# Patient Record
Sex: FEMALE | Race: OTHER | Employment: FULL TIME | URBAN - METROPOLITAN AREA
[De-identification: names, ages, dates, MRNs, and addresses within clinical notes are randomized per-mention and may not be internally consistent; named-entity substitution may affect disease eponyms.]

---

## 2017-09-15 PROBLEM — C54.1 ENDOMETRIAL CA (HCC): Status: ACTIVE | Noted: 2017-09-15

## 2018-01-01 ENCOUNTER — HOSPITAL ENCOUNTER (OUTPATIENT)
Dept: INTERVENTIONAL RADIOLOGY/VASCULAR | Age: 68
Discharge: HOME | End: 2018-12-07
Attending: NURSE PRACTITIONER

## 2018-01-01 ENCOUNTER — APPOINTMENT (OUTPATIENT)
Dept: CT IMAGING | Age: 68
DRG: 674 | End: 2018-01-01
Attending: EMERGENCY MEDICINE
Payer: COMMERCIAL

## 2018-01-01 ENCOUNTER — OFFICE VISIT (OUTPATIENT)
Dept: ONCOLOGY | Age: 68
End: 2018-01-01

## 2018-01-01 ENCOUNTER — HOSPITAL ENCOUNTER (OUTPATIENT)
Dept: PET IMAGING | Age: 68
Discharge: HOME | End: 2018-11-23
Attending: NURSE PRACTITIONER
Payer: COMMERCIAL

## 2018-01-01 ENCOUNTER — HOSPITAL ENCOUNTER (OUTPATIENT)
Dept: LAB | Age: 68
Discharge: HOME | End: 2018-12-04

## 2018-01-01 ENCOUNTER — HOSPITAL ENCOUNTER (INPATIENT)
Age: 68
LOS: 10 days | Discharge: HOME HEALTH | DRG: 674 | End: 2018-12-15
Attending: EMERGENCY MEDICINE | Admitting: HOSPITALIST
Payer: COMMERCIAL

## 2018-01-01 ENCOUNTER — HOSPITAL ENCOUNTER (OUTPATIENT)
Dept: INFUSION THERAPY | Age: 68
Discharge: HOME | End: 2018-11-14
Payer: COMMERCIAL

## 2018-01-01 ENCOUNTER — APPOINTMENT (OUTPATIENT)
Dept: INTERVENTIONAL RADIOLOGY/VASCULAR | Age: 68
DRG: 674 | End: 2018-01-01
Attending: FAMILY MEDICINE
Payer: COMMERCIAL

## 2018-01-01 ENCOUNTER — HOSPITAL ENCOUNTER (OUTPATIENT)
Dept: INFUSION THERAPY | Age: 68
End: 2018-01-01
Payer: COMMERCIAL

## 2018-01-01 ENCOUNTER — APPOINTMENT (OUTPATIENT)
Dept: INTERVENTIONAL RADIOLOGY/VASCULAR | Age: 68
DRG: 674 | End: 2018-01-01
Attending: RADIOLOGY
Payer: COMMERCIAL

## 2018-01-01 ENCOUNTER — TELEPHONE (OUTPATIENT)
Dept: ONCOLOGY | Age: 68
End: 2018-01-01

## 2018-01-01 ENCOUNTER — HOSPITAL ENCOUNTER (OUTPATIENT)
Dept: INFUSION THERAPY | Age: 68
Discharge: HOME | End: 2018-11-26
Payer: COMMERCIAL

## 2018-01-01 ENCOUNTER — APPOINTMENT (OUTPATIENT)
Dept: INFUSION THERAPY | Age: 68
End: 2018-01-01
Payer: COMMERCIAL

## 2018-01-01 ENCOUNTER — DOCUMENTATION ONLY (OUTPATIENT)
Dept: ONCOLOGY | Age: 68
End: 2018-01-01

## 2018-01-01 VITALS
TEMPERATURE: 96 F | BODY MASS INDEX: 33.87 KG/M2 | WEIGHT: 198.4 LBS | DIASTOLIC BLOOD PRESSURE: 98 MMHG | OXYGEN SATURATION: 96 % | RESPIRATION RATE: 16 BRPM | HEIGHT: 64 IN | SYSTOLIC BLOOD PRESSURE: 189 MMHG | HEART RATE: 88 BPM

## 2018-01-01 VITALS
OXYGEN SATURATION: 99 % | DIASTOLIC BLOOD PRESSURE: 95 MMHG | HEART RATE: 80 BPM | RESPIRATION RATE: 18 BRPM | TEMPERATURE: 97.5 F | SYSTOLIC BLOOD PRESSURE: 176 MMHG

## 2018-01-01 VITALS
TEMPERATURE: 98.4 F | OXYGEN SATURATION: 96 % | RESPIRATION RATE: 18 BRPM | DIASTOLIC BLOOD PRESSURE: 90 MMHG | HEART RATE: 84 BPM | SYSTOLIC BLOOD PRESSURE: 174 MMHG

## 2018-01-01 VITALS
HEIGHT: 65 IN | WEIGHT: 192.5 LBS | HEART RATE: 110 BPM | OXYGEN SATURATION: 97 % | SYSTOLIC BLOOD PRESSURE: 120 MMHG | TEMPERATURE: 98.9 F | BODY MASS INDEX: 32.07 KG/M2 | DIASTOLIC BLOOD PRESSURE: 71 MMHG | RESPIRATION RATE: 17 BRPM

## 2018-01-01 DIAGNOSIS — C54.1 ENDOMETRIAL CANCER (HCC): ICD-10-CM

## 2018-01-01 DIAGNOSIS — C54.1 ENDOMETRIAL CA (HCC): Primary | ICD-10-CM

## 2018-01-01 DIAGNOSIS — N13.30 HYDRONEPHROSIS: ICD-10-CM

## 2018-01-01 DIAGNOSIS — C54.1 ENDOMETRIAL CANCER (HCC): Primary | ICD-10-CM

## 2018-01-01 DIAGNOSIS — R94.8 ABNORMAL POSITRON EMISSION TOMOGRAPHY (PET) SCAN: ICD-10-CM

## 2018-01-01 DIAGNOSIS — N17.9 ACUTE RENAL FAILURE, UNSPECIFIED ACUTE RENAL FAILURE TYPE (HCC): ICD-10-CM

## 2018-01-01 DIAGNOSIS — C54.1: ICD-10-CM

## 2018-01-01 LAB
A-G RATIO,AGRAT: 1.6 RATIO (ref 1.1–2.6)
ABSOLUTE LYMPHOCYTE COUNT, 10803: 1.9 K/UL (ref 1–4.8)
ALBUMIN SERPL-MCNC: 3.1 G/DL (ref 3.4–5)
ALBUMIN SERPL-MCNC: 3.8 G/DL (ref 3.4–5)
ALBUMIN SERPL-MCNC: 4.3 G/DL (ref 3.5–5)
ALBUMIN/GLOB SERPL: 0.9 {RATIO} (ref 0.8–1.7)
ALBUMIN/GLOB SERPL: 1 {RATIO} (ref 0.8–1.7)
ALP SERPL-CCNC: 398 U/L (ref 40–120)
ALP SERPL-CCNC: 434 U/L (ref 45–117)
ALP SERPL-CCNC: 506 U/L (ref 45–117)
ALT SERPL-CCNC: 12 U/L (ref 13–56)
ALT SERPL-CCNC: 13 U/L (ref 13–56)
ALT SERPL-CCNC: 7 U/L (ref 5–40)
AMYLASE SERPL-CCNC: 86 U/L (ref 20–120)
ANION GAP SERPL CALC-SCNC: 10 MMOL/L (ref 3–18)
ANION GAP SERPL CALC-SCNC: 10 MMOL/L (ref 3–18)
ANION GAP SERPL CALC-SCNC: 11 MMOL/L (ref 3–18)
ANION GAP SERPL CALC-SCNC: 12 MMOL/L (ref 3–18)
ANION GAP SERPL CALC-SCNC: 22 MMOL/L
ANION GAP SERPL CALC-SCNC: 7 MMOL/L (ref 3–18)
ANION GAP SERPL CALC-SCNC: 7 MMOL/L (ref 3–18)
ANION GAP SERPL CALC-SCNC: 8 MMOL/L (ref 3–18)
ANION GAP SERPL CALC-SCNC: 9 MMOL/L (ref 3–18)
APPEARANCE UR: ABNORMAL
APTT PPP: 31.8 SEC (ref 23–36.4)
APTT PPP: 34.4 SEC (ref 23–36.4)
AST SERPL W P-5'-P-CCNC: 8 U/L (ref 10–37)
AST SERPL-CCNC: 16 U/L (ref 15–37)
AST SERPL-CCNC: 22 U/L (ref 15–37)
ATRIAL RATE: 90 BPM
BACTERIA URNS QL MICRO: ABNORMAL /HPF
BASOPHILS # BLD: 0 K/UL (ref 0–0.06)
BASOPHILS # BLD: 0 K/UL (ref 0–0.1)
BASOPHILS # BLD: 0 K/UL (ref 0–0.2)
BASOPHILS NFR BLD: 0 % (ref 0–2)
BASOPHILS NFR BLD: 0 % (ref 0–2)
BASOPHILS NFR BLD: 0 % (ref 0–3)
BILIRUB SERPL-MCNC: 0.4 MG/DL (ref 0.2–1)
BILIRUB SERPL-MCNC: 0.4 MG/DL (ref 0.2–1)
BILIRUB SERPL-MCNC: 0.4 MG/DL (ref 0.2–1.2)
BILIRUB UR QL: NEGATIVE
BUN SERPL-MCNC: 18 MG/DL (ref 7–18)
BUN SERPL-MCNC: 20 MG/DL (ref 7–18)
BUN SERPL-MCNC: 23 MG/DL (ref 7–18)
BUN SERPL-MCNC: 24 MG/DL (ref 7–18)
BUN SERPL-MCNC: 26 MG/DL (ref 7–18)
BUN SERPL-MCNC: 26 MG/DL (ref 7–18)
BUN SERPL-MCNC: 30 MG/DL (ref 7–18)
BUN SERPL-MCNC: 32 MG/DL (ref 7–18)
BUN SERPL-MCNC: 33 MG/DL (ref 7–18)
BUN SERPL-MCNC: 36 MG/DL (ref 7–18)
BUN SERPL-MCNC: 36 MG/DL (ref 7–18)
BUN SERPL-MCNC: 43 MG/DL (ref 7–18)
BUN SERPL-MCNC: 43 MG/DL (ref 7–18)
BUN SERPL-MCNC: 48 MG/DL (ref 7–18)
BUN SERPL-MCNC: 55 MG/DL (ref 7–18)
BUN SERPL-MCNC: 57 MG/DL (ref 6–22)
BUN SERPL-MCNC: 59 MG/DL (ref 7–18)
BUN SERPL-MCNC: 63 MG/DL (ref 7–18)
BUN SERPL-MCNC: 63 MG/DL (ref 7–18)
BUN SERPL-MCNC: 64 MG/DL (ref 7–18)
BUN/CREAT SERPL: 10 (ref 12–20)
BUN/CREAT SERPL: 11 (ref 12–20)
BUN/CREAT SERPL: 11 (ref 12–20)
BUN/CREAT SERPL: 17 (ref 12–20)
BUN/CREAT SERPL: 6 (ref 12–20)
BUN/CREAT SERPL: 7 (ref 12–20)
BUN/CREAT SERPL: 8 (ref 12–20)
BUN/CREAT SERPL: 9 (ref 12–20)
BUN/CREAT SERPL: 9 (ref 12–20)
CALCIUM SERPL-MCNC: 8 MG/DL (ref 8.5–10.1)
CALCIUM SERPL-MCNC: 8 MG/DL (ref 8.5–10.1)
CALCIUM SERPL-MCNC: 8.1 MG/DL (ref 8.5–10.1)
CALCIUM SERPL-MCNC: 8.2 MG/DL (ref 8.5–10.1)
CALCIUM SERPL-MCNC: 8.3 MG/DL (ref 8.5–10.1)
CALCIUM SERPL-MCNC: 8.4 MG/DL (ref 8.5–10.1)
CALCIUM SERPL-MCNC: 8.5 MG/DL (ref 8.5–10.1)
CALCIUM SERPL-MCNC: 8.6 MG/DL (ref 8.5–10.1)
CALCIUM SERPL-MCNC: 8.6 MG/DL (ref 8.5–10.1)
CALCIUM SERPL-MCNC: 9.1 MG/DL (ref 8.4–10.5)
CALCIUM SERPL-MCNC: 9.1 MG/DL (ref 8.5–10.1)
CALCIUM SERPL-MCNC: 9.4 MG/DL (ref 8.5–10.1)
CALCULATED P AXIS, ECG09: 20 DEGREES
CALCULATED R AXIS, ECG10: -23 DEGREES
CALCULATED T AXIS, ECG11: 15 DEGREES
CANCER AG125 SERPL-ACNC: 328 U/ML (ref 0–38.1)
CHLORIDE SERPL-SCNC: 101 MMOL/L (ref 98–110)
CHLORIDE SERPL-SCNC: 104 MMOL/L (ref 100–108)
CHLORIDE SERPL-SCNC: 104 MMOL/L (ref 100–108)
CHLORIDE SERPL-SCNC: 105 MMOL/L (ref 100–108)
CHLORIDE SERPL-SCNC: 106 MMOL/L (ref 100–108)
CHLORIDE SERPL-SCNC: 107 MMOL/L (ref 100–108)
CHLORIDE SERPL-SCNC: 108 MMOL/L (ref 100–108)
CHLORIDE SERPL-SCNC: 108 MMOL/L (ref 100–108)
CHLORIDE SERPL-SCNC: 109 MMOL/L (ref 100–108)
CHLORIDE SERPL-SCNC: 109 MMOL/L (ref 100–108)
CHLORIDE SERPL-SCNC: 98 MMOL/L (ref 100–108)
CHLORIDE SERPL-SCNC: 99 MMOL/L (ref 100–108)
CHOLEST SERPL-MCNC: 156 MG/DL
CO2 SERPL-SCNC: 20 MMOL/L (ref 20–32)
CO2 SERPL-SCNC: 21 MMOL/L (ref 21–32)
CO2 SERPL-SCNC: 22 MMOL/L (ref 21–32)
CO2 SERPL-SCNC: 23 MMOL/L (ref 21–32)
CO2 SERPL-SCNC: 24 MMOL/L (ref 21–32)
CO2 SERPL-SCNC: 25 MMOL/L (ref 21–32)
CO2 SERPL-SCNC: 25 MMOL/L (ref 21–32)
CO2 SERPL-SCNC: 26 MMOL/L (ref 21–32)
CO2 SERPL-SCNC: 27 MMOL/L (ref 21–32)
COLOR UR: YELLOW
CREAT SERPL-MCNC: 1.32 MG/DL (ref 0.6–1.3)
CREAT SERPL-MCNC: 1.61 MG/DL (ref 0.6–1.3)
CREAT SERPL-MCNC: 1.78 MG/DL (ref 0.6–1.3)
CREAT SERPL-MCNC: 10.1 MG/DL (ref 0.6–1.3)
CREAT SERPL-MCNC: 2.45 MG/DL (ref 0.6–1.3)
CREAT SERPL-MCNC: 2.53 MG/DL (ref 0.6–1.3)
CREAT SERPL-MCNC: 2.69 MG/DL (ref 0.6–1.3)
CREAT SERPL-MCNC: 2.96 MG/DL (ref 0.6–1.3)
CREAT SERPL-MCNC: 3.4 MG/DL (ref 0.6–1.3)
CREAT SERPL-MCNC: 3.71 MG/DL (ref 0.6–1.3)
CREAT SERPL-MCNC: 4.27 MG/DL (ref 0.6–1.3)
CREAT SERPL-MCNC: 4.45 MG/DL (ref 0.6–1.3)
CREAT SERPL-MCNC: 5.24 MG/DL (ref 0.6–1.3)
CREAT SERPL-MCNC: 5.74 MG/DL (ref 0.6–1.3)
CREAT SERPL-MCNC: 6.24 MG/DL (ref 0.6–1.3)
CREAT SERPL-MCNC: 8.05 MG/DL (ref 0.6–1.3)
CREAT SERPL-MCNC: 8.4 MG/DL (ref 0.8–1.4)
CREAT SERPL-MCNC: 9.29 MG/DL (ref 0.6–1.3)
CREAT SERPL-MCNC: 9.67 MG/DL (ref 0.6–1.3)
CREAT SERPL-MCNC: 9.97 MG/DL (ref 0.6–1.3)
DIAGNOSIS, 93000: NORMAL
DIFFERENTIAL METHOD BLD: ABNORMAL
DIFFERENTIAL METHOD BLD: ABNORMAL
EOSINOPHIL # BLD: 0 K/UL (ref 0–0.4)
EOSINOPHIL # BLD: 0.4 K/UL (ref 0–0.5)
EOSINOPHIL # BLD: 0.5 K/UL (ref 0–0.4)
EOSINOPHIL NFR BLD: 0 % (ref 0–5)
EOSINOPHIL NFR BLD: 3 % (ref 0–6)
EOSINOPHIL NFR BLD: 4 % (ref 0–5)
EPITH CASTS URNS QL MICRO: ABNORMAL /LPF (ref 0–5)
ERYTHROCYTE [DISTWIDTH] IN BLOOD BY AUTOMATED COUNT: 13.9 % (ref 11.6–14.5)
ERYTHROCYTE [DISTWIDTH] IN BLOOD BY AUTOMATED COUNT: 14.1 % (ref 11.6–14.5)
ERYTHROCYTE [DISTWIDTH] IN BLOOD BY AUTOMATED COUNT: 14.2 % (ref 11.6–14.5)
ERYTHROCYTE [DISTWIDTH] IN BLOOD BY AUTOMATED COUNT: 14.2 % (ref 11.6–14.5)
ERYTHROCYTE [DISTWIDTH] IN BLOOD BY AUTOMATED COUNT: 14.4 % (ref 10–15.5)
GFRAA, 66117: 5.9
GFRNA, 66118: 4.8
GLOBULIN SER CALC-MCNC: 3.5 G/DL (ref 2–4)
GLOBULIN SER CALC-MCNC: 4 G/DL (ref 2–4)
GLOBULIN,GLOB: 2.7 G/DL (ref 2–4)
GLUCOSE BLD STRIP.AUTO-MCNC: 106 MG/DL (ref 70–110)
GLUCOSE BLD STRIP.AUTO-MCNC: 108 MG/DL (ref 70–110)
GLUCOSE BLD STRIP.AUTO-MCNC: 110 MG/DL (ref 70–110)
GLUCOSE BLD STRIP.AUTO-MCNC: 128 MG/DL (ref 70–110)
GLUCOSE SERPL-MCNC: 101 MG/DL (ref 74–99)
GLUCOSE SERPL-MCNC: 103 MG/DL (ref 74–99)
GLUCOSE SERPL-MCNC: 103 MG/DL (ref 74–99)
GLUCOSE SERPL-MCNC: 104 MG/DL (ref 74–99)
GLUCOSE SERPL-MCNC: 104 MG/DL (ref 74–99)
GLUCOSE SERPL-MCNC: 107 MG/DL (ref 74–99)
GLUCOSE SERPL-MCNC: 109 MG/DL (ref 74–99)
GLUCOSE SERPL-MCNC: 109 MG/DL (ref 74–99)
GLUCOSE SERPL-MCNC: 112 MG/DL (ref 74–99)
GLUCOSE SERPL-MCNC: 112 MG/DL (ref 74–99)
GLUCOSE SERPL-MCNC: 113 MG/DL (ref 74–99)
GLUCOSE SERPL-MCNC: 120 MG/DL (ref 74–99)
GLUCOSE SERPL-MCNC: 127 MG/DL (ref 70–99)
GLUCOSE SERPL-MCNC: 129 MG/DL (ref 74–99)
GLUCOSE SERPL-MCNC: 138 MG/DL (ref 74–99)
GLUCOSE SERPL-MCNC: 82 MG/DL (ref 74–99)
GLUCOSE SERPL-MCNC: 91 MG/DL (ref 74–99)
GLUCOSE SERPL-MCNC: 92 MG/DL (ref 74–99)
GLUCOSE SERPL-MCNC: 92 MG/DL (ref 74–99)
GLUCOSE SERPL-MCNC: 96 MG/DL (ref 74–99)
GLUCOSE UR STRIP.AUTO-MCNC: NEGATIVE MG/DL
GRANULOCYTES,GRANS: 76 % (ref 40–75)
HBV SURFACE AB SER QL IA: NEGATIVE
HBV SURFACE AB SERPL IA-ACNC: <3.1 MIU/ML
HBV SURFACE AG SER QL: 0.13 INDEX
HBV SURFACE AG SER QL: NEGATIVE
HCT VFR BLD AUTO: 28.3 % (ref 35–45)
HCT VFR BLD AUTO: 31 % (ref 35–45)
HCT VFR BLD AUTO: 32.5 % (ref 35–45)
HCT VFR BLD AUTO: 33.6 % (ref 35–45)
HCT VFR BLD AUTO: 34.2 % (ref 35.1–48.3)
HDLC SERPL-MCNC: 34 MG/DL (ref 40–60)
HDLC SERPL: 4.6 {RATIO} (ref 0–5)
HEP BS AB COMMENT,HBSAC: ABNORMAL
HGB BLD-MCNC: 10.5 G/DL (ref 12–16)
HGB BLD-MCNC: 10.9 G/DL (ref 12–16)
HGB BLD-MCNC: 11 G/DL (ref 11.7–16.1)
HGB BLD-MCNC: 9.3 G/DL (ref 12–16)
HGB BLD-MCNC: 9.7 G/DL (ref 12–16)
HGB UR QL STRIP: ABNORMAL
INR BLD: 0.9 (ref 0.9–1.2)
INR PPP: 1.1 (ref 0.8–1.2)
KETONES UR QL STRIP.AUTO: NEGATIVE MG/DL
LDLC SERPL CALC-MCNC: 99.2 MG/DL (ref 0–100)
LEUKOCYTE ESTERASE UR QL STRIP.AUTO: ABNORMAL
LIPASE SERPL-CCNC: 40 U/L (ref 7–60)
LIPID PROFILE,FLP: ABNORMAL
LYMPHOCYTES # BLD: 1.9 K/UL (ref 0.9–3.6)
LYMPHOCYTES # BLD: 2.5 K/UL (ref 0.8–3.5)
LYMPHOCYTES NFR BLD: 13 % (ref 20–51)
LYMPHOCYTES NFR BLD: 14 % (ref 21–52)
LYMPHOCYTES, LYMLT: 13 % (ref 20–45)
MAGNESIUM SERPL-MCNC: 1.8 MG/DL (ref 1.6–2.6)
MAGNESIUM SERPL-MCNC: 2.7 MG/DL (ref 1.6–2.5)
MCH RBC QN AUTO: 28 PG (ref 24–34)
MCH RBC QN AUTO: 28.6 PG (ref 24–34)
MCH RBC QN AUTO: 28.7 PG (ref 24–34)
MCH RBC QN AUTO: 29 PG (ref 24–34)
MCH RBC QN AUTO: 29 PG (ref 26–34)
MCHC RBC AUTO-ENTMCNC: 31.3 G/DL (ref 31–37)
MCHC RBC AUTO-ENTMCNC: 32 G/DL (ref 31–36)
MCHC RBC AUTO-ENTMCNC: 32.3 G/DL (ref 31–37)
MCHC RBC AUTO-ENTMCNC: 32.4 G/DL (ref 31–37)
MCHC RBC AUTO-ENTMCNC: 32.9 G/DL (ref 31–37)
MCV RBC AUTO: 88.2 FL (ref 74–97)
MCV RBC AUTO: 88.4 FL (ref 74–97)
MCV RBC AUTO: 88.6 FL (ref 74–97)
MCV RBC AUTO: 89.6 FL (ref 74–97)
MCV RBC AUTO: 91 FL (ref 80–95)
MONOCYTES # BLD: 1 K/UL (ref 0.1–1)
MONOCYTES # BLD: 1.4 K/UL (ref 0.05–1.2)
MONOCYTES # BLD: 2.3 K/UL (ref 0–1)
MONOCYTES NFR BLD: 10 % (ref 3–10)
MONOCYTES NFR BLD: 12 % (ref 2–9)
MONOCYTES NFR BLD: 7 % (ref 3–12)
NEUTROPHILS # BLD AUTO: 10.5 K/UL (ref 1.8–7.7)
NEUTS SEG # BLD: 14.5 K/UL (ref 1.8–8)
NEUTS SEG # BLD: 9.7 K/UL (ref 1.8–8)
NEUTS SEG NFR BLD: 72 % (ref 40–73)
NEUTS SEG NFR BLD: 75 % (ref 42–75)
NITRITE UR QL STRIP.AUTO: NEGATIVE
P-R INTERVAL, ECG05: 158 MS
PH UR STRIP: 5 [PH] (ref 5–8)
PHOSPHATE SERPL-MCNC: 3.6 MG/DL (ref 2.5–4.9)
PLATELET # BLD AUTO: 153 K/UL (ref 135–420)
PLATELET # BLD AUTO: 175 K/UL (ref 135–420)
PLATELET # BLD AUTO: 176 K/UL (ref 135–420)
PLATELET # BLD AUTO: 193 K/UL (ref 140–440)
PLATELET # BLD AUTO: 231 K/UL (ref 135–420)
PLATELET COMMENTS,PCOM: ABNORMAL
PMV BLD AUTO: 8.6 FL (ref 9.2–11.8)
PMV BLD AUTO: 8.8 FL (ref 9.2–11.8)
PMV BLD AUTO: 8.9 FL (ref 9.2–11.8)
PMV BLD AUTO: 8.9 FL (ref 9.2–11.8)
PMV BLD AUTO: 9.6 FL (ref 9–13)
POTASSIUM SERPL-SCNC: 3.8 MMOL/L (ref 3.5–5.5)
POTASSIUM SERPL-SCNC: 4 MMOL/L (ref 3.5–5.5)
POTASSIUM SERPL-SCNC: 4.1 MMOL/L (ref 3.5–5.5)
POTASSIUM SERPL-SCNC: 4.2 MMOL/L (ref 3.5–5.5)
POTASSIUM SERPL-SCNC: 4.2 MMOL/L (ref 3.5–5.5)
POTASSIUM SERPL-SCNC: 4.3 MMOL/L (ref 3.5–5.5)
POTASSIUM SERPL-SCNC: 4.4 MMOL/L (ref 3.5–5.5)
POTASSIUM SERPL-SCNC: 4.5 MMOL/L (ref 3.5–5.5)
POTASSIUM SERPL-SCNC: 4.6 MMOL/L (ref 3.5–5.5)
POTASSIUM SERPL-SCNC: 5.2 MMOL/L (ref 3.5–5.5)
POTASSIUM SERPL-SCNC: 5.4 MMOL/L (ref 3.5–5.5)
POTASSIUM SERPL-SCNC: 5.4 MMOL/L (ref 3.5–5.5)
POTASSIUM SERPL-SCNC: 5.6 MMOL/L (ref 3.5–5.5)
POTASSIUM SERPL-SCNC: 5.8 MMOL/L (ref 3.5–5.5)
POTASSIUM SERPL-SCNC: 5.9 MMOL/L (ref 3.5–5.5)
POTASSIUM SERPL-SCNC: 6.2 MMOL/L (ref 3.5–5.5)
POTASSIUM SERPL-SCNC: 6.3 MMOL/L (ref 3.5–5.5)
PROT SERPL-MCNC: 6.6 G/DL (ref 6.4–8.2)
PROT SERPL-MCNC: 7 G/DL (ref 6.2–8.1)
PROT SERPL-MCNC: 7.8 G/DL (ref 6.4–8.2)
PROT UR STRIP-MCNC: 30 MG/DL
PROTHROMBIN TIME: 13.4 SEC (ref 11.5–15.2)
Q-T INTERVAL, ECG07: 352 MS
QRS DURATION, ECG06: 70 MS
QTC CALCULATION (BEZET), ECG08: 430 MS
RBC # BLD AUTO: 3.21 M/UL (ref 4.2–5.3)
RBC # BLD AUTO: 3.46 M/UL (ref 4.2–5.3)
RBC # BLD AUTO: 3.67 M/UL (ref 4.2–5.3)
RBC # BLD AUTO: 3.76 M/UL (ref 3.8–5.2)
RBC # BLD AUTO: 3.8 M/UL (ref 4.2–5.3)
RBC #/AREA URNS HPF: ABNORMAL /HPF (ref 0–5)
RBC MORPH BLD: ABNORMAL
SENTARA SPECIMEN COL,SENBCF: NORMAL
SODIUM SERPL-SCNC: 133 MMOL/L (ref 136–145)
SODIUM SERPL-SCNC: 133 MMOL/L (ref 136–145)
SODIUM SERPL-SCNC: 136 MMOL/L (ref 136–145)
SODIUM SERPL-SCNC: 136 MMOL/L (ref 136–145)
SODIUM SERPL-SCNC: 138 MMOL/L (ref 136–145)
SODIUM SERPL-SCNC: 139 MMOL/L (ref 136–145)
SODIUM SERPL-SCNC: 140 MMOL/L (ref 136–145)
SODIUM SERPL-SCNC: 140 MMOL/L (ref 136–145)
SODIUM SERPL-SCNC: 141 MMOL/L (ref 136–145)
SODIUM SERPL-SCNC: 143 MMOL/L (ref 133–145)
SODIUM SERPL-SCNC: 143 MMOL/L (ref 136–145)
SP GR UR REFRACTOMETRY: 1.01 (ref 1–1.03)
TRIGL SERPL-MCNC: 114 MG/DL (ref ?–150)
UROBILINOGEN UR QL STRIP.AUTO: 0.2 EU/DL (ref 0.2–1)
VENTRICULAR RATE, ECG03: 90 BPM
VLDLC SERPL CALC-MCNC: 22.8 MG/DL
WBC # BLD AUTO: 11.4 K/UL (ref 4.6–13.2)
WBC # BLD AUTO: 13.6 K/UL (ref 4.6–13.2)
WBC # BLD AUTO: 13.7 K/UL (ref 4.6–13.2)
WBC # BLD AUTO: 13.9 K/UL (ref 4–11)
WBC # BLD AUTO: 19.3 K/UL (ref 4.6–13.2)
WBC URNS QL MICRO: ABNORMAL /HPF (ref 0–4)

## 2018-01-01 PROCEDURE — 74011250637 HC RX REV CODE- 250/637: Performed by: EMERGENCY MEDICINE

## 2018-01-01 PROCEDURE — 74011250637 HC RX REV CODE- 250/637: Performed by: FAMILY MEDICINE

## 2018-01-01 PROCEDURE — 36415 COLL VENOUS BLD VENIPUNCTURE: CPT

## 2018-01-01 PROCEDURE — 65660000000 HC RM CCU STEPDOWN

## 2018-01-01 PROCEDURE — 74011000250 HC RX REV CODE- 250: Performed by: HOSPITALIST

## 2018-01-01 PROCEDURE — 74011250636 HC RX REV CODE- 250/636: Performed by: HOSPITALIST

## 2018-01-01 PROCEDURE — 74011000258 HC RX REV CODE- 258: Performed by: EMERGENCY MEDICINE

## 2018-01-01 PROCEDURE — C1894 INTRO/SHEATH, NON-LASER: HCPCS

## 2018-01-01 PROCEDURE — 85730 THROMBOPLASTIN TIME PARTIAL: CPT

## 2018-01-01 PROCEDURE — 82962 GLUCOSE BLOOD TEST: CPT

## 2018-01-01 PROCEDURE — 96523 IRRIG DRUG DELIVERY DEVICE: CPT

## 2018-01-01 PROCEDURE — 74011250636 HC RX REV CODE- 250/636: Performed by: FAMILY MEDICINE

## 2018-01-01 PROCEDURE — 74011000258 HC RX REV CODE- 258: Performed by: INTERNAL MEDICINE

## 2018-01-01 PROCEDURE — 76937 US GUIDE VASCULAR ACCESS: CPT

## 2018-01-01 PROCEDURE — C1769 GUIDE WIRE: HCPCS

## 2018-01-01 PROCEDURE — 74011000258 HC RX REV CODE- 258: Performed by: HOSPITALIST

## 2018-01-01 PROCEDURE — 77002 NEEDLE LOCALIZATION BY XRAY: CPT

## 2018-01-01 PROCEDURE — 74011636320 HC RX REV CODE- 636/320: Performed by: RADIOLOGY

## 2018-01-01 PROCEDURE — 05PY03Z REMOVAL OF INFUSION DEVICE FROM UPPER VEIN, OPEN APPROACH: ICD-10-PCS | Performed by: RADIOLOGY

## 2018-01-01 PROCEDURE — 85025 COMPLETE CBC W/AUTO DIFF WBC: CPT

## 2018-01-01 PROCEDURE — 80048 BASIC METABOLIC PNL TOTAL CA: CPT

## 2018-01-01 PROCEDURE — 74011250637 HC RX REV CODE- 250/637: Performed by: INTERNAL MEDICINE

## 2018-01-01 PROCEDURE — 74011250636 HC RX REV CODE- 250/636: Performed by: SURGERY

## 2018-01-01 PROCEDURE — 85027 COMPLETE CBC AUTOMATED: CPT

## 2018-01-01 PROCEDURE — 74011250636 HC RX REV CODE- 250/636

## 2018-01-01 PROCEDURE — 74011636637 HC RX REV CODE- 636/637: Performed by: EMERGENCY MEDICINE

## 2018-01-01 PROCEDURE — 84100 ASSAY OF PHOSPHORUS: CPT

## 2018-01-01 PROCEDURE — 86304 IMMUNOASSAY TUMOR CA 125: CPT

## 2018-01-01 PROCEDURE — 99284 EMERGENCY DEPT VISIT MOD MDM: CPT

## 2018-01-01 PROCEDURE — 74011250636 HC RX REV CODE- 250/636: Performed by: INTERNAL MEDICINE

## 2018-01-01 PROCEDURE — 74011250637 HC RX REV CODE- 250/637: Performed by: HOSPITALIST

## 2018-01-01 PROCEDURE — 77030022017 HC DRSG HEMO QCLOT ZMED -A

## 2018-01-01 PROCEDURE — A9552 F18 FDG: HCPCS

## 2018-01-01 PROCEDURE — 80061 LIPID PANEL: CPT

## 2018-01-01 PROCEDURE — 96374 THER/PROPH/DIAG INJ IV PUSH: CPT

## 2018-01-01 PROCEDURE — 77030012965 HC NDL HUBR BBMI -A

## 2018-01-01 PROCEDURE — 93005 ELECTROCARDIOGRAM TRACING: CPT

## 2018-01-01 PROCEDURE — 74011000250 HC RX REV CODE- 250: Performed by: INTERNAL MEDICINE

## 2018-01-01 PROCEDURE — 36593 DECLOT VASCULAR DEVICE: CPT

## 2018-01-01 PROCEDURE — 77030002986 HC SUT PROL J&J -A

## 2018-01-01 PROCEDURE — 74011636637 HC RX REV CODE- 636/637: Performed by: HOSPITALIST

## 2018-01-01 PROCEDURE — 85610 PROTHROMBIN TIME: CPT

## 2018-01-01 PROCEDURE — 99211 OFF/OP EST MAY X REQ PHY/QHP: CPT

## 2018-01-01 PROCEDURE — C1894 INTRO/SHEATH, NON-LASER: HCPCS | Performed by: SURGERY

## 2018-01-01 PROCEDURE — 76937 US GUIDE VASCULAR ACCESS: CPT | Performed by: SURGERY

## 2018-01-01 PROCEDURE — 77030002916 HC SUT ETHLN J&J -A: Performed by: SURGERY

## 2018-01-01 PROCEDURE — 80053 COMPREHEN METABOLIC PANEL: CPT

## 2018-01-01 PROCEDURE — BT131ZZ FLUOROSCOPY OF BILATERAL KIDNEYS USING LOW OSMOLAR CONTRAST: ICD-10-PCS | Performed by: RADIOLOGY

## 2018-01-01 PROCEDURE — 77030025702 HC BG URIN DRN MRTM -A

## 2018-01-01 PROCEDURE — 0JH63WZ INSERTION OF TOTALLY IMPLANTABLE VASCULAR ACCESS DEVICE INTO CHEST SUBCUTANEOUS TISSUE AND FASCIA, PERCUTANEOUS APPROACH: ICD-10-PCS | Performed by: RADIOLOGY

## 2018-01-01 PROCEDURE — 0T9030Z DRAINAGE OF RIGHT KIDNEY WITH DRAINAGE DEVICE, PERCUTANEOUS APPROACH: ICD-10-PCS | Performed by: RADIOLOGY

## 2018-01-01 PROCEDURE — 74011250636 HC RX REV CODE- 250/636: Performed by: RADIOLOGY

## 2018-01-01 PROCEDURE — 05HM33Z INSERTION OF INFUSION DEVICE INTO RIGHT INTERNAL JUGULAR VEIN, PERCUTANEOUS APPROACH: ICD-10-PCS | Performed by: RADIOLOGY

## 2018-01-01 PROCEDURE — 77010033678 HC OXYGEN DAILY

## 2018-01-01 PROCEDURE — 77030031139 HC SUT VCRL2 J&J -A

## 2018-01-01 PROCEDURE — 74011000250 HC RX REV CODE- 250: Performed by: RADIOLOGY

## 2018-01-01 PROCEDURE — 0JPT0WZ REMOVAL OF TOTALLY IMPLANTABLE VASCULAR ACCESS DEVICE FROM TRUNK SUBCUTANEOUS TISSUE AND FASCIA, OPEN APPROACH: ICD-10-PCS | Performed by: RADIOLOGY

## 2018-01-01 PROCEDURE — 36556 INSERT NON-TUNNEL CV CATH: CPT | Performed by: SURGERY

## 2018-01-01 PROCEDURE — 83735 ASSAY OF MAGNESIUM: CPT

## 2018-01-01 PROCEDURE — 84132 ASSAY OF SERUM POTASSIUM: CPT

## 2018-01-01 PROCEDURE — 99001 SPECIMEN HANDLING PT-LAB: CPT

## 2018-01-01 PROCEDURE — 96375 TX/PRO/DX INJ NEW DRUG ADDON: CPT

## 2018-01-01 PROCEDURE — B5131ZA FLUOROSCOPY OF RIGHT JUGULAR VEINS USING LOW OSMOLAR CONTRAST, GUIDANCE: ICD-10-PCS | Performed by: RADIOLOGY

## 2018-01-01 PROCEDURE — 05HM33Z INSERTION OF INFUSION DEVICE INTO RIGHT INTERNAL JUGULAR VEIN, PERCUTANEOUS APPROACH: ICD-10-PCS | Performed by: SURGERY

## 2018-01-01 PROCEDURE — 81001 URINALYSIS AUTO W/SCOPE: CPT

## 2018-01-01 PROCEDURE — 87340 HEPATITIS B SURFACE AG IA: CPT

## 2018-01-01 PROCEDURE — 74011250636 HC RX REV CODE- 250/636: Performed by: EMERGENCY MEDICINE

## 2018-01-01 PROCEDURE — C1752 CATH,HEMODIALYSIS,SHORT-TERM: HCPCS | Performed by: SURGERY

## 2018-01-01 PROCEDURE — 86706 HEP B SURFACE ANTIBODY: CPT

## 2018-01-01 PROCEDURE — 0T9130Z DRAINAGE OF LEFT KIDNEY WITH DRAINAGE DEVICE, PERCUTANEOUS APPROACH: ICD-10-PCS | Performed by: RADIOLOGY

## 2018-01-01 PROCEDURE — 51702 INSERT TEMP BLADDER CATH: CPT

## 2018-01-01 PROCEDURE — C1788 PORT, INDWELLING, IMP: HCPCS

## 2018-01-01 PROCEDURE — C1729 CATH, DRAINAGE: HCPCS

## 2018-01-01 PROCEDURE — 74011000250 HC RX REV CODE- 250: Performed by: EMERGENCY MEDICINE

## 2018-01-01 PROCEDURE — 94640 AIRWAY INHALATION TREATMENT: CPT

## 2018-01-01 PROCEDURE — 74176 CT ABD & PELVIS W/O CONTRAST: CPT

## 2018-01-01 PROCEDURE — 77030018719 HC DRSG PTCH ANTIMIC J&J -A: Performed by: SURGERY

## 2018-01-01 PROCEDURE — C1893 INTRO/SHEATH, FIXED,NON-PEEL: HCPCS

## 2018-01-01 RX ORDER — ONDANSETRON 4 MG/1
4 TABLET, FILM COATED ORAL
Qty: 30 TAB | Refills: 3 | Status: SHIPPED | OUTPATIENT
Start: 2018-01-01 | End: 2019-01-01 | Stop reason: ALTCHOICE

## 2018-01-01 RX ORDER — OXYCODONE AND ACETAMINOPHEN 5; 325 MG/1; MG/1
1-2 TABLET ORAL
Qty: 20 TAB | Refills: 0 | Status: SHIPPED | OUTPATIENT
Start: 2018-01-01 | End: 2019-01-01

## 2018-01-01 RX ORDER — HYDRALAZINE HYDROCHLORIDE 20 MG/ML
10 INJECTION INTRAMUSCULAR; INTRAVENOUS
Status: DISCONTINUED | OUTPATIENT
Start: 2018-01-01 | End: 2018-01-01 | Stop reason: HOSPADM

## 2018-01-01 RX ORDER — SODIUM CHLORIDE 0.9 % (FLUSH) 0.9 %
5-10 SYRINGE (ML) INJECTION EVERY 8 HOURS
Status: DISCONTINUED | OUTPATIENT
Start: 2018-01-01 | End: 2018-01-01 | Stop reason: SDUPTHER

## 2018-01-01 RX ORDER — IODIXANOL 320 MG/ML
50 INJECTION, SOLUTION INTRAVASCULAR
Status: COMPLETED | OUTPATIENT
Start: 2018-01-01 | End: 2018-01-01

## 2018-01-01 RX ORDER — SODIUM CHLORIDE 0.9 % (FLUSH) 0.9 %
5-10 SYRINGE (ML) INJECTION AS NEEDED
Status: DISCONTINUED | OUTPATIENT
Start: 2018-01-01 | End: 2018-01-01 | Stop reason: HOSPADM

## 2018-01-01 RX ORDER — MIDAZOLAM HYDROCHLORIDE 1 MG/ML
INJECTION, SOLUTION INTRAMUSCULAR; INTRAVENOUS AS NEEDED
Status: DISCONTINUED | OUTPATIENT
Start: 2018-01-01 | End: 2018-01-01 | Stop reason: HOSPADM

## 2018-01-01 RX ORDER — ACETAMINOPHEN 325 MG/1
650 TABLET ORAL
Status: DISCONTINUED | OUTPATIENT
Start: 2018-01-01 | End: 2018-01-01 | Stop reason: HOSPADM

## 2018-01-01 RX ORDER — NALOXONE HYDROCHLORIDE 0.4 MG/ML
0.1 INJECTION, SOLUTION INTRAMUSCULAR; INTRAVENOUS; SUBCUTANEOUS
Status: DISCONTINUED | OUTPATIENT
Start: 2018-01-01 | End: 2018-01-01 | Stop reason: HOSPADM

## 2018-01-01 RX ORDER — FENTANYL CITRATE 50 UG/ML
50 INJECTION, SOLUTION INTRAMUSCULAR; INTRAVENOUS
Status: DISCONTINUED | OUTPATIENT
Start: 2018-01-01 | End: 2018-01-01

## 2018-01-01 RX ORDER — FLUMAZENIL 0.1 MG/ML
0.2 INJECTION INTRAVENOUS
Status: DISCONTINUED | OUTPATIENT
Start: 2018-01-01 | End: 2018-01-01 | Stop reason: SDUPTHER

## 2018-01-01 RX ORDER — FACIAL-BODY WIPES
10 EACH TOPICAL DAILY PRN
Status: DISCONTINUED | OUTPATIENT
Start: 2018-01-01 | End: 2018-01-01 | Stop reason: HOSPADM

## 2018-01-01 RX ORDER — MORPHINE SULFATE 2 MG/ML
2 INJECTION, SOLUTION INTRAMUSCULAR; INTRAVENOUS
Status: DISCONTINUED | OUTPATIENT
Start: 2018-01-01 | End: 2018-01-01 | Stop reason: SDUPTHER

## 2018-01-01 RX ORDER — OXYCODONE AND ACETAMINOPHEN 5; 325 MG/1; MG/1
1-2 TABLET ORAL
Status: DISCONTINUED | OUTPATIENT
Start: 2018-01-01 | End: 2018-01-01 | Stop reason: HOSPADM

## 2018-01-01 RX ORDER — DOCUSATE SODIUM 100 MG/1
100 CAPSULE, LIQUID FILLED ORAL 2 TIMES DAILY
Qty: 60 CAP | Refills: 0 | Status: SHIPPED | OUTPATIENT
Start: 2018-01-01 | End: 2019-01-01

## 2018-01-01 RX ORDER — DIPHENHYDRAMINE HYDROCHLORIDE 50 MG/ML
25-50 INJECTION, SOLUTION INTRAMUSCULAR; INTRAVENOUS ONCE
Status: COMPLETED | OUTPATIENT
Start: 2018-01-01 | End: 2018-01-01

## 2018-01-01 RX ORDER — SODIUM CHLORIDE 9 MG/ML
75 INJECTION, SOLUTION INTRAVENOUS CONTINUOUS
Status: DISCONTINUED | OUTPATIENT
Start: 2018-01-01 | End: 2018-01-01

## 2018-01-01 RX ORDER — METOPROLOL TARTRATE 25 MG/1
12.5 TABLET, FILM COATED ORAL 2 TIMES DAILY
Status: DISCONTINUED | OUTPATIENT
Start: 2018-01-01 | End: 2018-01-01

## 2018-01-01 RX ORDER — ADHESIVE BANDAGE
30 BANDAGE TOPICAL DAILY PRN
Status: DISCONTINUED | OUTPATIENT
Start: 2018-01-01 | End: 2018-01-01 | Stop reason: HOSPADM

## 2018-01-01 RX ORDER — ALBUTEROL SULFATE 2.5 MG/.5ML
5 SOLUTION RESPIRATORY (INHALATION)
Status: COMPLETED | OUTPATIENT
Start: 2018-01-01 | End: 2018-01-01

## 2018-01-01 RX ORDER — HEPARIN 100 UNIT/ML
500 SYRINGE INTRAVENOUS ONCE
Status: ACTIVE | OUTPATIENT
Start: 2018-01-01 | End: 2018-01-01

## 2018-01-01 RX ORDER — HEPARIN 100 UNIT/ML
500 SYRINGE INTRAVENOUS AS NEEDED
Status: DISCONTINUED | OUTPATIENT
Start: 2018-01-01 | End: 2018-01-01 | Stop reason: HOSPADM

## 2018-01-01 RX ORDER — SODIUM CHLORIDE 0.9 % (FLUSH) 0.9 %
5-10 SYRINGE (ML) INJECTION EVERY 8 HOURS
Status: DISCONTINUED | OUTPATIENT
Start: 2018-01-01 | End: 2018-01-01 | Stop reason: HOSPADM

## 2018-01-01 RX ORDER — CYCLOBENZAPRINE HCL 10 MG
5 TABLET ORAL
Status: DISCONTINUED | OUTPATIENT
Start: 2018-01-01 | End: 2018-01-01 | Stop reason: HOSPADM

## 2018-01-01 RX ORDER — MORPHINE SULFATE 4 MG/ML
2 INJECTION INTRAVENOUS
Status: DISCONTINUED | OUTPATIENT
Start: 2018-01-01 | End: 2018-01-01 | Stop reason: HOSPADM

## 2018-01-01 RX ORDER — DEXTROSE 50 % IN WATER (D50W) INTRAVENOUS SYRINGE
25 ONCE
Status: COMPLETED | OUTPATIENT
Start: 2018-01-01 | End: 2018-01-01

## 2018-01-01 RX ORDER — LIDOCAINE HYDROCHLORIDE 10 MG/ML
INJECTION, SOLUTION EPIDURAL; INFILTRATION; INTRACAUDAL; PERINEURAL AS NEEDED
Status: DISCONTINUED | OUTPATIENT
Start: 2018-01-01 | End: 2018-01-01 | Stop reason: HOSPADM

## 2018-01-01 RX ORDER — DEXTROSE 50 % IN WATER (D50W) INTRAVENOUS SYRINGE
25
Status: COMPLETED | OUTPATIENT
Start: 2018-01-01 | End: 2018-01-01

## 2018-01-01 RX ORDER — SODIUM POLYSTYRENE SULFONATE 15 G/60ML
15 SUSPENSION ORAL; RECTAL
Status: COMPLETED | OUTPATIENT
Start: 2018-01-01 | End: 2018-01-01

## 2018-01-01 RX ORDER — FUROSEMIDE 10 MG/ML
40 INJECTION INTRAMUSCULAR; INTRAVENOUS ONCE
Status: ACTIVE | OUTPATIENT
Start: 2018-01-01 | End: 2018-01-01

## 2018-01-01 RX ORDER — HEPARIN SODIUM (PORCINE) LOCK FLUSH IV SOLN 100 UNIT/ML 100 UNIT/ML
500 SOLUTION INTRAVENOUS AS NEEDED
Status: DISCONTINUED | OUTPATIENT
Start: 2018-01-01 | End: 2018-01-01 | Stop reason: HOSPADM

## 2018-01-01 RX ORDER — CALCIUM GLUCONATE 94 MG/ML
1 INJECTION, SOLUTION INTRAVENOUS ONCE
Status: DISCONTINUED | OUTPATIENT
Start: 2018-01-01 | End: 2018-01-01 | Stop reason: CLARIF

## 2018-01-01 RX ORDER — MAGNESIUM CITRATE
296 SOLUTION, ORAL ORAL
Status: COMPLETED | OUTPATIENT
Start: 2018-01-01 | End: 2018-01-01

## 2018-01-01 RX ORDER — SODIUM BICARBONATE 1 MEQ/ML
50 SYRINGE (ML) INTRAVENOUS ONCE
Status: ACTIVE | OUTPATIENT
Start: 2018-01-01 | End: 2018-01-01

## 2018-01-01 RX ORDER — METOPROLOL TARTRATE 25 MG/1
25 TABLET, FILM COATED ORAL 2 TIMES DAILY
Status: DISCONTINUED | OUTPATIENT
Start: 2018-01-01 | End: 2018-01-01

## 2018-01-01 RX ORDER — HYDRALAZINE HYDROCHLORIDE 25 MG/1
25 TABLET, FILM COATED ORAL EVERY 8 HOURS
Status: DISCONTINUED | OUTPATIENT
Start: 2018-01-01 | End: 2018-01-01

## 2018-01-01 RX ORDER — MIDAZOLAM HYDROCHLORIDE 1 MG/ML
1 INJECTION, SOLUTION INTRAMUSCULAR; INTRAVENOUS
Status: DISCONTINUED | OUTPATIENT
Start: 2018-01-01 | End: 2018-01-01

## 2018-01-01 RX ORDER — FACIAL-BODY WIPES
10 EACH TOPICAL
Qty: 10 SUPPOSITORY | Refills: 0 | Status: SHIPPED | OUTPATIENT
Start: 2018-01-01 | End: 2019-01-01

## 2018-01-01 RX ORDER — HEPARIN SODIUM 5000 [USP'U]/ML
5000 INJECTION, SOLUTION INTRAVENOUS; SUBCUTANEOUS EVERY 8 HOURS
Status: DISCONTINUED | OUTPATIENT
Start: 2018-01-01 | End: 2018-01-01

## 2018-01-01 RX ORDER — HYDRALAZINE HYDROCHLORIDE 25 MG/1
25 TABLET, FILM COATED ORAL 3 TIMES DAILY
Status: DISCONTINUED | OUTPATIENT
Start: 2018-01-01 | End: 2018-01-01

## 2018-01-01 RX ORDER — TAMOXIFEN CITRATE 10 MG/1
20 TABLET, FILM COATED ORAL 2 TIMES DAILY
Status: DISCONTINUED | OUTPATIENT
Start: 2018-01-01 | End: 2018-01-01

## 2018-01-01 RX ORDER — ONDANSETRON HYDROCHLORIDE 8 MG/1
8 TABLET, FILM COATED ORAL
Qty: 30 TAB | Refills: 0 | Status: SHIPPED | OUTPATIENT
Start: 2018-01-01 | End: 2019-01-01

## 2018-01-01 RX ORDER — ONDANSETRON 2 MG/ML
4 INJECTION INTRAMUSCULAR; INTRAVENOUS
Status: DISCONTINUED | OUTPATIENT
Start: 2018-01-01 | End: 2018-01-01 | Stop reason: HOSPADM

## 2018-01-01 RX ORDER — DOCUSATE SODIUM 100 MG/1
100 CAPSULE, LIQUID FILLED ORAL 2 TIMES DAILY
Status: DISCONTINUED | OUTPATIENT
Start: 2018-01-01 | End: 2018-01-01 | Stop reason: HOSPADM

## 2018-01-01 RX ORDER — MEGESTROL ACETATE 40 MG/1
80 TABLET ORAL 2 TIMES DAILY
Qty: 120 TAB | Refills: 3 | Status: SHIPPED | OUTPATIENT
Start: 2018-01-01 | End: 2019-01-01 | Stop reason: ALTCHOICE

## 2018-01-01 RX ORDER — SODIUM CHLORIDE 0.9 % (FLUSH) 0.9 %
5-10 SYRINGE (ML) INJECTION AS NEEDED
Status: DISCONTINUED | OUTPATIENT
Start: 2018-01-01 | End: 2018-01-01 | Stop reason: SDUPTHER

## 2018-01-01 RX ORDER — SODIUM CHLORIDE 0.9 % (FLUSH) 0.9 %
10-40 SYRINGE (ML) INJECTION AS NEEDED
Status: DISCONTINUED | OUTPATIENT
Start: 2018-01-01 | End: 2018-01-01 | Stop reason: HOSPADM

## 2018-01-01 RX ORDER — HEPARIN 100 UNIT/ML
SYRINGE INTRAVENOUS
Status: DISPENSED
Start: 2018-01-01 | End: 2018-01-01

## 2018-01-01 RX ORDER — LIDOCAINE HYDROCHLORIDE 10 MG/ML
INJECTION, SOLUTION EPIDURAL; INFILTRATION; INTRACAUDAL; PERINEURAL
Status: COMPLETED
Start: 2018-01-01 | End: 2018-01-01

## 2018-01-01 RX ORDER — CYCLOBENZAPRINE HCL 10 MG
5 TABLET ORAL
Qty: 30 TAB | Refills: 0 | Status: SHIPPED | OUTPATIENT
Start: 2018-01-01 | End: 2019-01-01 | Stop reason: ALTCHOICE

## 2018-01-01 RX ORDER — CIPROFLOXACIN 2 MG/ML
400 INJECTION, SOLUTION INTRAVENOUS ONCE
Status: COMPLETED | OUTPATIENT
Start: 2018-01-01 | End: 2018-01-01

## 2018-01-01 RX ORDER — MIDAZOLAM HYDROCHLORIDE 1 MG/ML
1 INJECTION, SOLUTION INTRAMUSCULAR; INTRAVENOUS
Status: DISCONTINUED | OUTPATIENT
Start: 2018-01-01 | End: 2018-01-01 | Stop reason: HOSPADM

## 2018-01-01 RX ORDER — BISACODYL 5 MG
10 TABLET, DELAYED RELEASE (ENTERIC COATED) ORAL DAILY PRN
Status: DISCONTINUED | OUTPATIENT
Start: 2018-01-01 | End: 2018-01-01

## 2018-01-01 RX ORDER — LIDOCAINE HYDROCHLORIDE 10 MG/ML
30 INJECTION, SOLUTION EPIDURAL; INFILTRATION; INTRACAUDAL; PERINEURAL ONCE
Status: COMPLETED | OUTPATIENT
Start: 2018-01-01 | End: 2018-01-01

## 2018-01-01 RX ORDER — TAMOXIFEN CITRATE 20 MG/1
20 TABLET ORAL 2 TIMES DAILY
Qty: 120 TAB | Refills: 0 | Status: SHIPPED | OUTPATIENT
Start: 2018-01-01 | End: 2019-01-01

## 2018-01-01 RX ORDER — BISACODYL 5 MG
10 TABLET, DELAYED RELEASE (ENTERIC COATED) ORAL
Qty: 30 TAB | Refills: 1 | Status: SHIPPED | OUTPATIENT
Start: 2018-01-01 | End: 2019-01-01

## 2018-01-01 RX ORDER — NALOXONE HYDROCHLORIDE 0.4 MG/ML
0.1 INJECTION, SOLUTION INTRAMUSCULAR; INTRAVENOUS; SUBCUTANEOUS
Status: DISCONTINUED | OUTPATIENT
Start: 2018-01-01 | End: 2018-01-01 | Stop reason: SDUPTHER

## 2018-01-01 RX ORDER — CEFAZOLIN SODIUM 2 G/50ML
2 SOLUTION INTRAVENOUS ONCE
Status: COMPLETED | OUTPATIENT
Start: 2018-01-01 | End: 2018-01-01

## 2018-01-01 RX ORDER — MORPHINE SULFATE 10 MG/ML
2 INJECTION, SOLUTION INTRAMUSCULAR; INTRAVENOUS
Status: DISCONTINUED | OUTPATIENT
Start: 2018-01-01 | End: 2018-01-01

## 2018-01-01 RX ORDER — FLUMAZENIL 0.1 MG/ML
0.2 INJECTION INTRAVENOUS
Status: DISCONTINUED | OUTPATIENT
Start: 2018-01-01 | End: 2018-01-01 | Stop reason: HOSPADM

## 2018-01-01 RX ORDER — FENTANYL CITRATE 50 UG/ML
50 INJECTION, SOLUTION INTRAMUSCULAR; INTRAVENOUS
Status: DISCONTINUED | OUTPATIENT
Start: 2018-01-01 | End: 2018-01-01 | Stop reason: HOSPADM

## 2018-01-01 RX ORDER — FENTANYL CITRATE 50 UG/ML
INJECTION, SOLUTION INTRAMUSCULAR; INTRAVENOUS AS NEEDED
Status: DISCONTINUED | OUTPATIENT
Start: 2018-01-01 | End: 2018-01-01 | Stop reason: HOSPADM

## 2018-01-01 RX ORDER — SODIUM CHLORIDE 450 MG/100ML
100 INJECTION, SOLUTION INTRAVENOUS CONTINUOUS
Status: DISCONTINUED | OUTPATIENT
Start: 2018-01-01 | End: 2018-01-01

## 2018-01-01 RX ADMIN — CALCIUM GLUCONATE 1 G: 98 INJECTION, SOLUTION INTRAVENOUS at 08:29

## 2018-01-01 RX ADMIN — Medication 10 ML: at 13:33

## 2018-01-01 RX ADMIN — Medication 10 ML: at 05:13

## 2018-01-01 RX ADMIN — Medication 10 ML: at 05:47

## 2018-01-01 RX ADMIN — HEPARIN SODIUM 5000 UNITS: 5000 INJECTION, SOLUTION INTRAVENOUS; SUBCUTANEOUS at 08:00

## 2018-01-01 RX ADMIN — HEPARIN SODIUM 5000 UNITS: 5000 INJECTION, SOLUTION INTRAVENOUS; SUBCUTANEOUS at 08:29

## 2018-01-01 RX ADMIN — ONDANSETRON 4 MG: 2 INJECTION INTRAMUSCULAR; INTRAVENOUS at 15:58

## 2018-01-01 RX ADMIN — HYDRALAZINE HYDROCHLORIDE 25 MG: 25 TABLET, FILM COATED ORAL at 08:58

## 2018-01-01 RX ADMIN — SODIUM CHLORIDE 75 ML/HR: 900 INJECTION, SOLUTION INTRAVENOUS at 18:49

## 2018-01-01 RX ADMIN — CYCLOBENZAPRINE HYDROCHLORIDE 5 MG: 10 TABLET, FILM COATED ORAL at 13:26

## 2018-01-01 RX ADMIN — Medication 10 ML: at 14:28

## 2018-01-01 RX ADMIN — ONDANSETRON 4 MG: 2 INJECTION INTRAMUSCULAR; INTRAVENOUS at 21:59

## 2018-01-01 RX ADMIN — OXYCODONE AND ACETAMINOPHEN 2 TABLET: 5; 325 TABLET ORAL at 09:36

## 2018-01-01 RX ADMIN — MORPHINE SULFATE 2 MG: 4 INJECTION INTRAVENOUS at 10:16

## 2018-01-01 RX ADMIN — ONDANSETRON 4 MG: 2 INJECTION INTRAMUSCULAR; INTRAVENOUS at 17:00

## 2018-01-01 RX ADMIN — HEPARIN SODIUM 5000 UNITS: 5000 INJECTION, SOLUTION INTRAVENOUS; SUBCUTANEOUS at 00:07

## 2018-01-01 RX ADMIN — TAMOXIFEN CITRATE 20 MG: 10 TABLET, FILM COATED ORAL at 09:46

## 2018-01-01 RX ADMIN — MORPHINE SULFATE 2 MG: 4 INJECTION INTRAVENOUS at 21:59

## 2018-01-01 RX ADMIN — Medication 10 ML: at 15:03

## 2018-01-01 RX ADMIN — ONDANSETRON 4 MG: 2 INJECTION INTRAMUSCULAR; INTRAVENOUS at 20:50

## 2018-01-01 RX ADMIN — Medication 10 ML: at 06:08

## 2018-01-01 RX ADMIN — FENTANYL CITRATE 50 MCG: 50 INJECTION INTRAMUSCULAR; INTRAVENOUS at 16:05

## 2018-01-01 RX ADMIN — ONDANSETRON 4 MG: 2 INJECTION INTRAMUSCULAR; INTRAVENOUS at 15:06

## 2018-01-01 RX ADMIN — BISACODYL 10 MG: 5 TABLET, COATED ORAL at 13:25

## 2018-01-01 RX ADMIN — Medication 10 ML: at 06:33

## 2018-01-01 RX ADMIN — HEPARIN SODIUM 5000 UNITS: 5000 INJECTION, SOLUTION INTRAVENOUS; SUBCUTANEOUS at 01:04

## 2018-01-01 RX ADMIN — HEPARIN SODIUM 5000 UNITS: 5000 INJECTION, SOLUTION INTRAVENOUS; SUBCUTANEOUS at 16:11

## 2018-01-01 RX ADMIN — SODIUM POLYSTYRENE SULFONATE 15 G: 15 SUSPENSION ORAL; RECTAL at 22:25

## 2018-01-01 RX ADMIN — Medication 20 ML: at 16:27

## 2018-01-01 RX ADMIN — OXYCODONE AND ACETAMINOPHEN 1 TABLET: 5; 325 TABLET ORAL at 14:05

## 2018-01-01 RX ADMIN — Medication 10 ML: at 17:00

## 2018-01-01 RX ADMIN — HEPARIN SODIUM 5000 UNITS: 5000 INJECTION, SOLUTION INTRAVENOUS; SUBCUTANEOUS at 11:54

## 2018-01-01 RX ADMIN — MORPHINE SULFATE 2 MG: 10 INJECTION INTRAMUSCULAR; INTRAVENOUS; SUBCUTANEOUS at 19:49

## 2018-01-01 RX ADMIN — Medication 10 ML: at 06:16

## 2018-01-01 RX ADMIN — DEXTROSE MONOHYDRATE 25 G: 25 INJECTION, SOLUTION INTRAVENOUS at 22:19

## 2018-01-01 RX ADMIN — MIDAZOLAM HYDROCHLORIDE 1 MG: 2 INJECTION, SOLUTION INTRAMUSCULAR; INTRAVENOUS at 15:20

## 2018-01-01 RX ADMIN — Medication 10 ML: at 21:47

## 2018-01-01 RX ADMIN — ONDANSETRON 4 MG: 2 INJECTION INTRAMUSCULAR; INTRAVENOUS at 03:57

## 2018-01-01 RX ADMIN — LIDOCAINE HYDROCHLORIDE 30 ML: 10 INJECTION, SOLUTION EPIDURAL; INFILTRATION; INTRACAUDAL; PERINEURAL at 13:07

## 2018-01-01 RX ADMIN — DEXTROSE MONOHYDRATE 25 G: 25 INJECTION, SOLUTION INTRAVENOUS at 06:30

## 2018-01-01 RX ADMIN — Medication 10 ML: at 06:09

## 2018-01-01 RX ADMIN — MORPHINE SULFATE 2 MG: 2 INJECTION, SOLUTION INTRAMUSCULAR; INTRAVENOUS at 06:58

## 2018-01-01 RX ADMIN — OXYCODONE AND ACETAMINOPHEN 2 TABLET: 5; 325 TABLET ORAL at 09:27

## 2018-01-01 RX ADMIN — OXYCODONE AND ACETAMINOPHEN 1 TABLET: 5; 325 TABLET ORAL at 02:36

## 2018-01-01 RX ADMIN — HEPARIN SODIUM 5000 UNITS: 5000 INJECTION, SOLUTION INTRAVENOUS; SUBCUTANEOUS at 08:44

## 2018-01-01 RX ADMIN — CALCIUM GLUCONATE 1 G: 98 INJECTION, SOLUTION INTRAVENOUS at 23:17

## 2018-01-01 RX ADMIN — INSULIN HUMAN 10 UNITS: 100 INJECTION, SOLUTION PARENTERAL at 22:22

## 2018-01-01 RX ADMIN — SODIUM CHLORIDE 100 ML/HR: 450 INJECTION, SOLUTION INTRAVENOUS at 05:29

## 2018-01-01 RX ADMIN — Medication 10 ML: at 14:00

## 2018-01-01 RX ADMIN — SODIUM CHLORIDE 100 ML/HR: 450 INJECTION, SOLUTION INTRAVENOUS at 22:11

## 2018-01-01 RX ADMIN — ONDANSETRON 4 MG: 2 INJECTION INTRAMUSCULAR; INTRAVENOUS at 07:32

## 2018-01-01 RX ADMIN — OXYCODONE AND ACETAMINOPHEN 2 TABLET: 5; 325 TABLET ORAL at 14:32

## 2018-01-01 RX ADMIN — MORPHINE SULFATE 2 MG: 10 INJECTION INTRAMUSCULAR; INTRAVENOUS; SUBCUTANEOUS at 00:32

## 2018-01-01 RX ADMIN — OXYCODONE AND ACETAMINOPHEN 2 TABLET: 5; 325 TABLET ORAL at 16:01

## 2018-01-01 RX ADMIN — HEPARIN SODIUM 5000 UNITS: 5000 INJECTION, SOLUTION INTRAVENOUS; SUBCUTANEOUS at 17:30

## 2018-01-01 RX ADMIN — OXYCODONE AND ACETAMINOPHEN 1 TABLET: 5; 325 TABLET ORAL at 14:15

## 2018-01-01 RX ADMIN — OXYCODONE AND ACETAMINOPHEN 1 TABLET: 5; 325 TABLET ORAL at 05:18

## 2018-01-01 RX ADMIN — SODIUM CHLORIDE 100 ML/HR: 450 INJECTION, SOLUTION INTRAVENOUS at 16:39

## 2018-01-01 RX ADMIN — LIDOCAINE HYDROCHLORIDE 300 MG: 10; .005 INJECTION, SOLUTION EPIDURAL; INFILTRATION; INTRACAUDAL; PERINEURAL at 15:30

## 2018-01-01 RX ADMIN — Medication 296 ML: at 17:48

## 2018-01-01 RX ADMIN — ALBUTEROL SULFATE 5 MG: 2.5 SOLUTION RESPIRATORY (INHALATION) at 22:16

## 2018-01-01 RX ADMIN — HEPARIN SODIUM 5000 UNITS: 5000 INJECTION, SOLUTION INTRAVENOUS; SUBCUTANEOUS at 09:07

## 2018-01-01 RX ADMIN — FENTANYL CITRATE 50 MCG: 50 INJECTION INTRAMUSCULAR; INTRAVENOUS at 15:55

## 2018-01-01 RX ADMIN — SODIUM CHLORIDE 100 ML/HR: 450 INJECTION, SOLUTION INTRAVENOUS at 04:12

## 2018-01-01 RX ADMIN — CEFTRIAXONE SODIUM 1 G: 1 INJECTION, POWDER, FOR SOLUTION INTRAMUSCULAR; INTRAVENOUS at 03:56

## 2018-01-01 RX ADMIN — Medication 10 ML: at 18:47

## 2018-01-01 RX ADMIN — DOCUSATE SODIUM 100 MG: 100 CAPSULE, LIQUID FILLED ORAL at 08:05

## 2018-01-01 RX ADMIN — Medication 10 ML: at 22:46

## 2018-01-01 RX ADMIN — FENTANYL CITRATE 50 MCG: 50 INJECTION INTRAMUSCULAR; INTRAVENOUS at 13:10

## 2018-01-01 RX ADMIN — Medication 10 ML: at 01:08

## 2018-01-01 RX ADMIN — SODIUM CHLORIDE 100 ML/HR: 450 INJECTION, SOLUTION INTRAVENOUS at 07:03

## 2018-01-01 RX ADMIN — Medication 10 ML: at 22:00

## 2018-01-01 RX ADMIN — MIDAZOLAM HYDROCHLORIDE 1 MG: 2 INJECTION, SOLUTION INTRAMUSCULAR; INTRAVENOUS at 13:25

## 2018-01-01 RX ADMIN — FENTANYL CITRATE 50 MCG: 50 INJECTION INTRAMUSCULAR; INTRAVENOUS at 15:25

## 2018-01-01 RX ADMIN — DOCUSATE SODIUM 100 MG: 100 CAPSULE, LIQUID FILLED ORAL at 13:25

## 2018-01-01 RX ADMIN — Medication 10 ML: at 06:00

## 2018-01-01 RX ADMIN — INSULIN HUMAN 10 UNITS: 100 INJECTION, SOLUTION PARENTERAL at 06:31

## 2018-01-01 RX ADMIN — HEPARIN SODIUM 5000 UNITS: 5000 INJECTION, SOLUTION INTRAVENOUS; SUBCUTANEOUS at 04:19

## 2018-01-01 RX ADMIN — Medication 10 ML: at 05:03

## 2018-01-01 RX ADMIN — FENTANYL CITRATE 50 MCG: 50 INJECTION INTRAMUSCULAR; INTRAVENOUS at 15:20

## 2018-01-01 RX ADMIN — ACETAMINOPHEN 650 MG: 325 TABLET ORAL at 00:56

## 2018-01-01 RX ADMIN — OXYCODONE AND ACETAMINOPHEN 1 TABLET: 5; 325 TABLET ORAL at 21:03

## 2018-01-01 RX ADMIN — OXYCODONE AND ACETAMINOPHEN 1 TABLET: 5; 325 TABLET ORAL at 09:46

## 2018-01-01 RX ADMIN — OXYCODONE AND ACETAMINOPHEN 2 TABLET: 5; 325 TABLET ORAL at 02:33

## 2018-01-01 RX ADMIN — OXYCODONE AND ACETAMINOPHEN 2 TABLET: 5; 325 TABLET ORAL at 22:12

## 2018-01-01 RX ADMIN — SODIUM CHLORIDE 100 ML/HR: 450 INJECTION, SOLUTION INTRAVENOUS at 20:57

## 2018-01-01 RX ADMIN — HEPARIN SODIUM 5000 UNITS: 5000 INJECTION, SOLUTION INTRAVENOUS; SUBCUTANEOUS at 16:26

## 2018-01-01 RX ADMIN — DOCUSATE SODIUM 100 MG: 100 CAPSULE, LIQUID FILLED ORAL at 08:27

## 2018-01-01 RX ADMIN — WATER 2 MG: 1 INJECTION INTRAMUSCULAR; INTRAVENOUS; SUBCUTANEOUS at 16:09

## 2018-01-01 RX ADMIN — Medication 10 ML: at 05:19

## 2018-01-01 RX ADMIN — FENTANYL CITRATE 50 MCG: 50 INJECTION INTRAMUSCULAR; INTRAVENOUS at 13:05

## 2018-01-01 RX ADMIN — Medication 10 ML: at 21:28

## 2018-01-01 RX ADMIN — IODIXANOL 50 ML: 320 INJECTION, SOLUTION INTRAVASCULAR at 12:00

## 2018-01-01 RX ADMIN — ONDANSETRON 4 MG: 2 INJECTION INTRAMUSCULAR; INTRAVENOUS at 09:06

## 2018-01-01 RX ADMIN — HEPARIN SODIUM 5000 UNITS: 5000 INJECTION, SOLUTION INTRAVENOUS; SUBCUTANEOUS at 16:01

## 2018-01-01 RX ADMIN — Medication 10 ML: at 16:45

## 2018-01-01 RX ADMIN — ONDANSETRON 4 MG: 2 INJECTION INTRAMUSCULAR; INTRAVENOUS at 04:12

## 2018-01-01 RX ADMIN — HEPARIN SODIUM 5000 UNITS: 5000 INJECTION, SOLUTION INTRAVENOUS; SUBCUTANEOUS at 00:42

## 2018-01-01 RX ADMIN — MORPHINE SULFATE 2 MG: 2 INJECTION, SOLUTION INTRAMUSCULAR; INTRAVENOUS at 20:10

## 2018-01-01 RX ADMIN — DIPHENHYDRAMINE HYDROCHLORIDE 25 MG: 50 INJECTION INTRAMUSCULAR; INTRAVENOUS at 15:29

## 2018-01-01 RX ADMIN — ACETAMINOPHEN 650 MG: 325 TABLET ORAL at 06:38

## 2018-01-01 RX ADMIN — FENTANYL CITRATE 50 MCG: 50 INJECTION INTRAMUSCULAR; INTRAVENOUS at 15:35

## 2018-01-01 RX ADMIN — DOCUSATE SODIUM 100 MG: 100 CAPSULE, LIQUID FILLED ORAL at 11:55

## 2018-01-01 RX ADMIN — OXYCODONE AND ACETAMINOPHEN 1 TABLET: 5; 325 TABLET ORAL at 23:51

## 2018-01-01 RX ADMIN — MIDAZOLAM HYDROCHLORIDE 1 MG: 2 INJECTION, SOLUTION INTRAMUSCULAR; INTRAVENOUS at 13:05

## 2018-01-01 RX ADMIN — SODIUM CHLORIDE 100 ML/HR: 450 INJECTION, SOLUTION INTRAVENOUS at 18:24

## 2018-01-01 RX ADMIN — MIDAZOLAM HYDROCHLORIDE 1 MG: 2 INJECTION, SOLUTION INTRAMUSCULAR; INTRAVENOUS at 13:10

## 2018-01-01 RX ADMIN — MIDAZOLAM HYDROCHLORIDE 1 MG: 2 INJECTION, SOLUTION INTRAMUSCULAR; INTRAVENOUS at 13:20

## 2018-01-01 RX ADMIN — HEPARIN SODIUM 5000 UNITS: 5000 INJECTION, SOLUTION INTRAVENOUS; SUBCUTANEOUS at 02:22

## 2018-01-01 RX ADMIN — HEPARIN SODIUM 5000 UNITS: 5000 INJECTION, SOLUTION INTRAVENOUS; SUBCUTANEOUS at 09:48

## 2018-01-01 RX ADMIN — Medication 10 ML: at 21:05

## 2018-01-01 RX ADMIN — FENTANYL CITRATE 50 MCG: 50 INJECTION INTRAMUSCULAR; INTRAVENOUS at 15:45

## 2018-01-01 RX ADMIN — OXYCODONE AND ACETAMINOPHEN 2 TABLET: 5; 325 TABLET ORAL at 12:12

## 2018-01-01 RX ADMIN — HEPARIN SODIUM 5000 UNITS: 5000 INJECTION, SOLUTION INTRAVENOUS; SUBCUTANEOUS at 16:42

## 2018-01-01 RX ADMIN — CEFAZOLIN SODIUM 2 G: 2 SOLUTION INTRAVENOUS at 15:10

## 2018-01-01 RX ADMIN — OXYCODONE AND ACETAMINOPHEN 1 TABLET: 5; 325 TABLET ORAL at 00:06

## 2018-01-01 RX ADMIN — Medication 10 ML: at 21:41

## 2018-01-01 RX ADMIN — MIDAZOLAM HYDROCHLORIDE 1 MG: 2 INJECTION, SOLUTION INTRAMUSCULAR; INTRAVENOUS at 15:35

## 2018-01-01 RX ADMIN — MIDAZOLAM HYDROCHLORIDE 1 MG: 2 INJECTION, SOLUTION INTRAMUSCULAR; INTRAVENOUS at 15:50

## 2018-01-01 RX ADMIN — SODIUM CHLORIDE 100 ML/HR: 450 INJECTION, SOLUTION INTRAVENOUS at 16:50

## 2018-01-01 RX ADMIN — Medication 10 ML: at 21:59

## 2018-01-01 RX ADMIN — HYDRALAZINE HYDROCHLORIDE 25 MG: 25 TABLET, FILM COATED ORAL at 14:26

## 2018-01-01 RX ADMIN — MIDAZOLAM HYDROCHLORIDE 1 MG: 2 INJECTION, SOLUTION INTRAMUSCULAR; INTRAVENOUS at 15:25

## 2018-01-01 RX ADMIN — Medication 10 ML: at 16:41

## 2018-01-01 RX ADMIN — IOHEXOL 50 ML: 300 INJECTION, SOLUTION INTRAVENOUS at 13:19

## 2018-01-01 RX ADMIN — OXYCODONE AND ACETAMINOPHEN 1 TABLET: 5; 325 TABLET ORAL at 20:05

## 2018-01-01 RX ADMIN — SODIUM CHLORIDE, PRESERVATIVE FREE 500 UNITS: 5 INJECTION INTRAVENOUS at 16:28

## 2018-01-01 RX ADMIN — FENTANYL CITRATE 50 MCG: 50 INJECTION INTRAMUSCULAR; INTRAVENOUS at 13:25

## 2018-01-01 RX ADMIN — CIPROFLOXACIN 400 MG: 2 INJECTION, SOLUTION INTRAVENOUS at 12:55

## 2018-01-01 RX ADMIN — BISACODYL 10 MG: 5 TABLET, COATED ORAL at 11:55

## 2018-01-01 RX ADMIN — CEFTRIAXONE SODIUM 1 G: 1 INJECTION, POWDER, FOR SOLUTION INTRAMUSCULAR; INTRAVENOUS at 02:22

## 2018-01-01 RX ADMIN — FENTANYL CITRATE 50 MCG: 50 INJECTION INTRAMUSCULAR; INTRAVENOUS at 13:20

## 2018-01-01 RX ADMIN — HEPARIN SODIUM 5000 UNITS: 5000 INJECTION, SOLUTION INTRAVENOUS; SUBCUTANEOUS at 20:48

## 2018-01-01 RX ADMIN — MORPHINE SULFATE 2 MG: 10 INJECTION INTRAMUSCULAR; INTRAVENOUS; SUBCUTANEOUS at 13:26

## 2018-01-01 RX ADMIN — DOCUSATE SODIUM 100 MG: 100 CAPSULE, LIQUID FILLED ORAL at 08:36

## 2018-01-01 RX ADMIN — MAGNESIUM HYDROXIDE 30 ML: 400 SUSPENSION ORAL at 14:30

## 2018-01-01 RX ADMIN — Medication 10 ML: at 22:47

## 2018-01-01 RX ADMIN — OXYCODONE AND ACETAMINOPHEN 1 TABLET: 5; 325 TABLET ORAL at 23:09

## 2018-01-01 RX ADMIN — HEPARIN SODIUM 5000 UNITS: 5000 INJECTION, SOLUTION INTRAVENOUS; SUBCUTANEOUS at 09:36

## 2018-01-01 RX ADMIN — SODIUM CHLORIDE 100 ML/HR: 900 INJECTION, SOLUTION INTRAVENOUS at 00:10

## 2018-01-01 RX ADMIN — Medication 10 ML: at 01:00

## 2018-01-01 RX ADMIN — OXYCODONE AND ACETAMINOPHEN 2 TABLET: 5; 325 TABLET ORAL at 15:02

## 2018-01-01 RX ADMIN — CEFTRIAXONE SODIUM 1 G: 1 INJECTION, POWDER, FOR SOLUTION INTRAMUSCULAR; INTRAVENOUS at 02:46

## 2018-01-01 RX ADMIN — MORPHINE SULFATE 2 MG: 2 INJECTION, SOLUTION INTRAMUSCULAR; INTRAVENOUS at 12:25

## 2018-01-01 RX ADMIN — OXYCODONE AND ACETAMINOPHEN 2 TABLET: 5; 325 TABLET ORAL at 17:06

## 2018-01-01 RX ADMIN — HEPARIN SODIUM (PORCINE) LOCK FLUSH IV SOLN 100 UNIT/ML 500 UNITS: 100 SOLUTION at 10:00

## 2018-01-01 RX ADMIN — SODIUM CHLORIDE 100 ML/HR: 450 INJECTION, SOLUTION INTRAVENOUS at 08:51

## 2018-01-01 RX ADMIN — HEPARIN SODIUM (PORCINE) LOCK FLUSH IV SOLN 100 UNIT/ML 500 UNITS: 100 SOLUTION at 15:57

## 2018-01-01 RX ADMIN — MORPHINE SULFATE 2 MG: 4 INJECTION INTRAVENOUS at 14:26

## 2018-01-01 RX ADMIN — Medication 10 ML: at 14:27

## 2018-01-01 RX ADMIN — MORPHINE SULFATE 2 MG: 10 INJECTION INTRAMUSCULAR; INTRAVENOUS; SUBCUTANEOUS at 06:06

## 2018-01-01 RX ADMIN — OXYCODONE AND ACETAMINOPHEN 2 TABLET: 5; 325 TABLET ORAL at 08:06

## 2018-01-01 RX ADMIN — HEPARIN SODIUM 5000 UNITS: 5000 INJECTION, SOLUTION INTRAVENOUS; SUBCUTANEOUS at 23:51

## 2018-01-01 RX ADMIN — OXYCODONE AND ACETAMINOPHEN 1 TABLET: 5; 325 TABLET ORAL at 04:11

## 2018-09-17 ENCOUNTER — TELEPHONE (OUTPATIENT)
Dept: ONCOLOGY | Age: 68
End: 2018-09-17

## 2018-09-17 NOTE — TELEPHONE ENCOUNTER
Patient contacted the office to reschedule her appointment for today. During call patient stated that she has an IV Mediport and has not had it flushed in 3 months. She would like to know if there is any way to have it flushed prior to her appointment on 10/10/18. Please call her at [phone]. 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

## 2018-09-19 NOTE — TELEPHONE ENCOUNTER
Spoke with patient and relayed that we would not be able to order an IV flush until after she is seen in office. Patient's appointment was moved to a sooner date in the [place] office.

## 2018-09-24 ENCOUNTER — TELEPHONE (OUTPATIENT)
Dept: ONCOLOGY | Age: 68
End: 2018-09-24

## 2018-09-24 NOTE — TELEPHONE ENCOUNTER
Per NP Jodine Angelucci, patient to ask if she can come in at 1400 for a 1500 appointment so we can review her records, she was agreeable.

## 2018-09-25 ENCOUNTER — OFFICE VISIT (OUTPATIENT)
Dept: ONCOLOGY | Age: 68
End: 2018-09-25

## 2018-09-25 VITALS
RESPIRATION RATE: 22 BRPM | HEART RATE: 101 BPM | BODY MASS INDEX: 35.03 KG/M2 | OXYGEN SATURATION: 96 % | HEIGHT: 64 IN | WEIGHT: 205.2 LBS | TEMPERATURE: 97.1 F | SYSTOLIC BLOOD PRESSURE: 148 MMHG | DIASTOLIC BLOOD PRESSURE: 92 MMHG

## 2018-09-25 DIAGNOSIS — C54.1: Primary | ICD-10-CM

## 2018-09-25 DIAGNOSIS — R94.8 ABNORMAL POSITRON EMISSION TOMOGRAPHY (PET) SCAN: ICD-10-CM

## 2018-09-25 NOTE — PROGRESS NOTES
Alberto Valerio, a 76 y.o. female,  is here for Chief Complaint Patient presents with  New Patient  
  previously seen Dr. Roxana Pederson; here for a second opinion Visit Vitals  BP (!) 148/92 (BP 1 Location: Right arm, BP Patient Position: Sitting)  Pulse (!) 101  Temp 97.1 °F (36.2 °C) (Oral)  Resp 22  
 Ht 5' 4\" (1.626 m)  Wt 93.1 kg (205 lb 3.2 oz)  SpO2 96%  BMI 35.22 kg/m2 Patient reports that she has urinary incontinence, she has prior to the operation, reports that recently it has improved. Patient denies any persistent or worsening abdominal or pelvic pain. Denies any unusual vaginal bleeding, discharge, irritation, or odor. Denies experiencing any constipation or diarrhea. No burning, discomfort, or irritation with urination. Reports that since she started chemotherapy she has had blurry vision intermittently. \" I notice I get headaches when my blood pressure goes up,\" did report having a current headache. Denies any dizziness.

## 2018-09-25 NOTE — PROGRESS NOTES
[facility] [address] [place], [address] [place] [address] 
[address] [place] [address], [address] [place], [facility] [place] 
 [phone] Freddy Elizabeth LANGFORD Patient ID: 
Name:  Treva Vila MRN:  [id number] :  1950/68 y.o. Date:  2018 HISTORY OF PRESENT ILLNESS: 
Treva Vila is a 76 y.o.   postmenopausal female self-referred for Stage IVB Grade 2 endometrioid adenocarcinoma, favor cervical primary. She initially presented with complaint of PMB x8-10 months to PCP. She was referred to gyn Dr. Ben Araujo, who referred to Dr. Diaz Vivar for large cervical mass. Last pap 17 years prior. Biopsy of cervical mass 17 showed adenocarcinoma, most c/w endometrioid adenocarcinoma, with initial staining suggesting endometrial origin. Patient had MRI of pelvis 17, which showed BL parametrial involvement, L>R, cancer extending from the large cervical mass into fundus and anterior 1/3 of vagina, no obvious adenopathy. PETCT 17 suspicious for metastatic disease to left ovary, omentum, liver report scanned in media. Patient is s/p EUA, cysto, sigmoidoscopy 17, medial left parametrial involvement, no tumor in bladder or rectosigmoid colon. She is s/p exploratory laparotomy with radical hysterectomy, BSO, resection of omental mass 9/15/17, pathology consistent with metastatic endometrioid adenocarcinoma, favor cervical primary, pathology report scanned in media. Patient is s/p 6 cycles Carbo/Taxol, completed 2018. Most recent PETCT 18 demonstrated new uptake in small nodular densities adjacent to cecum concerning for metastatic disease, increasing uptake in right thyroid gland concerning for neoplasm, as well as interval response to prior uptake. She was referred to Dr. Brady Velasco for further workup, suggestive of goiters, she has follow up in a few months. Treatment options offered by Dr. Matthew Handley included Topotecan vs Avastin, which was deferred during thyroid workup. She declined treatment due to side effects of last treatment and work concerns, along with the fact that she is feeling well overall. She reports residual neuropathy, taking B vitamins occasionally. Denies abdominopelvic pain, bloating, bowel or bladder changes. She presents today for further management. Pathology: 
9/15/17 Metastatic endometrioid adenocarcinoma, favor cervical primary Labs: 
5/16/18 Ca-125: 27 
 
9/11/17 Ca-125 108 Imaging 5/16/18 Thyroid Scanned in media 3/6/18 US head/neck 3/12/18 CT Chest 
Scanned in media  
(r/o PE) delayed thryoid uptake 8 weeks after 2/27/18 PETCT Scanned in media 8/23/17 MRI pelvis Scanned in media 8/24/17 MRI abdomen Scanned in media ROS:  
As above Patient Active Problem List  
 Diagnosis Date Noted  Endometrial ca ([facility] [address] 09/15/2017 Past Medical History:  
Diagnosis Date  BMI 34.0-34.9,adult 34.8  Caffeine adverse reaction   
 elevated BP  Cancer ([address] uterine, cervical  
 Cervical cancer (HCC)  Chronic sinusitis  Dizziness  Endometriosis  Hiatal hernia  High cholesterol  Palpitation  PMB (postmenopausal bleeding)  Rash   
 eczemz  Urinary incontinence Past Surgical History:  
Procedure Laterality Date  HX ABDOMINAL LAPAROSCOPY    
 HX BREAST LUMPECTOMY Right 1973  
 benign  HX HYSTERECTOMY  HX OTHER SURGICAL  09/01/2017 Exam under anesthesia: Cystoscopy, sigmoidoscopy OB History No data available Social History Substance Use Topics  Smoking status: Never Smoker  Smokeless tobacco: Never Used  Alcohol use No  
  
Family History Problem Relation Age of Onset  Cancer Mother   
  left ovary Current Outpatient Prescriptions Medication Sig  
  calcium carbonate (OS-MAYELA) 500 mg calcium (1,250 mg) tablet Take  by mouth daily.  levoFLOXacin (LEVAQUIN) 500 mg tablet Take 500 mg by mouth daily. Postop prevention  DOCOSAHEXANOIC ACID/EPA (FISH OIL PO) Take 1,000 mg by mouth daily.  LACTOBACILLUS ACIDOPHILUS (PROBIOTIC PO) Take  by mouth daily. No current facility-administered medications for this visit. Allergies Allergen Reactions  Other Food Nausea and Vomiting Artificial sweeteners  Aspirin Other (comments) Gi upset  Milk Itching and Atopic Dermatitis Eczema (dairy cow)  Wheat Atopic Dermatitis  
  eczema  Other Plant, Animal, Environmental Other (comments) Pt states she has \"springtime grass allergies. \" Reports reaction: Sinus infection OBJECTIVE: 
 
Physical Exam 
VITAL SIGNS: There were no vitals taken for this visit. GENERAL CRISTINO: in no apparent distress and well developed and well nourished MUSCULOSKEL: no joint tenderness, deformity or swelling INTEGUMENT:  warm and dry, no rashes or lesions ABDOMEN . soft, NT, ND, No masses appreciated EXTREMITIES: extremities normal, atraumatic, no cyanosis or edema PELVIC: Exam deferred. RECTAL: deferred LOKI SURVEY: Cervical, supraclavicular, axillary and inguinal nodes normal.  
NEURO: Grossly normal  
 
 
 
IMPRESSION/PLAN: 
1. H/o Stage IVB endometrioid cervical cancer vs. Endometrial cancer 
 -given abnormal PETCT in February and no imaging since will repeat PETCT and  
 -will see patient back to discuss 
 -discussed options of chemotherapy, immunotherapy and hormonal therapy pending results 
 -f/u after PET to discuss 
 -all of ms. pruett's questioins/concerns addressed The total time spent was 60 minutes regarding this patients diagnosis of cervical cancer and >50% of this time was spent counseling and coordinating care [facility] DO Gynecologic Oncology 9/25/20181:28PM

## 2018-09-26 ENCOUNTER — TELEPHONE (OUTPATIENT)
Dept: ONCOLOGY | Age: 68
End: 2018-09-26

## 2018-09-26 NOTE — TELEPHONE ENCOUNTER
Attempted to informed patient of her appointment for poft flush and labs at 1pm 10/03/2018 at [facility] location. No answer left voicemail requesting return call.          Ashley Whelan NP

## 2018-09-26 NOTE — TELEPHONE ENCOUNTER
Informed patient of 10/3/2018 1 pm appointment time. Patient asked to rescheduled time. Gave patient number to Isabel [phone] to reschedule.          Chantal Hamlin NP

## 2018-09-27 PROBLEM — E66.01 SEVERE OBESITY (BMI 35.0-39.9): Status: ACTIVE | Noted: 2018-09-27

## 2018-10-03 ENCOUNTER — HOSPITAL ENCOUNTER (OUTPATIENT)
Dept: INFUSION THERAPY | Age: 68
Discharge: HOME | End: 2018-10-03
Payer: COMMERCIAL

## 2018-10-03 VITALS
RESPIRATION RATE: 18 BRPM | SYSTOLIC BLOOD PRESSURE: 156 MMHG | HEART RATE: 93 BPM | OXYGEN SATURATION: 95 % | TEMPERATURE: 97.4 F | DIASTOLIC BLOOD PRESSURE: 82 MMHG

## 2018-10-03 PROCEDURE — 86304 IMMUNOASSAY TUMOR CA 125: CPT | Performed by: OBSTETRICS & GYNECOLOGY

## 2018-10-03 PROCEDURE — 36591 DRAW BLOOD OFF VENOUS DEVICE: CPT

## 2018-10-03 PROCEDURE — 77030012965 HC NDL HUBR BBMI -A

## 2018-10-03 PROCEDURE — 74011250636 HC RX REV CODE- 250/636: Performed by: INTERNAL MEDICINE

## 2018-10-03 RX ORDER — SODIUM CHLORIDE 0.9 % (FLUSH) 0.9 %
10-40 SYRINGE (ML) INJECTION AS NEEDED
Status: DISCONTINUED | OUTPATIENT
Start: 2018-10-03 | End: 2018-10-07 | Stop reason: HOSPADM

## 2018-10-03 RX ORDER — HEPARIN 100 UNIT/ML
500 SYRINGE INTRAVENOUS AS NEEDED
Status: DISCONTINUED | OUTPATIENT
Start: 2018-10-03 | End: 2018-10-07 | Stop reason: HOSPADM

## 2018-10-03 RX ADMIN — HEPARIN 500 UNITS: 100 SYRINGE at 15:46

## 2018-10-03 RX ADMIN — Medication 30 ML: at 15:45

## 2018-10-03 NOTE — PROGRESS NOTES
[facility] Progress Note Date: October 3, 2018 Name: Steffen Carroll MRN: [id number] : 1950 Monthly Port flush/lab draw Arrived ambulatory at 1520 Ms. Hewitt was assessed and education was provided. No complaints or concerns voiced Ms. Hewitt's vitals were reviewed. Visit Vitals  /82 (BP 1 Location: Left arm, BP Patient Position: Sitting)  Pulse 93  Temp 97.4 °F (36.3 °C)  Resp 18  SpO2 95% Mediport was accessed with # 20 gauge needle after chloroprep. Brisk flush noted Right chest, single Blood return: YES 
 
 blood sample collected for ca-125 per order Flushed with 20 ml of NS followed by Heparin 500u Arizmendi needle removed. Site s irritation, swelling or tenderness Band-Aid applied. Ms. Leticia Evangelista tolerated the procedure, and had no complaints. Patient ARMBAND removed and shredded Ms. Hewitt was discharged from [place] [address] in stable condition at 1550. She is to return on 18 at 1530 pm for her next appointment. Chance Whalen, RN October 3, 2018 
4:04 PM

## 2018-10-05 LAB — CANCER AG125 SERPL-ACNC: 202 U/ML (ref 0–38.1)

## 2018-11-14 NOTE — PROGRESS NOTES
[facility] Progress Note Date: 2018 Name: Anival Mccurdy MRN: [id number] : 1950 Monthly Port flush/lab draw Arrived ambulatory at 1500 Ms. Hewitt was assessed and education was provided. No complaints or concerns voiced Ms. Hewitt's vitals were reviewed. Visit Vitals BP (!) 176/95 (BP 1 Location: Left arm) Pulse 80 Temp 97.5 °F (36.4 °C) Resp 18 SpO2 99% Mediport was accessed with # 20 gauge needle after chloroprep. No blood return noted. Cath mariola injected. No blood return noted. Informed Jessica Flores RN from Dr Branda Gilford office. Obtained orders for peripheral blood draw, heparinize port, and pt is to return in one week to re access port. Orders to be scanned in by Jessica Flores RN 
 
 blood sample collected for ca-125 per order Flushed with 20 ml of NS followed by Heparin 500u Arizmendi needle removed. Site s irritation, swelling or tenderness Band-Aid applied. Ms. Baudilio Rick tolerated the procedure, and had no complaints. Patient ARMBAND removed and shredded Ms. Hewitt was discharged from [place] [address] in stable condition at 1550. She is to return on 18 at 1655 pm for her next appointment. Tina Foy RN 2018 
4:04 PM

## 2018-11-15 NOTE — TELEPHONE ENCOUNTER
I called patient and spoke with her regarding her appointment for next week to have her port re-accessed due to it did not give blood return yesterday when she went for a port flush yesterday. Orders written yesterday to flush port and return in a week for a port flush with possible use of Activase. 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 She agreed to take the 4 pm on Tuesday 11-20-18. She said she would try to get there a little early. I will call scheduling and let them know.

## 2018-11-26 NOTE — PROGRESS NOTES
[facility] Progress Note Date: 2018 Name: Dennis Oneil MRN: [id number] : 1950 Monthly Port flush Arrived ambulatory at 25 [id number] Ms. Hewitt was assessed and education was provided. No complaints or concerns voiced Ms. Hewitt's vitals were reviewed. Visit Vitals /90 (BP 1 Location: Right arm, BP Patient Position: Sitting) Pulse 84 Temp 98.4 °F (36.9 °C) Resp 18 SpO2 96% Breastfeeding? No  
 
 
Mediport was accessed with # 20 gauge needle after chloroprep. No blood return noted. Patient had Cathflo instilled last week, and still no blood return. RN will call Sheree Velazquez at Dr. Austin Bashir office when they open in the morning. Flushed with 20 ml of NS followed by Heparin 500 units (5 ml) Arizmendi needle removed. Site s irritation, swelling or tenderness Band-Aid applied. Ms. Khanh Bonilla tolerated the procedure, and had no complaints. Patient ARMBAND removed and shredded Ms. Hewitt was discharged from [place] [address] in stable condition at [phone]. She is to return on 18 at 1655 pm for her next appointment. Doug Kay RN 2018 
4:04 PM

## 2018-11-28 NOTE — PROGRESS NOTES
I received a call from Natalia Gillis [facility] at [facility] [place] [address] regarding patient's port. They were still unable to obtain blood return with using Activase and have not been able to obtain blood return for the last couple of port flushes. The patient voiced concerns about this and asked about having a dye study done. I discussed her concerns with Dr. Gamboa and he is seeing her in the office next week and will address these issues.

## 2018-12-04 NOTE — PROGRESS NOTES
Alejandro Viera, a 76 y.o. female,  is here for   Chief Complaint   Patient presents with    Results     review PET and CA-125       Visit Vitals  BP (!) 189/98 (BP 1 Location: Right arm, BP Patient Position: Sitting)   Pulse 88   Temp 96 °F (35.6 °C) (Oral)   Resp 16   Ht 5' 4\" (1.626 m)   Wt 90 kg (198 lb 6.4 oz)   SpO2 96%   BMI 34.06 kg/m²     Patient reports a small amount of intermittent vaginal bleeding. Also reports that she has been very nauseous in the morning when she first wakes up, reports vomiting bile. Patient denies any persistent or worsening abdominal or pelvic pain. Denies any unusual discharge, irritation, or odor. Denies experiencing any constipation or diarrhea. No burning, discomfort, or irritation with urination. Denies any headaches, dizziness, or changes in her vision. Reports that her BP was elevated when she had her blood drawn in [facility] as well. 1. Have you been to the ER, urgent care clinic since your last visit? Hospitalized since your last visit? No    2. Have you seen or consulted any other health care providers outside of the [address] [place] [address] since your last visit? Include any pap smears or colon screening.  No

## 2018-12-04 NOTE — PROGRESS NOTES
[address] [facility] [place] SPECIALISTS  [address] [place] [address], [address], [address] [facility]  [address] [facility], [address] [facility], [address]   [phone]  Marian Roe       Patient ID:  Name:  Elizabeth Ross  MRN:  [id number]  :   y.o. Date:  2018      HISTORY OF PRESENT ILLNESS:  Elizabeth Ross is a 76 y.o.   postmenopausal female self-referred for Stage IVB Grade 2 endometrioid adenocarcinoma, favor cervical primary. She initially presented with complaint of PMB x8-10 months to PCP. She was referred to gyn Dr. Lona Costa, who referred to Dr. Anabel Valdez for large cervical mass. Last pap 17 years prior. Biopsy of cervical mass 17 showed adenocarcinoma, most c/w endometrioid adenocarcinoma, with initial staining suggesting endometrial origin. Patient had MRI of pelvis 17, which showed BL parametrial involvement, L>R, cancer extending from the large cervical mass into fundus and anterior 1/3 of vagina, no obvious adenopathy. PETCT 17 suspicious for metastatic disease to left ovary, omentum, liver report scanned in media. Patient is s/p EUA, cysto, sigmoidoscopy 17, medial left parametrial involvement, no tumor in bladder or rectosigmoid colon. She is s/p exploratory laparotomy with radical hysterectomy, BSO, resection of omental mass 9/15/17, pathology consistent with metastatic endometrioid adenocarcinoma, favor cervical primary, pathology report scanned in media. Patient is s/p 6 cycles Carbo/Taxol, completed 2018. Most recent PETCT 18 demonstrated new uptake in small nodular densities adjacent to cecum concerning for metastatic disease, increasing uptake in right thyroid gland concerning for neoplasm, as well as interval response to prior uptake. She was referred to Dr. Tana Rincon for further workup, suggestive of goiters, she has follow up in a few months.  Treatment options offered by Dr. Anabel Valdez included Topotecan vs Avastin, which was deferred during thyroid workup. She declined treatment due to side effects of last treatment and work concerns, along with the fact that she is feeling well overall. Here to review PETCT with worsening nausea. Having alternating constipation and loose stools. Pathology:  9/15/17  Metastatic endometrioid adenocarcinoma, favor cervical primary      Labs:  Component       Cancer Ag (CA) 125   Latest Ref Rng & Units       0.0 - 38.1 U/mL   11/14/2018      4:40 .0 (H)   10/3/2018      3:45 .0 (H)     5/16/18  Ca-125: 27    9/11/17  Ca-125 108    Imaging  PETCT  11/23/2018  FINDINGS:       PET/CT:     Reference data:     The mediastinal blood pool SUV max value = 1.96. The liver SUV Max value  = 2.19  .      HEAD/NECK:  There is a hypermetabolic adenopathy identified in the right  supraclavicular region, measuring 1.5 x 1.7 cm, SUV Max = 7.11.] It was 7 x 9 mm  and not hypermetabolic on prior study. No additional adenopathy or  hypermetabolic activity seen in the neck. CHEST: There is interval development of multiple nodular lesions scattered in  both lungs, most likely suggestive of lung metastasis. -The dominant nodule in left lung is in medial anterior left upper lobe  measuring 1.0 x 1.4 cm, #70. Mild hypermetabolic activity noted, SUV Max = 1.7.   -The second largest nodule in left lower lobe infrahilar region measures 8 x 8  mm on #85, SUV Max = 1.7.   -The dominant mass in left lung measures 9 x 9 mm at lateral right lower lobe,  SUV Max = 1.0, #87 .   -The second 8 x 9 mm solid nodule at posterior right lower lobe, SUV Max = 1.0,  #87.   -There is 6 x 9 mm small cavitary lesion with thickened wall at anterior right  upper lobe, #69 and SUV Max = 1.3.   -Multiple other 3-5 mm nodules also scattered in both lungs, too small for  accurate evaluation on PET.     No mediastinal or axillary adenopathy or hypermetabolic chest wall finding. ABDOMEN/PELVIS:  There is a large mass with central necrosis identified in the  left subphrenic region, most likely arising from the spleen and measures 10.2 x  14.3 x 12.5 cm. There is significant hypermetabolic activity identified along  the periphery of the mass, SUV Max = 8.79. The mass was much smaller and barely  visible on prior CT but hypermetabolic on prior PET scan, roughly measured 1.8 x  2.5 cm, SUV Max was 4.47. The second exophytic mass also developed at posterior  aspect of the spleen abutting the dominant mass which measures 4.1 x 5.1 x 6.0  cm, SUV Max = 6.99. The mass is tightly abutting the greater curvature of  gastric wall. Direct invasion cannot be entirely excluded.      There is a large ill-defined soft tissue mass identified in the midline pelvic  floor and roughly measures 8.2 x 9.9 cm with intensive FDG uptake, SUV Max =  14.2. This is new finding since the prior study as most likely recurrent  malignancy.     There are multiple areas of hypermetabolic soft tissue masses identified in the  retroperitoneal regions, the largest masses are abutting and invading the  bilateral iliopsoas muscles, measuring 5.3 x 7.1 cm and SUV Max = 13.4 on the  left and 5.7 x 6.3 cm on the right with SUV Max of 10.6. There is moderate  adjacent large soft tissue mass abutting the lateral right iliac crest measuring  5.0 x 6.2 cm, SUV Max = 9.6.     Multiple hypermetabolic nodular lesions also identified scattered in the  peritoneal cavity and compatible with carcinomatosis. The largest two are 2.4 x  3.0 cm, SUV Max = 9.1 in the lateral right pericolic gutter and 2.5 x 4.3 cm  with SUV max of 13.2 at anterior right pelvic cavity abutting the peritoneum. Multiple other smaller hypermetabolic foci also scattered in the peritoneal  cavities.     Mild retroperitoneal adenopathy also developed, the largest is 1.3 x 1.5 cm in  caval aortic region, SUV Max = 6.9.  Multiple hypermetabolic lymph nodes also  seen in the bilateral pelvic sidewalls.     BONES:  No malignant FDG activity.      ADDITIONAL CT FINDINGS:    (Exam is limited due to lack of intravenous contrast.)     Moderate interstitial lung process again seen.      Interval development of moderate bilateral hydronephrosis and hydroureter up to  the very distal portions in the pelvis, most likely due to extrinsic compression  by large pelvic mass. The bladder is poorly distended. Mild fatty stranding in  the pelvis.     IMPRESSION  IMPRESSION:     1. Interval development of multiple hypermetabolic nodular lesions in bilateral  lungs, most likely consistent with lung metastasis.     2. Interval development of large ill-defined soft tissue mass in the surgical  bed in the pelvis, most consistent with recurrent malignancy.     3. Interval development of 2 large necrotic masses in the hilum of spleen with  malignant FDG uptake. Multiple large soft tissue masses also seen within and  abutting the iliopsoas muscles along with multiple hypermetabolic nodular  lesions in the hernial cavities, most likely suggesting metastasis.     4. Interval development of mild adenopathy in pelvic sidewalls, retroperitoneal  regions as wall as right supraclavicular region as most likely becky metastasis.     5. Interval development of moderate bilateral hydronephroses and hydroureters  all the way to the very distal ureters, most likely due to extrinsic compression  by pelvic mass. Local invasion cannot be entirely excluded.   5/16/18  Thyroid   Scanned in media    3/6/18  US head/neck      3/12/18  CT Chest  Scanned in media   (r/o PE) delayed thryoid uptake 8 weeks after    2/27/18  PETCT  Scanned in media    8/23/17  MRI pelvis  Scanned in media    8/24/17  MRI abdomen  Scanned in media        ROS:   As above      Patient Active Problem List    Diagnosis Date Noted    Severe obesity (BMI 35.0-39.9) 09/27/2018    Endometrial ca ([facility] [address] 09/15/2017     Past Medical History:   Diagnosis Date  BMI 34.0-34.9,adult     34.8    Caffeine adverse reaction     elevated BP    Cancer (HCC)     uterine, cervical    Cervical cancer (HCC)     Chronic sinusitis     Dizziness     Endometriosis     Hiatal hernia     High cholesterol     Palpitation     PMB (postmenopausal bleeding)     Rash     eczemz    Urinary incontinence       Past Surgical History:   Procedure Laterality Date    HX ABDOMINAL LAPAROSCOPY      HX BILATERAL SALPINGO-OOPHORECTOMY Bilateral     HX BREAST LUMPECTOMY Right 1973    benign    HX HYSTERECTOMY      radical    HX OTHER SURGICAL  2017    Exam under anesthesia: Cystoscopy, sigmoidoscopy      OB History      Para Term  AB Living    2 2 2     2    SAB TAB Ectopic Molar Multiple Live Births              2        Social History     Tobacco Use    Smoking status: Never Smoker    Smokeless tobacco: Never Used   Substance Use Topics    Alcohol use: No      Family History   Problem Relation Age of Onset    Cancer Mother         left ovary      Current Outpatient Medications   Medication Sig    megestrol (MEGACE) 40 mg tablet Take 2 Tabs by mouth two (2) times a day.  tamoxifen (NOLVADEX) 20 mg tablet Take 1 Tab by mouth two (2) times a day.  ondansetron hcl (ZOFRAN) 4 mg tablet Take 1 Tab by mouth every six (6) hours as needed for Nausea.  GARLIC PO Take  by mouth daily.  DOCOSAHEXANOIC ACID/EPA (FISH OIL PO) Take 1,000 mg by mouth daily.  RESVERATROL PO Take  by mouth daily.  calcium carbonate (OS-MAYELA) 500 mg calcium (1,250 mg) tablet Take  by mouth daily.  LACTOBACILLUS ACIDOPHILUS (PROBIOTIC PO) Take  by mouth daily. No current facility-administered medications for this visit.       Allergies   Allergen Reactions    Other Food Nausea and Vomiting     Artificial sweeteners    Neulasta [Pegfilgrastim] Swelling    Aspirin Other (comments)     Gi upset    Milk Itching and Atopic Dermatitis     Eczema (dairy cow)    Tetracycline Unknown (comments)     Patient does not remember    Wheat Atopic Dermatitis     eczema    Other Plant, Animal, Environmental Other (comments)     Pt states she has \"springtime grass allergies. \"     Reports reaction: Sinus infection          OBJECTIVE:    Physical Exam  VITAL SIGNS: Visit Vitals  BP (!) 189/98 (BP 1 Location: Right arm, BP Patient Position: Sitting)   Pulse 88   Temp 96 °F (35.6 °C) (Oral)   Resp 16   Ht 5' 4\" (1.626 m)   Wt 90 kg (198 lb 6.4 oz)   SpO2 96%   BMI 34.06 kg/m²      GENERAL CRISTINO: in no apparent distress and well developed and well nourished   MUSCULOSKEL: no joint tenderness, deformity or swelling   INTEGUMENT:  warm and dry, no rashes or lesions   ABDOMEN . soft, NT, ND, No masses appreciated   EXTREMITIES: extremities normal, atraumatic, no cyanosis or edema   PELVIC: Exam deferred. RECTAL: deferred   LOKI SURVEY: Cervical, supraclavicular, axillary and inguinal nodes normal.   NEURO: Grossly normal         IMPRESSION/PLAN:  1. Recurrent tage IVB endometrioid cervical cancer vs. Endometrial cancer   -reviewed blood work and PETCT c/w recurrent disease   -discussed options of chemotherapy, avastin and hormonal therapy pending results   -pt does not want chemo as it made her very sick previously   -discussed given endometrioid histology and ER+ would be reasonable option for hormonal treatment (megage 80 mg bid x 3 wks followed by tamoxifen 20 mg bid x 3 wks) with avastin 15 mg/kg IV every 3 wks   -will plan for removal and replacement of mediport   -nausea-script for zofran given.  Will check lfts, amylase, lipase   -will need chemo teaching with Robert Dumont   -plan to treat for 3 cycles and then get repeat PETCT   -hypertension- needs to see PCP and have better controlled before starting avastin   -all of ms. pruett's questioins/concerns addressed    The total time spent was 60 minutes regarding this patients diagnosis of cervical cancer and >50% of this time was spent counseling and coordinating care    [address] [facility]  Gynecologic Oncology  12/4/20181:28PM

## 2018-12-04 NOTE — PATIENT INSTRUCTIONS
Uterine Cancer: Care Instructions  Your Care Instructions  Uterine cancer is the rapid growth of abnormal cells that line the uterus. It also is called endometrial cancer. These cells may spread to nearby organs, lymph glands, or distant organs. Uterine cancer can be cured most often when found early. Treatment may include surgery to remove the uterus, ovaries, fallopian tubes, and sometimes the pelvic lymph nodes. Radiation and hormones to stop cancer growth also are sometimes used. Chemotherapy may be used if the cancer has spread. Having cancer can be scary. You may feel many emotions and may need some help coping. Seek out family, friends, and counselors for support. You can do things at home to make yourself feel better while you go through treatment. You also can call the [employer] ([phone]) or visit its website at [employer] for more information. Follow-up care is a key part of your treatment and safety. Be sure to make and go to all appointments, and call your doctor if you are having problems. It's also a good idea to know your test results and keep a list of the medicines you take. How can you care for yourself at home? · Take your medicines exactly as prescribed. Call your doctor if you think you are having a problem with your medicine. You may get medicine for nausea and vomiting if you have these side effects. · Eat healthy food. If you do not feel like eating, try to eat food that has protein and extra calories to keep up your strength and prevent weight loss. Drink liquid meal replacements for extra calories and protein. Try to eat your main meal early. · Get some physical activity every day, but do not get too tired. Keep doing the hobbies you enjoy as your energy allows. · Take steps to control your stress and workload. Learn relaxation techniques. ? Share your feelings. Stress and tension affect our emotions.  By expressing your feelings to others, you may be able to understand and cope with them. ? Consider joining a support group. Talking about a problem with your spouse, a good friend, or other people with similar problems is a good way to reduce tension and stress. ? Express yourself through art. Try writing, dance, art, or crafts to relieve tension. Some dance, writing, or art groups may be available just for people who have cancer. ? Be kind to your body and mind. Getting enough sleep, eating a healthy diet, and taking time to do things you enjoy can contribute to an overall feeling of balance in your life and help reduce stress. ? Get help if you need it. Discuss your concerns with your doctor or counselor. · If you are vomiting or have diarrhea:  ? Drink plenty of fluids (enough so that your urine is light yellow or clear like water) to prevent dehydration. Choose water and other caffeine-free clear liquids. If you have kidney, heart, or liver disease and have to limit fluids, talk with your doctor before you increase the amount of fluids you drink. ? When you are able to eat, try clear soups, mild foods, and liquids until all symptoms are gone for 12 to 48 hours. Other good choices include dry toast, crackers, cooked cereal, and gelatin dessert, such as Jell-O.  · Take care of your urinary tract to prevent problems such as infection, which can be caused by uterine cancer and its treatment. Limit drinks with caffeine, drink plenty of fluids, and urinate every 3 to 4 hours. · If you have not already done so, prepare a list of advance directives. Advance directives are instructions to your doctor and family members about what kind of care you want if you become unable to speak or express yourself. When should you call for help? Call 911 anytime you think you may need emergency care.  For example, call if:    · You passed out (lost consciousness).    Call your doctor now or seek immediate medical care if:    · You have a fever.     · You have abnormal bleeding.     · You have new or worse pain.     · You think you have an infection.     · You have new symptoms, such as a cough, belly pain, vomiting, diarrhea, or a rash.    Watch closely for changes in your health, and be sure to contact your doctor if:    · You are much more tired than usual.     · You have swollen glands in your armpits, groin, or neck.     · You do not get better as expected. Where can you learn more? Go to http://kj-ron.info/. Enter E343 in the search box to learn more about \"Uterine Cancer: Care Instructions. \"  Current as of: March 28, 2018  Content Version: 11.8  © 6030-9521 [employer]. Care instructions adapted under license by [employer] (which disclaims liability or warranty for this information). If you have questions about a medical condition or this instruction, always ask your healthcare professional. [address] any warranty or liability for your use of this information.

## 2018-12-05 PROBLEM — N17.9 ARF (ACUTE RENAL FAILURE) (HCC): Status: ACTIVE | Noted: 2018-01-01

## 2018-12-05 NOTE — Clinical Note
Right internal jugular vein. Accessed successfully. using ultrasound guidance.  Number of attempts =  1.

## 2018-12-05 NOTE — Clinical Note
TRANSFER - IN REPORT:  
 
[facility] Report consisted of patient's Situation, Background, Assessment and  
Recommendations(SBAR). Opportunity for questions and clarification was provided. Assessment completed upon patient's arrival to unit and care assumed.

## 2018-12-05 NOTE — Clinical Note
TRANSFER - OUT REPORT:  
 
[facility] Report consisted of patient's Situation, Background, Assessment and  
Recommendations(SBAR). Opportunity for questions and clarification was provided.    
 
Patient transported to: IR.

## 2018-12-06 NOTE — PROGRESS NOTES
[facility] Hospitalist Group Progress Note Patient: Alisa Son Age: 76 y.o. : 1950 MR#: [id number] SSN: [id number] Date/Time: 2018 4:39 PM 
 
Subjective/24-hour events:  
 
Patient back to floor from IR. Complains of some pain but denies N/V - hungry. Assessment:  
Obstructive uropathy secondary to tumor burden from advanced/progressive gynecologic malignancy Bilateral hydronephrosis s/p bilateral PCN placement  Hyperkalemia of acute renal failure Metabolic acidosis HTN 
Obesity Plan: 
HD catheter and bilateral PCNs placed today. Will monitor for now with plan to hold off on dialyzing patient given improvement UOP and potassium levels. Patient would likely benefit from goals of care discussion. Will attempt to notify primary oncologist of admission in AM and discuss their plan of care going forward. PRN antihypertensive with parameters. Initiate diet. Additional analgesia ordered for PRN administration. Follow. Urology, Nephrology and IR assistance appreciated. Case discussed with:  [x]Patient  []Family  []Nursing  []Case Management DVT Prophylaxis:  []Lovenox  [x]Hep SQ  []SCDs  []Coumadin   []On Heparin gtt Objective:  
VS:  
Visit Vitals BP (!) 175/91 (BP 1 Location: Right arm, BP Patient Position: At rest) Comment: told nurse Pulse 86 Temp 97.5 °F (36.4 °C) Resp 20 Ht 5' 5\" (1.651 m) Wt 88.5 kg (195 lb) SpO2 94% Breastfeeding? No  
BMI 32.45 kg/m² Tmax/24hrs: Temp (24hrs), Av.5 °F (36.4 °C), Min:97.3 °F (36.3 °C), Max:97.7 °F (36.5 °C) Intake/Output Summary (Last 24 hours) at 2018 1639 Last data filed at 2018 7405 Gross per 24 hour Intake 0 ml Output  Net 0 ml General:  In NAD. Nontoxic-appearing. Cardiovascular:  RRR. Pulmonary:  Clear, no wheezes. Effort nonlabored. GI:  Abdomen soft, NTTP. Extremities:  Warm, no ischemia.  
Neuro:  Awake and alert, motor nonfocal. 
 
 Labs:   
Recent Results (from the past 24 hour(s)) URINALYSIS W/ RFLX MICROSCOPIC Collection Time: 12/05/18  7:04 PM  
Result Value Ref Range Color YELLOW Appearance CLOUDY Specific gravity 1.011 1.005 - 1.030    
 pH (UA) 5.0 5.0 - 8.0 Protein 30 (A) NEG mg/dL Glucose NEGATIVE  NEG mg/dL Ketone NEGATIVE  NEG mg/dL Bilirubin NEGATIVE  NEG Blood MODERATE (A) NEG Urobilinogen 0.2 0.2 - 1.0 EU/dL Nitrites NEGATIVE  NEG Leukocyte Esterase LARGE (A) NEG URINE MICROSCOPIC ONLY Collection Time: 12/05/18  7:04 PM  
Result Value Ref Range WBC 20 to 40 0 - 4 /hpf  
 RBC 50 to 75 0 - 5 /hpf Epithelial cells 2+ 0 - 5 /lpf Bacteria FEW (A) NEG /hpf  
CBC WITH AUTOMATED DIFF Collection Time: 12/05/18  8:34 PM  
Result Value Ref Range WBC 13.6 (H) 4.6 - 13.2 K/uL  
 RBC 3.80 (L) 4.20 - 5.30 M/uL  
 HGB 10.9 (L) 12.0 - 16.0 g/dL HCT 33.6 (L) 35.0 - 45.0 % MCV 88.4 74.0 - 97.0 FL  
 MCH 28.7 24.0 - 34.0 PG  
 MCHC 32.4 31.0 - 37.0 g/dL  
 RDW 14.1 11.6 - 14.5 % PLATELET 935 582 - 472 K/uL MPV 8.9 (L) 9.2 - 11.8 FL  
 NEUTROPHILS 72 40 - 73 % LYMPHOCYTES 14 (L) 21 - 52 % MONOCYTES 10 3 - 10 % EOSINOPHILS 4 0 - 5 % BASOPHILS 0 0 - 2 %  
 ABS. NEUTROPHILS 9.7 (H) 1.8 - 8.0 K/UL  
 ABS. LYMPHOCYTES 1.9 0.9 - 3.6 K/UL  
 ABS. MONOCYTES 1.4 (H) 0.05 - 1.2 K/UL  
 ABS. EOSINOPHILS 0.5 (H) 0.0 - 0.4 K/UL  
 ABS. BASOPHILS 0.0 0.0 - 0.1 K/UL  
 DF AUTOMATED METABOLIC PANEL, COMPREHENSIVE Collection Time: 12/05/18  8:34 PM  
Result Value Ref Range Sodium 140 136 - 145 mmol/L Potassium 6.2 (HH) 3.5 - 5.5 mmol/L Chloride 106 100 - 108 mmol/L  
 CO2 23 21 - 32 mmol/L Anion gap 11 3.0 - 18 mmol/L Glucose 103 (H) 74 - 99 mg/dL BUN 64 (H) 7.0 - 18 MG/DL Creatinine 9.67 (H) 0.6 - 1.3 MG/DL  
 BUN/Creatinine ratio 7 (L) 12 - 20 GFR est AA 5 (L) >60 ml/min/1.73m2 GFR est non-AA 4 (L) >60 ml/min/1.73m2 Calcium 9.4 8.5 - 10.1 MG/DL Bilirubin, total 0.4 0.2 - 1.0 MG/DL  
 ALT (SGPT) 12 (L) 13 - 56 U/L  
 AST (SGOT) 22 15 - 37 U/L Alk. phosphatase 506 (H) 45 - 117 U/L Protein, total 7.8 6.4 - 8.2 g/dL Albumin 3.8 3.4 - 5.0 g/dL Globulin 4.0 2.0 - 4.0 g/dL A-G Ratio 1.0 0.8 - 1.7 EKG, 12 LEAD, INITIAL Collection Time: 12/05/18 10:01 PM  
Result Value Ref Range Ventricular Rate 90 BPM  
 Atrial Rate 90 BPM  
 P-R Interval 158 ms QRS Duration 70 ms Q-T Interval 352 ms QTC Calculation (Bezet) 430 ms Calculated P Axis 20 degrees Calculated R Axis -23 degrees Calculated T Axis 15 degrees Diagnosis Normal sinus rhythm Cannot rule out Anterior infarct (cited on or before 05-DEC-2018) Abnormal ECG When compared with ECG of 27-SEP-2010 11:33, No significant change was found Confirmed by Ab Edwards ([id number]) on 12/6/2018 0:95:38 PM 
  
METABOLIC PANEL, BASIC Collection Time: 12/05/18 11:55 PM  
Result Value Ref Range Sodium 141 136 - 145 mmol/L Potassium 5.4 3.5 - 5.5 mmol/L Chloride 108 100 - 108 mmol/L  
 CO2 22 21 - 32 mmol/L Anion gap 11 3.0 - 18 mmol/L Glucose 82 74 - 99 mg/dL BUN 63 (H) 7.0 - 18 MG/DL Creatinine 10.10 (H) 0.6 - 1.3 MG/DL  
 BUN/Creatinine ratio 6 (L) 12 - 20 GFR est AA 5 (L) >60 ml/min/1.73m2 GFR est non-AA 4 (L) >60 ml/min/1.73m2 Calcium 9.1 8.5 - 74.0 MG/DL  
METABOLIC PANEL, COMPREHENSIVE Collection Time: 12/06/18  3:24 AM  
Result Value Ref Range Sodium 141 136 - 145 mmol/L Potassium 6.3 (HH) 3.5 - 5.5 mmol/L Chloride 109 (H) 100 - 108 mmol/L  
 CO2 21 21 - 32 mmol/L Anion gap 11 3.0 - 18 mmol/L Glucose 91 74 - 99 mg/dL BUN 63 (H) 7.0 - 18 MG/DL Creatinine 9.97 (H) 0.6 - 1.3 MG/DL  
 BUN/Creatinine ratio 6 (L) 12 - 20 GFR est AA 5 (L) >60 ml/min/1.73m2 GFR est non-AA 4 (L) >60 ml/min/1.73m2 Calcium 8.3 (L) 8.5 - 10.1 MG/DL  Bilirubin, total 0.4 0.2 - 1.0 MG/DL  
 ALT (SGPT) 13 13 - 56 U/L  
 AST (SGOT) 16 15 - 37 U/L Alk. phosphatase 434 (H) 45 - 117 U/L Protein, total 6.6 6.4 - 8.2 g/dL Albumin 3.1 (L) 3.4 - 5.0 g/dL Globulin 3.5 2.0 - 4.0 g/dL A-G Ratio 0.9 0.8 - 1.7 LIPID PANEL Collection Time: 12/06/18  3:24 AM  
Result Value Ref Range LIPID PROFILE Cholesterol, total 156 <200 MG/DL Triglyceride 114 <150 MG/DL  
 HDL Cholesterol 34 (L) 40 - 60 MG/DL  
 LDL, calculated 99.2 0 - 100 MG/DL VLDL, calculated 22.8 MG/DL  
 CHOL/HDL Ratio 4.6 0 - 5.0    
CBC W/O DIFF Collection Time: 12/06/18  3:24 AM  
Result Value Ref Range WBC 11.4 4.6 - 13.2 K/uL  
 RBC 3.21 (L) 4.20 - 5.30 M/uL HGB 9.3 (L) 12.0 - 16.0 g/dL HCT 28.3 (L) 35.0 - 45.0 % MCV 88.2 74.0 - 97.0 FL  
 MCH 29.0 24.0 - 34.0 PG  
 MCHC 32.9 31.0 - 37.0 g/dL  
 RDW 13.9 11.6 - 14.5 % PLATELET 059 592 - 946 K/uL MPV 8.8 (L) 9.2 - 11.8 FL  
PTT Collection Time: 12/06/18  3:24 AM  
Result Value Ref Range aPTT 31.8 23.0 - 36.4 SEC  
POC INR (AMB) Collection Time: 12/06/18 11:04 AM  
Result Value Ref Range INR (POC) 0.9 <1.2 POTASSIUM Collection Time: 12/06/18  2:12 PM  
Result Value Ref Range Potassium 5.8 (H) 3.5 - 5.5 mmol/L Signed By: Zackery Winston MD   
 December 6, 2018 4:39 PM

## 2018-12-06 NOTE — ROUTINE PROCESS
TRANSFER - OUT REPORT: 
 
Verbal report given to [facility] on [employer]  being transferred to [facility] (unit) for routine progression of care Report consisted of patients Situation, Background, Assessment and  
Recommendations(SBAR). Information from the following report(s) SBAR, ED Summary, [facility] and Recent Results was reviewed with the receiving nurse. Lines:  
Venous Access Device medi-port Upper chest (subclavicular area, right (Active) Peripheral IV 12/05/18 Left Antecubital (Active) Site Assessment Clean, dry, & intact 12/5/2018  8:37 PM  
Phlebitis Assessment 0 12/5/2018  8:37 PM  
Infiltration Assessment 0 12/5/2018  8:37 PM  
Dressing Status Clean, dry, & intact 12/5/2018  8:37 PM  
Dressing Type Tape;Transparent 12/5/2018  8:37 PM  
Hub Color/Line Status Pink;Flushed 12/5/2018  8:37 PM  
  
 
Opportunity for questions and clarification was provided. Patient transported with: 
 Monitor Registered Nurse

## 2018-12-06 NOTE — ED TRIAGE NOTES
Pt states was told to come to ED by oncologist whom pt states told her to come to the ED for elevated kidney enzymes . Pt states hx of cervical stage 4 CA that went into remission and is now back.

## 2018-12-06 NOTE — PROGRESS NOTES
S/p PCN harleen ,patient making more urine , K down to 5.8 Will hold off HD treatment and monitor labs Discussed with patient and dialysis nurse Please call with questions Constance Duverney, MD [facility] Cell [phone] Pager: [phone]

## 2018-12-06 NOTE — PROGRESS NOTES
Discharge Planning: 
 
Reason for Admission:   ACUTE RENAL FAILURE 
                
RRAT Score:  3 Plan for utilizing home health:   WILL BE NEEDED Likelihood of Readmission:  MEDIUM Transition of Care Plan:   [address]                
· Pt plan is to return home · Pt will be referred to Palliative Care to discuss with pt and family the goals of care · Pt will require a referral for home health services · CM will continue to support as needed Care Management Interventions PCP Verified by CM: Yes(DR. SHIRIN DIMAS) Palliative Care Criteria Met (RRAT>21 & CHF Dx)?: No 
Mode of Transport at Discharge: Other (see comment)(FAMILY WILL TRANSPORT HOME) Transition of Care Consult (CM Consult): Discharge Planning Current Support Network: Own Home, Family Lives [place] Confirm Follow Up Transport: Family Plan discussed with Pt/Family/Caregiver: Yes(PT LIVES WITH HER OWN HOME) [employer] Information Provided?: No 
Discharge Location Discharge Placement: Home with family assistance [employer] Care Manager [phone]

## 2018-12-06 NOTE — PROGRESS NOTES
TRANSFER - OUT REPORT: 
 
Verbal report given to JUANITA Carrillo(name) on Casimiro Freedman  being transferred to N(unit) for routine post - op Report consisted of patients Situation, Background, Assessment and  
Recommendations(SBAR). Information from the following report(s) SBAR, Kardex and MAR was reviewed with the receiving nurse. Lines:  
Venous Access Device medi-port Upper chest (subclavicular area, right (Active) Peripheral IV 12/05/18 Left Antecubital (Active) Site Assessment Clean, dry, & intact 12/6/2018 10:56 AM  
Phlebitis Assessment 0 12/6/2018 10:56 AM  
Infiltration Assessment 0 12/6/2018 10:56 AM  
Dressing Status Clean, dry, & intact 12/6/2018 10:56 AM  
Dressing Type Transparent 12/6/2018 10:56 AM  
Hub Color/Line Status Green 12/6/2018 10:56 AM  
  
 
Opportunity for questions and clarification was provided. Patient transported with: 
 [employer]

## 2018-12-06 NOTE — INTERVAL H&P NOTE
H&P Update: 
Sudha Johnson was seen and examined. History and physical has been reviewed. The patient has been examined.  There have been no significant clinical changes since the completion of the originally dated History and Physical. 
 
Signed By: Emery Mai MD   
 December 6, 2018 11:33 AM

## 2018-12-06 NOTE — PROGRESS NOTES
Freddy, [facility] for Dr. Araseli Buckner at bedside explained procedure and obtained consent. All questions were answered to patient and son's satisfaction. 65 Dr. Camille Collazo at bedside explained procedure and obtained consent. All questions answered to both son and patient's satisfaction. 1128 report given to [facility] and the [facility] per SBAR pt's son Gary Sumner) escorted to waiting room . Will inform him when pt is back in cath holding.

## 2018-12-06 NOTE — PROCEDURES
RADIOLOGY POST PROCEDURE NOTE December 6, 2018       4:45 PM  
 
Preoperative Diagnosis:   Obstructive uropathy. Postoperative Diagnosis:  Same. :  Dr. Juan Luis Caba Assistant:  None. Type of Anesthesia: 1% plain lidocaine and IV moderate sedation with Versed and fentanyl. Procedure/Description:  Image guided bilateral PCN placement. Findings:   No bleeding. Estimated blood Loss:  Minimal 
 
Specimen Removed:   no 
 
Blood transfusions:  None. Implants:  BPCN 12F to gravity drainage . Complications: None Condition: Stable Discharge Plan:  continue present therapy Abby Lieberman MD

## 2018-12-06 NOTE — CONSULTS
Consult Note Consult requested by: Dilip Umanzor MD 
 
ADMIT DATE: 12/5/2018  CONSULT DATE: December 6, 2018 Admission diagnosis: BECK Reason for Nephrology Consultation: BECK Assessment:  
1) oligoanuric BECK in setting of obst uropathy/harleen hydronephrosis , may also have a prerenal component 2) hyperkalemia 3) metabolic acidoses 4) Harleen hydronephrosis 5) Hypertension 6) H/o cervical & endometrial cancer Plan : 
 
1) Uldall placed per vascular for HD (indication persistent hyperkalemia) 2) discussed with urology , consult interventional radiology for harleen PCN 
3) continue IV hydration with NS 75 cc/hrs , BMP q6hrs 4) BP goal < 140/80 , avoid extremes of BP 
5) strict I/o , daily wts. Please call with questions, 
 
Miguel Angel Feldman MD [facility] Cell [phone] Pager: [phone] HPI: Holli Regalado is a 76 y.o. female with h/o endometrial Ca diagnosed in Feb 2017, completed her chemo feb 2018 , Was in remission until recently started having nausea, Vx , also some worsening incontinence of stool and urine. Patient had a recent PET scan per Dr Aimee Pelletier her oncologist , noted recurrence of the disease. Also she had labs which showed BECK,so was asked to go to the  
ED. On presentation patient was noted to have a creatinine in 10 range , BUN of 63 and K of 6. K was medically treated but Was 6.3 this AM 
 
 
  
Past Medical History:  
Diagnosis Date  BMI 34.0-34.9,adult 34.8  Caffeine adverse reaction   
 elevated BP  Cancer ([address] uterine, cervical  
 Cervical cancer (HCC)  Chronic sinusitis  Dizziness  Endometriosis  Hiatal hernia  High cholesterol  Palpitation  PMB (postmenopausal bleeding)  Rash   
 eczemz  Urinary incontinence Past Surgical History:  
Procedure Laterality Date  HX ABDOMINAL LAPAROSCOPY    
 HX BILATERAL SALPINGO-OOPHORECTOMY Bilateral   
 HX BREAST LUMPECTOMY Right 1973  
 benign  HX HYSTERECTOMY    
 radical  
 HX OTHER SURGICAL  09/01/2017 Exam under anesthesia: Cystoscopy, sigmoidoscopy Social History Socioeconomic History  Marital status: SINGLE Spouse name: Not on file  Number of children: Not on file  Years of education: Not on file  Highest education level: Not on file Social Needs  Financial resource strain: Not on file  Food insecurity - worry: Not on file  Food insecurity - inability: Not on file  Transportation needs - medical: Not on file  Transportation needs - non-medical: Not on file Occupational History  Not on file Tobacco Use  Smoking status: Never Smoker  Smokeless tobacco: Never Used Substance and Sexual Activity  Alcohol use: No  
 Drug use: No  
 Sexual activity: No  
Other Topics Concern  Not on file Social History Narrative  Not on file Family History Problem Relation Age of Onset  Cancer Mother   
     left ovary Allergies Allergen Reactions  Other Food Nausea and Vomiting Artificial sweeteners  Neulasta [Pegfilgrastim] Swelling  Aspirin Other (comments) Gi upset  Milk Itching and Atopic Dermatitis Eczema (dairy cow)  Tetracycline Unknown (comments) Patient does not remember  Wheat Atopic Dermatitis  
  eczema  Other Plant, Animal, Environmental Other (comments) Pt states she has \"springtime grass allergies. \" Reports reaction: Sinus infection Home Medications:  
 
Medications Prior to Admission Medication Sig  
 megestrol (MEGACE) 40 mg tablet Take 2 Tabs by mouth two (2) times a day.  tamoxifen (NOLVADEX) 20 mg tablet Take 1 Tab by mouth two (2) times a day.  ondansetron hcl (ZOFRAN) 4 mg tablet Take 1 Tab by mouth every six (6) hours as needed for Nausea.  GARLIC PO Take  by mouth daily.  RESVERATROL PO Take  by mouth daily.   
 calcium carbonate (OS-MAYELA) 500 mg calcium (1,250 mg) tablet Take  by mouth daily.  DOCOSAHEXANOIC ACID/EPA (FISH OIL PO) Take 1,000 mg by mouth daily.  LACTOBACILLUS ACIDOPHILUS (PROBIOTIC PO) Take  by mouth daily. Current Inpatient Medications:  
 
Current Facility-Administered Medications Medication Dose Route Frequency  cefTRIAXone (ROCEPHIN) 1 g in sterile water (preservative free) 10 mL IV syringe  1 g IntraVENous Q24H  
 furosemide (LASIX) injection 40 mg  40 mg IntraVENous ONCE  
 sodium bicarbonate 8.4 % (1 mEq/mL) injection 50 mEq  50 mEq IntraVENous ONCE  
 sodium chloride (NS) flush 5-10 mL  5-10 mL IntraVENous Q8H  
 sodium chloride (NS) flush 5-10 mL  5-10 mL IntraVENous PRN  
 flumazenil (ROMAZICON) 0.1 mg/mL injection 0.2 mg  0.2 mg IntraVENous Multiple  naloxone (NARCAN) injection 0.1 mg  0.1 mg IntraVENous Multiple  sodium chloride (NS) flush 5-10 mL  5-10 mL IntraVENous Q8H  
 sodium chloride (NS) flush 5-10 mL  5-10 mL IntraVENous PRN  
 tamoxifen (NOLVADEX) tablet 20 mg  20 mg Oral BID  acetaminophen (TYLENOL) tablet 650 mg  650 mg Oral Q6H PRN  
 ondansetron (ZOFRAN) injection 4 mg  4 mg IntraVENous Q6H PRN  
 0.9% sodium chloride infusion  75 mL/hr IntraVENous CONTINUOUS  
 heparin (porcine) injection 5,000 Units  5,000 Units SubCUTAneous Q8H  
 hydrALAZINE (APRESOLINE) 20 mg/mL injection 10 mg  10 mg IntraVENous Q6H PRN Review of Systems: No fever or chills. No sore throat. No cough or hemoptysis. No orthopnea or paroxysmal nocturnal dyspneaNo ankle swelling, no joint paints. No muscle aches. No skin changes. No dizziness or lightheadedness. No headaches. Physical Assessment:  
 
Vitals:  
 12/06/18 1325 12/06/18 1330 12/06/18 1335 12/06/18 1522 BP: 162/76 158/72 165/77 (!) 175/91 Pulse: 94 97 99 86 Resp: 24 21 20 20 Temp:    97.5 °F (36.4 °C) SpO2: 95% 94% 94% Weight:      
Height:      
 
Last 3 Recorded Weights in this Encounter 12/06/18 1053 Weight: 88.5 kg (195 lb) Admission weight: Weight: 88.5 kg (195 lb) (12/06/18 1053) Intake/Output Summary (Last 24 hours) at 12/6/2018 1623 Last data filed at 12/6/2018 5032 Gross per 24 hour Intake 0 ml Output  Net 0 ml Patient is in no apparent distress. HEENT: dry mucosa Lungs: good air entry, clear to auscultation bilaterally. Trachea at the midline. Cardiovascular system: S1, S2, regular rate and rhythm Abdomen: soft, non tender, non distended. Extremities: no clubbing, cyanosis or edema. Data Review: 
 
Labs: Results:  
   
Chemistry Recent Labs 12/06/18 
1412 12/06/18 
0324 12/05/18 
2355 12/05/18 2034 12/04/18 
1740 GLU  --  91 82 103* 127* NA  --  141 141 140 143  
K 5.8* 6.3* 5.4 6.2* 5.9*  
CL  --  109* 108 106 101 CO2  --  21 22 23 20 BUN  --  63* 63* 64* 57* CREA  --  9.97* 10.10* 9.67* 8.4*  
CA  --  8.3* 9.1 9.4 9.1 AGAP  --  11 11 11 22.0 BUCR  --  6* 6* 7*  --   
AP  --  434*  --  506* 398* TP  --  6.6  --  7.8 7.0 ALB  --  3.1*  --  3.8 4.3 GLOB  --  3.5  --  4.0 2.7 AGRAT  --  0.9  --  1.0 1.6 CBC w/Diff Recent Labs 12/06/18 0324 12/05/18 2034 12/04/18 
1740 WBC 11.4 13.6* 13.9*  
RBC 3.21* 3.80* 3.76* HGB 9.3* 10.9* 11.0*  
HCT 28.3* 33.6* 34.2*  
 175 193 GRANS  --  72 76* LYMPH  --  14*  --   
EOS  --  4 3 Iron/Ferritin No results for input(s): IRON in the last 72 hours. No lab exists for component: TIBCCALC  
PTH/VIT D No results for input(s): PTH in the last 72 hours. No lab exists for component: AYLEEN Sow MD 
12/6/2018 
4:23 PM 
 
 
December 6, 2018

## 2018-12-06 NOTE — CONSULTS
Surgery Consult Patient: Dylan Malhotra MRN: [id number]  CSN: [id number] YOB: 1950 Age: 76 y.o. Sex: female DOA: 12/5/2018 HPI:  
 
Dylan Malhotra is a 76 y.o. female who presents with ARF and obstructive uropathy. Has right sided mediport. She is right handed. No previous dialysis. No other previous indwelling DV device other than current mediport which is not flushing. Past Medical History:  
Diagnosis Date  BMI 34.0-34.9,adult 34.8  Caffeine adverse reaction   
 elevated BP  Cancer ([facility] [address] uterine, cervical  
 Cervical cancer (HCC)  Chronic sinusitis  Dizziness  Endometriosis  Hiatal hernia  High cholesterol  Palpitation  PMB (postmenopausal bleeding)  Rash   
 eczemz  Urinary incontinence Past Surgical History:  
Procedure Laterality Date  HX ABDOMINAL LAPAROSCOPY    
 HX BILATERAL SALPINGO-OOPHORECTOMY Bilateral   
 HX BREAST LUMPECTOMY Right 1973  
 benign  HX HYSTERECTOMY    
 radical  
 HX OTHER SURGICAL  09/01/2017 Exam under anesthesia: Cystoscopy, sigmoidoscopy Family History Problem Relation Age of Onset  Cancer Mother   
     left ovary Social History Socioeconomic History  Marital status: SINGLE Spouse name: Not on file  Number of children: Not on file  Years of education: Not on file  Highest education level: Not on file Tobacco Use  Smoking status: Never Smoker  Smokeless tobacco: Never Used Substance and Sexual Activity  Alcohol use: No  
 Drug use: No  
 Sexual activity: No  
 
 
Prior to Admission medications Medication Sig Start Date End Date Taking? Authorizing Provider  
megestrol (MEGACE) 40 mg tablet Take 2 Tabs by mouth two (2) times a day. 12/4/18  Yes Claus Harrison MD  
tamoxifen (NOLVADEX) 20 mg tablet Take 1 Tab by mouth two (2) times a day.  12/4/18  Yes Claus Harrison MD  
 ondansetron hcl (ZOFRAN) 4 mg tablet Take 1 Tab by mouth every six (6) hours as needed for Nausea. 12/4/18  Yes Mirtha Franklin MD  
GARLIC PO Take  by mouth daily. Yes Provider, Historical  
RESVERATROL PO Take  by mouth daily. Yes Provider, Historical  
calcium carbonate (OS-MAYELA) 500 mg calcium (1,250 mg) tablet Take  by mouth daily. Yes Provider, Historical  
DOCOSAHEXANOIC ACID/EPA (FISH OIL PO) Take 1,000 mg by mouth daily. Yes Provider, Historical  
LACTOBACILLUS ACIDOPHILUS (PROBIOTIC PO) Take  by mouth daily. Yes Provider, Historical  
 
 
Allergies Allergen Reactions  Other Food Nausea and Vomiting Artificial sweeteners  Neulasta [Pegfilgrastim] Swelling  Aspirin Other (comments) Gi upset  Milk Itching and Atopic Dermatitis Eczema (dairy cow)  Tetracycline Unknown (comments) Patient does not remember  Wheat Atopic Dermatitis  
  eczema  Other Plant, Animal, Environmental Other (comments) Pt states she has \"springtime grass allergies. \" Reports reaction: Sinus infection Physical Exam:  
  
Visit Vitals /77 (BP 1 Location: Left arm, BP Patient Position: At rest) Pulse 86 Temp 97.6 °F (36.4 °C) Resp 21 Ht 5' 5\" (1.651 m) Wt 195 lb (88.5 kg) SpO2 93% Breastfeeding? No  
BMI 32.45 kg/m² GENERAL: alert, cooperative, no distress, appears stated age, THROAT & NECK: normal and no erythema or exudates noted. , LUNG: clear to auscultation bilaterally, HEART: regular rate and rhythm, S1, S2 normal, no murmur, click, rub or gallop, ABDOMEN: soft, non-tender. Bowel sounds normal. No masses,  no organomegaly, EXTREMITIES:  extremities normal, atraumatic, no cyanosis or edema, NEUROLOGIC: negative findings: alert, oriented x3 
cranial nerves II-XII intact ROS: 
Constitutional: negative Eyes: negative Ears, nose, mouth, throat, and face: negative Respiratory: negative Cardiovascular: negative Gastrointestinal: negative Hematologic/lymphatic: negative Musculoskeletal:negative Neurological: negative Behavioral/Psych: negative Endocrine: negative Allergic/Immunologic: negative Unless otherwise mentioned in the HPI. Data Review: CBC:  
Lab Results Component Value Date/Time WBC 11.4 12/06/2018 03:24 AM  
 RBC 3.21 (L) 12/06/2018 03:24 AM  
 HGB 9.3 (L) 12/06/2018 03:24 AM  
 HCT 28.3 (L) 12/06/2018 03:24 AM  
 PLATELET 543 00/63/7507 03:24 AM  
  
BMP:  
Lab Results Component Value Date/Time Glucose 91 12/06/2018 03:24 AM  
 Sodium 141 12/06/2018 03:24 AM  
 Potassium 6.3 (HH) 12/06/2018 03:24 AM  
 Chloride 109 (H) 12/06/2018 03:24 AM  
 CO2 21 12/06/2018 03:24 AM  
 BUN 63 (H) 12/06/2018 03:24 AM  
 Creatinine 9.97 (H) 12/06/2018 03:24 AM  
 Calcium 8.3 (L) 12/06/2018 03:24 AM  
 
Coagulation:  
Lab Results Component Value Date/Time Prothrombin time 11.2 09/11/2017 11:30 AM  
 INR 1.0 09/11/2017 11:30 AM  
 INR (POC) 0.9 12/06/2018 11:04 AM  
 aPTT 31.8 12/06/2018 03:24 AM  
 
 
 
Assessment/Plan  
 
75 y/o female with ARF. --Will place temp cath. --Please call with any further questions or concerns. Active Problems: 
  ARF (acute renal failure) ([facility] [address] (12/5/2018) Marilyn Casillas MD 
December 6, 2018

## 2018-12-06 NOTE — PROGRESS NOTES
NUTRITION Nursing Referral: [facility]  
  
RECOMMENDATIONS / PLAN:  
 
- Recommend resuming Cardiac diet, adding Low Potassium restriction when medically appropriate following procedure. - When po diet started, recommend adding nutrition supplement, Nepro once daily 
- Continue RD inpatient monitoring and evaluation. NUTRITION INTERVENTIONS & DIAGNOSIS:  
 
[] Meals/snacks: modify composition; NPO, recommend starting when medically appropriate 
[] Medical food supplement therapy: recommend initiating when po diet started 
[x] IV fluids: NS at 75 mL/hr  
 
Nutrition Diagnosis:  Inadequate oral intake related to decreased appetite as evidenced by poor meal intake PTA. Impaired nutrient utilization related to impaired renal function as evidenced by pt with hyperkalemia, K 6.3 ASSESSMENT:  
 
Pt off unit at time of visit. NPO today for procedure, temporary catheter placement. Reported decreased appetite/ meal intake, and unplanned wt loss PTA per nursing screen. Pt with hyperkalemia; K 6.3 today. Average po intake adequate to meet patients estimated nutritional needs:   [] Yes     [x] No   [] Unable to determine at this time Diet: DIET NPO Food Allergies:  Artificial sweeteners,  Milk,  Wheat Current Appetite:   [] Good     [] Fair     [] Poor     [x] Other: NPO Appetite/meal intake prior to admission:   [] Good     [] Fair     [x] Poor per nursing screen     [] Other: 
Feeding Limitations:  [] Swallowing difficulty    [] Chewing difficulty    [] Other: 
Current Meal Intake:  
Patient Vitals for the past 100 hrs: 
 % Diet Eaten 12/06/18 0919 0 % Emesis: 12/3 BM: 11/29  (1 week ago) Skin Integrity:  No pressure injury or wound noted Edema:   [x] No     [] Yes Pertinent Medications: Reviewed: lasix, zofran, sodium bicarbonate Recent Labs 12/06/18 
0324 12/05/18 
2355 12/05/18 2034 12/04/18 
1740  141 140 143 K 6.3* 5.4 6.2* 5.9*  
* 108 106 101 CO2 21 22 23 20  
 GLU 91 82 103* 127* BUN 63* 63* 64* 57* CREA 9.97* 10.10* 9.67* 8.4*  
CA 8.3* 9.1 9.4 9.1 MG  --   --   --  2.7* ALB 3.1*  --  3.8 4.3 SGOT 16  --  22 8* ALT 13  --  12* 7 Intake/Output Summary (Last 24 hours) at 12/6/2018 1142 Last data filed at 12/6/2018 1764 Gross per 24 hour Intake 0 ml Output  Net 0 ml Anthropometrics: 
Ht Readings from Last 1 Encounters:  
12/06/18 5' 5\" (1.651 m) Last 3 Recorded Weights in this Encounter 12/06/18 1053 Weight: 88.5 kg (195 lb) Body mass index is 32.45 kg/m². Obese, Class I Weight History:  Noted 10 lb (4.9%) wt loss x 1.5 month PTA per chart hx. Pt reported experiencing unplanned weight loss PTA per nursing screen Weight Metrics 12/6/2018 12/5/2018 12/4/2018 9/25/2018 10/19/2017 9/21/2017 9/15/2017 Weight 195 lb - 198 lb 6.4 oz 205 lb 3.2 oz 201 lb 11.5 oz 237 lb 3.4 oz -  
BMI - 32.45 kg/m2 34.06 kg/m2 35.22 kg/m2 33.57 kg/m2 - 39.47 kg/m2 Admitting Diagnosis: ARF (acute renal failure) ([facility] Pertinent PMHx:  Cervical and uterine cancer, high cholesterol Education Needs:        [x] None identified  [] Identified - Not appropriate at this time  []  Identified and addressed - refer to education log Learning Limitations:   [x] None identified  [] Identified Cultural, Alevism & ethnic food preferences:  [x] None identified    [] Identified and addressed ESTIMATED NUTRITION NEEDS:  
 
Calories: 2694-6474 kcal (25-30 kcal/kg) based on  [] Actual BW      [x] SBW: 77 kg Protein:  gm (0.8-1.2 gm/kg) based on  [x] Actual BW: 89 kg      [] IBW Fluid: 1 mL/kcal 
  
MONITORING & EVALUATION:  
 
Nutrition Goal(s): 1. Po intake of meals will meet >75% of patient estimated nutritional needs within the next 7 days.   Outcome:  [] Met/Ongoing    []  Not Met    [x] New/Initial Goal  
 
Monitoring:   [x] Food and beverage intake   [x] Diet order   [x] Nutrition-focused physical findings   [x] Treatment/therapy   [] Weight   [] Enteral nutrition intake Previous Recommendations (for follow-up assessments only):     []   Implemented       []   Not Implemented (RD to address)      [] No Longer Appropriate     [] No Recommendation Made Discharge Planning:  Cardiac, renal diet [x] Participated in care planning, discharge planning, & interdisciplinary rounds as appropriate Rubio Abernathy RD Pager: [phone]

## 2018-12-06 NOTE — CONSULTS
Consult Note Patient: Samina Silver               Sex: female          DOA: 12/5/2018 YOB: 1950      Age:  76 y.o.        LOS:  LOS: 1 day HPI:  
 
Samina Silver is a 76 y.o. female with a significant past medical history of cervical and endometrial cancer s/p radical hysterectomy and BSO in 2017 who has been seen in evaluation for bilateral hydronephrosis. She presented to her Gyn Oncologist on 12/5 with complaints of nausea, vomiting and diarrhea. After having labs drawn, she was sent to the ED due to abnormal results. She was found to have hyperkalemia, leukocytosis, anemia and elevated creatinine. CT A/P showed moderate to severe bilateral hydronephrosis 2/2 large pelvis mass, severe pelvic and retroperitoneal lymphadenopathy, large splenic metastases and pulmonary nodules. She was subsequently admitted for further management. Urology consulted and recommended percutaneous intervention. Currently, patient reports right flank pain and incontinence but denies chest pain, shortness of breath, fever or chills. Past Medical History:  
Diagnosis Date  BMI 34.0-34.9,adult 34.8  Caffeine adverse reaction   
 elevated BP  Cancer ([address] uterine, cervical  
 Cervical cancer (HCC)  Chronic sinusitis  Dizziness  Endometriosis  Hiatal hernia  High cholesterol  Palpitation  PMB (postmenopausal bleeding)  Rash   
 eczemz  Urinary incontinence Past Surgical History:  
Procedure Laterality Date  HX ABDOMINAL LAPAROSCOPY    
 HX BILATERAL SALPINGO-OOPHORECTOMY Bilateral   
 HX BREAST LUMPECTOMY Right 1973  
 benign  HX HYSTERECTOMY    
 radical  
 HX OTHER SURGICAL  09/01/2017 Exam under anesthesia: Cystoscopy, sigmoidoscopy Family History Problem Relation Age of Onset  Cancer Mother   
     left ovary Social History Socioeconomic History  Marital status: SINGLE  
 Spouse name: Not on file  Number of children: Not on file  Years of education: Not on file  Highest education level: Not on file Tobacco Use  Smoking status: Never Smoker  Smokeless tobacco: Never Used Substance and Sexual Activity  Alcohol use: No  
 Drug use: No  
 Sexual activity: No  
 
 
Prior to Admission medications Medication Sig Start Date End Date Taking? Authorizing Provider  
megestrol (MEGACE) 40 mg tablet Take 2 Tabs by mouth two (2) times a day. 12/4/18  Yes Cal Schwab, MD  
tamoxifen (NOLVADEX) 20 mg tablet Take 1 Tab by mouth two (2) times a day. 12/4/18  Yes Cal Schwab, MD  
ondansetron hcl [facility]) 4 mg tablet Take 1 Tab by mouth every six (6) hours as needed for Nausea. 12/4/18  Yes Cal Schwab, MD  
GARLIC PO Take  by mouth daily. Yes Provider, Historical  
RESVERATROL PO Take  by mouth daily. Yes Provider, Historical  
calcium carbonate (OS-EBENEZER) 500 mg calcium (1,250 mg) tablet Take  by mouth daily. Yes Provider, Historical  
DOCOSAHEXANOIC ACID/EPA (FISH OIL PO) Take 1,000 mg by mouth daily. Yes Provider, Historical  
LACTOBACILLUS ACIDOPHILUS (PROBIOTIC PO) Take  by mouth daily. Yes Provider, Historical  
 
 
Allergies Allergen Reactions  Other Food Nausea and Vomiting Artificial sweeteners  Neulasta [Pegfilgrastim] Swelling  Aspirin Other (comments) Gi upset  Milk Itching and Atopic Dermatitis Eczema (dairy cow)  Tetracycline Unknown (comments) Patient does not remember  Wheat Atopic Dermatitis  
  eczema  Other Plant, Animal, Environmental Other (comments) Pt states she has \"springtime grass allergies. \" Reports reaction: Sinus infection Review of Systems Pertinent items are noted in the History of Present Illness. Physical Exam:  
  
Visit Vitals /77 (BP 1 Location: Left arm, BP Patient Position: At rest) Pulse 86 Temp 97.6 °F (36.4 °C) Resp 21  
 Ht 5' 5\" (1.651 m) Wt 88.5 kg (195 lb) SpO2 93% Breastfeeding? No  
BMI 32.45 kg/m² Physical Exam: 
Constitutional: NAD. A&Ox4. Respiratory: Normal respiratory effort. Symetrical rise and fall of chest.  
Cardiovascular: Regular rate. Gastrointestinal: Soft, NT, ND. Labs Reviewed: 
CMP:  
Lab Results Component Value Date/Time  12/06/2018 03:24 AM  
 K 6.3 (HH) 12/06/2018 03:24 AM  
  (H) 12/06/2018 03:24 AM  
 CO2 21 12/06/2018 03:24 AM  
 AGAP 11 12/06/2018 03:24 AM  
 GLU 91 12/06/2018 03:24 AM  
 BUN 63 (H) 12/06/2018 03:24 AM  
 CREA 9.97 (H) 12/06/2018 03:24 AM  
 GFRAA 5 (L) 12/06/2018 03:24 AM  
 GFRNA 4 (L) 12/06/2018 03:24 AM  
 CA 8.3 (L) 12/06/2018 03:24 AM  
 ALB 3.1 (L) 12/06/2018 03:24 AM  
 TP 6.6 12/06/2018 03:24 AM  
 GLOB 3.5 12/06/2018 03:24 AM  
 AGRAT 0.9 12/06/2018 03:24 AM  
 SGOT 16 12/06/2018 03:24 AM  
 ALT 13 12/06/2018 03:24 AM  
 
CBC:  
Lab Results Component Value Date/Time WBC 11.4 12/06/2018 03:24 AM  
 HGB 9.3 (L) 12/06/2018 03:24 AM  
 HCT 28.3 (L) 12/06/2018 03:24 AM  
  12/06/2018 03:24 AM  
 
COAGS:  
Lab Results Component Value Date/Time APTT 31.8 12/06/2018 03:24 AM  
 INR 0.9 12/06/2018 11:04 AM  
 
Imaging: 
CT Abdomen And Pelvis without Intravenous Contrast 
  
INDICATION: Renal failure. Stage IV cervical cancer. 
  
TECHNIQUE: 5 mm collimation axial images obtained from the diaphragm to the 
level of the pubic symphysis without administration of low osmolar, nonionic 
intravenous contrast. 
  
Dose reduction techniques used: Automated exposure control, adjustment of the 
mAs and/or kVp according to patient size, standardized low-dose protocol, and/or 
iterative reconstruction technique. 
  
COMPARISON: November 23, 2018. ABDOMEN FINDINGS: 
  
Lung Bases: Bibasilar atelectasis. Central bronchial wall thickening. 9 mm 
nodule in the right lower lobe. 8 mm nodule in the right lower lobe.  Additional 
 5 mm nodule in the right lower lobe. Left lower lobe nodule measuring 9 mm. Small nodular density in the left breast is incidentally noted. 
  
Liver: Normal attenuation. No evidence for mass. 
  
Gallbladder: Present and appears normal. No biliary ductal dilatation. 
  
Pancreas: Normal attenuation without mass or ductal dilatation. 
  
Spleen: Enlarged with large mass throughout the hilum measuring up to 11.2 cm, 
similar or slightly increased in size. Smaller splenic mass measures up to 3.3 
cm, previously 3.1 cm. 
  
Adrenal Glands: No evidence for mass. 
  
Kidneys: Moderate to severe bilateral hydronephrosis. This is slightly worsened. Probable small bilateral renal cysts. 
  
Lymph Nodes: Large soft tissue mass in the pelvis consistent with recurrent 
neoplasm. Pelvic sidewall adenopathy is seen measuring up to 2.0 cm on the left, 
previously 1.9 cm. Right pelvic sidewall lymphadenopathy measures up to 2.2 cm, 
previously 2.1 cm. Significant masses posterior to the left psoas muscle and 
right psoas muscle are again seen and consistent with malignancy. These measure 
up to 7.2 cm on the left, and 5.8 cm on the right. Large right paracolic gutter 
soft tissue mass measuring 7.0 x 5.6 cm. Aortocaval lymph node measures 15 mm. Additional aortocaval lymph nodes measure 13 mm. 
  
Aorta: Atherosclerosis 
  
PELVIS FINDINGS:  
  
Bowel: Small Bowel: Normal in caliber with normal wall thickness. Small hiatal 
hernia Large Bowel: Normal in caliber with normal wall thickness. Diverticulosis Appendix: Likely prior appendectomy. 
  
Bladder: Decompressed and possibly thick-walled. 
  
Large recurrent soft tissue mass in the pelvis measuring approximately 8.2 x 7.0 
cm. 
  
No significant free fluid. 
  
Bones: Degenerative changes of the spine. 
  
IMPRESSION Impression: 
  
Moderate to severe bilateral hydronephrosis secondary to tumor masses in the 
pelvis.  This is perhaps slightly worsened from prior. 
  
 Large recurrent pelvic soft tissue mass. Severe pelvic and retroperitoneal 
lymphadenopathy with large metastatic implants near the psoas muscles 
bilaterally. This is similar or slightly worsened from prior. 
  
Large splenic metastases are slightly increased from prior. 
  
Bladder wall thickening, nonspecific. 
  
Retroperitoneal adenopathy, similar to prior. 
  
Pulmonary nodules in the lung bases consistent with metastatic disease. 
  
Additional findings as above. Assessment/Plan Active Problems: 
  ARF (acute renal failure) ([facility] [address] (12/5/2018) Case and images reviewed by Dr. Bobbi Marin. Will plan for image-guided bilateral nephrostomy tube placement with moderate sedation as IR schedule allows. Patient to remain NPO with blood thinning medications held. Consent to be obtained prior to procedure.

## 2018-12-06 NOTE — ED PROVIDER NOTES
EMERGENCY DEPARTMENT HISTORY AND PHYSICAL EXAM 
 
9:33 PM 
 
 
Date: 12/5/2018 Patient Name: Aminah Florentino History of Presenting Illness Chief Complaint Patient presents with  Abnormal Lab Results History Provided By: Patient Chief Complaint: Abnormal Lab results Duration:  Hours Timing:  Acute Location: N/A Quality: N/A Severity: N/A Modifying Factors:  
Associated Symptoms: denies any other associated signs or symptoms Additional History (Context): Aminah Florentino is a 76 y.o. female with High cholesterol, Cervical CA, Endometriosis who presents with abnormal lab results after getting blood work done today. Pt was called by oncologist and was told to go to ED due to elevated kidney enzymes. Pt has history of cervical cancer that went into remission and is now back. Pt denies HA, Fever, Chills, SOB, CP, Leg swelling, and all other symptoms. PCP: Charisse Rothman MD 
 
Current Outpatient Medications Medication Sig Dispense Refill  megestrol (MEGACE) 40 mg tablet Take 2 Tabs by mouth two (2) times a day. 120 Tab 3  
 tamoxifen (NOLVADEX) 20 mg tablet Take 1 Tab by mouth two (2) times a day. 120 Tab 0  
 ondansetron hcl (ZOFRAN) 4 mg tablet Take 1 Tab by mouth every six (6) hours as needed for Nausea. 30 Tab 3  
 GARLIC PO Take  by mouth daily.  RESVERATROL PO Take  by mouth daily.  calcium carbonate (OS-MAYELA) 500 mg calcium (1,250 mg) tablet Take  by mouth daily.  DOCOSAHEXANOIC ACID/EPA (FISH OIL PO) Take 1,000 mg by mouth daily.  LACTOBACILLUS ACIDOPHILUS (PROBIOTIC PO) Take  by mouth daily. Past History Past Medical History: 
Past Medical History:  
Diagnosis Date  BMI 34.0-34.9,adult 34.8  Caffeine adverse reaction   
 elevated BP  Cancer ([facility] [address] uterine, cervical  
 Cervical cancer (HCC)  Chronic sinusitis  Dizziness  Endometriosis  Hiatal hernia  High cholesterol  Palpitation  PMB (postmenopausal bleeding)  Rash   
 eczemz  Urinary incontinence Past Surgical History: 
Past Surgical History:  
Procedure Laterality Date  HX ABDOMINAL LAPAROSCOPY    
 HX BILATERAL SALPINGO-OOPHORECTOMY Bilateral   
 HX BREAST LUMPECTOMY Right 1973  
 benign  HX HYSTERECTOMY    
 radical  
 HX OTHER SURGICAL  09/01/2017 Exam under anesthesia: Cystoscopy, sigmoidoscopy Family History: 
Family History Problem Relation Age of Onset  Cancer Mother   
     left ovary Social History: 
Social History Tobacco Use  Smoking status: Never Smoker  Smokeless tobacco: Never Used Substance Use Topics  Alcohol use: No  
 Drug use: No  
 
 
Allergies: Allergies Allergen Reactions  Other Food Nausea and Vomiting Artificial sweeteners  Neulasta [Pegfilgrastim] Swelling  Aspirin Other (comments) Gi upset  Milk Itching and Atopic Dermatitis Eczema (dairy cow)  Tetracycline Unknown (comments) Patient does not remember  Wheat Atopic Dermatitis  
  eczema  Other Plant, Animal, Environmental Other (comments) Pt states she has \"springtime grass allergies. \" Reports reaction: Sinus infection Review of Systems Review of Systems Constitutional: Negative for chills and fever. Cardiovascular: Negative for chest pain and leg swelling. Neurological: Negative for headaches. All other systems reviewed and are negative. Physical Exam  
 
Visit Vitals BP (!) 181/104 (BP 1 Location: Right arm, BP Patient Position: Sitting) Pulse 98 Temp 97.7 °F (36.5 °C) Resp 20 SpO2 97% Physical Exam 
 
Patient Vitals for the past 12 hrs: 
 Temp Pulse Resp BP SpO2  
12/05/18 1904 97.7 °F (36.5 °C) 98 20 (!) 181/104 97 % Gen: Well developed, well nourished 76 y.o. female HEENT: Normocephalic, atraumatic, no scleral icterus Respiratory: No accessory muscle use No wheeze, No rales, No rhonchi. Normal chest wall excursion. No subcutaneous air, no rib crepitus Cardiovascular: Regular rhythm and rate, Normal pulses, Normal perfusion. No edema Gastrointestinal: Non distended, Non tender, No masses. No ascites. No organomegaly. No evidence of trauma Musculoskeletal: Full range of motion at all other tested joints. No joint effusions. No spinal tenderness. Neuro: Normal strength, Normal sensation. Normal speech. No ataxia. Cranial Nerves II-XII normal as tested. Skin: No rash, No lesions, petechia or purpura. Warm and dry. Midline Abdominal Scar Psyche: No suicidal ideation, No homicidal ideation. No hallucinations. Organized thoughts. Heme: Normal 
: Deferred Diagnostic Study Results Labs - Recent Results (from the past 12 hour(s)) URINALYSIS W/ RFLX MICROSCOPIC Collection Time: 12/05/18  7:04 PM  
Result Value Ref Range Color YELLOW Appearance CLOUDY Specific gravity 1.011 1.005 - 1.030    
 pH (UA) 5.0 5.0 - 8.0 Protein 30 (A) NEG mg/dL Glucose NEGATIVE  NEG mg/dL Ketone NEGATIVE  NEG mg/dL Bilirubin NEGATIVE  NEG Blood MODERATE (A) NEG Urobilinogen 0.2 0.2 - 1.0 EU/dL Nitrites NEGATIVE  NEG Leukocyte Esterase LARGE (A) NEG URINE MICROSCOPIC ONLY Collection Time: 12/05/18  7:04 PM  
Result Value Ref Range WBC 20 to 40 0 - 4 /hpf  
 RBC 50 to 75 0 - 5 /hpf Epithelial cells 2+ 0 - 5 /lpf Bacteria FEW (A) NEG /hpf  
CBC WITH AUTOMATED DIFF Collection Time: 12/05/18  8:34 PM  
Result Value Ref Range WBC 13.6 (H) 4.6 - 13.2 K/uL  
 RBC 3.80 (L) 4.20 - 5.30 M/uL  
 HGB 10.9 (L) 12.0 - 16.0 g/dL HCT 33.6 (L) 35.0 - 45.0 % MCV 88.4 74.0 - 97.0 FL  
 MCH 28.7 24.0 - 34.0 PG  
 MCHC 32.4 31.0 - 37.0 g/dL  
 RDW 14.1 11.6 - 14.5 % PLATELET 225 418 - 477 K/uL MPV 8.9 (L) 9.2 - 11.8 FL  
 NEUTROPHILS 72 40 - 73 % LYMPHOCYTES 14 (L) 21 - 52 % MONOCYTES 10 3 - 10 % EOSINOPHILS 4 0 - 5 % BASOPHILS 0 0 - 2 % ABS. NEUTROPHILS 9.7 (H) 1.8 - 8.0 K/UL  
 ABS. LYMPHOCYTES 1.9 0.9 - 3.6 K/UL  
 ABS. MONOCYTES 1.4 (H) 0.05 - 1.2 K/UL  
 ABS. EOSINOPHILS 0.5 (H) 0.0 - 0.4 K/UL  
 ABS. BASOPHILS 0.0 0.0 - 0.1 K/UL  
 DF AUTOMATED METABOLIC PANEL, COMPREHENSIVE Collection Time: 12/05/18  8:34 PM  
Result Value Ref Range Sodium 140 136 - 145 mmol/L Potassium 6.2 (HH) 3.5 - 5.5 mmol/L Chloride 106 100 - 108 mmol/L  
 CO2 23 21 - 32 mmol/L Anion gap 11 3.0 - 18 mmol/L Glucose 103 (H) 74 - 99 mg/dL BUN 64 (H) 7.0 - 18 MG/DL Creatinine 9.67 (H) 0.6 - 1.3 MG/DL  
 BUN/Creatinine ratio 7 (L) 12 - 20 GFR est AA 5 (L) >60 ml/min/1.73m2 GFR est non-AA 4 (L) >60 ml/min/1.73m2 Calcium 9.4 8.5 - 10.1 MG/DL Bilirubin, total 0.4 0.2 - 1.0 MG/DL  
 ALT (SGPT) 12 (L) 13 - 56 U/L  
 AST (SGOT) 22 15 - 37 U/L Alk. phosphatase 506 (H) 45 - 117 U/L Protein, total 7.8 6.4 - 8.2 g/dL Albumin 3.8 3.4 - 5.0 g/dL Globulin 4.0 2.0 - 4.0 g/dL A-G Ratio 1.0 0.8 - 1.7 Radiologic Studies - No orders to display Medical Decision Making I am the first provider for this patient. I reviewed the vital signs, available nursing notes, past medical history, past surgical history, family history and social history. Vital Signs-Reviewed the patient's vital signs. Records Reviewed: Nursing Notes and Old Medical Records (Time of Review: 9:33 PM) ED Course: Progress Notes, Reevaluation, and Consults: 
Consult:  Discussed care with Dr. George Arevalo (Nephrology). Standard discussion; including history of patients chief complaint, available diagnostic results, and treatment course. Recommends CT Abd and admission to hospitalist 
 
Consult:  Discussed care with Ramsey HOLGUIN [facility]). Standard discussion; including history of patients chief complaint, available diagnostic results, and treatment course. Accepted for admission Diagnosis Diagnosis: Hyperkalemia Patient Active Problem List  
Diagnosis Code  [facility] C54.1  Severe obesity (BMI 35.0-39. 9) E66.01  
 ARF (acute renal failure) ([facility]) N17.9 Disposition:ADMIT No discharge date for patient encounter. Discharge Medications:  
Current Discharge Medication List  
  
 
 
 
Follow-up Information None Medication List  
  
ASK your doctor about these medications   
calcium carbonate 500 mg calcium (1,250 mg) tablet Commonly known as:  OS-MAYELA FISH OIL PO 
  
GARLIC PO 
  
megestrol 40 mg tablet Commonly known as:  MEGACE Take 2 Tabs by mouth two (2) times a day. ondansetron hcl 4 mg tablet Commonly known as:  Angel Pallas Take 1 Tab by mouth every six (6) hours as needed for Nausea. PROBIOTIC PO 
  
RESVERATROL PO 
  
tamoxifen 20 mg tablet Commonly known as:  NOLVADEX Take 1 Tab by mouth two (2) times a day. 
  
  
 
_______________________________ Scribe Attestation Holland Rehman acting as a scribe for and in the presence of Arnaldo Vasquez MD     
December 05, 2018 at 9:33 PM 
    
Provider Attestation:     
I personally performed the services described in the documentation, reviewed the documentation, as recorded by the scribe in my presence, and it accurately and completely records my words and actions. December 05, 2018 at 9:33 PM - Arnaldo Vasquez MD   
 
 
_______________________________

## 2018-12-06 NOTE — CONSULTS
Consult Note Patient: Marianne Morillo               Sex: female          DOA: 12/5/2018 YOB: 1950      Age:  76 y.o.        LOS:  LOS: 1 day Referring Provider: Vickie Bhardwaj ASSESSMENT:  
#1 Bilateral hydroureteronephrosis #2 BECK to Cre 10  
#3 Metastatic endometrioid carcinoma favoring cervical origin Given bulk of disease in pelvis, stents unlikely to be successfull therefore recommended nephrostomy tubes. These have been placed this afternoon and she is diuresing now. Would be best managed with neph tubes long term as well, cervical CA notorious for obstructing stents and causing renal failure. PLAN:   
Recommend 1. Continue nephrostomy tubes 2. [place] can be removed for patient comfort Amanda Morales MD 
[phone] Office 
[phone]  Pager Will follow Chief Complaint Patient presents with  Abnormal Lab Results HISTORY OF PRESENT ILLNESS:  Marianne Morillo is a 76 y.o. female who is seen in consultation as referred by   for acute kidney injury, hyperkalemia, bilateral hydronephrosis and pelvic mass. She has a history of metastatic endometrioid carcinoma of cervical origin s/p EVA/BSO 2017 s/p chemotherapy. She was found to have cre of 10 and sent to ED. CT shows bilateral massive hydronephrosis. She denies flank pain. Denies hematuria, infections or stones. Does have ANGELICA. Neph tubes places this afternoon with good diuresis. No flowsheet data found. Past Medical History:  
Diagnosis Date  BMI 34.0-34.9,adult 34.8  Caffeine adverse reaction   
 elevated BP  Cancer ([address] uterine, cervical  
 Cervical cancer (HCC)  Chronic sinusitis  Dizziness  Endometriosis  Hiatal hernia  High cholesterol  Palpitation  PMB (postmenopausal bleeding)  Rash   
 eczemz  Urinary incontinence Past Surgical History:  
Procedure Laterality Date  HX ABDOMINAL LAPAROSCOPY    
 HX BILATERAL SALPINGO-OOPHORECTOMY Bilateral   
 HX BREAST LUMPECTOMY Right 1973  
 benign  HX HYSTERECTOMY    
 radical  
 HX OTHER SURGICAL  09/01/2017 Exam under anesthesia: Cystoscopy, sigmoidoscopy Social History Tobacco Use  Smoking status: Never Smoker  Smokeless tobacco: Never Used Substance Use Topics  Alcohol use: No  
 Drug use: No  
 
 
Allergies Allergen Reactions  Other Food Nausea and Vomiting Artificial sweeteners  Neulasta [Pegfilgrastim] Swelling  Aspirin Other (comments) Gi upset  Milk Itching and Atopic Dermatitis Eczema (dairy cow)  Tetracycline Unknown (comments) Patient does not remember  Wheat Atopic Dermatitis  
  eczema  Other Plant, Animal, Environmental Other (comments) Pt states she has \"springtime grass allergies. \" Reports reaction: Sinus infection Family History Problem Relation Age of Onset  Cancer Mother   
     left ovary Current Facility-Administered Medications Medication Dose Route Frequency Provider Last Rate Last Dose  cefTRIAXone (ROCEPHIN) 1 g in sterile water (preservative free) 10 mL IV syringe  1 g IntraVENous Q24H Maria Dolores Banegas MD   1 g at 12/06/18 6952  furosemide (LASIX) injection 40 mg  40 mg IntraVENous ONCE Zane Amador MD      
 sodium bicarbonate 8.4 % (1 mEq/mL) injection 50 mEq  50 mEq IntraVENous ONCE Zane Amador MD      
 sodium chloride (NS) flush 5-10 mL  5-10 mL IntraVENous Q8H Jairo Thompson MD      
 sodium chloride (NS) flush 5-10 mL  5-10 mL IntraVENous PRN Jairo Thompson MD      
 flumazenil (ROMAZICON) 0.1 mg/mL injection 0.2 mg  0.2 mg IntraVENous Multiple Jairo Thompson MD      
 naloxone (NARCAN) injection 0.1 mg  0.1 mg IntraVENous Multiple Jairo Thompson MD      
 sodium chloride (NS) flush 5-10 mL  5-10 mL IntraVENous Q8H Dates, Dwan Schaumann, MD   10 mL at 12/06/18 7756  sodium chloride (NS) flush 5-10 mL  5-10 mL IntraVENous PRN Vianney Vasquez MD      
 tamoxifen (NOLVADEX) tablet 20 mg  20 mg Oral BID Mavis Gilman MD      
 acetaminophen (TYLENOL) tablet 650 mg  650 mg Oral Q6H PRN Mavis Gilman MD   650 mg at 12/06/18 1650  ondansetron (ZOFRAN) injection 4 mg  4 mg IntraVENous Q6H PRN Mavis Gilman MD   4 mg at 12/06/18 0357  
 0.9% sodium chloride infusion  75 mL/hr IntraVENous CONTINUOUS Bicradha, Ashley MARTINEZ  mL/hr at 12/06/18 0010 100 mL/hr at 12/06/18 0010  
 heparin (porcine) injection 5,000 Units  5,000 Units SubCUTAneous Q8H Mavis Gilman MD   5,000 Units at 12/06/18 1883  hydrALAZINE (APRESOLINE) 20 mg/mL injection 10 mg  10 mg IntraVENous Q6H PRN Mavis Gilman MD      
 
 
Review of Systems Constitutional: No fever, chills, or weight loss Respiratory: No dyspnea Cardiovascular: No chest pain Gastrointestinal: No vomiting or abdominal pain. Genitourinary: Denies frequency, urgency, dysuria, hematuria. Neurological: No focal motor changes. PHYSICAL EXAMINATION:  
Visit Vitals BP (!) 175/91 (BP 1 Location: Right arm, BP Patient Position: At rest) Comment: told nurse Pulse 86 Temp 97.5 °F (36.4 °C) Resp 20 Ht 5' 5\" (1.651 m) Wt 195 lb (88.5 kg) SpO2 94% Breastfeeding? No  
BMI 32.45 kg/m² Constitutional: Well developed, well nourished female. No acute distress. HEENT: Normocephalic, Atraumatic, EOM's intact CV:  Normal radial pulse. Respiratory: No respiratory distress or difficulties breathing Abdomen:  Nontender, nondistended.  Female:  No CVA tenderness. Bilateral neph tubes in place with light pink urine. Skin: No evidence of jaundice. Normal color Neuro/Psych:  Alert and oriented. Affect appropriate. Lymphatic:   No enlarged inguinal lymph nodes. REVIEW OF LABS AND IMAGING:   
 
Labs: Results:  
Chemistry Recent Labs 12/06/18 
1412 12/06/18 
0324 12/05/18 
2215 12/05/18 2034 12/04/18 
1740 GLU  --  91 82 103* 127* NA  --  141 141 140 143  
K 5.8* 6.3* 5.4 6.2* 5.9*  
CL  --  109* 108 106 101 CO2  --  21 22 23 20 BUN  --  63* 63* 64* 57* CREA  --  9.97* 10.10* 9.67* 8.4*  
CA  --  8.3* 9.1 9.4 9.1 AGAP  --  11 11 11 22.0 BUCR  --  6* 6* 7*  --   
AP  --  434*  --  506* 398* TP  --  6.6  --  7.8 7.0 ALB  --  3.1*  --  3.8 4.3 GLOB  --  3.5  --  4.0 2.7 AGRAT  --  0.9  --  1.0 1.6 CBC w/Diff Recent Labs 12/06/18 0324 12/05/18 2034 12/04/18 
1740 WBC 11.4 13.6* 13.9*  
RBC 3.21* 3.80* 3.76* HGB 9.3* 10.9* 11.0*  
HCT 28.3* 33.6* 34.2*  
 175 193 GRANS  --  72 76* LYMPH  --  14*  --   
EOS  --  4 3 Cultures No results for input(s): CULT in the last 72 hours. All Micro Results None Urinalysis Color Date Value Ref Range Status 12/05/2018 YELLOW   Final  
 
Appearance Date Value Ref Range Status 12/05/2018 CLOUDY   Final  
 
Specific gravity Date Value Ref Range Status 12/05/2018 1.011 1.005 - 1.030   Final  
 
pH (UA) Date Value Ref Range Status 12/05/2018 5.0 5.0 - 8.0   Final  
 
Protein Date Value Ref Range Status 12/05/2018 30 (A) NEG mg/dL Final  
 
Ketone Date Value Ref Range Status 12/05/2018 NEGATIVE  NEG mg/dL Final  
 
Bilirubin Date Value Ref Range Status 12/05/2018 NEGATIVE  NEG   Final  
 
Blood Date Value Ref Range Status 12/05/2018 MODERATE (A) NEG   Final  
 
Urobilinogen Date Value Ref Range Status 12/05/2018 0.2 0.2 - 1.0 EU/dL Final  
 
Nitrites Date Value Ref Range Status 12/05/2018 NEGATIVE  NEG   Final  
 
Leukocyte Esterase Date Value Ref Range Status 12/05/2018 LARGE (A) NEG   Final  
 
Potassium Date Value Ref Range Status 12/06/2018 5.8 (H) 3.5 - 5.5 mmol/L Final  
 
Creatinine Date Value Ref Range Status 12/06/2018 9.97 (H) 0.6 - 1.3 MG/DL Final  
 
BUN Date Value Ref Range Status 12/06/2018 63 (H) 7.0 - 18 MG/DL Final  
  
 PSA No results for input(s): PSA in the last 72 hours. Coagulation Lab Results Component Value Date/Time Prothrombin time 11.2 09/11/2017 11:30 AM  
 Prothrombin time 11.0 08/28/2017 11:27 AM  
 INR 1.0 09/11/2017 11:30 AM  
 INR 1.0 08/28/2017 11:27 AM  
 INR (POC) 0.9 12/06/2018 11:04 AM  
 aPTT 31.8 12/06/2018 03:24 AM  
 aPTT 29.8 09/11/2017 11:30 AM  
   
 
 
US Results (most recent): 
Results from Hospital Encounter encounter on 03/06/18 US HEAD NECK SOFT TISSUE Narrative Indication: Increased uptake right thyroid on PET scan. On chemotherapy. Impression IMPRESSION: Solid right thyroid nodules, largest 3.2 cm. Subcentimeter left 
thyroid nodules. Comment: Sonography of the thyroid was performed. No prior studies for 
comparison. The right lobe measures 4.9 x 2.3 x 2.5 cm. It harbors a 3.2 x 1.8 x 2.3 cm 
solid nodule in the midpole and a 1.9 x 1.7 x 2.1 cm lower pole nodule. The left lobe measures 4.6 x 1.0 x 1.3 cm and harbors 2 subcentimeter nodules. The largest measures 8 mm in greatest dimension. The isthmus is normal. 
  
       chest 
 
CT Results (most recent):  
Results from Hospital Encounter encounter on 12/05/18 CT ABD PELV WO CONT Narrative CT Abdomen And Pelvis without Intravenous Contrast 
 
INDICATION: Renal failure. Stage IV cervical cancer. TECHNIQUE: 5 mm collimation axial images obtained from the diaphragm to the 
level of the pubic symphysis without administration of low osmolar, nonionic 
intravenous contrast. 
 
Dose reduction techniques used: Automated exposure control, adjustment of the 
mAs and/or kVp according to patient size, standardized low-dose protocol, and/or 
iterative reconstruction technique. COMPARISON: November 23, 2018. ABDOMEN FINDINGS: 
 
Lung Bases: Bibasilar atelectasis. Central bronchial wall thickening. 9 mm 
nodule in the right lower lobe. 8 mm nodule in the right lower lobe.  Additional 
 5 mm nodule in the right lower lobe. Left lower lobe nodule measuring 9 mm. Small nodular density in the left breast is incidentally noted. Liver: Normal attenuation. No evidence for mass. Gallbladder: Present and appears normal. No biliary ductal dilatation. Pancreas: Normal attenuation without mass or ductal dilatation. Spleen: Enlarged with large mass throughout the hilum measuring up to 11.2 cm, 
similar or slightly increased in size. Smaller splenic mass measures up to 3.3 
cm, previously 3.1 cm. Adrenal Glands: No evidence for mass. Kidneys: Moderate to severe bilateral hydronephrosis. This is slightly worsened. Probable small bilateral renal cysts. Lymph Nodes: Large soft tissue mass in the pelvis consistent with recurrent 
neoplasm. Pelvic sidewall adenopathy is seen measuring up to 2.0 cm on the left, 
previously 1.9 cm. Right pelvic sidewall lymphadenopathy measures up to 2.2 cm, 
previously 2.1 cm. Significant masses posterior to the left psoas muscle and 
right psoas muscle are again seen and consistent with malignancy. These measure 
up to 7.2 cm on the left, and 5.8 cm on the right. Large right paracolic gutter 
soft tissue mass measuring 7.0 x 5.6 cm. Aortocaval lymph node measures 15 mm. Additional aortocaval lymph nodes measure 13 mm. Aorta: Atherosclerosis PELVIS FINDINGS:  
 
Bowel: Small Bowel: Normal in caliber with normal wall thickness. Small hiatal 
hernia Large Bowel: Normal in caliber with normal wall thickness. Diverticulosis Appendix: Likely prior appendectomy. Bladder: Decompressed and possibly thick-walled. Large recurrent soft tissue mass in the pelvis measuring approximately 8.2 x 7.0 
cm. No significant free fluid. Bones: Degenerative changes of the spine. Impression Impression: Moderate to severe bilateral hydronephrosis secondary to tumor masses in the 
pelvis. This is perhaps slightly worsened from prior. Large recurrent pelvic soft tissue mass. Severe pelvic and retroperitoneal 
lymphadenopathy with large metastatic implants near the psoas muscles 
bilaterally. This is similar or slightly worsened from prior. Large splenic metastases are slightly increased from prior. Bladder wall thickening, nonspecific. Retroperitoneal adenopathy, similar to prior. Pulmonary nodules in the lung bases consistent with metastatic disease. Additional findings as above. ABD MRI Results (most recent): No procedure found. 1. Acute renal failure, unspecified acute renal failure type ([address] 2. Hydronephrosis

## 2018-12-06 NOTE — H&P
HISTORY & PHYSICAL Patient: Alejandro Viera MRN: [id number]  CSN: [id number] YOB: 1950  Age: 76 y.o. Sex: female DOA: 12/5/2018 LOS:  LOS: 0 days DOA: 12/5/2018 Assessment/Plan Active Problems: 
  ARF (acute renal failure) ([address] (12/5/2018) Plan: 1. Hyperkalemia - rx in the ER with IV calcium gluconate, Insulin D50 - repeat BMP at midnight - Nephrology consulted by ER 2. ARF - likely 2y to dehydration - has been having N&V & diarrhea for past 1-2 weeks - IVF - monitor I&O's - await CT Abd & pelvis results -- CT Abd & Pelvis results reviewed - has moderate to severe Hydronephrosis - will consult Urology in AM for further eval - will keep pt NPO in AM  
3. H/o cervical & endometrial cancer with recurrence - is seeing Penny Collins - says she underwent radical hysterectomy last yr - competed Rx but found out this Thanksgiving that the cancer had returned - Has a PET scan done - was supposed to start new Rx but held 2y to elevated BP 4. HTN - new dx - monitor - will add hydralazine prn DVT - Heparin FC Severity of Signs & Symptoms -- Moderate Risk of adverse events -- Moderate Current Medical Rx Plan - As Above Patient history & comorbidities - Per HPI Discharge Plan -- Home HPI:  
 
Alejandro Viera is a 76 y.o. female who is being admitted for ARF , hyperkalemia Pt has a h/o cervical & endometrial cancer - underwent radical hysterectomy & chemo last year -- was in remission till she learnt recently that it has returned She mentions that she was supposed to start a new medication this week - likely tamoxifen but has been having NVD for the past 1-2 weeks -- she says she saw her gyn Onc - was given imodium but it didn't help much - she also mentions that she hasnt been drinking enough water / fluids She had labs drawn yesterday - showed ARF - advised to go to the ER  
 ER eval - also found to be hyperkalemic - Rx - Nephrology consulted, will admit for further eval  
 
 
Past Medical History:  
Diagnosis Date  BMI 34.0-34.9,adult 34.8  Caffeine adverse reaction   
 elevated BP  Cancer ([facility] [address] uterine, cervical  
 Cervical cancer (HCC)  Chronic sinusitis  Dizziness  Endometriosis  Hiatal hernia  High cholesterol  Palpitation  PMB (postmenopausal bleeding)  Rash   
 eczemz  Urinary incontinence Past Surgical History:  
Procedure Laterality Date  HX ABDOMINAL LAPAROSCOPY    
 HX BILATERAL SALPINGO-OOPHORECTOMY Bilateral   
 HX BREAST LUMPECTOMY Right 1973  
 benign  HX HYSTERECTOMY    
 radical  
 HX OTHER SURGICAL  09/01/2017 Exam under anesthesia: Cystoscopy, sigmoidoscopy Family History Problem Relation Age of Onset  Cancer Mother   
     left ovary Social History Socioeconomic History  Marital status: SINGLE Spouse name: Not on file  Number of children: Not on file  Years of education: Not on file  Highest education level: Not on file Tobacco Use  Smoking status: Never Smoker  Smokeless tobacco: Never Used Substance and Sexual Activity  Alcohol use: No  
 Drug use: No  
 Sexual activity: No  
 
 
Prior to Admission medications Medication Sig Start Date End Date Taking? Authorizing Provider  
megestrol (MEGACE) 40 mg tablet Take 2 Tabs by mouth two (2) times a day. 12/4/18  Yes Deborah Schultz MD  
tamoxifen (NOLVADEX) 20 mg tablet Take 1 Tab by mouth two (2) times a day. 12/4/18  Yes Deborah Schultz MD  
ondansetron hcl [facility]) 4 mg tablet Take 1 Tab by mouth every six (6) hours as needed for Nausea. 12/4/18  Yes Deborah Schultz MD  
GARLIC PO Take  by mouth daily. Yes Provider, Historical  
RESVERATROL PO Take  by mouth daily.    Yes Provider, Historical  
calcium carbonate (OS-MAYELA) 500 mg calcium (1,250 mg) tablet Take  by mouth daily. Yes Provider, Historical  
DOCOSAHEXANOIC ACID/EPA (FISH OIL PO) Take 1,000 mg by mouth daily. Yes Provider, Historical  
LACTOBACILLUS ACIDOPHILUS (PROBIOTIC PO) Take  by mouth daily. Yes Provider, Historical  
 
 
Allergies Allergen Reactions  Other Food Nausea and Vomiting Artificial sweeteners  Neulasta [Pegfilgrastim] Swelling  Aspirin Other (comments) Gi upset  Milk Itching and Atopic Dermatitis Eczema (dairy cow)  Tetracycline Unknown (comments) Patient does not remember  Wheat Atopic Dermatitis  
  eczema  Other Plant, Animal, Environmental Other (comments) Pt states she has \"springtime grass allergies. \" Reports reaction: Sinus infection Review of Systems A comprehensive review of systems was negative except for that written in the History of Present Illness. Physical Exam:  
  
Visit Vitals /67 Pulse (!) 103 Temp 97.7 °F (36.5 °C) Resp 23 SpO2 99% Physical Exam: 
 
Gen: In general, this is a well nourished female in no acute distress HEENT: Sclerae nonicteric. Oral mucous membranes moist. Dentition normal 
Neck: Supple with midline trachea. CV: RRR without murmur or rub appreciated. Resp:Respirations are unlabored without use of accessory muscles. Lung fields B/L without wheezes or rhonchi. Abd: Soft, nontender, nondistended. Extrem: Extremities are warm, without cyanosis or clubbing. No pitting pretibial edema. Skin: Warm, no visible rashes. Neuro: Patient is alert, oriented, and cooperative. No obvious focal defects. Moves all 4 extremities. Labs Reviewed: 
 
Recent Results (from the past 24 hour(s)) URINALYSIS W/ RFLX MICROSCOPIC Collection Time: 12/05/18  7:04 PM  
Result Value Ref Range Color YELLOW Appearance CLOUDY Specific gravity 1.011 1.005 - 1.030    
 pH (UA) 5.0 5.0 - 8.0 Protein 30 (A) NEG mg/dL Glucose NEGATIVE  NEG mg/dL Ketone NEGATIVE  NEG mg/dL Bilirubin NEGATIVE  NEG Blood MODERATE (A) NEG Urobilinogen 0.2 0.2 - 1.0 EU/dL Nitrites NEGATIVE  NEG Leukocyte Esterase LARGE (A) NEG URINE MICROSCOPIC ONLY Collection Time: 12/05/18  7:04 PM  
Result Value Ref Range WBC 20 to 40 0 - 4 /hpf  
 RBC 50 to 75 0 - 5 /hpf Epithelial cells 2+ 0 - 5 /lpf Bacteria FEW (A) NEG /hpf  
CBC WITH AUTOMATED DIFF Collection Time: 12/05/18  8:34 PM  
Result Value Ref Range WBC 13.6 (H) 4.6 - 13.2 K/uL  
 RBC 3.80 (L) 4.20 - 5.30 M/uL  
 HGB 10.9 (L) 12.0 - 16.0 g/dL HCT 33.6 (L) 35.0 - 45.0 % MCV 88.4 74.0 - 97.0 FL  
 MCH 28.7 24.0 - 34.0 PG  
 MCHC 32.4 31.0 - 37.0 g/dL  
 RDW 14.1 11.6 - 14.5 % PLATELET 067 425 - 673 K/uL MPV 8.9 (L) 9.2 - 11.8 FL  
 NEUTROPHILS 72 40 - 73 % LYMPHOCYTES 14 (L) 21 - 52 % MONOCYTES 10 3 - 10 % EOSINOPHILS 4 0 - 5 % BASOPHILS 0 0 - 2 %  
 ABS. NEUTROPHILS 9.7 (H) 1.8 - 8.0 K/UL  
 ABS. LYMPHOCYTES 1.9 0.9 - 3.6 K/UL  
 ABS. MONOCYTES 1.4 (H) 0.05 - 1.2 K/UL  
 ABS. EOSINOPHILS 0.5 (H) 0.0 - 0.4 K/UL  
 ABS. BASOPHILS 0.0 0.0 - 0.1 K/UL  
 DF AUTOMATED METABOLIC PANEL, COMPREHENSIVE Collection Time: 12/05/18  8:34 PM  
Result Value Ref Range Sodium 140 136 - 145 mmol/L Potassium 6.2 (HH) 3.5 - 5.5 mmol/L Chloride 106 100 - 108 mmol/L  
 CO2 23 21 - 32 mmol/L Anion gap 11 3.0 - 18 mmol/L Glucose 103 (H) 74 - 99 mg/dL BUN 64 (H) 7.0 - 18 MG/DL Creatinine 9.67 (H) 0.6 - 1.3 MG/DL  
 BUN/Creatinine ratio 7 (L) 12 - 20 GFR est AA 5 (L) >60 ml/min/1.73m2 GFR est non-AA 4 (L) >60 ml/min/1.73m2 Calcium 9.4 8.5 - 10.1 MG/DL Bilirubin, total 0.4 0.2 - 1.0 MG/DL  
 ALT (SGPT) 12 (L) 13 - 56 U/L  
 AST (SGOT) 22 15 - 37 U/L Alk. phosphatase 506 (H) 45 - 117 U/L Protein, total 7.8 6.4 - 8.2 g/dL Albumin 3.8 3.4 - 5.0 g/dL Globulin 4.0 2.0 - 4.0 g/dL A-G Ratio 1.0 0.8 - 1.7 EKG, 12 LEAD, INITIAL  Collection Time: 12/05/18 10:01 PM  
 Result Value Ref Range Ventricular Rate 90 BPM  
 Atrial Rate 90 BPM  
 P-R Interval 158 ms QRS Duration 70 ms Q-T Interval 352 ms QTC Calculation (Bezet) 430 ms Calculated P Axis 20 degrees Calculated R Axis -23 degrees Calculated T Axis 15 degrees Diagnosis Normal sinus rhythm Cannot rule out Anterior infarct , age undetermined Abnormal ECG Imaging Reviewed: 
 
CT Abd & pelvis IMPRESSION Impression: 
  
Moderate to severe bilateral hydronephrosis secondary to tumor masses in the 
pelvis. This is perhaps slightly worsened from prior. 
  
Large recurrent pelvic soft tissue mass. Severe pelvic and retroperitoneal 
lymphadenopathy with large metastatic implants near the psoas muscles 
bilaterally. This is similar or slightly worsened from prior. 
  
Large splenic metastases are slightly increased from prior. 
  
Bladder wall thickening, nonspecific. 
  
Retroperitoneal adenopathy, similar to prior. 
  
Pulmonary nodules in the lung bases consistent with metastatic disease. 
  
Additional findings as above.  
 
 
 
Anali Koch MD 
12/5/2018, 11:15 PM

## 2018-12-06 NOTE — H&P (VIEW-ONLY)
Surgery Consult Patient: Micah Clark MRN: [id number]  CSN: [id number] YOB: 1950 Age: 76 y.o. Sex: female DOA: 12/5/2018 HPI:  
 
Micah Clark is a 76 y.o. female who presents with ARF and obstructive uropathy. Has right sided mediport. She is right handed. No previous dialysis. No other previous indwelling DV device other than current mediport which is not flushing. Past Medical History:  
Diagnosis Date  BMI 34.0-34.9,adult 34.8  Caffeine adverse reaction   
 elevated BP  Cancer ([facility] [address] uterine, cervical  
 Cervical cancer (HCC)  Chronic sinusitis  Dizziness  Endometriosis  Hiatal hernia  High cholesterol  Palpitation  PMB (postmenopausal bleeding)  Rash   
 eczemz  Urinary incontinence Past Surgical History:  
Procedure Laterality Date  HX ABDOMINAL LAPAROSCOPY    
 HX BILATERAL SALPINGO-OOPHORECTOMY Bilateral   
 HX BREAST LUMPECTOMY Right 1973  
 benign  HX HYSTERECTOMY    
 radical  
 HX OTHER SURGICAL  09/01/2017 Exam under anesthesia: Cystoscopy, sigmoidoscopy Family History Problem Relation Age of Onset  Cancer Mother   
     left ovary Social History Socioeconomic History  Marital status: SINGLE Spouse name: Not on file  Number of children: Not on file  Years of education: Not on file  Highest education level: Not on file Tobacco Use  Smoking status: Never Smoker  Smokeless tobacco: Never Used Substance and Sexual Activity  Alcohol use: No  
 Drug use: No  
 Sexual activity: No  
 
 
Prior to Admission medications Medication Sig Start Date End Date Taking? Authorizing Provider  
megestrol (MEGACE) 40 mg tablet Take 2 Tabs by mouth two (2) times a day. 12/4/18  Yes Krissy Crow MD  
tamoxifen (NOLVADEX) 20 mg tablet Take 1 Tab by mouth two (2) times a day.  12/4/18  Yes Krissy Crow MD  
 ondansetron hcl (ZOFRAN) 4 mg tablet Take 1 Tab by mouth every six (6) hours as needed for Nausea. 12/4/18  Yes Donnie Cali MD  
GARLIC PO Take  by mouth daily. Yes Provider, Historical  
RESVERATROL PO Take  by mouth daily. Yes Provider, Historical  
calcium carbonate (OS-MAYELA) 500 mg calcium (1,250 mg) tablet Take  by mouth daily. Yes Provider, Historical  
DOCOSAHEXANOIC ACID/EPA (FISH OIL PO) Take 1,000 mg by mouth daily. Yes Provider, Historical  
LACTOBACILLUS ACIDOPHILUS (PROBIOTIC PO) Take  by mouth daily. Yes Provider, Historical  
 
 
Allergies Allergen Reactions  Other Food Nausea and Vomiting Artificial sweeteners  Neulasta [Pegfilgrastim] Swelling  Aspirin Other (comments) Gi upset  Milk Itching and Atopic Dermatitis Eczema (dairy cow)  Tetracycline Unknown (comments) Patient does not remember  Wheat Atopic Dermatitis  
  eczema  Other Plant, Animal, Environmental Other (comments) Pt states she has \"springtime grass allergies. \" Reports reaction: Sinus infection Physical Exam:  
  
Visit Vitals /77 (BP 1 Location: Left arm, BP Patient Position: At rest) Pulse 86 Temp 97.6 °F (36.4 °C) Resp 21 Ht 5' 5\" (1.651 m) Wt 195 lb (88.5 kg) SpO2 93% Breastfeeding? No  
BMI 32.45 kg/m² GENERAL: alert, cooperative, no distress, appears stated age, THROAT & NECK: normal and no erythema or exudates noted. , LUNG: clear to auscultation bilaterally, HEART: regular rate and rhythm, S1, S2 normal, no murmur, click, rub or gallop, ABDOMEN: soft, non-tender. Bowel sounds normal. No masses,  no organomegaly, EXTREMITIES:  extremities normal, atraumatic, no cyanosis or edema, NEUROLOGIC: negative findings: alert, oriented x3 
cranial nerves II-XII intact ROS: 
Constitutional: negative Eyes: negative Ears, nose, mouth, throat, and face: negative Respiratory: negative Cardiovascular: negative Gastrointestinal: negative Hematologic/lymphatic: negative Musculoskeletal:negative Neurological: negative Behavioral/Psych: negative Endocrine: negative Allergic/Immunologic: negative Unless otherwise mentioned in the HPI. Data Review: CBC:  
Lab Results Component Value Date/Time WBC 11.4 12/06/2018 03:24 AM  
 RBC 3.21 (L) 12/06/2018 03:24 AM  
 HGB 9.3 (L) 12/06/2018 03:24 AM  
 HCT 28.3 (L) 12/06/2018 03:24 AM  
 PLATELET 952 61/46/9208 03:24 AM  
  
BMP:  
Lab Results Component Value Date/Time Glucose 91 12/06/2018 03:24 AM  
 Sodium 141 12/06/2018 03:24 AM  
 Potassium 6.3 (HH) 12/06/2018 03:24 AM  
 Chloride 109 (H) 12/06/2018 03:24 AM  
 CO2 21 12/06/2018 03:24 AM  
 BUN 63 (H) 12/06/2018 03:24 AM  
 Creatinine 9.97 (H) 12/06/2018 03:24 AM  
 Calcium 8.3 (L) 12/06/2018 03:24 AM  
 
Coagulation:  
Lab Results Component Value Date/Time Prothrombin time 11.2 09/11/2017 11:30 AM  
 INR 1.0 09/11/2017 11:30 AM  
 INR (POC) 0.9 12/06/2018 11:04 AM  
 aPTT 31.8 12/06/2018 03:24 AM  
 
 
 
Assessment/Plan  
 
75 y/o female with ARF. --Will place temp cath. --Please call with any further questions or concerns. Active Problems: 
  ARF (acute renal failure) ([facility] [address] (12/5/2018) German Dunlap MD 
December 6, 2018

## 2018-12-06 NOTE — ROUTINE PROCESS
Report received from Maikel Rodríguez on [facility]. Pt transported to Cath holding placed on monitors, prepped for procedure, PIV assessed no sign of infiltration or phlebitis, assessment completed, no c/o voiced, no distress noted denies nausea or vomiting or pain at this time. ISTAT PT/INR completed at bedside. Family at bedside. Will cont to monitor until procedure.

## 2018-12-07 NOTE — PROGRESS NOTES
NUTRITION Nursing Referral: [facility]  
  
RECOMMENDATIONS / PLAN:  
 
- Add nutrition supplement: Nepro once daily - Provided education on low potassium diet. - Continue RD inpatient monitoring and evaluation. NUTRITION INTERVENTIONS & DIAGNOSIS:  
 
[x] Meals/snacks: modified composition 
[x] Medical food supplement therapy: initiate [x] Nutrition Education: low potassium diet, 12/7 Nutrition Diagnosis:   
Inadequate oral intake related to decreased appetite as evidenced by poor meal intake PTA. Impaired nutrient utilization related to impaired renal function as evidenced by pt with hyperkalemia. ASSESSMENT:  
 
12/7: Tolerating diet, some nausea. Consumed about 50% of breakfast per pt report, eating lunch at time of visit. Pt requested education on potassium containing foods, provided education. 12/6: Pt off unit at time of visit. NPO today for procedure, temporary catheter placement. Reported decreased appetite/ meal intake, and unplanned wt loss PTA per nursing screen. Pt with hyperkalemia; K 6.3 today. Average po intake adequate to meet patients estimated nutritional needs:   [] Yes     [x] No   [] Unable to determine at this time Diet: DIET CARDIAC Regular; Low Potassium Food Allergies:  Artificial sweeteners,  Milk,  Wheat Current Appetite:   [] Good     [x] Fair     [] Poor     [] Other:  
Appetite/meal intake prior to admission:   [] Good     [] Fair     [x] Poor per nursing screen     [] Other: 
Feeding Limitations:  [] Swallowing difficulty    [] Chewing difficulty    [] Other: 
Current Meal Intake: Patient Vitals for the past 100 hrs: 
 % Diet Eaten 12/06/18 0919 0 % BM: 12/5 Skin Integrity:  Surgical incision - nephrostomy tubes Edema:   [x] No     [] Yes Pertinent Medications: Reviewed: zofran, NS at 100 mL/hr Recent Labs 12/07/18 
1315 12/07/18 
0335 12/06/18 
2020  12/06/18 
0324  12/05/18 2034 12/04/18 
1740  141 143  --  141   < > 140 143 K 5.2 5.4 5.6*   < > 6.3*   < > 6.2* 5.9*  
 108 109*  --  109*   < > 106 101 CO2 23 23 22  --  21   < > 23 20 * 103* 92  --  91   < > 103* 127* BUN 48* 55* 59*  --  63*   < > 64* 57* CREA 6.24* 8.05* 9.29*  --  9.97*   < > 9.67* 8.4*  
CA 8.3* 8.6 8.6  --  8.3*   < > 9.4 9.1 MG  --   --   --   --   --   --   --  2.7* ALB  --   --   --   --  3.1*  --  3.8 4.3 SGOT  --   --   --   --  16  --  22 8* ALT  --   --   --   --  13  --  12* 7  
 < > = values in this interval not displayed. Intake/Output Summary (Last 24 hours) at 12/7/2018 1423 Last data filed at 12/7/2018 1149 Gross per 24 hour Intake 600 ml Output 7300 ml Net -6700 ml Anthropometrics: 
Ht Readings from Last 1 Encounters:  
12/06/18 5' 5\" (1.651 m) Last 3 Recorded Weights in this Encounter 12/06/18 1053 Weight: 88.5 kg (195 lb) Body mass index is 32.45 kg/m². Obese, Class I Weight History:  Noted 10 lb (4.9%) wt loss x 1.5 month PTA per chart hx. Pt reported experiencing unplanned weight loss PTA per nursing screen Weight Metrics 12/6/2018 12/5/2018 12/4/2018 9/25/2018 10/19/2017 9/21/2017 9/15/2017 Weight 195 lb - 198 lb 6.4 oz 205 lb 3.2 oz 201 lb 11.5 oz 237 lb 3.4 oz -  
BMI - 32.45 kg/m2 34.06 kg/m2 35.22 kg/m2 33.57 kg/m2 - 39.47 kg/m2 Admitting Diagnosis: ARF (acute renal failure) ([facility] [address] ARF (acute renal failure) ([facility] [address] Pertinent PMHx:  Cervical and uterine cancer, high cholesterol Education Needs:        [] None identified  [] Identified - Not appropriate at this time  [x]  Identified and addressed - refer to education log Learning Limitations:   [x] None identified  [] Identified Cultural, Mu-ism & ethnic food preferences:  [x] None identified    [] Identified and addressed ESTIMATED NUTRITION NEEDS:  
 
Calories: 5611-5552 kcal (25-30 kcal/kg) based on  [] Actual BW      [x] SBW: 77 kg Protein:  gm (0.8-1.2 gm/kg) based on  [x] Actual BW: 89 kg      [] IBW Fluid: 1 mL/kcal 
  
MONITORING & EVALUATION:  
 
Nutrition Goal(s): 1. Po intake of meals will meet >75% of patient estimated nutritional needs within the next 7 days. Outcome:  [] Met/Ongoing    [x]  Not Met    [] New/Initial Goal  
 
Monitoring:   [x] Food and beverage intake   [x] Diet order   [x] Nutrition-focused physical findings   [x] Treatment/therapy   [] Weight   [] Enteral nutrition intake Previous Recommendations (for follow-up assessments only):     []   Implemented       [x]   Not Implemented (RD to address)      [] No Longer Appropriate     [] No Recommendation Made Discharge Planning:  Cardiac, renal diet [x] Participated in care planning, discharge planning, & interdisciplinary rounds as appropriate Samir Vaughn RD, [facility] Pager: [phone]

## 2018-12-07 NOTE — PROGRESS NOTES
Progress Note Patient: Harvinder Montelongo MRN: [id number]  SSN: [id number] YOB: 1950  Age: 76 y.o. Sex: female Admit Date: 12/5/2018 LOS: 2 days Assessment:  
#1 Bilateral hydroureteronephrosis - sp bilateral PCN placement 12/6/18 #2 BECK to Cre 10 - improving. #3 Metastatic endometrioid carcinoma favoring cervical origin  
  
 
 
 
Plan:  
 
SCr improving, today 8. Excellent UOP, 6 L yesterday. OSIEL samaniego Subjective: No issues. Feels improved. Objective:  
 
Vitals:  
 12/06/18 2126 12/07/18 0024 12/07/18 1210 12/07/18 7111 BP: 162/88 150/86 100/56 (!) 176/98 Pulse: 94 93 68 89 Resp: 17 18 18 17 Temp: 97.1 °F (36.2 °C) 97.9 °F (36.6 °C) 97.9 °F (36.6 °C) 98.2 °F (36.8 °C) SpO2: 95% 94% 99% 99% Weight:      
Height:      
  
 
Intake and Output: 
Current Shift: No intake/output data recorded. Last three shifts: 12/05 1901 - 12/07 0700 In: 600 [P.O.:600] Out: Cassandra Saldaña [[id number]] Current Facility-Administered Medications Medication Dose Route Frequency  cefTRIAXone (ROCEPHIN) 1 g in sterile water (preservative free) 10 mL IV syringe  1 g IntraVENous Q24H  
 sodium chloride (NS) flush 5-10 mL  5-10 mL IntraVENous Q8H  
 sodium chloride (NS) flush 5-10 mL  5-10 mL IntraVENous PRN  
 flumazenil (ROMAZICON) 0.1 mg/mL injection 0.2 mg  0.2 mg IntraVENous Multiple  naloxone (NARCAN) injection 0.1 mg  0.1 mg IntraVENous Multiple  morphine 10 mg/ml injection 2 mg  2 mg IntraVENous Q4H PRN  
 oxyCODONE-acetaminophen (PERCOCET) 5-325 mg per tablet 1-2 Tab  1-2 Tab Oral Q6H PRN  
 0.45% sodium chloride infusion  100 mL/hr IntraVENous CONTINUOUS  
 sodium chloride (NS) flush 5-10 mL  5-10 mL IntraVENous Q8H  
 sodium chloride (NS) flush 5-10 mL  5-10 mL IntraVENous PRN  
 tamoxifen (NOLVADEX) tablet 20 mg  20 mg Oral BID  acetaminophen (TYLENOL) tablet 650 mg  650 mg Oral Q6H PRN  
  ondansetron (ZOFRAN) injection 4 mg  4 mg IntraVENous Q6H PRN  
 heparin (porcine) injection 5,000 Units  5,000 Units SubCUTAneous Q8H  
 hydrALAZINE (APRESOLINE) 20 mg/mL injection 10 mg  10 mg IntraVENous Q6H PRN Physical Exam:  
GENERAL: alert, cooperative, no distress, appears stated age LUNG: unlabored breathing ABDOMEN: soft, non-tender. EXTREMITIES:  extremities normal, atraumatic, no cyanosis or edema SKIN: no jaundice :b/l pcns draining clear, light pink urine Lab/Data Review: 
BMP:  
Lab Results Component Value Date/Time  12/07/2018 03:35 AM  
 K 5.4 12/07/2018 03:35 AM  
  12/07/2018 03:35 AM  
 CO2 23 12/07/2018 03:35 AM  
 AGAP 10 12/07/2018 03:35 AM  
  (H) 12/07/2018 03:35 AM  
 BUN 55 (H) 12/07/2018 03:35 AM  
 CREA 8.05 (H) 12/07/2018 03:35 AM  
 GFRAA 6 (L) 12/07/2018 03:35 AM  
 GFRNA 5 (L) 12/07/2018 03:35 AM  
 
CBC: No results found for: WBC, HGB, HGBEXT, HCT, HCTEXT, PLT, PLTEXT, HGBEXT, HCTEXT, PLTEXT 
COAGS:  
Lab Results Component Value Date/Time INR 0.9 12/06/2018 11:04 AM  
  
 
 
CT Results: 
Results from Hospital Encounter encounter on 12/05/18 CT ABD PELV WO CONT Narrative CT Abdomen And Pelvis without Intravenous Contrast 
 
INDICATION: Renal failure. Stage IV cervical cancer. TECHNIQUE: 5 mm collimation axial images obtained from the diaphragm to the 
level of the pubic symphysis without administration of low osmolar, nonionic 
intravenous contrast. 
 
Dose reduction techniques used: Automated exposure control, adjustment of the 
mAs and/or kVp according to patient size, standardized low-dose protocol, and/or 
iterative reconstruction technique. COMPARISON: November 23, 2018. ABDOMEN FINDINGS: 
 
Lung Bases: Bibasilar atelectasis. Central bronchial wall thickening. 9 mm 
nodule in the right lower lobe. 8 mm nodule in the right lower lobe.  Additional 
 5 mm nodule in the right lower lobe. Left lower lobe nodule measuring 9 mm. Small nodular density in the left breast is incidentally noted. Liver: Normal attenuation. No evidence for mass. Gallbladder: Present and appears normal. No biliary ductal dilatation. Pancreas: Normal attenuation without mass or ductal dilatation. Spleen: Enlarged with large mass throughout the hilum measuring up to 11.2 cm, 
similar or slightly increased in size. Smaller splenic mass measures up to 3.3 
cm, previously 3.1 cm. Adrenal Glands: No evidence for mass. Kidneys: Moderate to severe bilateral hydronephrosis. This is slightly worsened. Probable small bilateral renal cysts. Lymph Nodes: Large soft tissue mass in the pelvis consistent with recurrent 
neoplasm. Pelvic sidewall adenopathy is seen measuring up to 2.0 cm on the left, 
previously 1.9 cm. Right pelvic sidewall lymphadenopathy measures up to 2.2 cm, 
previously 2.1 cm. Significant masses posterior to the left psoas muscle and 
right psoas muscle are again seen and consistent with malignancy. These measure 
up to 7.2 cm on the left, and 5.8 cm on the right. Large right paracolic gutter 
soft tissue mass measuring 7.0 x 5.6 cm. Aortocaval lymph node measures 15 mm. Additional aortocaval lymph nodes measure 13 mm. Aorta: Atherosclerosis PELVIS FINDINGS:  
 
Bowel: Small Bowel: Normal in caliber with normal wall thickness. Small hiatal 
hernia Large Bowel: Normal in caliber with normal wall thickness. Diverticulosis Appendix: Likely prior appendectomy. Bladder: Decompressed and possibly thick-walled. Large recurrent soft tissue mass in the pelvis measuring approximately 8.2 x 7.0 
cm. No significant free fluid. Bones: Degenerative changes of the spine. Impression Impression: Moderate to severe bilateral hydronephrosis secondary to tumor masses in the 
pelvis. This is perhaps slightly worsened from prior. Large recurrent pelvic soft tissue mass. Severe pelvic and retroperitoneal 
lymphadenopathy with large metastatic implants near the psoas muscles 
bilaterally. This is similar or slightly worsened from prior. Large splenic metastases are slightly increased from prior. Bladder wall thickening, nonspecific. Retroperitoneal adenopathy, similar to prior. Pulmonary nodules in the lung bases consistent with metastatic disease. Additional findings as above. US Results: 
Results from [place] encounter on 03/06/18 US HEAD NECK SOFT TISSUE Narrative Indication: Increased uptake right thyroid on PET scan. On chemotherapy. Impression IMPRESSION: Solid right thyroid nodules, largest 3.2 cm. Subcentimeter left 
thyroid nodules. Comment: Sonography of the thyroid was performed. No prior studies for 
comparison. The right lobe measures 4.9 x 2.3 x 2.5 cm. It harbors a 3.2 x 1.8 x 2.3 cm 
solid nodule in the midpole and a 1.9 x 1.7 x 2.1 cm lower pole nodule. The left lobe measures 4.6 x 1.0 x 1.3 cm and harbors 2 subcentimeter nodules. The largest measures 8 mm in greatest dimension. The isthmus is normal. 
  
 
 
 
Active Problems: 
  ARF (acute renal failure) ([facility] [address] (12/5/2018) Signed By: Justus Leo MD , FACS December 7, 2018 PAGER:  [phone] OFFICE:  [phone] [id number] [facility]

## 2018-12-07 NOTE — PROGRESS NOTES
In Patient Progress note Admit Date: 12/5/2018 Impression:  
 
1) oligoanuric BECK in setting of obst uropathy/harleen hydronephrosis , Improving s/p PCN placement 2) hyperkalemia, improving 3) metabolic acidoses,better 4) Harleen hydronephrosis , s/p harleen PCN 
5) Hypertension , better 6) H/o cervical & endometrial cancer 7) anemia  
  
Plan : 
1) continue 1/2 NS @ 100cc/hrs  
2) add hydralazine 25 mg q8hrs 3) keep Uldall in place for another days , then D/C  
4) BP goal < 140/80 , avoid extremes of BP 
5) strict I/o , daily wts. 6) renal diet 7) check iron studies 
  
Please call with questions, 
  
Derick Nix MD FASN Cell [phone] Pager: [phone] 
  
Subjective:  
 
Denies SOB Current Facility-Administered Medications:  
  hydrALAZINE (APRESOLINE) tablet 25 mg, 25 mg, Oral, Q8H, Jose Amador MD, 25 mg at 12/07/18 8675   cefTRIAXone (ROCEPHIN) 1 g in sterile water (preservative free) 10 mL IV syringe, 1 g, IntraVENous, Q24H, Joaquin Owen MD, 1 g at 12/07/18 0222   sodium chloride (NS) flush 5-10 mL, 5-10 mL, IntraVENous, Q8H, Jairo Thompson MD, 10 mL at 12/07/18 0519 
  sodium chloride (NS) flush 5-10 mL, 5-10 mL, IntraVENous, PRN, Jairo Thompson MD 
  flumazenil (ROMAZICON) 0.1 mg/mL injection 0.2 mg, 0.2 mg, IntraVENous, Jay Steel Dmitri, MD 
  naloxone (NARCAN) injection 0.1 mg, 0.1 mg, IntraVENous, MultipleJay Dmitri, MD 
  morphine 10 mg/ml injection 2 mg, 2 mg, IntraVENous, Q4H PRN, Karis Randolph MD 
  oxyCODONE-acetaminophen (PERCOCET) 5-325 mg per tablet 1-2 Tab, 1-2 Tab, Oral, Q6H PRN, Karis Randolph MD, 1 Tab at 12/07/18 0518 
  0.45% sodium chloride infusion, 100 mL/hr, IntraVENous, CONTINUOUS, Bichu, Jose MARTINEZ MD, Last Rate: 100 mL/hr at 12/07/18 0851, 100 mL/hr at 12/07/18 3183   sodium chloride (NS) flush 5-10 mL, 5-10 mL, IntraVENous, Q8H, Dates, Niurka Acosta MD, 10 mL at 12/07/18 1165   sodium chloride (NS) flush 5-10 mL, 5-10 mL, IntraVENous, PRN, Dates, Radha Benjamin MD 
  tamoxifen (NOLVADEX) tablet 20 mg, 20 mg, Oral, BID, Jason Carbajal MD 
  acetaminophen (TYLENOL) tablet 650 mg, 650 mg, Oral, Q6H PRN, Jason Carbajal MD, 650 mg at 12/06/18 9039   ondansetron (ZOFRAN) injection 4 mg, 4 mg, IntraVENous, Q6H PRN, Jason Carbajal MD, 4 mg at 12/07/18 3097   heparin (porcine) injection 5,000 Units, 5,000 Units, SubCUTAneous, Q8H, Jason Carbajal MD, 5,000 Units at 12/07/18 4942   hydrALAZINE (APRESOLINE) 20 mg/mL injection 10 mg, 10 mg, IntraVENous, Q6H PRN, Jason Carbajal MD 
 
 
 
Objective:  
 
Visit Vitals BP (!) 176/98 (BP 1 Location: Right arm, BP Patient Position: At rest) Pulse 89 Temp 98.2 °F (36.8 °C) Resp 17 Ht 5' 5\" (1.651 m) Wt 88.5 kg (195 lb) SpO2 99% Breastfeeding? No  
BMI 32.45 kg/m² Intake/Output Summary (Last 24 hours) at 12/7/2018 1044 Last data filed at 12/7/2018 6034 Gross per 24 hour Intake 600 ml Output 6075 ml Net -5475 ml Physical Exam:  
 
GEN NAD HENT mmm RS AEBE clear CVS s1 s2 wnl no JVD 
GI soft BS + Ext no edema Data Review: 
 
Recent Labs 12/06/18 
7530 WBC 11.4 RBC 3.21* HCT 28.3*  
MCV 88.2 MCH 29.0 MCHC 32.9 RDW 13.9 Recent Labs 12/07/18 
0335 12/06/18 
2020 12/06/18 
1412 12/06/18 
0324 12/05/18 
2355 12/05/18 2034 12/04/18 
1740 BUN 55* 59*  --  63* 63* 64* 57* CREA 8.05* 9.29*  --  9.97* 10.10* 9.67* 8.4*  
CA 8.6 8.6  --  8.3* 9.1 9.4 9.1 ALB  --   --   --  3.1*  --  3.8 4.3  
K 5.4 5.6* 5.8* 6.3* 5.4 6.2* 5.9*  143  --  141 141 140 143  109*  --  109* 108 106 101 CO2 23 22  --  21 22 23 20 * 92  --  91 82 103* 127* Carly Yeager MD

## 2018-12-07 NOTE — PROGRESS NOTES
[facility] Hospitalist Group Progress Note Patient: Samina Silver Age: 76 y.o. : 1950 MR#: [id number] SSN: [id number] Date/Time: 2018 2:50 PM 
 
Subjective/24-hour events:  
 
Complains of some nausea today, but no N/V. Assessment:  
Obstructive uropathy secondary to tumor burden from advanced/progressive gynecologic malignancy Bilateral hydronephrosis s/p bilateral PCN placement  Hyperkalemia of acute renal failure Metabolic acidosis HTN 
Obesity Plan: 
Continue IVF, monitor UOP, lytes and renal indices. No need for HD acutely. HD catheter to remain in place for now, however. Adjust antihypertensives as necessary - BP goal noted. Continue supportive care otherwise. Case discussed with:  [x]Patient  []Family  []Nursing  []Case Management DVT Prophylaxis:  []Lovenox  [x]Hep SQ  []SCDs  []Coumadin   []On Heparin gtt Objective:  
VS:  
Visit Vitals BP (!) 167/100 (BP 1 Location: Right arm, BP Patient Position: At rest) Pulse 100 Temp 98.1 °F (36.7 °C) Resp 18 Ht 5' 5\" (1.651 m) Wt 88.4 kg (194 lb 14.4 oz) SpO2 95% Breastfeeding? No  
BMI 32.43 kg/m² Tmax/24hrs: Temp (24hrs), Av.8 °F (36.6 °C), Min:97.1 °F (36.2 °C), Max:98.2 °F (36.8 °C) Intake/Output Summary (Last 24 hours) at 2018 1451 Last data filed at 2018 1149 Gross per 24 hour Intake 600 ml Output 7300 ml Net -6700 ml General:  In NAD. Nontoxic-appearing. Cardiovascular:  RRR. Pulmonary:  Clear, no wheezes. Effort nonlabored. GI:  Abdomen soft, NTTP. Extremities:  Warm, no ischemia. Neuro:  Awake and alert, motor nonfocal. 
 
Labs:   
Recent Results (from the past 24 hour(s)) METABOLIC PANEL, BASIC Collection Time: 18  8:20 PM  
Result Value Ref Range Sodium 143 136 - 145 mmol/L Potassium 5.6 (H) 3.5 - 5.5 mmol/L Chloride 109 (H) 100 - 108 mmol/L  
 CO2 22 21 - 32 mmol/L  Anion gap 12 3.0 - 18 mmol/L  
 Glucose 92 74 - 99 mg/dL BUN 59 (H) 7.0 - 18 MG/DL Creatinine 9.29 (H) 0.6 - 1.3 MG/DL  
 BUN/Creatinine ratio 6 (L) 12 - 20 GFR est AA 5 (L) >60 ml/min/1.73m2 GFR est non-AA 4 (L) >60 ml/min/1.73m2 Calcium 8.6 8.5 - 83.2 MG/DL  
METABOLIC PANEL, BASIC Collection Time: 12/07/18  3:35 AM  
Result Value Ref Range Sodium 141 136 - 145 mmol/L Potassium 5.4 3.5 - 5.5 mmol/L Chloride 108 100 - 108 mmol/L  
 CO2 23 21 - 32 mmol/L Anion gap 10 3.0 - 18 mmol/L Glucose 103 (H) 74 - 99 mg/dL BUN 55 (H) 7.0 - 18 MG/DL Creatinine 8.05 (H) 0.6 - 1.3 MG/DL  
 BUN/Creatinine ratio 7 (L) 12 - 20 GFR est AA 6 (L) >60 ml/min/1.73m2 GFR est non-AA 5 (L) >60 ml/min/1.73m2 Calcium 8.6 8.5 - 10.1 MG/DL  
GLUCOSE, POC Collection Time: 12/07/18  7:42 AM  
Result Value Ref Range Glucose (POC) 106 70 - 110 mg/dL GLUCOSE, POC Collection Time: 12/07/18 11:07 AM  
Result Value Ref Range Glucose (POC) 128 (H) 70 - 110 mg/dL METABOLIC PANEL, BASIC Collection Time: 12/07/18  1:15 PM  
Result Value Ref Range Sodium 140 136 - 145 mmol/L Potassium 5.2 3.5 - 5.5 mmol/L Chloride 106 100 - 108 mmol/L  
 CO2 23 21 - 32 mmol/L Anion gap 11 3.0 - 18 mmol/L Glucose 138 (H) 74 - 99 mg/dL BUN 48 (H) 7.0 - 18 MG/DL Creatinine 6.24 (H) 0.6 - 1.3 MG/DL  
 BUN/Creatinine ratio 8 (L) 12 - 20 GFR est AA 8 (L) >60 ml/min/1.73m2 GFR est non-AA 7 (L) >60 ml/min/1.73m2 Calcium 8.3 (L) 8.5 - 10.1 MG/DL Signed By: Zackery Winston MD   
 December 7, 2018 2:50 PM

## 2018-12-07 NOTE — PROGRESS NOTES
conducted a Follow up consultation and Spiritual Assessment for Hany Douglas, who is a 76 y.o.,female. The  provided the following Interventions: 
Continued the relationship of care and support. Listened empathically. Offered prayer and assurance of continued prayer on patients behalf. Chart reviewed. The following outcomes were achieved: 
Patient expressed gratitude for 's visit. Assessment: 
There are no further spiritual or Anabaptism issues which require Spiritual Care Services interventions at this time. Plan: 
Chaplains will continue to follow and will provide pastoral care on an as needed/requested basis.  recommends bedside caregivers page  on duty if patient shows signs of acute spiritual or emotional distress. [address] [facility]  
[phone]

## 2018-12-07 NOTE — ROUTINE PROCESS
Bedside shift change report given to Rolan May RN (oncoming nurse) by Makenzie Bustillo RN (offgoing nurse). Report included the following information SBAR, Kardex, Intake/Output, MAR, Recent Results and Quality Measures.

## 2018-12-08 NOTE — PROGRESS NOTES
Renal parameters improving , continue IVF 1/2 NS @ 100cc/hrs Nausea + treat agressively OK to take the Uldall out please Unlikely to need  RRT Please call with questions Dominique Arredondo MD FASN Cell [phone] Pager: [phone]

## 2018-12-08 NOTE — PROGRESS NOTES
Spoke to Dr. Gurwinder Romeo concerning patient's increased MEWS of 3 D/T increased HR and increased BP. Explained that patient has scheduled and PRN BP meds but refuses to take them stating that they make her vomit. New orders provided.

## 2018-12-08 NOTE — PROGRESS NOTES
[facility] Hospitalist Group Progress Note Patient: Harvinder Montelongo Age: 76 y.o. : 1950 MR#: [id number] SSN: [id number] Date/Time: 2018 8:43 AM 
 
Subjective/24-hour events:  
 
Feeling better today - denies N/V. Assessment:  
Obstructive uropathy secondary to tumor burden from advanced/progressive gynecologic malignancy Bilateral hydronephrosis s/p bilateral PCN placement  Hyperkalemia of acute renal failure Metabolic acidosis HTN 
Obesity Plan: 
Continue IVF, monitor renal indices per nephrology - creatinine continues to improve and UOP remains excellent. D/C rocephin and monitor off. Cultures remain negative. BP control - adjust med regimen as necessary. Disposition when renal issues stablize/OK with nephrology. Follow. Case discussed with:  [x]Patient  []Family  []Nursing  []Case Management DVT Prophylaxis:  []Lovenox  [x]Hep SQ  []SCDs  []Coumadin   []On Heparin gtt Objective:  
VS:  
Visit Vitals /84 (BP 1 Location: Right arm, BP Patient Position: At rest) Pulse (!) 110 Temp 98.5 °F (36.9 °C) Resp 18 Ht 5' 5\" (1.651 m) Wt 88.4 kg (194 lb 14.4 oz) SpO2 93% Breastfeeding? No  
BMI 32.43 kg/m² Tmax/24hrs: Temp (24hrs), Av °F (36.7 °C), Min:97 °F (36.1 °C), Max:98.6 °F (37 °C) Intake/Output Summary (Last 24 hours) at 2018 5038 Last data filed at 2018 5908 Gross per 24 hour Intake 360 ml Output 4625 ml Net -4265 ml General:  In NAD. Nontoxic-appearing. Cardiovascular:  RRR. Pulmonary:  Clear, no wheezes. Effort nonlabored. GI:  Abdomen soft, NTTP. Extremities:  Warm, no ischemia. Neuro:  Awake and alert, motor nonfocal. 
 
Labs:   
Recent Results (from the past 24 hour(s)) GLUCOSE, POC Collection Time: 18 11:07 AM  
Result Value Ref Range Glucose (POC) 128 (H) 70 - 110 mg/dL METABOLIC PANEL, BASIC Collection Time: 18  1:15 PM  
Result Value Ref Range Sodium 140 136 - 145 mmol/L Potassium 5.2 3.5 - 5.5 mmol/L Chloride 106 100 - 108 mmol/L  
 CO2 23 21 - 32 mmol/L Anion gap 11 3.0 - 18 mmol/L Glucose 138 (H) 74 - 99 mg/dL BUN 48 (H) 7.0 - 18 MG/DL Creatinine 6.24 (H) 0.6 - 1.3 MG/DL  
 BUN/Creatinine ratio 8 (L) 12 - 20 GFR est AA 8 (L) >60 ml/min/1.73m2 GFR est non-AA 7 (L) >60 ml/min/1.73m2 Calcium 8.3 (L) 8.5 - 58.6 MG/DL  
METABOLIC PANEL, BASIC Collection Time: 12/07/18  6:11 PM  
Result Value Ref Range Sodium 138 136 - 145 mmol/L Potassium 4.4 3.5 - 5.5 mmol/L Chloride 107 100 - 108 mmol/L  
 CO2 23 21 - 32 mmol/L Anion gap 8 3.0 - 18 mmol/L Glucose 120 (H) 74 - 99 mg/dL BUN 43 (H) 7.0 - 18 MG/DL Creatinine 5.74 (H) 0.6 - 1.3 MG/DL  
 BUN/Creatinine ratio 7 (L) 12 - 20 GFR est AA 9 (L) >60 ml/min/1.73m2 GFR est non-AA 7 (L) >60 ml/min/1.73m2 Calcium 8.2 (L) 8.5 - 72.4 MG/DL  
METABOLIC PANEL, BASIC Collection Time: 12/08/18  1:34 AM  
Result Value Ref Range Sodium 139 136 - 145 mmol/L Potassium 4.6 3.5 - 5.5 mmol/L Chloride 106 100 - 108 mmol/L  
 CO2 22 21 - 32 mmol/L Anion gap 11 3.0 - 18 mmol/L Glucose 113 (H) 74 - 99 mg/dL BUN 43 (H) 7.0 - 18 MG/DL Creatinine 5.24 (H) 0.6 - 1.3 MG/DL  
 BUN/Creatinine ratio 8 (L) 12 - 20 GFR est AA 10 (L) >60 ml/min/1.73m2 GFR est non-AA 8 (L) >60 ml/min/1.73m2 Calcium 8.4 (L) 8.5 - 10.1 MG/DL Signed By: Sravani Bautista MD   
 December 8, 2018 8:43 AM

## 2018-12-08 NOTE — PROGRESS NOTES
Urology Hospital Progress Note HD#:   4 POD#:  N/A Antibiotics:  None ASSESSMENT:  
 
1.  Bilateral hydroureteronephrosis - sp bilateral PCN placement 12/6/18 
 -Metastatic endometrioid carcinoma favoring cervical origin 2. BECK  
          -Urine output 4500 ml over last 24 hours Current creatinine:   4.45 (eGFR: 10) Peak creatinine: 10.1 (12/5/18) Baseline creatinine: 0.8 (6/18) PLAN:  
 
-excellent urine output 
-noted plans for continued gentle hydration per nephrology 
-while PCN in place, will need to arrange for [facility] for MWF flush with 10 ml NS 
-improving-- will see again on Monday SUBJECTIVE:  
 
CC: BECK and anuria Referring MD: Lloyd Grider MD 
 
HPI: Overall, she is feeling better. We discussed management of her PCN and that they would be a likely permanent situation until response is demonstrated based on second line therapies for her malignancy. ECOG PS: 1 OBJECTIVE:  
 
Visit Vitals /82 (BP 1 Location: Right arm, BP Patient Position: At rest) Pulse (!) 105 Temp 98.1 °F (36.7 °C) Resp 20 Ht 5' 5\" (1.651 m) Wt 194 lb 14.4 oz (88.4 kg) SpO2 96% Breastfeeding? No  
BMI 32.43 kg/m² Gen: NAD Pulm: Equal respiratory effort bilaterally CV: regular Abd: soft, nontender, non-distended; no SP tenderness; no CVA TTP; bilateral PCN with clear and yellow urine present Ext: No significant edema Neuro: Grossly intact Skin: warm and dry Recent Results (from the past 24 hour(s)) METABOLIC PANEL, BASIC Collection Time: 12/07/18  6:11 PM  
Result Value Ref Range Sodium 138 136 - 145 mmol/L Potassium 4.4 3.5 - 5.5 mmol/L Chloride 107 100 - 108 mmol/L  
 CO2 23 21 - 32 mmol/L Anion gap 8 3.0 - 18 mmol/L Glucose 120 (H) 74 - 99 mg/dL BUN 43 (H) 7.0 - 18 MG/DL Creatinine 5.74 (H) 0.6 - 1.3 MG/DL  
 BUN/Creatinine ratio 7 (L) 12 - 20 GFR est AA 9 (L) >60 ml/min/1.73m2 GFR est non-AA 7 (L) >60 ml/min/1.73m2 Calcium 8.2 (L) 8.5 - 76.5 MG/DL  
METABOLIC PANEL, BASIC Collection Time: 12/08/18  1:34 AM  
Result Value Ref Range Sodium 139 136 - 145 mmol/L Potassium 4.6 3.5 - 5.5 mmol/L Chloride 106 100 - 108 mmol/L  
 CO2 22 21 - 32 mmol/L Anion gap 11 3.0 - 18 mmol/L Glucose 113 (H) 74 - 99 mg/dL BUN 43 (H) 7.0 - 18 MG/DL Creatinine 5.24 (H) 0.6 - 1.3 MG/DL  
 BUN/Creatinine ratio 8 (L) 12 - 20 GFR est AA 10 (L) >60 ml/min/1.73m2 GFR est non-AA 8 (L) >60 ml/min/1.73m2 Calcium 8.4 (L) 8.5 - 00.1 MG/DL  
METABOLIC PANEL, BASIC Collection Time: 12/08/18 10:30 AM  
Result Value Ref Range Sodium 141 136 - 145 mmol/L Potassium 4.5 3.5 - 5.5 mmol/L Chloride 107 100 - 108 mmol/L  
 CO2 24 21 - 32 mmol/L Anion gap 10 3.0 - 18 mmol/L Glucose 109 (H) 74 - 99 mg/dL BUN 36 (H) 7.0 - 18 MG/DL Creatinine 4.45 (H) 0.6 - 1.3 MG/DL  
 BUN/Creatinine ratio 8 (L) 12 - 20 GFR est AA 12 (L) >60 ml/min/1.73m2 GFR est non-AA 10 (L) >60 ml/min/1.73m2 Calcium 8.0 (L) 8.5 - 10.1 MG/DL Jenny Erwin. Rogelio Sue MD, Lu[address] K[address] [facility] [place]  [facility] Department of Urology Cade Fong. [place], [place] Pager:  [phone] Office:  [phone]

## 2018-12-09 NOTE — PROGRESS NOTES
Problem: Falls - Risk of 
Goal: *Absence of Falls Document Olya Torres Fall Risk and appropriate interventions in the flowsheet. Outcome: Progressing Towards Goal 
Fall Risk Interventions: 
  
 
  
 
Medication Interventions: Assess postural VS orthostatic hypotension, Evaluate medications/consider consulting pharmacy, Patient to call before getting OOB, Teach patient to arise slowly

## 2018-12-09 NOTE — PROGRESS NOTES
12/8/18:  
 
1915: Bedside shift change report given to Zaheer Batista RN (oncoming nurse) by Guero Quezada RN (offgoing nurse). Report included the following information SBAR, Procedure Summary, Intake/Output and MAR.  
 
2026: pt resting in bed watching tv son at bedside Respirations even and unlabored, no signs of distress or requests at this time 2134: pt watching tv Respirations even and unlabored, no signs of distress or requests at this time 2251: pt watching tv Respirations even and unlabored, no signs of distress or requests at this time 2351: emptied nephrostomy tubes Respirations even and unlabored, no signs of distress or requests at this time 12/9/18 
 
0023: vital signs taken pt resting in bed even rise and fall of chest no signs of distress 0106: pt resting in bed even rise and fall of chest no signs of distress 0209: pt resting in bed even rise and fall of chest no signs of distress  
 
0302: pt alert watching tv states she has no pain Respirations even and unlabored, no signs of distress or requests at this time 0715: Bedside shift change report given to Ken Kussmaul, LPN (oncoming nurse) by Myles Flanagan RN (offgoing nurse). Report included the following information SBAR and Procedure Summary.

## 2018-12-09 NOTE — PROGRESS NOTES
[facility] Hospitalist Group Progress Note Patient: Alejandro Viera Age: 76 y.o. : 1950 MR#: [id number] SSN: [id number] Date/Time: 2018 9:59 AM 
 
Subjective/24-hour events:  
 
Nothing new/acute overnight. Continues to have some intermittent nausea but denies vomiting. UOP continues to be good. Assessment:  
Obstructive uropathy secondary to tumor burden from advanced/progressive gynecologic malignancy Bilateral hydronephrosis s/p bilateral PCN placement  Hyperkalemia of acute renal failure Metabolic acidosis HTN 
Obesity Plan: 
IVF, supportive care. Creatinine continues to improve. Lytes stable, continue to monitor. Routine PCN care. Disposition when OK with nephrology. Will need close f/u with oncology for plan for resumption/continuation of chemotherapy when deemed appropriate. Case discussed with:  [x]Patient  []Family  [x]Nursing  []Case Management DVT Prophylaxis:  []Lovenox  [x]Hep SQ  []SCDs  []Coumadin   []On Heparin gtt Objective:  
VS:  
Visit Vitals /85 (BP 1 Location: Right arm, BP Patient Position: At rest) Pulse (!) 105 Temp 98.1 °F (36.7 °C) Resp 16 Ht 5' 5\" (1.651 m) Wt 88.4 kg (194 lb 14.2 oz) SpO2 96% Breastfeeding? No  
BMI 32.43 kg/m² Tmax/24hrs: Temp (24hrs), Av.8 °F (36.6 °C), Min:97.2 °F (36.2 °C), Max:98.1 °F (36.7 °C) Intake/Output Summary (Last 24 hours) at 2018 7597 Last data filed at 2018 2976 Gross per 24 hour Intake 2120 ml Output 3390 ml Net -1270 ml General:  In NAD. Nontoxic-appearing. Cardiovascular:  RRR. Pulmonary:  Clear, no wheezes. Effort nonlabored. GI:  Abdomen soft, NTTP. Extremities:  Warm, no ischemia. Neuro:  Awake and alert, motor nonfocal. 
 
Labs:   
Recent Results (from the past 24 hour(s)) METABOLIC PANEL, BASIC Collection Time: 18 10:30 AM  
Result Value Ref Range  Sodium 141 136 - 145 mmol/L  
 Potassium 4.5 3.5 - 5.5 mmol/L Chloride 107 100 - 108 mmol/L  
 CO2 24 21 - 32 mmol/L Anion gap 10 3.0 - 18 mmol/L Glucose 109 (H) 74 - 99 mg/dL BUN 36 (H) 7.0 - 18 MG/DL Creatinine 4.45 (H) 0.6 - 1.3 MG/DL  
 BUN/Creatinine ratio 8 (L) 12 - 20 GFR est AA 12 (L) >60 ml/min/1.73m2 GFR est non-AA 10 (L) >60 ml/min/1.73m2 Calcium 8.0 (L) 8.5 - 77.9 MG/DL  
METABOLIC PANEL, BASIC Collection Time: 12/08/18  2:00 PM  
Result Value Ref Range Sodium 138 136 - 145 mmol/L Potassium 4.1 3.5 - 5.5 mmol/L Chloride 105 100 - 108 mmol/L  
 CO2 26 21 - 32 mmol/L Anion gap 7 3.0 - 18 mmol/L Glucose 129 (H) 74 - 99 mg/dL BUN 36 (H) 7.0 - 18 MG/DL Creatinine 4.27 (H) 0.6 - 1.3 MG/DL  
 BUN/Creatinine ratio 8 (L) 12 - 20 GFR est AA 13 (L) >60 ml/min/1.73m2 GFR est non-AA 10 (L) >60 ml/min/1.73m2 Calcium 8.5 8.5 - 95.1 MG/DL  
METABOLIC PANEL, BASIC Collection Time: 12/08/18  8:40 PM  
Result Value Ref Range Sodium 139 136 - 145 mmol/L Potassium 4.2 3.5 - 5.5 mmol/L Chloride 107 100 - 108 mmol/L  
 CO2 23 21 - 32 mmol/L Anion gap 9 3.0 - 18 mmol/L Glucose 107 (H) 74 - 99 mg/dL BUN 33 (H) 7.0 - 18 MG/DL Creatinine 3.71 (H) 0.6 - 1.3 MG/DL  
 BUN/Creatinine ratio 9 (L) 12 - 20 GFR est AA 15 (L) >60 ml/min/1.73m2 GFR est non-AA 12 (L) >60 ml/min/1.73m2 Calcium 8.2 (L) 8.5 - 07.9 MG/DL  
METABOLIC PANEL, BASIC Collection Time: 12/09/18  3:04 AM  
Result Value Ref Range Sodium 139 136 - 145 mmol/L Potassium 4.6 3.5 - 5.5 mmol/L Chloride 106 100 - 108 mmol/L  
 CO2 24 21 - 32 mmol/L Anion gap 9 3.0 - 18 mmol/L Glucose 112 (H) 74 - 99 mg/dL BUN 32 (H) 7.0 - 18 MG/DL Creatinine 3.40 (H) 0.6 - 1.3 MG/DL  
 BUN/Creatinine ratio 9 (L) 12 - 20 GFR est AA 16 (L) >60 ml/min/1.73m2 GFR est non-AA 13 (L) >60 ml/min/1.73m2 Calcium 8.2 (L) 8.5 - 10.1 MG/DL Signed By: Unique Flores MD   
 December 9, 2018 9:59 AM

## 2018-12-09 NOTE — PROGRESS NOTES
Urology Hospital Progress Note HD#:   5 POD#:  N/A Antibiotics:  None ASSESSMENT:  
 
1.  Bilateral hydroureteronephrosis - sp bilateral PCN placement 12/6/18 
            -Metastatic endometrioid carcinoma favoring cervical origin  
  
2. BECK  
             -Urine output 3625 ml over last 24 hours 
              
Current creatinine:      2.96 (eGFR: 16) Peak creatinine:          10.1 (12/5/18) Baseline creatinine:    0.8 (6/18) PLAN:  
 
-consider voiding trial in the AM 
-continued good urine output. Renal function is slowly returning to baseline 
-will need to arrange [facility] for PCN flush with 10 ml NS q MWF upon discharge SUBJECTIVE:  
 
CC: Bilateral hydronephrosis with BECK Referring MD:    Lalit Layne MD 
 
HPI: No major issues overnight. Comfortable. Resting in chair. No complaints of pain this AM. 
 
ECOG PS: 1 OBJECTIVE:  
 
Visit Vitals /80 (BP 1 Location: Right arm, BP Patient Position: Sitting) Pulse (!) 109 Temp 97.1 °F (36.2 °C) Resp 16 Ht 5' 5\" (1.651 m) Wt 194 lb 14.2 oz (88.4 kg) SpO2 94% Breastfeeding? No  
BMI 32.43 kg/m² Gen: Patient is in NAD Abd: soft, nontender, non-distended : bilateral PCN draining quite well ; samaniego with ~595 out over the past 24 hours; all clear and yellow urine Ext: No significant edema Neuro: Grossly intact Skin: warm and dry Recent Results (from the past 24 hour(s)) METABOLIC PANEL, BASIC Collection Time: 12/08/18  2:00 PM  
Result Value Ref Range Sodium 138 136 - 145 mmol/L Potassium 4.1 3.5 - 5.5 mmol/L Chloride 105 100 - 108 mmol/L  
 CO2 26 21 - 32 mmol/L Anion gap 7 3.0 - 18 mmol/L Glucose 129 (H) 74 - 99 mg/dL BUN 36 (H) 7.0 - 18 MG/DL Creatinine 4.27 (H) 0.6 - 1.3 MG/DL  
 BUN/Creatinine ratio 8 (L) 12 - 20 GFR est AA 13 (L) >60 ml/min/1.73m2 GFR est non-AA 10 (L) >60 ml/min/1.73m2  Calcium 8.5 8.5 - 12.4 MG/DL  
METABOLIC PANEL, BASIC  
 Collection Time: 12/08/18  8:40 PM  
Result Value Ref Range Sodium 139 136 - 145 mmol/L Potassium 4.2 3.5 - 5.5 mmol/L Chloride 107 100 - 108 mmol/L  
 CO2 23 21 - 32 mmol/L Anion gap 9 3.0 - 18 mmol/L Glucose 107 (H) 74 - 99 mg/dL BUN 33 (H) 7.0 - 18 MG/DL Creatinine 3.71 (H) 0.6 - 1.3 MG/DL  
 BUN/Creatinine ratio 9 (L) 12 - 20 GFR est AA 15 (L) >60 ml/min/1.73m2 GFR est non-AA 12 (L) >60 ml/min/1.73m2 Calcium 8.2 (L) 8.5 - 57.6 MG/DL  
METABOLIC PANEL, BASIC Collection Time: 12/09/18  3:04 AM  
Result Value Ref Range Sodium 139 136 - 145 mmol/L Potassium 4.6 3.5 - 5.5 mmol/L Chloride 106 100 - 108 mmol/L  
 CO2 24 21 - 32 mmol/L Anion gap 9 3.0 - 18 mmol/L Glucose 112 (H) 74 - 99 mg/dL BUN 32 (H) 7.0 - 18 MG/DL Creatinine 3.40 (H) 0.6 - 1.3 MG/DL  
 BUN/Creatinine ratio 9 (L) 12 - 20 GFR est AA 16 (L) >60 ml/min/1.73m2 GFR est non-AA 13 (L) >60 ml/min/1.73m2 Calcium 8.2 (L) 8.5 - 91.8 MG/DL  
METABOLIC PANEL, BASIC Collection Time: 12/09/18  9:00 AM  
Result Value Ref Range Sodium 138 136 - 145 mmol/L Potassium 4.3 3.5 - 5.5 mmol/L Chloride 105 100 - 108 mmol/L  
 CO2 24 21 - 32 mmol/L Anion gap 9 3.0 - 18 mmol/L Glucose 104 (H) 74 - 99 mg/dL BUN 30 (H) 7.0 - 18 MG/DL Creatinine 2.96 (H) 0.6 - 1.3 MG/DL  
 BUN/Creatinine ratio 10 (L) 12 - 20 GFR est AA 19 (L) >60 ml/min/1.73m2 GFR est non-AA 16 (L) >60 ml/min/1.73m2 Calcium 8.2 (L) 8.5 - 10.1 MG/DL René Oh. Gary Lynn MD, [address] K[address] [facility] [place]  [facility] Department of Urology Dusty Jackson. Sylvia AVILA [facility] [place], [place] Pager:  [phone] Office:  [phone]

## 2018-12-09 NOTE — PROGRESS NOTES
In Patient Progress note Admit Date: 12/5/2018 Impression:  
 
1) oligoanuric BECK in setting of obst uropathy/harleen hydronephrosis , Improving s/p PCN placement 2) hyperkalemia, resolved 3) metabolic acidoses,resolved 4) Harleen hydronephrosis , s/p harleen PCN 
5) Hypertension , better 6) H/o cervical & endometrial cancer 7) anemia  
  
Plan : 
1) continue 1/2 NS @ 100cc/hrs  
2) continue metoprolol 3) may d/c U[place]all 4) BP goal < 140/80 , avoid extremes of BP 
5) strict I/o , daily wts. 6) renal diet 7) pending iron studies 
  
Please call with questions, 
  
Lilo Dang MD FASN Cell [phone] Pager: [phone] 
  
Subjective:  
 
Denies SOB Current Facility-Administered Medications:  
  metoprolol tartrate (LOPRESSOR) tablet 25 mg, 25 mg, Oral, BID, Grace Carrero MD 
  sodium chloride (NS) flush 5-10 mL, 5-10 mL, IntraVENous, Q8H, Jairo Thompson MD, 10 mL at 12/09/18 1503   sodium chloride (NS) flush 5-10 mL, 5-10 mL, IntraVENous, PRN, Jairo Thompson MD 
  flumazenil (ROMAZICON) 0.1 mg/mL injection 0.2 mg, 0.2 mg, IntraVENous, MultipleJay Dmitri, MD 
  naloxone (NARCAN) injection 0.1 mg, 0.1 mg, IntraVENous, MultipleJay Dmitri, MD 
  morphine 10 mg/ml injection 2 mg, 2 mg, IntraVENous, Q4H PRN, Cristel Jones MD 
  oxyCODONE-acetaminophen (PERCOCET) 5-325 mg per tablet 1-2 Tab, 1-2 Tab, Oral, Q6H PRN, Nathanael Gonzalez MD, 2 Tab at 12/09/18 1502 
  0.45% sodium chloride infusion, 100 mL/hr, IntraVENous, CONTINUOUS, Jose Amador MD, Last Rate: 100 mL/hr at 12/09/18 1824, 100 mL/hr at 12/09/18 1824 
  sodium chloride (NS) flush 5-10 mL, 5-10 mL, IntraVENous, Q8H, Lisa Vasquez MD, 10 mL at 12/09/18 1503   sodium chloride (NS) flush 5-10 mL, 5-10 mL, IntraVENous, PRN, Lisa Vasquez MD 
  acetaminophen (TYLENOL) tablet 650 mg, 650 mg, Oral, Q6H PRN, Grace Carrero MD, 650 mg at 12/06/18 6025   ondansetron (ZOFRAN) injection 4 mg, 4 mg, IntraVENous, Q6H PRN, Maria Dolores Banegas MD, 4 mg at 12/09/18 1506   heparin (porcine) injection 5,000 Units, 5,000 Units, SubCUTAneous, Q8H, Maria Dolores Banegas MD, 5,000 Units at 12/09/18 1730   hydrALAZINE (APRESOLINE) 20 mg/mL injection 10 mg, 10 mg, IntraVENous, Q6H PRN, Maria Dolores Banegas MD 
 
 
 
Objective:  
 
Visit Vitals /79 (BP 1 Location: Right arm, BP Patient Position: At rest) Pulse 98 Temp 98.8 °F (37.1 °C) Resp 18 Ht 5' 5\" (1.651 m) Wt 88.4 kg (194 lb 14.2 oz) SpO2 94% Breastfeeding? No  
BMI 32.43 kg/m² Intake/Output Summary (Last 24 hours) at 12/9/2018 1857 Last data filed at 12/9/2018 1759 Gross per 24 hour Intake 900 ml Output 2730 ml Net -1830 ml Physical Exam:  
 
GEN NAD HENT mmm RS AEBE clear CVS s1 s2 wnl no JVD 
GI soft BS + Ext no edema Data Review: No results for input(s): WBC, RBC, HCT, MCV, MCH, MCHC, RDW, HCTEXT, HCTEXT in the last 72 hours. No lab exists for component: HEMOGLOBIN, PLATELET, MPV Recent Labs 12/09/18 [facility] 12/09/18 
0900 12/09/18 
0304 12/08/18 
2040 12/08/18 
1400 12/08/18 
1030 12/08/18 
0134 12/07/18 
1811 12/07/18 
1315 12/07/18 
0335 12/06/18 
2020 BUN 26* 30* 32* 33* 36* 36* 43* 43* 48* 55* 59* CREA 2.69* 2.96* 3.40* 3.71* 4.27* 4.45* 5.24* 5.74* 6.24* 8.05* 9.29* CA 8.4* 8.2* 8.2* 8.2* 8.5 8.0* 8.4* 8.2* 8.3* 8.6 8.6 K 4.4 4.3 4.6 4.2 4.1 4.5 4.6 4.4 5.2 5.4 5.6*  138 139 139 138 141 139 138 140 141 143  105 106 107 105 107 106 107 106 108 109* CO2 25 24 24 23 26 24 22 23 23 23 22 * 104* 112* 107* 129* 109* 113* 120* 138* 103* 92  
 
 
Norma Flanagan MD

## 2018-12-10 NOTE — PROGRESS NOTES
[facility] Hospitalist Group Progress Note Patient: Hany Douglas Age: 76 y.o. : 1950 MR#: [id number] SSN: [id number] Date/Time: 12/10/2018 10:57 AM 
 
Subjective/24-hour events:  
 
Feeling better today - no nausea acutely. Remains afebrile. Assessment:  
Obstructive uropathy secondary to tumor burden from advanced/progressive gynecologic malignancy Bilateral hydronephrosis s/p bilateral PCN placement  Hyperkalemia of acute renal failure Metabolic acidosis HTN 
Obesity Plan: 
Continue IVF per nephrology and monitor lytes, renal indices. Creatinine continuing to improve. D/C [place] and HD catheter. FMLA paperwork completed today. Anticipate discharge in next day or so pending nephrology clearance. Time spent:  40 mins. Case discussed with:  [x]Patient  []Family  [x]Nursing  []Case Management DVT Prophylaxis:  []Lovenox  [x]Hep SQ  []SCDs  []Coumadin   []On Heparin gtt Objective:  
VS:  
Visit Vitals /80 (BP 1 Location: Right arm, BP Patient Position: At rest) Pulse (!) 101 Temp 98.1 °F (36.7 °C) Resp 18 Ht 5' 5\" (1.651 m) Wt 90 kg (198 lb 8 oz) SpO2 95% Breastfeeding? No  
BMI 33.03 kg/m² Tmax/24hrs: Temp (24hrs), Av.3 °F (36.8 °C), Min:97.1 °F (36.2 °C), Max:98.8 °F (37.1 °C) Intake/Output Summary (Last 24 hours) at 12/10/2018 1057 Last data filed at 12/10/2018 9094 Gross per 24 hour Intake 880 ml Output 1950 ml Net -1070 ml General:  In NAD. Nontoxic-appearing. Cardiovascular:  RRR. Pulmonary:  Clear, no wheezes. Effort nonlabored. GI:  Abdomen soft, NTTP. Extremities:  Warm, no ischemia. Neuro:  Awake and alert, motor nonfocal. 
 
Labs:   
Recent Results (from the past 24 hour(s)) METABOLIC PANEL, BASIC Collection Time: 18  3:35 PM  
Result Value Ref Range Sodium 138 136 - 145 mmol/L Potassium 4.4 3.5 - 5.5 mmol/L  Chloride 105 100 - 108 mmol/L  
 CO2 25 21 - 32 mmol/L  
 Anion gap 8 3.0 - 18 mmol/L Glucose 101 (H) 74 - 99 mg/dL BUN 26 (H) 7.0 - 18 MG/DL Creatinine 2.69 (H) 0.6 - 1.3 MG/DL  
 BUN/Creatinine ratio 10 (L) 12 - 20 GFR est AA 21 (L) >60 ml/min/1.73m2 GFR est non-AA 18 (L) >60 ml/min/1.73m2 Calcium 8.4 (L) 8.5 - 00.5 MG/DL  
METABOLIC PANEL, BASIC Collection Time: 12/09/18 10:15 PM  
Result Value Ref Range Sodium 136 136 - 145 mmol/L Potassium 4.0 3.5 - 5.5 mmol/L Chloride 104 100 - 108 mmol/L  
 CO2 25 21 - 32 mmol/L Anion gap 7 3.0 - 18 mmol/L Glucose 96 74 - 99 mg/dL BUN 26 (H) 7.0 - 18 MG/DL Creatinine 2.53 (H) 0.6 - 1.3 MG/DL  
 BUN/Creatinine ratio 10 (L) 12 - 20 GFR est AA 23 (L) >60 ml/min/1.73m2 GFR est non-AA 19 (L) >60 ml/min/1.73m2 Calcium 8.0 (L) 8.5 - 10.1 MG/DL  
CBC W/O DIFF Collection Time: 12/10/18  1:34 AM  
Result Value Ref Range WBC 13.7 (H) 4.6 - 13.2 K/uL  
 RBC 3.46 (L) 4.20 - 5.30 M/uL HGB 9.7 (L) 12.0 - 16.0 g/dL HCT 31.0 (L) 35.0 - 45.0 % MCV 89.6 74.0 - 97.0 FL  
 MCH 28.0 24.0 - 34.0 PG  
 MCHC 31.3 31.0 - 37.0 g/dL  
 RDW 14.2 11.6 - 14.5 % PLATELET 323 889 - 710 K/uL MPV 8.9 (L) 9.2 - 14.8 FL  
METABOLIC PANEL, BASIC Collection Time: 12/10/18  1:34 AM  
Result Value Ref Range Sodium 136 136 - 145 mmol/L Potassium 3.8 3.5 - 5.5 mmol/L Chloride 105 100 - 108 mmol/L  
 CO2 23 21 - 32 mmol/L Anion gap 8 3.0 - 18 mmol/L Glucose 109 (H) 74 - 99 mg/dL BUN 24 (H) 7.0 - 18 MG/DL Creatinine 2.45 (H) 0.6 - 1.3 MG/DL  
 BUN/Creatinine ratio 10 (L) 12 - 20 GFR est AA 24 (L) >60 ml/min/1.73m2 GFR est non-AA 20 (L) >60 ml/min/1.73m2 Calcium 8.1 (L) 8.5 - 10.1 MG/DL  
GLUCOSE, POC Collection Time: 12/10/18  8:28 AM  
Result Value Ref Range Glucose (POC) 110 70 - 110 mg/dL Signed By: Kem Jimenes MD   
 December 10, 2018 10:57 AM

## 2018-12-10 NOTE — PROGRESS NOTES
Progress Note Patient: Glenn Núñez MRN: [id number]  SSN: [id number] YOB: 1950  Age: 76 y.o. Sex: female Admit Date: 12/5/2018 LOS: 5 days Assessment:  
#1 Bilateral hydroureteronephrosis - sp bilateral PCN placement 12/6/18 #2 BECK to Cre 10, now ~2 range Doing well with PCN tubes in place. Please start educating for self care and arrange for home health initially for MWF flushes with 5cc normal saline. Will see back in ~2 months to reassess and decide about next steps or continue with neph tubes with exchanges with IR. Plan: 1. Home health MWF for flushes 5cc NS 2. Start teaching Neph tube cares now Follow up with me in office in 2 months. Signed By: Addi Lopez MD   
 December 10, 2018 PAGER:  [phone] OFFICE:  [phone] [place] [address] Subjective:  
 
NO events overnight. Objective:  
 
Vitals:  
 12/09/18 2135 12/09/18 2357 12/10/18 0419 12/10/18 4821 BP:  142/84 138/80 127/80 Pulse:  86 82 (!) 101 Resp:  20 20 18 Temp:  98.3 °F (36.8 °C) 98.5 °F (36.9 °C) 98.1 °F (36.7 °C) SpO2:  95% 100% 95% Weight: 198 lb 8 oz (90 kg) Height:      
  
 
Intake and Output: 
Current Shift: No intake/output data recorded. Last three shifts: 12/08 1901 - 12/10 0700 In: 900 [P.O.:900] Out: 3930 [[facility]:[id number]] Current Facility-Administered Medications Medication Dose Route Frequency  metoprolol tartrate (LOPRESSOR) tablet 25 mg  25 mg Oral BID  sodium chloride (NS) flush 5-10 mL  5-10 mL IntraVENous Q8H  
 sodium chloride (NS) flush 5-10 mL  5-10 mL IntraVENous PRN  
 flumazenil (ROMAZICON) 0.1 mg/mL injection 0.2 mg  0.2 mg IntraVENous Multiple  naloxone (NARCAN) injection 0.1 mg  0.1 mg IntraVENous Multiple  morphine 10 mg/ml injection 2 mg  2 mg IntraVENous Q4H PRN  
 oxyCODONE-acetaminophen (PERCOCET) 5-325 mg per tablet 1-2 Tab  1-2 Tab Oral Q6H PRN  
  0.45% sodium chloride infusion  100 mL/hr IntraVENous CONTINUOUS  
 sodium chloride (NS) flush 5-10 mL  5-10 mL IntraVENous Q8H  
 sodium chloride (NS) flush 5-10 mL  5-10 mL IntraVENous PRN  
 acetaminophen (TYLENOL) tablet 650 mg  650 mg Oral Q6H PRN  
 ondansetron (ZOFRAN) injection 4 mg  4 mg IntraVENous Q6H PRN  
 heparin (porcine) injection 5,000 Units  5,000 Units SubCUTAneous Q8H  
 hydrALAZINE (APRESOLINE) 20 mg/mL injection 10 mg  10 mg IntraVENous Q6H PRN Physical Exam:  
GENERAL: alert, cooperative, no distress, appears stated age LUNG: unlabored breathing ABDOMEN: soft, non-tender. No masses,  no organomegaly EXTREMITIES:  extremities normal, atraumatic, no cyanosis or edema SKIN: no jaundice : no CVA tenderness. Neph tubes draining well. Lab/Data Review: 
BMP:  
Lab Results Component Value Date/Time  12/10/2018 01:34 AM  
 K 3.8 12/10/2018 01:34 AM  
  12/10/2018 01:34 AM  
 CO2 23 12/10/2018 01:34 AM  
 AGAP 8 12/10/2018 01:34 AM  
  (H) 12/10/2018 01:34 AM  
 BUN 24 (H) 12/10/2018 01:34 AM  
 CREA 2.45 (H) 12/10/2018 01:34 AM  
 GFRAA 24 (L) 12/10/2018 01:34 AM  
 GFRNA 20 (L) 12/10/2018 01:34 AM  
 
CBC:  
Lab Results Component Value Date/Time WBC 13.7 (H) 12/10/2018 01:34 AM  
 HGB 9.7 (L) 12/10/2018 01:34 AM  
 HCT 31.0 (L) 12/10/2018 01:34 AM  
  12/10/2018 01:34 AM  
 
COAGS: No results found for: APTT, PTP, INR  
 
 
CT Results: 
Results from Hospital Encounter encounter on 12/05/18 CT ABD PELV WO CONT Narrative CT Abdomen And Pelvis without Intravenous Contrast 
 
INDICATION: Renal failure. Stage IV cervical cancer. TECHNIQUE: 5 mm collimation axial images obtained from the diaphragm to the 
level of the pubic symphysis without administration of low osmolar, nonionic 
intravenous contrast. 
 
Dose reduction techniques used:  Automated exposure control, adjustment of the 
 mAs and/or kVp according to patient size, standardized low-dose protocol, and/or 
iterative reconstruction technique. COMPARISON: November 23, 2018. ABDOMEN FINDINGS: 
 
Lung Bases: Bibasilar atelectasis. Central bronchial wall thickening. 9 mm 
nodule in the right lower lobe. 8 mm nodule in the right lower lobe. Additional 
5 mm nodule in the right lower lobe. Left lower lobe nodule measuring 9 mm. Small nodular density in the left breast is incidentally noted. Liver: Normal attenuation. No evidence for mass. Gallbladder: Present and appears normal. No biliary ductal dilatation. Pancreas: Normal attenuation without mass or ductal dilatation. Spleen: Enlarged with large mass throughout the hilum measuring up to 11.2 cm, 
similar or slightly increased in size. Smaller splenic mass measures up to 3.3 
cm, previously 3.1 cm. Adrenal Glands: No evidence for mass. Kidneys: Moderate to severe bilateral hydronephrosis. This is slightly worsened. Probable small bilateral renal cysts. Lymph Nodes: Large soft tissue mass in the pelvis consistent with recurrent 
neoplasm. Pelvic sidewall adenopathy is seen measuring up to 2.0 cm on the left, 
previously 1.9 cm. Right pelvic sidewall lymphadenopathy measures up to 2.2 cm, 
previously 2.1 cm. Significant masses posterior to the left psoas muscle and 
right psoas muscle are again seen and consistent with malignancy. These measure 
up to 7.2 cm on the left, and 5.8 cm on the right. Large right paracolic gutter 
soft tissue mass measuring 7.0 x 5.6 cm. Aortocaval lymph node measures 15 mm. Additional aortocaval lymph nodes measure 13 mm. Aorta: Atherosclerosis PELVIS FINDINGS:  
 
Bowel: Small Bowel: Normal in caliber with normal wall thickness. Small hiatal 
hernia Large Bowel: Normal in caliber with normal wall thickness. Diverticulosis Appendix: Likely prior appendectomy. Bladder: Decompressed and possibly thick-walled. Large recurrent soft tissue mass in the pelvis measuring approximately 8.2 x 7.0 
cm. No significant free fluid. Bones: Degenerative changes of the spine. Impression Impression: Moderate to severe bilateral hydronephrosis secondary to tumor masses in the 
pelvis. This is perhaps slightly worsened from prior. Large recurrent pelvic soft tissue mass. Severe pelvic and retroperitoneal 
lymphadenopathy with large metastatic implants near the psoas muscles 
bilaterally. This is similar or slightly worsened from prior. Large splenic metastases are slightly increased from prior. Bladder wall thickening, nonspecific. Retroperitoneal adenopathy, similar to prior. Pulmonary nodules in the lung bases consistent with metastatic disease. Additional findings as above. US Results: 
Results from [place] encounter on 03/06/18 US HEAD NECK SOFT TISSUE Narrative Indication: Increased uptake right thyroid on PET scan. On chemotherapy. Impression IMPRESSION: Solid right thyroid nodules, largest 3.2 cm. Subcentimeter left 
thyroid nodules. Comment: Sonography of the thyroid was performed. No prior studies for 
comparison. The right lobe measures 4.9 x 2.3 x 2.5 cm. It harbors a 3.2 x 1.8 x 2.3 cm 
solid nodule in the midpole and a 1.9 x 1.7 x 2.1 cm lower pole nodule. The left lobe measures 4.6 x 1.0 x 1.3 cm and harbors 2 subcentimeter nodules. The largest measures 8 mm in greatest dimension. The isthmus is normal. 
  
 
 
 
 
 
 
 
 
 
 
 
 
 
 
 
Active Problems: 
  ARF (acute renal failure) ([facility] [address] (12/5/2018)

## 2018-12-10 NOTE — PROGRESS NOTES
Problem: Falls - Risk of 
Goal: *Absence of Falls Document Geyser Kappa Fall Risk and appropriate interventions in the flowsheet. Outcome: Progressing Towards Goal 
Fall Risk Interventions: 
  
 
  
 
Medication Interventions: Bed/chair exit alarm, Patient to call before getting OOB, Teach patient to arise slowly

## 2018-12-10 NOTE — PROGRESS NOTES
Problem: Falls - Risk of 
Goal: *Absence of Falls Document Lamar Galen Fall Risk and appropriate interventions in the flowsheet. Outcome: Progressing Towards Goal 
Fall Risk Interventions: 
  
 
  
 
Medication Interventions: Patient to call before getting OOB, Teach patient to arise slowly Problem: Pressure Injury - Risk of 
Goal: *Prevention of pressure injury Document Nuno Scale and appropriate interventions in the flowsheet. Outcome: Progressing Towards Goal 
Pressure Injury Interventions: 
  
 
Moisture Interventions: Internal/External urinary devices, Minimize layers Activity Interventions: Increase time out of bed, Pressure redistribution bed/mattress(bed type), PT/OT evaluation Mobility Interventions: HOB 30 degrees or less, Pressure redistribution bed/mattress (bed type), PT/OT evaluation Nutrition Interventions: Document food/fluid/supplement intake

## 2018-12-10 NOTE — PROGRESS NOTES
Bedside shift change report given to Helga Bloch (oncoming nurse) by Freddie Whelan LPN (offgoing nurse). Report included the following information SBAR and Procedure Summary.

## 2018-12-11 NOTE — CDMP QUERY
The medical record reflects the following: 
 
Risk:  77 yo with Dx \"advanced/progressive gynecologic malignancy\" Clinical Indicators: 
CT abd:  Moderate to severe bilateral hydronephrosis secondary to tumor masses in the 
pelvis. This is perhaps slightly worsened from prior. Large recurrent pelvic soft tissue mass. Severe pelvic and retroperitoneal 
lymphadenopathy with large metastatic implants near the psoas muscles 
bilaterally. This is similar or slightly worsened from prior. Large splenic metastases are slightly increased from prior. Bladder wall thickening, nonspecific. Retroperitoneal adenopathy, similar to prior. Pulmonary nodules in the lung bases consistent with metastatic disease 11/23 PET:  1. Interval development of multiple hypermetabolic nodular lesions in bilateral 
lungs, most likely consistent with lung metastasis. 2. Interval development of large ill-defined soft tissue mass in the surgical 
bed in the pelvis, most consistent with recurrent malignancy. 3. Interval development of 2 large necrotic masses in the hilum of spleen with 
malignant FDG uptake. Multiple large soft tissue masses also seen within and 
abutting the iliopsoas muscles along with multiple hypermetabolic nodular 
lesions in the hernial cavities, most likely suggesting metastasis. 4. Interval development of mild adenopathy in pelvic sidewalls, retroperitoneal 
regions as wall as right supraclavicular region as most likely becky metastasis. 5. Interval development of moderate bilateral hydronephroses and hydroureters 
all the way to the very distal ureters, most likely due to extrinsic  
--12/9 PN \"Will need close f/u with oncology for plan for resumption/continuation of chemotherapy when deemed appropriate\" Please clarify any/all sites of mets. =>Other Explanation of clinical findings =>Unable to Determine (no explanation of clinical findings) Please clarify and document your clinical opinion in the progress notes and discharge summary including the definitive and/or presumptive diagnosis, (suspected or probable), related to the above clinical findings. Please include clinical findings supporting your diagnosis. If you DECLINE this query or would like to communicate with [facility], please utilize the \"[facility] message box\" at the TOP of the Progress Note on the right. Thank you, 
Julio Cesar Rush RN BSN CCDS [phone]

## 2018-12-11 NOTE — ROUTINE PROCESS
Bedside and Verbal shift change report given to Liana Chisholm RN (oncoming nurse) by Yony Juárez RN (offgoing nurse). Report included the following information SBAR, Kardex and MAR. Patient had no acute events overnight. Currently resting in NAD. No express needs reported at this time.

## 2018-12-11 NOTE — PROGRESS NOTES
In Patient Progress note Admit Date: 12/5/2018 Impression:  
 
1) oligoanuric BECK in setting of obst uropathy/harleen hydronephrosis d/t tumour burden Improving s/p PCN placement 2) hyperkalemia, resolved 3) metabolic acidoses,resolved 4) Harleen hydronephrosis , s/p harleen PCN 
5) Hypertension , better 6) H/o cervical & endometrial cancer 7) anemia  
  
Plan : 
1) d/c IVF and encourage po intake 2) may d/c [place] 4) BP goal < 140/80 , avoid extremes of BP 
5) strict I/o , daily wts. 6) renal diet 7) pending iron studies 
  
Please call with questions, 
  
Miguel Angel Feldman MD FASN Cell [phone] Pager: [phone] 
  
Subjective:  
 
Denies SOB Current Facility-Administered Medications:  
  metoprolol tartrate (LOPRESSOR) tablet 12.5 mg, 12.5 mg, Oral, BID, Jose Amador MD 
  sodium chloride (NS) flush 5-10 mL, 5-10 mL, IntraVENous, Q8H, Jairo Thompson MD, 10 mL at 12/10/18 2141   sodium chloride (NS) flush 5-10 mL, 5-10 mL, IntraVENous, PRN, Jairo Thompson MD 
  flumazenil (ROMAZICON) 0.1 mg/mL injection 0.2 mg, 0.2 mg, IntraVENous, Jay Steel Dmitri, MD 
  naloxone (NARCAN) injection 0.1 mg, 0.1 mg, IntraVENous, MultipleJay Dmitri, MD 
  morphine 10 mg/ml injection 2 mg, 2 mg, IntraVENous, Q4H PRN, Loraine Rivas MD 
  oxyCODONE-acetaminophen (PERCOCET) 5-325 mg per tablet 1-2 Tab, 1-2 Tab, Oral, Q6H PRN, Dilip Umanzor MD, 1 Tab at 12/10/18 1405 
  0.45% sodium chloride infusion, 100 mL/hr, IntraVENous, CONTINUOUS, Jose Amador MD, Last Rate: 100 mL/hr at 12/10/18 1639, 100 mL/hr at 12/10/18 1639 
  sodium chloride (NS) flush 5-10 mL, 5-10 mL, IntraVENous, Q8H, Jyotsna Vasquez MD, 10 mL at 12/10/18 2141   sodium chloride (NS) flush 5-10 mL, 5-10 mL, IntraVENous, PRN, Jyotsna Vasquez MD 
  acetaminophen (TYLENOL) tablet 650 mg, 650 mg, Oral, Q6H PRN, Adore Francois MD, 650 mg at 12/06/18 4441   ondansetron (ZOFRAN) injection 4 mg, 4 mg, IntraVENous, Q6H PRN, Adore Francois MD, 4 mg at 12/09/18 1506   heparin (porcine) injection 5,000 Units, 5,000 Units, SubCUTAneous, Q8H, Adore Francois MD, 5,000 Units at 12/10/18 1642   hydrALAZINE (APRESOLINE) 20 mg/mL injection 10 mg, 10 mg, IntraVENous, Q6H PRN, Adore Francois MD 
 
 
 
Objective:  
 
Visit Vitals /82 (BP 1 Location: Right arm, BP Patient Position: At rest) Pulse (!) 103 Temp 98.5 °F (36.9 °C) Resp 18 Ht 5' 5\" (1.651 m) Wt 90 kg (198 lb 8 oz) SpO2 96% Breastfeeding? No  
BMI 33.03 kg/m² Intake/Output Summary (Last 24 hours) at 12/10/2018 2211 Last data filed at 12/10/2018 2143 Gross per 24 hour Intake 8889.7 ml Output 2900 ml Net 5989.7 ml Physical Exam:  
 
GEN NAD HENT mmm RS AEBE clear CVS s1 s2 wnl no JVD 
GI soft BS + Ext no edema Data Review: 
 
Recent Labs 12/10/18 
0134 WBC 13.7*  
RBC 3.46* HCT 31.0*  
MCV 89.6 MCH 28.0 MCHC 31.3  
RDW 14.2 Recent Labs 12/10/18 
0134 12/09/18 431 3995 12/09/18 32 61 16 12/09/18 
0900 12/09/18 
0304 12/08/18 
2040 12/08/18 
1400 12/08/18 
1030 12/08/18 
0134 BUN 24* 26* 26* 30* 32* 33* 36* 36* 43* CREA 2.45* 2.53* 2.69* 2.96* 3.40* 3.71* 4.27* 4.45* 5.24* CA 8.1* 8.0* 8.4* 8.2* 8.2* 8.2* 8.5 8.0* 8.4*  
K 3.8 4.0 4.4 4.3 4.6 4.2 4.1 4.5 4.6  136 138 138 139 139 138 141 139  104 105 105 106 107 105 107 106 CO2 23 25 25 24 24 23 26 24 22 * 96 101* 104* 112* 107* 129* 109* 113* Charity Nicholas MD

## 2018-12-11 NOTE — PROGRESS NOTES
Patient still has not voided after samaniego removal, patient does not feel the urge to void. Patient was assisted to bedside commode, no urine output noted. Patient had very little urine output from samaniego since placement of nephrostomy tubes. Adequate urine output coming from bilateral nephrostomy tubes.  On coming nurse will bladder scan patient and notify MD.

## 2018-12-11 NOTE — PROGRESS NOTES
[facility] Hospitalist Group Progress Note Patient: Janine Hanson Age: 76 y.o. : 1950 MR#: [id number] SSN: [id number] Date/Time: 2018 10:23 AM 
 
Subjective/24-hour events:  
 
Complains of some R leg pain this AM that she describes as muscular. Nothing new/acute otherwise. Assessment:  
Obstructive uropathy secondary to tumor burden from advanced/progressive gynecologic malignancy Bilateral hydronephrosis s/p bilateral PCN placement  Hyperkalemia of acute renal failure Metabolic acidosis HTN 
R leg pain suspect muscular Obesity Plan: 
Renal issues continue to improve. Continue current management. Received call from office of patient's GYN/Oncologist.  Have requested that mediport issue be dealt with while hospitalized. Have discussed with IR - will plan for port study to be done today and proceed accordingly. Trial of analgesic and flexeril PRN. Disposition pending above as patient is medically stable from renal perspective. Case discussed with:  [x]Patient  []Family  [x]Nursing  []Case Management DVT Prophylaxis:  []Lovenox  [x]Hep SQ  []SCDs  []Coumadin   []On Heparin gtt Objective:  
VS:  
Visit Vitals BP (!) 143/96 (BP 1 Location: Right arm, BP Patient Position: At rest) Pulse 94 Temp 97.6 °F (36.4 °C) Resp 20 Ht 5' 5\" (1.651 m) Wt 87 kg (191 lb 12.8 oz) SpO2 93% Breastfeeding? No  
BMI 31.92 kg/m² Tmax/24hrs: Temp (24hrs), Av.2 °F (36.8 °C), Min:97.3 °F (36.3 °C), Max:99.3 °F (37.4 °C) Intake/Output Summary (Last 24 hours) at 2018 1023 Last data filed at 2018 0330 Gross per 24 hour Intake 8329.7 ml Output 2000 ml Net 6329.7 ml  
 
 
General:  In NAD. Nontoxic-appearing. Cardiovascular:  RRR. Pulmonary:  Clear, no wheezes. Effort nonlabored. GI:  Abdomen soft, NTTP. Extremities:  Warm, no ischemia. Neuro:  Awake and alert, motor nonfocal. 
 
Labs: Recent Results (from the past 24 hour(s)) GLUCOSE, POC Collection Time: 12/10/18 11:57 AM  
Result Value Ref Range Glucose (POC) 108 70 - 110 mg/dL Signed By: Stella Dickerson MD   
 December 11, 2018 10:23 AM

## 2018-12-11 NOTE — PROGRESS NOTES
NUTRITION    Nursing Referral: [facility]      RECOMMENDATIONS / PLAN:     - Modify nutrition supplement: change Nepro to Ensure Pudding TID  - Update food preferences  - Bowel regimen started  - Continue RD inpatient monitoring and evaluation. NUTRITION INTERVENTIONS & DIAGNOSIS:     [x] Meals/snacks: modified composition  [x] Medical food supplement therapy: Nepro once daily  [x] Nutrition Education: low potassium diet, 12/7  [x] Collaboration and referral of nutrition care:  Discussed with Dr Zulema Zee patient's complaint of constipation; noted bowel regimen started today    Nutrition Diagnosis:    Inadequate oral intake related to decreased appetite as evidenced by poor meal intake PTA, fair meal intake since admission. Impaired nutrient utilization related to impaired renal function as evidenced by pt with hyperkalemia. Food and nutrition related knowledge deficit related to low potassium diet as evidenced by pt report. ASSESSMENT:     12/11: Pt reported fair appetite and meal intake of in-house meals; has not been eating any outside food; preferences discussed. Dislikes Nepro; causes nausea/vomiting. Other nutrition supplement options discussed. Pt also c/o constipation; last BM was on 12/5 or 12/6 (pt can not recall exact date); discussed concern with MD. K level improved; WNL, 4.2 today    12/7: Tolerating diet, some nausea. Consumed about 50% of breakfast per pt report, eating lunch at time of visit. Pt requested education on potassium containing foods, provided education. 12/6: Pt off unit at time of visit. NPO today for procedure, temporary catheter placement. Reported decreased appetite/ meal intake, and unplanned wt loss PTA per nursing screen. Pt with hyperkalemia; K 6.3 today.     Average po intake adequate to meet patients estimated nutritional needs:   [] Yes     [x] No   [] Unable to determine at this time    Diet: DIET CARDIAC Regular; Low Potassium  DIET NUTRITIONAL SUPPLEMENTS Dinner; SWEEPiO Food Allergies:  Artificial sweeteners,  Milk,  Wheat   Current Appetite:   [] Good     [x] Fair     [] Poor     [] Other:   Appetite/meal intake prior to admission:   [] Good     [] Fair     [x] Poor per nursing screen     [] Other:  Feeding Limitations:  [] Swallowing difficulty    [] Chewing difficulty    [] Other:  Current Meal Intake:   Patient Vitals for the past 100 hrs:   % Diet Eaten   12/10/18 0916 10 %   12/10/18 0747 25 %   12/09/18 1759 40 %   12/09/18 1336 25 %   12/09/18 0943 45 %   12/08/18 1812 70 %   12/08/18 1404 50 %   12/08/18 1006 20 %       BM: 12/5 or 12/6 per pt report;  C/o constipation  Skin Integrity:  Surgical incision - nephrostomy tubes  Edema:   [] No     [x] Yes   Pertinent Medications: Reviewed: zofran, bowel regimen (dulcolax, colace: started today)    Recent Labs     12/10/18  0134 12/09/18  2215 12/09/18  1535    136 138   K 3.8 4.0 4.4    104 105   CO2 23 25 25   * 96 101*   BUN 24* 26* 26*   CREA 2.45* 2.53* 2.69*   CA 8.1* 8.0* 8.4*       Intake/Output Summary (Last 24 hours) at 12/11/2018 1149  Last data filed at 12/11/2018 0330  Gross per 24 hour   Intake 8329.7 ml   Output 1325 ml   Net 7004.7 ml       Anthropometrics:  Ht Readings from Last 1 Encounters:   12/06/18 5' 5\" (1.651 m)     Last 3 Recorded Weights in this Encounter    12/08/18 1638 12/09/18 2135 12/11/18 0044   Weight: 88.4 kg (194 lb 14.2 oz) 90 kg (198 lb 8 oz) 87 kg (191 lb 12.8 oz)     Body mass index is 31.92 kg/m². Obese, Class I    Weight History:  Noted 10 lb (4.9%) wt loss x 1.5 month PTA per chart hx.  Pt reported experiencing unplanned weight loss PTA per nursing screen    Weight Metrics 12/11/2018 12/5/2018 12/4/2018 9/25/2018 10/19/2017 9/21/2017 9/15/2017   Weight 191 lb 12.8 oz - 198 lb 6.4 oz 205 lb 3.2 oz 201 lb 11.5 oz 237 lb 3.4 oz -   BMI - 31.92 kg/m2 34.06 kg/m2 35.22 kg/m2 33.57 kg/m2 - 39.47 kg/m2        Admitting Diagnosis: ARF (acute renal failure) (HCC)  ARF (acute renal failure) (HCC)  Pertinent PMHx:  Cervical and uterine cancer, high cholesterol    Education Needs:        [] None identified  [] Identified - Not appropriate at this time  [x]  Identified and addressed - refer to education log  Learning Limitations:   [x] None identified  [] Identified    Cultural, [place]ian & ethnic food preferences:  [x] None identified    [] Identified and addressed     ESTIMATED NUTRITION NEEDS:     Calories: 3360-2348 kcal (25-30 kcal/kg) based on  [] Actual BW      [x] SBW: 77 kg   Protein:  gm (0.8-1.2 gm/kg) based on  [x] Actual BW: 89 kg      [] IBW   Fluid: 1 mL/kcal     MONITORING & EVALUATION:     Nutrition Goal(s):  Goals modified  1. Po intake of meals will meet >75% of patient estimated nutritional needs within the next 7 days. Outcome:  [] Met/Ongoing    [x]  Not Met/ Progressing    [] New/Initial Goal   2. Patient will increase knowledge of appropriate food choices on a low potassium diet within 7 days.   Outcome:  [x] Met    []  Not Met    [] New/Initial Goal       Monitoring:   [x] Food and beverage intake   [x] Diet order   [x] Nutrition-focused physical findings   [x] Treatment/therapy   [] Weight   [] Enteral nutrition intake        Previous Recommendations (for follow-up assessments only):     []   Implemented       []   Not Implemented (RD to address)      [] No Longer Appropriate     [] No Recommendation Made     Discharge Planning:  Cardiac, renal diet   [x] Participated in care planning, discharge planning, & interdisciplinary rounds as appropriate      Waleska Garcia, [address] [place] [address]  Pager: [phone]

## 2018-12-11 NOTE — CDMP QUERY
The medical record reflects the following: 
 
Risk:  77 yo with gynecologic malignancy Clinical Indicators: 
--12/5 H&P \"She mentions that she was supposed to start a new medication this week - likely tamoxifen but has been having NVD for the past 1-2 weeks\" --12/7 RD:  \"Tolerating diet, some nausea. Consumed about 50% of breakfast per pt report Inadequate oral intake related to decreased appetite as evidenced by poor meal intake PTA. Weight History:  Noted 10 lb (4.9%) wt loss x 1.5 month PTA per chart hx Add nutrition supplement: Nepro once daily, Continue RD inpatient monitoring and evaluation\" Please clarify if this patient is being treated/managed for: 
 
=>Mild Malnutrition and/or Weight Loss =>Other Explanation of clinical findings =>Unable to Determine (no explanation of clinical findings) Please clarify and document your clinical opinion in the progress notes and discharge summary including the definitive and/or presumptive diagnosis, (suspected or probable), related to the above clinical findings. Please include clinical findings supporting your diagnosis. If you DECLINE this query or would like to communicate with [facility], please utilize the \"[facility] message box\" at the TOP of the Progress Note on the right. Thank you, 
Audrey Tirado RN BSN CCDS [phone]

## 2018-12-11 NOTE — PROGRESS NOTES
conducted a Follow up consultation and Spiritual Assessment for Alisa Son, who is a 76 y.o.,female. The  provided the following Interventions: 
Continued the relationship of care and support. Listened empathically. Offered prayer and assurance of continued prayer on patients behalf. Chart reviewed. The following outcomes were achieved: 
Patient expressed gratitude for 's visit. Assessment: 
There are no further spiritual or Zoroastrian issues which require Spiritual Care Services interventions at this time. Plan: 
Chaplains will continue to follow and will provide pastoral care on an as needed/requested basis.  recommends bedside caregivers page  on duty if patient shows signs of acute spiritual or emotional distress. [address] [place] [address] Care  
[phone]

## 2018-12-12 NOTE — PROGRESS NOTES
Problem: Pressure Injury - Risk of  Goal: *Prevention of pressure injury  Document Nuno Scale and appropriate interventions in the flowsheet.   Outcome: Progressing Towards Goal  Pressure Injury Interventions:       Moisture Interventions: Absorbent underpads    Activity Interventions: Pressure redistribution bed/mattress(bed type)    Mobility Interventions: HOB 30 degrees or less    Nutrition Interventions: Document food/fluid/supplement intake

## 2018-12-12 NOTE — PROGRESS NOTES
[facility]ist [facility]  Progress Note    Patient: Dylan Malhotra Age: 76 y.o. : 1950 MR#: [id number] SSN: [id number]  Date/Time: 2018 11:57 AM    Subjective/24-hour events:     No new complaints this AM.    Assessment:   Obstructive uropathy secondary to tumor burden from advanced/progressive gynecologic malignancy  Bilateral hydronephrosis s/p bilateral PCN placement   Hyperkalemia of acute renal failure  Metabolic acidosis  HTN  R leg pain suspect muscular  Obesity    Plan:  Port study noted. Will call oncology to notify of results and obtain direction on what they need with regard to port function. If ability to draw blood is required, patient will need additional procedure. F/U labs. Disposition as soon as port issues resolved. Case discussed with:  [x]Patient  []Family  [x]Nursing  []Case Management  DVT Prophylaxis:  []Lovenox  [x]Hep SQ  []SCDs  []Coumadin   []On Heparin gtt    Objective:   VS:   Visit Vitals  /76 (BP 1 Location: Right arm, BP Patient Position: At rest)   Pulse 99   Temp 98.2 °F (36.8 °C)   Resp 18   Ht 5' 5\" (1.651 m)   Wt 87.3 kg (192 lb 8 oz)   SpO2 93%   Breastfeeding? No   BMI 32.03 kg/m²      Tmax/24hrs: Temp (24hrs), Av.2 °F (36.8 °C), Min:98 °F (36.7 °C), Max:98.5 °F (36.9 °C)      Intake/Output Summary (Last 24 hours) at 2018 1158  Last data filed at 2018 0931  Gross per 24 hour   Intake 220 ml   Output 850 ml   Net -630 ml       General:  In NAD. Nontoxic-appearing. Cardiovascular:  RRR. Pulmonary:  Clear, no wheezes. Effort nonlabored. GI:  Abdomen soft, NTTP. Extremities:  Warm, no ischemia. Neuro:  Awake and alert, motor nonfocal.    Labs:    No results found for this or any previous visit (from the past 24 hour(s)).     Signed By: Sabrina Denton MD     2018 11:57 AM

## 2018-12-13 NOTE — PROGRESS NOTES
Bedside shift change report given to [employer], RN (oncoming nurse) by Jarocho King RN (offgoing nurse). Report included the following information SBAR and Procedure Summary.

## 2018-12-13 NOTE — PROGRESS NOTES
In Patient Progress note    Admit Date: 12/5/2018    Impression:     1) oligoanuric BECK in setting of obst uropathy/harleen hydronephrosis d/t tumour burden  Improving s/p PCN placement   2) hyperkalemia, resolved   3) metabolic acidoses,resolved   4) Harleen hydronephrosis , s/p harleen PCN  5) Hypertension , better  6) H/o cervical & endometrial cancer   7) anemia ok  8) mild hyponatremia      Plan :  1) encourage po intake   2) BP goal < 140/80 , avoid extremes of BP  3) strict I/o , daily wts.       Please call with questions,     Britton Blair MD FASN  Cell [phone]  Pager: [phone]     Subjective:     Denies SOB  Denies nausea     Current Facility-Administered Medications:     morphine injection 2 mg, 2 mg, IntraVENous, Q4H PRN, Nahomy Wyatt MD, 2 mg at 12/12/18 2010    heparin (porcine) 100 unit/mL injection 500 Units, 500 Units, InterCATHeter, PRN, Raul Fernandez MD, 500 Units at 12/11/18 1000    bisacodyl (DULCOLAX) tablet 10 mg, 10 mg, Oral, DAILY PRN, Nahomy Wyatt MD, 10 mg at 12/12/18 1155    docusate sodium (COLACE) capsule 100 mg, 100 mg, Oral, BID, Nahomy Wyatt MD, 100 mg at 12/12/18 1155    cyclobenzaprine (FLEXERIL) tablet 5 mg, 5 mg, Oral, TID PRN, Nahomy Wyatt MD, 5 mg at 12/11/18 1326    metoprolol tartrate (LOPRESSOR) tablet 12.5 mg, 12.5 mg, Oral, BID, Myra Amador MD    sodium chloride (NS) flush 5-10 mL, 5-10 mL, IntraVENous, Q8H, Jairo Thompson MD, 10 mL at 12/12/18 1700    sodium chloride (NS) flush 5-10 mL, 5-10 mL, IntraVENous, PRN, Jairo Thompson MD    flumazenil (ROMAZICON) 0.1 mg/mL injection 0.2 mg, 0.2 mg, IntraVENous, Multiple, Jairo Thompson MD    naloxone (NARCAN) injection 0.1 mg, 0.1 mg, IntraVENous, Multiple, Jairo Thompson MD    oxyCODONE-acetaminophen (PERCOCET) 5-325 mg per tablet 1-2 Tab, 1-2 Tab, Oral, Q6H PRN, Nahomy Wyatt MD, 2 Tab at 12/12/18 0927    sodium chloride (NS) flush 5-10 mL, 5-10 mL, IntraVENous, Q8H, Juliette Vasquez MD, 10 mL at 12/12/18 1700    sodium chloride (NS) flush 5-10 mL, 5-10 mL, IntraVENous, PRN, Juliette Vasquez MD    acetaminophen (TYLENOL) tablet 650 mg, 650 mg, Oral, Q6H PRN, Lucia Monroe MD, 650 mg at 12/06/18 1457    [facility]ron [facility]) injection 4 mg, 4 mg, IntraVENous, Q6H PRN, Lucia Monroe MD, 4 mg at 12/12/18 1700    heparin (porcine) injection 5,000 Units, 5,000 Units, SubCUTAneous, Q8H, Lucia Monroe MD, 5,000 Units at 12/12/18 2048    hydrALAZINE (APRESOLINE) 20 mg/mL injection 10 mg, 10 mg, IntraVENous, Q6H PRN, Lucia Monroe MD        Objective:     Visit Vitals  /83 (BP 1 Location: Right arm, BP Patient Position: At rest)   Pulse 98   Temp 98.1 °F (36.7 °C)   Resp 20   Ht 5' 5\" (1.651 m)   Wt 87.3 kg (192 lb 8 oz)   SpO2 96%   Breastfeeding?  No   BMI 32.03 kg/m²         Intake/Output Summary (Last 24 hours) at 12/12/2018 2242  Last data filed at 12/12/2018 1922  Gross per 24 hour   Intake 580 ml   Output 1050 ml   Net -470 ml       Physical Exam:     GEN NAD  HENT mmm  RS AEBE clear   CVS s1 s2 wnl no JVD  GI soft BS +  Ext no edema    Data Review:    Recent Labs     12/12/18  1208   WBC 19.3*   RBC 3.67*   HCT 32.5*   MCV 88.6   MCH 28.6   MCHC 32.3   RDW 14.2     Recent Labs     12/12/18  1208 12/11/18  1103 12/10/18  0134   BUN 18 20* 24*   CREA 1.61* 1.78* 2.45*   CA 8.3* 8.3* 8.1*   K 4.6 4.2 3.8   * 138 136   CL 99* 104 105   CO2 23 23 23   PHOS  --  3.6  --    * 104* 109*       Holli Selby MD

## 2018-12-13 NOTE — PROGRESS NOTES
Interventional Radiology      Patient seen in follow up for chest port revision    Port revision can be done outpatient if patient desires to go home. Will make pt NPO for tomorrow chest port revision tentatively    Please let us know if anything changes.     Thank you

## 2018-12-13 NOTE — DISCHARGE SUMMARY
Discharge Summary    Patient: Holli Regalado MRN: [id number]  CSN: [id number]    YOB: 1950  Age: 76 y.o. Sex: female    DOA: 12/5/2018 LOS:  LOS: 8 days   Discharge Date: 12/13/2018     Admission Diagnoses: ARF (acute renal failure) ([facility]  ARF (acute renal failure) ([facility]    Discharge Diagnoses:    Obstructive uropathy secondary to tumor burden from advanced/progressive gynecologic malignancy  Bilateral hydronephrosis s/p bilateral PCN placement 12/6  Hyperkalemia of acute renal failure, resolved  Metabolic acidosis  Obesity    Discharge Condition: Stable    PHYSICAL EXAM  Visit Vitals  /69 (BP 1 Location: Left arm, BP Patient Position: At rest)   Pulse 90   Temp 98 °F (36.7 °C)   Resp 18   Ht 5' 5\" (1.651 m)   Wt 87.3 kg (192 lb 8 oz)   SpO2 95%   Breastfeeding? No   BMI 32.03 kg/m²       General: In NAD. Nontoxic-appearing. HEENT: NCAT. Sclerae anicteric  Lungs:  Clear, no wheezes. Effort nonlabored. Heart:  RRR. Abdomen: Soft, NTTP. Extremities: Warm, no ischemia. Psych:   Mood normal.  Neurologic:  Awake and alert, motor nonfocal.      Hospital Course:   See admission H&P for full details of HPI. Patient admitted to medical bed with nephrology and urology in consultation. Urology recommendation was for placement of bilateral PCNs for management of obstructive uropathy/hydronephrosis due to recurrent cancer/tumor burden. She underwent this procedure by interventional radiology on 12/6/18 with a U[place]all hemodialysis catheter also being placed on the same day. Clinical response to nephrostomy placement was excellent - UOP improved, renal indices and hyperkalemia improved. Patient ultimately did not require hemodialysis and the dialysis catheter was removed on 12/10/18. GYN/Oncology has requested addressing patient's mediport, which has apparently been dysfunctional.  Case discussed with IR and port study obtained - results of study are documented below.   Discussion was had with her primary oncologist's office on 12/13/2018. Their recommendation was for port to be replaced, as frequent blood draws are anticipated while additional planned chemotherapy treatments are in progress. I had a lengthy conversation with the patient following my conversation with GYN/Oncology. She had voiced to me earlier in this hospitalization that she preferred not to have her port replaced if at all possible. Patient continues to stand by this and is adamant against having the procedure done now, despite this being recommended by Oncology. She states that she is just ready to go home and will discuss this procedure being done as an outpatient with her oncologist once she is discharged. Being that patient has refused mediport replacement and acute renal failure has resolved, maximum benefit of hospitalization has been met and she is medically stable for discharge home with [facility] services as ordered and outpatient follow up as recommended. Consults:   Urology  Nephrology  Interventional Radiology      Significant Diagnostic Studies:   IR mediport study:  IMPRESSION  Impression: Difficulty obtaining blood return likely related to fibrin sheath at  the distal end of the prot tubing. Appropriate positioning of tip in the right  atrium. No kinks or breaks in tubing. If blood return is required by clinicians  to perform lab testing or confirm placement of access needle in port reservoir,  patient may elect to have port replaced or catheter stripping to remove fibrin  sheath by select IR attendings.          Bilateral PCN placement:  Type of Anesthesia: 1% plain lidocaine and IV moderate sedation with Versed and fentanyl.     Procedure/Description:  Image guided bilateral PCN placement.     Findings:   No bleeding.     Estimated blood Loss:  Minimal     Specimen Removed:   no     Blood transfusions:  None.     Implants:  BPCN 12F to gravity drainage .     Complications: None     Condition: Stable        CT abdomen/pelvis:  IMPRESSION  Impression:     Moderate to severe bilateral hydronephrosis secondary to tumor masses in the  pelvis. This is perhaps slightly worsened from prior.     Large recurrent pelvic soft tissue mass. Severe pelvic and retroperitoneal  lymphadenopathy with large metastatic implants near the psoas muscles  bilaterally. This is similar or slightly worsened from prior.     Large splenic metastases are slightly increased from prior.     Bladder wall thickening, nonspecific.     Retroperitoneal adenopathy, similar to prior.     Pulmonary nodules in the lung bases consistent with metastatic disease. Discharge Medications:     Current Discharge Medication List      START taking these medications    Details   bisacodyl (DULCOLAX) 5 mg EC tablet Take 2 Tabs by mouth daily as needed for Constipation. Qty: 30 Tab, Refills: 1      cyclobenzaprine (FLEXERIL) 10 mg tablet Take 0.5 Tabs by mouth three (3) times daily as needed for Muscle Spasm(s). Qty: 30 Tab, Refills: 0      docusate sodium (COLACE) 100 mg capsule Take 1 Cap by mouth two (2) times a day for 90 days. Qty: 60 Cap, Refills: 0      !! ondansetron hcl (ZOFRAN) 8 mg tablet Take 1 Tab by mouth every eight (8) hours as needed for Nausea. Qty: 30 Tab, Refills: 0      oxyCODONE-acetaminophen (PERCOCET) 5-325 mg per tablet Take 1-2 Tabs by mouth every six (6) hours as needed. Max Daily Amount: 8 Tabs. Qty: 20 Tab, Refills: 0    Associated Diagnoses: Endometrial ca [facility])       ! ! - Potential duplicate medications found. Please discuss with provider. CONTINUE these medications which have NOT CHANGED    Details   megestrol (MEGACE) 40 mg tablet Take 2 Tabs by mouth two (2) times a day. Qty: 120 Tab, Refills: 3      tamoxifen (NOLVADEX) 20 mg tablet Take 1 Tab by mouth two (2) times a day. Qty: 120 Tab, Refills: 0      !! ondansetron hcl (ZOFRAN) 4 mg tablet Take 1 Tab by mouth every six (6) hours as needed for Nausea.   Qty: 30 Tab, Refills: 3      GARLIC PO Take  by mouth daily. RESVERATROL PO Take  by mouth daily. calcium carbonate (OS-MAYELA) 500 mg calcium (1,250 mg) tablet Take  by mouth daily. DOCOSAHEXANOIC ACID/EPA (FISH OIL PO) Take 1,000 mg by mouth daily. LACTOBACILLUS ACIDOPHILUS (PROBIOTIC PO) Take  by mouth daily. !! - Potential duplicate medications found. Please discuss with provider. Activity: As tolerated    Diet: Regular. Disposition:  Home with [place] [id number] for PT/OT and nephrostomy care. Follow-up: with PCP, Gudelia Garza MD  1 week, urology and GYN/Oncology as directed. Minutes spent on discharge: >30 minutes spent coordinating this discharge. Sera Montano.  Archie Quintero MD  [facility]

## 2018-12-13 NOTE — PROGRESS NOTES
Bedside shift change report given to Quang Luna (oncoming nurse) by Jared Lester (offgoing nurse).  Report included the following information SBAR, Kardex, Intake/Output, MAR and Cardiac Rhythm NSR-ST.

## 2018-12-13 NOTE — PROGRESS NOTES
Discharge Planning:  · Pt will return to home with home health services  · Pt will return home with family assistance  · Pt provided [address] for home health services  · Pt chose [facility]  · Pt provided [facility]side Rx for discharge medications  · Pt's son will  medications when he arrives to transport this pt home         [facility]  [phone]

## 2018-12-13 NOTE — PROGRESS NOTES
Problem: Falls - Risk of  Goal: *Absence of Falls  Document Antionette Fall Risk and appropriate interventions in the flowsheet. Outcome: Progressing Towards Goal  Fall Risk Interventions:            Medication Interventions: Assess postural VS orthostatic hypotension, Patient to call before getting OOB, Teach patient to arise slowly         History of Falls Interventions: Bed/chair exit alarm, Utilize gait belt for transfer/ambulation        Problem: Pressure Injury - Risk of  Goal: *Prevention of pressure injury  Document Nuno Scale and appropriate interventions in the flowsheet.   Outcome: Progressing Towards Goal  Pressure Injury Interventions:       Moisture Interventions: Absorbent underpads, Internal/External urinary devices, Minimize layers    Activity Interventions: Increase time out of bed, Pressure redistribution bed/mattress(bed type)    Mobility Interventions: HOB 30 degrees or less, Pressure redistribution bed/mattress (bed type)    Nutrition Interventions: Document food/fluid/supplement intake

## 2018-12-13 NOTE — PROGRESS NOTES
In Patient Progress note    Admit Date: 12/5/2018    Impression:     1) oligoanuric BECK in setting of obst uropathy/harleen hydronephrosis d/t tumour burden, Improving s/p PCN placement   2) metabolic acidoses,resolved   3) Harleen hydronephrosis , s/p harleen PCN  4) Hypertension , resolved   5) H/o cervical & endometrial cancer   6) anemia ok  7) mild hyponatremia , recheck      Plan :    1) encourage po intake   2) BP goal < 140/80 ,d/c metoprolol  3) strict I/o , daily wts.       Please call with questions,     Conny Nissen, MD FASN  Cell [phone]  Pager: [phone]     Subjective:     Denies SOB  Denies nausea       Current Facility-Administered Medications:     morphine injection 2 mg, 2 mg, IntraVENous, Q4H PRN, Dougie Mejia MD, 2 mg at 12/13/18 0658    heparin (porcine) 100 unit/mL injection 500 Units, 500 Units, InterCATHeter, PRN, Domenico Rosas MD, 500 Units at 12/11/18 1000    bisacodyl (DULCOLAX) tablet 10 mg, 10 mg, Oral, DAILY PRN, Dougie Mejia MD, 10 mg at 12/12/18 1155    docusate sodium (COLACE) capsule 100 mg, 100 mg, Oral, BID, Dougie Mejia MD, 100 mg at 12/13/18 0805    cyclobenzaprine (FLEXERIL) tablet 5 mg, 5 mg, Oral, TID PRN, Dougie Mejia MD, 5 mg at 12/11/18 1326    metoprolol tartrate (LOPRESSOR) tablet 12.5 mg, 12.5 mg, Oral, BID, Shine Amador MD    sodium chloride (NS) flush 5-10 mL, 5-10 mL, IntraVENous, Q8H, Jairo Thompson MD, 10 mL at 12/13/18 0608    sodium chloride (NS) flush 5-10 mL, 5-10 mL, IntraVENous, PRN, Jairo Thompson MD    flumazenil (ROMAZICON) 0.1 mg/mL injection 0.2 mg, 0.2 mg, IntraVENous, Multiple, Jairo Thompson MD    naloxone (NARCAN) injection 0.1 mg, 0.1 mg, IntraVENous, Multiple, Jairo Thompson MD    oxyCODONE-acetaminophen (PERCOCET) 5-325 mg per tablet 1-2 Tab, 1-2 Tab, Oral, Q6H PRN, Dougie Mejia MD, 2 Tab at 12/13/18 0806    sodium chloride (NS) flush 5-10 mL, 5-10 mL, IntraVENous, Q8H, Dates, Jonah Moya MD, 10 mL at 12/13/18 0609    sodium chloride (NS) flush 5-10 mL, 5-10 mL, IntraVENous, PRN, Christina, Arnulfo Meade MD    acetaminophen (TYLENOL) tablet 650 mg, 650 mg, Oral, Q6H PRN, Khanh Luciano MD, 650 mg at 12/06/18 6322    ondansetron [facility]) injection 4 mg, 4 mg, IntraVENous, Q6H PRN, Khanh Luciano MD, 4 mg at 12/12/18 1700    heparin (porcine) injection 5,000 Units, 5,000 Units, SubCUTAneous, Q8H, Khanh Luciano MD, 5,000 Units at 12/13/18 0419    hydrALAZINE (APRESOLINE) 20 mg/mL injection 10 mg, 10 mg, IntraVENous, Q6H PRN, Khanh Luciano MD        Objective:     Visit Vitals  /69 (BP 1 Location: Left arm, BP Patient Position: At rest)   Pulse 90   Temp 98 °F (36.7 °C)   Resp 18   Ht 5' 5\" (1.651 m)   Wt 87.3 kg (192 lb 8 oz)   SpO2 95%   Breastfeeding?  No   BMI 32.03 kg/m²         Intake/Output Summary (Last 24 hours) at 12/13/2018 1117  Last data filed at 12/13/2018 1004  Gross per 24 hour   Intake 860 ml   Output 980 ml   Net -120 ml       Physical Exam:     GEN NAD  HENT mmm  RS AEBE clear   CVS s1 s2 wnl no JVD  GI soft BS +, harleen PCN in place   Ext no edema    Data Review:    Recent Labs     12/12/18  1208   WBC 19.3*   RBC 3.67*   HCT 32.5*   MCV 88.6   MCH 28.6   MCHC 32.3   RDW 14.2     Recent Labs     12/12/18  1208 12/11/18  1103   BUN 18 20*   CREA 1.61* 1.78*   CA 8.3* 8.3*   K 4.6 4.2   * 138   CL 99* 104   CO2 23 23   PHOS  --  3.6   * 104*       Deloris Hodges MD

## 2018-12-14 NOTE — PROGRESS NOTES
Bedside shift change report given to [employer], RN (oncoming nurse) by Corrina Alicia RN (offgoing nurse). Report included the following information SBAR, Procedure Summary and Intake/Output.

## 2018-12-14 NOTE — PROGRESS NOTES
Discharge Planning:  · Pt plan is to be discharged to home on 12/15/18  · This writer spoke with this pt regarding discharge to a SNF to assist with her ambulating problems  · Pt states no ambulating problem except with one of her leg that sometimes locks  · Pt states she \"Absolutely\" do not want to be placed in a local SNF  · Pt has signed an [address] for [address] [place] [address] who have been notified of this pt's pending discharge and update  · Pt will be placed on \"Will Call\" for discharge transport when ready, PCS faxed to [phone] for this \"Will Call\" services  · Pt request medical transport due to her inability to safely ambulate up her twenty steps  · Will Call packet on this pt's chart    [facility]  [phone]

## 2018-12-14 NOTE — PROGRESS NOTES
NUTRITION    Nursing Referral: [facility]      RECOMMENDATIONS / PLAN:     - Recommend resuming po diet following procedure when medically appropriate  - Add Prune juice once daily to diet when diet order resumed for promotion of bowel movement  - Continue bowel regimen; noted suppository ordered for today  - Clarify in diet order that patient can tolerate and eats cheese, yogurt, ice cream, and pudding, just no milk; discussed with MD  - Discontinue Ensure Pudding supplement per pt request  - Continue RD inpatient monitoring and evaluation. NUTRITION INTERVENTIONS & DIAGNOSIS:     [] Meals/snacks: modified composition; NPO  [] Medical food supplement therapy: Ensure Pudding TID  [x] Nutrition Education: low potassium diet, 12/7  [x] Collaboration and referral of nutrition care:  Pt discussed with Dr Antonina Dean; po diet to resume following procedure today per MD.  Discussed with MD about patient's report of \"milk allergy\" but able to tolerate/eat, with no symptoms, other dairy products, like cheese, yogurt, ice cream, pudding; okay to verify in diet order when diet resumed per MD    Nutrition Diagnosis:    Inadequate oral intake related to decreased appetite as evidenced by poor meal intake PTA, fair meal intake since admission. Impaired nutrient utilization related to impaired renal function as evidenced by pt with hyperkalemia. Food and nutrition related knowledge deficit related to low potassium diet as evidenced by pt report. ASSESSMENT:     12/14: Pt reported fair appetite and meal intake when on po diet; consuming 50% of meals per her report. Dislikes Ensure Pudding; stated that she did not tolerate them. NPO today for procedure; diet to resume afterwards per Dr Antonina Dean. Pt c/o constipation; still no BM since 12/5. Agreeable to receiving prune juice daily to encouraged BM. Noted dulcolax suppository ordered.  Pt reported having food allergy to milk, but has always tolerated/eaten other dairy products, which she specified. K level remain WNL    12/11: Pt reported fair appetite and meal intake of in-house meals; has not been eating any outside food; preferences discussed. Dislikes Nepro; causes nausea/vomiting. Other nutrition supplement options discussed. Pt also c/o constipation; last BM was on 12/5 or 12/6 (pt can not recall exact date); discussed concern with MD. K level improved; WNL, 4.2 today  12/7: Tolerating diet, some nausea. Consumed about 50% of breakfast per pt report, eating lunch at time of visit. Pt requested education on potassium containing foods, provided education. 12/6: Pt off unit at time of visit. NPO today for procedure, temporary catheter placement. Reported decreased appetite/ meal intake, and unplanned wt loss PTA per nursing screen. Pt with hyperkalemia; K 6.3 today.     Average po intake adequate to meet patients estimated nutritional needs:   [] Yes     [x] No   [] Unable to determine at this time    Diet: DIET NUTRITIONAL SUPPLEMENTS Breakfast, Lunch, Dinner; ENSURE PUDDING  DIET NPO Except Meds      Food Allergies:  Artificial sweeteners,  Milk,  Wheat   Current Appetite:   [] Good     [x] Fair     [] Poor     [] Other:   Appetite/meal intake prior to admission:   [] Good     [] Fair     [x] Poor per nursing screen     [] Other:  Feeding Limitations:  [] Swallowing difficulty    [] Chewing difficulty    [] Other:  Current Meal Intake:   Patient Vitals for the past 100 hrs:   % Diet Eaten   12/13/18 1741 10 %   12/13/18 1326 25 %   12/13/18 0908 2 %   12/12/18 1753 25 %   12/12/18 1342 10 %   12/12/18 0931 2 %       BM: 12/5 or 12/6 per pt report;  C/o constipation  Skin Integrity:  Surgical incision - nephrostomy tubes  Edema:   [] No     [x] Yes   Pertinent Medications: Reviewed: zofran, bowel regimen     Recent Labs     12/14/18  0940 12/12/18  1208   * 133*   K 4.4 4.6   CL 98* 99*   CO2 27 23   GLU 92 112*   BUN 23* 18   CREA 1.32* 1.61*   CA 8.4* 8.3*       Intake/Output Summary (Last 24 hours) at 12/14/2018 1455  Last data filed at 12/14/2018 0626  Gross per 24 hour   Intake 240 ml   Output 855 ml   Net -615 ml       Anthropometrics:  Ht Readings from Last 1 Encounters:   12/06/18 5' 5\" (1.651 m)     Last 3 Recorded Weights in this Encounter    12/09/18 2135 12/11/18 0044 12/12/18 0928   Weight: 90 kg (198 lb 8 oz) 87 kg (191 lb 12.8 oz) 87.3 kg (192 lb 8 oz)     Body mass index is 32.03 kg/m². Obese, Class I    Weight History:  Noted 10 lb (4.9%) wt loss x 1.5 month PTA per chart hx. Pt reported experiencing unplanned weight loss PTA per nursing screen    Weight Metrics 12/12/2018 12/5/2018 12/4/2018 9/25/2018 10/19/2017 9/21/2017 9/15/2017   Weight 192 lb 8 oz - 198 lb 6.4 oz 205 lb 3.2 oz 201 lb 11.5 oz 237 lb 3.4 oz -   BMI - 32.03 kg/m2 34.06 kg/m2 35.22 kg/m2 33.57 kg/m2 - 39.47 kg/m2        Admitting Diagnosis: ARF (acute renal failure) (HCC)  ARF (acute renal failure) (HCC)  Pertinent PMHx:  Cervical and uterine cancer, high cholesterol    Education Needs:        [] None identified  [] Identified - Not appropriate at this time  [x]  Identified and addressed - refer to education log  Learning Limitations:   [x] None identified  [] Identified    Cultural, Mormonism & ethnic food preferences:  [x] None identified    [] Identified and addressed     ESTIMATED NUTRITION NEEDS:     Calories: 2237-9936 kcal (25-30 kcal/kg) based on  [] Actual BW      [x] SBW: 77 kg   Protein:  gm (0.8-1.2 gm/kg) based on  [x] Actual BW: 89 kg      [] IBW   Fluid: 1 mL/kcal     MONITORING & EVALUATION:     Nutrition Goal(s):    1. Po intake of meals will meet >75% of patient estimated nutritional needs within the next 7 days. Outcome:  [] Met/Ongoing    [x]  Not Met/ Progressing    [] New/Initial Goal   2. Patient will increase knowledge of appropriate food choices on a low potassium diet within 7 days.   Outcome:  [x] Met    []  Not Met    [] New/Initial Goal       Monitoring:   [x] Food and beverage intake   [x] Diet order   [x] Nutrition-focused physical findings   [x] Treatment/therapy   [] Weight   [] Enteral nutrition intake        Previous Recommendations (for follow-up assessments only):     [x]   Implemented       []   Not Implemented (RD to address)      [] No Longer Appropriate     [] No Recommendation Made     Discharge Planning:  Cardiac, renal diet   [x] Participated in care planning, discharge planning, & interdisciplinary rounds as appropriate      Star Leventhal, [address] [place] [address]  Pager: [phone]

## 2018-12-14 NOTE — CONSULTS
Consult Note    Patient: Lili Martínez               Sex: female          DOA: 12/5/2018         YOB: 1950      Age:  76 y.o.        LOS:  LOS: 9 days              HPI:     Lili Martínez is a 76 y.o. female who has been seen for right chest port revision. Prior chest port placed by an outside service. Port is malpositioned. Past Medical History:   Diagnosis Date    BMI 34.0-34.9,adult     34.8    Caffeine adverse reaction     elevated BP    Cancer (HCC)     uterine, cervical    Cervical cancer (HCC)     Chronic sinusitis     Dizziness     Endometriosis     Hiatal hernia     High cholesterol     Palpitation     PMB (postmenopausal bleeding)     Rash     eczemz    Urinary incontinence        Past Surgical History:   Procedure Laterality Date    HX ABDOMINAL LAPAROSCOPY      HX BILATERAL SALPINGO-OOPHORECTOMY Bilateral     HX BREAST LUMPECTOMY Right 1973    benign    HX HYSTERECTOMY      radical    HX OTHER SURGICAL  09/01/2017    Exam under anesthesia: Cystoscopy, sigmoidoscopy       Family History   Problem Relation Age of Onset    Cancer Mother         left ovary       Social History     Socioeconomic History    Marital status: SINGLE     Spouse name: Not on file    Number of children: Not on file    Years of education: Not on file    Highest education level: Not on file   Tobacco Use    Smoking status: Never Smoker    Smokeless tobacco: Never Used   Substance and Sexual Activity    Alcohol use: No    Drug use: No    Sexual activity: No       Prior to Admission medications    Medication Sig Start Date End Date Taking? Authorizing Provider   bisacodyl (DULCOLAX) 5 mg EC tablet Take 2 Tabs by mouth daily as needed for Constipation. 12/13/18  Yes Valeria Melton MD   cyclobenzaprine (FLEXERIL) 10 mg tablet Take 0.5 Tabs by mouth three (3) times daily as needed for Muscle Spasm(s).  12/13/18  Yes Valeria Melton MD   docusate sodium (COLACE) 100 mg capsule Take 1 Cap by mouth two (2) times a day for 90 days. 12/13/18 3/13/19 Yes Andrea Zambrano MD   ondansetron hcl (ZOFRAN) 8 mg tablet Take 1 Tab by mouth every eight (8) hours as needed for Nausea. 12/13/18  Yes Andrea Zambrano MD   oxyCODONE-acetaminophen (PERCOCET) 5-325 mg per tablet Take 1-2 Tabs by mouth every six (6) hours as needed. Max Daily Amount: 8 Tabs. 12/11/18  Yes Andrea Zambrano MD   megestrol (MEGACE) 40 mg tablet Take 2 Tabs by mouth two (2) times a day. 12/4/18  Yes Adela Crocker MD   tamoxifen (NOLVADEX) 20 mg tablet Take 1 Tab by mouth two (2) times a day. 12/4/18  Yes Adela Crocker MD   ondansetron hcl [facility]) 4 mg tablet Take 1 Tab by mouth every six (6) hours as needed for Nausea. 12/4/18  Yes Adela Crocker MD   GARLIC PO Take  by mouth daily. Yes Provider, Historical   RESVERATROL PO Take  by mouth daily. Yes Provider, Historical   calcium carbonate (OS-MAYELA) 500 mg calcium (1,250 mg) tablet Take  by mouth daily. Yes Provider, Historical   DOCOSAHEXANOIC ACID/EPA (FISH OIL PO) Take 1,000 mg by mouth daily. Yes Provider, Historical   LACTOBACILLUS ACIDOPHILUS (PROBIOTIC PO) Take  by mouth daily. Yes Provider, Historical       Allergies   Allergen Reactions    Other Food Nausea and Vomiting     Artificial sweeteners    Neulasta [Pegfilgrastim] Swelling    Aspirin Other (comments)     Gi upset    Milk Itching and Atopic Dermatitis     Eczema (dairy cow)    Tetracycline Unknown (comments)     Patient does not remember    Wheat Atopic Dermatitis     eczema    Other Plant, Animal, Environmental Other (comments)     Pt states she has \"springtime grass allergies. \"     Reports reaction: Sinus infection       Review of Systems  Pertinent items are noted in the History of Present Illness.       Physical Exam:      Visit Vitals  /73 (BP 1 Location: Right arm, BP Patient Position: At rest)   Pulse 98   Temp 98 °F (36.7 °C)   Resp 20   Ht 5' 5\" (1.651 m)   Wt 87.3 kg (192 lb 8 oz)   SpO2 98%   Breastfeeding? No   BMI 32.03 kg/m²       Physical Exam:  Physical exam not obtained due to patient factors. Labs Reviewed:  Lab results reviewed. For significant abnormal values and values requiring intervention, see assessment and plan. Assessment/Plan     Active Problems:    ARF (acute renal failure) ([facility] [address] (12/5/2018)      Old right chest port removal and new chest port placement was done today. Thank you.

## 2018-12-14 NOTE — PROGRESS NOTES
Problem: Falls - Risk of  Goal: *Absence of Falls  Document Antionette Fall Risk and appropriate interventions in the flowsheet. Outcome: Progressing Towards Goal  Fall Risk Interventions:  Mobility Interventions: Patient to call before getting OOB         Medication Interventions: Bed/chair exit alarm, Patient to call before getting OOB, Teach patient to arise slowly    Elimination Interventions: Bed/chair exit alarm, Call light in reach, Patient to call for help with toileting needs    History of Falls Interventions: Bed/chair exit alarm, Door open when patient unattended        Problem: Pressure Injury - Risk of  Goal: *Prevention of pressure injury  Document Nuno Scale and appropriate interventions in the flowsheet.   Outcome: Progressing Towards Goal  Pressure Injury Interventions:  Sensory Interventions: Assess changes in LOC, Discuss PT/OT consult with provider, Minimize linen layers, Maintain/enhance activity level    Moisture Interventions: Absorbent underpads, Limit adult briefs, Minimize layers    Activity Interventions: Increase time out of bed, Pressure redistribution bed/mattress(bed type)    Mobility Interventions: Assess need for specialty bed, Pressure redistribution bed/mattress (bed type)    Nutrition Interventions: Document food/fluid/supplement intake    Friction and Shear Interventions: Minimize layers

## 2018-12-14 NOTE — PROGRESS NOTES
After one attempt of unsuccessful PIV insertion. , patient does want no more tries. She stated she will in the morning until the new mediport is placed.

## 2018-12-14 NOTE — PROGRESS NOTES
conducted a Follow up consultation and Spiritual Assessment for Chilo Forrester, who is a 76 y.o.,female. The  provided the following Interventions:  Continued the relationship of care and support. Listened empathically. Offered assurance of continued prayer on patients behalf. Chart reviewed. The following outcomes were achieved:  Patient expressed gratitude for 's visit. Assessment:  There are no further spiritual or [employer]ec[employer]al issues which require Spiritual Care Services interventions at this time. Plan:  Chaplains will continue to follow and will provide pastoral care on an as needed/requested basis.  recommends bedside caregivers page  on duty if patient shows signs of acute spiritual or emotional distress.        Chaplain Resident [facility]   [phone]

## 2018-12-14 NOTE — PROGRESS NOTES
Bedside and Verbal shift change report given to Nirmala Barlow (oncoming nurse) by [employer] (offgoing nurse). Report included the following information SBAR, Kardex, MAR and Recent Results. Héctor Parkinson

## 2018-12-14 NOTE — PROCEDURES
RADIOLOGY POST PROCEDURE NOTE     December 14, 2018       4:51 PM     Preoperative Diagnosis:   Malfunctioning/malpositioned right chest port. Postoperative Diagnosis:  Same. :  Dr. Maday Whiteside    Assistant:  None. Type of Anesthesia: 1% plain lidocaine and IV moderate sedation with Versed and Fentanyl. Procedure/Description:  Image guided old right chest port removal and new port placement. Findings:   No bleeding. Estimated blood Loss:  Minimal    Specimen Removed:   no    Blood transfusions:  None. Implants:  See report.     Complications: None    Condition: Stable    Discharge Plan:  continue present therapy    Poppy Bose MD

## 2018-12-14 NOTE — PROGRESS NOTES
[facility] Hospitalist Group  Progress Note    Patient: Micah Clark Age: 76 y.o. : 1950 MR#: [id number] SSN: [id number]  Date/Time: 2018 11:15 AM    Subjective/24-hour events:     Discharge planned for yesterday cancelled. Patient has decided to have mediport revision while hospitalized. No new issues overnight but still unable to have bowel movement. 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  PT/OT evals. [address]ala 78 orders for PT/OT eval/treat already ordered. Discussed possibility that therapy may recommend SNF depending on their evaluation. She states that she does not think that she will need this and that she will likely decline if recommended. Discussed with [facility]. Suppository for constipation. Plan for discharge home in AM with medical transport pending no new clinical issues. Continue supportive care in interim. Case discussed with:  [x]Patient  []Family  [x]Nursing  [x]Case Management  DVT Prophylaxis:  []Lovenox  [x]Hep SQ  []SCDs  []Coumadin   []On Heparin gtt    Objective:   VS:   Visit Vitals  /78 (BP 1 Location: Right arm, BP Patient Position: At rest)   Pulse 96   Temp 97.8 °F (36.6 °C)   Resp 18   Ht 5' 5\" (1.651 m)   Wt 87.3 kg (192 lb 8 oz)   SpO2 96%   Breastfeeding? No   BMI 32.03 kg/m²      Tmax/24hrs: Temp (24hrs), Av.2 °F (36.8 °C), Min:97.8 °F (36.6 °C), Max:98.7 °F (37.1 °C)      Intake/Output Summary (Last 24 hours) at 2018 1115  Last data filed at 2018 0626  Gross per 24 hour   Intake 560 ml   Output 855 ml   Net -295 ml       General:  In NAD. Nontoxic-appearing. Cardiovascular:  RRR. Pulmonary:  Clear, no wheezes.   Effort nonlabored. GI:  Abdomen soft, NTTP. Extremities:  Warm, no ischemia.   Neuro:  Awake and alert, motor nonfocal.    Labs:    Recent Results (from the past 24 hour(s))   PTT    Collection Time: 12/14/18  3:44 AM   Result Value Ref Range    aPTT 34.4 23.0 - 36.4 SEC   PROTHROMBIN TIME + INR    Collection Time: 12/14/18  3:44 AM   Result Value Ref Range    Prothrombin time 13.4 11.5 - 15.2 sec    INR 1.1 0.8 - 1.2     METABOLIC PANEL, BASIC    Collection Time: 12/14/18  9:40 AM   Result Value Ref Range    Sodium 133 (L) 136 - 145 mmol/L    Potassium 4.4 3.5 - 5.5 mmol/L    Chloride 98 (L) 100 - 108 mmol/L    CO2 27 21 - 32 mmol/L    Anion gap 8 3.0 - 18 mmol/L    Glucose 92 74 - 99 mg/dL    BUN 23 (H) 7.0 - 18 MG/DL    Creatinine 1.32 (H) 0.6 - 1.3 MG/DL    BUN/Creatinine ratio 17 12 - 20      GFR est AA 49 (L) >60 ml/min/1.73m2    GFR est non-AA 40 (L) >60 ml/min/1.73m2    Calcium 8.4 (L) 8.5 - 10.1 MG/DL       Signed By: Matt Blank MD     December 14, 2018 11:15 AM

## 2018-12-14 NOTE — PROGRESS NOTES
Bedside shift change report given to Meghan Pereyra RN (oncoming nurse) by [employer] [facility], RN (offgoing nurse). Report included the following information SBAR and Procedure Summary.

## 2018-12-15 NOTE — PROGRESS NOTES
Patient discharge to home per MD order. Patient giving discharge instructions, follow-up appointments, and prescription. Patient stated understanding of discharge instruction, follow-up appointment and medication administration. Patient transported home via medical transport. No acute distress noted at this time.

## 2018-12-15 NOTE — PROGRESS NOTES
12/15/18 0838   Vital Signs   Temp 97.9 °F (36.6 °C)   Temp Source Oral   Pulse (Heart Rate) (!) 112   Heart Rate Source Monitor   Resp Rate 17   O2 Sat (%) 96 %   Level of Consciousness Alert   /68   MAP (Calculated) 83   BP 1 Method Automatic   BP 1 Location Left arm   MEWS Score 3   Patient Observation   Repositioned Head of bed elevated (degrees)   Patient Turned Turns self   Observations rounds   Ambulate No (Comment)   Activity In bed   Patient has a MEWS Score of 3 due to heart rate of 112. MD notified. No New orders received at this time. Patient is okay to be discharge to home per MD orders.

## 2018-12-15 NOTE — PROGRESS NOTES
Bedside shift change report given to this RN (oncoming nurse) by Pamela Fajardo RN (offgoing nurse). Report included the following information SBAR and Kardex.

## 2018-12-15 NOTE — PROGRESS NOTES
Pt stated she wants to go home and not to nursing facility. Pt's nurse Sohail Marie will call  when pt is ready for discharge. 1400:  As per pt's nurse, pt is ready for discharge. A [facility] transport was already set up yesterday. [employer] and they stated they will be here within an hour. Notified nursing and pt.

## 2018-12-15 NOTE — DISCHARGE INSTRUCTIONS
Learning About Hydronephrosis  What is hydronephrosis? Hydronephrosis is swelling of the kidneys. It is caused by a buildup of urine. This condition can happen if a tube that drains urine from your kidneys is blocked. The blockage can come from within the urinary tract or from pressure outside of the tract. Pregnancy is an example of an outside (external) cause. This condition is often caused by a blockage such as a kidney stone, tumor, or blood clot. It also can be caused by a problem in your urinary system that you were born with (congenital problem). What are the symptoms? Some of the common symptoms are:  · Pain in one or both sides. · Stomach pain. · Blood in your urine. Some people have no symptoms. How is it diagnosed? Your doctor will do an ultrasound to look for a blockage in your urinary system. An ultrasound allows your doctor to see a picture of the organs and other structures in your belly (abdomen). You also may need blood and urine tests. How is it treated? Your treatment depends on the cause of the swelling. If it is caused by a blockage, your treatment will depend on the type of blockage you have. If the blockage is caused by a kidney stone, you may wait for the stone to pass. If hydronephrosis happens during pregnancy, it usually clears up on its own. You may need to have urine drained from your bladder or kidneys. A urinary catheter is a small, flexible tube that can be inserted through the urethra and into the bladder, allowing urine to drain. A nephrostomy catheter is a thin tube placed into your kidney to drain urine. Sometimes surgery is needed to clear the blockage. If you have a blockage, you should begin to feel better after the blockage is gone. Many people recover and have no long-term problems. But some may have kidney damage. If hydronephrosis was left untreated for a long time, the damage can be severe. Severe damage will require further treatment.   Follow-up care is a key part of your treatment and safety. Be sure to make and go to all appointments, and call your doctor if you are having problems. It's also a good idea to know your test results and keep a list of the medicines you take. Where can you learn more? Go to http://kj-ron.info/. Enter S386 in the search box to learn more about \"Learning About Hydronephrosis. \"  Current as of: March 15, 2018  Content Version: 11.8  © 1105-1146 [employer]. Care instructions adapted under license by [employer] (which disclaims liability or warranty for this information). If you have questions about a medical condition or this instruction, always ask your healthcare professional. [address] any warranty or liability for your use of this information. Uterine Cancer: Care Instructions  Your Care Instructions  Uterine cancer is the rapid growth of abnormal cells that line the uterus. It also is called endometrial cancer. These cells may spread to nearby organs, lymph glands, or distant organs. Uterine cancer can be cured most often when found early. Treatment may include surgery to remove the uterus, ovaries, fallopian tubes, and sometimes the pelvic lymph nodes. Radiation and hormones to stop cancer growth also are sometimes used. Chemotherapy may be used if the cancer has spread. Having cancer can be scary. You may feel many emotions and may need some help coping. Seek out family, friends, and counselors for support. You can do things at home to make yourself feel better while you go through treatment. You also can call the [employer] (9-138.282.4151) or visit its website at [address] [employer] for more information. Follow-up care is a key part of your treatment and safety. Be sure to make and go to all appointments, and call your doctor if you are having problems.  It's also a good idea to know your test results and keep a list of the medicines you take. How can you care for yourself at home? · Take your medicines exactly as prescribed. Call your doctor if you think you are having a problem with your medicine. You may get medicine for nausea and vomiting if you have these side effects. · Eat healthy food. If you do not feel like eating, try to eat food that has protein and extra calories to keep up your strength and prevent weight loss. Drink liquid meal replacements for extra calories and protein. Try to eat your main meal early. · Get some physical activity every day, but do not get too tired. Keep doing the hobbies you enjoy as your energy allows. · Take steps to control your stress and workload. Learn relaxation techniques. ? Share your feelings. Stress and tension affect our emotions. By expressing your feelings to others, you may be able to understand and cope with them. ? Consider joining a support group. Talking about a problem with your spouse, a good friend, or other people with similar problems is a good way to reduce tension and stress. ? Express yourself through art. Try writing, dance, art, or crafts to relieve tension. Some dance, writing, or art groups may be available just for people who have cancer. ? Be kind to your body and mind. Getting enough sleep, eating a healthy diet, and taking time to do things you enjoy can contribute to an overall feeling of balance in your life and help reduce stress. ? Get help if you need it. Discuss your concerns with your doctor or counselor. · If you are vomiting or have diarrhea:  ? Drink plenty of fluids (enough so that your urine is light yellow or clear like water) to prevent dehydration. Choose water and other caffeine-free clear liquids. If you have kidney, heart, or liver disease and have to limit fluids, talk with your doctor before you increase the amount of fluids you drink. ?  When you are able to eat, try clear soups, mild foods, and liquids until all symptoms are gone for 12 to 48 hours. Other good choices include dry toast, crackers, cooked cereal, and gelatin dessert, such as Jell-O.  · Take care of your urinary tract to prevent problems such as infection, which can be caused by uterine cancer and its treatment. Limit drinks with caffeine, drink plenty of fluids, and urinate every 3 to 4 hours. · If you have not already done so, prepare a list of advance directives. Advance directives are instructions to your doctor and family members about what kind of care you want if you become unable to speak or express yourself. When should you call for help? Call 911 anytime you think you may need emergency care. For example, call if:    · You passed out (lost consciousness).    Call your doctor now or seek immediate medical care if:    · You have a fever.     · You have abnormal bleeding.     · You have new or worse pain.     · You think you have an infection.     · You have new symptoms, such as a cough, belly pain, vomiting, diarrhea, or a rash.    Watch closely for changes in your health, and be sure to contact your doctor if:    · You are much more tired than usual.     · You have swollen glands in your armpits, groin, or neck.     · You do not get better as expected. Where can you learn more? Go to http://kj-ron.info/. Enter E343 in the search box to learn more about \"Uterine Cancer: Care Instructions. \"  Current as of: March 28, 2018  Content Version: 11.8  © 2780-6988 [employer]. Care instructions adapted under license by [employer] (which disclaims liability or warranty for this information). If you have questions about a medical condition or this instruction, always ask your healthcare professional. [place] [address] any warranty or liability for your use of this information.            Acute Kidney Injury: Care Instructions  Your Care Instructions    Acute kidney injury (BECK) is a sudden decrease in kidney function. This can happen over a period of hours, days or, in some cases, weeks. BECK used to be called acute renal failure, but kidney failure doesn't always happen with BECK. Common causes of BECK are dehydration, blood loss, and medicines. When BECK happens, the kidneys have trouble removing waste and excess fluids from the body. The waste and fluids build up and become harmful. Kidney function may return to normal if the cause of BECK is treated quickly. Your chance of a full recovery depends on what caused the problem, how quickly the cause was treated, and what other medical problems you have. A machine may be used to help your kidneys remove waste and fluids for a short period of time. This is called dialysis. Follow-up care is a key part of your treatment and safety. Be sure to make and go to all appointments, and call your doctor if you are having problems. It's also a good idea to know your test results and keep a list of the medicines you take. How can you care for yourself at home? · Talk to your doctor about how much fluid you should drink. · Eat a balanced diet. Talk to your doctor or a dietitian about what type of diet may be best for you. You may need to limit sodium, potassium, and phosphorus. · If you need dialysis, follow the instructions and schedule for dialysis that your doctor gives you. · Do not smoke. Smoking can make your condition worse. If you need help quitting, talk to your doctor about stop-smoking programs and medicines. These can increase your chances of quitting for good. · Do not drink alcohol. · Review all of your medicines with your doctor. Do not take any medicines, including nonsteroidal anti-inflammatory drugs (NSAIDs) such as ibuprofen (Advil, Motrin) or naproxen (Aleve), unless your doctor says it is safe for you to do so. · Make sure that anyone treating you for any health problem knows that you have had BECK. When should you call for help?   Call 911 anytime you think you may need emergency care. For example, call if:    · You passed out (lost consciousness).    Call your doctor now or seek immediate medical care if:    · You have new or worse nausea and vomiting.     · You have much less urine than normal, or you have no urine.     · You are feeling confused or cannot think clearly.     · You have new or more blood in your urine.     · You have new swelling.     · You are dizzy or lightheaded, or feel like you may faint.    Watch closely for changes in your health, and be sure to contact your doctor if:    · You do not get better as expected. Where can you learn more? Go to http://kj-ron.info/. Enter C587 in the search box to learn more about \"Acute Kidney Injury: Care Instructions. \"  Current as of: March 15, 2018  Content Version: 11.8  © 2122-0838 [employer]. Care instructions adapted under license by [employer] (which disclaims liability or warranty for this information). If you have questions about a medical condition or this instruction, always ask your healthcare professional. [address] any warranty or liability for your use of this information. Featurespace Activation    Thank you for requesting access to Features[employer]. Please follow the instructions below to securely access and download your online medical record. Featurespace allows you to send messages to your doctor, view your test results, renew your prescriptions, schedule appointments, and more. How Do I Sign Up? 1. In your internet browser, go to [email or web address]  2. Click on the First Time User? Click Here link in the Sign In box. You will be redirect to the New Member Sign Up page. 3. Enter your Featurespace Access Code exactly as it appears below. You will not need to use this code after youve completed the sign-up process. If you do not sign up before the expiration date, you must request a new code. Featurespace Access Code:  Activation code not generated  Current ProDeaf Status: Active (This is the date your ProDeaf access code will )    4. Enter the last four digits of your Social Security Number (xxxx) and Date of Birth (mm/dd/yyyy) as indicated and click Submit. You will be taken to the next sign-up page. 5. Create a Smart Picture [employer] ID. This will be your ProDeaf login ID and cannot be changed, so think of one that is secure and easy to remember. 6. Create a ProDeaf password. You can change your password at any time. 7. Enter your Password Reset Question and Answer. This can be used at a later time if you forget your password. 8. Enter your e-mail address. You will receive e-mail notification when new information is available in [address] 9. Click Sign Up. You can now view and download portions of your medical record. 10. Click the Download Summary menu link to download a portable copy of your medical information. Additional Information    If you have questions, please visit the Frequently Asked Questions section of the ProDeaf website at [email or web address] Remember, ProDeaf is NOT to be used for urgent needs. For medical emergencies, dial 911. Patient armband removed and shredded  DISCHARGE SUMMARY from Nurse    PATIENT INSTRUCTIONS:    After general anesthesia or intravenous sedation, for 24 hours or while taking prescription Narcotics:  · Limit your activities  · Do not drive and operate hazardous machinery  · Do not make important personal or business decisions  · Do  not drink alcoholic beverages  · If you have not urinated within 8 hours after discharge, please contact your surgeon on call.     Report the following to your surgeon:  · Excessive pain, swelling, redness or odor of or around the surgical area  · Temperature over 100.5  · Nausea and vomiting lasting longer than 4 hours or if unable to take medications  · Any signs of decreased circulation or nerve impairment to extremity: change in color, persistent  numbness, tingling, coldness or increase pain  · Any questions    What to do at Home:  Recommended activity: PT/OT per [address], Nephrostomy care    If you experience any of the following symptoms Pain not controlled by pain meds, fever greater than 100.5, please follow up with primary care doctor or Oncologist..    *  Please give a list of your current medications to your Primary Care Provider. *  Please update this list whenever your medications are discontinued, doses are      changed, or new medications (including over-the-counter products) are added. *  Please carry medication information at all times in case of emergency situations. These are general instructions for a healthy lifestyle:    No smoking/ No tobacco products/ Avoid exposure to second hand smoke  Surgeon General's Warning:  Quitting smoking now greatly reduces serious risk to your health. Obesity, smoking, and sedentary lifestyle greatly increases your risk for illness    A healthy diet, regular physical exercise & weight monitoring are important for maintaining a healthy lifestyle    You may be retaining fluid if you have a history of heart failure or if you experience any of the following symptoms:  Weight gain of 3 pounds or more overnight or 5 pounds in a week, increased swelling in our hands or feet or shortness of breath while lying flat in bed. Please call your doctor as soon as you notice any of these symptoms; do not wait until your next office visit. Recognize signs and symptoms of STROKE:    F-face looks uneven    A-arms unable to move or move unevenly    S-speech slurred or non-existent    T-time-call 911 as soon as signs and symptoms begin-DO NOT go       Back to bed or wait to see if you get better-TIME IS BRAIN. Warning Signs of HEART ATTACK     Call 911 if you have these symptoms:   Chest discomfort.  Most heart attacks involve discomfort in the center of the chest that lasts more than a few minutes, or that goes away and comes back. It can feel like uncomfortable pressure, squeezing, fullness, or pain.  Discomfort in other areas of the upper body. Symptoms can include pain or discomfort in one or both arms, the back, neck, jaw, or stomach.  Shortness of breath with or without chest discomfort.  Other signs may include breaking out in a cold sweat, nausea, or lightheadedness. Don't wait more than five minutes to call 911 - MINUTES MATTER! Fast action can save your life. Calling 911 is almost always the fastest way to get lifesaving treatment. Emergency Medical Services staff can begin treatment when they arrive -- up to an hour sooner than if someone gets to the hospital by car. The discharge information has been reviewed with the patient. The patient verbalized understanding. Discharge medications reviewed with the patient and appropriate educational materials and side effects teaching were provided.   ___________________________________________________________________________________________________________________________________

## 2018-12-21 NOTE — PROGRESS NOTES
Additional diagnoses/clarification for documentation purposes:  1. Mild protein calorie malnutrition  2. Metastatic GYN malignancy to multiple sites:   1. Lung   2. Spleen (hilum)   3. Iliopsoas muscles and hernial cavities   4.   Pelvic sidewalls, retroperitoneal regions and supraclavicular region

## 2019-01-01 ENCOUNTER — APPOINTMENT (OUTPATIENT)
Dept: INFUSION THERAPY | Age: 69
End: 2019-01-01
Payer: COMMERCIAL

## 2019-01-01 ENCOUNTER — APPOINTMENT (OUTPATIENT)
Dept: GENERAL RADIOLOGY | Age: 69
End: 2019-01-01
Attending: UROLOGY
Payer: COMMERCIAL

## 2019-01-01 ENCOUNTER — HOSPITAL ENCOUNTER (OUTPATIENT)
Dept: INFUSION THERAPY | Age: 69
Discharge: HOME | End: 2019-01-30
Payer: COMMERCIAL

## 2019-01-01 ENCOUNTER — OFFICE VISIT (OUTPATIENT)
Dept: ONCOLOGY | Age: 69
End: 2019-01-01

## 2019-01-01 ENCOUNTER — HOSPITAL ENCOUNTER (OUTPATIENT)
Dept: INFUSION THERAPY | Age: 69
Discharge: HOME | End: 2019-01-31
Payer: COMMERCIAL

## 2019-01-01 ENCOUNTER — HOME CARE VISIT (OUTPATIENT)
Dept: SCHEDULING | Facility: HOME HEALTH | Age: 69
End: 2019-01-01
Payer: COMMERCIAL

## 2019-01-01 ENCOUNTER — HOSPITAL ENCOUNTER (OUTPATIENT)
Dept: INFUSION THERAPY | Age: 69
Discharge: HOME | End: 2019-04-22
Payer: COMMERCIAL

## 2019-01-01 ENCOUNTER — HOSPITAL ENCOUNTER (OUTPATIENT)
Dept: INFUSION THERAPY | Age: 69
End: 2019-01-01
Payer: COMMERCIAL

## 2019-01-01 ENCOUNTER — HOSPITAL ENCOUNTER (OUTPATIENT)
Dept: CT IMAGING | Age: 69
Discharge: HOME | End: 2019-06-19
Attending: COLON & RECTAL SURGERY
Payer: COMMERCIAL

## 2019-01-01 ENCOUNTER — HOSPITAL ENCOUNTER (OUTPATIENT)
Dept: INFUSION THERAPY | Age: 69
Discharge: HOME | End: 2019-03-23
Payer: COMMERCIAL

## 2019-01-01 ENCOUNTER — APPOINTMENT (OUTPATIENT)
Dept: VASCULAR SURGERY | Age: 69
DRG: 808 | End: 2019-01-01
Attending: PHYSICIAN ASSISTANT
Payer: COMMERCIAL

## 2019-01-01 ENCOUNTER — HOSPITAL ENCOUNTER (OUTPATIENT)
Dept: INFUSION THERAPY | Age: 69
Discharge: HOME | End: 2019-04-27
Payer: COMMERCIAL

## 2019-01-01 ENCOUNTER — HOSPITAL ENCOUNTER (OUTPATIENT)
Dept: INFUSION THERAPY | Age: 69
Discharge: HOME | End: 2019-04-12
Payer: COMMERCIAL

## 2019-01-01 ENCOUNTER — HOSPITAL ENCOUNTER (OUTPATIENT)
Dept: INFUSION THERAPY | Age: 69
Discharge: HOME | End: 2019-02-19
Payer: COMMERCIAL

## 2019-01-01 ENCOUNTER — HOSPITAL ENCOUNTER (INPATIENT)
Age: 69
LOS: 3 days | Discharge: HOME | DRG: 660 | End: 2019-09-20
Attending: EMERGENCY MEDICINE | Admitting: HOSPITALIST
Payer: COMMERCIAL

## 2019-01-01 ENCOUNTER — HOSPITAL ENCOUNTER (OUTPATIENT)
Dept: INFUSION THERAPY | Age: 69
Discharge: HOME | End: 2019-10-01
Payer: COMMERCIAL

## 2019-01-01 ENCOUNTER — HOSPITAL ENCOUNTER (OUTPATIENT)
Dept: INTERVENTIONAL RADIOLOGY/VASCULAR | Age: 69
Discharge: HOME | End: 2019-07-11
Attending: COLON & RECTAL SURGERY | Admitting: RADIOLOGY
Payer: COMMERCIAL

## 2019-01-01 ENCOUNTER — DOCUMENTATION ONLY (OUTPATIENT)
Dept: ONCOLOGY | Age: 69
End: 2019-01-01

## 2019-01-01 ENCOUNTER — HOSPITAL ENCOUNTER (OUTPATIENT)
Dept: INFUSION THERAPY | Age: 69
Discharge: HOME | End: 2019-08-19
Payer: COMMERCIAL

## 2019-01-01 ENCOUNTER — HOSPITAL ENCOUNTER (OUTPATIENT)
Dept: INFUSION THERAPY | Age: 69
Discharge: HOME | End: 2019-04-04
Payer: COMMERCIAL

## 2019-01-01 ENCOUNTER — HOSPITAL ENCOUNTER (OUTPATIENT)
Dept: INFUSION THERAPY | Age: 69
Discharge: HOME | End: 2019-03-25
Payer: COMMERCIAL

## 2019-01-01 ENCOUNTER — TELEPHONE (OUTPATIENT)
Dept: ONCOLOGY | Age: 69
End: 2019-01-01

## 2019-01-01 ENCOUNTER — HOSPITAL ENCOUNTER (OUTPATIENT)
Dept: INFUSION THERAPY | Age: 69
Discharge: HOME | End: 2019-04-03
Payer: COMMERCIAL

## 2019-01-01 ENCOUNTER — HOSPITAL ENCOUNTER (OUTPATIENT)
Dept: INFUSION THERAPY | Age: 69
Discharge: HOME | End: 2019-02-20
Payer: COMMERCIAL

## 2019-01-01 ENCOUNTER — ANESTHESIA EVENT (OUTPATIENT)
Dept: SURGERY | Age: 69
DRG: 660 | End: 2019-01-01
Payer: COMMERCIAL

## 2019-01-01 ENCOUNTER — HOME HEALTH ADMISSION (OUTPATIENT)
Dept: HOME HEALTH SERVICES | Facility: HOME HEALTH | Age: 69
End: 2019-01-01
Payer: COMMERCIAL

## 2019-01-01 ENCOUNTER — HOSPITAL ENCOUNTER (OUTPATIENT)
Dept: INFUSION THERAPY | Age: 69
Discharge: HOME | End: 2019-04-13
Payer: COMMERCIAL

## 2019-01-01 ENCOUNTER — HOSPITAL ENCOUNTER (OUTPATIENT)
Dept: INFUSION THERAPY | Age: 69
Discharge: HOME | End: 2019-04-01
Payer: COMMERCIAL

## 2019-01-01 ENCOUNTER — HOSPITAL ENCOUNTER (OUTPATIENT)
Dept: INFUSION THERAPY | Age: 69
Discharge: HOME | End: 2019-02-22
Payer: COMMERCIAL

## 2019-01-01 ENCOUNTER — HOSPITAL ENCOUNTER (OUTPATIENT)
Dept: INFUSION THERAPY | Age: 69
Discharge: HOME | End: 2019-04-15
Payer: COMMERCIAL

## 2019-01-01 ENCOUNTER — HOSPITAL ENCOUNTER (OUTPATIENT)
Dept: INFUSION THERAPY | Age: 69
End: 2019-01-01

## 2019-01-01 ENCOUNTER — HOME CARE VISIT (OUTPATIENT)
Dept: HOME HEALTH SERVICES | Facility: HOME HEALTH | Age: 69
End: 2019-01-01
Payer: COMMERCIAL

## 2019-01-01 ENCOUNTER — HOSPITAL ENCOUNTER (OUTPATIENT)
Dept: INFUSION THERAPY | Age: 69
Discharge: HOME | End: 2019-04-16
Payer: COMMERCIAL

## 2019-01-01 ENCOUNTER — HOSPITAL ENCOUNTER (OUTPATIENT)
Dept: INFUSION THERAPY | Age: 69
Discharge: HOME | End: 2019-04-06
Payer: COMMERCIAL

## 2019-01-01 ENCOUNTER — APPOINTMENT (OUTPATIENT)
Dept: CT IMAGING | Age: 69
DRG: 871 | End: 2019-01-01
Attending: PHYSICIAN ASSISTANT
Payer: COMMERCIAL

## 2019-01-01 ENCOUNTER — HOSPITAL ENCOUNTER (OUTPATIENT)
Dept: INFUSION THERAPY | Age: 69
Discharge: HOME | End: 2019-03-13
Payer: COMMERCIAL

## 2019-01-01 ENCOUNTER — ANESTHESIA (OUTPATIENT)
Dept: SURGERY | Age: 69
DRG: 660 | End: 2019-01-01
Payer: COMMERCIAL

## 2019-01-01 ENCOUNTER — HOSPITAL ENCOUNTER (OUTPATIENT)
Dept: INFUSION THERAPY | Age: 69
Discharge: HOME | End: 2019-08-02
Payer: COMMERCIAL

## 2019-01-01 ENCOUNTER — HOSPITAL ENCOUNTER (OUTPATIENT)
Dept: INFUSION THERAPY | Age: 69
Discharge: HOME | End: 2019-06-13
Payer: COMMERCIAL

## 2019-01-01 ENCOUNTER — HOSPITAL ENCOUNTER (OUTPATIENT)
Dept: INFUSION THERAPY | Age: 69
Discharge: HOME | End: 2019-04-08
Payer: COMMERCIAL

## 2019-01-01 ENCOUNTER — OFFICE VISIT (OUTPATIENT)
Dept: SURGERY | Age: 69
End: 2019-01-01

## 2019-01-01 ENCOUNTER — HOSPITAL ENCOUNTER (OUTPATIENT)
Dept: INFUSION THERAPY | Age: 69
Discharge: HOME | End: 2019-02-14
Payer: COMMERCIAL

## 2019-01-01 ENCOUNTER — HOSPITAL ENCOUNTER (OUTPATIENT)
Dept: INFUSION THERAPY | Age: 69
Discharge: HOME | End: 2019-02-18
Payer: COMMERCIAL

## 2019-01-01 ENCOUNTER — HOSPITAL ENCOUNTER (OUTPATIENT)
Dept: INFUSION THERAPY | Age: 69
Discharge: HOME | End: 2019-10-11
Payer: COMMERCIAL

## 2019-01-01 ENCOUNTER — APPOINTMENT (OUTPATIENT)
Dept: VASCULAR SURGERY | Age: 69
DRG: 660 | End: 2019-01-01
Attending: EMERGENCY MEDICINE
Payer: COMMERCIAL

## 2019-01-01 ENCOUNTER — HOSPITAL ENCOUNTER (OUTPATIENT)
Dept: INFUSION THERAPY | Age: 69
Discharge: HOME | End: 2019-09-09
Payer: COMMERCIAL

## 2019-01-01 ENCOUNTER — HOSPITAL ENCOUNTER (OUTPATIENT)
Dept: INFUSION THERAPY | Age: 69
Discharge: HOME | End: 2019-02-04
Payer: COMMERCIAL

## 2019-01-01 ENCOUNTER — HOSPITAL ENCOUNTER (OUTPATIENT)
Dept: INFUSION THERAPY | Age: 69
Discharge: HOME | End: 2019-07-29
Payer: COMMERCIAL

## 2019-01-01 ENCOUNTER — HOSPITAL ENCOUNTER (OUTPATIENT)
Dept: INFUSION THERAPY | Age: 69
Discharge: HOME | End: 2019-04-19
Payer: COMMERCIAL

## 2019-01-01 ENCOUNTER — HOSPITAL ENCOUNTER (OUTPATIENT)
Dept: INFUSION THERAPY | Age: 69
Discharge: HOME | End: 2019-04-02
Payer: COMMERCIAL

## 2019-01-01 ENCOUNTER — ANESTHESIA (OUTPATIENT)
Dept: SURGERY | Age: 69
End: 2019-01-01
Payer: COMMERCIAL

## 2019-01-01 ENCOUNTER — HOSPITAL ENCOUNTER (OUTPATIENT)
Dept: CT IMAGING | Age: 69
Discharge: HOME | End: 2019-03-05
Attending: UROLOGY
Payer: COMMERCIAL

## 2019-01-01 ENCOUNTER — APPOINTMENT (OUTPATIENT)
Dept: INFUSION THERAPY | Age: 69
End: 2019-01-01

## 2019-01-01 ENCOUNTER — HOSPITAL ENCOUNTER (OUTPATIENT)
Dept: INFUSION THERAPY | Age: 69
Discharge: HOME | End: 2019-02-25
Payer: COMMERCIAL

## 2019-01-01 ENCOUNTER — HOSPITAL ENCOUNTER (OUTPATIENT)
Dept: INFUSION THERAPY | Age: 69
Discharge: HOME | End: 2019-03-19
Payer: COMMERCIAL

## 2019-01-01 ENCOUNTER — HOSPITAL ENCOUNTER (OUTPATIENT)
Dept: INFUSION THERAPY | Age: 69
Discharge: HOME | End: 2019-04-21
Payer: COMMERCIAL

## 2019-01-01 ENCOUNTER — DOCUMENTATION ONLY (OUTPATIENT)
Dept: SURGERY | Age: 69
End: 2019-01-01

## 2019-01-01 ENCOUNTER — HOSPITAL ENCOUNTER (OUTPATIENT)
Dept: INFUSION THERAPY | Age: 69
Discharge: HOME | End: 2019-01-29
Payer: COMMERCIAL

## 2019-01-01 ENCOUNTER — ANESTHESIA EVENT (OUTPATIENT)
Dept: CARDIOTHORACIC SURGERY | Age: 69
End: 2019-01-01

## 2019-01-01 ENCOUNTER — HOSPITAL ENCOUNTER (OUTPATIENT)
Dept: INFUSION THERAPY | Age: 69
Discharge: HOME | End: 2019-03-31
Payer: COMMERCIAL

## 2019-01-01 ENCOUNTER — HOSPITAL ENCOUNTER (OUTPATIENT)
Dept: INFUSION THERAPY | Age: 69
Discharge: HOME | End: 2019-03-29
Payer: COMMERCIAL

## 2019-01-01 ENCOUNTER — HOSPITAL ENCOUNTER (OUTPATIENT)
Dept: INFUSION THERAPY | Age: 69
Discharge: HOME | End: 2019-07-31
Payer: COMMERCIAL

## 2019-01-01 ENCOUNTER — HOSPITAL ENCOUNTER (OUTPATIENT)
Dept: INFUSION THERAPY | Age: 69
Discharge: HOME | End: 2019-09-10
Payer: COMMERCIAL

## 2019-01-01 ENCOUNTER — HOSPITAL ENCOUNTER (OUTPATIENT)
Dept: INTERVENTIONAL RADIOLOGY/VASCULAR | Age: 69
Discharge: HOME | End: 2019-03-29
Attending: COLON & RECTAL SURGERY | Admitting: RADIOLOGY
Payer: COMMERCIAL

## 2019-01-01 ENCOUNTER — HOSPITAL ENCOUNTER (OUTPATIENT)
Dept: INFUSION THERAPY | Age: 69
Discharge: HOME | End: 2019-10-07
Payer: COMMERCIAL

## 2019-01-01 ENCOUNTER — HOSPITAL ENCOUNTER (OUTPATIENT)
Dept: INFUSION THERAPY | Age: 69
Discharge: HOME | End: 2019-07-12
Payer: COMMERCIAL

## 2019-01-01 ENCOUNTER — ANESTHESIA EVENT (OUTPATIENT)
Dept: SURGERY | Age: 69
End: 2019-01-01
Payer: COMMERCIAL

## 2019-01-01 ENCOUNTER — HOSPITAL ENCOUNTER (OUTPATIENT)
Dept: INFUSION THERAPY | Age: 69
Discharge: HOME | End: 2019-04-11
Payer: COMMERCIAL

## 2019-01-01 ENCOUNTER — HOSPITAL ENCOUNTER (OUTPATIENT)
Dept: INFUSION THERAPY | Age: 69
Discharge: HOME | End: 2019-07-30
Payer: COMMERCIAL

## 2019-01-01 ENCOUNTER — HOSPITAL ENCOUNTER (OUTPATIENT)
Dept: INFUSION THERAPY | Age: 69
Discharge: HOME | End: 2019-02-21
Payer: COMMERCIAL

## 2019-01-01 ENCOUNTER — HOSPITAL ENCOUNTER (OUTPATIENT)
Dept: INFUSION THERAPY | Age: 69
Discharge: HOME | End: 2019-04-29
Payer: COMMERCIAL

## 2019-01-01 ENCOUNTER — HOSPITAL ENCOUNTER (OUTPATIENT)
Dept: CT IMAGING | Age: 69
Discharge: HOME | End: 2019-04-05
Attending: COLON & RECTAL SURGERY
Payer: COMMERCIAL

## 2019-01-01 ENCOUNTER — APPOINTMENT (OUTPATIENT)
Dept: CT IMAGING | Age: 69
DRG: 871 | End: 2019-01-01
Attending: INTERNAL MEDICINE
Payer: COMMERCIAL

## 2019-01-01 ENCOUNTER — HOSPITAL ENCOUNTER (OUTPATIENT)
Dept: INFUSION THERAPY | Age: 69
Discharge: HOME | End: 2019-08-01
Payer: COMMERCIAL

## 2019-01-01 ENCOUNTER — ANESTHESIA (OUTPATIENT)
Dept: CARDIOTHORACIC SURGERY | Age: 69
End: 2019-01-01

## 2019-01-01 ENCOUNTER — HOSPITAL ENCOUNTER (OUTPATIENT)
Age: 69
Discharge: HOME | End: 2019-04-16
Attending: UROLOGY | Admitting: UROLOGY
Payer: COMMERCIAL

## 2019-01-01 ENCOUNTER — HOSPITAL ENCOUNTER (OUTPATIENT)
Dept: INFUSION THERAPY | Age: 69
Discharge: HOME | End: 2019-08-20
Payer: COMMERCIAL

## 2019-01-01 ENCOUNTER — HOSPITAL ENCOUNTER (OUTPATIENT)
Dept: INFUSION THERAPY | Age: 69
Discharge: HOME | End: 2019-03-18
Payer: COMMERCIAL

## 2019-01-01 ENCOUNTER — HOSPITAL ENCOUNTER (OUTPATIENT)
Dept: INFUSION THERAPY | Age: 69
Discharge: HOME | End: 2019-02-01
Payer: COMMERCIAL

## 2019-01-01 ENCOUNTER — HOSPITAL ENCOUNTER (OUTPATIENT)
Dept: INFUSION THERAPY | Age: 69
Discharge: HOME | End: 2019-03-28
Payer: COMMERCIAL

## 2019-01-01 ENCOUNTER — HOSPITAL ENCOUNTER (OUTPATIENT)
Dept: INFUSION THERAPY | Age: 69
Discharge: HOME | End: 2019-10-10
Payer: COMMERCIAL

## 2019-01-01 ENCOUNTER — HOSPITAL ENCOUNTER (OUTPATIENT)
Dept: INFUSION THERAPY | Age: 69
Discharge: HOME | End: 2019-03-21
Payer: COMMERCIAL

## 2019-01-01 ENCOUNTER — HOSPITAL ENCOUNTER (INPATIENT)
Dept: INTERVENTIONAL RADIOLOGY/VASCULAR | Age: 69
LOS: 9 days | Discharge: HOME HEALTH | DRG: 871 | End: 2019-03-16
Attending: UROLOGY | Admitting: INTERNAL MEDICINE
Payer: COMMERCIAL

## 2019-01-01 ENCOUNTER — HOSPITAL ENCOUNTER (OUTPATIENT)
Dept: CT IMAGING | Age: 69
Discharge: HOME | End: 2019-09-13
Attending: UROLOGY
Payer: COMMERCIAL

## 2019-01-01 ENCOUNTER — HOSPITAL ENCOUNTER (OUTPATIENT)
Dept: INFUSION THERAPY | Age: 69
Discharge: HOME | End: 2019-04-09
Payer: COMMERCIAL

## 2019-01-01 ENCOUNTER — HOSPITAL ENCOUNTER (OUTPATIENT)
Dept: INFUSION THERAPY | Age: 69
Discharge: HOME | End: 2019-04-26
Payer: COMMERCIAL

## 2019-01-01 ENCOUNTER — HOSPITAL ENCOUNTER (OUTPATIENT)
Dept: INFUSION THERAPY | Age: 69
Discharge: HOME | End: 2019-03-30
Payer: COMMERCIAL

## 2019-01-01 ENCOUNTER — HOSPITAL ENCOUNTER (OUTPATIENT)
Dept: INFUSION THERAPY | Age: 69
Discharge: HOME | End: 2019-04-14
Payer: COMMERCIAL

## 2019-01-01 ENCOUNTER — APPOINTMENT (OUTPATIENT)
Dept: CT IMAGING | Age: 69
DRG: 372 | End: 2019-01-01
Attending: PHYSICIAN ASSISTANT
Payer: COMMERCIAL

## 2019-01-01 ENCOUNTER — HOSPITAL ENCOUNTER (OUTPATIENT)
Dept: INFUSION THERAPY | Age: 69
Discharge: HOME | End: 2019-01-28
Payer: COMMERCIAL

## 2019-01-01 ENCOUNTER — HOSPITAL ENCOUNTER (INPATIENT)
Age: 69
LOS: 4 days | Discharge: HOME HEALTH | DRG: 372 | End: 2019-06-24
Attending: COLON & RECTAL SURGERY | Admitting: COLON & RECTAL SURGERY
Payer: COMMERCIAL

## 2019-01-01 ENCOUNTER — HOSPITAL ENCOUNTER (OUTPATIENT)
Dept: INFUSION THERAPY | Age: 69
Discharge: HOME | End: 2019-03-22
Payer: COMMERCIAL

## 2019-01-01 ENCOUNTER — HOSPITAL ENCOUNTER (OUTPATIENT)
Dept: INFUSION THERAPY | Age: 69
Discharge: HOME | End: 2019-04-23
Payer: COMMERCIAL

## 2019-01-01 ENCOUNTER — APPOINTMENT (OUTPATIENT)
Dept: CT IMAGING | Age: 69
DRG: 808 | End: 2019-01-01
Attending: PHYSICIAN ASSISTANT
Payer: COMMERCIAL

## 2019-01-01 ENCOUNTER — HOSPICE ADMISSION (OUTPATIENT)
Dept: HOSPICE | Facility: HOSPICE | Age: 69
End: 2019-01-01

## 2019-01-01 ENCOUNTER — HOSPITAL ENCOUNTER (OUTPATIENT)
Dept: INFUSION THERAPY | Age: 69
Discharge: HOME | End: 2019-04-20
Payer: COMMERCIAL

## 2019-01-01 ENCOUNTER — HOSPITAL ENCOUNTER (OUTPATIENT)
Dept: INFUSION THERAPY | Age: 69
Discharge: HOME | End: 2019-04-30
Payer: COMMERCIAL

## 2019-01-01 ENCOUNTER — HOSPITAL ENCOUNTER (OUTPATIENT)
Dept: CT IMAGING | Age: 69
Discharge: HOME | End: 2019-05-07
Attending: COLON & RECTAL SURGERY
Payer: COMMERCIAL

## 2019-01-01 ENCOUNTER — HOSPITAL ENCOUNTER (OUTPATIENT)
Dept: INFUSION THERAPY | Age: 69
Discharge: HOME | End: 2019-04-24
Payer: COMMERCIAL

## 2019-01-01 ENCOUNTER — HOSPITAL ENCOUNTER (OUTPATIENT)
Dept: INFUSION THERAPY | Age: 69
Discharge: HOME | End: 2019-04-18
Payer: COMMERCIAL

## 2019-01-01 ENCOUNTER — HOSPITAL ENCOUNTER (OUTPATIENT)
Dept: INFUSION THERAPY | Age: 69
Discharge: HOME | End: 2019-08-22
Payer: COMMERCIAL

## 2019-01-01 ENCOUNTER — HOSPITAL ENCOUNTER (OUTPATIENT)
Dept: INFUSION THERAPY | Age: 69
Discharge: HOME | End: 2019-03-27
Payer: COMMERCIAL

## 2019-01-01 ENCOUNTER — HOSPITAL ENCOUNTER (OUTPATIENT)
Dept: INFUSION THERAPY | Age: 69
Discharge: HOME | End: 2019-04-25
Payer: COMMERCIAL

## 2019-01-01 ENCOUNTER — HOSPITAL ENCOUNTER (OUTPATIENT)
Dept: INFUSION THERAPY | Age: 69
Discharge: HOME | End: 2019-09-16
Payer: COMMERCIAL

## 2019-01-01 ENCOUNTER — HOSPITAL ENCOUNTER (OUTPATIENT)
Age: 69
Discharge: HOME | End: 2019-08-06
Attending: UROLOGY | Admitting: UROLOGY
Payer: COMMERCIAL

## 2019-01-01 ENCOUNTER — APPOINTMENT (OUTPATIENT)
Dept: GENERAL RADIOLOGY | Age: 69
DRG: 808 | End: 2019-01-01
Attending: EMERGENCY MEDICINE
Payer: COMMERCIAL

## 2019-01-01 ENCOUNTER — HOSPITAL ENCOUNTER (OUTPATIENT)
Dept: INFUSION THERAPY | Age: 69
Discharge: HOME | End: 2019-03-04
Payer: COMMERCIAL

## 2019-01-01 ENCOUNTER — HOSPITAL ENCOUNTER (OUTPATIENT)
Dept: INFUSION THERAPY | Age: 69
Discharge: HOME | End: 2019-04-10
Payer: COMMERCIAL

## 2019-01-01 ENCOUNTER — HOSPITAL ENCOUNTER (OUTPATIENT)
Dept: INFUSION THERAPY | Age: 69
Discharge: HOME | End: 2019-03-26
Payer: COMMERCIAL

## 2019-01-01 ENCOUNTER — HOSPITAL ENCOUNTER (OUTPATIENT)
Dept: INFUSION THERAPY | Age: 69
Discharge: HOME | End: 2019-08-21
Payer: COMMERCIAL

## 2019-01-01 ENCOUNTER — HOSPITAL ENCOUNTER (OUTPATIENT)
Dept: INFUSION THERAPY | Age: 69
Discharge: HOME | End: 2019-10-08
Payer: COMMERCIAL

## 2019-01-01 ENCOUNTER — HOSPITAL ENCOUNTER (INPATIENT)
Age: 69
LOS: 5 days | DRG: 808 | End: 2019-10-19
Attending: EMERGENCY MEDICINE | Admitting: INTERNAL MEDICINE
Payer: COMMERCIAL

## 2019-01-01 ENCOUNTER — CLINICAL SUPPORT (OUTPATIENT)
Dept: ONCOLOGY | Age: 69
End: 2019-01-01

## 2019-01-01 ENCOUNTER — HOSPITAL ENCOUNTER (OUTPATIENT)
Dept: INTERVENTIONAL RADIOLOGY/VASCULAR | Age: 69
Discharge: HOME | End: 2019-05-20
Attending: COLON & RECTAL SURGERY | Admitting: RADIOLOGY
Payer: COMMERCIAL

## 2019-01-01 ENCOUNTER — HOSPITAL ENCOUNTER (OUTPATIENT)
Dept: INFUSION THERAPY | Age: 69
Discharge: HOME | End: 2019-10-09
Payer: COMMERCIAL

## 2019-01-01 ENCOUNTER — APPOINTMENT (OUTPATIENT)
Dept: GENERAL RADIOLOGY | Age: 69
DRG: 871 | End: 2019-01-01
Attending: FAMILY MEDICINE
Payer: COMMERCIAL

## 2019-01-01 ENCOUNTER — HOSPITAL ENCOUNTER (OUTPATIENT)
Dept: INFUSION THERAPY | Age: 69
Discharge: HOME | End: 2019-03-20
Payer: COMMERCIAL

## 2019-01-01 ENCOUNTER — HOSPITAL ENCOUNTER (OUTPATIENT)
Dept: INFUSION THERAPY | Age: 69
Discharge: HOME | End: 2019-04-05
Payer: COMMERCIAL

## 2019-01-01 ENCOUNTER — APPOINTMENT (OUTPATIENT)
Dept: GENERAL RADIOLOGY | Age: 69
DRG: 660 | End: 2019-01-01
Attending: UROLOGY
Payer: COMMERCIAL

## 2019-01-01 ENCOUNTER — HOSPITAL ENCOUNTER (OUTPATIENT)
Dept: INFUSION THERAPY | Age: 69
Discharge: HOME | End: 2019-04-17
Payer: COMMERCIAL

## 2019-01-01 ENCOUNTER — HOSPITAL ENCOUNTER (OUTPATIENT)
Dept: INFUSION THERAPY | Age: 69
Discharge: HOME | End: 2019-03-14
Payer: COMMERCIAL

## 2019-01-01 ENCOUNTER — HOSPITAL ENCOUNTER (OUTPATIENT)
Dept: INFUSION THERAPY | Age: 69
Discharge: HOME | End: 2019-08-23
Payer: COMMERCIAL

## 2019-01-01 ENCOUNTER — HOSPITAL ENCOUNTER (OUTPATIENT)
Dept: INFUSION THERAPY | Age: 69
Discharge: HOME | End: 2019-03-24
Payer: COMMERCIAL

## 2019-01-01 VITALS
TEMPERATURE: 98.1 F | RESPIRATION RATE: 17 BRPM | OXYGEN SATURATION: 97 % | HEART RATE: 102 BPM | SYSTOLIC BLOOD PRESSURE: 119 MMHG | DIASTOLIC BLOOD PRESSURE: 78 MMHG

## 2019-01-01 VITALS
HEART RATE: 101 BPM | OXYGEN SATURATION: 99 % | DIASTOLIC BLOOD PRESSURE: 99 MMHG | TEMPERATURE: 98 F | SYSTOLIC BLOOD PRESSURE: 137 MMHG

## 2019-01-01 VITALS
HEART RATE: 100 BPM | DIASTOLIC BLOOD PRESSURE: 83 MMHG | BODY MASS INDEX: 22.81 KG/M2 | RESPIRATION RATE: 18 BRPM | RESPIRATION RATE: 18 BRPM | TEMPERATURE: 97.9 F | SYSTOLIC BLOOD PRESSURE: 133 MMHG | SYSTOLIC BLOOD PRESSURE: 138 MMHG | DIASTOLIC BLOOD PRESSURE: 83 MMHG | HEART RATE: 106 BPM | OXYGEN SATURATION: 98 % | TEMPERATURE: 98.3 F | OXYGEN SATURATION: 98 % | HEIGHT: 65 IN | WEIGHT: 136.9 LBS

## 2019-01-01 VITALS
RESPIRATION RATE: 16 BRPM | BODY MASS INDEX: 28.12 KG/M2 | HEART RATE: 100 BPM | DIASTOLIC BLOOD PRESSURE: 86 MMHG | WEIGHT: 169 LBS | SYSTOLIC BLOOD PRESSURE: 148 MMHG | OXYGEN SATURATION: 98 % | TEMPERATURE: 99 F

## 2019-01-01 VITALS
SYSTOLIC BLOOD PRESSURE: 108 MMHG | HEART RATE: 103 BPM | DIASTOLIC BLOOD PRESSURE: 80 MMHG | OXYGEN SATURATION: 98 % | TEMPERATURE: 97.5 F

## 2019-01-01 VITALS
DIASTOLIC BLOOD PRESSURE: 87 MMHG | HEART RATE: 97 BPM | SYSTOLIC BLOOD PRESSURE: 143 MMHG | TEMPERATURE: 100 F | OXYGEN SATURATION: 96 % | WEIGHT: 167 LBS | BODY MASS INDEX: 27.79 KG/M2 | RESPIRATION RATE: 17 BRPM

## 2019-01-01 VITALS
TEMPERATURE: 98.2 F | BODY MASS INDEX: 28.16 KG/M2 | WEIGHT: 169 LBS | HEART RATE: 126 BPM | OXYGEN SATURATION: 98 % | HEIGHT: 65 IN | RESPIRATION RATE: 18 BRPM

## 2019-01-01 VITALS
OXYGEN SATURATION: 100 % | SYSTOLIC BLOOD PRESSURE: 135 MMHG | RESPIRATION RATE: 18 BRPM | RESPIRATION RATE: 18 BRPM | SYSTOLIC BLOOD PRESSURE: 153 MMHG | TEMPERATURE: 97.6 F | TEMPERATURE: 97.7 F | HEART RATE: 76 BPM | OXYGEN SATURATION: 98 % | DIASTOLIC BLOOD PRESSURE: 86 MMHG | HEART RATE: 114 BPM | DIASTOLIC BLOOD PRESSURE: 91 MMHG

## 2019-01-01 VITALS
OXYGEN SATURATION: 100 % | SYSTOLIC BLOOD PRESSURE: 137 MMHG | HEART RATE: 115 BPM | BODY MASS INDEX: 27.88 KG/M2 | TEMPERATURE: 98.8 F | WEIGHT: 167.31 LBS | RESPIRATION RATE: 20 BRPM | HEIGHT: 65 IN | DIASTOLIC BLOOD PRESSURE: 88 MMHG

## 2019-01-01 VITALS
TEMPERATURE: 98.1 F | OXYGEN SATURATION: 100 % | DIASTOLIC BLOOD PRESSURE: 86 MMHG | RESPIRATION RATE: 18 BRPM | HEART RATE: 93 BPM | SYSTOLIC BLOOD PRESSURE: 137 MMHG

## 2019-01-01 VITALS
OXYGEN SATURATION: 97 % | HEIGHT: 65 IN | DIASTOLIC BLOOD PRESSURE: 46 MMHG | HEART RATE: 136 BPM | SYSTOLIC BLOOD PRESSURE: 79 MMHG | BODY MASS INDEX: 28.76 KG/M2 | RESPIRATION RATE: 11 BRPM | TEMPERATURE: 98.9 F | WEIGHT: 172.62 LBS

## 2019-01-01 VITALS
DIASTOLIC BLOOD PRESSURE: 72 MMHG | SYSTOLIC BLOOD PRESSURE: 127 MMHG | OXYGEN SATURATION: 99 % | HEART RATE: 72 BPM | RESPIRATION RATE: 18 BRPM | TEMPERATURE: 97.9 F

## 2019-01-01 VITALS
TEMPERATURE: 98.2 F | DIASTOLIC BLOOD PRESSURE: 81 MMHG | RESPIRATION RATE: 18 BRPM | HEART RATE: 82 BPM | OXYGEN SATURATION: 100 % | SYSTOLIC BLOOD PRESSURE: 138 MMHG

## 2019-01-01 VITALS
SYSTOLIC BLOOD PRESSURE: 123 MMHG | RESPIRATION RATE: 16 BRPM | TEMPERATURE: 97.6 F | DIASTOLIC BLOOD PRESSURE: 88 MMHG | HEART RATE: 104 BPM | OXYGEN SATURATION: 98 %

## 2019-01-01 VITALS
WEIGHT: 168.2 LBS | OXYGEN SATURATION: 96 % | SYSTOLIC BLOOD PRESSURE: 153 MMHG | RESPIRATION RATE: 16 BRPM | TEMPERATURE: 98.7 F | HEIGHT: 65 IN | HEART RATE: 103 BPM | DIASTOLIC BLOOD PRESSURE: 97 MMHG | BODY MASS INDEX: 28.02 KG/M2

## 2019-01-01 VITALS
RESPIRATION RATE: 18 BRPM | DIASTOLIC BLOOD PRESSURE: 60 MMHG | SYSTOLIC BLOOD PRESSURE: 129 MMHG | TEMPERATURE: 98.3 F | HEART RATE: 82 BPM

## 2019-01-01 VITALS
SYSTOLIC BLOOD PRESSURE: 122 MMHG | OXYGEN SATURATION: 98 % | HEIGHT: 65 IN | BODY MASS INDEX: 29.99 KG/M2 | DIASTOLIC BLOOD PRESSURE: 82 MMHG | TEMPERATURE: 98 F | HEART RATE: 110 BPM | RESPIRATION RATE: 18 BRPM | WEIGHT: 180 LBS

## 2019-01-01 VITALS
SYSTOLIC BLOOD PRESSURE: 128 MMHG | OXYGEN SATURATION: 98 % | HEART RATE: 108 BPM | DIASTOLIC BLOOD PRESSURE: 82 MMHG | RESPIRATION RATE: 18 BRPM | TEMPERATURE: 97.8 F

## 2019-01-01 VITALS
HEART RATE: 86 BPM | DIASTOLIC BLOOD PRESSURE: 76 MMHG | SYSTOLIC BLOOD PRESSURE: 116 MMHG | OXYGEN SATURATION: 97 % | RESPIRATION RATE: 18 BRPM | TEMPERATURE: 98.1 F

## 2019-01-01 VITALS
TEMPERATURE: 97.9 F | SYSTOLIC BLOOD PRESSURE: 150 MMHG | HEART RATE: 96 BPM | RESPIRATION RATE: 18 BRPM | OXYGEN SATURATION: 100 % | DIASTOLIC BLOOD PRESSURE: 84 MMHG

## 2019-01-01 VITALS
DIASTOLIC BLOOD PRESSURE: 86 MMHG | HEIGHT: 65 IN | WEIGHT: 183.8 LBS | RESPIRATION RATE: 18 BRPM | OXYGEN SATURATION: 99 % | DIASTOLIC BLOOD PRESSURE: 73 MMHG | TEMPERATURE: 98.2 F | HEART RATE: 97 BPM | SYSTOLIC BLOOD PRESSURE: 137 MMHG | OXYGEN SATURATION: 99 % | SYSTOLIC BLOOD PRESSURE: 107 MMHG | BODY MASS INDEX: 30.62 KG/M2 | TEMPERATURE: 98.3 F | RESPIRATION RATE: 18 BRPM | HEART RATE: 117 BPM

## 2019-01-01 VITALS
TEMPERATURE: 97.6 F | SYSTOLIC BLOOD PRESSURE: 118 MMHG | DIASTOLIC BLOOD PRESSURE: 70 MMHG | HEART RATE: 110 BPM | OXYGEN SATURATION: 97 % | RESPIRATION RATE: 18 BRPM

## 2019-01-01 VITALS
OXYGEN SATURATION: 97 % | RESPIRATION RATE: 16 BRPM | TEMPERATURE: 98.3 F | SYSTOLIC BLOOD PRESSURE: 128 MMHG | DIASTOLIC BLOOD PRESSURE: 84 MMHG | HEART RATE: 103 BPM

## 2019-01-01 VITALS
SYSTOLIC BLOOD PRESSURE: 114 MMHG | TEMPERATURE: 98.6 F | DIASTOLIC BLOOD PRESSURE: 76 MMHG | OXYGEN SATURATION: 98 % | HEART RATE: 80 BPM

## 2019-01-01 VITALS
DIASTOLIC BLOOD PRESSURE: 83 MMHG | HEART RATE: 100 BPM | SYSTOLIC BLOOD PRESSURE: 127 MMHG | OXYGEN SATURATION: 96 % | OXYGEN SATURATION: 97 % | TEMPERATURE: 97.7 F | RESPIRATION RATE: 18 BRPM | RESPIRATION RATE: 19 BRPM | DIASTOLIC BLOOD PRESSURE: 85 MMHG | SYSTOLIC BLOOD PRESSURE: 151 MMHG | HEART RATE: 104 BPM | TEMPERATURE: 98.3 F

## 2019-01-01 VITALS
RESPIRATION RATE: 16 BRPM | TEMPERATURE: 97.6 F | DIASTOLIC BLOOD PRESSURE: 89 MMHG | HEART RATE: 98 BPM | OXYGEN SATURATION: 100 % | SYSTOLIC BLOOD PRESSURE: 127 MMHG

## 2019-01-01 VITALS
TEMPERATURE: 98.6 F | RESPIRATION RATE: 16 BRPM | DIASTOLIC BLOOD PRESSURE: 72 MMHG | WEIGHT: 163.5 LBS | OXYGEN SATURATION: 100 % | HEIGHT: 65 IN | HEART RATE: 106 BPM | BODY MASS INDEX: 27.24 KG/M2 | SYSTOLIC BLOOD PRESSURE: 120 MMHG

## 2019-01-01 VITALS
WEIGHT: 184.5 LBS | HEIGHT: 65 IN | DIASTOLIC BLOOD PRESSURE: 80 MMHG | RESPIRATION RATE: 18 BRPM | SYSTOLIC BLOOD PRESSURE: 127 MMHG | BODY MASS INDEX: 30.74 KG/M2 | TEMPERATURE: 97.5 F | HEART RATE: 109 BPM | OXYGEN SATURATION: 99 %

## 2019-01-01 VITALS
OXYGEN SATURATION: 99 % | HEART RATE: 95 BPM | TEMPERATURE: 97.4 F | RESPIRATION RATE: 18 BRPM | SYSTOLIC BLOOD PRESSURE: 142 MMHG | DIASTOLIC BLOOD PRESSURE: 87 MMHG

## 2019-01-01 VITALS
SYSTOLIC BLOOD PRESSURE: 128 MMHG | RESPIRATION RATE: 18 BRPM | TEMPERATURE: 98 F | DIASTOLIC BLOOD PRESSURE: 83 MMHG | HEART RATE: 109 BPM | OXYGEN SATURATION: 96 %

## 2019-01-01 VITALS
HEART RATE: 115 BPM | DIASTOLIC BLOOD PRESSURE: 81 MMHG | OXYGEN SATURATION: 97 % | RESPIRATION RATE: 18 BRPM | TEMPERATURE: 98 F | SYSTOLIC BLOOD PRESSURE: 133 MMHG

## 2019-01-01 VITALS
DIASTOLIC BLOOD PRESSURE: 89 MMHG | HEART RATE: 107 BPM | RESPIRATION RATE: 18 BRPM | TEMPERATURE: 98.6 F | TEMPERATURE: 98.2 F | OXYGEN SATURATION: 96 % | SYSTOLIC BLOOD PRESSURE: 139 MMHG | OXYGEN SATURATION: 97 % | DIASTOLIC BLOOD PRESSURE: 87 MMHG | HEART RATE: 108 BPM | SYSTOLIC BLOOD PRESSURE: 122 MMHG | RESPIRATION RATE: 18 BRPM

## 2019-01-01 VITALS
RESPIRATION RATE: 18 BRPM | HEART RATE: 105 BPM | DIASTOLIC BLOOD PRESSURE: 86 MMHG | OXYGEN SATURATION: 98 % | SYSTOLIC BLOOD PRESSURE: 138 MMHG | TEMPERATURE: 98.4 F

## 2019-01-01 VITALS
OXYGEN SATURATION: 97 % | TEMPERATURE: 98.2 F | RESPIRATION RATE: 18 BRPM | DIASTOLIC BLOOD PRESSURE: 85 MMHG | SYSTOLIC BLOOD PRESSURE: 122 MMHG | HEART RATE: 110 BPM

## 2019-01-01 VITALS
TEMPERATURE: 98 F | DIASTOLIC BLOOD PRESSURE: 66 MMHG | HEART RATE: 69 BPM | SYSTOLIC BLOOD PRESSURE: 118 MMHG | RESPIRATION RATE: 17 BRPM | OXYGEN SATURATION: 97 %

## 2019-01-01 VITALS
OXYGEN SATURATION: 98 % | RESPIRATION RATE: 16 BRPM | HEART RATE: 85 BPM | DIASTOLIC BLOOD PRESSURE: 79 MMHG | TEMPERATURE: 98 F | SYSTOLIC BLOOD PRESSURE: 138 MMHG

## 2019-01-01 VITALS
TEMPERATURE: 97.4 F | RESPIRATION RATE: 18 BRPM | OXYGEN SATURATION: 98 % | HEART RATE: 112 BPM | DIASTOLIC BLOOD PRESSURE: 69 MMHG | SYSTOLIC BLOOD PRESSURE: 105 MMHG

## 2019-01-01 VITALS
SYSTOLIC BLOOD PRESSURE: 145 MMHG | OXYGEN SATURATION: 98 % | TEMPERATURE: 96.5 F | BODY MASS INDEX: 30.42 KG/M2 | RESPIRATION RATE: 16 BRPM | DIASTOLIC BLOOD PRESSURE: 76 MMHG | HEIGHT: 65 IN | HEART RATE: 65 BPM | WEIGHT: 182.6 LBS

## 2019-01-01 VITALS
HEIGHT: 65 IN | RESPIRATION RATE: 18 BRPM | WEIGHT: 167 LBS | DIASTOLIC BLOOD PRESSURE: 78 MMHG | TEMPERATURE: 98.5 F | SYSTOLIC BLOOD PRESSURE: 132 MMHG | OXYGEN SATURATION: 100 % | BODY MASS INDEX: 27.82 KG/M2 | HEART RATE: 98 BPM

## 2019-01-01 VITALS — HEART RATE: 117 BPM | RESPIRATION RATE: 16 BRPM | OXYGEN SATURATION: 98 % | TEMPERATURE: 98.3 F

## 2019-01-01 VITALS
HEIGHT: 65 IN | BODY MASS INDEX: 26.74 KG/M2 | TEMPERATURE: 98.6 F | SYSTOLIC BLOOD PRESSURE: 127 MMHG | RESPIRATION RATE: 20 BRPM | WEIGHT: 160.5 LBS | OXYGEN SATURATION: 100 % | DIASTOLIC BLOOD PRESSURE: 81 MMHG | HEART RATE: 111 BPM

## 2019-01-01 VITALS
SYSTOLIC BLOOD PRESSURE: 126 MMHG | TEMPERATURE: 97.6 F | WEIGHT: 167 LBS | HEART RATE: 102 BPM | RESPIRATION RATE: 16 BRPM | HEIGHT: 65 IN | DIASTOLIC BLOOD PRESSURE: 83 MMHG | OXYGEN SATURATION: 98 % | BODY MASS INDEX: 27.82 KG/M2

## 2019-01-01 VITALS
DIASTOLIC BLOOD PRESSURE: 82 MMHG | TEMPERATURE: 97.5 F | RESPIRATION RATE: 18 BRPM | OXYGEN SATURATION: 94 % | SYSTOLIC BLOOD PRESSURE: 127 MMHG | HEART RATE: 101 BPM

## 2019-01-01 VITALS
RESPIRATION RATE: 18 BRPM | BODY MASS INDEX: 31.11 KG/M2 | HEIGHT: 65 IN | SYSTOLIC BLOOD PRESSURE: 132 MMHG | WEIGHT: 186.7 LBS | DIASTOLIC BLOOD PRESSURE: 81 MMHG | OXYGEN SATURATION: 97 % | TEMPERATURE: 97.9 F | HEART RATE: 103 BPM

## 2019-01-01 VITALS
SYSTOLIC BLOOD PRESSURE: 112 MMHG | OXYGEN SATURATION: 98 % | RESPIRATION RATE: 18 BRPM | DIASTOLIC BLOOD PRESSURE: 74 MMHG | TEMPERATURE: 97.2 F | HEART RATE: 112 BPM

## 2019-01-01 VITALS
HEART RATE: 80 BPM | SYSTOLIC BLOOD PRESSURE: 142 MMHG | TEMPERATURE: 98 F | DIASTOLIC BLOOD PRESSURE: 84 MMHG | RESPIRATION RATE: 20 BRPM | OXYGEN SATURATION: 100 %

## 2019-01-01 VITALS
HEART RATE: 108 BPM | SYSTOLIC BLOOD PRESSURE: 124 MMHG | OXYGEN SATURATION: 96 % | RESPIRATION RATE: 18 BRPM | TEMPERATURE: 97.5 F | DIASTOLIC BLOOD PRESSURE: 82 MMHG

## 2019-01-01 VITALS
HEART RATE: 82 BPM | TEMPERATURE: 98.1 F | OXYGEN SATURATION: 97 % | RESPIRATION RATE: 17 BRPM | DIASTOLIC BLOOD PRESSURE: 78 MMHG | SYSTOLIC BLOOD PRESSURE: 130 MMHG

## 2019-01-01 VITALS
RESPIRATION RATE: 16 BRPM | BODY MASS INDEX: 27.46 KG/M2 | OXYGEN SATURATION: 100 % | DIASTOLIC BLOOD PRESSURE: 56 MMHG | SYSTOLIC BLOOD PRESSURE: 114 MMHG | WEIGHT: 164.8 LBS | TEMPERATURE: 98.6 F | HEART RATE: 93 BPM | HEIGHT: 65 IN

## 2019-01-01 VITALS
HEART RATE: 108 BPM | TEMPERATURE: 97.4 F | RESPIRATION RATE: 18 BRPM | OXYGEN SATURATION: 99 % | DIASTOLIC BLOOD PRESSURE: 86 MMHG | SYSTOLIC BLOOD PRESSURE: 133 MMHG

## 2019-01-01 VITALS
HEART RATE: 112 BPM | TEMPERATURE: 98.2 F | RESPIRATION RATE: 18 BRPM | SYSTOLIC BLOOD PRESSURE: 136 MMHG | DIASTOLIC BLOOD PRESSURE: 58 MMHG | OXYGEN SATURATION: 98 %

## 2019-01-01 VITALS
SYSTOLIC BLOOD PRESSURE: 128 MMHG | DIASTOLIC BLOOD PRESSURE: 80 MMHG | RESPIRATION RATE: 18 BRPM | OXYGEN SATURATION: 98 % | TEMPERATURE: 98 F | HEART RATE: 82 BPM

## 2019-01-01 VITALS
TEMPERATURE: 98.2 F | SYSTOLIC BLOOD PRESSURE: 122 MMHG | OXYGEN SATURATION: 98 % | DIASTOLIC BLOOD PRESSURE: 81 MMHG | HEART RATE: 110 BPM | RESPIRATION RATE: 18 BRPM

## 2019-01-01 VITALS
SYSTOLIC BLOOD PRESSURE: 114 MMHG | TEMPERATURE: 98.5 F | DIASTOLIC BLOOD PRESSURE: 69 MMHG | RESPIRATION RATE: 18 BRPM | HEART RATE: 94 BPM | OXYGEN SATURATION: 98 %

## 2019-01-01 VITALS — TEMPERATURE: 97.7 F | DIASTOLIC BLOOD PRESSURE: 70 MMHG | SYSTOLIC BLOOD PRESSURE: 118 MMHG | HEART RATE: 101 BPM

## 2019-01-01 VITALS
TEMPERATURE: 97.9 F | RESPIRATION RATE: 18 BRPM | OXYGEN SATURATION: 96 % | DIASTOLIC BLOOD PRESSURE: 80 MMHG | SYSTOLIC BLOOD PRESSURE: 126 MMHG | HEART RATE: 111 BPM

## 2019-01-01 VITALS
SYSTOLIC BLOOD PRESSURE: 128 MMHG | HEART RATE: 111 BPM | OXYGEN SATURATION: 98 % | DIASTOLIC BLOOD PRESSURE: 82 MMHG | RESPIRATION RATE: 18 BRPM | TEMPERATURE: 97.9 F

## 2019-01-01 VITALS
HEART RATE: 99 BPM | OXYGEN SATURATION: 99 % | RESPIRATION RATE: 18 BRPM | BODY MASS INDEX: 31.48 KG/M2 | DIASTOLIC BLOOD PRESSURE: 60 MMHG | DIASTOLIC BLOOD PRESSURE: 83 MMHG | WEIGHT: 189.2 LBS | SYSTOLIC BLOOD PRESSURE: 115 MMHG | SYSTOLIC BLOOD PRESSURE: 139 MMHG | TEMPERATURE: 97.8 F | RESPIRATION RATE: 18 BRPM | TEMPERATURE: 97.6 F | HEART RATE: 108 BPM

## 2019-01-01 VITALS
HEART RATE: 102 BPM | SYSTOLIC BLOOD PRESSURE: 133 MMHG | TEMPERATURE: 97.4 F | OXYGEN SATURATION: 98 % | DIASTOLIC BLOOD PRESSURE: 84 MMHG

## 2019-01-01 VITALS
RESPIRATION RATE: 18 BRPM | TEMPERATURE: 97.8 F | HEART RATE: 113 BPM | OXYGEN SATURATION: 97 % | DIASTOLIC BLOOD PRESSURE: 84 MMHG | SYSTOLIC BLOOD PRESSURE: 131 MMHG

## 2019-01-01 VITALS
DIASTOLIC BLOOD PRESSURE: 73 MMHG | SYSTOLIC BLOOD PRESSURE: 129 MMHG | TEMPERATURE: 97.9 F | RESPIRATION RATE: 18 BRPM | OXYGEN SATURATION: 98 % | HEART RATE: 115 BPM

## 2019-01-01 VITALS
SYSTOLIC BLOOD PRESSURE: 102 MMHG | OXYGEN SATURATION: 98 % | RESPIRATION RATE: 16 BRPM | BODY MASS INDEX: 30.71 KG/M2 | WEIGHT: 184.3 LBS | TEMPERATURE: 97.7 F | DIASTOLIC BLOOD PRESSURE: 62 MMHG | HEIGHT: 65 IN | HEART RATE: 95 BPM

## 2019-01-01 VITALS
TEMPERATURE: 97.5 F | OXYGEN SATURATION: 98 % | SYSTOLIC BLOOD PRESSURE: 133 MMHG | SYSTOLIC BLOOD PRESSURE: 142 MMHG | TEMPERATURE: 97.8 F | RESPIRATION RATE: 18 BRPM | DIASTOLIC BLOOD PRESSURE: 90 MMHG | OXYGEN SATURATION: 97 % | RESPIRATION RATE: 18 BRPM | HEART RATE: 87 BPM | HEART RATE: 100 BPM | DIASTOLIC BLOOD PRESSURE: 83 MMHG

## 2019-01-01 VITALS
OXYGEN SATURATION: 97 % | RESPIRATION RATE: 20 BRPM | DIASTOLIC BLOOD PRESSURE: 80 MMHG | SYSTOLIC BLOOD PRESSURE: 139 MMHG | TEMPERATURE: 98.3 F | HEART RATE: 102 BPM

## 2019-01-01 VITALS
HEART RATE: 110 BPM | OXYGEN SATURATION: 98 % | RESPIRATION RATE: 18 BRPM | SYSTOLIC BLOOD PRESSURE: 125 MMHG | TEMPERATURE: 98.3 F | DIASTOLIC BLOOD PRESSURE: 86 MMHG

## 2019-01-01 VITALS
RESPIRATION RATE: 18 BRPM | DIASTOLIC BLOOD PRESSURE: 85 MMHG | HEIGHT: 65 IN | TEMPERATURE: 97.2 F | BODY MASS INDEX: 31.32 KG/M2 | HEART RATE: 116 BPM | WEIGHT: 188 LBS | SYSTOLIC BLOOD PRESSURE: 140 MMHG | OXYGEN SATURATION: 100 %

## 2019-01-01 VITALS
SYSTOLIC BLOOD PRESSURE: 137 MMHG | DIASTOLIC BLOOD PRESSURE: 87 MMHG | HEART RATE: 101 BPM | RESPIRATION RATE: 16 BRPM | TEMPERATURE: 97.5 F | OXYGEN SATURATION: 98 %

## 2019-01-01 VITALS
RESPIRATION RATE: 16 BRPM | HEART RATE: 108 BPM | TEMPERATURE: 97.6 F | DIASTOLIC BLOOD PRESSURE: 83 MMHG | SYSTOLIC BLOOD PRESSURE: 124 MMHG

## 2019-01-01 VITALS — RESPIRATION RATE: 16 BRPM | HEART RATE: 99 BPM | TEMPERATURE: 98.3 F | OXYGEN SATURATION: 98 %

## 2019-01-01 VITALS
RESPIRATION RATE: 18 BRPM | HEIGHT: 65 IN | TEMPERATURE: 98.2 F | HEART RATE: 106 BPM | WEIGHT: 168 LBS | DIASTOLIC BLOOD PRESSURE: 76 MMHG | OXYGEN SATURATION: 97 % | SYSTOLIC BLOOD PRESSURE: 123 MMHG | BODY MASS INDEX: 27.99 KG/M2

## 2019-01-01 VITALS
HEART RATE: 107 BPM | SYSTOLIC BLOOD PRESSURE: 141 MMHG | TEMPERATURE: 98.6 F | OXYGEN SATURATION: 98 % | DIASTOLIC BLOOD PRESSURE: 88 MMHG

## 2019-01-01 VITALS
HEART RATE: 108 BPM | OXYGEN SATURATION: 96 % | TEMPERATURE: 98.9 F | SYSTOLIC BLOOD PRESSURE: 120 MMHG | DIASTOLIC BLOOD PRESSURE: 80 MMHG

## 2019-01-01 VITALS
OXYGEN SATURATION: 98 % | RESPIRATION RATE: 14 BRPM | TEMPERATURE: 96.8 F | HEART RATE: 108 BPM | SYSTOLIC BLOOD PRESSURE: 125 MMHG | DIASTOLIC BLOOD PRESSURE: 83 MMHG

## 2019-01-01 VITALS
SYSTOLIC BLOOD PRESSURE: 143 MMHG | HEART RATE: 111 BPM | RESPIRATION RATE: 18 BRPM | SYSTOLIC BLOOD PRESSURE: 133 MMHG | TEMPERATURE: 97.9 F | DIASTOLIC BLOOD PRESSURE: 83 MMHG | RESPIRATION RATE: 18 BRPM | OXYGEN SATURATION: 97 % | DIASTOLIC BLOOD PRESSURE: 85 MMHG | HEART RATE: 88 BPM | OXYGEN SATURATION: 98 % | TEMPERATURE: 97.5 F

## 2019-01-01 VITALS
HEART RATE: 92 BPM | DIASTOLIC BLOOD PRESSURE: 77 MMHG | SYSTOLIC BLOOD PRESSURE: 143 MMHG | TEMPERATURE: 97.8 F | RESPIRATION RATE: 18 BRPM | OXYGEN SATURATION: 99 %

## 2019-01-01 VITALS
SYSTOLIC BLOOD PRESSURE: 132 MMHG | BODY MASS INDEX: 26.02 KG/M2 | RESPIRATION RATE: 18 BRPM | TEMPERATURE: 98.3 F | HEART RATE: 117 BPM | DIASTOLIC BLOOD PRESSURE: 91 MMHG | WEIGHT: 156.2 LBS | DIASTOLIC BLOOD PRESSURE: 81 MMHG | OXYGEN SATURATION: 97 % | HEART RATE: 103 BPM | RESPIRATION RATE: 18 BRPM | HEIGHT: 65 IN | TEMPERATURE: 98.2 F | OXYGEN SATURATION: 98 % | SYSTOLIC BLOOD PRESSURE: 143 MMHG

## 2019-01-01 VITALS
RESPIRATION RATE: 18 BRPM | TEMPERATURE: 97.6 F | WEIGHT: 178 LBS | DIASTOLIC BLOOD PRESSURE: 78 MMHG | HEART RATE: 118 BPM | HEIGHT: 65 IN | BODY MASS INDEX: 29.66 KG/M2 | SYSTOLIC BLOOD PRESSURE: 125 MMHG

## 2019-01-01 VITALS
TEMPERATURE: 97.7 F | HEART RATE: 101 BPM | OXYGEN SATURATION: 98 % | SYSTOLIC BLOOD PRESSURE: 118 MMHG | DIASTOLIC BLOOD PRESSURE: 70 MMHG

## 2019-01-01 VITALS
OXYGEN SATURATION: 98 % | RESPIRATION RATE: 18 BRPM | SYSTOLIC BLOOD PRESSURE: 133 MMHG | DIASTOLIC BLOOD PRESSURE: 81 MMHG | TEMPERATURE: 98 F | HEART RATE: 97 BPM

## 2019-01-01 VITALS
TEMPERATURE: 98.1 F | DIASTOLIC BLOOD PRESSURE: 78 MMHG | OXYGEN SATURATION: 97 % | RESPIRATION RATE: 17 BRPM | SYSTOLIC BLOOD PRESSURE: 119 MMHG | HEART RATE: 102 BPM

## 2019-01-01 VITALS
HEART RATE: 87 BPM | TEMPERATURE: 98.3 F | RESPIRATION RATE: 18 BRPM | OXYGEN SATURATION: 97 % | DIASTOLIC BLOOD PRESSURE: 87 MMHG | SYSTOLIC BLOOD PRESSURE: 143 MMHG

## 2019-01-01 VITALS
OXYGEN SATURATION: 97 % | SYSTOLIC BLOOD PRESSURE: 119 MMHG | RESPIRATION RATE: 18 BRPM | DIASTOLIC BLOOD PRESSURE: 66 MMHG | HEART RATE: 110 BPM | TEMPERATURE: 97.3 F

## 2019-01-01 VITALS
OXYGEN SATURATION: 100 % | RESPIRATION RATE: 16 BRPM | HEART RATE: 74 BPM | SYSTOLIC BLOOD PRESSURE: 151 MMHG | DIASTOLIC BLOOD PRESSURE: 76 MMHG | TEMPERATURE: 97.8 F

## 2019-01-01 VITALS
HEART RATE: 106 BPM | HEIGHT: 65 IN | RESPIRATION RATE: 16 BRPM | OXYGEN SATURATION: 97 % | BODY MASS INDEX: 27.76 KG/M2 | TEMPERATURE: 98.1 F | WEIGHT: 166.6 LBS | DIASTOLIC BLOOD PRESSURE: 84 MMHG | SYSTOLIC BLOOD PRESSURE: 131 MMHG

## 2019-01-01 VITALS
TEMPERATURE: 97.8 F | RESPIRATION RATE: 18 BRPM | OXYGEN SATURATION: 100 % | DIASTOLIC BLOOD PRESSURE: 83 MMHG | HEART RATE: 75 BPM | SYSTOLIC BLOOD PRESSURE: 129 MMHG

## 2019-01-01 VITALS
SYSTOLIC BLOOD PRESSURE: 122 MMHG | OXYGEN SATURATION: 97 % | RESPIRATION RATE: 18 BRPM | TEMPERATURE: 98 F | DIASTOLIC BLOOD PRESSURE: 82 MMHG | HEART RATE: 105 BPM

## 2019-01-01 VITALS
RESPIRATION RATE: 16 BRPM | OXYGEN SATURATION: 98 % | TEMPERATURE: 98.6 F | DIASTOLIC BLOOD PRESSURE: 79 MMHG | HEART RATE: 98 BPM | SYSTOLIC BLOOD PRESSURE: 125 MMHG

## 2019-01-01 VITALS
SYSTOLIC BLOOD PRESSURE: 110 MMHG | BODY MASS INDEX: 31.33 KG/M2 | WEIGHT: 188.3 LBS | TEMPERATURE: 97.8 F | RESPIRATION RATE: 18 BRPM | HEART RATE: 91 BPM | DIASTOLIC BLOOD PRESSURE: 75 MMHG | OXYGEN SATURATION: 98 %

## 2019-01-01 VITALS
TEMPERATURE: 97.8 F | DIASTOLIC BLOOD PRESSURE: 81 MMHG | OXYGEN SATURATION: 97 % | RESPIRATION RATE: 20 BRPM | SYSTOLIC BLOOD PRESSURE: 117 MMHG | HEART RATE: 114 BPM

## 2019-01-01 VITALS
TEMPERATURE: 97 F | WEIGHT: 160.9 LBS | BODY MASS INDEX: 26.78 KG/M2 | SYSTOLIC BLOOD PRESSURE: 140 MMHG | DIASTOLIC BLOOD PRESSURE: 86 MMHG | HEART RATE: 120 BPM

## 2019-01-01 VITALS
DIASTOLIC BLOOD PRESSURE: 85 MMHG | TEMPERATURE: 97.9 F | HEART RATE: 111 BPM | SYSTOLIC BLOOD PRESSURE: 143 MMHG | RESPIRATION RATE: 18 BRPM | OXYGEN SATURATION: 97 %

## 2019-01-01 VITALS
WEIGHT: 156 LBS | HEART RATE: 83 BPM | TEMPERATURE: 97.2 F | RESPIRATION RATE: 15 BRPM | BODY MASS INDEX: 25.99 KG/M2 | SYSTOLIC BLOOD PRESSURE: 117 MMHG | HEIGHT: 65 IN | OXYGEN SATURATION: 100 % | DIASTOLIC BLOOD PRESSURE: 68 MMHG

## 2019-01-01 VITALS
RESPIRATION RATE: 18 BRPM | HEART RATE: 89 BPM | OXYGEN SATURATION: 98 % | DIASTOLIC BLOOD PRESSURE: 72 MMHG | TEMPERATURE: 97.8 F | SYSTOLIC BLOOD PRESSURE: 126 MMHG

## 2019-01-01 VITALS
SYSTOLIC BLOOD PRESSURE: 149 MMHG | WEIGHT: 163 LBS | RESPIRATION RATE: 16 BRPM | HEIGHT: 65 IN | HEART RATE: 113 BPM | DIASTOLIC BLOOD PRESSURE: 88 MMHG | OXYGEN SATURATION: 98 % | BODY MASS INDEX: 27.16 KG/M2 | TEMPERATURE: 98.2 F

## 2019-01-01 VITALS
RESPIRATION RATE: 18 BRPM | TEMPERATURE: 98 F | SYSTOLIC BLOOD PRESSURE: 132 MMHG | OXYGEN SATURATION: 98 % | DIASTOLIC BLOOD PRESSURE: 82 MMHG | HEART RATE: 99 BPM

## 2019-01-01 VITALS
TEMPERATURE: 97.1 F | RESPIRATION RATE: 16 BRPM | WEIGHT: 164 LBS | DIASTOLIC BLOOD PRESSURE: 77 MMHG | OXYGEN SATURATION: 100 % | BODY MASS INDEX: 27.32 KG/M2 | SYSTOLIC BLOOD PRESSURE: 137 MMHG | HEIGHT: 65 IN | HEART RATE: 85 BPM

## 2019-01-01 VITALS
TEMPERATURE: 97.9 F | SYSTOLIC BLOOD PRESSURE: 139 MMHG | OXYGEN SATURATION: 99 % | HEART RATE: 112 BPM | DIASTOLIC BLOOD PRESSURE: 81 MMHG | RESPIRATION RATE: 20 BRPM

## 2019-01-01 VITALS
HEART RATE: 88 BPM | TEMPERATURE: 97.4 F | RESPIRATION RATE: 18 BRPM | SYSTOLIC BLOOD PRESSURE: 120 MMHG | BODY MASS INDEX: 30.66 KG/M2 | DIASTOLIC BLOOD PRESSURE: 82 MMHG | OXYGEN SATURATION: 98 % | HEIGHT: 65 IN | WEIGHT: 184 LBS

## 2019-01-01 VITALS
DIASTOLIC BLOOD PRESSURE: 84 MMHG | HEART RATE: 119 BPM | RESPIRATION RATE: 18 BRPM | SYSTOLIC BLOOD PRESSURE: 137 MMHG | TEMPERATURE: 98.3 F | OXYGEN SATURATION: 97 %

## 2019-01-01 VITALS
TEMPERATURE: 97.4 F | DIASTOLIC BLOOD PRESSURE: 80 MMHG | SYSTOLIC BLOOD PRESSURE: 118 MMHG | OXYGEN SATURATION: 98 % | HEART RATE: 106 BPM

## 2019-01-01 VITALS
HEART RATE: 115 BPM | TEMPERATURE: 97.1 F | DIASTOLIC BLOOD PRESSURE: 68 MMHG | SYSTOLIC BLOOD PRESSURE: 116 MMHG | OXYGEN SATURATION: 98 % | RESPIRATION RATE: 17 BRPM

## 2019-01-01 VITALS
RESPIRATION RATE: 18 BRPM | OXYGEN SATURATION: 99 % | DIASTOLIC BLOOD PRESSURE: 81 MMHG | HEART RATE: 110 BPM | TEMPERATURE: 98 F | SYSTOLIC BLOOD PRESSURE: 126 MMHG

## 2019-01-01 VITALS
HEART RATE: 69 BPM | RESPIRATION RATE: 18 BRPM | DIASTOLIC BLOOD PRESSURE: 83 MMHG | TEMPERATURE: 97.6 F | OXYGEN SATURATION: 99 % | SYSTOLIC BLOOD PRESSURE: 129 MMHG

## 2019-01-01 VITALS
SYSTOLIC BLOOD PRESSURE: 125 MMHG | RESPIRATION RATE: 18 BRPM | OXYGEN SATURATION: 98 % | HEART RATE: 87 BPM | TEMPERATURE: 97.7 F | DIASTOLIC BLOOD PRESSURE: 77 MMHG

## 2019-01-01 VITALS
SYSTOLIC BLOOD PRESSURE: 124 MMHG | HEART RATE: 65 BPM | RESPIRATION RATE: 17 BRPM | DIASTOLIC BLOOD PRESSURE: 82 MMHG | OXYGEN SATURATION: 97 % | TEMPERATURE: 97.4 F

## 2019-01-01 VITALS
HEART RATE: 105 BPM | OXYGEN SATURATION: 96 % | SYSTOLIC BLOOD PRESSURE: 133 MMHG | DIASTOLIC BLOOD PRESSURE: 88 MMHG | TEMPERATURE: 98.7 F | RESPIRATION RATE: 18 BRPM

## 2019-01-01 VITALS
HEART RATE: 85 BPM | SYSTOLIC BLOOD PRESSURE: 135 MMHG | RESPIRATION RATE: 18 BRPM | DIASTOLIC BLOOD PRESSURE: 79 MMHG | TEMPERATURE: 98.1 F | OXYGEN SATURATION: 100 %

## 2019-01-01 VITALS
SYSTOLIC BLOOD PRESSURE: 132 MMHG | TEMPERATURE: 98.3 F | RESPIRATION RATE: 18 BRPM | DIASTOLIC BLOOD PRESSURE: 85 MMHG | OXYGEN SATURATION: 97 % | HEART RATE: 115 BPM

## 2019-01-01 VITALS
OXYGEN SATURATION: 100 % | DIASTOLIC BLOOD PRESSURE: 78 MMHG | RESPIRATION RATE: 18 BRPM | HEART RATE: 80 BPM | TEMPERATURE: 97.8 F | SYSTOLIC BLOOD PRESSURE: 125 MMHG

## 2019-01-01 VITALS
SYSTOLIC BLOOD PRESSURE: 117 MMHG | HEART RATE: 110 BPM | OXYGEN SATURATION: 96 % | TEMPERATURE: 97.4 F | RESPIRATION RATE: 18 BRPM | DIASTOLIC BLOOD PRESSURE: 80 MMHG

## 2019-01-01 VITALS
TEMPERATURE: 98.2 F | SYSTOLIC BLOOD PRESSURE: 139 MMHG | DIASTOLIC BLOOD PRESSURE: 84 MMHG | RESPIRATION RATE: 18 BRPM | OXYGEN SATURATION: 96 % | HEART RATE: 103 BPM

## 2019-01-01 VITALS
SYSTOLIC BLOOD PRESSURE: 146 MMHG | DIASTOLIC BLOOD PRESSURE: 87 MMHG | RESPIRATION RATE: 18 BRPM | TEMPERATURE: 97.9 F | HEART RATE: 91 BPM | OXYGEN SATURATION: 98 %

## 2019-01-01 DIAGNOSIS — C53.9 MALIGNANT NEOPLASM OF CERVIX, UNSPECIFIED SITE (HCC): Primary | ICD-10-CM

## 2019-01-01 DIAGNOSIS — C54.1 ENDOMETRIAL CANCER (HCC): Primary | ICD-10-CM

## 2019-01-01 DIAGNOSIS — Z51.5 HOSPICE CARE: ICD-10-CM

## 2019-01-01 DIAGNOSIS — N73.9 PELVIC ABSCESS IN FEMALE: ICD-10-CM

## 2019-01-01 DIAGNOSIS — K63.1 COLON PERFORATION (HCC): Primary | ICD-10-CM

## 2019-01-01 DIAGNOSIS — I82.409 DEEP VEIN THROMBOSIS (DVT) OF LOWER EXTREMITY, UNSPECIFIED CHRONICITY, UNSPECIFIED LATERALITY, UNSPECIFIED VEIN (HCC): ICD-10-CM

## 2019-01-01 DIAGNOSIS — B96.5 PSEUDOMONAS URINARY TRACT INFECTION: Primary | ICD-10-CM

## 2019-01-01 DIAGNOSIS — K63.1 COLON PERFORATION (HCC): ICD-10-CM

## 2019-01-01 DIAGNOSIS — N39.0 PSEUDOMONAS URINARY TRACT INFECTION: Primary | ICD-10-CM

## 2019-01-01 DIAGNOSIS — N13.30 HYDRONEPHROSIS, UNSPECIFIED HYDRONEPHROSIS TYPE: ICD-10-CM

## 2019-01-01 DIAGNOSIS — K68.19 RETROPERITONEAL ABSCESS (HCC): ICD-10-CM

## 2019-01-01 DIAGNOSIS — C53.0 MALIGNANT NEOPLASM OF ENDOCERVIX (HCC): ICD-10-CM

## 2019-01-01 DIAGNOSIS — D70.9 NEUTROPENIC FEVER (HCC): Primary | ICD-10-CM

## 2019-01-01 DIAGNOSIS — N17.9 AKI (ACUTE KIDNEY INJURY) (HCC): Primary | ICD-10-CM

## 2019-01-01 DIAGNOSIS — C54.1 ENDOMETRIAL CA (HCC): ICD-10-CM

## 2019-01-01 DIAGNOSIS — C53.1 MALIGNANT NEOPLASM OF EXOCERVIX (HCC): Primary | ICD-10-CM

## 2019-01-01 DIAGNOSIS — N13.30 BILATERAL HYDRONEPHROSIS: ICD-10-CM

## 2019-01-01 DIAGNOSIS — F41.9 ANXIETY: ICD-10-CM

## 2019-01-01 DIAGNOSIS — N17.9 ACUTE RENAL FAILURE, UNSPECIFIED ACUTE RENAL FAILURE TYPE (HCC): ICD-10-CM

## 2019-01-01 DIAGNOSIS — F19.982 DRUG-INDUCED INSOMNIA (HCC): ICD-10-CM

## 2019-01-01 DIAGNOSIS — R50.81 NEUTROPENIC FEVER (HCC): Primary | ICD-10-CM

## 2019-01-01 DIAGNOSIS — N39.0 URINARY TRACT INFECTION WITH HEMATURIA, SITE UNSPECIFIED: ICD-10-CM

## 2019-01-01 DIAGNOSIS — K68.19 RETROPERITONEAL ABSCESS (HCC): Primary | ICD-10-CM

## 2019-01-01 DIAGNOSIS — C54.1 ENDOMETRIAL CA (HCC): Primary | ICD-10-CM

## 2019-01-01 DIAGNOSIS — N13.30 HYDRONEPHROSIS: ICD-10-CM

## 2019-01-01 DIAGNOSIS — N13.39 OTHER HYDRONEPHROSIS: Primary | ICD-10-CM

## 2019-01-01 DIAGNOSIS — A41.9 SEPSIS, DUE TO UNSPECIFIED ORGANISM, UNSPECIFIED WHETHER ACUTE ORGAN DYSFUNCTION PRESENT (HCC): ICD-10-CM

## 2019-01-01 DIAGNOSIS — R31.9 URINARY TRACT INFECTION WITH HEMATURIA, SITE UNSPECIFIED: ICD-10-CM

## 2019-01-01 DIAGNOSIS — J18.9 PNEUMONIA OF BOTH LOWER LOBES DUE TO INFECTIOUS ORGANISM: ICD-10-CM

## 2019-01-01 DIAGNOSIS — E87.5 HYPERKALEMIA: ICD-10-CM

## 2019-01-01 LAB
ABO + RH BLD: NORMAL
ALBUMIN SERPL-MCNC: 1.8 G/DL (ref 3.4–5)
ALBUMIN SERPL-MCNC: 1.9 G/DL (ref 3.4–5)
ALBUMIN SERPL-MCNC: 2.4 G/DL (ref 3.4–5)
ALBUMIN SERPL-MCNC: 2.8 G/DL (ref 3.4–5)
ALBUMIN SERPL-MCNC: 2.8 G/DL (ref 3.4–5)
ALBUMIN SERPL-MCNC: 2.9 G/DL (ref 3.4–5)
ALBUMIN SERPL-MCNC: 2.9 G/DL (ref 3.4–5)
ALBUMIN SERPL-MCNC: 3 G/DL (ref 3.4–5)
ALBUMIN SERPL-MCNC: 3.1 G/DL (ref 3.4–5)
ALBUMIN SERPL-MCNC: 3.2 G/DL (ref 3.4–5)
ALBUMIN/GLOB SERPL: 0.4 {RATIO} (ref 0.8–1.7)
ALBUMIN/GLOB SERPL: 0.5 {RATIO} (ref 0.8–1.7)
ALBUMIN/GLOB SERPL: 0.6 {RATIO} (ref 0.8–1.7)
ALBUMIN/GLOB SERPL: 0.7 {RATIO} (ref 0.8–1.7)
ALBUMIN/GLOB SERPL: 0.7 {RATIO} (ref 0.8–1.7)
ALBUMIN/GLOB SERPL: 0.8 {RATIO} (ref 0.8–1.7)
ALBUMIN/GLOB SERPL: 0.9 {RATIO} (ref 0.8–1.7)
ALBUMIN/GLOB SERPL: 1 {RATIO} (ref 0.8–1.7)
ALP SERPL-CCNC: 112 U/L (ref 45–117)
ALP SERPL-CCNC: 113 U/L (ref 45–117)
ALP SERPL-CCNC: 122 U/L (ref 45–117)
ALP SERPL-CCNC: 125 U/L (ref 45–117)
ALP SERPL-CCNC: 127 U/L (ref 45–117)
ALP SERPL-CCNC: 142 U/L (ref 45–117)
ALP SERPL-CCNC: 164 U/L (ref 45–117)
ALP SERPL-CCNC: 197 U/L (ref 45–117)
ALP SERPL-CCNC: 201 U/L (ref 45–117)
ALP SERPL-CCNC: 280 U/L (ref 45–117)
ALP SERPL-CCNC: 287 U/L (ref 45–117)
ALP SERPL-CCNC: 532 U/L (ref 45–117)
ALP SERPL-CCNC: 83 U/L (ref 45–117)
ALT SERPL-CCNC: 11 U/L (ref 13–56)
ALT SERPL-CCNC: 13 U/L (ref 13–56)
ALT SERPL-CCNC: 14 U/L (ref 13–56)
ALT SERPL-CCNC: 14 U/L (ref 13–56)
ALT SERPL-CCNC: 17 U/L (ref 13–56)
ALT SERPL-CCNC: 17 U/L (ref 13–56)
ALT SERPL-CCNC: 20 U/L (ref 13–56)
ALT SERPL-CCNC: 21 U/L (ref 13–56)
ALT SERPL-CCNC: 22 U/L (ref 13–56)
ALT SERPL-CCNC: 23 U/L (ref 13–56)
ALT SERPL-CCNC: 28 U/L (ref 13–56)
ALT SERPL-CCNC: 32 U/L (ref 13–56)
ALT SERPL-CCNC: 42 U/L (ref 13–56)
AMMONIA PLAS-SCNC: 18 UMOL/L (ref 11–32)
AMORPH CRY URNS QL MICRO: ABNORMAL
ANION GAP BLD CALC-SCNC: 16 MMOL/L (ref 10–20)
ANION GAP BLD CALC-SCNC: 19 MMOL/L (ref 10–20)
ANION GAP SERPL CALC-SCNC: 10 MMOL/L (ref 3–18)
ANION GAP SERPL CALC-SCNC: 10 MMOL/L (ref 3–18)
ANION GAP SERPL CALC-SCNC: 11 MMOL/L (ref 3–18)
ANION GAP SERPL CALC-SCNC: 11 MMOL/L (ref 3–18)
ANION GAP SERPL CALC-SCNC: 13 MMOL/L (ref 3–18)
ANION GAP SERPL CALC-SCNC: 13 MMOL/L (ref 3–18)
ANION GAP SERPL CALC-SCNC: 4 MMOL/L (ref 3–18)
ANION GAP SERPL CALC-SCNC: 5 MMOL/L (ref 3–18)
ANION GAP SERPL CALC-SCNC: 6 MMOL/L (ref 3–18)
ANION GAP SERPL CALC-SCNC: 6 MMOL/L (ref 3–18)
ANION GAP SERPL CALC-SCNC: 7 MMOL/L (ref 3–18)
ANION GAP SERPL CALC-SCNC: 8 MMOL/L (ref 3–18)
ANION GAP SERPL CALC-SCNC: 9 MMOL/L (ref 3–18)
APPEARANCE UR: ABNORMAL
APPEARANCE UR: CLEAR
APTT PPP: 110.3 SEC (ref 23–36.4)
APTT PPP: 115.2 SEC (ref 23–36.4)
APTT PPP: 27.9 SEC (ref 23–36.4)
APTT PPP: 28.7 SEC (ref 23–36.4)
APTT PPP: 32.1 SEC (ref 23–36.4)
APTT PPP: 32.3 SEC (ref 23–36.4)
APTT PPP: 34.2 SEC (ref 23–36.4)
APTT PPP: 36 SEC (ref 23–36.4)
APTT PPP: 44.5 SEC (ref 23–36.4)
APTT PPP: 50.1 SEC (ref 23–36.4)
APTT PPP: 54.2 SEC (ref 23–36.4)
APTT PPP: 72.9 SEC (ref 23–36.4)
APTT PPP: >180 SEC (ref 23–36.4)
AST SERPL-CCNC: 12 U/L (ref 10–38)
AST SERPL-CCNC: 13 U/L (ref 10–38)
AST SERPL-CCNC: 15 U/L (ref 10–38)
AST SERPL-CCNC: 15 U/L (ref 15–37)
AST SERPL-CCNC: 16 U/L (ref 15–37)
AST SERPL-CCNC: 16 U/L (ref 15–37)
AST SERPL-CCNC: 18 U/L (ref 10–38)
AST SERPL-CCNC: 18 U/L (ref 15–37)
AST SERPL-CCNC: 19 U/L (ref 15–37)
AST SERPL-CCNC: 30 U/L (ref 10–38)
AST SERPL-CCNC: 31 U/L (ref 10–38)
AST SERPL-CCNC: 32 U/L (ref 10–38)
AST SERPL-CCNC: 34 U/L (ref 10–38)
ATRIAL RATE: 110 BPM
ATRIAL RATE: 127 BPM
ATRIAL RATE: 130 BPM
ATRIAL RATE: 141 BPM
ATRIAL RATE: 152 BPM
ATRIAL RATE: 157 BPM
ATRIAL RATE: 192 BPM
ATRIAL RATE: 202 BPM
ATRIAL RATE: 214 BPM
BACTERIA SPEC CULT: ABNORMAL
BACTERIA SPEC CULT: NORMAL
BACTERIA URNS QL MICRO: ABNORMAL /HPF
BACTERIA URNS QL MICRO: NEGATIVE /HPF
BASO+EOS+MONOS # BLD AUTO: 0.2 K/UL (ref 0–2.3)
BASO+EOS+MONOS # BLD AUTO: 0.6 K/UL (ref 0–2.3)
BASO+EOS+MONOS # BLD AUTO: 0.7 K/UL (ref 0–2.3)
BASO+EOS+MONOS # BLD AUTO: 0.7 K/UL (ref 0–2.3)
BASO+EOS+MONOS # BLD AUTO: 0.8 K/UL (ref 0–2.3)
BASO+EOS+MONOS # BLD AUTO: 1 K/UL (ref 0–2.3)
BASO+EOS+MONOS # BLD AUTO: 1.2 K/UL (ref 0–2.3)
BASO+EOS+MONOS # BLD AUTO: 1.2 K/UL (ref 0–2.3)
BASO+EOS+MONOS NFR BLD AUTO: 11 % (ref 0.1–17)
BASO+EOS+MONOS NFR BLD AUTO: 11 % (ref 0.1–17)
BASO+EOS+MONOS NFR BLD AUTO: 4 % (ref 0.1–17)
BASO+EOS+MONOS NFR BLD AUTO: 7 % (ref 0.1–17)
BASO+EOS+MONOS NFR BLD AUTO: 7 % (ref 0.1–17)
BASO+EOS+MONOS NFR BLD AUTO: 9 % (ref 0.1–17)
BASOPHILS # BLD: 0 K/UL (ref 0–0.06)
BASOPHILS # BLD: 0 K/UL (ref 0–0.1)
BASOPHILS # BLD: 0.1 K/UL (ref 0–0.06)
BASOPHILS # BLD: ABNORMAL K/UL (ref 0–0.1)
BASOPHILS # BLD: ABNORMAL K/UL (ref 0–0.1)
BASOPHILS NFR BLD: 0 % (ref 0–2)
BASOPHILS NFR BLD: 0 % (ref 0–3)
BASOPHILS NFR BLD: 1 % (ref 0–3)
BASOPHILS NFR BLD: ABNORMAL % (ref 0–2)
BILIRUB DIRECT SERPL-MCNC: 0.1 MG/DL (ref 0–0.2)
BILIRUB DIRECT SERPL-MCNC: 0.9 MG/DL (ref 0–0.2)
BILIRUB DIRECT SERPL-MCNC: <0.1 MG/DL (ref 0–0.2)
BILIRUB SERPL-MCNC: 0.2 MG/DL (ref 0.2–1)
BILIRUB SERPL-MCNC: 0.3 MG/DL (ref 0.2–1)
BILIRUB SERPL-MCNC: 0.4 MG/DL (ref 0.2–1)
BILIRUB SERPL-MCNC: 0.6 MG/DL (ref 0.2–1)
BILIRUB SERPL-MCNC: 1.3 MG/DL (ref 0.2–1)
BILIRUB UR QL: NEGATIVE
BLD PROD TYP BPU: NORMAL
BLOOD BANK CMNT PATIENT-IMP: NORMAL
BLOOD BANK CMNT PATIENT-IMP: NORMAL
BLOOD GROUP ANTIBODIES SERPL: NORMAL
BPU ID: NORMAL
BUN BLD-MCNC: 13 MG/DL (ref 7–18)
BUN BLD-MCNC: 15 MG/DL (ref 7–18)
BUN BLD-MCNC: 20 MG/DL (ref 7–18)
BUN BLD-MCNC: 30 MG/DL (ref 7–18)
BUN BLD-MCNC: 37 MG/DL (ref 7–18)
BUN BLD-MCNC: 9 MG/DL (ref 7–18)
BUN SERPL-MCNC: 10 MG/DL (ref 7–18)
BUN SERPL-MCNC: 10 MG/DL (ref 7–18)
BUN SERPL-MCNC: 106 MG/DL (ref 7–18)
BUN SERPL-MCNC: 109 MG/DL (ref 7–18)
BUN SERPL-MCNC: 109 MG/DL (ref 7–18)
BUN SERPL-MCNC: 112 MG/DL (ref 7–18)
BUN SERPL-MCNC: 116 MG/DL (ref 7–18)
BUN SERPL-MCNC: 12 MG/DL (ref 7–18)
BUN SERPL-MCNC: 12 MG/DL (ref 7–18)
BUN SERPL-MCNC: 13 MG/DL (ref 7–18)
BUN SERPL-MCNC: 14 MG/DL (ref 7–18)
BUN SERPL-MCNC: 14 MG/DL (ref 7–18)
BUN SERPL-MCNC: 19 MG/DL (ref 7–18)
BUN SERPL-MCNC: 20 MG/DL (ref 7–18)
BUN SERPL-MCNC: 20 MG/DL (ref 7–18)
BUN SERPL-MCNC: 28 MG/DL (ref 7–18)
BUN SERPL-MCNC: 30 MG/DL (ref 7–18)
BUN SERPL-MCNC: 30 MG/DL (ref 7–18)
BUN SERPL-MCNC: 33 MG/DL (ref 7–18)
BUN SERPL-MCNC: 35 MG/DL (ref 7–18)
BUN SERPL-MCNC: 36 MG/DL (ref 7–18)
BUN SERPL-MCNC: 7 MG/DL (ref 7–18)
BUN/CREAT SERPL: 10 (ref 12–20)
BUN/CREAT SERPL: 10 (ref 12–20)
BUN/CREAT SERPL: 11 (ref 12–20)
BUN/CREAT SERPL: 12 (ref 12–20)
BUN/CREAT SERPL: 13 (ref 12–20)
BUN/CREAT SERPL: 14 (ref 12–20)
BUN/CREAT SERPL: 15 (ref 12–20)
BUN/CREAT SERPL: 15 (ref 12–20)
BUN/CREAT SERPL: 16 (ref 12–20)
BUN/CREAT SERPL: 17 (ref 12–20)
BUN/CREAT SERPL: 18 (ref 12–20)
BUN/CREAT SERPL: 18 (ref 12–20)
BUN/CREAT SERPL: 19 (ref 12–20)
BUN/CREAT SERPL: 20 (ref 12–20)
BUN/CREAT SERPL: 20 (ref 12–20)
CA-I BLD-MCNC: 1.14 MMOL/L (ref 1.12–1.32)
CA-I BLD-MCNC: 1.16 MMOL/L (ref 1.12–1.32)
CA-I BLD-MCNC: 1.16 MMOL/L (ref 1.12–1.32)
CA-I BLD-MCNC: 1.17 MMOL/L (ref 1.12–1.32)
CA-I BLD-MCNC: 1.19 MMOL/L (ref 1.12–1.32)
CALCIUM SERPL-MCNC: 7.3 MG/DL (ref 8.5–10.1)
CALCIUM SERPL-MCNC: 7.3 MG/DL (ref 8.5–10.1)
CALCIUM SERPL-MCNC: 7.4 MG/DL (ref 8.5–10.1)
CALCIUM SERPL-MCNC: 7.7 MG/DL (ref 8.5–10.1)
CALCIUM SERPL-MCNC: 7.8 MG/DL (ref 8.5–10.1)
CALCIUM SERPL-MCNC: 8.1 MG/DL (ref 8.5–10.1)
CALCIUM SERPL-MCNC: 8.1 MG/DL (ref 8.5–10.1)
CALCIUM SERPL-MCNC: 8.2 MG/DL (ref 8.5–10.1)
CALCIUM SERPL-MCNC: 8.3 MG/DL (ref 8.5–10.1)
CALCIUM SERPL-MCNC: 8.4 MG/DL (ref 8.5–10.1)
CALCIUM SERPL-MCNC: 8.4 MG/DL (ref 8.5–10.1)
CALCIUM SERPL-MCNC: 8.7 MG/DL (ref 8.5–10.1)
CALCIUM SERPL-MCNC: 8.7 MG/DL (ref 8.5–10.1)
CALCIUM SERPL-MCNC: 8.8 MG/DL (ref 8.5–10.1)
CALCIUM SERPL-MCNC: 8.8 MG/DL (ref 8.5–10.1)
CALCIUM SERPL-MCNC: 9 MG/DL (ref 8.5–10.1)
CALCIUM SERPL-MCNC: 9 MG/DL (ref 8.5–10.1)
CALCIUM SERPL-MCNC: 9.2 MG/DL (ref 8.5–10.1)
CALCIUM SERPL-MCNC: 9.6 MG/DL (ref 8.5–10.1)
CALCULATED P AXIS, ECG09: 29 DEGREES
CALCULATED P AXIS, ECG09: 34 DEGREES
CALCULATED P AXIS, ECG09: 40 DEGREES
CALCULATED R AXIS, ECG10: -22 DEGREES
CALCULATED R AXIS, ECG10: -3 DEGREES
CALCULATED R AXIS, ECG10: -4 DEGREES
CALCULATED R AXIS, ECG10: -4 DEGREES
CALCULATED R AXIS, ECG10: -6 DEGREES
CALCULATED R AXIS, ECG10: -8 DEGREES
CALCULATED R AXIS, ECG10: 2 DEGREES
CALCULATED R AXIS, ECG10: 3 DEGREES
CALCULATED T AXIS, ECG11: 25 DEGREES
CALCULATED T AXIS, ECG11: 34 DEGREES
CALCULATED T AXIS, ECG11: 34 DEGREES
CALCULATED T AXIS, ECG11: 44 DEGREES
CALCULATED T AXIS, ECG11: 45 DEGREES
CALCULATED T AXIS, ECG11: 49 DEGREES
CALCULATED T AXIS, ECG11: 51 DEGREES
CALCULATED T AXIS, ECG11: 60 DEGREES
CALCULATED T AXIS, ECG11: 7 DEGREES
CALLED TO:,BCALL1: NORMAL
CALLED TO:,BCALL1: NORMAL
CALLED TO:,BCALL2: NORMAL
CANCER AG125 SERPL-ACNC: 100 U/ML (ref 1.5–35)
CANCER AG125 SERPL-ACNC: 113 U/ML (ref 1.5–35)
CANCER AG125 SERPL-ACNC: 149 U/ML (ref 1.5–35)
CANCER AG125 SERPL-ACNC: 175.7 U/ML (ref 0–38.1)
CANCER AG125 SERPL-ACNC: 223 U/ML (ref 1.5–35)
CANCER AG125 SERPL-ACNC: 8.7 U/ML (ref 0–38.1)
CANCER AG125 SERPL-ACNC: 99 U/ML (ref 1.5–35)
CHLORIDE BLD-SCNC: 102 MMOL/L (ref 100–108)
CHLORIDE BLD-SCNC: 104 MMOL/L (ref 100–108)
CHLORIDE BLD-SCNC: 107 MMOL/L (ref 100–108)
CHLORIDE SERPL-SCNC: 101 MMOL/L (ref 100–108)
CHLORIDE SERPL-SCNC: 101 MMOL/L (ref 100–111)
CHLORIDE SERPL-SCNC: 104 MMOL/L (ref 100–111)
CHLORIDE SERPL-SCNC: 105 MMOL/L (ref 100–108)
CHLORIDE SERPL-SCNC: 105 MMOL/L (ref 100–111)
CHLORIDE SERPL-SCNC: 106 MMOL/L (ref 100–108)
CHLORIDE SERPL-SCNC: 106 MMOL/L (ref 100–108)
CHLORIDE SERPL-SCNC: 106 MMOL/L (ref 100–111)
CHLORIDE SERPL-SCNC: 107 MMOL/L (ref 100–108)
CHLORIDE SERPL-SCNC: 107 MMOL/L (ref 100–111)
CHLORIDE SERPL-SCNC: 108 MMOL/L (ref 100–111)
CHLORIDE SERPL-SCNC: 110 MMOL/L (ref 100–108)
CHLORIDE SERPL-SCNC: 112 MMOL/L (ref 100–111)
CK MB CFR SERPL CALC: NORMAL % (ref 0–4)
CK MB CFR SERPL CALC: NORMAL % (ref 0–4)
CK MB SERPL-MCNC: <1 NG/ML (ref 5–25)
CK MB SERPL-MCNC: <1 NG/ML (ref 5–25)
CK SERPL-CCNC: 17 U/L (ref 26–192)
CK SERPL-CCNC: 22 U/L (ref 26–192)
CK SERPL-CCNC: 30 U/L (ref 26–192)
CK SERPL-CCNC: 34 U/L (ref 26–192)
CK SERPL-CCNC: 50 U/L (ref 26–192)
CO2 BLD-SCNC: 21 MMOL/L (ref 19–24)
CO2 BLD-SCNC: 23 MMOL/L (ref 19–24)
CO2 BLD-SCNC: 24 MMOL/L (ref 19–24)
CO2 BLD-SCNC: 24 MMOL/L (ref 19–24)
CO2 BLD-SCNC: 25 MMOL/L (ref 19–24)
CO2 SERPL-SCNC: 13 MMOL/L (ref 21–32)
CO2 SERPL-SCNC: 17 MMOL/L (ref 21–32)
CO2 SERPL-SCNC: 17 MMOL/L (ref 21–32)
CO2 SERPL-SCNC: 18 MMOL/L (ref 21–32)
CO2 SERPL-SCNC: 22 MMOL/L (ref 21–32)
CO2 SERPL-SCNC: 23 MMOL/L (ref 21–32)
CO2 SERPL-SCNC: 24 MMOL/L (ref 21–32)
CO2 SERPL-SCNC: 25 MMOL/L (ref 21–32)
CO2 SERPL-SCNC: 26 MMOL/L (ref 21–32)
CO2 SERPL-SCNC: 27 MMOL/L (ref 21–32)
CO2 SERPL-SCNC: 29 MMOL/L (ref 21–32)
CO2 SERPL-SCNC: 30 MMOL/L (ref 21–32)
CO2 SERPL-SCNC: 30 MMOL/L (ref 21–32)
COLOR UR: ABNORMAL
COLOR UR: YELLOW
CREAT SERPL-MCNC: 0.62 MG/DL (ref 0.6–1.3)
CREAT SERPL-MCNC: 0.64 MG/DL (ref 0.6–1.3)
CREAT SERPL-MCNC: 0.65 MG/DL (ref 0.6–1.3)
CREAT SERPL-MCNC: 0.69 MG/DL (ref 0.6–1.3)
CREAT SERPL-MCNC: 0.7 MG/DL (ref 0.6–1.3)
CREAT SERPL-MCNC: 0.76 MG/DL (ref 0.6–1.3)
CREAT SERPL-MCNC: 0.87 MG/DL (ref 0.6–1.3)
CREAT SERPL-MCNC: 1.22 MG/DL (ref 0.6–1.3)
CREAT SERPL-MCNC: 1.31 MG/DL (ref 0.6–1.3)
CREAT SERPL-MCNC: 1.46 MG/DL (ref 0.6–1.3)
CREAT SERPL-MCNC: 1.56 MG/DL (ref 0.6–1.3)
CREAT SERPL-MCNC: 1.71 MG/DL (ref 0.6–1.3)
CREAT SERPL-MCNC: 1.75 MG/DL (ref 0.6–1.3)
CREAT SERPL-MCNC: 1.84 MG/DL (ref 0.6–1.3)
CREAT SERPL-MCNC: 2.07 MG/DL (ref 0.6–1.3)
CREAT SERPL-MCNC: 2.45 MG/DL (ref 0.6–1.3)
CREAT SERPL-MCNC: 2.82 MG/DL (ref 0.6–1.3)
CREAT SERPL-MCNC: 3.22 MG/DL (ref 0.6–1.3)
CREAT SERPL-MCNC: 3.26 MG/DL (ref 0.6–1.3)
CREAT SERPL-MCNC: 5.52 MG/DL (ref 0.6–1.3)
CREAT SERPL-MCNC: 5.76 MG/DL (ref 0.6–1.3)
CREAT SERPL-MCNC: 5.99 MG/DL (ref 0.6–1.3)
CREAT SERPL-MCNC: 6.02 MG/DL (ref 0.6–1.3)
CREAT SERPL-MCNC: 6.03 MG/DL (ref 0.6–1.3)
CREAT UR-MCNC: 0.7 MG/DL (ref 0.6–1.3)
CREAT UR-MCNC: 0.8 MG/DL (ref 0.6–1.3)
CREAT UR-MCNC: 0.9 MG/DL (ref 0.6–1.3)
CREAT UR-MCNC: 1 MG/DL (ref 0.6–1.3)
CREAT UR-MCNC: 1.2 MG/DL (ref 0.6–1.3)
CREAT UR-MCNC: 1.5 MG/DL (ref 0.6–1.3)
CREAT UR-MCNC: 124 MG/DL (ref 30–125)
CREAT UR-MCNC: 3.3 MG/DL (ref 0.6–1.3)
CREAT UR-MCNC: 73 MG/DL (ref 30–125)
CROSSMATCH RESULT,%XM: NORMAL
CRP SERPL-MCNC: 20.1 MG/DL (ref 0–0.3)
DIAGNOSIS, 93000: NORMAL
DIFFERENTIAL METHOD BLD: ABNORMAL
EOSINOPHIL # BLD: 0 K/UL (ref 0–0.4)
EOSINOPHIL # BLD: 0.1 K/UL (ref 0–0.4)
EOSINOPHIL # BLD: 0.1 K/UL (ref 0–0.4)
EOSINOPHIL # BLD: 0.2 K/UL (ref 0–0.4)
EOSINOPHIL # BLD: 0.2 K/UL (ref 0–0.4)
EOSINOPHIL # BLD: 0.3 K/UL (ref 0–0.4)
EOSINOPHIL # BLD: 0.4 K/UL (ref 0–0.4)
EOSINOPHIL # BLD: ABNORMAL K/UL (ref 0–0.4)
EOSINOPHIL # BLD: ABNORMAL K/UL (ref 0–0.4)
EOSINOPHIL #/AREA URNS HPF: NORMAL /[HPF]
EOSINOPHIL NFR BLD: 0 % (ref 0–5)
EOSINOPHIL NFR BLD: 1 % (ref 0–5)
EOSINOPHIL NFR BLD: 2 % (ref 0–5)
EOSINOPHIL NFR BLD: 2 % (ref 0–5)
EOSINOPHIL NFR BLD: 3 % (ref 0–5)
EOSINOPHIL NFR BLD: ABNORMAL % (ref 0–5)
EPITH CASTS URNS QL MICRO: ABNORMAL /LPF (ref 0–5)
ERYTHROCYTE [DISTWIDTH] IN BLOOD BY AUTOMATED COUNT: 15.6 % (ref 11.5–14.5)
ERYTHROCYTE [DISTWIDTH] IN BLOOD BY AUTOMATED COUNT: 15.7 % (ref 11.5–14.5)
ERYTHROCYTE [DISTWIDTH] IN BLOOD BY AUTOMATED COUNT: 15.7 % (ref 11.6–14.5)
ERYTHROCYTE [DISTWIDTH] IN BLOOD BY AUTOMATED COUNT: 15.8 % (ref 11.6–14.5)
ERYTHROCYTE [DISTWIDTH] IN BLOOD BY AUTOMATED COUNT: 15.8 % (ref 11.6–14.5)
ERYTHROCYTE [DISTWIDTH] IN BLOOD BY AUTOMATED COUNT: 16 % (ref 11.6–14.5)
ERYTHROCYTE [DISTWIDTH] IN BLOOD BY AUTOMATED COUNT: 16.2 % (ref 11.6–14.5)
ERYTHROCYTE [DISTWIDTH] IN BLOOD BY AUTOMATED COUNT: 16.2 % (ref 11.6–14.5)
ERYTHROCYTE [DISTWIDTH] IN BLOOD BY AUTOMATED COUNT: 16.3 % (ref 11.6–14.5)
ERYTHROCYTE [DISTWIDTH] IN BLOOD BY AUTOMATED COUNT: 16.4 % (ref 11.6–14.5)
ERYTHROCYTE [DISTWIDTH] IN BLOOD BY AUTOMATED COUNT: 16.5 % (ref 11.5–14.5)
ERYTHROCYTE [DISTWIDTH] IN BLOOD BY AUTOMATED COUNT: 16.5 % (ref 11.6–14.5)
ERYTHROCYTE [DISTWIDTH] IN BLOOD BY AUTOMATED COUNT: 16.6 % (ref 11.5–14.5)
ERYTHROCYTE [DISTWIDTH] IN BLOOD BY AUTOMATED COUNT: 16.6 % (ref 11.6–14.5)
ERYTHROCYTE [DISTWIDTH] IN BLOOD BY AUTOMATED COUNT: 16.7 % (ref 11.5–14.5)
ERYTHROCYTE [DISTWIDTH] IN BLOOD BY AUTOMATED COUNT: 16.7 % (ref 11.6–14.5)
ERYTHROCYTE [DISTWIDTH] IN BLOOD BY AUTOMATED COUNT: 16.7 % (ref 11.6–14.5)
ERYTHROCYTE [DISTWIDTH] IN BLOOD BY AUTOMATED COUNT: 17.4 % (ref 11.5–14.5)
ERYTHROCYTE [DISTWIDTH] IN BLOOD BY AUTOMATED COUNT: 17.5 % (ref 11.5–14.5)
ERYTHROCYTE [DISTWIDTH] IN BLOOD BY AUTOMATED COUNT: 17.5 % (ref 11.6–14.5)
ERYTHROCYTE [DISTWIDTH] IN BLOOD BY AUTOMATED COUNT: 17.6 % (ref 11.6–14.5)
ERYTHROCYTE [DISTWIDTH] IN BLOOD BY AUTOMATED COUNT: 17.9 % (ref 11.6–14.5)
ERYTHROCYTE [DISTWIDTH] IN BLOOD BY AUTOMATED COUNT: 18.1 % (ref 11.6–14.5)
ERYTHROCYTE [DISTWIDTH] IN BLOOD BY AUTOMATED COUNT: 18.4 % (ref 11.6–14.5)
ERYTHROCYTE [DISTWIDTH] IN BLOOD BY AUTOMATED COUNT: 18.6 % (ref 11.6–14.5)
ERYTHROCYTE [DISTWIDTH] IN BLOOD BY AUTOMATED COUNT: 18.6 % (ref 11.6–14.5)
ERYTHROCYTE [DISTWIDTH] IN BLOOD BY AUTOMATED COUNT: 18.7 % (ref 11.6–14.5)
ERYTHROCYTE [DISTWIDTH] IN BLOOD BY AUTOMATED COUNT: 18.8 % (ref 11.5–14.5)
FERRITIN SERPL-MCNC: 603 NG/ML (ref 8–388)
FLUAV AG NPH QL IA: NEGATIVE
FLUBV AG NOSE QL IA: NEGATIVE
GLOBULIN SER CALC-MCNC: 3 G/DL (ref 2–4)
GLOBULIN SER CALC-MCNC: 3.3 G/DL (ref 2–4)
GLOBULIN SER CALC-MCNC: 3.4 G/DL (ref 2–4)
GLOBULIN SER CALC-MCNC: 3.5 G/DL (ref 2–4)
GLOBULIN SER CALC-MCNC: 3.5 G/DL (ref 2–4)
GLOBULIN SER CALC-MCNC: 3.7 G/DL (ref 2–4)
GLOBULIN SER CALC-MCNC: 4 G/DL (ref 2–4)
GLOBULIN SER CALC-MCNC: 4 G/DL (ref 2–4)
GLOBULIN SER CALC-MCNC: 4.2 G/DL (ref 2–4)
GLOBULIN SER CALC-MCNC: 4.3 G/DL (ref 2–4)
GLOBULIN SER CALC-MCNC: 4.5 G/DL (ref 2–4)
GLUCOSE BLD STRIP.AUTO-MCNC: 105 MG/DL (ref 74–106)
GLUCOSE BLD STRIP.AUTO-MCNC: 106 MG/DL (ref 74–106)
GLUCOSE BLD STRIP.AUTO-MCNC: 107 MG/DL (ref 74–106)
GLUCOSE BLD STRIP.AUTO-MCNC: 121 MG/DL (ref 74–106)
GLUCOSE BLD STRIP.AUTO-MCNC: 124 MG/DL (ref 74–106)
GLUCOSE BLD STRIP.AUTO-MCNC: 133 MG/DL (ref 70–110)
GLUCOSE BLD STRIP.AUTO-MCNC: 98 MG/DL (ref 74–106)
GLUCOSE SERPL-MCNC: 104 MG/DL (ref 74–99)
GLUCOSE SERPL-MCNC: 105 MG/DL (ref 74–99)
GLUCOSE SERPL-MCNC: 107 MG/DL (ref 74–99)
GLUCOSE SERPL-MCNC: 108 MG/DL (ref 74–99)
GLUCOSE SERPL-MCNC: 110 MG/DL (ref 74–99)
GLUCOSE SERPL-MCNC: 114 MG/DL (ref 74–99)
GLUCOSE SERPL-MCNC: 115 MG/DL (ref 74–99)
GLUCOSE SERPL-MCNC: 124 MG/DL (ref 74–99)
GLUCOSE SERPL-MCNC: 127 MG/DL (ref 74–99)
GLUCOSE SERPL-MCNC: 138 MG/DL (ref 74–99)
GLUCOSE SERPL-MCNC: 147 MG/DL (ref 74–99)
GLUCOSE SERPL-MCNC: 80 MG/DL (ref 74–99)
GLUCOSE SERPL-MCNC: 82 MG/DL (ref 74–99)
GLUCOSE SERPL-MCNC: 86 MG/DL (ref 74–99)
GLUCOSE SERPL-MCNC: 87 MG/DL (ref 74–99)
GLUCOSE SERPL-MCNC: 89 MG/DL (ref 74–99)
GLUCOSE SERPL-MCNC: 90 MG/DL (ref 74–99)
GLUCOSE SERPL-MCNC: 90 MG/DL (ref 74–99)
GLUCOSE SERPL-MCNC: 92 MG/DL (ref 74–99)
GLUCOSE SERPL-MCNC: 93 MG/DL (ref 74–99)
GLUCOSE SERPL-MCNC: 93 MG/DL (ref 74–99)
GLUCOSE SERPL-MCNC: 96 MG/DL (ref 74–99)
GLUCOSE SERPL-MCNC: 96 MG/DL (ref 74–99)
GLUCOSE SERPL-MCNC: 99 MG/DL (ref 74–99)
GLUCOSE UR STRIP.AUTO-MCNC: NEGATIVE MG/DL
GRAM STN SPEC: ABNORMAL
GRAM STN SPEC: NORMAL
GRAM STN SPEC: NORMAL
HCT VFR BLD AUTO: 17.7 % (ref 35–45)
HCT VFR BLD AUTO: 19.4 % (ref 35–45)
HCT VFR BLD AUTO: 19.5 % (ref 35–45)
HCT VFR BLD AUTO: 19.7 % (ref 35–45)
HCT VFR BLD AUTO: 21.2 % (ref 35–45)
HCT VFR BLD AUTO: 22 % (ref 35–45)
HCT VFR BLD AUTO: 22.6 % (ref 36–48)
HCT VFR BLD AUTO: 23 % (ref 35–45)
HCT VFR BLD AUTO: 23.1 % (ref 35–45)
HCT VFR BLD AUTO: 23.3 % (ref 35–45)
HCT VFR BLD AUTO: 25 % (ref 35–45)
HCT VFR BLD AUTO: 26.1 % (ref 36–48)
HCT VFR BLD AUTO: 27.1 % (ref 35–45)
HCT VFR BLD AUTO: 27.1 % (ref 35–45)
HCT VFR BLD AUTO: 27.3 % (ref 35–45)
HCT VFR BLD AUTO: 27.5 % (ref 35–45)
HCT VFR BLD AUTO: 27.8 % (ref 35–45)
HCT VFR BLD AUTO: 28.1 % (ref 35–45)
HCT VFR BLD AUTO: 28.7 % (ref 35–45)
HCT VFR BLD AUTO: 29.1 % (ref 35–45)
HCT VFR BLD AUTO: 29.2 % (ref 36–48)
HCT VFR BLD AUTO: 30.2 % (ref 35–45)
HCT VFR BLD AUTO: 30.2 % (ref 35–45)
HCT VFR BLD AUTO: 30.5 % (ref 35–45)
HCT VFR BLD AUTO: 30.7 % (ref 36–48)
HCT VFR BLD AUTO: 30.8 % (ref 35–45)
HCT VFR BLD AUTO: 31.6 % (ref 36–48)
HCT VFR BLD AUTO: 31.8 % (ref 36–48)
HCT VFR BLD AUTO: 32.2 % (ref 35–45)
HCT VFR BLD AUTO: 32.2 % (ref 36–48)
HCT VFR BLD AUTO: 33.2 % (ref 36–48)
HCT VFR BLD AUTO: 33.4 % (ref 35–45)
HCT VFR BLD CALC: 25 % (ref 36–49)
HCT VFR BLD CALC: 28 % (ref 36–49)
HCT VFR BLD CALC: 28 % (ref 36–49)
HCT VFR BLD CALC: 29 % (ref 36–49)
HCT VFR BLD CALC: 29 % (ref 36–49)
HCT VFR BLD CALC: 32 % (ref 36–49)
HGB BLD-MCNC: 10 G/DL (ref 12–16)
HGB BLD-MCNC: 10.1 G/DL (ref 12–16)
HGB BLD-MCNC: 10.1 G/DL (ref 12–16)
HGB BLD-MCNC: 10.2 G/DL (ref 12–16)
HGB BLD-MCNC: 10.6 G/DL (ref 12–16)
HGB BLD-MCNC: 10.8 G/DL (ref 12–16)
HGB BLD-MCNC: 10.9 G/DL (ref 12–16)
HGB BLD-MCNC: 5.8 G/DL (ref 12–16)
HGB BLD-MCNC: 6.2 G/DL (ref 12–16)
HGB BLD-MCNC: 6.5 G/DL (ref 12–16)
HGB BLD-MCNC: 6.7 G/DL (ref 12–16)
HGB BLD-MCNC: 6.8 G/DL (ref 12–16)
HGB BLD-MCNC: 6.9 G/DL (ref 12–16)
HGB BLD-MCNC: 7.2 G/DL (ref 12–16)
HGB BLD-MCNC: 7.3 G/DL (ref 12–16)
HGB BLD-MCNC: 7.4 G/DL (ref 12–16)
HGB BLD-MCNC: 7.6 G/DL (ref 12–16)
HGB BLD-MCNC: 8.2 G/DL (ref 12–16)
HGB BLD-MCNC: 8.2 G/DL (ref 12–16)
HGB BLD-MCNC: 8.5 G/DL (ref 12–16)
HGB BLD-MCNC: 8.5 G/DL (ref 12–16)
HGB BLD-MCNC: 8.6 G/DL (ref 12–16)
HGB BLD-MCNC: 8.6 G/DL (ref 12–16)
HGB BLD-MCNC: 8.8 G/DL (ref 12–16)
HGB BLD-MCNC: 9 G/DL (ref 12–16)
HGB BLD-MCNC: 9 G/DL (ref 12–16)
HGB BLD-MCNC: 9.2 G/DL (ref 12–16)
HGB BLD-MCNC: 9.3 G/DL (ref 12–16)
HGB BLD-MCNC: 9.4 G/DL (ref 12–16)
HGB BLD-MCNC: 9.5 G/DL (ref 12–16)
HGB BLD-MCNC: 9.6 G/DL (ref 12–16)
HGB BLD-MCNC: 9.7 G/DL (ref 12–16)
HGB BLD-MCNC: 9.9 G/DL (ref 12–16)
HGB UR QL STRIP: ABNORMAL
INR PPP: 1 (ref 0.8–1.2)
INR PPP: 1.2 (ref 0.8–1.2)
INR PPP: 1.2 (ref 0.8–1.2)
INR PPP: 1.4 (ref 0.8–1.2)
INR PPP: 1.4 (ref 0.8–1.2)
INR PPP: 1.5 (ref 0.8–1.2)
IRON SERPL-MCNC: 12 UG/DL (ref 50–175)
KETONES UR QL STRIP.AUTO: ABNORMAL MG/DL
KETONES UR QL STRIP.AUTO: ABNORMAL MG/DL
KETONES UR QL STRIP.AUTO: NEGATIVE MG/DL
LACTATE BLD-SCNC: 0.42 MMOL/L (ref 0.4–2)
LACTATE BLD-SCNC: 1.07 MMOL/L (ref 0.4–2)
LACTATE SERPL-SCNC: 1.1 MMOL/L (ref 0.4–2)
LACTATE SERPL-SCNC: 2.1 MMOL/L (ref 0.4–2)
LEUKOCYTE ESTERASE UR QL STRIP.AUTO: ABNORMAL
LIPASE SERPL-CCNC: 127 U/L (ref 73–393)
LYMPHOCYTES # BLD: 1.2 K/UL (ref 0.8–3.5)
LYMPHOCYTES # BLD: 1.5 K/UL (ref 1.1–5.9)
LYMPHOCYTES # BLD: 1.6 K/UL (ref 0.8–3.5)
LYMPHOCYTES # BLD: 1.6 K/UL (ref 0.9–3.6)
LYMPHOCYTES # BLD: 1.6 K/UL (ref 1.1–5.9)
LYMPHOCYTES # BLD: 1.8 K/UL (ref 1.1–5.9)
LYMPHOCYTES # BLD: 1.9 K/UL (ref 1.1–5.9)
LYMPHOCYTES # BLD: 2 K/UL (ref 0.8–3.5)
LYMPHOCYTES # BLD: 2 K/UL (ref 0.8–3.5)
LYMPHOCYTES # BLD: 2.1 K/UL (ref 0.8–3.5)
LYMPHOCYTES # BLD: 2.2 K/UL (ref 1.1–5.9)
LYMPHOCYTES # BLD: 2.3 K/UL (ref 0.8–3.5)
LYMPHOCYTES # BLD: 2.3 K/UL (ref 1.1–5.9)
LYMPHOCYTES # BLD: 2.5 K/UL (ref 1.1–5.9)
LYMPHOCYTES # BLD: 2.6 K/UL (ref 0.8–3.5)
LYMPHOCYTES # BLD: 3.1 K/UL (ref 1.1–5.9)
LYMPHOCYTES # BLD: 3.5 K/UL (ref 0.8–3.5)
LYMPHOCYTES # BLD: 4.2 K/UL (ref 0.8–3.5)
LYMPHOCYTES # BLD: ABNORMAL K/UL (ref 0.9–3.6)
LYMPHOCYTES # BLD: ABNORMAL K/UL (ref 0.9–3.6)
LYMPHOCYTES NFR BLD: 11 % (ref 14–44)
LYMPHOCYTES NFR BLD: 12 % (ref 14–44)
LYMPHOCYTES NFR BLD: 12 % (ref 20–51)
LYMPHOCYTES NFR BLD: 13 % (ref 20–51)
LYMPHOCYTES NFR BLD: 17 % (ref 20–51)
LYMPHOCYTES NFR BLD: 18 % (ref 20–51)
LYMPHOCYTES NFR BLD: 19 % (ref 14–44)
LYMPHOCYTES NFR BLD: 20 % (ref 20–51)
LYMPHOCYTES NFR BLD: 21 % (ref 14–44)
LYMPHOCYTES NFR BLD: 22 % (ref 20–51)
LYMPHOCYTES NFR BLD: 25 % (ref 20–51)
LYMPHOCYTES NFR BLD: 28 % (ref 14–44)
LYMPHOCYTES NFR BLD: 28 % (ref 20–51)
LYMPHOCYTES NFR BLD: 29 % (ref 14–44)
LYMPHOCYTES NFR BLD: 29 % (ref 14–44)
LYMPHOCYTES NFR BLD: 29 % (ref 21–52)
LYMPHOCYTES NFR BLD: 39 % (ref 20–51)
LYMPHOCYTES NFR BLD: 45 % (ref 14–44)
LYMPHOCYTES NFR BLD: ABNORMAL % (ref 21–52)
MAGNESIUM SERPL-MCNC: 1.8 MG/DL (ref 1.6–2.6)
MAGNESIUM SERPL-MCNC: 1.9 MG/DL (ref 1.6–2.6)
MAGNESIUM SERPL-MCNC: 1.9 MG/DL (ref 1.6–2.6)
MAGNESIUM SERPL-MCNC: 2 MG/DL (ref 1.6–2.6)
MAGNESIUM SERPL-MCNC: 2 MG/DL (ref 1.6–2.6)
MAGNESIUM SERPL-MCNC: 2.1 MG/DL (ref 1.6–2.6)
MAGNESIUM SERPL-MCNC: 2.1 MG/DL (ref 1.6–2.6)
MAGNESIUM SERPL-MCNC: 2.2 MG/DL (ref 1.6–2.6)
MAGNESIUM SERPL-MCNC: 2.2 MG/DL (ref 1.6–2.6)
MAGNESIUM SERPL-MCNC: 2.3 MG/DL (ref 1.6–2.6)
MAGNESIUM SERPL-MCNC: 2.3 MG/DL (ref 1.6–2.6)
MAGNESIUM SERPL-MCNC: 2.4 MG/DL (ref 1.6–2.6)
MCH RBC QN AUTO: 26.7 PG (ref 24–34)
MCH RBC QN AUTO: 26.8 PG (ref 24–34)
MCH RBC QN AUTO: 27.6 PG (ref 24–34)
MCH RBC QN AUTO: 27.9 PG (ref 24–34)
MCH RBC QN AUTO: 27.9 PG (ref 24–34)
MCH RBC QN AUTO: 28 PG (ref 24–34)
MCH RBC QN AUTO: 28.1 PG (ref 24–34)
MCH RBC QN AUTO: 28.2 PG (ref 24–34)
MCH RBC QN AUTO: 28.4 PG (ref 24–34)
MCH RBC QN AUTO: 28.5 PG (ref 24–34)
MCH RBC QN AUTO: 28.5 PG (ref 24–34)
MCH RBC QN AUTO: 28.6 PG (ref 24–34)
MCH RBC QN AUTO: 28.6 PG (ref 25–35)
MCH RBC QN AUTO: 28.6 PG (ref 25–35)
MCH RBC QN AUTO: 28.7 PG (ref 24–34)
MCH RBC QN AUTO: 28.8 PG (ref 24–34)
MCH RBC QN AUTO: 28.8 PG (ref 24–34)
MCH RBC QN AUTO: 28.9 PG (ref 24–34)
MCH RBC QN AUTO: 28.9 PG (ref 25–35)
MCH RBC QN AUTO: 29 PG (ref 24–34)
MCH RBC QN AUTO: 29.1 PG (ref 24–34)
MCH RBC QN AUTO: 29.1 PG (ref 24–34)
MCH RBC QN AUTO: 29.2 PG (ref 24–34)
MCH RBC QN AUTO: 29.2 PG (ref 24–34)
MCH RBC QN AUTO: 29.4 PG (ref 24–34)
MCH RBC QN AUTO: 30 PG (ref 25–35)
MCH RBC QN AUTO: 30.1 PG (ref 25–35)
MCH RBC QN AUTO: 30.2 PG (ref 25–35)
MCH RBC QN AUTO: 30.4 PG (ref 25–35)
MCH RBC QN AUTO: 30.4 PG (ref 25–35)
MCHC RBC AUTO-ENTMCNC: 30.5 G/DL (ref 31–37)
MCHC RBC AUTO-ENTMCNC: 30.5 G/DL (ref 31–37)
MCHC RBC AUTO-ENTMCNC: 31.1 G/DL (ref 31–37)
MCHC RBC AUTO-ENTMCNC: 31.4 G/DL (ref 31–37)
MCHC RBC AUTO-ENTMCNC: 31.5 G/DL (ref 31–37)
MCHC RBC AUTO-ENTMCNC: 31.6 G/DL (ref 31–37)
MCHC RBC AUTO-ENTMCNC: 31.7 G/DL (ref 31–37)
MCHC RBC AUTO-ENTMCNC: 31.8 G/DL (ref 31–37)
MCHC RBC AUTO-ENTMCNC: 31.8 G/DL (ref 31–37)
MCHC RBC AUTO-ENTMCNC: 31.9 G/DL (ref 31–37)
MCHC RBC AUTO-ENTMCNC: 31.9 G/DL (ref 31–37)
MCHC RBC AUTO-ENTMCNC: 32 G/DL (ref 31–37)
MCHC RBC AUTO-ENTMCNC: 32.2 G/DL (ref 31–37)
MCHC RBC AUTO-ENTMCNC: 32.3 G/DL (ref 31–37)
MCHC RBC AUTO-ENTMCNC: 32.5 G/DL (ref 31–37)
MCHC RBC AUTO-ENTMCNC: 32.6 G/DL (ref 31–37)
MCHC RBC AUTO-ENTMCNC: 32.8 G/DL (ref 31–37)
MCHC RBC AUTO-ENTMCNC: 33.1 G/DL (ref 31–37)
MCHC RBC AUTO-ENTMCNC: 33.5 G/DL (ref 31–37)
MCHC RBC AUTO-ENTMCNC: 34 G/DL (ref 31–37)
MCV RBC AUTO: 82.4 FL (ref 74–97)
MCV RBC AUTO: 84.7 FL (ref 74–97)
MCV RBC AUTO: 85.1 FL (ref 74–97)
MCV RBC AUTO: 87.2 FL (ref 74–97)
MCV RBC AUTO: 87.7 FL (ref 74–97)
MCV RBC AUTO: 87.8 FL (ref 74–97)
MCV RBC AUTO: 88 FL (ref 74–97)
MCV RBC AUTO: 88 FL (ref 74–97)
MCV RBC AUTO: 88.6 FL (ref 74–97)
MCV RBC AUTO: 88.9 FL (ref 74–97)
MCV RBC AUTO: 89 FL (ref 74–97)
MCV RBC AUTO: 89.1 FL (ref 74–97)
MCV RBC AUTO: 89.3 FL (ref 74–97)
MCV RBC AUTO: 89.7 FL (ref 78–102)
MCV RBC AUTO: 90.2 FL (ref 78–102)
MCV RBC AUTO: 90.4 FL (ref 74–97)
MCV RBC AUTO: 90.6 FL (ref 74–97)
MCV RBC AUTO: 90.9 FL (ref 74–97)
MCV RBC AUTO: 91.2 FL (ref 74–97)
MCV RBC AUTO: 91.3 FL (ref 74–97)
MCV RBC AUTO: 91.5 FL (ref 74–97)
MCV RBC AUTO: 91.6 FL (ref 74–97)
MCV RBC AUTO: 91.9 FL (ref 78–102)
MCV RBC AUTO: 92.1 FL (ref 78–102)
MCV RBC AUTO: 95.1 FL (ref 78–102)
MCV RBC AUTO: 95.2 FL (ref 78–102)
MCV RBC AUTO: 95.3 FL (ref 78–102)
MCV RBC AUTO: 96 FL (ref 78–102)
METAMYELOCYTES NFR BLD MANUAL: 1 %
METAMYELOCYTES NFR BLD MANUAL: 2 %
METAMYELOCYTES NFR BLD MANUAL: 5 %
METAMYELOCYTES NFR BLD MANUAL: 6 %
MONOCYTES # BLD: 0.5 K/UL (ref 0–1)
MONOCYTES # BLD: 0.5 K/UL (ref 0–1)
MONOCYTES # BLD: 0.6 K/UL (ref 0–1)
MONOCYTES # BLD: 0.9 K/UL (ref 0–1)
MONOCYTES # BLD: 1.3 K/UL (ref 0.05–1.2)
MONOCYTES # BLD: 1.4 K/UL (ref 0–1)
MONOCYTES # BLD: 1.4 K/UL (ref 0–1)
MONOCYTES # BLD: 1.5 K/UL (ref 0–1)
MONOCYTES # BLD: 1.5 K/UL (ref 0–1)
MONOCYTES # BLD: 1.8 K/UL (ref 0–1)
MONOCYTES # BLD: 1.8 K/UL (ref 0–1)
MONOCYTES # BLD: 2.5 K/UL (ref 0–1)
MONOCYTES # BLD: ABNORMAL K/UL (ref 0.05–1.2)
MONOCYTES # BLD: ABNORMAL K/UL (ref 0.05–1.2)
MONOCYTES NFR BLD: 10 % (ref 2–9)
MONOCYTES NFR BLD: 13 % (ref 2–9)
MONOCYTES NFR BLD: 17 % (ref 2–9)
MONOCYTES NFR BLD: 23 % (ref 3–10)
MONOCYTES NFR BLD: 4 % (ref 2–9)
MONOCYTES NFR BLD: 5 % (ref 2–9)
MONOCYTES NFR BLD: ABNORMAL % (ref 3–10)
MUCOUS THREADS URNS QL MICRO: ABNORMAL /LPF
MUCOUS THREADS URNS QL MICRO: ABNORMAL /LPF
MYELOCYTES NFR BLD MANUAL: 1 %
MYELOCYTES NFR BLD MANUAL: 2 %
MYELOCYTES NFR BLD MANUAL: 2 %
MYELOCYTES NFR BLD MANUAL: 3 %
MYELOCYTES NFR BLD MANUAL: 6 %
MYELOCYTES NFR BLD MANUAL: 6 %
NEUTS BAND NFR BLD MANUAL: 1 % (ref 0–5)
NEUTS BAND NFR BLD MANUAL: 14 % (ref 0–5)
NEUTS BAND NFR BLD MANUAL: 16 % (ref 0–5)
NEUTS BAND NFR BLD MANUAL: 2 % (ref 0–5)
NEUTS BAND NFR BLD MANUAL: 2 % (ref 0–5)
NEUTS BAND NFR BLD MANUAL: 5 % (ref 0–5)
NEUTS BAND NFR BLD MANUAL: 7 % (ref 0–5)
NEUTS SEG # BLD: 10.5 K/UL (ref 1.8–9.5)
NEUTS SEG # BLD: 14.1 K/UL (ref 1.8–9.5)
NEUTS SEG # BLD: 2.4 K/UL (ref 1.8–8)
NEUTS SEG # BLD: 2.6 K/UL (ref 1.8–8)
NEUTS SEG # BLD: 3.7 K/UL (ref 1.8–9.5)
NEUTS SEG # BLD: 4.8 K/UL (ref 1.8–9.5)
NEUTS SEG # BLD: 4.9 K/UL (ref 1.8–9.5)
NEUTS SEG # BLD: 5.4 K/UL (ref 1.8–9.5)
NEUTS SEG # BLD: 5.7 K/UL (ref 1.8–9.5)
NEUTS SEG # BLD: 6.5 K/UL (ref 1.8–8)
NEUTS SEG # BLD: 6.5 K/UL (ref 1.8–9.5)
NEUTS SEG # BLD: 6.6 K/UL (ref 1.8–8)
NEUTS SEG # BLD: 6.9 K/UL (ref 1.8–8)
NEUTS SEG # BLD: 7.5 K/UL (ref 1.8–8)
NEUTS SEG # BLD: 7.6 K/UL (ref 1.8–8)
NEUTS SEG # BLD: 8 K/UL (ref 1.8–8)
NEUTS SEG # BLD: 8 K/UL (ref 1.8–8)
NEUTS SEG # BLD: 8.1 K/UL (ref 1.8–8)
NEUTS SEG # BLD: 8.5 K/UL (ref 1.8–8)
NEUTS SEG # BLD: 9 K/UL (ref 1.8–8)
NEUTS SEG # BLD: ABNORMAL K/UL (ref 1.8–8)
NEUTS SEG # BLD: ABNORMAL K/UL (ref 1.8–8)
NEUTS SEG NFR BLD: 46 % (ref 42–75)
NEUTS SEG NFR BLD: 46 % (ref 42–75)
NEUTS SEG NFR BLD: 47 % (ref 40–73)
NEUTS SEG NFR BLD: 49 % (ref 42–75)
NEUTS SEG NFR BLD: 52 % (ref 40–70)
NEUTS SEG NFR BLD: 53 % (ref 42–75)
NEUTS SEG NFR BLD: 57 % (ref 42–75)
NEUTS SEG NFR BLD: 60 % (ref 42–75)
NEUTS SEG NFR BLD: 60 % (ref 42–75)
NEUTS SEG NFR BLD: 62 % (ref 40–70)
NEUTS SEG NFR BLD: 63 % (ref 40–70)
NEUTS SEG NFR BLD: 63 % (ref 42–75)
NEUTS SEG NFR BLD: 64 % (ref 40–70)
NEUTS SEG NFR BLD: 65 % (ref 42–75)
NEUTS SEG NFR BLD: 69 % (ref 40–70)
NEUTS SEG NFR BLD: 69 % (ref 42–75)
NEUTS SEG NFR BLD: 69 % (ref 42–75)
NEUTS SEG NFR BLD: 71 % (ref 40–70)
NEUTS SEG NFR BLD: 79 % (ref 40–70)
NEUTS SEG NFR BLD: 82 % (ref 40–70)
NEUTS SEG NFR BLD: ABNORMAL % (ref 40–73)
NITRITE UR QL STRIP.AUTO: NEGATIVE
NRBC BLD-RTO: 1 PER 100 WBC
NRBC BLD-RTO: 2 PER 100 WBC
NRBC BLD-RTO: 2 PER 100 WBC
OTHER CELLS NFR BLD MANUAL: 3 %
P-R INTERVAL, ECG05: 118 MS
P-R INTERVAL, ECG05: 118 MS
P-R INTERVAL, ECG05: 146 MS
PH UR STRIP: 5 [PH] (ref 5–8)
PH UR STRIP: 5.5 [PH] (ref 5–8)
PH UR STRIP: 6 [PH] (ref 5–8)
PH UR STRIP: 6 [PH] (ref 5–8)
PH UR STRIP: 7 [PH] (ref 5–8)
PH UR STRIP: 7.5 [PH] (ref 5–8)
PHOSPHATE SERPL-MCNC: 2 MG/DL (ref 2.5–4.9)
PHOSPHATE SERPL-MCNC: 3.3 MG/DL (ref 2.5–4.9)
PHOSPHATE SERPL-MCNC: 4.3 MG/DL (ref 2.5–4.9)
PHOSPHATE SERPL-MCNC: 4.6 MG/DL (ref 2.5–4.9)
PHOSPHATE SERPL-MCNC: 4.9 MG/DL (ref 2.5–4.9)
PHOSPHATE SERPL-MCNC: 5.6 MG/DL (ref 2.5–4.9)
PHOSPHATE SERPL-MCNC: 7.5 MG/DL (ref 2.5–4.9)
PLATELET # BLD AUTO: 16 K/UL (ref 135–420)
PLATELET # BLD AUTO: 16 K/UL (ref 135–420)
PLATELET # BLD AUTO: 18 K/UL (ref 135–420)
PLATELET # BLD AUTO: 188 K/UL (ref 135–420)
PLATELET # BLD AUTO: 189 K/UL (ref 135–420)
PLATELET # BLD AUTO: 220 K/UL (ref 135–420)
PLATELET # BLD AUTO: 235 K/UL (ref 135–420)
PLATELET # BLD AUTO: 237 K/UL (ref 140–440)
PLATELET # BLD AUTO: 243 K/UL (ref 140–440)
PLATELET # BLD AUTO: 256 K/UL (ref 140–440)
PLATELET # BLD AUTO: 264 K/UL (ref 135–420)
PLATELET # BLD AUTO: 269 K/UL (ref 140–440)
PLATELET # BLD AUTO: 278 K/UL (ref 135–420)
PLATELET # BLD AUTO: 282 K/UL (ref 135–420)
PLATELET # BLD AUTO: 282 K/UL (ref 140–440)
PLATELET # BLD AUTO: 303 K/UL (ref 135–420)
PLATELET # BLD AUTO: 310 K/UL (ref 135–420)
PLATELET # BLD AUTO: 328 K/UL (ref 135–420)
PLATELET # BLD AUTO: 332 K/UL (ref 140–440)
PLATELET # BLD AUTO: 339 K/UL (ref 135–420)
PLATELET # BLD AUTO: 347 K/UL (ref 135–420)
PLATELET # BLD AUTO: 382 K/UL (ref 135–420)
PLATELET # BLD AUTO: 394 K/UL (ref 135–420)
PLATELET # BLD AUTO: 394 K/UL (ref 140–440)
PLATELET # BLD AUTO: 408 K/UL (ref 135–420)
PLATELET # BLD AUTO: 423 K/UL (ref 135–420)
PLATELET # BLD AUTO: 43 K/UL (ref 135–420)
PLATELET # BLD AUTO: 454 K/UL (ref 135–420)
PLATELET # BLD AUTO: 456 K/UL (ref 135–420)
PLATELET # BLD AUTO: 482 K/UL (ref 140–440)
PLATELET COMMENTS,PCOM: ABNORMAL
PMV BLD AUTO: 7.9 FL (ref 9.2–11.8)
PMV BLD AUTO: 8.1 FL (ref 9.2–11.8)
PMV BLD AUTO: 8.2 FL (ref 9.2–11.8)
PMV BLD AUTO: 8.4 FL (ref 9.2–11.8)
PMV BLD AUTO: 8.5 FL (ref 9.2–11.8)
PMV BLD AUTO: 8.5 FL (ref 9.2–11.8)
PMV BLD AUTO: 8.7 FL (ref 9.2–11.8)
PMV BLD AUTO: 8.9 FL (ref 9.2–11.8)
PMV BLD AUTO: 8.9 FL (ref 9.2–11.8)
PMV BLD AUTO: 9 FL (ref 9.2–11.8)
PMV BLD AUTO: 9.3 FL (ref 9.2–11.8)
PMV BLD AUTO: 9.3 FL (ref 9.2–11.8)
PMV BLD AUTO: 9.4 FL (ref 9.2–11.8)
POTASSIUM BLD-SCNC: 3.6 MMOL/L (ref 3.5–5.5)
POTASSIUM BLD-SCNC: 4 MMOL/L (ref 3.5–5.5)
POTASSIUM BLD-SCNC: 4.2 MMOL/L (ref 3.5–5.5)
POTASSIUM BLD-SCNC: 4.3 MMOL/L (ref 3.5–5.5)
POTASSIUM BLD-SCNC: 5.2 MMOL/L (ref 3.5–5.5)
POTASSIUM BLD-SCNC: 5.5 MMOL/L (ref 3.5–5.5)
POTASSIUM SERPL-SCNC: 3.6 MMOL/L (ref 3.5–5.5)
POTASSIUM SERPL-SCNC: 3.7 MMOL/L (ref 3.5–5.5)
POTASSIUM SERPL-SCNC: 3.9 MMOL/L (ref 3.5–5.5)
POTASSIUM SERPL-SCNC: 3.9 MMOL/L (ref 3.5–5.5)
POTASSIUM SERPL-SCNC: 4.1 MMOL/L (ref 3.5–5.5)
POTASSIUM SERPL-SCNC: 4.2 MMOL/L (ref 3.5–5.5)
POTASSIUM SERPL-SCNC: 4.3 MMOL/L (ref 3.5–5.5)
POTASSIUM SERPL-SCNC: 4.4 MMOL/L (ref 3.5–5.5)
POTASSIUM SERPL-SCNC: 4.5 MMOL/L (ref 3.5–5.5)
POTASSIUM SERPL-SCNC: 4.5 MMOL/L (ref 3.5–5.5)
POTASSIUM SERPL-SCNC: 4.7 MMOL/L (ref 3.5–5.5)
POTASSIUM SERPL-SCNC: 4.8 MMOL/L (ref 3.5–5.5)
POTASSIUM SERPL-SCNC: 4.9 MMOL/L (ref 3.5–5.5)
POTASSIUM SERPL-SCNC: 5.2 MMOL/L (ref 3.5–5.5)
POTASSIUM SERPL-SCNC: 5.4 MMOL/L (ref 3.5–5.5)
POTASSIUM SERPL-SCNC: 5.7 MMOL/L (ref 3.5–5.5)
POTASSIUM SERPL-SCNC: 5.8 MMOL/L (ref 3.5–5.5)
POTASSIUM SERPL-SCNC: 5.9 MMOL/L (ref 3.5–5.5)
POTASSIUM SERPL-SCNC: 6.1 MMOL/L (ref 3.5–5.5)
POTASSIUM SERPL-SCNC: 6.4 MMOL/L (ref 3.5–5.5)
POTASSIUM SERPL-SCNC: 6.7 MMOL/L (ref 3.5–5.5)
PROT SERPL-MCNC: 5.4 G/DL (ref 6.4–8.2)
PROT SERPL-MCNC: 6 G/DL (ref 6.4–8.2)
PROT SERPL-MCNC: 6.2 G/DL (ref 6.4–8.2)
PROT SERPL-MCNC: 6.3 G/DL (ref 6.4–8.2)
PROT SERPL-MCNC: 6.3 G/DL (ref 6.4–8.2)
PROT SERPL-MCNC: 6.4 G/DL (ref 6.4–8.2)
PROT SERPL-MCNC: 6.6 G/DL (ref 6.4–8.2)
PROT SERPL-MCNC: 6.6 G/DL (ref 6.4–8.2)
PROT SERPL-MCNC: 6.7 G/DL (ref 6.4–8.2)
PROT SERPL-MCNC: 6.8 G/DL (ref 6.4–8.2)
PROT SERPL-MCNC: 6.9 G/DL (ref 6.4–8.2)
PROT SERPL-MCNC: 7 G/DL (ref 6.4–8.2)
PROT SERPL-MCNC: 7.1 G/DL (ref 6.4–8.2)
PROT UR STRIP-MCNC: 100 MG/DL
PROT UR STRIP-MCNC: 30 MG/DL
PROT UR STRIP-MCNC: 300 MG/DL
PROT UR STRIP-MCNC: ABNORMAL MG/DL
PROT UR-MCNC: 96 MG/DL
PROT/CREAT UR-RTO: 0.8
PROTHROMBIN TIME: 13 SEC (ref 11.5–15.2)
PROTHROMBIN TIME: 14.5 SEC (ref 11.5–15.2)
PROTHROMBIN TIME: 15.2 SEC (ref 11.5–15.2)
PROTHROMBIN TIME: 17 SEC (ref 11.5–15.2)
PROTHROMBIN TIME: 17.3 SEC (ref 11.5–15.2)
PROTHROMBIN TIME: 18.1 SEC (ref 11.5–15.2)
Q-T INTERVAL, ECG07: 232 MS
Q-T INTERVAL, ECG07: 240 MS
Q-T INTERVAL, ECG07: 248 MS
Q-T INTERVAL, ECG07: 266 MS
Q-T INTERVAL, ECG07: 266 MS
Q-T INTERVAL, ECG07: 270 MS
Q-T INTERVAL, ECG07: 280 MS
Q-T INTERVAL, ECG07: 284 MS
Q-T INTERVAL, ECG07: 294 MS
QRS DURATION, ECG06: 70 MS
QRS DURATION, ECG06: 74 MS
QRS DURATION, ECG06: 76 MS
QRS DURATION, ECG06: 76 MS
QRS DURATION, ECG06: 78 MS
QRS DURATION, ECG06: 78 MS
QRS DURATION, ECG06: 82 MS
QTC CALCULATION (BEZET), ECG08: 381 MS
QTC CALCULATION (BEZET), ECG08: 397 MS
QTC CALCULATION (BEZET), ECG08: 412 MS
QTC CALCULATION (BEZET), ECG08: 417 MS
QTC CALCULATION (BEZET), ECG08: 419 MS
QTC CALCULATION (BEZET), ECG08: 422 MS
QTC CALCULATION (BEZET), ECG08: 428 MS
QTC CALCULATION (BEZET), ECG08: 429 MS
QTC CALCULATION (BEZET), ECG08: 432 MS
RBC # BLD AUTO: 2.08 M/UL (ref 4.2–5.3)
RBC # BLD AUTO: 2.13 M/UL (ref 4.2–5.3)
RBC # BLD AUTO: 2.29 M/UL (ref 4.2–5.3)
RBC # BLD AUTO: 2.39 M/UL (ref 4.2–5.3)
RBC # BLD AUTO: 2.42 M/UL (ref 4.2–5.3)
RBC # BLD AUTO: 2.52 M/UL (ref 4.1–5.1)
RBC # BLD AUTO: 2.53 M/UL (ref 4.2–5.3)
RBC # BLD AUTO: 2.58 M/UL (ref 4.2–5.3)
RBC # BLD AUTO: 2.61 M/UL (ref 4.2–5.3)
RBC # BLD AUTO: 2.81 M/UL (ref 4.2–5.3)
RBC # BLD AUTO: 2.84 M/UL (ref 4.1–5.1)
RBC # BLD AUTO: 2.99 M/UL (ref 4.2–5.3)
RBC # BLD AUTO: 3.02 M/UL (ref 4.2–5.3)
RBC # BLD AUTO: 3.08 M/UL (ref 4.2–5.3)
RBC # BLD AUTO: 3.17 M/UL (ref 4.1–5.1)
RBC # BLD AUTO: 3.22 M/UL (ref 4.1–5.1)
RBC # BLD AUTO: 3.23 M/UL (ref 4.2–5.3)
RBC # BLD AUTO: 3.26 M/UL (ref 4.2–5.3)
RBC # BLD AUTO: 3.29 M/UL (ref 4.1–5.1)
RBC # BLD AUTO: 3.33 M/UL (ref 4.2–5.3)
RBC # BLD AUTO: 3.34 M/UL (ref 4.1–5.1)
RBC # BLD AUTO: 3.41 M/UL (ref 4.2–5.3)
RBC # BLD AUTO: 3.44 M/UL (ref 4.2–5.3)
RBC # BLD AUTO: 3.49 M/UL (ref 4.1–5.1)
RBC # BLD AUTO: 3.51 M/UL (ref 4.2–5.3)
RBC # BLD AUTO: 3.57 M/UL (ref 4.1–5.1)
RBC # BLD AUTO: 3.66 M/UL (ref 4.2–5.3)
RBC # BLD AUTO: 3.83 M/UL (ref 4.2–5.3)
RBC #/AREA URNS HPF: ABNORMAL /HPF (ref 0–5)
RBC MORPH BLD: ABNORMAL
SERVICE CMNT-IMP: ABNORMAL
SERVICE CMNT-IMP: NORMAL
SODIUM BLD-SCNC: 132 MMOL/L (ref 136–145)
SODIUM BLD-SCNC: 136 MMOL/L (ref 136–145)
SODIUM BLD-SCNC: 138 MMOL/L (ref 136–145)
SODIUM BLD-SCNC: 138 MMOL/L (ref 136–145)
SODIUM BLD-SCNC: 139 MMOL/L (ref 136–145)
SODIUM BLD-SCNC: 141 MMOL/L (ref 136–145)
SODIUM SERPL-SCNC: 133 MMOL/L (ref 136–145)
SODIUM SERPL-SCNC: 134 MMOL/L (ref 136–145)
SODIUM SERPL-SCNC: 135 MMOL/L (ref 136–145)
SODIUM SERPL-SCNC: 136 MMOL/L (ref 136–145)
SODIUM SERPL-SCNC: 137 MMOL/L (ref 136–145)
SODIUM SERPL-SCNC: 137 MMOL/L (ref 136–145)
SODIUM SERPL-SCNC: 138 MMOL/L (ref 136–145)
SODIUM SERPL-SCNC: 139 MMOL/L (ref 136–145)
SODIUM SERPL-SCNC: 141 MMOL/L (ref 136–145)
SODIUM SERPL-SCNC: 142 MMOL/L (ref 136–145)
SODIUM SERPL-SCNC: 142 MMOL/L (ref 136–145)
SODIUM SERPL-SCNC: 143 MMOL/L (ref 136–145)
SODIUM UR-SCNC: 94 MMOL/L (ref 20–110)
SP GR UR REFRACTOMETRY: 1.01 (ref 1–1.03)
SP GR UR REFRACTOMETRY: 1.02 (ref 1–1.03)
SP GR UR REFRACTOMETRY: >1.03 (ref 1–1.03)
SPECIMEN EXP DATE BLD: NORMAL
STATUS OF UNIT,%ST: NORMAL
TROPONIN I SERPL-MCNC: <0.02 NG/ML (ref 0–0.04)
TROPONIN I SERPL-MCNC: <0.02 NG/ML (ref 0–0.04)
UNIT DIVISION, %UDIV: 0
URATE SERPL-MCNC: 14.5 MG/DL (ref 2.6–7.2)
UROBILINOGEN UR QL STRIP.AUTO: 0.2 EU/DL (ref 0.2–1)
UROBILINOGEN UR QL STRIP.AUTO: 1 EU/DL (ref 0.2–1)
VANCOMYCIN SERPL-MCNC: 18.2 UG/ML (ref 5–40)
VANCOMYCIN SERPL-MCNC: 24 UG/ML (ref 5–40)
VENTRICULAR RATE, ECG03: 110 BPM
VENTRICULAR RATE, ECG03: 120 BPM
VENTRICULAR RATE, ECG03: 130 BPM
VENTRICULAR RATE, ECG03: 137 BPM
VENTRICULAR RATE, ECG03: 148 BPM
VENTRICULAR RATE, ECG03: 152 BPM
VENTRICULAR RATE, ECG03: 180 BPM
VENTRICULAR RATE, ECG03: 195 BPM
VENTRICULAR RATE, ECG03: 196 BPM
WBC # BLD AUTO: 0.3 K/UL (ref 4.6–13.2)
WBC # BLD AUTO: 0.4 K/UL (ref 4.6–13.2)
WBC # BLD AUTO: 10.5 K/UL (ref 4.6–13.2)
WBC # BLD AUTO: 10.6 K/UL (ref 4.6–13.2)
WBC # BLD AUTO: 11.3 K/UL (ref 4.6–13.2)
WBC # BLD AUTO: 11.6 K/UL (ref 4.6–13.2)
WBC # BLD AUTO: 13.1 K/UL (ref 4.6–13.2)
WBC # BLD AUTO: 13.3 K/UL (ref 4.5–13)
WBC # BLD AUTO: 13.7 K/UL (ref 4.6–13.2)
WBC # BLD AUTO: 14.1 K/UL (ref 4.6–13.2)
WBC # BLD AUTO: 14.5 K/UL (ref 4.6–13.2)
WBC # BLD AUTO: 15.1 K/UL (ref 4.6–13.2)
WBC # BLD AUTO: 17.1 K/UL (ref 4.5–13)
WBC # BLD AUTO: 5 K/UL (ref 4.6–13.2)
WBC # BLD AUTO: 5.5 K/UL (ref 4.6–13.2)
WBC # BLD AUTO: 7 K/UL (ref 4.5–13)
WBC # BLD AUTO: 7.4 K/UL (ref 4.6–13.2)
WBC # BLD AUTO: 7.7 K/UL (ref 4.5–13)
WBC # BLD AUTO: 7.9 K/UL (ref 4.5–13)
WBC # BLD AUTO: 7.9 K/UL (ref 4.6–13.2)
WBC # BLD AUTO: 8 K/UL (ref 4.5–13)
WBC # BLD AUTO: 8.5 K/UL (ref 4.5–13)
WBC # BLD AUTO: 8.9 K/UL (ref 4.6–13.2)
WBC # BLD AUTO: 9.4 K/UL (ref 4.5–13)
WBC # BLD AUTO: 9.5 K/UL (ref 4.6–13.2)
WBC # BLD AUTO: 9.6 K/UL (ref 4.6–13.2)
WBC URNS QL MICRO: >100 /HPF (ref 0–4)
WBC URNS QL MICRO: ABNORMAL /HPF (ref 0–4)

## 2019-01-01 PROCEDURE — 76937 US GUIDE VASCULAR ACCESS: CPT | Performed by: SURGERY

## 2019-01-01 PROCEDURE — 74011250636 HC RX REV CODE- 250/636: Performed by: NURSE ANESTHETIST, CERTIFIED REGISTERED

## 2019-01-01 PROCEDURE — 74011250636 HC RX REV CODE- 250/636

## 2019-01-01 PROCEDURE — 74011250636 HC RX REV CODE- 250/636: Performed by: RADIOLOGY

## 2019-01-01 PROCEDURE — 87070 CULTURE OTHR SPECIMN AEROBIC: CPT

## 2019-01-01 PROCEDURE — 74011250636 HC RX REV CODE- 250/636: Performed by: INTERNAL MEDICINE

## 2019-01-01 PROCEDURE — 74011250636 HC RX REV CODE- 250/636: Performed by: OBSTETRICS & GYNECOLOGY

## 2019-01-01 PROCEDURE — 77030012965 HC NDL HUBR BBMI -A

## 2019-01-01 PROCEDURE — 49406 IMAGE CATH FLUID PERI/RETRO: CPT

## 2019-01-01 PROCEDURE — 96366 THER/PROPH/DIAG IV INF ADDON: CPT

## 2019-01-01 PROCEDURE — 74011000250 HC RX REV CODE- 250: Performed by: INTERNAL MEDICINE

## 2019-01-01 PROCEDURE — 96375 TX/PRO/DX INJ NEW DRUG ADDON: CPT

## 2019-01-01 PROCEDURE — 74011250637 HC RX REV CODE- 250/637: Performed by: INTERNAL MEDICINE

## 2019-01-01 PROCEDURE — 74011000250 HC RX REV CODE- 250

## 2019-01-01 PROCEDURE — A4223 INFUSION SUPPLIES W/O PUMP: HCPCS

## 2019-01-01 PROCEDURE — 65660000000 HC RM CCU STEPDOWN

## 2019-01-01 PROCEDURE — 96376 TX/PRO/DX INJ SAME DRUG ADON: CPT

## 2019-01-01 PROCEDURE — 87077 CULTURE AEROBIC IDENTIFY: CPT

## 2019-01-01 PROCEDURE — 74011636320 HC RX REV CODE- 636/320: Performed by: UROLOGY

## 2019-01-01 PROCEDURE — 83735 ASSAY OF MAGNESIUM: CPT

## 2019-01-01 PROCEDURE — 96365 THER/PROPH/DIAG IV INF INIT: CPT

## 2019-01-01 PROCEDURE — 84100 ASSAY OF PHOSPHORUS: CPT

## 2019-01-01 PROCEDURE — 96367 TX/PROPH/DG ADDL SEQ IV INF: CPT

## 2019-01-01 PROCEDURE — C1769 GUIDE WIRE: HCPCS | Performed by: UROLOGY

## 2019-01-01 PROCEDURE — 30233K1 TRANSFUSION OF NONAUTOLOGOUS FROZEN PLASMA INTO PERIPHERAL VEIN, PERCUTANEOUS APPROACH: ICD-10-PCS | Performed by: EMERGENCY MEDICINE

## 2019-01-01 PROCEDURE — 77030037878 HC DRSG MEPILEX >48IN BORD MOLN -B

## 2019-01-01 PROCEDURE — 96413 CHEMO IV INFUSION 1 HR: CPT

## 2019-01-01 PROCEDURE — 74011000250 HC RX REV CODE- 250: Performed by: OBSTETRICS & GYNECOLOGY

## 2019-01-01 PROCEDURE — 82550 ASSAY OF CK (CPK): CPT

## 2019-01-01 PROCEDURE — 86923 COMPATIBILITY TEST ELECTRIC: CPT

## 2019-01-01 PROCEDURE — 80076 HEPATIC FUNCTION PANEL: CPT

## 2019-01-01 PROCEDURE — 97162 PT EVAL MOD COMPLEX 30 MIN: CPT

## 2019-01-01 PROCEDURE — 97530 THERAPEUTIC ACTIVITIES: CPT

## 2019-01-01 PROCEDURE — 96361 HYDRATE IV INFUSION ADD-ON: CPT

## 2019-01-01 PROCEDURE — 74011000258 HC RX REV CODE- 258: Performed by: OBSTETRICS & GYNECOLOGY

## 2019-01-01 PROCEDURE — 74011000258 HC RX REV CODE- 258: Performed by: INTERNAL MEDICINE

## 2019-01-01 PROCEDURE — 77030040361 HC SLV COMPR DVT MDII -B: Performed by: UROLOGY

## 2019-01-01 PROCEDURE — 74011250637 HC RX REV CODE- 250/637: Performed by: NURSE PRACTITIONER

## 2019-01-01 PROCEDURE — 80048 BASIC METABOLIC PNL TOTAL CA: CPT

## 2019-01-01 PROCEDURE — 77030005206: Performed by: UROLOGY

## 2019-01-01 PROCEDURE — 85025 COMPLETE CBC W/AUTO DIFF WBC: CPT

## 2019-01-01 PROCEDURE — P9016 RBC LEUKOCYTES REDUCED: HCPCS

## 2019-01-01 PROCEDURE — 87186 SC STD MICRODIL/AGAR DIL: CPT

## 2019-01-01 PROCEDURE — 74011250637 HC RX REV CODE- 250/637: Performed by: FAMILY MEDICINE

## 2019-01-01 PROCEDURE — 74011000258 HC RX REV CODE- 258: Performed by: COLON & RECTAL SURGERY

## 2019-01-01 PROCEDURE — 36415 COLL VENOUS BLD VENIPUNCTURE: CPT

## 2019-01-01 PROCEDURE — 96415 CHEMO IV INFUSION ADDL HR: CPT

## 2019-01-01 PROCEDURE — 37191 INS ENDOVAS VENA CAVA FILTR: CPT | Performed by: SURGERY

## 2019-01-01 PROCEDURE — 74011636320 HC RX REV CODE- 636/320: Performed by: SURGERY

## 2019-01-01 PROCEDURE — G0299 HHS/HOSPICE OF RN EA 15 MIN: HCPCS

## 2019-01-01 PROCEDURE — 82947 ASSAY GLUCOSE BLOOD QUANT: CPT

## 2019-01-01 PROCEDURE — 0T25X0Z CHANGE DRAINAGE DEVICE IN KIDNEY, EXTERNAL APPROACH: ICD-10-PCS | Performed by: RADIOLOGY

## 2019-01-01 PROCEDURE — 65270000029 HC RM PRIVATE

## 2019-01-01 PROCEDURE — 93005 ELECTROCARDIOGRAM TRACING: CPT

## 2019-01-01 PROCEDURE — 76060000033 HC ANESTHESIA 1 TO 1.5 HR: Performed by: UROLOGY

## 2019-01-01 PROCEDURE — 65610000006 HC RM INTENSIVE CARE

## 2019-01-01 PROCEDURE — 400013 HH SOC

## 2019-01-01 PROCEDURE — 77030013140 HC MSK NEB VYRM -A

## 2019-01-01 PROCEDURE — 76060000032 HC ANESTHESIA 0.5 TO 1 HR: Performed by: UROLOGY

## 2019-01-01 PROCEDURE — 74011250636 HC RX REV CODE- 250/636: Performed by: NURSE PRACTITIONER

## 2019-01-01 PROCEDURE — 80047 BASIC METABLC PNL IONIZED CA: CPT

## 2019-01-01 PROCEDURE — 74011250637 HC RX REV CODE- 250/637: Performed by: PHYSICIAN ASSISTANT

## 2019-01-01 PROCEDURE — 77030013744: Performed by: SURGERY

## 2019-01-01 PROCEDURE — 0T788DZ DILATION OF BILATERAL URETERS WITH INTRALUMINAL DEVICE, VIA NATURAL OR ARTIFICIAL OPENING ENDOSCOPIC: ICD-10-PCS | Performed by: UROLOGY

## 2019-01-01 PROCEDURE — 74011636320 HC RX REV CODE- 636/320: Performed by: RADIOLOGY

## 2019-01-01 PROCEDURE — 77030018836 HC SOL IRR NACL ICUM -A: Performed by: UROLOGY

## 2019-01-01 PROCEDURE — 74011250636 HC RX REV CODE- 250/636: Performed by: PHYSICIAN ASSISTANT

## 2019-01-01 PROCEDURE — 96523 IRRIG DRUG DELIVERY DEVICE: CPT

## 2019-01-01 PROCEDURE — 74011636637 HC RX REV CODE- 636/637: Performed by: INTERNAL MEDICINE

## 2019-01-01 PROCEDURE — 83540 ASSAY OF IRON: CPT

## 2019-01-01 PROCEDURE — 74011000250 HC RX REV CODE- 250: Performed by: COLON & RECTAL SURGERY

## 2019-01-01 PROCEDURE — 86304 IMMUNOASSAY TUMOR CA 125: CPT

## 2019-01-01 PROCEDURE — 82728 ASSAY OF FERRITIN: CPT

## 2019-01-01 PROCEDURE — 99285 EMERGENCY DEPT VISIT HI MDM: CPT

## 2019-01-01 PROCEDURE — 77030020782 HC GWN BAIR PAWS FLX 3M -B: Performed by: UROLOGY

## 2019-01-01 PROCEDURE — 97116 GAIT TRAINING THERAPY: CPT

## 2019-01-01 PROCEDURE — 74420 UROGRAPHY RTRGR +-KUB: CPT

## 2019-01-01 PROCEDURE — 83690 ASSAY OF LIPASE: CPT

## 2019-01-01 PROCEDURE — 99211 OFF/OP EST MAY X REQ PHY/QHP: CPT

## 2019-01-01 PROCEDURE — 74011250636 HC RX REV CODE- 250/636: Performed by: UROLOGY

## 2019-01-01 PROCEDURE — 87086 URINE CULTURE/COLONY COUNT: CPT

## 2019-01-01 PROCEDURE — 96360 HYDRATION IV INFUSION INIT: CPT

## 2019-01-01 PROCEDURE — 74011000258 HC RX REV CODE- 258: Performed by: PHYSICIAN ASSISTANT

## 2019-01-01 PROCEDURE — 74011250636 HC RX REV CODE- 250/636: Performed by: FAMILY MEDICINE

## 2019-01-01 PROCEDURE — 86900 BLOOD TYPING SEROLOGIC ABO: CPT

## 2019-01-01 PROCEDURE — G0300 HHS/HOSPICE OF LPN EA 15 MIN: HCPCS

## 2019-01-01 PROCEDURE — 36591 DRAW BLOOD OFF VENOUS DEVICE: CPT

## 2019-01-01 PROCEDURE — 74176 CT ABD & PELVIS W/O CONTRAST: CPT

## 2019-01-01 PROCEDURE — G0157 HHC PT ASSISTANT EA 15: HCPCS

## 2019-01-01 PROCEDURE — 0W9J30Z DRAINAGE OF PELVIC CAVITY WITH DRAINAGE DEVICE, PERCUTANEOUS APPROACH: ICD-10-PCS | Performed by: STUDENT IN AN ORGANIZED HEALTH CARE EDUCATION/TRAINING PROGRAM

## 2019-01-01 PROCEDURE — 74011250636 HC RX REV CODE- 250/636: Performed by: COLON & RECTAL SURGERY

## 2019-01-01 PROCEDURE — 85027 COMPLETE CBC AUTOMATED: CPT

## 2019-01-01 PROCEDURE — 77030004561 HC CATH ANGI DX COBRA ANGI -B

## 2019-01-01 PROCEDURE — 85730 THROMBOPLASTIN TIME PARTIAL: CPT

## 2019-01-01 PROCEDURE — BT131ZZ FLUOROSCOPY OF BILATERAL KIDNEYS USING LOW OSMOLAR CONTRAST: ICD-10-PCS | Performed by: RADIOLOGY

## 2019-01-01 PROCEDURE — 74011250636 HC RX REV CODE- 250/636: Performed by: EMERGENCY MEDICINE

## 2019-01-01 PROCEDURE — G0151 HHCP-SERV OF PT,EA 15 MIN: HCPCS

## 2019-01-01 PROCEDURE — 74011636637 HC RX REV CODE- 636/637: Performed by: PHYSICIAN ASSISTANT

## 2019-01-01 PROCEDURE — 76210000006 HC OR PH I REC 0.5 TO 1 HR: Performed by: UROLOGY

## 2019-01-01 PROCEDURE — C2617 STENT, NON-COR, TEM W/O DEL: HCPCS | Performed by: UROLOGY

## 2019-01-01 PROCEDURE — C1880 VENA CAVA FILTER: HCPCS | Performed by: SURGERY

## 2019-01-01 PROCEDURE — P9059 PLASMA, FRZ BETWEEN 8-24HOUR: HCPCS

## 2019-01-01 PROCEDURE — 36430 TRANSFUSION BLD/BLD COMPNT: CPT

## 2019-01-01 PROCEDURE — 74011250637 HC RX REV CODE- 250/637: Performed by: COLON & RECTAL SURGERY

## 2019-01-01 PROCEDURE — 87185 SC STD ENZYME DETCJ PER NZM: CPT

## 2019-01-01 PROCEDURE — 77030004530 HC CATH ANGI DX IMGR BSC -A: Performed by: SURGERY

## 2019-01-01 PROCEDURE — 77030027138 HC INCENT SPIROMETER -A

## 2019-01-01 PROCEDURE — 74011000258 HC RX REV CODE- 258: Performed by: NURSE PRACTITIONER

## 2019-01-01 PROCEDURE — 74177 CT ABD & PELVIS W/CONTRAST: CPT

## 2019-01-01 PROCEDURE — 77030010509 HC AIRWY LMA MSK TELE -A: Performed by: ANESTHESIOLOGY

## 2019-01-01 PROCEDURE — 77030008683 HC TU ET CUF COVD -A: Performed by: ANESTHESIOLOGY

## 2019-01-01 PROCEDURE — 77030019927 HC TBNG IRR CYSTO BAXT -A: Performed by: UROLOGY

## 2019-01-01 PROCEDURE — 81001 URINALYSIS AUTO W/SCOPE: CPT

## 2019-01-01 PROCEDURE — 49060 DRAIN OPEN RETROPERI ABSCESS: CPT

## 2019-01-01 PROCEDURE — A4333 URINARY CATH ANCHOR DEVICE: HCPCS

## 2019-01-01 PROCEDURE — 71045 X-RAY EXAM CHEST 1 VIEW: CPT

## 2019-01-01 PROCEDURE — 20501 NJX SINUS TRACT DIAGNOSTIC: CPT

## 2019-01-01 PROCEDURE — 77030018846 HC SOL IRR STRL H20 ICUM -A

## 2019-01-01 PROCEDURE — 77030034696 HC CATH URETH FOL 2W BARD -A: Performed by: UROLOGY

## 2019-01-01 PROCEDURE — A4245 ALCOHOL WIPES PER BOX: HCPCS

## 2019-01-01 PROCEDURE — 74011636320 HC RX REV CODE- 636/320: Performed by: FAMILY MEDICINE

## 2019-01-01 PROCEDURE — 77030010545: Performed by: UROLOGY

## 2019-01-01 PROCEDURE — 93971 EXTREMITY STUDY: CPT

## 2019-01-01 PROCEDURE — 85018 HEMOGLOBIN: CPT

## 2019-01-01 PROCEDURE — 76010000161 HC OR TIME 1 TO 1.5 HR INTENSV-TIER 1: Performed by: UROLOGY

## 2019-01-01 PROCEDURE — 84132 ASSAY OF SERUM POTASSIUM: CPT

## 2019-01-01 PROCEDURE — 87040 BLOOD CULTURE FOR BACTERIA: CPT

## 2019-01-01 PROCEDURE — 83605 ASSAY OF LACTIC ACID: CPT

## 2019-01-01 PROCEDURE — 76210000020 HC REC RM PH II FIRST 0.5 HR: Performed by: UROLOGY

## 2019-01-01 PROCEDURE — G0495 RN CARE TRAIN/EDU IN HH: HCPCS

## 2019-01-01 PROCEDURE — 74011636320 HC RX REV CODE- 636/320: Performed by: COLON & RECTAL SURGERY

## 2019-01-01 PROCEDURE — 74011000250 HC RX REV CODE- 250: Performed by: STUDENT IN AN ORGANIZED HEALTH CARE EDUCATION/TRAINING PROGRAM

## 2019-01-01 PROCEDURE — 71275 CT ANGIOGRAPHY CHEST: CPT

## 2019-01-01 PROCEDURE — 74011000258 HC RX REV CODE- 258: Performed by: EMERGENCY MEDICINE

## 2019-01-01 PROCEDURE — 36593 DECLOT VASCULAR DEVICE: CPT

## 2019-01-01 PROCEDURE — 87076 CULTURE ANAEROBE IDENT EACH: CPT

## 2019-01-01 PROCEDURE — A5120 SKIN BARRIER, WIPE OR SWAB: HCPCS

## 2019-01-01 PROCEDURE — 82565 ASSAY OF CREATININE: CPT

## 2019-01-01 PROCEDURE — 82962 GLUCOSE BLOOD TEST: CPT

## 2019-01-01 PROCEDURE — 74011000250 HC RX REV CODE- 250: Performed by: UROLOGY

## 2019-01-01 PROCEDURE — 76450000000

## 2019-01-01 PROCEDURE — 96417 CHEMO IV INFUS EACH ADDL SEQ: CPT

## 2019-01-01 PROCEDURE — 87804 INFLUENZA ASSAY W/OPTIC: CPT

## 2019-01-01 PROCEDURE — 97535 SELF CARE MNGMENT TRAINING: CPT

## 2019-01-01 PROCEDURE — 96374 THER/PROPH/DIAG INJ IV PUSH: CPT

## 2019-01-01 PROCEDURE — 84484 ASSAY OF TROPONIN QUANT: CPT

## 2019-01-01 PROCEDURE — 84550 ASSAY OF BLOOD/URIC ACID: CPT

## 2019-01-01 PROCEDURE — 82553 CREATINE MB FRACTION: CPT

## 2019-01-01 PROCEDURE — 77030040830 HC CATH URETH FOL MDII -A

## 2019-01-01 PROCEDURE — 74011000250 HC RX REV CODE- 250: Performed by: PHYSICIAN ASSISTANT

## 2019-01-01 PROCEDURE — 82570 ASSAY OF URINE CREATININE: CPT

## 2019-01-01 PROCEDURE — 99152 MOD SED SAME PHYS/QHP 5/>YRS: CPT

## 2019-01-01 PROCEDURE — 77030020268 HC MISC GENERAL SUPPLY: Performed by: UROLOGY

## 2019-01-01 PROCEDURE — 75984 XRAY CONTROL CATHETER CHANGE: CPT

## 2019-01-01 PROCEDURE — 74011250636 HC RX REV CODE- 250/636: Performed by: SURGERY

## 2019-01-01 PROCEDURE — 84156 ASSAY OF PROTEIN URINE: CPT

## 2019-01-01 PROCEDURE — 74011250637 HC RX REV CODE- 250/637: Performed by: NURSE ANESTHETIST, CERTIFIED REGISTERED

## 2019-01-01 PROCEDURE — 76210000026 HC REC RM PH II 1 TO 1.5 HR: Performed by: UROLOGY

## 2019-01-01 PROCEDURE — 77030038269 HC DRN EXT URIN PURWCK BARD -A

## 2019-01-01 PROCEDURE — 85610 PROTHROMBIN TIME: CPT

## 2019-01-01 PROCEDURE — BT141ZZ FLUOROSCOPY OF KIDNEYS, URETERS AND BLADDER USING LOW OSMOLAR CONTRAST: ICD-10-PCS | Performed by: UROLOGY

## 2019-01-01 PROCEDURE — 80053 COMPREHEN METABOLIC PANEL: CPT

## 2019-01-01 PROCEDURE — 30233N1 TRANSFUSION OF NONAUTOLOGOUS RED BLOOD CELLS INTO PERIPHERAL VEIN, PERCUTANEOUS APPROACH: ICD-10-PCS | Performed by: EMERGENCY MEDICINE

## 2019-01-01 PROCEDURE — 86140 C-REACTIVE PROTEIN: CPT

## 2019-01-01 PROCEDURE — 76010000160 HC OR TIME 0.5 TO 1 HR INTENSV-TIER 1: Performed by: UROLOGY

## 2019-01-01 PROCEDURE — 77030025702 HC BG URIN DRN MRTM -A

## 2019-01-01 PROCEDURE — 0W9H30Z DRAINAGE OF RETROPERITONEUM WITH DRAINAGE DEVICE, PERCUTANEOUS APPROACH: ICD-10-PCS | Performed by: RADIOLOGY

## 2019-01-01 PROCEDURE — P9035 PLATELET PHERES LEUKOREDUCED: HCPCS

## 2019-01-01 PROCEDURE — 84300 ASSAY OF URINE SODIUM: CPT

## 2019-01-01 PROCEDURE — C1769 GUIDE WIRE: HCPCS

## 2019-01-01 PROCEDURE — 80202 ASSAY OF VANCOMYCIN: CPT

## 2019-01-01 PROCEDURE — 87205 SMEAR GRAM STAIN: CPT

## 2019-01-01 PROCEDURE — A6402 STERILE GAUZE <= 16 SQ IN: HCPCS

## 2019-01-01 PROCEDURE — 77030032490 HC SLV COMPR SCD KNE COVD -B: Performed by: UROLOGY

## 2019-01-01 PROCEDURE — 0TP98DZ REMOVAL OF INTRALUMINAL DEVICE FROM URETER, VIA NATURAL OR ARTIFICIAL OPENING ENDOSCOPIC: ICD-10-PCS | Performed by: UROLOGY

## 2019-01-01 PROCEDURE — 76210000016 HC OR PH I REC 1 TO 1.5 HR: Performed by: UROLOGY

## 2019-01-01 PROCEDURE — 87075 CULTR BACTERIA EXCEPT BLOOD: CPT

## 2019-01-01 PROCEDURE — 77030013169 SET IV BLD ICUM -A

## 2019-01-01 PROCEDURE — C1769 GUIDE WIRE: HCPCS | Performed by: SURGERY

## 2019-01-01 PROCEDURE — 86901 BLOOD TYPING SEROLOGIC RH(D): CPT

## 2019-01-01 PROCEDURE — C1758 CATHETER, URETERAL: HCPCS | Performed by: UROLOGY

## 2019-01-01 PROCEDURE — 97165 OT EVAL LOW COMPLEX 30 MIN: CPT

## 2019-01-01 PROCEDURE — 30233R1 TRANSFUSION OF NONAUTOLOGOUS PLATELETS INTO PERIPHERAL VEIN, PERCUTANEOUS APPROACH: ICD-10-PCS | Performed by: EMERGENCY MEDICINE

## 2019-01-01 PROCEDURE — 76010000138 HC OR TIME 0.5 TO 1 HR: Performed by: UROLOGY

## 2019-01-01 PROCEDURE — 96368 THER/DIAG CONCURRENT INF: CPT

## 2019-01-01 PROCEDURE — 82140 ASSAY OF AMMONIA: CPT

## 2019-01-01 PROCEDURE — 74011250636 HC RX REV CODE- 250/636: Performed by: STUDENT IN AN ORGANIZED HEALTH CARE EDUCATION/TRAINING PROGRAM

## 2019-01-01 PROCEDURE — 74178 CT ABD&PLV WO CNTR FLWD CNTR: CPT

## 2019-01-01 PROCEDURE — A4452 WATERPROOF TAPE: HCPCS

## 2019-01-01 DEVICE — URETERAL STENT
Type: IMPLANTABLE DEVICE | Site: URETER | Status: FUNCTIONAL
Brand: POLARIS™ ULTRA

## 2019-01-01 DEVICE — FILTER VENA CAVA 7FR 79X65CM -- IGTCFS-65-1-FEM-CELECT-PT: Type: IMPLANTABLE DEVICE | Status: FUNCTIONAL

## 2019-01-01 DEVICE — RESONANCE, METALLIC URETERAL STENT AND INTRODUCER
Type: IMPLANTABLE DEVICE | Site: URETER | Status: FUNCTIONAL
Brand: RESONANCE

## 2019-01-01 RX ORDER — SODIUM CHLORIDE 0.9 % (FLUSH) 0.9 %
10 SYRINGE (ML) INJECTION AS NEEDED
Status: CANCELLED
Start: 2019-01-01

## 2019-01-01 RX ORDER — SODIUM CHLORIDE 0.9 % (FLUSH) 0.9 %
10-40 SYRINGE (ML) INJECTION AS NEEDED
Status: DISCONTINUED | OUTPATIENT
Start: 2019-01-01 | End: 2019-01-01 | Stop reason: HOSPADM

## 2019-01-01 RX ORDER — ALBUTEROL SULFATE 0.83 MG/ML
2.5 SOLUTION RESPIRATORY (INHALATION) AS NEEDED
Status: DISCONTINUED | OUTPATIENT
Start: 2019-01-01 | End: 2019-01-01 | Stop reason: HOSPADM

## 2019-01-01 RX ORDER — SODIUM CHLORIDE 9 MG/ML
500 INJECTION, SOLUTION INTRAVENOUS ONCE
Status: COMPLETED | OUTPATIENT
Start: 2019-01-01 | End: 2019-01-01

## 2019-01-01 RX ORDER — HEPARIN 100 UNIT/ML
SYRINGE INTRAVENOUS
Status: DISCONTINUED
Start: 2019-01-01 | End: 2019-01-01 | Stop reason: WASHOUT

## 2019-01-01 RX ORDER — MORPHINE SULFATE 2 MG/ML
2-4 INJECTION, SOLUTION INTRAMUSCULAR; INTRAVENOUS
Status: DISCONTINUED | OUTPATIENT
Start: 2019-01-01 | End: 2019-01-01

## 2019-01-01 RX ORDER — ONDANSETRON 2 MG/ML
8 INJECTION INTRAMUSCULAR; INTRAVENOUS AS NEEDED
Status: CANCELLED | OUTPATIENT
Start: 2019-01-01

## 2019-01-01 RX ORDER — HEPARIN 100 UNIT/ML
500 SYRINGE INTRAVENOUS ONCE
Status: COMPLETED | OUTPATIENT
Start: 2019-01-01 | End: 2019-01-01

## 2019-01-01 RX ORDER — ACETAMINOPHEN 325 MG/1
650 TABLET ORAL AS NEEDED
Status: CANCELLED
Start: 2019-01-01

## 2019-01-01 RX ORDER — SODIUM CHLORIDE 9 MG/ML
10 INJECTION INTRAMUSCULAR; INTRAVENOUS; SUBCUTANEOUS AS NEEDED
Status: CANCELLED | OUTPATIENT
Start: 2019-01-01

## 2019-01-01 RX ORDER — SODIUM CHLORIDE 0.9 % (FLUSH) 0.9 %
10-40 SYRINGE (ML) INJECTION AS NEEDED
Status: DISPENSED | OUTPATIENT
Start: 2019-01-01 | End: 2019-01-01

## 2019-01-01 RX ORDER — MORPHINE SULFATE 4 MG/ML
4 INJECTION INTRAVENOUS
Status: DISCONTINUED | OUTPATIENT
Start: 2019-01-01 | End: 2019-01-01

## 2019-01-01 RX ORDER — EPINEPHRINE 1 MG/ML
0.3 INJECTION, SOLUTION, CONCENTRATE INTRAVENOUS AS NEEDED
Status: CANCELLED | OUTPATIENT
Start: 2019-01-01

## 2019-01-01 RX ORDER — HEPARIN 100 UNIT/ML
SYRINGE INTRAVENOUS
Status: COMPLETED
Start: 2019-01-01 | End: 2019-01-01

## 2019-01-01 RX ORDER — SODIUM CHLORIDE 0.9 % (FLUSH) 0.9 %
10 SYRINGE (ML) INJECTION AS NEEDED
Status: DISPENSED | OUTPATIENT
Start: 2019-01-01 | End: 2019-01-01

## 2019-01-01 RX ORDER — SODIUM CHLORIDE 9 MG/ML
500 INJECTION, SOLUTION INTRAVENOUS ONCE
Status: CANCELLED | OUTPATIENT
Start: 2019-01-01 | End: 2019-01-01

## 2019-01-01 RX ORDER — ALBUTEROL SULFATE 0.83 MG/ML
2.5 SOLUTION RESPIRATORY (INHALATION) AS NEEDED
Status: CANCELLED
Start: 2019-01-01

## 2019-01-01 RX ORDER — HEPARIN 100 UNIT/ML
300-500 SYRINGE INTRAVENOUS AS NEEDED
Status: CANCELLED
Start: 2019-01-01

## 2019-01-01 RX ORDER — FENTANYL CITRATE 50 UG/ML
50 INJECTION, SOLUTION INTRAMUSCULAR; INTRAVENOUS
Status: DISCONTINUED | OUTPATIENT
Start: 2019-01-01 | End: 2019-01-01 | Stop reason: HOSPADM

## 2019-01-01 RX ORDER — DIPHENHYDRAMINE HYDROCHLORIDE 50 MG/ML
50 INJECTION, SOLUTION INTRAMUSCULAR; INTRAVENOUS AS NEEDED
Status: CANCELLED
Start: 2019-01-01

## 2019-01-01 RX ORDER — SODIUM CHLORIDE 9 MG/ML
250 INJECTION, SOLUTION INTRAVENOUS ONCE
Status: COMPLETED | OUTPATIENT
Start: 2019-01-01 | End: 2019-01-01

## 2019-01-01 RX ORDER — DEXAMETHASONE SODIUM PHOSPHATE 4 MG/ML
8 INJECTION, SOLUTION INTRA-ARTICULAR; INTRALESIONAL; INTRAMUSCULAR; INTRAVENOUS; SOFT TISSUE ONCE
Status: CANCELLED
Start: 2019-01-01 | End: 2019-01-01

## 2019-01-01 RX ORDER — LORAZEPAM 2 MG/ML
0.26 INJECTION INTRAMUSCULAR ONCE
Status: CANCELLED
Start: 2019-01-01 | End: 2019-01-01

## 2019-01-01 RX ORDER — SODIUM CHLORIDE 9 MG/ML
250 INJECTION, SOLUTION INTRAVENOUS AS NEEDED
Status: DISCONTINUED | OUTPATIENT
Start: 2019-01-01 | End: 2019-01-01

## 2019-01-01 RX ORDER — HEPARIN 100 UNIT/ML
300-500 SYRINGE INTRAVENOUS AS NEEDED
Status: DISPENSED | OUTPATIENT
Start: 2019-01-01 | End: 2019-01-01

## 2019-01-01 RX ORDER — HEPARIN 100 UNIT/ML
SYRINGE INTRAVENOUS
Status: DISCONTINUED
Start: 2019-01-01 | End: 2019-01-01 | Stop reason: HOSPADM

## 2019-01-01 RX ORDER — SCOLOPAMINE TRANSDERMAL SYSTEM 1 MG/1
1 PATCH, EXTENDED RELEASE TRANSDERMAL
Qty: 5 PATCH | Refills: 0 | Status: SHIPPED | OUTPATIENT
Start: 2019-01-01

## 2019-01-01 RX ORDER — GLUCOSAM/CHONDRO/HERB 149/HYAL 750-100 MG
1 TABLET ORAL DAILY
Status: DISCONTINUED | OUTPATIENT
Start: 2019-01-01 | End: 2019-01-01 | Stop reason: HOSPADM

## 2019-01-01 RX ORDER — LORAZEPAM 2 MG/ML
0.26 INJECTION INTRAMUSCULAR ONCE
Status: ACTIVE | OUTPATIENT
Start: 2019-01-01 | End: 2019-01-01

## 2019-01-01 RX ORDER — DEXAMETHASONE SODIUM PHOSPHATE 4 MG/ML
8 INJECTION, SOLUTION INTRA-ARTICULAR; INTRALESIONAL; INTRAMUSCULAR; INTRAVENOUS; SOFT TISSUE ONCE
Status: CANCELLED
Start: 2019-01-01

## 2019-01-01 RX ORDER — DEXAMETHASONE SODIUM PHOSPHATE 4 MG/ML
10 INJECTION, SOLUTION INTRA-ARTICULAR; INTRALESIONAL; INTRAMUSCULAR; INTRAVENOUS; SOFT TISSUE ONCE
Status: CANCELLED
Start: 2019-01-01 | End: 2019-01-01

## 2019-01-01 RX ORDER — HEPARIN 100 UNIT/ML
SYRINGE INTRAVENOUS
Status: DISPENSED
Start: 2019-01-01 | End: 2019-01-01

## 2019-01-01 RX ORDER — HYDROCORTISONE SODIUM SUCCINATE 100 MG/2ML
100 INJECTION, POWDER, FOR SOLUTION INTRAMUSCULAR; INTRAVENOUS AS NEEDED
Status: CANCELLED | OUTPATIENT
Start: 2019-01-01

## 2019-01-01 RX ORDER — MAGNESIUM SULFATE HEPTAHYDRATE 40 MG/ML
2 INJECTION, SOLUTION INTRAVENOUS
Status: CANCELLED | OUTPATIENT
Start: 2019-01-01 | End: 2019-01-01

## 2019-01-01 RX ORDER — HEPARIN 100 UNIT/ML
500 SYRINGE INTRAVENOUS ONCE
Status: DISPENSED | OUTPATIENT
Start: 2019-01-01 | End: 2019-01-01

## 2019-01-01 RX ORDER — MORPHINE SULFATE 100 MG/5ML
5 SOLUTION ORAL
Status: DISCONTINUED | OUTPATIENT
Start: 2019-01-01 | End: 2019-01-01 | Stop reason: HOSPADM

## 2019-01-01 RX ORDER — SODIUM CHLORIDE 0.9 % (FLUSH) 0.9 %
5-40 SYRINGE (ML) INJECTION EVERY 8 HOURS
Status: DISCONTINUED | OUTPATIENT
Start: 2019-01-01 | End: 2019-01-01 | Stop reason: HOSPADM

## 2019-01-01 RX ORDER — LIDOCAINE HYDROCHLORIDE 10 MG/ML
30 INJECTION, SOLUTION EPIDURAL; INFILTRATION; INTRACAUDAL; PERINEURAL ONCE
Status: COMPLETED | OUTPATIENT
Start: 2019-01-01 | End: 2019-01-01

## 2019-01-01 RX ORDER — SIMETHICONE 80 MG
80 TABLET,CHEWABLE ORAL
Status: DISCONTINUED | OUTPATIENT
Start: 2019-01-01 | End: 2019-01-01 | Stop reason: HOSPADM

## 2019-01-01 RX ORDER — SODIUM CHLORIDE 0.9 % (FLUSH) 0.9 %
10-40 SYRINGE (ML) INJECTION AS NEEDED
Status: CANCELLED
Start: 2019-01-01

## 2019-01-01 RX ORDER — HEPARIN 100 UNIT/ML
500 SYRINGE INTRAVENOUS AS NEEDED
Status: DISCONTINUED | OUTPATIENT
Start: 2019-01-01 | End: 2019-01-01 | Stop reason: HOSPADM

## 2019-01-01 RX ORDER — MEGESTROL ACETATE 40 MG/1
80 TABLET ORAL 2 TIMES DAILY
Status: DISCONTINUED | OUTPATIENT
Start: 2019-01-01 | End: 2019-01-01

## 2019-01-01 RX ORDER — LORAZEPAM 2 MG/ML
0.26 INJECTION INTRAMUSCULAR ONCE
Status: COMPLETED | OUTPATIENT
Start: 2019-01-01 | End: 2019-01-01

## 2019-01-01 RX ORDER — SODIUM CHLORIDE 9 MG/ML
INJECTION, SOLUTION INTRAVENOUS
Status: DISCONTINUED | OUTPATIENT
Start: 2019-01-01 | End: 2019-01-01 | Stop reason: HOSPADM

## 2019-01-01 RX ORDER — SODIUM CHLORIDE 0.9 % (FLUSH) 0.9 %
5-40 SYRINGE (ML) INJECTION AS NEEDED
Status: DISCONTINUED | OUTPATIENT
Start: 2019-01-01 | End: 2019-01-01 | Stop reason: HOSPADM

## 2019-01-01 RX ORDER — SODIUM CHLORIDE 9 MG/ML
INJECTION, SOLUTION INTRAVENOUS
Status: COMPLETED | OUTPATIENT
Start: 2019-01-01 | End: 2019-01-01

## 2019-01-01 RX ORDER — PROPOFOL 10 MG/ML
INJECTION, EMULSION INTRAVENOUS AS NEEDED
Status: DISCONTINUED | OUTPATIENT
Start: 2019-01-01 | End: 2019-01-01 | Stop reason: HOSPADM

## 2019-01-01 RX ORDER — POTASSIUM CHLORIDE 7.45 MG/ML
10 INJECTION INTRAVENOUS
Status: CANCELLED | OUTPATIENT
Start: 2019-01-01 | End: 2019-01-01

## 2019-01-01 RX ORDER — ACETAMINOPHEN 650 MG/1
650 SUPPOSITORY RECTAL
Status: DISCONTINUED | OUTPATIENT
Start: 2019-01-01 | End: 2019-01-01 | Stop reason: HOSPADM

## 2019-01-01 RX ORDER — PALONOSETRON 0.05 MG/ML
0.25 INJECTION, SOLUTION INTRAVENOUS ONCE
Status: CANCELLED
Start: 2019-01-01 | End: 2019-01-01

## 2019-01-01 RX ORDER — DEXAMETHASONE SODIUM PHOSPHATE 4 MG/ML
INJECTION, SOLUTION INTRA-ARTICULAR; INTRALESIONAL; INTRAMUSCULAR; INTRAVENOUS; SOFT TISSUE AS NEEDED
Status: DISCONTINUED | OUTPATIENT
Start: 2019-01-01 | End: 2019-01-01 | Stop reason: HOSPADM

## 2019-01-01 RX ORDER — MIDAZOLAM HYDROCHLORIDE 1 MG/ML
1 INJECTION, SOLUTION INTRAMUSCULAR; INTRAVENOUS
Status: DISCONTINUED | OUTPATIENT
Start: 2019-01-01 | End: 2019-01-01 | Stop reason: HOSPADM

## 2019-01-01 RX ORDER — ACETAMINOPHEN 325 MG/1
650 TABLET ORAL
Status: DISCONTINUED | OUTPATIENT
Start: 2019-01-01 | End: 2019-01-01 | Stop reason: HOSPADM

## 2019-01-01 RX ORDER — POTASSIUM CHLORIDE 7.45 MG/ML
10 INJECTION INTRAVENOUS
Status: CANCELLED | OUTPATIENT
Start: 2019-01-01

## 2019-01-01 RX ORDER — HYDROMORPHONE HYDROCHLORIDE 1 MG/ML
0.5 INJECTION, SOLUTION INTRAMUSCULAR; INTRAVENOUS; SUBCUTANEOUS
Status: DISCONTINUED | OUTPATIENT
Start: 2019-01-01 | End: 2019-01-01

## 2019-01-01 RX ORDER — SODIUM CHLORIDE 0.9 % (FLUSH) 0.9 %
10-40 SYRINGE (ML) INJECTION AS NEEDED
Status: DISCONTINUED | OUTPATIENT
Start: 2019-01-01 | End: 2019-01-01 | Stop reason: SDUPTHER

## 2019-01-01 RX ORDER — FENTANYL CITRATE 50 UG/ML
12.5-5 INJECTION, SOLUTION INTRAMUSCULAR; INTRAVENOUS
Status: DISCONTINUED | OUTPATIENT
Start: 2019-01-01 | End: 2019-01-01 | Stop reason: ALTCHOICE

## 2019-01-01 RX ORDER — VANCOMYCIN/0.9 % SOD CHLORIDE 1 G/100 ML
1000 PLASTIC BAG, INJECTION (ML) INTRAVENOUS ONCE
Status: COMPLETED | OUTPATIENT
Start: 2019-01-01 | End: 2019-01-01

## 2019-01-01 RX ORDER — HEPARIN 100 UNIT/ML
500 SYRINGE INTRAVENOUS AS NEEDED
Status: DISCONTINUED | OUTPATIENT
Start: 2019-01-01 | End: 2019-01-01 | Stop reason: SDUPTHER

## 2019-01-01 RX ORDER — LEVOFLOXACIN 500 MG/1
500 TABLET, FILM COATED ORAL EVERY 24 HOURS
Status: DISCONTINUED | OUTPATIENT
Start: 2019-01-01 | End: 2019-01-01

## 2019-01-01 RX ORDER — MORPHINE SULFATE 2 MG/ML
4 INJECTION, SOLUTION INTRAMUSCULAR; INTRAVENOUS ONCE
Status: DISCONTINUED | OUTPATIENT
Start: 2019-01-01 | End: 2019-01-01 | Stop reason: SDUPTHER

## 2019-01-01 RX ORDER — ONDANSETRON 4 MG/1
4 TABLET, ORALLY DISINTEGRATING ORAL
Status: DISCONTINUED | OUTPATIENT
Start: 2019-01-01 | End: 2019-01-01 | Stop reason: HOSPADM

## 2019-01-01 RX ORDER — LIDOCAINE HYDROCHLORIDE 10 MG/ML
0.1 INJECTION, SOLUTION EPIDURAL; INFILTRATION; INTRACAUDAL; PERINEURAL AS NEEDED
Status: DISCONTINUED | OUTPATIENT
Start: 2019-01-01 | End: 2019-01-01 | Stop reason: HOSPADM

## 2019-01-01 RX ORDER — LIDOCAINE HYDROCHLORIDE 20 MG/ML
INJECTION, SOLUTION EPIDURAL; INFILTRATION; INTRACAUDAL; PERINEURAL AS NEEDED
Status: DISCONTINUED | OUTPATIENT
Start: 2019-01-01 | End: 2019-01-01 | Stop reason: HOSPADM

## 2019-01-01 RX ORDER — DEXAMETHASONE SODIUM PHOSPHATE 4 MG/ML
8 INJECTION, SOLUTION INTRA-ARTICULAR; INTRALESIONAL; INTRAMUSCULAR; INTRAVENOUS; SOFT TISSUE ONCE
Status: COMPLETED | OUTPATIENT
Start: 2019-01-01 | End: 2019-01-01

## 2019-01-01 RX ORDER — FENTANYL CITRATE 50 UG/ML
INJECTION, SOLUTION INTRAMUSCULAR; INTRAVENOUS AS NEEDED
Status: DISCONTINUED | OUTPATIENT
Start: 2019-01-01 | End: 2019-01-01 | Stop reason: HOSPADM

## 2019-01-01 RX ORDER — DIPHENHYDRAMINE HYDROCHLORIDE 50 MG/ML
25 INJECTION, SOLUTION INTRAMUSCULAR; INTRAVENOUS ONCE
Status: COMPLETED | OUTPATIENT
Start: 2019-01-01 | End: 2019-01-01

## 2019-01-01 RX ORDER — FENTANYL CITRATE 50 UG/ML
12.5-5 INJECTION, SOLUTION INTRAMUSCULAR; INTRAVENOUS
Status: DISCONTINUED | OUTPATIENT
Start: 2019-01-01 | End: 2019-01-01

## 2019-01-01 RX ORDER — HEPARIN SODIUM 1000 [USP'U]/ML
80 INJECTION, SOLUTION INTRAVENOUS; SUBCUTANEOUS ONCE
Status: COMPLETED | OUTPATIENT
Start: 2019-01-01 | End: 2019-01-01

## 2019-01-01 RX ORDER — AMOXICILLIN AND CLAVULANATE POTASSIUM 875; 125 MG/1; MG/1
1 TABLET, FILM COATED ORAL EVERY 12 HOURS
Status: DISCONTINUED | OUTPATIENT
Start: 2019-01-01 | End: 2019-01-01

## 2019-01-01 RX ORDER — LORAZEPAM 2 MG/ML
0.26 INJECTION INTRAMUSCULAR ONCE
Status: DISCONTINUED | OUTPATIENT
Start: 2019-01-01 | End: 2019-01-01 | Stop reason: HOSPADM

## 2019-01-01 RX ORDER — SODIUM CHLORIDE 0.9 % (FLUSH) 0.9 %
5-10 SYRINGE (ML) INJECTION AS NEEDED
Status: DISCONTINUED | OUTPATIENT
Start: 2019-01-01 | End: 2019-01-01

## 2019-01-01 RX ORDER — LORAZEPAM 2 MG/ML
0.26 INJECTION INTRAMUSCULAR ONCE
Status: CANCELLED
Start: 2019-01-01

## 2019-01-01 RX ORDER — SODIUM CHLORIDE 9 MG/ML
10 INJECTION INTRAMUSCULAR; INTRAVENOUS; SUBCUTANEOUS AS NEEDED
Status: ACTIVE | OUTPATIENT
Start: 2019-01-01 | End: 2019-01-01

## 2019-01-01 RX ORDER — HEPARIN SODIUM 10000 [USP'U]/100ML
18-36 INJECTION, SOLUTION INTRAVENOUS
Status: DISCONTINUED | OUTPATIENT
Start: 2019-01-01 | End: 2019-01-01

## 2019-01-01 RX ORDER — METOPROLOL TARTRATE 5 MG/5ML
INJECTION INTRAVENOUS
Status: COMPLETED | OUTPATIENT
Start: 2019-01-01 | End: 2019-01-01

## 2019-01-01 RX ORDER — ONDANSETRON 2 MG/ML
INJECTION INTRAMUSCULAR; INTRAVENOUS AS NEEDED
Status: DISCONTINUED | OUTPATIENT
Start: 2019-01-01 | End: 2019-01-01 | Stop reason: HOSPADM

## 2019-01-01 RX ORDER — OXYCODONE AND ACETAMINOPHEN 5; 325 MG/1; MG/1
1 TABLET ORAL
Status: DISCONTINUED | OUTPATIENT
Start: 2019-01-01 | End: 2019-01-01 | Stop reason: HOSPADM

## 2019-01-01 RX ORDER — LORAZEPAM 2 MG/ML
0.5 INJECTION INTRAMUSCULAR ONCE
Status: COMPLETED | OUTPATIENT
Start: 2019-01-01 | End: 2019-01-01

## 2019-01-01 RX ORDER — SODIUM CHLORIDE 9 MG/ML
75 INJECTION, SOLUTION INTRAVENOUS CONTINUOUS
Status: DISCONTINUED | OUTPATIENT
Start: 2019-01-01 | End: 2019-01-01

## 2019-01-01 RX ORDER — MIDAZOLAM HYDROCHLORIDE 1 MG/ML
1 INJECTION, SOLUTION INTRAMUSCULAR; INTRAVENOUS
Status: DISCONTINUED | OUTPATIENT
Start: 2019-01-01 | End: 2019-01-01 | Stop reason: ALTCHOICE

## 2019-01-01 RX ORDER — MORPHINE SULFATE 10 MG/ML
5 INJECTION, SOLUTION INTRAMUSCULAR; INTRAVENOUS ONCE
Status: DISCONTINUED | OUTPATIENT
Start: 2019-01-01 | End: 2019-01-01 | Stop reason: SDUPTHER

## 2019-01-01 RX ORDER — ALBUTEROL SULFATE 1.25 MG/3ML
1.25 SOLUTION RESPIRATORY (INHALATION)
Status: DISCONTINUED | OUTPATIENT
Start: 2019-01-01 | End: 2019-01-01 | Stop reason: HOSPADM

## 2019-01-01 RX ORDER — HEPARIN 100 UNIT/ML
300-500 SYRINGE INTRAVENOUS AS NEEDED
Status: DISCONTINUED | OUTPATIENT
Start: 2019-01-01 | End: 2019-01-01 | Stop reason: HOSPADM

## 2019-01-01 RX ORDER — MORPHINE SULFATE 4 MG/ML
4 INJECTION, SOLUTION INTRAMUSCULAR; INTRAVENOUS
Status: DISCONTINUED | OUTPATIENT
Start: 2019-01-01 | End: 2019-01-01

## 2019-01-01 RX ORDER — METOPROLOL TARTRATE 5 MG/5ML
2.5 INJECTION INTRAVENOUS ONCE
Status: COMPLETED | OUTPATIENT
Start: 2019-01-01 | End: 2019-01-01

## 2019-01-01 RX ORDER — SODIUM CHLORIDE 9 MG/ML
1000 INJECTION, SOLUTION INTRAVENOUS CONTINUOUS
Status: DISPENSED | OUTPATIENT
Start: 2019-01-01 | End: 2019-01-01

## 2019-01-01 RX ORDER — SODIUM CHLORIDE, SODIUM LACTATE, POTASSIUM CHLORIDE, CALCIUM CHLORIDE 600; 310; 30; 20 MG/100ML; MG/100ML; MG/100ML; MG/100ML
50 INJECTION, SOLUTION INTRAVENOUS CONTINUOUS
Status: DISCONTINUED | OUTPATIENT
Start: 2019-01-01 | End: 2019-01-01 | Stop reason: HOSPADM

## 2019-01-01 RX ORDER — HEPARIN SODIUM 1000 [USP'U]/ML
INJECTION, SOLUTION INTRAVENOUS; SUBCUTANEOUS
Status: DISPENSED
Start: 2019-01-01 | End: 2019-01-01

## 2019-01-01 RX ORDER — SODIUM CHLORIDE 9 MG/ML
250 INJECTION, SOLUTION INTRAVENOUS AS NEEDED
Status: DISCONTINUED | OUTPATIENT
Start: 2019-01-01 | End: 2019-01-01 | Stop reason: HOSPADM

## 2019-01-01 RX ORDER — LEVOFLOXACIN 5 MG/ML
500 INJECTION, SOLUTION INTRAVENOUS EVERY 24 HOURS
Status: DISCONTINUED | OUTPATIENT
Start: 2019-01-01 | End: 2019-01-01

## 2019-01-01 RX ORDER — SODIUM CHLORIDE 9 MG/ML
500 INJECTION, SOLUTION INTRAVENOUS ONCE
Status: CANCELLED | OUTPATIENT
Start: 2019-01-01

## 2019-01-01 RX ORDER — MORPHINE SULFATE 4 MG/ML
4 INJECTION INTRAVENOUS
Status: DISCONTINUED | OUTPATIENT
Start: 2019-01-01 | End: 2019-01-01 | Stop reason: HOSPADM

## 2019-01-01 RX ORDER — DIPHENHYDRAMINE HYDROCHLORIDE 50 MG/ML
12.5 INJECTION, SOLUTION INTRAMUSCULAR; INTRAVENOUS
Status: DISCONTINUED | OUTPATIENT
Start: 2019-01-01 | End: 2019-01-01 | Stop reason: HOSPADM

## 2019-01-01 RX ORDER — LORAZEPAM 2 MG/ML
1 INJECTION INTRAMUSCULAR
Status: COMPLETED | OUTPATIENT
Start: 2019-01-01 | End: 2019-01-01

## 2019-01-01 RX ORDER — HYDROXYZINE HYDROCHLORIDE 10 MG/1
10 TABLET, FILM COATED ORAL
Status: DISCONTINUED | OUTPATIENT
Start: 2019-01-01 | End: 2019-01-01 | Stop reason: HOSPADM

## 2019-01-01 RX ORDER — SODIUM CHLORIDE 9 MG/ML
1000 INJECTION, SOLUTION INTRAVENOUS ONCE
Status: COMPLETED | OUTPATIENT
Start: 2019-01-01 | End: 2019-01-01

## 2019-01-01 RX ORDER — LEVOFLOXACIN 5 MG/ML
750 INJECTION, SOLUTION INTRAVENOUS EVERY 24 HOURS
Status: DISCONTINUED | OUTPATIENT
Start: 2019-01-01 | End: 2019-01-01

## 2019-01-01 RX ORDER — ONDANSETRON 4 MG/1
8 TABLET, FILM COATED ORAL
Status: DISCONTINUED | OUTPATIENT
Start: 2019-01-01 | End: 2019-01-01 | Stop reason: SDUPTHER

## 2019-01-01 RX ORDER — PALONOSETRON 0.05 MG/ML
0.25 INJECTION, SOLUTION INTRAVENOUS ONCE
Status: COMPLETED | OUTPATIENT
Start: 2019-01-01 | End: 2019-01-01

## 2019-01-01 RX ORDER — NALOXONE HYDROCHLORIDE 0.4 MG/ML
0.2 INJECTION, SOLUTION INTRAMUSCULAR; INTRAVENOUS; SUBCUTANEOUS AS NEEDED
Status: DISCONTINUED | OUTPATIENT
Start: 2019-01-01 | End: 2019-01-01 | Stop reason: HOSPADM

## 2019-01-01 RX ORDER — MAGNESIUM SULFATE HEPTAHYDRATE 40 MG/ML
2 INJECTION, SOLUTION INTRAVENOUS
Status: ACTIVE | OUTPATIENT
Start: 2019-01-01 | End: 2019-01-01

## 2019-01-01 RX ORDER — BISACODYL 5 MG
10 TABLET, DELAYED RELEASE (ENTERIC COATED) ORAL
Status: DISCONTINUED | OUTPATIENT
Start: 2019-01-01 | End: 2019-01-01

## 2019-01-01 RX ORDER — HEPARIN 100 UNIT/ML
300-500 SYRINGE INTRAVENOUS AS NEEDED
Status: ACTIVE | OUTPATIENT
Start: 2019-01-01 | End: 2019-01-01

## 2019-01-01 RX ORDER — MIDAZOLAM HYDROCHLORIDE 1 MG/ML
INJECTION, SOLUTION INTRAMUSCULAR; INTRAVENOUS AS NEEDED
Status: DISCONTINUED | OUTPATIENT
Start: 2019-01-01 | End: 2019-01-01 | Stop reason: HOSPADM

## 2019-01-01 RX ORDER — DEXAMETHASONE SODIUM PHOSPHATE 4 MG/ML
10 INJECTION, SOLUTION INTRA-ARTICULAR; INTRALESIONAL; INTRAMUSCULAR; INTRAVENOUS; SOFT TISSUE ONCE
Status: COMPLETED | OUTPATIENT
Start: 2019-01-01 | End: 2019-01-01

## 2019-01-01 RX ORDER — HYDROMORPHONE HYDROCHLORIDE 2 MG/ML
INJECTION, SOLUTION INTRAMUSCULAR; INTRAVENOUS; SUBCUTANEOUS AS NEEDED
Status: DISCONTINUED | OUTPATIENT
Start: 2019-01-01 | End: 2019-01-01 | Stop reason: HOSPADM

## 2019-01-01 RX ORDER — FENTANYL CITRATE 50 UG/ML
12.5-5 INJECTION, SOLUTION INTRAMUSCULAR; INTRAVENOUS
Status: DISCONTINUED | OUTPATIENT
Start: 2019-01-01 | End: 2019-01-01 | Stop reason: HOSPADM

## 2019-01-01 RX ORDER — GLYCOPYRROLATE 0.2 MG/ML
INJECTION INTRAMUSCULAR; INTRAVENOUS AS NEEDED
Status: DISCONTINUED | OUTPATIENT
Start: 2019-01-01 | End: 2019-01-01 | Stop reason: HOSPADM

## 2019-01-01 RX ORDER — MORPHINE SULFATE 5 MG/ML
INJECTION, SOLUTION INTRAVENOUS
Status: DISPENSED | OUTPATIENT
Start: 2019-01-01 | End: 2019-01-01

## 2019-01-01 RX ORDER — UREA 10 %
1 LOTION (ML) TOPICAL DAILY
Status: DISCONTINUED | OUTPATIENT
Start: 2019-01-01 | End: 2019-01-01 | Stop reason: HOSPADM

## 2019-01-01 RX ORDER — NYSTATIN 100000 [USP'U]/ML
500000 SUSPENSION ORAL 4 TIMES DAILY
Status: DISCONTINUED | OUTPATIENT
Start: 2019-01-01 | End: 2019-01-01 | Stop reason: HOSPADM

## 2019-01-01 RX ORDER — HEPARIN SODIUM (PORCINE) LOCK FLUSH IV SOLN 100 UNIT/ML 100 UNIT/ML
SOLUTION INTRAVENOUS
Status: COMPLETED
Start: 2019-01-01 | End: 2019-01-01

## 2019-01-01 RX ORDER — SODIUM CHLORIDE, SODIUM LACTATE, POTASSIUM CHLORIDE, CALCIUM CHLORIDE 600; 310; 30; 20 MG/100ML; MG/100ML; MG/100ML; MG/100ML
100 INJECTION, SOLUTION INTRAVENOUS CONTINUOUS
Status: DISCONTINUED | OUTPATIENT
Start: 2019-01-01 | End: 2019-01-01

## 2019-01-01 RX ORDER — AMOXICILLIN 250 MG
2 CAPSULE ORAL
Status: DISCONTINUED | OUTPATIENT
Start: 2019-01-01 | End: 2019-01-01 | Stop reason: HOSPADM

## 2019-01-01 RX ORDER — ONDANSETRON 2 MG/ML
4 INJECTION INTRAMUSCULAR; INTRAVENOUS
Status: COMPLETED | OUTPATIENT
Start: 2019-01-01 | End: 2019-01-01

## 2019-01-01 RX ORDER — SODIUM CHLORIDE 9 MG/ML
25 INJECTION, SOLUTION INTRAVENOUS ONCE
Status: DISPENSED | OUTPATIENT
Start: 2019-01-01 | End: 2019-01-01

## 2019-01-01 RX ORDER — NALOXONE HYDROCHLORIDE 0.4 MG/ML
0.04 INJECTION, SOLUTION INTRAMUSCULAR; INTRAVENOUS; SUBCUTANEOUS AS NEEDED
Status: DISCONTINUED | OUTPATIENT
Start: 2019-01-01 | End: 2019-01-01 | Stop reason: HOSPADM

## 2019-01-01 RX ORDER — SODIUM POLYSTYRENE SULFONATE 15 G/60ML
30 SUSPENSION ORAL; RECTAL
Status: COMPLETED | OUTPATIENT
Start: 2019-01-01 | End: 2019-01-01

## 2019-01-01 RX ORDER — ALBUTEROL SULFATE 0.83 MG/ML
2.5 SOLUTION RESPIRATORY (INHALATION)
Status: COMPLETED | OUTPATIENT
Start: 2019-01-01 | End: 2019-01-01

## 2019-01-01 RX ORDER — SODIUM CHLORIDE 0.9 % (FLUSH) 0.9 %
5-40 SYRINGE (ML) INJECTION EVERY 8 HOURS
Status: DISCONTINUED | OUTPATIENT
Start: 2019-01-01 | End: 2019-01-01 | Stop reason: SDUPTHER

## 2019-01-01 RX ORDER — NYSTATIN 100000 [USP'U]/ML
500000 SUSPENSION ORAL 4 TIMES DAILY
Qty: 200 ML | Refills: 0 | Status: SHIPPED | OUTPATIENT
Start: 2019-01-01

## 2019-01-01 RX ORDER — POLYETHYLENE GLYCOL 3350 17 G/17G
17 POWDER, FOR SOLUTION ORAL DAILY
Status: DISCONTINUED | OUTPATIENT
Start: 2019-01-01 | End: 2019-01-01

## 2019-01-01 RX ORDER — HEPARIN 100 UNIT/ML
500 SYRINGE INTRAVENOUS ONCE
Status: CANCELLED | OUTPATIENT
Start: 2019-01-01 | End: 2019-01-01

## 2019-01-01 RX ORDER — VANCOMYCIN 1.75 GRAM/500 ML IN 0.9 % SODIUM CHLORIDE INTRAVENOUS
1750 ONCE
Status: COMPLETED | OUTPATIENT
Start: 2019-01-01 | End: 2019-01-01

## 2019-01-01 RX ORDER — NALOXONE HYDROCHLORIDE 0.4 MG/ML
0.1 INJECTION, SOLUTION INTRAMUSCULAR; INTRAVENOUS; SUBCUTANEOUS AS NEEDED
Status: DISCONTINUED | OUTPATIENT
Start: 2019-01-01 | End: 2019-01-01 | Stop reason: HOSPADM

## 2019-01-01 RX ORDER — DOCUSATE SODIUM 100 MG/1
100 CAPSULE, LIQUID FILLED ORAL 2 TIMES DAILY
Status: DISCONTINUED | OUTPATIENT
Start: 2019-01-01 | End: 2019-01-01 | Stop reason: HOSPADM

## 2019-01-01 RX ORDER — ONDANSETRON 2 MG/ML
4 INJECTION INTRAMUSCULAR; INTRAVENOUS ONCE
Status: COMPLETED | OUTPATIENT
Start: 2019-01-01 | End: 2019-01-01

## 2019-01-01 RX ORDER — OXYBUTYNIN CHLORIDE 10 MG/1
10 TABLET, EXTENDED RELEASE ORAL
Qty: 30 TAB | Refills: 3 | Status: SHIPPED | OUTPATIENT
Start: 2019-01-01 | End: 2019-01-01 | Stop reason: ALTCHOICE

## 2019-01-01 RX ORDER — SODIUM CHLORIDE 9 MG/ML
20 INJECTION, SOLUTION INTRAVENOUS CONTINUOUS
Status: DISCONTINUED | OUTPATIENT
Start: 2019-01-01 | End: 2019-01-01

## 2019-01-01 RX ORDER — SODIUM CHLORIDE 0.9 % (FLUSH) 0.9 %
5-10 SYRINGE (ML) INJECTION EVERY 8 HOURS
Status: DISCONTINUED | OUTPATIENT
Start: 2019-01-01 | End: 2019-01-01 | Stop reason: HOSPADM

## 2019-01-01 RX ORDER — DIGOXIN 0.25 MG/ML
INJECTION INTRAMUSCULAR; INTRAVENOUS
Status: COMPLETED | OUTPATIENT
Start: 2019-01-01 | End: 2019-01-01

## 2019-01-01 RX ORDER — ONDANSETRON 4 MG/1
4 TABLET, ORALLY DISINTEGRATING ORAL
Qty: 12 TAB | Refills: 0 | Status: SHIPPED | OUTPATIENT
Start: 2019-01-01

## 2019-01-01 RX ORDER — DEXTROSE 50 % IN WATER (D50W) INTRAVENOUS SYRINGE
25-50 AS NEEDED
Status: DISCONTINUED | OUTPATIENT
Start: 2019-01-01 | End: 2019-01-01

## 2019-01-01 RX ORDER — MORPHINE SULFATE 10 MG/ML
5 INJECTION, SOLUTION INTRAMUSCULAR; INTRAVENOUS
Status: DISCONTINUED | OUTPATIENT
Start: 2019-01-01 | End: 2019-01-01 | Stop reason: HOSPADM

## 2019-01-01 RX ORDER — DEXTROSE 50 % IN WATER (D50W) INTRAVENOUS SYRINGE
50 ONCE
Status: COMPLETED | OUTPATIENT
Start: 2019-01-01 | End: 2019-01-01

## 2019-01-01 RX ORDER — MAGNESIUM SULFATE 100 %
4 CRYSTALS MISCELLANEOUS AS NEEDED
Status: DISCONTINUED | OUTPATIENT
Start: 2019-01-01 | End: 2019-01-01

## 2019-01-01 RX ORDER — MORPHINE SULFATE 2 MG/ML
2 INJECTION, SOLUTION INTRAMUSCULAR; INTRAVENOUS
Status: DISCONTINUED | OUTPATIENT
Start: 2019-01-01 | End: 2019-01-01

## 2019-01-01 RX ORDER — ADENOSINE 3 MG/ML
INJECTION, SOLUTION INTRAVENOUS
Status: COMPLETED | OUTPATIENT
Start: 2019-01-01 | End: 2019-01-01

## 2019-01-01 RX ORDER — HEPARIN 100 UNIT/ML
500 SYRINGE INTRAVENOUS AS NEEDED
Status: CANCELLED | OUTPATIENT
Start: 2019-01-01

## 2019-01-01 RX ORDER — ACETAMINOPHEN 325 MG/1
650 TABLET ORAL ONCE
Status: COMPLETED | OUTPATIENT
Start: 2019-01-01 | End: 2019-01-01

## 2019-01-01 RX ORDER — FENTANYL CITRATE 50 UG/ML
50 INJECTION, SOLUTION INTRAMUSCULAR; INTRAVENOUS AS NEEDED
Status: DISCONTINUED | OUTPATIENT
Start: 2019-01-01 | End: 2019-01-01 | Stop reason: HOSPADM

## 2019-01-01 RX ORDER — ONDANSETRON 4 MG/1
4 TABLET, FILM COATED ORAL
Status: DISCONTINUED | OUTPATIENT
Start: 2019-01-01 | End: 2019-01-01 | Stop reason: HOSPADM

## 2019-01-01 RX ORDER — VANCOMYCIN HYDROCHLORIDE
1250 EVERY 24 HOURS
Status: DISCONTINUED | OUTPATIENT
Start: 2019-01-01 | End: 2019-01-01

## 2019-01-01 RX ORDER — MORPHINE SULFATE 20 MG/ML
5 SOLUTION ORAL
Qty: 15 ML | Refills: 0 | Status: SHIPPED | OUTPATIENT
Start: 2019-01-01 | End: 2019-10-22

## 2019-01-01 RX ORDER — HEPARIN SODIUM 1000 [USP'U]/ML
6500 INJECTION, SOLUTION INTRAVENOUS; SUBCUTANEOUS ONCE
Status: COMPLETED | OUTPATIENT
Start: 2019-01-01 | End: 2019-01-01

## 2019-01-01 RX ORDER — LORAZEPAM 2 MG/ML
INJECTION INTRAMUSCULAR
Status: COMPLETED
Start: 2019-01-01 | End: 2019-01-01

## 2019-01-01 RX ORDER — DIPHENHYDRAMINE HYDROCHLORIDE 50 MG/ML
12.5 INJECTION, SOLUTION INTRAMUSCULAR; INTRAVENOUS ONCE
Status: COMPLETED | OUTPATIENT
Start: 2019-01-01 | End: 2019-01-01

## 2019-01-01 RX ORDER — OXYCODONE AND ACETAMINOPHEN 5; 325 MG/1; MG/1
1-2 TABLET ORAL
Qty: 15 TAB | Refills: 0 | Status: SHIPPED | OUTPATIENT
Start: 2019-01-01 | End: 2019-01-01

## 2019-01-01 RX ORDER — DIGOXIN 0.25 MG/ML
INJECTION INTRAMUSCULAR; INTRAVENOUS
Status: DISPENSED
Start: 2019-01-01 | End: 2019-01-01

## 2019-01-01 RX ORDER — MORPHINE SULFATE 5 MG/ML
INJECTION, SOLUTION INTRAVENOUS CONTINUOUS
Status: DISCONTINUED | OUTPATIENT
Start: 2019-01-01 | End: 2019-01-01 | Stop reason: HOSPADM

## 2019-01-01 RX ORDER — SIMETHICONE 80 MG
80 TABLET,CHEWABLE ORAL
COMMUNITY
End: 2019-01-01 | Stop reason: ALTCHOICE

## 2019-01-01 RX ORDER — MORPHINE SULFATE 2 MG/ML
2 INJECTION, SOLUTION INTRAMUSCULAR; INTRAVENOUS
Status: DISCONTINUED | OUTPATIENT
Start: 2019-01-01 | End: 2019-01-01 | Stop reason: HOSPADM

## 2019-01-01 RX ORDER — CALCIUM GLUCONATE 94 MG/ML
1 INJECTION, SOLUTION INTRAVENOUS ONCE
Status: COMPLETED | OUTPATIENT
Start: 2019-01-01 | End: 2019-01-01

## 2019-01-01 RX ORDER — FACIAL-BODY WIPES
10 EACH TOPICAL
Status: DISCONTINUED | OUTPATIENT
Start: 2019-01-01 | End: 2019-01-01

## 2019-01-01 RX ORDER — MULTIVIT WITH MINERALS/HERBS
1 TABLET ORAL DAILY
COMMUNITY
End: 2019-01-01

## 2019-01-01 RX ORDER — DIPHENHYDRAMINE HYDROCHLORIDE 50 MG/ML
25 INJECTION, SOLUTION INTRAMUSCULAR; INTRAVENOUS
Status: DISCONTINUED | OUTPATIENT
Start: 2019-01-01 | End: 2019-01-01 | Stop reason: HOSPADM

## 2019-01-01 RX ORDER — MULTIVIT WITH MINERALS/HERBS
1 TABLET ORAL DAILY
Status: DISCONTINUED | OUTPATIENT
Start: 2019-01-01 | End: 2019-01-01 | Stop reason: HOSPADM

## 2019-01-01 RX ORDER — FAMOTIDINE 20 MG/1
20 TABLET, FILM COATED ORAL DAILY
Status: DISCONTINUED | OUTPATIENT
Start: 2019-01-01 | End: 2019-01-01 | Stop reason: HOSPADM

## 2019-01-01 RX ORDER — LORAZEPAM 2 MG/ML
0.5 CONCENTRATE ORAL
Status: DISCONTINUED | OUTPATIENT
Start: 2019-01-01 | End: 2019-01-01

## 2019-01-01 RX ORDER — FLUMAZENIL 0.1 MG/ML
0.2 INJECTION INTRAVENOUS
Status: DISCONTINUED | OUTPATIENT
Start: 2019-01-01 | End: 2019-01-01 | Stop reason: HOSPADM

## 2019-01-01 RX ORDER — DEXTROSE 50 % IN WATER (D50W) INTRAVENOUS SYRINGE
25 ONCE
Status: COMPLETED | OUTPATIENT
Start: 2019-01-01 | End: 2019-01-01

## 2019-01-01 RX ORDER — SODIUM CHLORIDE, SODIUM LACTATE, POTASSIUM CHLORIDE, CALCIUM CHLORIDE 600; 310; 30; 20 MG/100ML; MG/100ML; MG/100ML; MG/100ML
75 INJECTION, SOLUTION INTRAVENOUS CONTINUOUS
Status: DISCONTINUED | OUTPATIENT
Start: 2019-01-01 | End: 2019-01-01 | Stop reason: HOSPADM

## 2019-01-01 RX ORDER — LORAZEPAM 0.5 MG/1
0.5 TABLET ORAL
Qty: 20 TAB | Refills: 1 | OUTPATIENT
Start: 2019-01-01 | End: 2019-01-01

## 2019-01-01 RX ORDER — FUROSEMIDE 10 MG/ML
40 INJECTION INTRAMUSCULAR; INTRAVENOUS ONCE
Status: COMPLETED | OUTPATIENT
Start: 2019-01-01 | End: 2019-01-01

## 2019-01-01 RX ORDER — METOPROLOL TARTRATE 5 MG/5ML
INJECTION INTRAVENOUS
Status: DISPENSED
Start: 2019-01-01 | End: 2019-01-01

## 2019-01-01 RX ORDER — MIDAZOLAM HYDROCHLORIDE 1 MG/ML
1 INJECTION, SOLUTION INTRAMUSCULAR; INTRAVENOUS
Status: DISCONTINUED | OUTPATIENT
Start: 2019-01-01 | End: 2019-01-01

## 2019-01-01 RX ORDER — ONDANSETRON 2 MG/ML
4 INJECTION INTRAMUSCULAR; INTRAVENOUS
Status: DISCONTINUED | OUTPATIENT
Start: 2019-01-01 | End: 2019-01-01 | Stop reason: HOSPADM

## 2019-01-01 RX ORDER — HYDROMORPHONE HYDROCHLORIDE 2 MG/ML
0.5 INJECTION, SOLUTION INTRAMUSCULAR; INTRAVENOUS; SUBCUTANEOUS
Status: COMPLETED | OUTPATIENT
Start: 2019-01-01 | End: 2019-01-01

## 2019-01-01 RX ORDER — ONDANSETRON 2 MG/ML
8 INJECTION INTRAMUSCULAR; INTRAVENOUS
Status: DISCONTINUED | OUTPATIENT
Start: 2019-01-01 | End: 2019-01-01 | Stop reason: SDUPTHER

## 2019-01-01 RX ORDER — HYDROMORPHONE HYDROCHLORIDE 2 MG/ML
0.5 INJECTION, SOLUTION INTRAMUSCULAR; INTRAVENOUS; SUBCUTANEOUS
Status: DISCONTINUED | OUTPATIENT
Start: 2019-01-01 | End: 2019-01-01 | Stop reason: HOSPADM

## 2019-01-01 RX ORDER — LORAZEPAM 0.5 MG/1
0.5 TABLET ORAL
Status: DISCONTINUED | OUTPATIENT
Start: 2019-01-01 | End: 2019-01-01 | Stop reason: HOSPADM

## 2019-01-01 RX ORDER — SODIUM CHLORIDE 450 MG/100ML
100 INJECTION, SOLUTION INTRAVENOUS CONTINUOUS
Status: DISCONTINUED | OUTPATIENT
Start: 2019-01-01 | End: 2019-01-01

## 2019-01-01 RX ORDER — MORPHINE SULFATE 10 MG/ML
10 INJECTION, SOLUTION INTRAMUSCULAR; INTRAVENOUS
Status: COMPLETED | OUTPATIENT
Start: 2019-01-01 | End: 2019-01-01

## 2019-01-01 RX ORDER — DILTIAZEM HYDROCHLORIDE 5 MG/ML
INJECTION INTRAVENOUS
Status: DISPENSED
Start: 2019-01-01 | End: 2019-01-01

## 2019-01-01 RX ORDER — NALOXONE HYDROCHLORIDE 0.4 MG/ML
0.4 INJECTION, SOLUTION INTRAMUSCULAR; INTRAVENOUS; SUBCUTANEOUS AS NEEDED
Status: DISCONTINUED | OUTPATIENT
Start: 2019-01-01 | End: 2019-01-01

## 2019-01-01 RX ORDER — FAMOTIDINE 20 MG/1
20 TABLET, FILM COATED ORAL ONCE
Status: COMPLETED | OUTPATIENT
Start: 2019-01-01 | End: 2019-01-01

## 2019-01-01 RX ORDER — SODIUM CHLORIDE 9 MG/ML
250 INJECTION, SOLUTION INTRAVENOUS AS NEEDED
Status: DISCONTINUED | OUTPATIENT
Start: 2019-01-01 | End: 2019-01-01 | Stop reason: SDUPTHER

## 2019-01-01 RX ORDER — LIDOCAINE HYDROCHLORIDE 10 MG/ML
INJECTION, SOLUTION EPIDURAL; INFILTRATION; INTRACAUDAL; PERINEURAL
Status: COMPLETED
Start: 2019-01-01 | End: 2019-01-01

## 2019-01-01 RX ORDER — MAGNESIUM SULFATE 100 %
4 CRYSTALS MISCELLANEOUS AS NEEDED
Status: DISCONTINUED | OUTPATIENT
Start: 2019-01-01 | End: 2019-01-01 | Stop reason: HOSPADM

## 2019-01-01 RX ORDER — HYDROMORPHONE HYDROCHLORIDE 1 MG/ML
0.2 INJECTION, SOLUTION INTRAMUSCULAR; INTRAVENOUS; SUBCUTANEOUS
Status: DISCONTINUED | OUTPATIENT
Start: 2019-01-01 | End: 2019-01-01

## 2019-01-01 RX ORDER — SODIUM CHLORIDE 0.9 % (FLUSH) 0.9 %
10-40 SYRINGE (ML) INJECTION AS NEEDED
Status: CANCELLED | OUTPATIENT
Start: 2019-01-01

## 2019-01-01 RX ORDER — CIPROFLOXACIN 2 MG/ML
400 INJECTION, SOLUTION INTRAVENOUS
Status: COMPLETED | OUTPATIENT
Start: 2019-01-01 | End: 2019-01-01

## 2019-01-01 RX ORDER — NALOXONE HYDROCHLORIDE 0.4 MG/ML
0.2 INJECTION, SOLUTION INTRAMUSCULAR; INTRAVENOUS; SUBCUTANEOUS AS NEEDED
Status: DISCONTINUED | OUTPATIENT
Start: 2019-01-01 | End: 2019-01-01 | Stop reason: SDUPTHER

## 2019-01-01 RX ORDER — MORPHINE SULFATE 10 MG/ML
5 INJECTION, SOLUTION INTRAMUSCULAR; INTRAVENOUS
Status: DISCONTINUED | OUTPATIENT
Start: 2019-01-01 | End: 2019-01-01

## 2019-01-01 RX ORDER — HEPARIN 100 UNIT/ML
500 SYRINGE INTRAVENOUS ONCE
Status: DISCONTINUED | OUTPATIENT
Start: 2019-01-01 | End: 2019-01-01 | Stop reason: HOSPADM

## 2019-01-01 RX ORDER — HEPARIN SODIUM (PORCINE) LOCK FLUSH IV SOLN 100 UNIT/ML 100 UNIT/ML
500 SOLUTION INTRAVENOUS AS NEEDED
Status: DISCONTINUED | OUTPATIENT
Start: 2019-01-01 | End: 2019-01-01 | Stop reason: HOSPADM

## 2019-01-01 RX ORDER — DEXTROSE 50 % IN WATER (D50W) INTRAVENOUS SYRINGE
25-50 AS NEEDED
Status: DISCONTINUED | OUTPATIENT
Start: 2019-01-01 | End: 2019-01-01 | Stop reason: HOSPADM

## 2019-01-01 RX ORDER — HEPARIN 100 UNIT/ML
500 SYRINGE INTRAVENOUS ONCE
Status: ACTIVE | OUTPATIENT
Start: 2019-01-01 | End: 2019-01-01

## 2019-01-01 RX ORDER — MORPHINE SULFATE 2 MG/ML
2 INJECTION, SOLUTION INTRAMUSCULAR; INTRAVENOUS ONCE
Status: DISCONTINUED | OUTPATIENT
Start: 2019-01-01 | End: 2019-01-01

## 2019-01-01 RX ORDER — ASCORBIC ACID 250 MG
TABLET ORAL DAILY
Status: DISCONTINUED | OUTPATIENT
Start: 2019-01-01 | End: 2019-01-01 | Stop reason: HOSPADM

## 2019-01-01 RX ORDER — CALCIUM CARBONATE 500(1250)
500 TABLET ORAL 2 TIMES DAILY WITH MEALS
Status: DISCONTINUED | OUTPATIENT
Start: 2019-01-01 | End: 2019-01-01

## 2019-01-01 RX ORDER — HYDROCODONE BITARTRATE AND ACETAMINOPHEN 5; 300 MG/1; MG/1
1 TABLET ORAL
Qty: 21 TAB | Refills: 0 | Status: SHIPPED | OUTPATIENT
Start: 2019-01-01 | End: 2019-01-01

## 2019-01-01 RX ORDER — POTASSIUM CHLORIDE 7.45 MG/ML
10 INJECTION INTRAVENOUS
Status: ACTIVE | OUTPATIENT
Start: 2019-01-01 | End: 2019-01-01

## 2019-01-01 RX ORDER — SODIUM CHLORIDE 0.9 % (FLUSH) 0.9 %
5-40 SYRINGE (ML) INJECTION EVERY 8 HOURS
Status: DISCONTINUED | OUTPATIENT
Start: 2019-01-01 | End: 2019-01-01

## 2019-01-01 RX ORDER — INSULIN LISPRO 100 [IU]/ML
INJECTION, SOLUTION INTRAVENOUS; SUBCUTANEOUS ONCE
Status: DISCONTINUED | OUTPATIENT
Start: 2019-01-01 | End: 2019-01-01

## 2019-01-01 RX ORDER — ACETAMINOPHEN 325 MG/1
650 TABLET ORAL
Qty: 20 TAB | Refills: 0 | Status: SHIPPED
Start: 2019-01-01

## 2019-01-01 RX ORDER — BISACODYL 5 MG
10 TABLET, DELAYED RELEASE (ENTERIC COATED) ORAL DAILY
Status: DISCONTINUED | OUTPATIENT
Start: 2019-01-01 | End: 2019-01-01

## 2019-01-01 RX ORDER — INSULIN LISPRO 100 [IU]/ML
INJECTION, SOLUTION INTRAVENOUS; SUBCUTANEOUS ONCE
Status: DISCONTINUED | OUTPATIENT
Start: 2019-01-01 | End: 2019-01-01 | Stop reason: HOSPADM

## 2019-01-01 RX ORDER — HYDROMORPHONE HYDROCHLORIDE 1 MG/ML
INJECTION, SOLUTION INTRAMUSCULAR; INTRAVENOUS; SUBCUTANEOUS
Status: COMPLETED
Start: 2019-01-01 | End: 2019-01-01

## 2019-01-01 RX ORDER — SCOLOPAMINE TRANSDERMAL SYSTEM 1 MG/1
1 PATCH, EXTENDED RELEASE TRANSDERMAL
Status: DISCONTINUED | OUTPATIENT
Start: 2019-01-01 | End: 2019-01-01 | Stop reason: HOSPADM

## 2019-01-01 RX ORDER — SODIUM CHLORIDE 9 MG/ML
25 INJECTION, SOLUTION INTRAVENOUS CONTINUOUS
Status: DISCONTINUED | OUTPATIENT
Start: 2019-01-01 | End: 2019-01-01

## 2019-01-01 RX ORDER — DILTIAZEM HYDROCHLORIDE 5 MG/ML
INJECTION INTRAVENOUS
Status: COMPLETED | OUTPATIENT
Start: 2019-01-01 | End: 2019-01-01

## 2019-01-01 RX ORDER — HYDROMORPHONE HYDROCHLORIDE 1 MG/ML
0.5 INJECTION, SOLUTION INTRAMUSCULAR; INTRAVENOUS; SUBCUTANEOUS
Status: COMPLETED | OUTPATIENT
Start: 2019-01-01 | End: 2019-01-01

## 2019-01-01 RX ORDER — MORPHINE SULFATE 4 MG/ML
4 INJECTION, SOLUTION INTRAMUSCULAR; INTRAVENOUS
Status: COMPLETED | OUTPATIENT
Start: 2019-01-01 | End: 2019-01-01

## 2019-01-01 RX ORDER — LIDOCAINE HYDROCHLORIDE 10 MG/ML
INJECTION, SOLUTION EPIDURAL; INFILTRATION; INTRACAUDAL; PERINEURAL AS NEEDED
Status: DISCONTINUED | OUTPATIENT
Start: 2019-01-01 | End: 2019-01-01 | Stop reason: HOSPADM

## 2019-01-01 RX ORDER — OXYCODONE HCL 10 MG/1
10 TABLET, FILM COATED, EXTENDED RELEASE ORAL EVERY 12 HOURS
Status: DISCONTINUED | OUTPATIENT
Start: 2019-01-01 | End: 2019-01-01

## 2019-01-01 RX ORDER — ONDANSETRON 2 MG/ML
INJECTION INTRAMUSCULAR; INTRAVENOUS
Status: DISPENSED
Start: 2019-01-01 | End: 2019-01-01

## 2019-01-01 RX ORDER — SUCCINYLCHOLINE CHLORIDE 20 MG/ML INJECTION SOLUTION
SOLUTION AS NEEDED
Status: DISCONTINUED | OUTPATIENT
Start: 2019-01-01 | End: 2019-01-01 | Stop reason: HOSPADM

## 2019-01-01 RX ORDER — HEPARIN SODIUM 10000 [USP'U]/100ML
18-36 INJECTION, SOLUTION INTRAVENOUS
Status: DISPENSED | OUTPATIENT
Start: 2019-01-01 | End: 2019-01-01

## 2019-01-01 RX ORDER — SODIUM CHLORIDE 9 MG/ML
500 INJECTION, SOLUTION INTRAVENOUS CONTINUOUS
Status: DISCONTINUED | OUTPATIENT
Start: 2019-01-01 | End: 2019-01-01

## 2019-01-01 RX ORDER — HEPARIN SODIUM 5000 [USP'U]/ML
5000 INJECTION, SOLUTION INTRAVENOUS; SUBCUTANEOUS EVERY 8 HOURS
Status: DISCONTINUED | OUTPATIENT
Start: 2019-01-01 | End: 2019-01-01 | Stop reason: HOSPADM

## 2019-01-01 RX ORDER — ADHESIVE BANDAGE
30 BANDAGE TOPICAL DAILY PRN
Status: DISCONTINUED | OUTPATIENT
Start: 2019-01-01 | End: 2019-01-01 | Stop reason: HOSPADM

## 2019-01-01 RX ORDER — LORAZEPAM 2 MG/ML
0.5 CONCENTRATE ORAL
Qty: 30 ML | Refills: 0 | Status: SHIPPED | OUTPATIENT
Start: 2019-01-01 | End: 2019-01-01

## 2019-01-01 RX ORDER — SODIUM CHLORIDE 0.9 % (FLUSH) 0.9 %
5-40 SYRINGE (ML) INJECTION AS NEEDED
Status: DISCONTINUED | OUTPATIENT
Start: 2019-01-01 | End: 2019-01-01 | Stop reason: SDUPTHER

## 2019-01-01 RX ORDER — MORPHINE SULFATE 4 MG/ML
4 INJECTION INTRAVENOUS
Status: COMPLETED | OUTPATIENT
Start: 2019-01-01 | End: 2019-01-01

## 2019-01-01 RX ORDER — HEPARIN SODIUM 10000 [USP'U]/ML
80 INJECTION, SOLUTION INTRAVENOUS; SUBCUTANEOUS ONCE
Status: DISCONTINUED | OUTPATIENT
Start: 2019-01-01 | End: 2019-01-01

## 2019-01-01 RX ORDER — ONDANSETRON 2 MG/ML
8 INJECTION INTRAMUSCULAR; INTRAVENOUS
Status: DISCONTINUED | OUTPATIENT
Start: 2019-01-01 | End: 2019-01-01

## 2019-01-01 RX ORDER — DEXTROSE 50 % IN WATER (D50W) INTRAVENOUS SYRINGE
25
Status: COMPLETED | OUTPATIENT
Start: 2019-01-01 | End: 2019-01-01

## 2019-01-01 RX ORDER — MORPHINE SULFATE 4 MG/ML
4 INJECTION INTRAVENOUS ONCE
Status: COMPLETED | OUTPATIENT
Start: 2019-01-01 | End: 2019-01-01

## 2019-01-01 RX ORDER — DIPHENHYDRAMINE HCL 25 MG
25 CAPSULE ORAL ONCE
Status: COMPLETED | OUTPATIENT
Start: 2019-01-01 | End: 2019-01-01

## 2019-01-01 RX ORDER — MORPHINE SULFATE 100 MG/5ML
5 SOLUTION ORAL
Status: DISCONTINUED | OUTPATIENT
Start: 2019-01-01 | End: 2019-01-01

## 2019-01-01 RX ORDER — LORAZEPAM 0.5 MG/1
0.5 TABLET ORAL
Qty: 30 TAB | Refills: 0 | Status: SHIPPED | OUTPATIENT
Start: 2019-01-01 | End: 2019-01-01

## 2019-01-01 RX ORDER — CIPROFLOXACIN 500 MG/1
500 TABLET ORAL 2 TIMES DAILY
Qty: 14 TAB | Refills: 0 | Status: SHIPPED | OUTPATIENT
Start: 2019-01-01 | End: 2019-01-01

## 2019-01-01 RX ORDER — NEOSTIGMINE METHYLSULFATE 1 MG/ML
INJECTION INTRAVENOUS AS NEEDED
Status: DISCONTINUED | OUTPATIENT
Start: 2019-01-01 | End: 2019-01-01 | Stop reason: HOSPADM

## 2019-01-01 RX ORDER — AMOXICILLIN 250 MG
2 CAPSULE ORAL
Qty: 30 TAB | Refills: 0 | Status: SHIPPED | OUTPATIENT
Start: 2019-01-01

## 2019-01-01 RX ORDER — FAMOTIDINE 20 MG/1
20 TABLET, FILM COATED ORAL DAILY
COMMUNITY
End: 2019-01-01

## 2019-01-01 RX ORDER — CYCLOBENZAPRINE HCL 10 MG
5 TABLET ORAL
Status: DISCONTINUED | OUTPATIENT
Start: 2019-01-01 | End: 2019-01-01 | Stop reason: HOSPADM

## 2019-01-01 RX ORDER — FENTANYL 12.5 UG/1
1 PATCH TRANSDERMAL
Status: DISCONTINUED | OUTPATIENT
Start: 2019-01-01 | End: 2019-01-01 | Stop reason: HOSPADM

## 2019-01-01 RX ORDER — UREA 10 %
100 LOTION (ML) TOPICAL DAILY
COMMUNITY
End: 2019-01-01

## 2019-01-01 RX ORDER — METRONIDAZOLE 500 MG/100ML
500 INJECTION, SOLUTION INTRAVENOUS EVERY 12 HOURS
Status: DISCONTINUED | OUTPATIENT
Start: 2019-01-01 | End: 2019-01-01

## 2019-01-01 RX ORDER — ACETAMINOPHEN 500 MG
1000 TABLET ORAL
Status: COMPLETED | OUTPATIENT
Start: 2019-01-01 | End: 2019-01-01

## 2019-01-01 RX ORDER — LORAZEPAM 2 MG/ML
0.26 INJECTION INTRAMUSCULAR ONCE
Status: DISPENSED | OUTPATIENT
Start: 2019-01-01 | End: 2019-01-01

## 2019-01-01 RX ORDER — ROCURONIUM BROMIDE 10 MG/ML
INJECTION, SOLUTION INTRAVENOUS AS NEEDED
Status: DISCONTINUED | OUTPATIENT
Start: 2019-01-01 | End: 2019-01-01 | Stop reason: HOSPADM

## 2019-01-01 RX ADMIN — PALONOSETRON 0.25 MG: 0.25 INJECTION, SOLUTION INTRAVENOUS at 14:35

## 2019-01-01 RX ADMIN — DAPTOMYCIN 350 MG: 500 INJECTION, POWDER, LYOPHILIZED, FOR SOLUTION INTRAVENOUS at 11:12

## 2019-01-01 RX ADMIN — SODIUM CHLORIDE, PRESERVATIVE FREE 500 UNITS: 5 INJECTION INTRAVENOUS at 10:10

## 2019-01-01 RX ADMIN — DEXAMETHASONE SODIUM PHOSPHATE 10 MG: 4 INJECTION, SOLUTION INTRA-ARTICULAR; INTRALESIONAL; INTRAMUSCULAR; INTRAVENOUS; SOFT TISSUE at 10:53

## 2019-01-01 RX ADMIN — TOPOTECAN 1.9 MG: 1 INJECTION, SOLUTION, CONCENTRATE INTRAVENOUS at 09:57

## 2019-01-01 RX ADMIN — DAPTOMYCIN 350 MG: 500 INJECTION, POWDER, LYOPHILIZED, FOR SOLUTION INTRAVENOUS at 09:56

## 2019-01-01 RX ADMIN — SODIUM CHLORIDE 500 ML: 9 INJECTION, SOLUTION INTRAVENOUS at 09:47

## 2019-01-01 RX ADMIN — MORPHINE SULFATE 4 MG: 2 INJECTION, SOLUTION INTRAMUSCULAR; INTRAVENOUS at 16:15

## 2019-01-01 RX ADMIN — FAMOTIDINE 20 MG: 10 INJECTION, SOLUTION INTRAVENOUS at 09:14

## 2019-01-01 RX ADMIN — HYDROMORPHONE HYDROCHLORIDE 0.5 MG: 2 INJECTION, SOLUTION INTRAMUSCULAR; INTRAVENOUS; SUBCUTANEOUS at 12:24

## 2019-01-01 RX ADMIN — HYDROMORPHONE HYDROCHLORIDE 0.5 MG: 1 INJECTION, SOLUTION INTRAMUSCULAR; INTRAVENOUS; SUBCUTANEOUS at 01:30

## 2019-01-01 RX ADMIN — FENTANYL CITRATE 100 MCG: 50 INJECTION, SOLUTION INTRAMUSCULAR; INTRAVENOUS at 13:43

## 2019-01-01 RX ADMIN — PROPOFOL 100 MG: 10 INJECTION, EMULSION INTRAVENOUS at 13:19

## 2019-01-01 RX ADMIN — Medication 10 ML: at 15:24

## 2019-01-01 RX ADMIN — Medication 30 ML: at 15:15

## 2019-01-01 RX ADMIN — FAMOTIDINE 20 MG: 10 INJECTION, SOLUTION INTRAVENOUS at 09:29

## 2019-01-01 RX ADMIN — SODIUM CHLORIDE 500 ML: 900 INJECTION, SOLUTION INTRAVENOUS at 16:53

## 2019-01-01 RX ADMIN — ONDANSETRON 4 MG: 2 INJECTION INTRAMUSCULAR; INTRAVENOUS at 12:40

## 2019-01-01 RX ADMIN — HEPARIN 500 UNITS: 100 SYRINGE at 10:21

## 2019-01-01 RX ADMIN — PIPERACILLIN SODIUM,TAZOBACTAM SODIUM 2.25 G: 2; .25 INJECTION, POWDER, FOR SOLUTION INTRAVENOUS at 02:47

## 2019-01-01 RX ADMIN — PIPERACILLIN SODIUM,TAZOBACTAM SODIUM 3.38 G: 3; .375 INJECTION, POWDER, FOR SOLUTION INTRAVENOUS at 16:16

## 2019-01-01 RX ADMIN — FAMOTIDINE 20 MG: 10 INJECTION INTRAVENOUS at 20:44

## 2019-01-01 RX ADMIN — DAPTOMYCIN 350 MG: 500 INJECTION, POWDER, LYOPHILIZED, FOR SOLUTION INTRAVENOUS at 10:37

## 2019-01-01 RX ADMIN — HEPARIN 500 UNITS: 100 SYRINGE at 11:35

## 2019-01-01 RX ADMIN — DEXAMETHASONE SODIUM PHOSPHATE 8 MG: 4 INJECTION INTRA-ARTICULAR; INTRALESIONAL; INTRAMUSCULAR; INTRAVENOUS; SOFT TISSUE at 09:53

## 2019-01-01 RX ADMIN — Medication 10 ML: at 06:00

## 2019-01-01 RX ADMIN — LEVOFLOXACIN 750 MG: 5 INJECTION, SOLUTION INTRAVENOUS at 12:37

## 2019-01-01 RX ADMIN — TOPOTECAN 1.3 MG: 1 INJECTION, SOLUTION, CONCENTRATE INTRAVENOUS at 10:34

## 2019-01-01 RX ADMIN — TOPOTECAN 1.3 MG: 1 INJECTION, SOLUTION, CONCENTRATE INTRAVENOUS at 16:29

## 2019-01-01 RX ADMIN — DOCUSATE SODIUM 100 MG: 100 CAPSULE, LIQUID FILLED ORAL at 08:49

## 2019-01-01 RX ADMIN — HEPARIN SODIUM 6800 UNITS: 1000 INJECTION INTRAVENOUS; SUBCUTANEOUS at 18:32

## 2019-01-01 RX ADMIN — LACTOBACILLUS TAB 1 TABLET: TAB at 09:54

## 2019-01-01 RX ADMIN — SODIUM CHLORIDE 1 G: 900 INJECTION INTRAVENOUS at 10:19

## 2019-01-01 RX ADMIN — CEFEPIME HYDROCHLORIDE 1 G: 1 INJECTION, POWDER, FOR SOLUTION INTRAMUSCULAR; INTRAVENOUS at 09:00

## 2019-01-01 RX ADMIN — Medication 10 ML: at 15:49

## 2019-01-01 RX ADMIN — PIPERACILLIN SODIUM,TAZOBACTAM SODIUM 2.25 G: 2; .25 INJECTION, POWDER, FOR SOLUTION INTRAVENOUS at 14:40

## 2019-01-01 RX ADMIN — HEPARIN SODIUM 18 UNITS/KG/HR: 10000 INJECTION, SOLUTION INTRAVENOUS at 00:07

## 2019-01-01 RX ADMIN — Medication 10 ML: at 15:26

## 2019-01-01 RX ADMIN — ONDANSETRON 4 MG: 2 INJECTION INTRAMUSCULAR; INTRAVENOUS at 03:06

## 2019-01-01 RX ADMIN — DEXAMETHASONE SODIUM PHOSPHATE 8 MG: 4 INJECTION, SOLUTION INTRA-ARTICULAR; INTRALESIONAL; INTRAMUSCULAR; INTRAVENOUS; SOFT TISSUE at 10:08

## 2019-01-01 RX ADMIN — HEPARIN 500 UNITS: 100 SYRINGE at 17:20

## 2019-01-01 RX ADMIN — DEXAMETHASONE SODIUM PHOSPHATE 8 MG: 4 INJECTION, SOLUTION INTRA-ARTICULAR; INTRALESIONAL; INTRAMUSCULAR; INTRAVENOUS; SOFT TISSUE at 09:25

## 2019-01-01 RX ADMIN — ONDANSETRON 16 MG: 2 INJECTION INTRAMUSCULAR; INTRAVENOUS at 09:58

## 2019-01-01 RX ADMIN — SODIUM CHLORIDE, PRESERVATIVE FREE 500 UNITS: 5 INJECTION INTRAVENOUS at 10:23

## 2019-01-01 RX ADMIN — SODIUM CHLORIDE 150 MG: 900 INJECTION, SOLUTION INTRAVENOUS at 14:30

## 2019-01-01 RX ADMIN — MORPHINE SULFATE 5 MG: 10 INJECTION, SOLUTION INTRAMUSCULAR; INTRAVENOUS at 08:43

## 2019-01-01 RX ADMIN — DIPHENHYDRAMINE HYDROCHLORIDE 12.5 MG: 50 INJECTION INTRAMUSCULAR; INTRAVENOUS at 17:06

## 2019-01-01 RX ADMIN — HEPARIN 500 UNITS: 100 SYRINGE at 16:40

## 2019-01-01 RX ADMIN — Medication 10 ML: at 10:39

## 2019-01-01 RX ADMIN — Medication 500 UNITS: at 10:10

## 2019-01-01 RX ADMIN — SODIUM CHLORIDE 500 ML: 9 INJECTION, SOLUTION INTRAVENOUS at 09:30

## 2019-01-01 RX ADMIN — MORPHINE SULFATE 5 MG: 10 INJECTION, SOLUTION INTRAMUSCULAR; INTRAVENOUS at 15:08

## 2019-01-01 RX ADMIN — SODIUM CHLORIDE 1000 ML: 900 INJECTION, SOLUTION INTRAVENOUS at 14:30

## 2019-01-01 RX ADMIN — MORPHINE SULFATE 4 MG: 2 INJECTION, SOLUTION INTRAMUSCULAR; INTRAVENOUS at 09:17

## 2019-01-01 RX ADMIN — MORPHINE SULFATE 5 MG: 10 INJECTION, SOLUTION INTRAMUSCULAR; INTRAVENOUS at 13:56

## 2019-01-01 RX ADMIN — MORPHINE SULFATE 5 MG: 10 INJECTION, SOLUTION INTRAMUSCULAR; INTRAVENOUS at 20:37

## 2019-01-01 RX ADMIN — Medication 10 ML: at 10:12

## 2019-01-01 RX ADMIN — LACTOBACILLUS TAB 1 TABLET: TAB at 08:50

## 2019-01-01 RX ADMIN — DEXAMETHASONE SODIUM PHOSPHATE 8 MG: 4 INJECTION, SOLUTION INTRAMUSCULAR; INTRAVENOUS at 09:33

## 2019-01-01 RX ADMIN — MORPHINE SULFATE 4 MG: 2 INJECTION, SOLUTION INTRAMUSCULAR; INTRAVENOUS at 00:17

## 2019-01-01 RX ADMIN — PIPERACILLIN SODIUM,TAZOBACTAM SODIUM 3.38 G: 3; .375 INJECTION, POWDER, FOR SOLUTION INTRAVENOUS at 23:01

## 2019-01-01 RX ADMIN — Medication 10 ML: at 22:22

## 2019-01-01 RX ADMIN — SODIUM CHLORIDE 75 ML/HR: 900 INJECTION, SOLUTION INTRAVENOUS at 20:49

## 2019-01-01 RX ADMIN — SODIUM CHLORIDE 500 ML: 9 INJECTION, SOLUTION INTRAVENOUS at 09:14

## 2019-01-01 RX ADMIN — SODIUM CHLORIDE 1 G: 900 INJECTION INTRAVENOUS at 09:52

## 2019-01-01 RX ADMIN — GLYCOPYRROLATE 0.2 MG: 0.2 INJECTION INTRAMUSCULAR; INTRAVENOUS at 10:47

## 2019-01-01 RX ADMIN — MORPHINE SULFATE 4 MG: 4 INJECTION INTRAVENOUS at 05:17

## 2019-01-01 RX ADMIN — FENTANYL CITRATE 50 MCG: 50 INJECTION, SOLUTION INTRAMUSCULAR; INTRAVENOUS at 13:19

## 2019-01-01 RX ADMIN — ONDANSETRON 16 MG: 2 INJECTION INTRAMUSCULAR; INTRAVENOUS at 14:20

## 2019-01-01 RX ADMIN — TOPOTECAN 1.9 MG: 1 INJECTION, SOLUTION, CONCENTRATE INTRAVENOUS at 11:18

## 2019-01-01 RX ADMIN — Medication 10 ML: at 15:44

## 2019-01-01 RX ADMIN — DIPHENHYDRAMINE HYDROCHLORIDE AND LIDOCAINE HYDROCHLORIDE AND ALUMINUM HYDROXIDE AND MAGNESIUM HYDRO 10 ML: KIT at 11:30

## 2019-01-01 RX ADMIN — HEPARIN 500 UNITS: 100 SYRINGE at 10:25

## 2019-01-01 RX ADMIN — Medication 20 ML: at 17:20

## 2019-01-01 RX ADMIN — DEXAMETHASONE SODIUM PHOSPHATE 8 MG: 4 INJECTION, SOLUTION INTRAMUSCULAR; INTRAVENOUS at 09:25

## 2019-01-01 RX ADMIN — WATER 2 G: 1 INJECTION INTRAMUSCULAR; INTRAVENOUS; SUBCUTANEOUS at 13:24

## 2019-01-01 RX ADMIN — SODIUM CHLORIDE 500 ML: 900 INJECTION, SOLUTION INTRAVENOUS at 10:00

## 2019-01-01 RX ADMIN — FENTANYL CITRATE 50 MCG: 50 INJECTION, SOLUTION INTRAMUSCULAR; INTRAVENOUS at 11:42

## 2019-01-01 RX ADMIN — MORPHINE SULFATE 5 MG: 10 INJECTION, SOLUTION INTRAMUSCULAR; INTRAVENOUS at 02:25

## 2019-01-01 RX ADMIN — Medication 10 ML: at 08:40

## 2019-01-01 RX ADMIN — Medication 10 ML: at 10:16

## 2019-01-01 RX ADMIN — NYSTATIN 500000 UNITS: 500000 SUSPENSION ORAL at 00:03

## 2019-01-01 RX ADMIN — MORPHINE SULFATE 5 MG: 10 INJECTION, SOLUTION INTRAMUSCULAR; INTRAVENOUS at 00:57

## 2019-01-01 RX ADMIN — SODIUM CHLORIDE 1 G: 900 INJECTION INTRAVENOUS at 09:37

## 2019-01-01 RX ADMIN — ONDANSETRON 16 MG: 2 INJECTION INTRAMUSCULAR; INTRAVENOUS at 09:14

## 2019-01-01 RX ADMIN — SODIUM CHLORIDE 1 G: 900 INJECTION INTRAVENOUS at 09:43

## 2019-01-01 RX ADMIN — DOCUSATE SODIUM 100 MG: 100 CAPSULE, LIQUID FILLED ORAL at 17:48

## 2019-01-01 RX ADMIN — SODIUM CHLORIDE, PRESERVATIVE FREE 500 UNITS: 5 INJECTION INTRAVENOUS at 11:37

## 2019-01-01 RX ADMIN — HEPARIN 500 UNITS: 100 SYRINGE at 09:59

## 2019-01-01 RX ADMIN — Medication 20 ML: at 13:36

## 2019-01-01 RX ADMIN — Medication 1 TABLET: at 09:54

## 2019-01-01 RX ADMIN — Medication 10 ML: at 10:52

## 2019-01-01 RX ADMIN — Medication 10 ML: at 09:31

## 2019-01-01 RX ADMIN — ROCURONIUM BROMIDE 20 MG: 10 INJECTION, SOLUTION INTRAVENOUS at 10:58

## 2019-01-01 RX ADMIN — Medication 10 ML: at 14:00

## 2019-01-01 RX ADMIN — SODIUM CHLORIDE 500 ML: 9 INJECTION, SOLUTION INTRAVENOUS at 09:58

## 2019-01-01 RX ADMIN — SODIUM CHLORIDE 1 G: 900 INJECTION INTRAVENOUS at 09:13

## 2019-01-01 RX ADMIN — ONDANSETRON 4 MG: 2 INJECTION INTRAMUSCULAR; INTRAVENOUS at 10:03

## 2019-01-01 RX ADMIN — SODIUM CHLORIDE, PRESERVATIVE FREE 500 UNITS: 5 INJECTION INTRAVENOUS at 09:53

## 2019-01-01 RX ADMIN — ACETAMINOPHEN 1000 MG: 500 TABLET ORAL at 11:47

## 2019-01-01 RX ADMIN — Medication 20 ML: at 13:48

## 2019-01-01 RX ADMIN — SODIUM CHLORIDE, PRESERVATIVE FREE 500 UNITS: 5 INJECTION INTRAVENOUS at 09:41

## 2019-01-01 RX ADMIN — IOHEXOL 50 ML: 300 INJECTION, SOLUTION INTRAVENOUS at 11:37

## 2019-01-01 RX ADMIN — SODIUM CHLORIDE 500 ML: 9 INJECTION, SOLUTION INTRAVENOUS at 09:41

## 2019-01-01 RX ADMIN — SODIUM CHLORIDE 75 ML/HR: 900 INJECTION, SOLUTION INTRAVENOUS at 18:24

## 2019-01-01 RX ADMIN — MORPHINE SULFATE 4 MG: 4 INJECTION INTRAVENOUS at 19:29

## 2019-01-01 RX ADMIN — MORPHINE SULFATE 4 MG: 2 INJECTION, SOLUTION INTRAMUSCULAR; INTRAVENOUS at 22:47

## 2019-01-01 RX ADMIN — MORPHINE SULFATE 4 MG: 2 INJECTION, SOLUTION INTRAMUSCULAR; INTRAVENOUS at 03:25

## 2019-01-01 RX ADMIN — MORPHINE SULFATE 4 MG: 2 INJECTION, SOLUTION INTRAMUSCULAR; INTRAVENOUS at 06:51

## 2019-01-01 RX ADMIN — Medication 10 ML: at 06:48

## 2019-01-01 RX ADMIN — Medication 10 ML: at 09:20

## 2019-01-01 RX ADMIN — MORPHINE SULFATE 4 MG: 4 INJECTION INTRAVENOUS at 11:54

## 2019-01-01 RX ADMIN — MIDAZOLAM HYDROCHLORIDE 0.5 MG: 2 INJECTION, SOLUTION INTRAMUSCULAR; INTRAVENOUS at 17:09

## 2019-01-01 RX ADMIN — ALTEPLASE 2 MG: 2.2 INJECTION, POWDER, LYOPHILIZED, FOR SOLUTION INTRAVENOUS at 09:37

## 2019-01-01 RX ADMIN — HEPARIN 500 UNITS: 100 SYRINGE at 10:07

## 2019-01-01 RX ADMIN — SODIUM CHLORIDE, PRESERVATIVE FREE 500 UNITS: 5 INJECTION INTRAVENOUS at 11:27

## 2019-01-01 RX ADMIN — WATER 1 G: 1 INJECTION INTRAMUSCULAR; INTRAVENOUS; SUBCUTANEOUS at 10:47

## 2019-01-01 RX ADMIN — ALTEPLASE 2 MG: 2.2 INJECTION, POWDER, LYOPHILIZED, FOR SOLUTION INTRAVENOUS at 00:09

## 2019-01-01 RX ADMIN — FAMOTIDINE 20 MG: 10 INJECTION INTRAVENOUS at 09:28

## 2019-01-01 RX ADMIN — MORPHINE SULFATE 4 MG: 2 INJECTION, SOLUTION INTRAMUSCULAR; INTRAVENOUS at 04:26

## 2019-01-01 RX ADMIN — PALONOSETRON HYDROCHLORIDE 0.25 MG: 0.25 INJECTION, SOLUTION INTRAVENOUS at 10:00

## 2019-01-01 RX ADMIN — Medication 10 ML: at 22:28

## 2019-01-01 RX ADMIN — DAPTOMYCIN 350 MG: 500 INJECTION, POWDER, LYOPHILIZED, FOR SOLUTION INTRAVENOUS at 09:19

## 2019-01-01 RX ADMIN — FAMOTIDINE 20 MG: 10 INJECTION, SOLUTION INTRAVENOUS at 09:24

## 2019-01-01 RX ADMIN — ACETAMINOPHEN 650 MG: 325 TABLET ORAL at 16:34

## 2019-01-01 RX ADMIN — SODIUM CHLORIDE 1 G: 900 INJECTION INTRAVENOUS at 09:18

## 2019-01-01 RX ADMIN — ONDANSETRON 4 MG: 2 INJECTION INTRAMUSCULAR; INTRAVENOUS at 08:43

## 2019-01-01 RX ADMIN — SIMETHICONE CHEW TAB 80 MG 80 MG: 80 TABLET ORAL at 15:53

## 2019-01-01 RX ADMIN — Medication 10 ML: at 09:55

## 2019-01-01 RX ADMIN — Medication 30 ML: at 10:10

## 2019-01-01 RX ADMIN — Medication 10 ML: at 10:15

## 2019-01-01 RX ADMIN — SODIUM CHLORIDE, PRESERVATIVE FREE 500 UNITS: 5 INJECTION INTRAVENOUS at 13:36

## 2019-01-01 RX ADMIN — MORPHINE SULFATE 4 MG: 2 INJECTION, SOLUTION INTRAMUSCULAR; INTRAVENOUS at 03:15

## 2019-01-01 RX ADMIN — PIPERACILLIN SODIUM,TAZOBACTAM SODIUM 3.38 G: 3; .375 INJECTION, POWDER, FOR SOLUTION INTRAVENOUS at 17:23

## 2019-01-01 RX ADMIN — SODIUM CHLORIDE 1 G: 900 INJECTION INTRAVENOUS at 10:18

## 2019-01-01 RX ADMIN — DEXAMETHASONE SODIUM PHOSPHATE 8 MG: 4 INJECTION, SOLUTION INTRA-ARTICULAR; INTRALESIONAL; INTRAMUSCULAR; INTRAVENOUS; SOFT TISSUE at 10:20

## 2019-01-01 RX ADMIN — SODIUM CHLORIDE 1 G: 900 INJECTION INTRAVENOUS at 09:25

## 2019-01-01 RX ADMIN — PIPERACILLIN SODIUM,TAZOBACTAM SODIUM 3.38 G: 3; .375 INJECTION, POWDER, FOR SOLUTION INTRAVENOUS at 10:24

## 2019-01-01 RX ADMIN — WATER 1 G: 1 INJECTION INTRAMUSCULAR; INTRAVENOUS; SUBCUTANEOUS at 13:36

## 2019-01-01 RX ADMIN — CALCIUM 500 MG: 500 TABLET ORAL at 10:25

## 2019-01-01 RX ADMIN — SODIUM CHLORIDE 1 G: 900 INJECTION INTRAVENOUS at 10:45

## 2019-01-01 RX ADMIN — MORPHINE SULFATE 5 MG: 10 INJECTION, SOLUTION INTRAMUSCULAR; INTRAVENOUS at 19:24

## 2019-01-01 RX ADMIN — TOPOTECAN 1.3 MG: 1 INJECTION, SOLUTION, CONCENTRATE INTRAVENOUS at 15:17

## 2019-01-01 RX ADMIN — SODIUM CHLORIDE, PRESERVATIVE FREE 500 UNITS: 5 INJECTION INTRAVENOUS at 10:41

## 2019-01-01 RX ADMIN — Medication 20 ML: at 10:12

## 2019-01-01 RX ADMIN — ONDANSETRON 4 MG: 2 INJECTION INTRAMUSCULAR; INTRAVENOUS at 20:22

## 2019-01-01 RX ADMIN — HEPARIN 500 UNITS: 100 SYRINGE at 09:49

## 2019-01-01 RX ADMIN — PALONOSETRON HYDROCHLORIDE 0.25 MG: 0.05 INJECTION INTRAVENOUS at 08:25

## 2019-01-01 RX ADMIN — SODIUM CHLORIDE 1 G: 900 INJECTION INTRAVENOUS at 09:45

## 2019-01-01 RX ADMIN — Medication 10 ML: at 09:59

## 2019-01-01 RX ADMIN — ALTEPLASE 2 MG: 2.2 INJECTION, POWDER, LYOPHILIZED, FOR SOLUTION INTRAVENOUS at 13:20

## 2019-01-01 RX ADMIN — CALCIUM GLUCONATE 1 G: 98 INJECTION, SOLUTION INTRAVENOUS at 14:59

## 2019-01-01 RX ADMIN — PIPERACILLIN SODIUM,TAZOBACTAM SODIUM 2.25 G: 2; .25 INJECTION, POWDER, FOR SOLUTION INTRAVENOUS at 09:29

## 2019-01-01 RX ADMIN — DOCUSATE SODIUM 100 MG: 100 CAPSULE, LIQUID FILLED ORAL at 18:59

## 2019-01-01 RX ADMIN — FAMOTIDINE 20 MG: 10 INJECTION, SOLUTION INTRAVENOUS at 14:42

## 2019-01-01 RX ADMIN — ONDANSETRON 4 MG: 2 INJECTION INTRAMUSCULAR; INTRAVENOUS at 15:16

## 2019-01-01 RX ADMIN — FAMOTIDINE 20 MG: 10 INJECTION, SOLUTION INTRAVENOUS at 10:01

## 2019-01-01 RX ADMIN — PIPERACILLIN SODIUM,TAZOBACTAM SODIUM 2.25 G: 2; .25 INJECTION, POWDER, FOR SOLUTION INTRAVENOUS at 20:34

## 2019-01-01 RX ADMIN — VANCOMYCIN HYDROCHLORIDE 1000 MG: 10 INJECTION, POWDER, LYOPHILIZED, FOR SOLUTION INTRAVENOUS at 12:52

## 2019-01-01 RX ADMIN — METOPROLOL TARTRATE 2.5 MG: 1 INJECTION, SOLUTION INTRAVENOUS at 18:13

## 2019-01-01 RX ADMIN — TOPOTECAN 1.3 MG: 1 INJECTION, SOLUTION, CONCENTRATE INTRAVENOUS at 09:51

## 2019-01-01 RX ADMIN — ONDANSETRON 4 MG: 2 INJECTION INTRAMUSCULAR; INTRAVENOUS at 05:29

## 2019-01-01 RX ADMIN — MIDAZOLAM HYDROCHLORIDE 1 MG: 2 INJECTION, SOLUTION INTRAMUSCULAR; INTRAVENOUS at 11:30

## 2019-01-01 RX ADMIN — MIDAZOLAM HYDROCHLORIDE 0.5 MG: 2 INJECTION, SOLUTION INTRAMUSCULAR; INTRAVENOUS at 11:05

## 2019-01-01 RX ADMIN — SODIUM CHLORIDE, PRESERVATIVE FREE 500 UNITS: 5 INJECTION INTRAVENOUS at 10:56

## 2019-01-01 RX ADMIN — Medication 20 ML: at 09:53

## 2019-01-01 RX ADMIN — DAPTOMYCIN 350 MG: 500 INJECTION, POWDER, LYOPHILIZED, FOR SOLUTION INTRAVENOUS at 10:48

## 2019-01-01 RX ADMIN — PIPERACILLIN SODIUM AND TAZOBACTAM SODIUM 3.38 G: 3; .375 INJECTION, POWDER, LYOPHILIZED, FOR SOLUTION INTRAVENOUS at 20:41

## 2019-01-01 RX ADMIN — Medication 20 ML: at 15:34

## 2019-01-01 RX ADMIN — DAPTOMYCIN 350 MG: 500 INJECTION, POWDER, LYOPHILIZED, FOR SOLUTION INTRAVENOUS at 09:36

## 2019-01-01 RX ADMIN — FAMOTIDINE 20 MG: 10 INJECTION, SOLUTION INTRAVENOUS at 10:53

## 2019-01-01 RX ADMIN — MORPHINE SULFATE 4 MG: 2 INJECTION, SOLUTION INTRAMUSCULAR; INTRAVENOUS at 19:06

## 2019-01-01 RX ADMIN — MORPHINE SULFATE 5 MG: 10 INJECTION, SOLUTION INTRAMUSCULAR; INTRAVENOUS at 14:10

## 2019-01-01 RX ADMIN — Medication 20 ML: at 11:45

## 2019-01-01 RX ADMIN — Medication 40 ML: at 09:50

## 2019-01-01 RX ADMIN — MORPHINE SULFATE 5 MG: 10 INJECTION, SOLUTION INTRAMUSCULAR; INTRAVENOUS at 10:20

## 2019-01-01 RX ADMIN — GLYCOPYRROLATE 0.2 MG: 0.2 INJECTION INTRAMUSCULAR; INTRAVENOUS at 13:36

## 2019-01-01 RX ADMIN — Medication 20 ML: at 09:41

## 2019-01-01 RX ADMIN — FENTANYL CITRATE 50 MCG: 50 INJECTION, SOLUTION INTRAMUSCULAR; INTRAVENOUS at 14:25

## 2019-01-01 RX ADMIN — TOPOTECAN 1.3 MG: 1 INJECTION, SOLUTION, CONCENTRATE INTRAVENOUS at 15:35

## 2019-01-01 RX ADMIN — VANCOMYCIN HYDROCHLORIDE 500 MG: 500 INJECTION, POWDER, LYOPHILIZED, FOR SOLUTION INTRAVENOUS at 15:08

## 2019-01-01 RX ADMIN — SODIUM CHLORIDE 1 G: 900 INJECTION INTRAVENOUS at 09:35

## 2019-01-01 RX ADMIN — FENTANYL CITRATE 25 MCG: 50 INJECTION, SOLUTION INTRAMUSCULAR; INTRAVENOUS at 17:10

## 2019-01-01 RX ADMIN — DEXAMETHASONE SODIUM PHOSPHATE 8 MG: 4 INJECTION INTRA-ARTICULAR; INTRALESIONAL; INTRAMUSCULAR; INTRAVENOUS; SOFT TISSUE at 16:01

## 2019-01-01 RX ADMIN — SODIUM CHLORIDE 250 ML: 9 INJECTION, SOLUTION INTRAVENOUS at 09:57

## 2019-01-01 RX ADMIN — MORPHINE SULFATE: 10 INJECTION, SOLUTION INTRAMUSCULAR; INTRAVENOUS at 12:21

## 2019-01-01 RX ADMIN — NYSTATIN 500000 UNITS: 500000 SUSPENSION ORAL at 13:00

## 2019-01-01 RX ADMIN — MORPHINE SULFATE 4 MG: 4 INJECTION, SOLUTION INTRAMUSCULAR; INTRAVENOUS at 20:23

## 2019-01-01 RX ADMIN — SODIUM CHLORIDE 1 G: 900 INJECTION INTRAVENOUS at 09:09

## 2019-01-01 RX ADMIN — Medication 10 ML: at 16:13

## 2019-01-01 RX ADMIN — ONDANSETRON 4 MG: 2 INJECTION INTRAMUSCULAR; INTRAVENOUS at 19:04

## 2019-01-01 RX ADMIN — DOCUSATE SODIUM 100 MG: 100 CAPSULE, LIQUID FILLED ORAL at 09:55

## 2019-01-01 RX ADMIN — FAMOTIDINE 20 MG: 10 INJECTION, SOLUTION INTRAVENOUS at 09:57

## 2019-01-01 RX ADMIN — POLYETHYLENE GLYCOL 3350 17 G: 17 POWDER, FOR SOLUTION ORAL at 09:55

## 2019-01-01 RX ADMIN — HEPARIN 500 UNITS: 100 SYRINGE at 11:37

## 2019-01-01 RX ADMIN — Medication 1 TABLET: at 08:50

## 2019-01-01 RX ADMIN — PIPERACILLIN SODIUM,TAZOBACTAM SODIUM 3.38 G: 3; .375 INJECTION, POWDER, FOR SOLUTION INTRAVENOUS at 04:10

## 2019-01-01 RX ADMIN — HEPARIN 500 UNITS: 100 SYRINGE at 15:27

## 2019-01-01 RX ADMIN — INSULIN HUMAN 10 UNITS: 100 INJECTION, SOLUTION PARENTERAL at 00:04

## 2019-01-01 RX ADMIN — Medication 10 ML: at 17:19

## 2019-01-01 RX ADMIN — SODIUM CHLORIDE 500 ML: 9 INJECTION, SOLUTION INTRAVENOUS at 14:25

## 2019-01-01 RX ADMIN — DAPTOMYCIN 350 MG: 500 INJECTION, POWDER, LYOPHILIZED, FOR SOLUTION INTRAVENOUS at 09:52

## 2019-01-01 RX ADMIN — HEPARIN 500 UNITS: 100 SYRINGE at 10:23

## 2019-01-01 RX ADMIN — SIMETHICONE CHEW TAB 80 MG 80 MG: 80 TABLET ORAL at 08:50

## 2019-01-01 RX ADMIN — MORPHINE SULFATE 5 MG: 10 INJECTION, SOLUTION INTRAMUSCULAR; INTRAVENOUS at 17:48

## 2019-01-01 RX ADMIN — Medication 5 ML: at 06:03

## 2019-01-01 RX ADMIN — PIPERACILLIN SODIUM,TAZOBACTAM SODIUM 3.38 G: 3; .375 INJECTION, POWDER, FOR SOLUTION INTRAVENOUS at 04:27

## 2019-01-01 RX ADMIN — MORPHINE SULFATE 5 MG: 10 INJECTION, SOLUTION INTRAMUSCULAR; INTRAVENOUS at 15:45

## 2019-01-01 RX ADMIN — MORPHINE SULFATE: 10 INJECTION, SOLUTION INTRAMUSCULAR; INTRAVENOUS at 21:12

## 2019-01-01 RX ADMIN — ACETAMINOPHEN 650 MG: 325 TABLET ORAL at 09:52

## 2019-01-01 RX ADMIN — LEVOFLOXACIN 500 MG: 5 INJECTION, SOLUTION INTRAVENOUS at 18:59

## 2019-01-01 RX ADMIN — HEPARIN 500 UNITS: 100 SYRINGE at 09:50

## 2019-01-01 RX ADMIN — HEPARIN 500 UNITS: 100 SYRINGE at 10:17

## 2019-01-01 RX ADMIN — Medication 20 ML: at 10:40

## 2019-01-01 RX ADMIN — MORPHINE SULFATE 5 MG: 10 INJECTION, SOLUTION INTRAMUSCULAR; INTRAVENOUS at 09:55

## 2019-01-01 RX ADMIN — ASCORBIC ACID TAB 250 MG 250 MG: 250 TAB at 10:25

## 2019-01-01 RX ADMIN — DIPHENHYDRAMINE HYDROCHLORIDE AND LIDOCAINE HYDROCHLORIDE AND ALUMINUM HYDROXIDE AND MAGNESIUM HYDRO 10 ML: KIT at 07:30

## 2019-01-01 RX ADMIN — DAPTOMYCIN 350 MG: 500 INJECTION, POWDER, LYOPHILIZED, FOR SOLUTION INTRAVENOUS at 10:00

## 2019-01-01 RX ADMIN — SODIUM CHLORIDE, PRESERVATIVE FREE 500 UNITS: 5 INJECTION INTRAVENOUS at 15:20

## 2019-01-01 RX ADMIN — SODIUM CHLORIDE, PRESERVATIVE FREE 500 UNITS: 5 INJECTION INTRAVENOUS at 10:25

## 2019-01-01 RX ADMIN — HEPARIN 500 UNITS: 100 SYRINGE at 09:55

## 2019-01-01 RX ADMIN — SODIUM CHLORIDE 1 G: 900 INJECTION INTRAVENOUS at 15:18

## 2019-01-01 RX ADMIN — HEPARIN 500 UNITS: 100 SYRINGE at 09:16

## 2019-01-01 RX ADMIN — SODIUM CHLORIDE 1 G: 900 INJECTION INTRAVENOUS at 09:16

## 2019-01-01 RX ADMIN — FENTANYL CITRATE 25 MCG: 50 INJECTION, SOLUTION INTRAMUSCULAR; INTRAVENOUS at 17:00

## 2019-01-01 RX ADMIN — Medication 20 ML: at 10:19

## 2019-01-01 RX ADMIN — Medication 10 ML: at 22:27

## 2019-01-01 RX ADMIN — SODIUM CHLORIDE, PRESERVATIVE FREE 500 UNITS: 5 INJECTION INTRAVENOUS at 10:40

## 2019-01-01 RX ADMIN — SODIUM CHLORIDE 500 ML: 900 INJECTION, SOLUTION INTRAVENOUS at 10:02

## 2019-01-01 RX ADMIN — FAMOTIDINE 20 MG: 10 INJECTION, SOLUTION INTRAVENOUS at 10:24

## 2019-01-01 RX ADMIN — MORPHINE SULFATE 4 MG: 4 INJECTION INTRAVENOUS at 18:13

## 2019-01-01 RX ADMIN — DEXTROSE 50 % IN WATER (D50W) INTRAVENOUS SYRINGE 25 G: at 00:06

## 2019-01-01 RX ADMIN — Medication 10 ML: at 15:45

## 2019-01-01 RX ADMIN — MIDAZOLAM HYDROCHLORIDE 1 MG: 2 INJECTION, SOLUTION INTRAMUSCULAR; INTRAVENOUS at 11:00

## 2019-01-01 RX ADMIN — LACTOBACILLUS TAB 1 TABLET: TAB at 10:26

## 2019-01-01 RX ADMIN — PIPERACILLIN SODIUM,TAZOBACTAM SODIUM 2.25 G: 2; .25 INJECTION, POWDER, FOR SOLUTION INTRAVENOUS at 20:30

## 2019-01-01 RX ADMIN — Medication 10 ML: at 23:20

## 2019-01-01 RX ADMIN — SODIUM CHLORIDE 1 G: 900 INJECTION INTRAVENOUS at 09:19

## 2019-01-01 RX ADMIN — MORPHINE SULFATE 4 MG: 2 INJECTION, SOLUTION INTRAMUSCULAR; INTRAVENOUS at 06:49

## 2019-01-01 RX ADMIN — HEPARIN 500 UNITS: 100 SYRINGE at 10:10

## 2019-01-01 RX ADMIN — Medication 20 ML: at 09:16

## 2019-01-01 RX ADMIN — LIDOCAINE HYDROCHLORIDE 75 MG: 20 INJECTION, SOLUTION EPIDURAL; INFILTRATION; INTRACAUDAL; PERINEURAL at 13:43

## 2019-01-01 RX ADMIN — SODIUM CHLORIDE 500 ML: 9 INJECTION, SOLUTION INTRAVENOUS at 09:28

## 2019-01-01 RX ADMIN — Medication 10 ML: at 11:15

## 2019-01-01 RX ADMIN — METRONIDAZOLE 500 MG: 500 INJECTION, SOLUTION INTRAVENOUS at 11:52

## 2019-01-01 RX ADMIN — TOPOTECAN 1.9 MG: 1 INJECTION, SOLUTION, CONCENTRATE INTRAVENOUS at 10:44

## 2019-01-01 RX ADMIN — HEPARIN 500 UNITS: 100 SYRINGE at 15:48

## 2019-01-01 RX ADMIN — PIPERACILLIN SODIUM,TAZOBACTAM SODIUM 3.38 G: 3; .375 INJECTION, POWDER, FOR SOLUTION INTRAVENOUS at 04:36

## 2019-01-01 RX ADMIN — TOPOTECAN 1.3 MG: 1 INJECTION, SOLUTION, CONCENTRATE INTRAVENOUS at 10:24

## 2019-01-01 RX ADMIN — LEVOFLOXACIN 750 MG: 750 INJECTION, SOLUTION INTRAVENOUS at 17:49

## 2019-01-01 RX ADMIN — ASCORBIC ACID TAB 250 MG 250 MG: 250 TAB at 09:16

## 2019-01-01 RX ADMIN — LEVOFLOXACIN 500 MG: 5 INJECTION, SOLUTION INTRAVENOUS at 18:32

## 2019-01-01 RX ADMIN — MORPHINE SULFATE 4 MG: 4 INJECTION INTRAVENOUS at 14:49

## 2019-01-01 RX ADMIN — PIPERACILLIN SODIUM AND TAZOBACTAM SODIUM 3.38 G: 3; .375 INJECTION, POWDER, LYOPHILIZED, FOR SOLUTION INTRAVENOUS at 15:16

## 2019-01-01 RX ADMIN — PIPERACILLIN SODIUM,TAZOBACTAM SODIUM 3.38 G: 3; .375 INJECTION, POWDER, FOR SOLUTION INTRAVENOUS at 10:25

## 2019-01-01 RX ADMIN — SODIUM CHLORIDE 500 ML: 9 INJECTION, SOLUTION INTRAVENOUS at 14:18

## 2019-01-01 RX ADMIN — Medication 30 ML: at 09:59

## 2019-01-01 RX ADMIN — FENTANYL CITRATE 50 MCG: 50 INJECTION INTRAMUSCULAR; INTRAVENOUS at 11:36

## 2019-01-01 RX ADMIN — PROPOFOL 150 MG: 10 INJECTION, EMULSION INTRAVENOUS at 10:54

## 2019-01-01 RX ADMIN — SODIUM CHLORIDE 150 MG: 900 INJECTION, SOLUTION INTRAVENOUS at 09:58

## 2019-01-01 RX ADMIN — ONDANSETRON 16 MG: 2 INJECTION INTRAMUSCULAR; INTRAVENOUS at 09:30

## 2019-01-01 RX ADMIN — Medication 20 ML: at 09:46

## 2019-01-01 RX ADMIN — MORPHINE SULFATE 5 MG: 10 INJECTION, SOLUTION INTRAMUSCULAR; INTRAVENOUS at 18:39

## 2019-01-01 RX ADMIN — SODIUM CHLORIDE 1 G: 900 INJECTION INTRAVENOUS at 09:50

## 2019-01-01 RX ADMIN — DAPTOMYCIN 350 MG: 500 INJECTION, POWDER, LYOPHILIZED, FOR SOLUTION INTRAVENOUS at 10:15

## 2019-01-01 RX ADMIN — DEXAMETHASONE SODIUM PHOSPHATE 4 MG: 4 INJECTION, SOLUTION INTRA-ARTICULAR; INTRALESIONAL; INTRAMUSCULAR; INTRAVENOUS; SOFT TISSUE at 13:43

## 2019-01-01 RX ADMIN — HEPARIN 500 UNITS: 100 SYRINGE at 16:42

## 2019-01-01 RX ADMIN — FAMOTIDINE 20 MG: 10 INJECTION, SOLUTION INTRAVENOUS at 08:27

## 2019-01-01 RX ADMIN — Medication 40 ML: at 14:45

## 2019-01-01 RX ADMIN — TOPOTECAN 1.9 MG: 1 INJECTION, SOLUTION, CONCENTRATE INTRAVENOUS at 10:00

## 2019-01-01 RX ADMIN — SODIUM CHLORIDE 1 G: 900 INJECTION INTRAVENOUS at 10:33

## 2019-01-01 RX ADMIN — SODIUM CHLORIDE, SODIUM LACTATE, POTASSIUM CHLORIDE, AND CALCIUM CHLORIDE 100 ML/HR: 600; 310; 30; 20 INJECTION, SOLUTION INTRAVENOUS at 16:16

## 2019-01-01 RX ADMIN — ONDANSETRON 4 MG: 2 INJECTION INTRAMUSCULAR; INTRAVENOUS at 13:57

## 2019-01-01 RX ADMIN — FAMOTIDINE 20 MG: 10 INJECTION, SOLUTION INTRAVENOUS at 10:04

## 2019-01-01 RX ADMIN — ONDANSETRON 16 MG: 2 INJECTION INTRAMUSCULAR; INTRAVENOUS at 09:25

## 2019-01-01 RX ADMIN — Medication 10 ML: at 13:26

## 2019-01-01 RX ADMIN — TOPOTECAN 1.3 MG: 1 INJECTION, SOLUTION, CONCENTRATE INTRAVENOUS at 11:35

## 2019-01-01 RX ADMIN — MORPHINE SULFATE 4 MG: 4 INJECTION INTRAVENOUS at 23:30

## 2019-01-01 RX ADMIN — ONDANSETRON 4 MG: 2 INJECTION INTRAMUSCULAR; INTRAVENOUS at 17:41

## 2019-01-01 RX ADMIN — ONDANSETRON 16 MG: 2 INJECTION INTRAMUSCULAR; INTRAVENOUS at 10:15

## 2019-01-01 RX ADMIN — MIDAZOLAM HYDROCHLORIDE 1 MG: 2 INJECTION, SOLUTION INTRAMUSCULAR; INTRAVENOUS at 11:36

## 2019-01-01 RX ADMIN — MORPHINE SULFATE 4 MG: 4 INJECTION INTRAVENOUS at 19:04

## 2019-01-01 RX ADMIN — Medication 20 ML: at 11:26

## 2019-01-01 RX ADMIN — ALTEPLASE 2 MG: 2.2 INJECTION, POWDER, LYOPHILIZED, FOR SOLUTION INTRAVENOUS at 09:58

## 2019-01-01 RX ADMIN — SODIUM CHLORIDE: 9 INJECTION, SOLUTION INTRAVENOUS at 13:10

## 2019-01-01 RX ADMIN — BENZOCAINE AND MENTHOL 1 LOZENGE: 15; 3.6 LOZENGE ORAL at 06:53

## 2019-01-01 RX ADMIN — Medication 10 ML: at 11:16

## 2019-01-01 RX ADMIN — Medication 20 ML: at 11:35

## 2019-01-01 RX ADMIN — SODIUM CHLORIDE, PRESERVATIVE FREE 500 UNITS: 5 INJECTION INTRAVENOUS at 09:55

## 2019-01-01 RX ADMIN — Medication 20 ML: at 14:10

## 2019-01-01 RX ADMIN — SODIUM CHLORIDE, PRESERVATIVE FREE 500 UNITS: 5 INJECTION INTRAVENOUS at 10:50

## 2019-01-01 RX ADMIN — PIPERACILLIN SODIUM AND TAZOBACTAM SODIUM 3.38 G: 3; .375 INJECTION, POWDER, LYOPHILIZED, FOR SOLUTION INTRAVENOUS at 03:51

## 2019-01-01 RX ADMIN — IOHEXOL 50 ML: 300 INJECTION, SOLUTION INTRAVENOUS at 10:38

## 2019-01-01 RX ADMIN — Medication 10 ML: at 09:30

## 2019-01-01 RX ADMIN — Medication 10 ML: at 21:34

## 2019-01-01 RX ADMIN — IOPAMIDOL 100 ML: 612 INJECTION, SOLUTION INTRAVENOUS at 08:11

## 2019-01-01 RX ADMIN — Medication 10 ML: at 13:57

## 2019-01-01 RX ADMIN — ONDANSETRON 16 MG: 2 INJECTION INTRAMUSCULAR; INTRAVENOUS at 10:21

## 2019-01-01 RX ADMIN — FAMOTIDINE 20 MG: 10 INJECTION, SOLUTION INTRAVENOUS at 15:04

## 2019-01-01 RX ADMIN — SODIUM CHLORIDE 1 G: 900 INJECTION INTRAVENOUS at 09:33

## 2019-01-01 RX ADMIN — MORPHINE SULFATE 4 MG: 4 INJECTION, SOLUTION INTRAMUSCULAR; INTRAVENOUS at 22:36

## 2019-01-01 RX ADMIN — HEPARIN 500 UNITS: 100 SYRINGE at 11:44

## 2019-01-01 RX ADMIN — FENTANYL CITRATE 50 MCG: 50 INJECTION, SOLUTION INTRAMUSCULAR; INTRAVENOUS at 14:05

## 2019-01-01 RX ADMIN — TOPOTECAN 1.3 MG: 1 INJECTION, SOLUTION, CONCENTRATE INTRAVENOUS at 15:27

## 2019-01-01 RX ADMIN — Medication 20 ML: at 14:30

## 2019-01-01 RX ADMIN — FENTANYL CITRATE 50 MCG: 50 INJECTION INTRAMUSCULAR; INTRAVENOUS at 11:00

## 2019-01-01 RX ADMIN — Medication 20 ML: at 12:25

## 2019-01-01 RX ADMIN — ALBUTEROL SULFATE 2.5 MG: 2.5 SOLUTION RESPIRATORY (INHALATION) at 14:12

## 2019-01-01 RX ADMIN — Medication 30 ML: at 09:20

## 2019-01-01 RX ADMIN — HEPARIN 500 UNITS: 100 SYRINGE at 11:00

## 2019-01-01 RX ADMIN — Medication 10 ML: at 09:12

## 2019-01-01 RX ADMIN — Medication 20 ML: at 14:15

## 2019-01-01 RX ADMIN — VANCOMYCIN HYDROCHLORIDE 1000 MG: 10 INJECTION, POWDER, LYOPHILIZED, FOR SOLUTION INTRAVENOUS at 12:43

## 2019-01-01 RX ADMIN — FAMOTIDINE 20 MG: 10 INJECTION INTRAVENOUS at 09:13

## 2019-01-01 RX ADMIN — IOPAMIDOL 80 ML: 755 INJECTION, SOLUTION INTRAVENOUS at 15:54

## 2019-01-01 RX ADMIN — SODIUM CHLORIDE, PRESERVATIVE FREE 500 UNITS: 5 INJECTION INTRAVENOUS at 10:15

## 2019-01-01 RX ADMIN — HEPARIN 500 UNITS: 100 SYRINGE at 15:20

## 2019-01-01 RX ADMIN — DIGOXIN 250 MCG: 0.25 INJECTION INTRAMUSCULAR; INTRAVENOUS at 19:45

## 2019-01-01 RX ADMIN — MORPHINE SULFATE 4 MG: 4 INJECTION INTRAVENOUS at 01:27

## 2019-01-01 RX ADMIN — PIPERACILLIN SODIUM AND TAZOBACTAM SODIUM 3.38 G: 3; .375 INJECTION, POWDER, LYOPHILIZED, FOR SOLUTION INTRAVENOUS at 08:58

## 2019-01-01 RX ADMIN — Medication 10 ML: at 10:35

## 2019-01-01 RX ADMIN — SODIUM CHLORIDE, PRESERVATIVE FREE 500 UNITS: 5 INJECTION INTRAVENOUS at 09:50

## 2019-01-01 RX ADMIN — FAMOTIDINE 20 MG: 20 TABLET, FILM COATED ORAL at 09:22

## 2019-01-01 RX ADMIN — ONDANSETRON 8 MG: 2 INJECTION INTRAMUSCULAR; INTRAVENOUS at 05:20

## 2019-01-01 RX ADMIN — SODIUM CHLORIDE 1000 ML: 9 INJECTION, SOLUTION INTRAVENOUS at 09:30

## 2019-01-01 RX ADMIN — Medication 500 UNITS: at 10:02

## 2019-01-01 RX ADMIN — MORPHINE SULFATE 4 MG: 4 INJECTION INTRAVENOUS at 22:19

## 2019-01-01 RX ADMIN — Medication 10 ML: at 11:24

## 2019-01-01 RX ADMIN — DEXAMETHASONE SODIUM PHOSPHATE 10 MG: 4 INJECTION, SOLUTION INTRA-ARTICULAR; INTRALESIONAL; INTRAMUSCULAR; INTRAVENOUS; SOFT TISSUE at 09:45

## 2019-01-01 RX ADMIN — SODIUM CHLORIDE 1 G: 900 INJECTION INTRAVENOUS at 09:34

## 2019-01-01 RX ADMIN — Medication 10 ML: at 16:38

## 2019-01-01 RX ADMIN — SODIUM POLYSTYRENE SULFONATE 30 G: 15 SUSPENSION ORAL; RECTAL at 05:24

## 2019-01-01 RX ADMIN — FAMOTIDINE 20 MG: 10 INJECTION, SOLUTION INTRAVENOUS at 13:33

## 2019-01-01 RX ADMIN — Medication 20 ML: at 09:56

## 2019-01-01 RX ADMIN — DAPTOMYCIN 350 MG: 500 INJECTION, POWDER, LYOPHILIZED, FOR SOLUTION INTRAVENOUS at 10:16

## 2019-01-01 RX ADMIN — FENTANYL CITRATE 25 MCG: 50 INJECTION, SOLUTION INTRAMUSCULAR; INTRAVENOUS at 13:34

## 2019-01-01 RX ADMIN — MORPHINE SULFATE 4 MG: 4 INJECTION INTRAVENOUS at 04:11

## 2019-01-01 RX ADMIN — ONDANSETRON 16 MG: 2 INJECTION INTRAMUSCULAR; INTRAVENOUS at 15:47

## 2019-01-01 RX ADMIN — HEPARIN SODIUM 6860 UNITS: 1000 INJECTION INTRAVENOUS; SUBCUTANEOUS at 04:08

## 2019-01-01 RX ADMIN — HYDROMORPHONE HYDROCHLORIDE 0.5 MG: 2 INJECTION INTRAMUSCULAR; INTRAVENOUS; SUBCUTANEOUS at 14:45

## 2019-01-01 RX ADMIN — DEXAMETHASONE SODIUM PHOSPHATE 4 MG: 4 INJECTION, SOLUTION INTRA-ARTICULAR; INTRALESIONAL; INTRAMUSCULAR; INTRAVENOUS; SOFT TISSUE at 10:54

## 2019-01-01 RX ADMIN — Medication 30 ML: at 09:22

## 2019-01-01 RX ADMIN — Medication 10 ML: at 09:15

## 2019-01-01 RX ADMIN — FAMOTIDINE 20 MG: 10 INJECTION, SOLUTION INTRAVENOUS at 14:59

## 2019-01-01 RX ADMIN — PIPERACILLIN SODIUM,TAZOBACTAM SODIUM 2.25 G: 2; .25 INJECTION, POWDER, FOR SOLUTION INTRAVENOUS at 03:12

## 2019-01-01 RX ADMIN — HEPARIN 500 UNITS: 100 SYRINGE at 16:15

## 2019-01-01 RX ADMIN — SODIUM CHLORIDE 1 G: 900 INJECTION INTRAVENOUS at 14:38

## 2019-01-01 RX ADMIN — SODIUM CHLORIDE 75 ML/HR: 900 INJECTION, SOLUTION INTRAVENOUS at 18:48

## 2019-01-01 RX ADMIN — FAMOTIDINE 20 MG: 10 INJECTION, SOLUTION INTRAVENOUS at 10:10

## 2019-01-01 RX ADMIN — FAMOTIDINE 20 MG: 10 INJECTION, SOLUTION INTRAVENOUS at 09:30

## 2019-01-01 RX ADMIN — Medication 10 ML: at 10:37

## 2019-01-01 RX ADMIN — MORPHINE SULFATE 5 MG: 10 INJECTION, SOLUTION INTRAMUSCULAR; INTRAVENOUS at 21:59

## 2019-01-01 RX ADMIN — FENTANYL CITRATE 25 MCG: 50 INJECTION INTRAMUSCULAR; INTRAVENOUS at 11:50

## 2019-01-01 RX ADMIN — Medication 10 ML: at 09:43

## 2019-01-01 RX ADMIN — Medication 10 ML: at 10:33

## 2019-01-01 RX ADMIN — MORPHINE SULFATE 5 MG: 10 INJECTION, SOLUTION INTRAMUSCULAR; INTRAVENOUS at 22:48

## 2019-01-01 RX ADMIN — ADENOSINE 6 MG: 3 INJECTION, SOLUTION INTRAVENOUS at 19:18

## 2019-01-01 RX ADMIN — SODIUM CHLORIDE 16 MG: 900 INJECTION, SOLUTION INTRAVENOUS at 09:29

## 2019-01-01 RX ADMIN — DEXAMETHASONE SODIUM PHOSPHATE 10 MG: 4 INJECTION, SOLUTION INTRA-ARTICULAR; INTRALESIONAL; INTRAMUSCULAR; INTRAVENOUS; SOFT TISSUE at 09:29

## 2019-01-01 RX ADMIN — Medication 10 ML: at 21:17

## 2019-01-01 RX ADMIN — PIPERACILLIN SODIUM AND TAZOBACTAM SODIUM 4.5 G: 4; .5 INJECTION, POWDER, LYOPHILIZED, FOR SOLUTION INTRAVENOUS at 11:58

## 2019-01-01 RX ADMIN — SODIUM CHLORIDE, SODIUM LACTATE, POTASSIUM CHLORIDE, AND CALCIUM CHLORIDE 100 ML/HR: 600; 310; 30; 20 INJECTION, SOLUTION INTRAVENOUS at 04:29

## 2019-01-01 RX ADMIN — Medication 10 ML: at 09:47

## 2019-01-01 RX ADMIN — BEVACIZUMAB 1280 MG: 400 INJECTION, SOLUTION INTRAVENOUS at 11:58

## 2019-01-01 RX ADMIN — HEPARIN 500 UNITS: 100 SYRINGE at 10:55

## 2019-01-01 RX ADMIN — DEXAMETHASONE SODIUM PHOSPHATE 8 MG: 4 INJECTION INTRA-ARTICULAR; INTRALESIONAL; INTRAMUSCULAR; INTRAVENOUS; SOFT TISSUE at 14:37

## 2019-01-01 RX ADMIN — DEXAMETHASONE SODIUM PHOSPHATE 4 MG: 4 INJECTION, SOLUTION INTRA-ARTICULAR; INTRALESIONAL; INTRAMUSCULAR; INTRAVENOUS; SOFT TISSUE at 13:28

## 2019-01-01 RX ADMIN — PIPERACILLIN SODIUM,TAZOBACTAM SODIUM 2.25 G: 2; .25 INJECTION, POWDER, FOR SOLUTION INTRAVENOUS at 09:13

## 2019-01-01 RX ADMIN — IOHEXOL 15 ML: 300 INJECTION, SOLUTION INTRAVENOUS at 10:42

## 2019-01-01 RX ADMIN — PROCHLORPERAZINE EDISYLATE 10 MG: 5 INJECTION INTRAMUSCULAR; INTRAVENOUS at 13:04

## 2019-01-01 RX ADMIN — PALONOSETRON 0.25 MG: 0.05 INJECTION, SOLUTION INTRAVENOUS at 13:28

## 2019-01-01 RX ADMIN — Medication 10 ML: at 10:40

## 2019-01-01 RX ADMIN — DAPTOMYCIN 350 MG: 500 INJECTION, POWDER, LYOPHILIZED, FOR SOLUTION INTRAVENOUS at 09:51

## 2019-01-01 RX ADMIN — METRONIDAZOLE 500 MG: 500 INJECTION, SOLUTION INTRAVENOUS at 22:37

## 2019-01-01 RX ADMIN — CEFEPIME HYDROCHLORIDE 1 G: 1 INJECTION, POWDER, FOR SOLUTION INTRAMUSCULAR; INTRAVENOUS at 22:28

## 2019-01-01 RX ADMIN — SODIUM CHLORIDE 150 MG: 900 INJECTION, SOLUTION INTRAVENOUS at 13:56

## 2019-01-01 RX ADMIN — Medication 10 ML: at 09:18

## 2019-01-01 RX ADMIN — Medication 10 ML: at 07:50

## 2019-01-01 RX ADMIN — PIPERACILLIN SODIUM,TAZOBACTAM SODIUM 2.25 G: 2; .25 INJECTION, POWDER, FOR SOLUTION INTRAVENOUS at 21:43

## 2019-01-01 RX ADMIN — Medication 10 ML: at 10:13

## 2019-01-01 RX ADMIN — PIPERACILLIN SODIUM,TAZOBACTAM SODIUM 3.38 G: 3; .375 INJECTION, POWDER, FOR SOLUTION INTRAVENOUS at 22:00

## 2019-01-01 RX ADMIN — TOPOTECAN 1.3 MG: 1 INJECTION, SOLUTION, CONCENTRATE INTRAVENOUS at 14:39

## 2019-01-01 RX ADMIN — SODIUM CHLORIDE, PRESERVATIVE FREE 500 UNITS: 5 INJECTION INTRAVENOUS at 11:05

## 2019-01-01 RX ADMIN — CALCIUM 500 MG: 500 TABLET ORAL at 23:19

## 2019-01-01 RX ADMIN — Medication 20 ML: at 09:20

## 2019-01-01 RX ADMIN — Medication 20 ML: at 10:20

## 2019-01-01 RX ADMIN — FAMOTIDINE 20 MG: 10 INJECTION, SOLUTION INTRAVENOUS at 09:25

## 2019-01-01 RX ADMIN — PIPERACILLIN SODIUM,TAZOBACTAM SODIUM 3.38 G: 3; .375 INJECTION, POWDER, FOR SOLUTION INTRAVENOUS at 17:50

## 2019-01-01 RX ADMIN — LEVOFLOXACIN 750 MG: 750 INJECTION, SOLUTION INTRAVENOUS at 18:54

## 2019-01-01 RX ADMIN — HEPARIN 500 UNITS: 100 SYRINGE at 10:59

## 2019-01-01 RX ADMIN — ONDANSETRON 16 MG: 2 INJECTION INTRAMUSCULAR; INTRAVENOUS at 09:40

## 2019-01-01 RX ADMIN — TOPOTECAN 1.9 MG: 1 INJECTION, SOLUTION, CONCENTRATE INTRAVENOUS at 10:30

## 2019-01-01 RX ADMIN — Medication 10 ML: at 10:50

## 2019-01-01 RX ADMIN — IOPAMIDOL 70 ML: 612 INJECTION, SOLUTION INTRAVENOUS at 15:00

## 2019-01-01 RX ADMIN — SODIUM CHLORIDE 1 G: 900 INJECTION INTRAVENOUS at 09:41

## 2019-01-01 RX ADMIN — Medication 40 ML: at 14:38

## 2019-01-01 RX ADMIN — DAPTOMYCIN 350 MG: 500 INJECTION, POWDER, LYOPHILIZED, FOR SOLUTION INTRAVENOUS at 09:16

## 2019-01-01 RX ADMIN — IOPAMIDOL 100 ML: 612 INJECTION, SOLUTION INTRAVENOUS at 12:42

## 2019-01-01 RX ADMIN — DAPTOMYCIN 350 MG: 500 INJECTION, POWDER, LYOPHILIZED, FOR SOLUTION INTRAVENOUS at 10:12

## 2019-01-01 RX ADMIN — Medication 10 ML: at 11:43

## 2019-01-01 RX ADMIN — HEPARIN 500 UNITS: 100 SYRINGE at 09:52

## 2019-01-01 RX ADMIN — SODIUM CHLORIDE 10 ML: 9 INJECTION INTRAMUSCULAR; INTRAVENOUS; SUBCUTANEOUS at 11:37

## 2019-01-01 RX ADMIN — Medication 10 ML: at 09:41

## 2019-01-01 RX ADMIN — DIPHENHYDRAMINE HYDROCHLORIDE AND LIDOCAINE HYDROCHLORIDE AND ALUMINUM HYDROXIDE AND MAGNESIUM HYDRO 10 ML: KIT at 21:32

## 2019-01-01 RX ADMIN — Medication 20 ML: at 11:05

## 2019-01-01 RX ADMIN — MORPHINE SULFATE 4 MG: 4 INJECTION INTRAVENOUS at 10:24

## 2019-01-01 RX ADMIN — SODIUM CHLORIDE 1 G: 900 INJECTION INTRAVENOUS at 09:46

## 2019-01-01 RX ADMIN — Medication 500 UNITS: at 09:59

## 2019-01-01 RX ADMIN — Medication 20 ML: at 10:23

## 2019-01-01 RX ADMIN — Medication 20 ML: at 15:20

## 2019-01-01 RX ADMIN — SODIUM CHLORIDE, PRESERVATIVE FREE 500 UNITS: 5 INJECTION INTRAVENOUS at 10:21

## 2019-01-01 RX ADMIN — SODIUM CHLORIDE, PRESERVATIVE FREE 500 UNITS: 5 INJECTION INTRAVENOUS at 13:57

## 2019-01-01 RX ADMIN — FAMOTIDINE 20 MG: 10 INJECTION, SOLUTION INTRAVENOUS at 14:45

## 2019-01-01 RX ADMIN — SODIUM CHLORIDE: 9 INJECTION, SOLUTION INTRAVENOUS at 10:47

## 2019-01-01 RX ADMIN — Medication 10 ML: at 09:35

## 2019-01-01 RX ADMIN — SODIUM CHLORIDE 75 ML/HR: 900 INJECTION, SOLUTION INTRAVENOUS at 06:48

## 2019-01-01 RX ADMIN — SODIUM CHLORIDE 500 ML: 900 INJECTION, SOLUTION INTRAVENOUS at 20:48

## 2019-01-01 RX ADMIN — SODIUM CHLORIDE 1 G: 900 INJECTION INTRAVENOUS at 09:27

## 2019-01-01 RX ADMIN — Medication 10 ML: at 20:39

## 2019-01-01 RX ADMIN — SODIUM CHLORIDE 500 ML: 900 INJECTION, SOLUTION INTRAVENOUS at 10:23

## 2019-01-01 RX ADMIN — SODIUM CHLORIDE, PRESERVATIVE FREE 500 UNITS: 5 INJECTION INTRAVENOUS at 11:45

## 2019-01-01 RX ADMIN — Medication 10 ML: at 11:36

## 2019-01-01 RX ADMIN — NYSTATIN 500000 UNITS: 500000 SUSPENSION ORAL at 18:00

## 2019-01-01 RX ADMIN — HEPARIN SODIUM AND DEXTROSE 18 UNITS/KG/HR: 10000; 5 INJECTION INTRAVENOUS at 18:18

## 2019-01-01 RX ADMIN — SODIUM CHLORIDE 500 ML: 9 INJECTION, SOLUTION INTRAVENOUS at 09:22

## 2019-01-01 RX ADMIN — MORPHINE SULFATE 5 MG: 10 INJECTION, SOLUTION INTRAMUSCULAR; INTRAVENOUS at 10:35

## 2019-01-01 RX ADMIN — MORPHINE SULFATE 5 MG: 10 INJECTION, SOLUTION INTRAMUSCULAR; INTRAVENOUS at 08:42

## 2019-01-01 RX ADMIN — FAMOTIDINE 20 MG: 10 INJECTION INTRAVENOUS at 22:44

## 2019-01-01 RX ADMIN — DAPTOMYCIN 350 MG: 500 INJECTION, POWDER, LYOPHILIZED, FOR SOLUTION INTRAVENOUS at 09:44

## 2019-01-01 RX ADMIN — TOPOTECAN 1.9 MG: 1 INJECTION, SOLUTION, CONCENTRATE INTRAVENOUS at 10:02

## 2019-01-01 RX ADMIN — Medication 500 UNITS: at 09:39

## 2019-01-01 RX ADMIN — ACETAMINOPHEN 650 MG: 325 TABLET ORAL at 13:25

## 2019-01-01 RX ADMIN — DOCUSATE SODIUM 100 MG: 100 CAPSULE, LIQUID FILLED ORAL at 17:50

## 2019-01-01 RX ADMIN — DEXAMETHASONE SODIUM PHOSPHATE 8 MG: 4 INJECTION, SOLUTION INTRA-ARTICULAR; INTRALESIONAL; INTRAMUSCULAR; INTRAVENOUS; SOFT TISSUE at 09:45

## 2019-01-01 RX ADMIN — BEVACIZUMAB 1280 MG: 400 INJECTION, SOLUTION INTRAVENOUS at 12:41

## 2019-01-01 RX ADMIN — MORPHINE SULFATE 5 MG: 10 INJECTION, SOLUTION INTRAMUSCULAR; INTRAVENOUS at 11:48

## 2019-01-01 RX ADMIN — FAMOTIDINE 20 MG: 10 INJECTION, SOLUTION INTRAVENOUS at 13:52

## 2019-01-01 RX ADMIN — Medication 10 ML: at 09:34

## 2019-01-01 RX ADMIN — LACTOBACILLUS TAB 1 TABLET: TAB at 10:20

## 2019-01-01 RX ADMIN — Medication 10 ML: at 09:44

## 2019-01-01 RX ADMIN — INSULIN HUMAN 10 UNITS: 100 INJECTION, SOLUTION PARENTERAL at 15:01

## 2019-01-01 RX ADMIN — TOPOTECAN 1.3 MG: 1 INJECTION, SOLUTION, CONCENTRATE INTRAVENOUS at 10:52

## 2019-01-01 RX ADMIN — PROPOFOL 150 MG: 10 INJECTION, EMULSION INTRAVENOUS at 13:43

## 2019-01-01 RX ADMIN — MORPHINE SULFATE 5 MG: 10 INJECTION, SOLUTION INTRAMUSCULAR; INTRAVENOUS at 15:47

## 2019-01-01 RX ADMIN — Medication 10 ML: at 09:52

## 2019-01-01 RX ADMIN — Medication 1 TABLET: at 09:16

## 2019-01-01 RX ADMIN — Medication 20 ML: at 11:34

## 2019-01-01 RX ADMIN — SODIUM CHLORIDE 10 ML: 9 INJECTION INTRAMUSCULAR; INTRAVENOUS; SUBCUTANEOUS at 13:30

## 2019-01-01 RX ADMIN — SODIUM CHLORIDE 1000 ML: 900 INJECTION, SOLUTION INTRAVENOUS at 11:37

## 2019-01-01 RX ADMIN — DAPTOMYCIN 350 MG: 500 INJECTION, POWDER, LYOPHILIZED, FOR SOLUTION INTRAVENOUS at 09:49

## 2019-01-01 RX ADMIN — FAMOTIDINE 20 MG: 20 TABLET, FILM COATED ORAL at 13:49

## 2019-01-01 RX ADMIN — TOPOTECAN 1.9 MG: 1 INJECTION, SOLUTION, CONCENTRATE INTRAVENOUS at 14:24

## 2019-01-01 RX ADMIN — SODIUM CHLORIDE, PRESERVATIVE FREE 500 UNITS: 5 INJECTION INTRAVENOUS at 10:19

## 2019-01-01 RX ADMIN — TOPOTECAN 1.9 MG: 1 INJECTION, SOLUTION, CONCENTRATE INTRAVENOUS at 10:51

## 2019-01-01 RX ADMIN — Medication 500 UNITS: at 15:45

## 2019-01-01 RX ADMIN — PIPERACILLIN SODIUM,TAZOBACTAM SODIUM 3.38 G: 3; .375 INJECTION, POWDER, FOR SOLUTION INTRAVENOUS at 18:53

## 2019-01-01 RX ADMIN — LIDOCAINE HYDROCHLORIDE 75 MG: 20 INJECTION, SOLUTION EPIDURAL; INFILTRATION; INTRACAUDAL; PERINEURAL at 10:54

## 2019-01-01 RX ADMIN — PIPERACILLIN SODIUM,TAZOBACTAM SODIUM 3.38 G: 3; .375 INJECTION, POWDER, FOR SOLUTION INTRAVENOUS at 17:42

## 2019-01-01 RX ADMIN — HEPARIN 500 UNITS: 100 SYRINGE at 10:04

## 2019-01-01 RX ADMIN — Medication 10 ML: at 10:03

## 2019-01-01 RX ADMIN — SODIUM CHLORIDE 500 ML: 9 INJECTION, SOLUTION INTRAVENOUS at 09:26

## 2019-01-01 RX ADMIN — PIPERACILLIN SODIUM,TAZOBACTAM SODIUM 3.38 G: 3; .375 INJECTION, POWDER, FOR SOLUTION INTRAVENOUS at 22:46

## 2019-01-01 RX ADMIN — SODIUM CHLORIDE 75 ML/HR: 900 INJECTION, SOLUTION INTRAVENOUS at 06:01

## 2019-01-01 RX ADMIN — Medication 10 ML: at 00:10

## 2019-01-01 RX ADMIN — DEXAMETHASONE SODIUM PHOSPHATE 8 MG: 4 INJECTION, SOLUTION INTRA-ARTICULAR; INTRALESIONAL; INTRAMUSCULAR; INTRAVENOUS; SOFT TISSUE at 10:24

## 2019-01-01 RX ADMIN — MORPHINE SULFATE 10 MG: 10 INJECTION INTRAVENOUS at 11:27

## 2019-01-01 RX ADMIN — MORPHINE SULFATE 5 MG: 10 INJECTION, SOLUTION INTRAMUSCULAR; INTRAVENOUS at 04:36

## 2019-01-01 RX ADMIN — DILTIAZEM HYDROCHLORIDE 25.41 MG: 5 INJECTION, SOLUTION INTRAVENOUS at 19:31

## 2019-01-01 RX ADMIN — PIPERACILLIN SODIUM AND TAZOBACTAM SODIUM 3.38 G: 3; .375 INJECTION, POWDER, LYOPHILIZED, FOR SOLUTION INTRAVENOUS at 02:49

## 2019-01-01 RX ADMIN — OMEGA-3 FATTY ACIDS CAP 1000 MG 1 CAPSULE: 1000 CAP at 10:25

## 2019-01-01 RX ADMIN — SODIUM CHLORIDE 75 ML/HR: 900 INJECTION, SOLUTION INTRAVENOUS at 07:26

## 2019-01-01 RX ADMIN — Medication 10 ML: at 15:17

## 2019-01-01 RX ADMIN — ONDANSETRON 4 MG: 2 INJECTION INTRAMUSCULAR; INTRAVENOUS at 11:56

## 2019-01-01 RX ADMIN — HYDROMORPHONE HYDROCHLORIDE 0.5 MG: 2 INJECTION, SOLUTION INTRAMUSCULAR; INTRAVENOUS; SUBCUTANEOUS at 12:04

## 2019-01-01 RX ADMIN — Medication 10 ML: at 09:10

## 2019-01-01 RX ADMIN — Medication 30 ML: at 10:06

## 2019-01-01 RX ADMIN — HEPARIN 500 UNITS: 100 SYRINGE at 11:16

## 2019-01-01 RX ADMIN — MORPHINE SULFATE 4 MG: 4 INJECTION INTRAVENOUS at 11:56

## 2019-01-01 RX ADMIN — SODIUM CHLORIDE 1000 ML: 900 INJECTION, SOLUTION INTRAVENOUS at 20:09

## 2019-01-01 RX ADMIN — SODIUM CHLORIDE 500 ML: 9 INJECTION, SOLUTION INTRAVENOUS at 09:17

## 2019-01-01 RX ADMIN — SODIUM CHLORIDE 500 ML: 900 INJECTION, SOLUTION INTRAVENOUS at 09:59

## 2019-01-01 RX ADMIN — SODIUM CHLORIDE, SODIUM LACTATE, POTASSIUM CHLORIDE, AND CALCIUM CHLORIDE 75 ML/HR: 600; 310; 30; 20 INJECTION, SOLUTION INTRAVENOUS at 08:43

## 2019-01-01 RX ADMIN — SODIUM CHLORIDE 75 ML/HR: 900 INJECTION, SOLUTION INTRAVENOUS at 15:56

## 2019-01-01 RX ADMIN — DEXAMETHASONE SODIUM PHOSPHATE 8 MG: 4 INJECTION, SOLUTION INTRAMUSCULAR; INTRAVENOUS at 10:01

## 2019-01-01 RX ADMIN — MORPHINE SULFATE 4 MG: 4 INJECTION INTRAVENOUS at 05:23

## 2019-01-01 RX ADMIN — SODIUM CHLORIDE, PRESERVATIVE FREE 500 UNITS: 5 INJECTION INTRAVENOUS at 15:34

## 2019-01-01 RX ADMIN — SODIUM CHLORIDE 75 ML/HR: 900 INJECTION, SOLUTION INTRAVENOUS at 16:53

## 2019-01-01 RX ADMIN — Medication 30 ML: at 09:26

## 2019-01-01 RX ADMIN — IOHEXOL 10 ML: 300 INJECTION, SOLUTION INTRAVENOUS at 09:31

## 2019-01-01 RX ADMIN — MIDAZOLAM HYDROCHLORIDE 1 MG: 2 INJECTION, SOLUTION INTRAMUSCULAR; INTRAVENOUS at 16:50

## 2019-01-01 RX ADMIN — Medication 10 ML: at 15:53

## 2019-01-01 RX ADMIN — Medication 20 ML: at 09:25

## 2019-01-01 RX ADMIN — DAPTOMYCIN 350 MG: 500 INJECTION, POWDER, LYOPHILIZED, FOR SOLUTION INTRAVENOUS at 10:09

## 2019-01-01 RX ADMIN — HYDROMORPHONE HYDROCHLORIDE 0.5 MG: 1 INJECTION, SOLUTION INTRAMUSCULAR; INTRAVENOUS; SUBCUTANEOUS at 09:59

## 2019-01-01 RX ADMIN — SODIUM CHLORIDE 75 ML/HR: 900 INJECTION, SOLUTION INTRAVENOUS at 06:04

## 2019-01-01 RX ADMIN — Medication 10 ML: at 10:59

## 2019-01-01 RX ADMIN — SODIUM CHLORIDE, PRESERVATIVE FREE 500 UNITS: 5 INJECTION INTRAVENOUS at 09:49

## 2019-01-01 RX ADMIN — Medication 10 ML: at 14:25

## 2019-01-01 RX ADMIN — SODIUM CHLORIDE 1 G: 900 INJECTION INTRAVENOUS at 09:21

## 2019-01-01 RX ADMIN — HEPARIN SODIUM 18 UNITS/KG/HR: 10000 INJECTION, SOLUTION INTRAVENOUS at 18:01

## 2019-01-01 RX ADMIN — MORPHINE SULFATE 5 MG: 10 INJECTION, SOLUTION INTRAMUSCULAR; INTRAVENOUS at 15:23

## 2019-01-01 RX ADMIN — FAMOTIDINE 20 MG: 10 INJECTION, SOLUTION INTRAVENOUS at 09:41

## 2019-01-01 RX ADMIN — LORAZEPAM 1 MG: 2 INJECTION INTRAMUSCULAR at 12:58

## 2019-01-01 RX ADMIN — DAPTOMYCIN 350 MG: 500 INJECTION, POWDER, LYOPHILIZED, FOR SOLUTION INTRAVENOUS at 09:29

## 2019-01-01 RX ADMIN — HEPARIN 500 UNITS: 100 SYRINGE at 11:26

## 2019-01-01 RX ADMIN — DEXAMETHASONE SODIUM PHOSPHATE 8 MG: 4 INJECTION INTRA-ARTICULAR; INTRALESIONAL; INTRAMUSCULAR; INTRAVENOUS; SOFT TISSUE at 14:58

## 2019-01-01 RX ADMIN — MORPHINE SULFATE 4 MG: 4 INJECTION, SOLUTION INTRAMUSCULAR; INTRAVENOUS at 04:10

## 2019-01-01 RX ADMIN — DEXAMETHASONE SODIUM PHOSPHATE 8 MG: 4 INJECTION, SOLUTION INTRAMUSCULAR; INTRAVENOUS at 09:55

## 2019-01-01 RX ADMIN — VANCOMYCIN HYDROCHLORIDE 1750 MG: 10 INJECTION, POWDER, LYOPHILIZED, FOR SOLUTION INTRAVENOUS at 18:21

## 2019-01-01 RX ADMIN — SODIUM CHLORIDE 250 ML: 900 INJECTION, SOLUTION INTRAVENOUS at 12:31

## 2019-01-01 RX ADMIN — MORPHINE SULFATE 5 MG: 10 INJECTION, SOLUTION INTRAMUSCULAR; INTRAVENOUS at 22:56

## 2019-01-01 RX ADMIN — HYDROMORPHONE HYDROCHLORIDE 1 MG: 2 INJECTION, SOLUTION INTRAMUSCULAR; INTRAVENOUS; SUBCUTANEOUS at 11:48

## 2019-01-01 RX ADMIN — Medication 30 ML: at 09:33

## 2019-01-01 RX ADMIN — TOPOTECAN 1.9 MG: 1 INJECTION, SOLUTION, CONCENTRATE INTRAVENOUS at 10:59

## 2019-01-01 RX ADMIN — Medication 10 ML: at 09:27

## 2019-01-01 RX ADMIN — DOCUSATE SODIUM 100 MG: 100 CAPSULE, LIQUID FILLED ORAL at 09:16

## 2019-01-01 RX ADMIN — PHYTONADIONE 5 MG: 10 INJECTION, EMULSION INTRAMUSCULAR; INTRAVENOUS; SUBCUTANEOUS at 01:02

## 2019-01-01 RX ADMIN — Medication 20 ML: at 10:51

## 2019-01-01 RX ADMIN — SODIUM CHLORIDE, SODIUM LACTATE, POTASSIUM CHLORIDE, AND CALCIUM CHLORIDE: 600; 310; 30; 20 INJECTION, SOLUTION INTRAVENOUS at 13:36

## 2019-01-01 RX ADMIN — Medication 10 ML: at 19:03

## 2019-01-01 RX ADMIN — Medication 10 ML: at 10:25

## 2019-01-01 RX ADMIN — DIPHENHYDRAMINE HYDROCHLORIDE 25 MG: 25 CAPSULE ORAL at 09:52

## 2019-01-01 RX ADMIN — SODIUM CHLORIDE 1000 ML: 9 INJECTION, SOLUTION INTRAVENOUS at 09:14

## 2019-01-01 RX ADMIN — SODIUM CHLORIDE 1 G: 900 INJECTION INTRAVENOUS at 09:36

## 2019-01-01 RX ADMIN — MORPHINE SULFATE 5 MG: 10 INJECTION, SOLUTION INTRAMUSCULAR; INTRAVENOUS at 15:09

## 2019-01-01 RX ADMIN — Medication 20 ML: at 10:58

## 2019-01-01 RX ADMIN — SODIUM CHLORIDE 500 ML: 9 INJECTION, SOLUTION INTRAVENOUS at 09:12

## 2019-01-01 RX ADMIN — SODIUM CHLORIDE 500 ML: 9 INJECTION, SOLUTION INTRAVENOUS at 10:00

## 2019-01-01 RX ADMIN — LIDOCAINE HYDROCHLORIDE 30 ML: 10 INJECTION, SOLUTION EPIDURAL; INFILTRATION; INTRACAUDAL; PERINEURAL at 11:30

## 2019-01-01 RX ADMIN — ONDANSETRON 16 MG: 2 INJECTION INTRAMUSCULAR; INTRAVENOUS at 09:13

## 2019-01-01 RX ADMIN — HEPARIN 500 UNITS: 100 SYRINGE at 15:54

## 2019-01-01 RX ADMIN — DAPTOMYCIN 350 MG: 500 INJECTION, POWDER, LYOPHILIZED, FOR SOLUTION INTRAVENOUS at 09:20

## 2019-01-01 RX ADMIN — Medication 20 ML: at 09:18

## 2019-01-01 RX ADMIN — PIPERACILLIN SODIUM,TAZOBACTAM SODIUM 2.25 G: 2; .25 INJECTION, POWDER, FOR SOLUTION INTRAVENOUS at 19:05

## 2019-01-01 RX ADMIN — Medication 10 ML: at 09:42

## 2019-01-01 RX ADMIN — MIDAZOLAM HYDROCHLORIDE 0.5 MG: 2 INJECTION, SOLUTION INTRAMUSCULAR; INTRAVENOUS at 11:10

## 2019-01-01 RX ADMIN — MORPHINE SULFATE 4 MG: 4 INJECTION INTRAVENOUS at 11:30

## 2019-01-01 RX ADMIN — ACETAMINOPHEN 650 MG: 325 TABLET ORAL at 06:46

## 2019-01-01 RX ADMIN — ONDANSETRON 8 MG: 2 INJECTION INTRAMUSCULAR; INTRAVENOUS at 23:00

## 2019-01-01 RX ADMIN — Medication 30 ML: at 09:10

## 2019-01-01 RX ADMIN — HEPARIN 500 UNITS: 100 SYRINGE at 16:14

## 2019-01-01 RX ADMIN — MORPHINE SULFATE 4 MG: 2 INJECTION, SOLUTION INTRAMUSCULAR; INTRAVENOUS at 14:55

## 2019-01-01 RX ADMIN — PIPERACILLIN SODIUM,TAZOBACTAM SODIUM 3.38 G: 3; .375 INJECTION, POWDER, FOR SOLUTION INTRAVENOUS at 04:13

## 2019-01-01 RX ADMIN — PIPERACILLIN SODIUM,TAZOBACTAM SODIUM 3.38 G: 3; .375 INJECTION, POWDER, FOR SOLUTION INTRAVENOUS at 23:18

## 2019-01-01 RX ADMIN — LORAZEPAM 1 MG: 2 INJECTION INTRAMUSCULAR; INTRAVENOUS at 12:58

## 2019-01-01 RX ADMIN — LORAZEPAM 0.2 MG: 2 INJECTION INTRAMUSCULAR; INTRAVENOUS at 09:37

## 2019-01-01 RX ADMIN — ONDANSETRON 4 MG: 2 INJECTION INTRAMUSCULAR; INTRAVENOUS at 13:36

## 2019-01-01 RX ADMIN — PIPERACILLIN SODIUM,TAZOBACTAM SODIUM 3.38 G: 3; .375 INJECTION, POWDER, FOR SOLUTION INTRAVENOUS at 22:16

## 2019-01-01 RX ADMIN — DOCUSATE SODIUM 100 MG: 100 CAPSULE, LIQUID FILLED ORAL at 10:25

## 2019-01-01 RX ADMIN — FENTANYL CITRATE 25 MCG: 50 INJECTION INTRAMUSCULAR; INTRAVENOUS at 11:05

## 2019-01-01 RX ADMIN — OMEGA-3 FATTY ACIDS CAP 1000 MG 1 CAPSULE: 1000 CAP at 09:16

## 2019-01-01 RX ADMIN — TOPOTECAN 1.3 MG: 1 INJECTION, SOLUTION, CONCENTRATE INTRAVENOUS at 10:18

## 2019-01-01 RX ADMIN — HYDROMORPHONE HYDROCHLORIDE 0.2 MG: 1 INJECTION, SOLUTION INTRAMUSCULAR; INTRAVENOUS; SUBCUTANEOUS at 23:00

## 2019-01-01 RX ADMIN — DAPTOMYCIN 350 MG: 500 INJECTION, POWDER, LYOPHILIZED, FOR SOLUTION INTRAVENOUS at 15:50

## 2019-01-01 RX ADMIN — HEPARIN 500 UNITS: 100 SYRINGE at 10:52

## 2019-01-01 RX ADMIN — FAMOTIDINE 20 MG: 10 INJECTION, SOLUTION INTRAVENOUS at 15:59

## 2019-01-01 RX ADMIN — SODIUM CHLORIDE 500 ML: 9 INJECTION, SOLUTION INTRAVENOUS at 14:30

## 2019-01-01 RX ADMIN — HEPARIN SODIUM (PORCINE) LOCK FLUSH IV SOLN 100 UNIT/ML 500 UNITS: 100 SOLUTION at 16:09

## 2019-01-01 RX ADMIN — POLYETHYLENE GLYCOL 3350 17 G: 17 POWDER, FOR SOLUTION ORAL at 13:48

## 2019-01-01 RX ADMIN — Medication 10 ML: at 09:36

## 2019-01-01 RX ADMIN — SODIUM CHLORIDE, PRESERVATIVE FREE 500 UNITS: 5 INJECTION INTRAVENOUS at 10:06

## 2019-01-01 RX ADMIN — CIPROFLOXACIN 400 MG: 2 INJECTION, SOLUTION INTRAVENOUS at 11:00

## 2019-01-01 RX ADMIN — HYDROMORPHONE HYDROCHLORIDE 0.5 MG: 2 INJECTION, SOLUTION INTRAMUSCULAR; INTRAVENOUS; SUBCUTANEOUS at 12:44

## 2019-01-01 RX ADMIN — SODIUM CHLORIDE 500 ML: 9 INJECTION, SOLUTION INTRAVENOUS at 09:25

## 2019-01-01 RX ADMIN — DIPHENHYDRAMINE HYDROCHLORIDE AND LIDOCAINE HYDROCHLORIDE AND ALUMINUM HYDROXIDE AND MAGNESIUM HYDRO 10 ML: KIT at 11:31

## 2019-01-01 RX ADMIN — Medication 500 UNITS: at 10:23

## 2019-01-01 RX ADMIN — LEVOFLOXACIN 500 MG: 5 INJECTION, SOLUTION INTRAVENOUS at 17:58

## 2019-01-01 RX ADMIN — DAPTOMYCIN 350 MG: 500 INJECTION, POWDER, LYOPHILIZED, FOR SOLUTION INTRAVENOUS at 09:32

## 2019-01-01 RX ADMIN — OXYCODONE HYDROCHLORIDE 10 MG: 10 TABLET, FILM COATED, EXTENDED RELEASE ORAL at 12:51

## 2019-01-01 RX ADMIN — CALCIUM 500 MG: 500 TABLET ORAL at 09:16

## 2019-01-01 RX ADMIN — HYDROMORPHONE HYDROCHLORIDE 0.5 MG: 2 INJECTION, SOLUTION INTRAMUSCULAR; INTRAVENOUS; SUBCUTANEOUS at 12:34

## 2019-01-01 RX ADMIN — SODIUM CHLORIDE 500 ML: 900 INJECTION, SOLUTION INTRAVENOUS at 13:40

## 2019-01-01 RX ADMIN — LACTOBACILLUS TAB 1 TABLET: TAB at 09:16

## 2019-01-01 RX ADMIN — FAMOTIDINE 20 MG: 10 INJECTION INTRAVENOUS at 09:17

## 2019-01-01 RX ADMIN — MORPHINE SULFATE 4 MG: 4 INJECTION INTRAVENOUS at 21:55

## 2019-01-01 RX ADMIN — Medication 30 ML: at 10:07

## 2019-01-01 RX ADMIN — INSULIN HUMAN 10 UNITS: 100 INJECTION, SOLUTION PARENTERAL at 05:45

## 2019-01-01 RX ADMIN — ACETAMINOPHEN 650 MG: 650 SUPPOSITORY RECTAL at 12:31

## 2019-01-01 RX ADMIN — DEXAMETHASONE SODIUM PHOSPHATE 8 MG: 4 INJECTION, SOLUTION INTRA-ARTICULAR; INTRALESIONAL; INTRAMUSCULAR; INTRAVENOUS; SOFT TISSUE at 10:04

## 2019-01-01 RX ADMIN — Medication 20 ML: at 11:51

## 2019-01-01 RX ADMIN — PROPOFOL 50 MG: 10 INJECTION, EMULSION INTRAVENOUS at 13:20

## 2019-01-01 RX ADMIN — DEXAMETHASONE SODIUM PHOSPHATE 8 MG: 4 INJECTION INTRA-ARTICULAR; INTRALESIONAL; INTRAMUSCULAR; INTRAVENOUS; SOFT TISSUE at 13:53

## 2019-01-01 RX ADMIN — Medication 10 ML: at 22:01

## 2019-01-01 RX ADMIN — LEVOFLOXACIN 750 MG: 750 INJECTION, SOLUTION INTRAVENOUS at 16:05

## 2019-01-01 RX ADMIN — FENTANYL CITRATE 50 MCG: 50 INJECTION INTRAMUSCULAR; INTRAVENOUS at 11:45

## 2019-01-01 RX ADMIN — SODIUM CHLORIDE 250 ML: 9 INJECTION, SOLUTION INTRAVENOUS at 09:15

## 2019-01-01 RX ADMIN — LIDOCAINE HYDROCHLORIDE 30 ML: 10 INJECTION, SOLUTION EPIDURAL; INFILTRATION; INTRACAUDAL; PERINEURAL at 10:40

## 2019-01-01 RX ADMIN — Medication 10 ML: at 05:54

## 2019-01-01 RX ADMIN — Medication 10 ML: at 05:05

## 2019-01-01 RX ADMIN — MIDAZOLAM HYDROCHLORIDE 0.5 MG: 2 INJECTION, SOLUTION INTRAMUSCULAR; INTRAVENOUS at 17:00

## 2019-01-01 RX ADMIN — MORPHINE SULFATE 2 MG: 2 INJECTION, SOLUTION INTRAMUSCULAR; INTRAVENOUS at 22:34

## 2019-01-01 RX ADMIN — SODIUM CHLORIDE 500 ML: 9 INJECTION, SOLUTION INTRAVENOUS at 08:18

## 2019-01-01 RX ADMIN — SODIUM POLYSTYRENE SULFONATE 30 G: 15 SUSPENSION ORAL; RECTAL at 15:12

## 2019-01-01 RX ADMIN — DAPTOMYCIN 350 MG: 500 INJECTION, POWDER, LYOPHILIZED, FOR SOLUTION INTRAVENOUS at 09:09

## 2019-01-01 RX ADMIN — LORAZEPAM 0.5 MG: 2 INJECTION INTRAMUSCULAR; INTRAVENOUS at 10:59

## 2019-01-01 RX ADMIN — FAMOTIDINE 20 MG: 10 INJECTION, SOLUTION INTRAVENOUS at 09:58

## 2019-01-01 RX ADMIN — ONDANSETRON 16 MG: 2 INJECTION INTRAMUSCULAR; INTRAVENOUS at 13:52

## 2019-01-01 RX ADMIN — TOPOTECAN 1.9 MG: 1 INJECTION, SOLUTION, CONCENTRATE INTRAVENOUS at 10:41

## 2019-01-01 RX ADMIN — METRONIDAZOLE 500 MG: 500 INJECTION, SOLUTION INTRAVENOUS at 23:08

## 2019-01-01 RX ADMIN — MORPHINE SULFATE 4 MG: 4 INJECTION INTRAVENOUS at 09:15

## 2019-01-01 RX ADMIN — Medication 10 ML: at 09:19

## 2019-01-01 RX ADMIN — DAPTOMYCIN 350 MG: 500 INJECTION, POWDER, LYOPHILIZED, FOR SOLUTION INTRAVENOUS at 09:45

## 2019-01-01 RX ADMIN — PIPERACILLIN SODIUM AND TAZOBACTAM SODIUM 3.38 G: 3; .375 INJECTION, POWDER, LYOPHILIZED, FOR SOLUTION INTRAVENOUS at 15:09

## 2019-01-01 RX ADMIN — GLYCOPYRROLATE 0.6 MG: 0.2 INJECTION INTRAMUSCULAR; INTRAVENOUS at 11:41

## 2019-01-01 RX ADMIN — Medication 30 ML: at 10:15

## 2019-01-01 RX ADMIN — MORPHINE SULFATE 4 MG: 4 INJECTION INTRAVENOUS at 21:18

## 2019-01-01 RX ADMIN — ONDANSETRON 16 MG: 2 INJECTION INTRAMUSCULAR; INTRAVENOUS at 09:54

## 2019-01-01 RX ADMIN — NYSTATIN 500000 UNITS: 500000 SUSPENSION ORAL at 22:22

## 2019-01-01 RX ADMIN — DAPTOMYCIN 350 MG: 500 INJECTION, POWDER, LYOPHILIZED, FOR SOLUTION INTRAVENOUS at 09:12

## 2019-01-01 RX ADMIN — SODIUM CHLORIDE 500 ML: 900 INJECTION, SOLUTION INTRAVENOUS at 10:16

## 2019-01-01 RX ADMIN — SODIUM CHLORIDE, SODIUM LACTATE, POTASSIUM CHLORIDE, AND CALCIUM CHLORIDE 100 ML/HR: 600; 310; 30; 20 INJECTION, SOLUTION INTRAVENOUS at 05:19

## 2019-01-01 RX ADMIN — SODIUM CHLORIDE, PRESERVATIVE FREE 500 UNITS: 5 INJECTION INTRAVENOUS at 13:48

## 2019-01-01 RX ADMIN — Medication 30 ML: at 09:58

## 2019-01-01 RX ADMIN — MORPHINE SULFATE 4 MG: 2 INJECTION, SOLUTION INTRAMUSCULAR; INTRAVENOUS at 13:00

## 2019-01-01 RX ADMIN — DEXTROSE 50 % IN WATER (D50W) INTRAVENOUS SYRINGE 25 G: at 05:47

## 2019-01-01 RX ADMIN — MORPHINE SULFATE 4 MG: 2 INJECTION, SOLUTION INTRAMUSCULAR; INTRAVENOUS at 20:46

## 2019-01-01 RX ADMIN — TOPOTECAN 1.3 MG: 1 INJECTION, SOLUTION, CONCENTRATE INTRAVENOUS at 15:18

## 2019-01-01 RX ADMIN — Medication 20 ML: at 16:15

## 2019-01-01 RX ADMIN — ONDANSETRON 4 MG: 2 INJECTION INTRAMUSCULAR; INTRAVENOUS at 12:08

## 2019-01-01 RX ADMIN — MORPHINE SULFATE 5 MG: 10 INJECTION, SOLUTION INTRAMUSCULAR; INTRAVENOUS at 17:49

## 2019-01-01 RX ADMIN — DAPTOMYCIN 350 MG: 500 INJECTION, POWDER, LYOPHILIZED, FOR SOLUTION INTRAVENOUS at 09:11

## 2019-01-01 RX ADMIN — Medication 10 ML: at 10:20

## 2019-01-01 RX ADMIN — HEPARIN 500 UNITS: 100 SYRINGE at 12:25

## 2019-01-01 RX ADMIN — FENTANYL CITRATE 25 MCG: 50 INJECTION INTRAMUSCULAR; INTRAVENOUS at 11:10

## 2019-01-01 RX ADMIN — SODIUM CHLORIDE 1 G: 900 INJECTION INTRAVENOUS at 09:10

## 2019-01-01 RX ADMIN — LIDOCAINE HYDROCHLORIDE 100 MG: 20 INJECTION, SOLUTION EPIDURAL; INFILTRATION; INTRACAUDAL; PERINEURAL at 13:19

## 2019-01-01 RX ADMIN — Medication 20 ML: at 13:55

## 2019-01-01 RX ADMIN — Medication 10 ML: at 09:48

## 2019-01-01 RX ADMIN — Medication 30 ML: at 14:28

## 2019-01-01 RX ADMIN — SODIUM CHLORIDE 25 ML/HR: 900 INJECTION, SOLUTION INTRAVENOUS at 11:50

## 2019-01-01 RX ADMIN — FAMOTIDINE 20 MG: 10 INJECTION INTRAVENOUS at 09:37

## 2019-01-01 RX ADMIN — HYDROMORPHONE HYDROCHLORIDE: 1 INJECTION, SOLUTION INTRAMUSCULAR; INTRAVENOUS; SUBCUTANEOUS at 01:30

## 2019-01-01 RX ADMIN — ACETAMINOPHEN 650 MG: 325 TABLET ORAL at 00:13

## 2019-01-01 RX ADMIN — SODIUM CHLORIDE 1 G: 900 INJECTION INTRAVENOUS at 09:32

## 2019-01-01 RX ADMIN — ONDANSETRON 4 MG: 2 INJECTION INTRAMUSCULAR; INTRAVENOUS at 10:47

## 2019-01-01 RX ADMIN — FAMOTIDINE 20 MG: 20 TABLET ORAL at 10:30

## 2019-01-01 RX ADMIN — DEXAMETHASONE SODIUM PHOSPHATE 8 MG: 4 INJECTION, SOLUTION INTRA-ARTICULAR; INTRALESIONAL; INTRAMUSCULAR; INTRAVENOUS; SOFT TISSUE at 13:36

## 2019-01-01 RX ADMIN — Medication 20 ML: at 10:56

## 2019-01-01 RX ADMIN — Medication 3 ML: at 10:37

## 2019-01-01 RX ADMIN — Medication 10 ML: at 15:48

## 2019-01-01 RX ADMIN — Medication 20 ML: at 16:40

## 2019-01-01 RX ADMIN — ONDANSETRON 16 MG: 2 INJECTION INTRAMUSCULAR; INTRAVENOUS at 09:27

## 2019-01-01 RX ADMIN — OXYCODONE HYDROCHLORIDE 10 MG: 10 TABLET, FILM COATED, EXTENDED RELEASE ORAL at 09:48

## 2019-01-01 RX ADMIN — POLYETHYLENE GLYCOL 3350 17 G: 17 POWDER, FOR SOLUTION ORAL at 08:49

## 2019-01-01 RX ADMIN — SODIUM CHLORIDE 1 G: 900 INJECTION INTRAVENOUS at 09:28

## 2019-01-01 RX ADMIN — OXYCODONE HYDROCHLORIDE 10 MG: 10 TABLET, FILM COATED, EXTENDED RELEASE ORAL at 23:05

## 2019-01-01 RX ADMIN — DEXTROSE 50 % IN WATER (D50W) INTRAVENOUS SYRINGE 25 G: at 15:03

## 2019-01-01 RX ADMIN — Medication 10 ML: at 10:55

## 2019-01-01 RX ADMIN — PIPERACILLIN SODIUM,TAZOBACTAM SODIUM 3.38 G: 3; .375 INJECTION, POWDER, FOR SOLUTION INTRAVENOUS at 22:01

## 2019-01-01 RX ADMIN — PIPERACILLIN SODIUM AND TAZOBACTAM SODIUM 3.38 G: 3; .375 INJECTION, POWDER, LYOPHILIZED, FOR SOLUTION INTRAVENOUS at 10:34

## 2019-01-01 RX ADMIN — Medication 10 ML: at 18:05

## 2019-01-01 RX ADMIN — Medication 10 ML: at 08:14

## 2019-01-01 RX ADMIN — ACETAMINOPHEN 650 MG: 325 TABLET ORAL at 14:21

## 2019-01-01 RX ADMIN — MORPHINE SULFATE 5 MG: 10 INJECTION, SOLUTION INTRAMUSCULAR; INTRAVENOUS at 10:36

## 2019-01-01 RX ADMIN — DEXAMETHASONE SODIUM PHOSPHATE 8 MG: 4 INJECTION INTRA-ARTICULAR; INTRALESIONAL; INTRAMUSCULAR; INTRAVENOUS; SOFT TISSUE at 14:28

## 2019-01-01 RX ADMIN — ONDANSETRON 16 MG: 2 INJECTION INTRAMUSCULAR; INTRAVENOUS at 14:38

## 2019-01-01 RX ADMIN — SODIUM CHLORIDE, SODIUM LACTATE, POTASSIUM CHLORIDE, AND CALCIUM CHLORIDE 75 ML/HR: 600; 310; 30; 20 INJECTION, SOLUTION INTRAVENOUS at 10:29

## 2019-01-01 RX ADMIN — ONDANSETRON 8 MG: 2 INJECTION INTRAMUSCULAR; INTRAVENOUS at 08:48

## 2019-01-01 RX ADMIN — AMOXICILLIN AND CLAVULANATE POTASSIUM 1 TABLET: 875; 125 TABLET, FILM COATED ORAL at 12:43

## 2019-01-01 RX ADMIN — SODIUM CHLORIDE 75 ML/HR: 900 INJECTION, SOLUTION INTRAVENOUS at 06:51

## 2019-01-01 RX ADMIN — DAPTOMYCIN 350 MG: 500 INJECTION, POWDER, LYOPHILIZED, FOR SOLUTION INTRAVENOUS at 10:05

## 2019-01-01 RX ADMIN — MORPHINE SULFATE 4 MG: 2 INJECTION, SOLUTION INTRAMUSCULAR; INTRAVENOUS at 10:24

## 2019-01-01 RX ADMIN — FENTANYL CITRATE 100 MCG: 50 INJECTION, SOLUTION INTRAMUSCULAR; INTRAVENOUS at 10:54

## 2019-01-01 RX ADMIN — DIPHENHYDRAMINE HYDROCHLORIDE AND LIDOCAINE HYDROCHLORIDE AND ALUMINUM HYDROXIDE AND MAGNESIUM HYDRO 10 ML: KIT at 16:30

## 2019-01-01 RX ADMIN — TOPOTECAN 1.3 MG: 1 INJECTION, SOLUTION, CONCENTRATE INTRAVENOUS at 10:46

## 2019-01-01 RX ADMIN — WATER: 1000 INJECTION, SOLUTION INTRAVENOUS at 16:36

## 2019-01-01 RX ADMIN — ONDANSETRON 16 MG: 2 INJECTION INTRAMUSCULAR; INTRAVENOUS at 10:00

## 2019-01-01 RX ADMIN — ASCORBIC ACID TAB 250 MG 250 MG: 250 TAB at 09:54

## 2019-01-01 RX ADMIN — Medication 10 ML: at 09:51

## 2019-01-01 RX ADMIN — DEXAMETHASONE SODIUM PHOSPHATE 8 MG: 4 INJECTION, SOLUTION INTRAMUSCULAR; INTRAVENOUS at 09:31

## 2019-01-01 RX ADMIN — Medication 20 ML: at 10:41

## 2019-01-01 RX ADMIN — DAPTOMYCIN 350 MG: 500 INJECTION, POWDER, LYOPHILIZED, FOR SOLUTION INTRAVENOUS at 09:33

## 2019-01-01 RX ADMIN — ONDANSETRON 16 MG: 2 INJECTION INTRAMUSCULAR; INTRAVENOUS at 09:49

## 2019-01-01 RX ADMIN — HEPARIN 500 UNITS: 100 SYRINGE at 10:51

## 2019-01-01 RX ADMIN — SODIUM CHLORIDE 1 G: 900 INJECTION INTRAVENOUS at 09:20

## 2019-01-01 RX ADMIN — Medication 10 ML: at 09:53

## 2019-01-01 RX ADMIN — Medication 10 ML: at 09:29

## 2019-01-01 RX ADMIN — SODIUM CHLORIDE 1000 ML: 900 INJECTION, SOLUTION INTRAVENOUS at 20:25

## 2019-01-01 RX ADMIN — DIPHENHYDRAMINE HYDROCHLORIDE AND LIDOCAINE HYDROCHLORIDE AND ALUMINUM HYDROXIDE AND MAGNESIUM HYDRO 10 ML: KIT at 16:13

## 2019-01-01 RX ADMIN — Medication 10 ML: at 05:49

## 2019-01-01 RX ADMIN — SODIUM CHLORIDE 1 G: 900 INJECTION INTRAVENOUS at 09:06

## 2019-01-01 RX ADMIN — PIPERACILLIN SODIUM,TAZOBACTAM SODIUM 3.38 G: 3; .375 INJECTION, POWDER, FOR SOLUTION INTRAVENOUS at 10:11

## 2019-01-01 RX ADMIN — DAPTOMYCIN 350 MG: 500 INJECTION, POWDER, LYOPHILIZED, FOR SOLUTION INTRAVENOUS at 09:47

## 2019-01-01 RX ADMIN — DEXAMETHASONE SODIUM PHOSPHATE 8 MG: 4 INJECTION, SOLUTION INTRA-ARTICULAR; INTRALESIONAL; INTRAMUSCULAR; INTRAVENOUS; SOFT TISSUE at 09:14

## 2019-01-01 RX ADMIN — FAMOTIDINE 20 MG: 20 TABLET ORAL at 08:43

## 2019-01-01 RX ADMIN — DOCUSATE SODIUM 100 MG: 100 CAPSULE, LIQUID FILLED ORAL at 10:22

## 2019-01-01 RX ADMIN — Medication 30 ML: at 11:05

## 2019-01-01 RX ADMIN — MORPHINE SULFATE 4 MG: 2 INJECTION, SOLUTION INTRAMUSCULAR; INTRAVENOUS at 19:20

## 2019-01-01 RX ADMIN — SODIUM CHLORIDE 1 G: 900 INJECTION INTRAVENOUS at 08:40

## 2019-01-01 RX ADMIN — SODIUM CHLORIDE 150 MG: 900 INJECTION, SOLUTION INTRAVENOUS at 08:36

## 2019-01-01 RX ADMIN — SODIUM CHLORIDE 500 ML: 9 INJECTION, SOLUTION INTRAVENOUS at 13:44

## 2019-01-01 RX ADMIN — SODIUM CHLORIDE 1 G: 900 INJECTION INTRAVENOUS at 11:00

## 2019-01-01 RX ADMIN — PIPERACILLIN SODIUM,TAZOBACTAM SODIUM 3.38 G: 3; .375 INJECTION, POWDER, FOR SOLUTION INTRAVENOUS at 16:01

## 2019-01-01 RX ADMIN — SODIUM CHLORIDE, PRESERVATIVE FREE 500 UNITS: 5 INJECTION INTRAVENOUS at 11:15

## 2019-01-01 RX ADMIN — FENTANYL CITRATE 50 MCG: 50 INJECTION, SOLUTION INTRAMUSCULAR; INTRAVENOUS at 16:50

## 2019-01-01 RX ADMIN — NEOSTIGMINE METHYLSULFATE 5 MG: 1 INJECTION INTRAVENOUS at 11:41

## 2019-01-01 RX ADMIN — WATER: 1000 INJECTION, SOLUTION INTRAVENOUS at 00:01

## 2019-01-01 RX ADMIN — POLYETHYLENE GLYCOL 3350 17 G: 17 POWDER, FOR SOLUTION ORAL at 09:22

## 2019-01-01 RX ADMIN — PIPERACILLIN SODIUM,TAZOBACTAM SODIUM 3.38 G: 3; .375 INJECTION, POWDER, FOR SOLUTION INTRAVENOUS at 04:29

## 2019-01-01 RX ADMIN — DAPTOMYCIN 350 MG: 500 INJECTION, POWDER, LYOPHILIZED, FOR SOLUTION INTRAVENOUS at 14:39

## 2019-01-01 RX ADMIN — Medication 40 ML: at 09:49

## 2019-01-01 RX ADMIN — PIPERACILLIN SODIUM,TAZOBACTAM SODIUM 3.38 G: 3; .375 INJECTION, POWDER, FOR SOLUTION INTRAVENOUS at 10:13

## 2019-01-01 RX ADMIN — HEPARIN 500 UNITS: 100 SYRINGE at 10:15

## 2019-01-01 RX ADMIN — NYSTATIN 500000 UNITS: 500000 SUSPENSION ORAL at 09:00

## 2019-01-01 RX ADMIN — SODIUM CHLORIDE, PRESERVATIVE FREE 500 UNITS: 5 INJECTION INTRAVENOUS at 11:17

## 2019-01-01 RX ADMIN — FAMOTIDINE 20 MG: 10 INJECTION, SOLUTION INTRAVENOUS at 09:48

## 2019-01-01 RX ADMIN — SODIUM CHLORIDE 1000 ML/HR: 9 INJECTION, SOLUTION INTRAVENOUS at 14:00

## 2019-01-01 RX ADMIN — DAPTOMYCIN 350 MG: 500 INJECTION, POWDER, LYOPHILIZED, FOR SOLUTION INTRAVENOUS at 09:35

## 2019-01-01 RX ADMIN — DEXAMETHASONE SODIUM PHOSPHATE 8 MG: 4 INJECTION, SOLUTION INTRAMUSCULAR; INTRAVENOUS at 14:40

## 2019-01-01 RX ADMIN — ONDANSETRON 16 MG: 2 INJECTION INTRAMUSCULAR; INTRAVENOUS at 10:24

## 2019-01-01 RX ADMIN — Medication 10 ML: at 22:50

## 2019-01-01 RX ADMIN — Medication 10 ML: at 14:51

## 2019-01-01 RX ADMIN — Medication 10 ML: at 09:09

## 2019-01-01 RX ADMIN — DEXAMETHASONE SODIUM PHOSPHATE 8 MG: 4 INJECTION, SOLUTION INTRA-ARTICULAR; INTRALESIONAL; INTRAMUSCULAR; INTRAVENOUS; SOFT TISSUE at 08:30

## 2019-01-01 RX ADMIN — HYDROMORPHONE HYDROCHLORIDE 0.5 MG: 1 INJECTION, SOLUTION INTRAMUSCULAR; INTRAVENOUS; SUBCUTANEOUS at 04:40

## 2019-01-01 RX ADMIN — IOPAMIDOL 80 ML: 612 INJECTION, SOLUTION INTRAVENOUS at 13:37

## 2019-01-01 RX ADMIN — DAPTOMYCIN 350 MG: 500 INJECTION, POWDER, LYOPHILIZED, FOR SOLUTION INTRAVENOUS at 10:51

## 2019-01-01 RX ADMIN — MORPHINE SULFATE 4 MG: 4 INJECTION, SOLUTION INTRAMUSCULAR; INTRAVENOUS at 00:14

## 2019-01-01 RX ADMIN — SODIUM CHLORIDE 250 ML: 900 INJECTION, SOLUTION INTRAVENOUS at 00:23

## 2019-01-01 RX ADMIN — PIPERACILLIN SODIUM,TAZOBACTAM SODIUM 3.38 G: 3; .375 INJECTION, POWDER, FOR SOLUTION INTRAVENOUS at 05:18

## 2019-01-01 RX ADMIN — HEPARIN 500 UNITS: 100 SYRINGE at 09:43

## 2019-01-01 RX ADMIN — SODIUM CHLORIDE, PRESERVATIVE FREE 500 UNITS: 5 INJECTION INTRAVENOUS at 11:51

## 2019-01-01 RX ADMIN — PIPERACILLIN SODIUM,TAZOBACTAM SODIUM 3.38 G: 3; .375 INJECTION, POWDER, FOR SOLUTION INTRAVENOUS at 11:50

## 2019-01-01 RX ADMIN — MORPHINE SULFATE 5 MG: 10 INJECTION, SOLUTION INTRAMUSCULAR; INTRAVENOUS at 23:03

## 2019-01-01 RX ADMIN — SODIUM CHLORIDE 75 ML/HR: 900 INJECTION, SOLUTION INTRAVENOUS at 10:24

## 2019-01-01 RX ADMIN — MIDAZOLAM HYDROCHLORIDE 2 MG: 1 INJECTION, SOLUTION INTRAMUSCULAR; INTRAVENOUS at 10:47

## 2019-01-01 RX ADMIN — PIPERACILLIN SODIUM AND TAZOBACTAM SODIUM 3.38 G: 3; .375 INJECTION, POWDER, LYOPHILIZED, FOR SOLUTION INTRAVENOUS at 22:49

## 2019-01-01 RX ADMIN — FUROSEMIDE 40 MG: 40 INJECTION, SOLUTION INTRAMUSCULAR; INTRAVENOUS at 11:48

## 2019-01-01 RX ADMIN — SODIUM CHLORIDE, SODIUM LACTATE, POTASSIUM CHLORIDE, AND CALCIUM CHLORIDE 100 ML/HR: 600; 310; 30; 20 INJECTION, SOLUTION INTRAVENOUS at 17:41

## 2019-01-01 RX ADMIN — DILTIAZEM HYDROCHLORIDE 18.15 MG: 5 INJECTION, SOLUTION INTRAVENOUS at 19:26

## 2019-01-01 RX ADMIN — HEPARIN SODIUM 6500 UNITS: 1000 INJECTION INTRAVENOUS; SUBCUTANEOUS at 00:03

## 2019-01-01 RX ADMIN — DEXAMETHASONE SODIUM PHOSPHATE 8 MG: 4 INJECTION, SOLUTION INTRA-ARTICULAR; INTRALESIONAL; INTRAMUSCULAR; INTRAVENOUS; SOFT TISSUE at 09:52

## 2019-01-01 RX ADMIN — MORPHINE SULFATE 2 MG: 2 INJECTION, SOLUTION INTRAMUSCULAR; INTRAVENOUS at 17:41

## 2019-01-01 RX ADMIN — DAPTOMYCIN 350 MG: 500 INJECTION, POWDER, LYOPHILIZED, FOR SOLUTION INTRAVENOUS at 10:56

## 2019-01-01 RX ADMIN — ONDANSETRON 4 MG: 2 INJECTION INTRAMUSCULAR; INTRAVENOUS at 14:45

## 2019-01-01 RX ADMIN — Medication 10 ML: at 16:14

## 2019-01-01 RX ADMIN — HEPARIN SODIUM 15 UNITS/KG/HR: 10000 INJECTION, SOLUTION INTRAVENOUS at 15:53

## 2019-01-01 RX ADMIN — MORPHINE SULFATE 5 MG: 10 INJECTION, SOLUTION INTRAMUSCULAR; INTRAVENOUS at 05:28

## 2019-01-01 RX ADMIN — ONDANSETRON 4 MG: 2 INJECTION INTRAMUSCULAR; INTRAVENOUS at 11:53

## 2019-01-01 RX ADMIN — Medication 30 ML: at 09:52

## 2019-01-01 RX ADMIN — BISACODYL 10 MG: 5 TABLET, COATED ORAL at 15:23

## 2019-01-01 RX ADMIN — SODIUM CHLORIDE 1000 ML: 9 INJECTION, SOLUTION INTRAVENOUS at 11:24

## 2019-01-01 RX ADMIN — DAPTOMYCIN 350 MG: 500 INJECTION, POWDER, LYOPHILIZED, FOR SOLUTION INTRAVENOUS at 09:21

## 2019-01-01 RX ADMIN — BENZOCAINE AND MENTHOL 1 LOZENGE: 15; 3.6 LOZENGE ORAL at 05:12

## 2019-01-01 RX ADMIN — SODIUM CHLORIDE 178 ML: 900 INJECTION, SOLUTION INTRAVENOUS at 13:53

## 2019-01-01 RX ADMIN — ONDANSETRON 16 MG: 2 INJECTION INTRAMUSCULAR; INTRAVENOUS at 09:35

## 2019-01-01 RX ADMIN — DAPTOMYCIN 350 MG: 500 INJECTION, POWDER, LYOPHILIZED, FOR SOLUTION INTRAVENOUS at 09:18

## 2019-01-01 RX ADMIN — POLYETHYLENE GLYCOL 3350 17 G: 17 POWDER, FOR SOLUTION ORAL at 12:40

## 2019-01-01 RX ADMIN — MORPHINE SULFATE 4 MG: 4 INJECTION INTRAVENOUS at 03:06

## 2019-01-01 RX ADMIN — Medication 1 TABLET: at 10:25

## 2019-01-01 RX ADMIN — FENTANYL CITRATE 50 MCG: 50 INJECTION INTRAMUSCULAR; INTRAVENOUS at 11:30

## 2019-01-01 RX ADMIN — MORPHINE SULFATE 5 MG: 10 INJECTION, SOLUTION INTRAMUSCULAR; INTRAVENOUS at 05:49

## 2019-01-01 RX ADMIN — SODIUM CHLORIDE 1000 ML: 900 INJECTION, SOLUTION INTRAVENOUS at 11:25

## 2019-01-01 RX ADMIN — Medication 10 ML: at 22:00

## 2019-01-01 RX ADMIN — Medication 10 ML: at 09:38

## 2019-01-01 RX ADMIN — SODIUM CHLORIDE 75 ML/HR: 900 INJECTION, SOLUTION INTRAVENOUS at 10:04

## 2019-01-01 RX ADMIN — FENTANYL CITRATE 25 MCG: 50 INJECTION INTRAMUSCULAR; INTRAVENOUS at 11:40

## 2019-01-01 RX ADMIN — FENTANYL CITRATE 25 MCG: 50 INJECTION, SOLUTION INTRAMUSCULAR; INTRAVENOUS at 13:31

## 2019-01-01 RX ADMIN — Medication 10 ML: at 09:06

## 2019-01-01 RX ADMIN — FENTANYL CITRATE 100 MCG: 50 INJECTION, SOLUTION INTRAMUSCULAR; INTRAVENOUS at 11:03

## 2019-01-01 RX ADMIN — Medication 10 ML: at 06:02

## 2019-01-01 RX ADMIN — HEPARIN 500 UNITS: 100 SYRINGE at 16:28

## 2019-01-01 RX ADMIN — SIMETHICONE CHEW TAB 80 MG 80 MG: 80 TABLET ORAL at 10:32

## 2019-01-01 RX ADMIN — ONDANSETRON 16 MG: 2 INJECTION INTRAMUSCULAR; INTRAVENOUS at 09:28

## 2019-01-01 RX ADMIN — DAPTOMYCIN 350 MG: 500 INJECTION, POWDER, LYOPHILIZED, FOR SOLUTION INTRAVENOUS at 09:38

## 2019-01-01 RX ADMIN — LIDOCAINE HYDROCHLORIDE 30 ML: 10 INJECTION, SOLUTION EPIDURAL; INFILTRATION; INTRACAUDAL; PERINEURAL at 16:50

## 2019-01-01 RX ADMIN — SODIUM CHLORIDE 500 ML: 9 INJECTION, SOLUTION INTRAVENOUS at 14:15

## 2019-01-01 RX ADMIN — PIPERACILLIN SODIUM AND TAZOBACTAM SODIUM 3.38 G: 3; .375 INJECTION, POWDER, LYOPHILIZED, FOR SOLUTION INTRAVENOUS at 08:32

## 2019-01-01 RX ADMIN — MIDAZOLAM HYDROCHLORIDE 1 MG: 1 INJECTION, SOLUTION INTRAMUSCULAR; INTRAVENOUS at 13:36

## 2019-01-01 RX ADMIN — METOPROLOL TARTRATE 2.5 MG: 5 INJECTION, SOLUTION INTRAVENOUS at 20:04

## 2019-01-01 RX ADMIN — DIPHENHYDRAMINE HYDROCHLORIDE 25 MG: 50 INJECTION INTRAMUSCULAR; INTRAVENOUS at 13:57

## 2019-01-01 RX ADMIN — TOPOTECAN 1.3 MG: 1 INJECTION, SOLUTION, CONCENTRATE INTRAVENOUS at 10:36

## 2019-01-01 RX ADMIN — Medication 20 ML: at 15:44

## 2019-01-01 RX ADMIN — SUCCINYLCHOLINE CHLORIDE 20 MG/ML INJECTION SOLUTION 140 MG: SOLUTION at 10:54

## 2019-01-01 RX ADMIN — HEPARIN 500 UNITS: 100 SYRINGE at 16:13

## 2019-01-01 RX ADMIN — Medication 10 ML: at 09:25

## 2019-01-01 RX ADMIN — HYDROMORPHONE HYDROCHLORIDE 1 MG: 2 INJECTION, SOLUTION INTRAMUSCULAR; INTRAVENOUS; SUBCUTANEOUS at 11:42

## 2019-01-01 RX ADMIN — BENZOCAINE AND MENTHOL 1 LOZENGE: 15; 3.6 LOZENGE ORAL at 15:41

## 2019-01-01 RX ADMIN — FAMOTIDINE 20 MG: 20 TABLET, FILM COATED ORAL at 09:37

## 2019-01-01 RX ADMIN — CEFEPIME HYDROCHLORIDE 2 G: 2 INJECTION, POWDER, FOR SOLUTION INTRAVENOUS at 23:07

## 2019-01-04 NOTE — PROGRESS NOTES
In Patient Progress note Admit Date: 12/5/2018 Impression:  
 
1) oligoanuric BECK in setting of obst uropathy/cj hydronephrosis d/t tumour burden, Improving s/p PCN placement 2) metabolic acidoses,resolved 3) Cj hydronephrosis , s/p cj PCN 
4) Hypertension , resolved 5) H/o cervical & endometrial cancer 6) anemia ok 7) mild hyponatremia , recheck  
  
Plan : 
 
1) encourage po intake 2) BP goal < 140/80 ,d/c metoprolol 3) strict I/o , daily wts. 
 
  
Please call with questions, 
 
F/U with nephrology after discharge  
  
Jean Claude Benton MD FASN Cell [phone] Pager: [phone] 
  
Subjective:  
 
Denies SOB Denies nausea No current facility-administered medications for this encounter. Current Outpatient Medications:  
  bisacodyl (DULCOLAX) 10 mg suppository, Insert 10 mg into rectum daily as needed. , Disp: 10 Suppository, Rfl: 0 
  bisacodyl (DULCOLAX) 5 mg EC tablet, Take 2 Tabs by mouth daily as needed for Constipation. , Disp: 30 Tab, Rfl: 1   cyclobenzaprine (FLEXERIL) 10 mg tablet, Take 0.5 Tabs by mouth three (3) times daily as needed for Muscle Spasm(s). , Disp: 30 Tab, Rfl: 0 
  docusate sodium (COLACE) 100 mg capsule, Take 1 Cap by mouth two (2) times a day for 90 days. , Disp: 60 Cap, Rfl: 0 
  ondansetron hcl (ZOFRAN) 8 mg tablet, Take 1 Tab by mouth every eight (8) hours as needed for Nausea., Disp: 30 Tab, Rfl: 0 
  oxyCODONE-acetaminophen (PERCOCET) 5-325 mg per tablet, Take 1-2 Tabs by mouth every six (6) hours as needed. Max Daily Amount: 8 Tabs., Disp: 20 Tab, Rfl: 0 
  megestrol (MEGACE) 40 mg tablet, Take 2 Tabs by mouth two (2) times a day., Disp: 120 Tab, Rfl: 3 
  tamoxifen (NOLVADEX) 20 mg tablet, Take 1 Tab by mouth two (2) times a day., Disp: 120 Tab, Rfl: 0 
  ondansetron hcl (ZOFRAN) 4 mg tablet, Take 1 Tab by mouth every six (6) hours as needed for Nausea., Disp: 30 Tab, Rfl: 3 
  GARLIC PO, Take  by mouth daily. , Disp: , Rfl:  
   RESVERATROL PO, Take  by mouth daily. , Disp: , Rfl:  
  calcium carbonate (OS-MAYELA) 500 mg calcium (1,250 mg) tablet, Take  by mouth daily. , Disp: , Rfl:  
  DOCOSAHEXANOIC ACID/EPA (FISH OIL PO), Take 1,000 mg by mouth daily. , Disp: , Rfl:  
  LACTOBACILLUS ACIDOPHILUS (PROBIOTIC PO), Take  by mouth daily. , Disp: , Rfl:  
 
 
 
Objective:  
 
Visit Vitals /71 (BP 1 Location: Left arm) Pulse (!) 110 Temp 98.9 °F (37.2 °C) Resp 17 Ht 5' 5\" (1.651 m) Wt 87.3 kg (192 lb 8 oz) SpO2 97% Breastfeeding? No  
BMI 32.03 kg/m² No intake or output data in the 24 hours ending 01/04/19 0438 Physical Exam:  
 
GEN NAD HENT mmm RS AEBE clear CVS s1 s2 wnl no JVD 
GI soft BS +, harleen PCN in place Ext no edema Data Review: No results for input(s): WBC, RBC, HCT, MCV, MCH, MCHC, RDW, HCTEXT, HCTEXT in the last 72 hours. No lab exists for component: HEMOGLOBIN, PLATELET, MPV No results for input(s): BUN, CREA, CA, ALB, K, NA, CL, CO2, PHOS, GLU in the last 72 hours.  
 
Chelsie Cunha MD

## 2019-01-04 NOTE — PROGRESS NOTES
In Patient Progress note Admit Date: 12/5/2018 Impression:  
 
1) oligoanuric BECK in setting of obst uropathy/cj hydronephrosis d/t tumour burden, Improving s/p PCN placement 2) metabolic acidoses,resolved 3) Cj hydronephrosis , s/p cj PCN 
4) Hypertension , resolved 5) H/o cervical & endometrial cancer 6) anemia ok 7) mild hyponatremia , recheck  
  
Plan : 
 
1) encourage po intake 2) BP goal < 140/80 ,d/c metoprolol 3) strict I/o , daily wts. 
 
  
Please call with questions, 
  
Darin Walker MD FASN Cell [phone] Pager: [phone] 
  
Subjective:  
 
Denies SOB Denies nausea No current facility-administered medications for this encounter. Current Outpatient Medications:  
  bisacodyl (DULCOLAX) 10 mg suppository, Insert 10 mg into rectum daily as needed. , Disp: 10 Suppository, Rfl: 0 
  bisacodyl (DULCOLAX) 5 mg EC tablet, Take 2 Tabs by mouth daily as needed for Constipation. , Disp: 30 Tab, Rfl: 1   cyclobenzaprine (FLEXERIL) 10 mg tablet, Take 0.5 Tabs by mouth three (3) times daily as needed for Muscle Spasm(s). , Disp: 30 Tab, Rfl: 0 
  docusate sodium (COLACE) 100 mg capsule, Take 1 Cap by mouth two (2) times a day for 90 days. , Disp: 60 Cap, Rfl: 0 
  ondansetron hcl (ZOFRAN) 8 mg tablet, Take 1 Tab by mouth every eight (8) hours as needed for Nausea., Disp: 30 Tab, Rfl: 0 
  oxyCODONE-acetaminophen (PERCOCET) 5-325 mg per tablet, Take 1-2 Tabs by mouth every six (6) hours as needed. Max Daily Amount: 8 Tabs., Disp: 20 Tab, Rfl: 0 
  megestrol (MEGACE) 40 mg tablet, Take 2 Tabs by mouth two (2) times a day., Disp: 120 Tab, Rfl: 3 
  tamoxifen (NOLVADEX) 20 mg tablet, Take 1 Tab by mouth two (2) times a day., Disp: 120 Tab, Rfl: 0 
  ondansetron hcl (ZOFRAN) 4 mg tablet, Take 1 Tab by mouth every six (6) hours as needed for Nausea., Disp: 30 Tab, Rfl: 3 
  GARLIC PO, Take  by mouth daily. , Disp: , Rfl:  
   RESVERATROL PO, Take  by mouth daily. , Disp: , Rfl:  
  calcium carbonate (OS-MAYELA) 500 mg calcium (1,250 mg) tablet, Take  by mouth daily. , Disp: , Rfl:  
  DOCOSAHEXANOIC ACID/EPA (FISH OIL PO), Take 1,000 mg by mouth daily. , Disp: , Rfl:  
  LACTOBACILLUS ACIDOPHILUS (PROBIOTIC PO), Take  by mouth daily. , Disp: , Rfl:  
 
 
 
Objective:  
 
Visit Vitals /71 (BP 1 Location: Left arm) Pulse (!) 110 Temp 98.9 °F (37.2 °C) Resp 17 Ht 5' 5\" (1.651 m) Wt 87.3 kg (192 lb 8 oz) SpO2 97% Breastfeeding? No  
BMI 32.03 kg/m² No intake or output data in the 24 hours ending 01/04/19 0377 Physical Exam:  
 
GEN NAD HENT mmm RS AEBE clear CVS s1 s2 wnl no JVD 
GI soft BS +, harleen PCN in place Ext no edema Data Review: No results for input(s): WBC, RBC, HCT, MCV, MCH, MCHC, RDW, HCTEXT, HCTEXT in the last 72 hours. No lab exists for component: HEMOGLOBIN, PLATELET, MPV No results for input(s): BUN, CREA, CA, ALB, K, NA, CL, CO2, PHOS, GLU in the last 72 hours.  
 
Deloris Hodges MD

## 2019-01-09 NOTE — TELEPHONE ENCOUNTER
Patient contacted the office stating that she has completed the three weeks of medication she was to take prior to starting Avastin and states she has developed kidney failure. She has not heard anything about the Avastin and is requesting a call at [phone] to discuss if she should start it with the kidney failure.

## 2019-01-09 NOTE — TELEPHONE ENCOUNTER
Explained to pt that Dr. Phoenix Araya would like to see her in the office to discuss her plan on care. Transferred pt to [facility] to scheduled for 1/18/19.          Ruthann Matthews NP

## 2019-01-18 NOTE — PATIENT INSTRUCTIONS
Uterine Cancer: Care Instructions  Your Care Instructions  Uterine cancer is the rapid growth of abnormal cells that line the uterus. It also is called endometrial cancer. These cells may spread to nearby organs, lymph glands, or distant organs. Uterine cancer can be cured most often when found early. Treatment may include surgery to remove the uterus, ovaries, fallopian tubes, and sometimes the pelvic lymph nodes. Radiation and hormones to stop cancer growth also are sometimes used. Chemotherapy may be used if the cancer has spread. Having cancer can be scary. You may feel many emotions and may need some help coping. Seek out family, friends, and counselors for support. You can do things at home to make yourself feel better while you go through treatment. You also can call the [employer] (4-736.108.9675) or visit its website at [employer] for more information. Follow-up care is a key part of your treatment and safety. Be sure to make and go to all appointments, and call your doctor if you are having problems. It's also a good idea to know your test results and keep a list of the medicines you take. How can you care for yourself at home? · Take your medicines exactly as prescribed. Call your doctor if you think you are having a problem with your medicine. You may get medicine for nausea and vomiting if you have these side effects. · Eat healthy food. If you do not feel like eating, try to eat food that has protein and extra calories to keep up your strength and prevent weight loss. Drink liquid meal replacements for extra calories and protein. Try to eat your main meal early. · Get some physical activity every day, but do not get too tired. Keep doing the hobbies you enjoy as your energy allows. · Take steps to control your stress and workload. Learn relaxation techniques. ? Share your feelings. Stress and tension affect our emotions.  By expressing your feelings to others, you may be able to understand and cope with them. ? Consider joining a support group. Talking about a problem with your spouse, a good friend, or other people with similar problems is a good way to reduce tension and stress. ? Express yourself through art. Try writing, dance, art, or crafts to relieve tension. Some dance, writing, or art groups may be available just for people who have cancer. ? Be kind to your body and mind. Getting enough sleep, eating a healthy diet, and taking time to do things you enjoy can contribute to an overall feeling of balance in your life and help reduce stress. ? Get help if you need it. Discuss your concerns with your doctor or counselor. · If you are vomiting or have diarrhea:  ? Drink plenty of fluids (enough so that your urine is light yellow or clear like water) to prevent dehydration. Choose water and other caffeine-free clear liquids. If you have kidney, heart, or liver disease and have to limit fluids, talk with your doctor before you increase the amount of fluids you drink. ? When you are able to eat, try clear soups, mild foods, and liquids until all symptoms are gone for 12 to 48 hours. Other good choices include dry toast, crackers, cooked cereal, and gelatin dessert, such as Jell-O.  · Take care of your urinary tract to prevent problems such as infection, which can be caused by uterine cancer and its treatment. Limit drinks with caffeine, drink plenty of fluids, and urinate every 3 to 4 hours. · If you have not already done so, prepare a list of advance directives. Advance directives are instructions to your doctor and family members about what kind of care you want if you become unable to speak or express yourself. When should you call for help? Call 911 anytime you think you may need emergency care.  For example, call if:    · You passed out (lost consciousness).    Call your doctor now or seek immediate medical care if:    · You have a fever.     · You have abnormal bleeding.     · You have new or worse pain.     · You think you have an infection.     · You have new symptoms, such as a cough, belly pain, vomiting, diarrhea, or a rash.    Watch closely for changes in your health, and be sure to contact your doctor if:    · You are much more tired than usual.     · You have swollen glands in your armpits, groin, or neck.     · You do not get better as expected. Where can you learn more? Go to http://kj-ron.info/. Enter E343 in the search box to learn more about \"Uterine Cancer: Care Instructions. \"  Current as of: March 27, 2018  Content Version: 11.9  © 5291-0490 [employer]. Care instructions adapted under license by [employer] (which disclaims liability or warranty for this information). If you have questions about a medical condition or this instruction, always ask your healthcare professional. [address] any warranty or liability for your use of this information.

## 2019-01-18 NOTE — PROGRESS NOTES
[facility]  [address] [place] [address], [address], [address] [facility]  [address] [facility], [address] [place] [address]  [place], [address]   [phone]  Alenaya Sujey LANGFORD      Patient ID:  Name:  Mariel Purdy  MRN:  [id number]  :   y.o. Date:  2019      HISTORY OF PRESENT ILLNESS:  Mariel Purdy is a 76 y.o.   postmenopausal female self-referred for Stage IVB Grade 2 endometrioid adenocarcinoma, favor cervical primary. She initially presented with complaint of PMB x8-10 months to PCP. She was referred to gyn Dr. Vikash Boateng, who referred to Dr. Chelsie Davila for large cervical mass. Last pap 17 years prior. Biopsy of cervical mass 17 showed adenocarcinoma, most c/w endometrioid adenocarcinoma, with initial staining suggesting endometrial origin. Patient had MRI of pelvis 17, which showed BL parametrial involvement, L>R, cancer extending from the large cervical mass into fundus and anterior 1/3 of vagina, no obvious adenopathy. PETCT 17 suspicious for metastatic disease to left ovary, omentum, liver report scanned in media. Patient is s/p EUA, cysto, sigmoidoscopy 17, medial left parametrial involvement, no tumor in bladder or rectosigmoid colon. She is s/p exploratory laparotomy with radical hysterectomy, BSO, resection of omental mass 9/15/17, pathology consistent with metastatic endometrioid adenocarcinoma, favor cervical primary, pathology report scanned in media. Patient is s/p 6 cycles Carbo/Taxol, completed 2018. Most recent PETCT 18 demonstrated new uptake in small nodular densities adjacent to cecum concerning for metastatic disease, increasing uptake in right thyroid gland concerning for neoplasm, as well as interval response to prior uptake. She was referred to Dr. Deangelo Galan for further workup, suggestive of goiters, she has follow up in a few months.  Was seen and discussed proceeding with avastin and hormonal treatment. Pt was then admitted 12/5-12/13 with renal failure and hydronephrosis and had bilateral PCN placed. Pt feels much better. Denies abdominopelvic pain, vaginal bleeding. Pathology:  9/15/17  Metastatic endometrioid adenocarcinoma, favor cervical primary      Labs:  Component       Cancer Ag (CA) 125   Latest Ref Rng & Units       0.0 - 38.1 U/mL   11/14/2018      4:40 .0 (H)   10/3/2018      3:45 .0 (H)     5/16/18  Ca-125: 27    9/11/17  Ca-125 108    Imaging  PETCT  11/23/2018  FINDINGS:       PET/CT:     Reference data:     The mediastinal blood pool SUV max value = 1.96. The liver SUV Max value  = 2.19  .      HEAD/NECK:  There is a hypermetabolic adenopathy identified in the right  supraclavicular region, measuring 1.5 x 1.7 cm, SUV Max = 7.11.] It was 7 x 9 mm  and not hypermetabolic on prior study. No additional adenopathy or  hypermetabolic activity seen in the neck. CHEST: There is interval development of multiple nodular lesions scattered in  both lungs, most likely suggestive of lung metastasis. -The dominant nodule in left lung is in medial anterior left upper lobe  measuring 1.0 x 1.4 cm, #70. Mild hypermetabolic activity noted, SUV Max = 1.7.   -The second largest nodule in left lower lobe infrahilar region measures 8 x 8  mm on #85, SUV Max = 1.7.   -The dominant mass in left lung measures 9 x 9 mm at lateral right lower lobe,  SUV Max = 1.0, #87 .   -The second 8 x 9 mm solid nodule at posterior right lower lobe, SUV Max = 1.0,  #87.   -There is 6 x 9 mm small cavitary lesion with thickened wall at anterior right  upper lobe, #69 and SUV Max = 1.3.   -Multiple other 3-5 mm nodules also scattered in both lungs, too small for  accurate evaluation on PET.     No mediastinal or axillary adenopathy or hypermetabolic chest wall finding.      ABDOMEN/PELVIS:  There is a large mass with central necrosis identified in the  left subphrenic region, most likely arising from the spleen and measures 10.2 x  14.3 x 12.5 cm. There is significant hypermetabolic activity identified along  the periphery of the mass, SUV Max = 8.79. The mass was much smaller and barely  visible on prior CT but hypermetabolic on prior PET scan, roughly measured 1.8 x  2.5 cm, SUV Max was 4.47. The second exophytic mass also developed at posterior  aspect of the spleen abutting the dominant mass which measures 4.1 x 5.1 x 6.0  cm, SUV Max = 6.99. The mass is tightly abutting the greater curvature of  gastric wall. Direct invasion cannot be entirely excluded.      There is a large ill-defined soft tissue mass identified in the midline pelvic  floor and roughly measures 8.2 x 9.9 cm with intensive FDG uptake, SUV Max =  14.2. This is new finding since the prior study as most likely recurrent  malignancy.     There are multiple areas of hypermetabolic soft tissue masses identified in the  retroperitoneal regions, the largest masses are abutting and invading the  bilateral iliopsoas muscles, measuring 5.3 x 7.1 cm and SUV Max = 13.4 on the  left and 5.7 x 6.3 cm on the right with SUV Max of 10.6. There is moderate  adjacent large soft tissue mass abutting the lateral right iliac crest measuring  5.0 x 6.2 cm, SUV Max = 9.6.     Multiple hypermetabolic nodular lesions also identified scattered in the  peritoneal cavity and compatible with carcinomatosis. The largest two are 2.4 x  3.0 cm, SUV Max = 9.1 in the lateral right pericolic gutter and 2.5 x 4.3 cm  with SUV max of 13.2 at anterior right pelvic cavity abutting the peritoneum. Multiple other smaller hypermetabolic foci also scattered in the peritoneal  cavities.     Mild retroperitoneal adenopathy also developed, the largest is 1.3 x 1.5 cm in  caval aortic region, SUV Max = 6.9.  Multiple hypermetabolic lymph nodes also  seen in the bilateral pelvic sidewalls.     BONES:  No malignant FDG activity.      ADDITIONAL CT FINDINGS:    (Exam is limited due to lack of intravenous contrast.)     Moderate interstitial lung process again seen.      Interval development of moderate bilateral hydronephrosis and hydroureter up to  the very distal portions in the pelvis, most likely due to extrinsic compression  by large pelvic mass. The bladder is poorly distended. Mild fatty stranding in  the pelvis.     IMPRESSION  IMPRESSION:     1. Interval development of multiple hypermetabolic nodular lesions in bilateral  lungs, most likely consistent with lung metastasis.     2. Interval development of large ill-defined soft tissue mass in the surgical  bed in the pelvis, most consistent with recurrent malignancy.     3. Interval development of 2 large necrotic masses in the hilum of spleen with  malignant FDG uptake. Multiple large soft tissue masses also seen within and  abutting the iliopsoas muscles along with multiple hypermetabolic nodular  lesions in the hernial cavities, most likely suggesting metastasis.     4. Interval development of mild adenopathy in pelvic sidewalls, retroperitoneal  regions as wall as right supraclavicular region as most likely becky metastasis.     5. Interval development of moderate bilateral hydronephroses and hydroureters  all the way to the very distal ureters, most likely due to extrinsic compression  by pelvic mass. Local invasion cannot be entirely excluded.   5/16/18  Thyroid   Scanned in media    3/6/18  US head/neck      3/12/18  CT Chest  Scanned in media   (r/o PE) delayed thryoid uptake 8 weeks after    2/27/18  PETCT  Scanned in media    8/23/17  MRI pelvis  Scanned in media    8/24/17  MRI abdomen  Scanned in media        ROS:   As above      Patient Active Problem List    Diagnosis Date Noted    ARF (acute renal failure) ([address] 12/05/2018    Severe obesity (BMI 35.0-39.9) 09/27/2018    Endometrial ca ([address] 09/15/2017     Past Medical History:   Diagnosis Date    Acute kidney failure ([address] 12/05/2019    BMI 34.0-34.9,adult 34.8    Caffeine adverse reaction     elevated BP    Cancer (HCC)     uterine, cervical    Cervical cancer (HCC)     Chronic sinusitis     Dizziness     Endometriosis     Hiatal hernia     High cholesterol     Palpitation     PMB (postmenopausal bleeding)     Rash     eczemz    Urinary incontinence       Past Surgical History:   Procedure Laterality Date    HX ABDOMINAL LAPAROSCOPY      HX BILATERAL SALPINGO-OOPHORECTOMY Bilateral     HX BREAST LUMPECTOMY Right 1973    benign    HX HYSTERECTOMY      radical    HX OTHER SURGICAL  2017    Exam under anesthesia: Cystoscopy, sigmoidoscopy    IR MEDIPORT        OB History      Para Term  AB Living    2 2 2     2    SAB TAB Ectopic Molar Multiple Live Births              2        Social History     Tobacco Use    Smoking status: Never Smoker    Smokeless tobacco: Never Used   Substance Use Topics    Alcohol use: No      Family History   Problem Relation Age of Onset    Cancer Mother         left ovary      Current Outpatient Medications   Medication Sig    b complex vitamins (B COMPLEX 1) tablet Take 1 Tab by mouth daily.  ascorbic acid (VAMSI-C PO) Take  by mouth.  GARLIC PO Take  by mouth daily.  bisacodyl (DULCOLAX) 10 mg suppository Insert 10 mg into rectum daily as needed.  bisacodyl (DULCOLAX) 5 mg EC tablet Take 2 Tabs by mouth daily as needed for Constipation.  cyclobenzaprine (FLEXERIL) 10 mg tablet Take 0.5 Tabs by mouth three (3) times daily as needed for Muscle Spasm(s).  docusate sodium (COLACE) 100 mg capsule Take 1 Cap by mouth two (2) times a day for 90 days.  ondansetron hcl (ZOFRAN) 8 mg tablet Take 1 Tab by mouth every eight (8) hours as needed for Nausea.  oxyCODONE-acetaminophen (PERCOCET) 5-325 mg per tablet Take 1-2 Tabs by mouth every six (6) hours as needed. Max Daily Amount: 8 Tabs.  megestrol (MEGACE) 40 mg tablet Take 2 Tabs by mouth two (2) times a day.     tamoxifen (NOLVADEX) 20 mg tablet Take 1 Tab by mouth two (2) times a day.  ondansetron hcl (ZOFRAN) 4 mg tablet Take 1 Tab by mouth every six (6) hours as needed for Nausea.  RESVERATROL PO Take  by mouth daily.  calcium carbonate (OS-MAYELA) 500 mg calcium (1,250 mg) tablet Take  by mouth daily.  DOCOSAHEXANOIC ACID/EPA (FISH OIL PO) Take 1,000 mg by mouth daily.  LACTOBACILLUS ACIDOPHILUS (PROBIOTIC PO) Take  by mouth daily. No current facility-administered medications for this visit. Allergies   Allergen Reactions    Other Food Nausea and Vomiting     Artificial sweeteners    Neulasta [Pegfilgrastim] Swelling    Aspirin Other (comments)     Gi upset    Milk Itching and Atopic Dermatitis     Eczema (dairy cow)    Tetracycline Unknown (comments)     Patient does not remember    Wheat Atopic Dermatitis     eczema    Other Plant, Animal, Environmental Other (comments)     Pt states she has \"springtime grass allergies. \"     Reports reaction: Sinus infection          OBJECTIVE:    Physical Exam  VITAL SIGNS: Visit Vitals  /85 (BP 1 Location: Left arm, BP Patient Position: Sitting)   Pulse (!) 116   Temp 97.2 °F (36.2 °C) (Oral)   Resp 18   Ht 5' 5\" (1.651 m)   Wt 85.3 kg (188 lb)   SpO2 100%   BMI 31.28 kg/m²      GENERAL CRISTINO: in no apparent distress and well developed and well nourished   MUSCULOSKEL: no joint tenderness, deformity or swelling   INTEGUMENT:  warm and dry, no rashes or lesions   ABDOMEN . soft, NT, ND, No masses appreciated   EXTREMITIES: extremities normal, atraumatic, no cyanosis or edema   PELVIC: Exam deferred. RECTAL: deferred   LOKI SURVEY: Cervical, supraclavicular, axillary and inguinal nodes normal.   NEURO: Grossly normal         IMPRESSION/PLAN:  1. Recurrent tage IVB endometrioid cervical cancer vs. Endometrial cancer   -previously reviewed blood work and PETCT c/w recurrent disease   -previously discussed options of chemotherapy, avastin and hormonal therapy   -Initially patient reluctant to do chemo but has decided to go ahead with chemo   -d/c hormonal treatment   -will plan on topotecan 1.0 mg/m2 D1-D5, Avastin 15 mg/kg D1 every 21 days until CCR, Toxicity, or progression    -s/p mediport placement on 12/14/2018   -hydronephrosis- s/p b/l nephrostomy tube placement   -will need chemo teaching with Viktor Duran   -plan to treat for 3 cycles and then get repeat PETCT   -hypertension- improved   -all of ms. pruett's questioins/concerns addressed    The total time spent was 40 minutes regarding this patients diagnosis of cervical cancer and >50% of this time was spent counseling and coordinating care    [address] [place] [address]  Gynecologic Oncology  1/18/20191:28PM

## 2019-01-18 NOTE — PROGRESS NOTES
Dario Dooley, a 76 y.o. female,  is here for   Chief Complaint   Patient presents with    Uterine Cancer     follow up from hospital       Visit Vitals  /85 (BP 1 Location: Left arm, BP Patient Position: Sitting)   Pulse (!) 116   Temp 97.2 °F (36.2 °C) (Oral)   Resp 18   Ht 5' 5\" (1.651 m)   Wt 85.3 kg (188 lb)   SpO2 100%   BMI 31.28 kg/m²       Patient denies any persistent or worsening abdominal or pelvic pain. Denies any unusual vaginal bleeding, discharge, irritation, or odor. Denies experiencing any constipation or diarrhea. No burning, discomfort, or irritation with urination. 1. Have you been to the ER, urgent care clinic since your last visit? Hospitalized since your last visit? Yes When: 12/05/2018 Where: [facility] for visit: told report for elevaed kidney bloodwork    2. Have you seen or consulted any other health care providers outside of the [address] [place] [address] since your last visit? Include any pap smears or colon screening.  No

## 2019-01-22 PROBLEM — C53.9 CERVICAL CANCER (HCC): Status: ACTIVE | Noted: 2019-01-01

## 2019-01-23 NOTE — PROGRESS NOTES
Patient here ambulatory with for chemo teaching (Avastin D1, Topotecan D1-5 every 21 days ). She has a right/left PCN tube with bags attached draining urine and \"I,m hoping to get these removed and have stents placed soon\". .  Consent form signed. Patient given chemo folder with written information and it was also reviewed verbally with all questions answered. Information reviewed regarding lab work prior to chemo. (WBC'c, RBC's, Platelets and their basic functions) Patient aware of premedications both orally and intravenously given prior to chemo. Possible side effects of chemo discussed, which include:    Nausea/vomiting: Scripts called in for Zofran (every 6-8 hrs)and Phenergan(every 4-6 hrs). Patient knows to take the Zofran at the first sign of nausea and if after 2-3 hours they still have nausea to take the Phenergan. They have been made aware the phenergan may cause drowsiness. Medications may be alternated and patient knows to call if vomiting. VICKI is also suggested for nausea. Given samples of vicki chews, may also try vicki tea, gum, snaps. Constipation:  May take Senokot-S, miralax, colace, prunes, activia, power pudding, Milk of Magnesia. Patient knows that constipation may occur up to five days after chemo due to the premeds. They are to introduce the suggestions individually. Power pudding recipe given. Pt knows to call if has not had a bowel movement for 2 days. Diarrhea: Educated on the [facility]. Bananas,Rice, Apples, Toast, Yogurt. Patient knows they may take [id number] [employer]. They are to start with 1 tablet after each episode. NO MORE than 8 tablets a day, and to call if more than 2 watery stools. Fever: Report all fevers greater than 100.5. Skin: Report all rashes. Use sunscreen, as the chemo will make you more sensitive and you run the risk of getting severe sunburn. Fatigue: Stay well hydrated, pace yourself.  Supplement diet with Ensure or Boost. Samples given of Ensure and Ensure clear and coupons. Add fruit or yogurt to make them more of a smoothie to help with taste. Eat small frequent meals that include protein. Scrambled eggs, cheese, peanut butter are good sources. Continue to try to maintain normal activities of daily living. If unable to get out of bed, Call. Dehydration: You will note to have extreme fatigue, increase in nausea). Should be drinking 1000-1500cc a day. Try to drink a cup of something every hour you are awake. May substitute with ice pops, soup. Gatorade, low caffeine drinks. She is going to receive weekly hydration on non chemo weeks. Dry Mouth: Biotene mouthwash or gum. Taste changes: Lemon drops, cold drinks, hard candies, ice pops. DO NOT use metal silverware or drink from cans if you have a metallic taste. .   Neuropathy: Numbness/tingling/burning sensation, diff buttoning clothes. Advised to take B-6 and B-12 or  B-Complex twice a day to help with this. Hair loss: MAY have thinning occur 2-3 weeks after starting treatment. Use baby shampoo for tender scalp. Given information on the [employer]. Tour of [facility] given to pt, along with a tote with information and samples. Medications were not e-scribed to pharmacy as patient has recently moved and is going to call with her new pharmacy phone # and address. She said she has some anti-nausea meds at home and is going to call to let me know which ones she has and if they have . Patient given a calendar for (/Feb) with all appointments scheduled. Akin Szymanski (from pharmacy) also spoke with patient and form signed by Dr. John Keller for patient to receive Avastin at little or no cost.  Phone numbers given on how to reach the office for any questions or concerns.

## 2019-01-23 NOTE — PROGRESS NOTES
[facility]  [address] [place] [address], [address], [facility]  [address] [facility], [address] [place] [address]  [facility], [address]   [phone]  Filomena Llamas DO      Patient ID:  Name:  Dario Dooley  MRN:  [id number]  :  68 y.o. Date:  2019      HISTORY OF PRESENT ILLNESS:  Dario Dooley is a 76 y.o.   postmenopausal female self-referred for Stage IVB Grade 2 endometrioid adenocarcinoma, favor cervical primary. She initially presented with complaint of PMB x8-10 months to PCP. She was referred to gyn Dr. Yevgeniy Wong, who referred to Dr. Bebeto Brandon for large cervical mass. Last pap 17 years prior. Biopsy of cervical mass 17 showed adenocarcinoma, most c/w endometrioid adenocarcinoma, with initial staining suggesting endometrial origin. Patient had MRI of pelvis 17, which showed BL parametrial involvement, L>R, cancer extending from the large cervical mass into fundus and anterior 1/3 of vagina, no obvious adenopathy. PETCT 17 suspicious for metastatic disease to left ovary, omentum, liver report scanned in media. Patient is s/p EUA, cysto, sigmoidoscopy 17, medial left parametrial involvement, no tumor in bladder or rectosigmoid colon. She is s/p exploratory laparotomy with radical hysterectomy, BSO, resection of omental mass 9/15/17, pathology consistent with metastatic endometrioid adenocarcinoma, favor cervical primary, pathology report scanned in media. Patient is s/p 6 cycles Carbo/Taxol, completed 2018. Most recent PETCT 18 demonstrated new uptake in small nodular densities adjacent to cecum concerning for metastatic disease, increasing uptake in right thyroid gland concerning for neoplasm, as well as interval response to prior uptake. She was referred to Dr. Paco Dang for further workup, suggestive of goiters, she has follow up in a few months.  Was seen and discussed proceeding with avastin and hormonal treatment. Pt was then admitted 12/5-12/13 with renal failure and hydronephrosis and had bilateral PCN placed. Pt feels much better. Denies abdominopelvic pain, vaginal bleeding. Pt here for chemo teaching and reluctant to proceed with avastin. Pathology:  9/15/17  Metastatic endometrioid adenocarcinoma, favor cervical primary      Labs:  Component       Cancer Ag (CA) 125   Latest Ref Rng & Units       0.0 - 38.1 U/mL   11/14/2018      4:40 .0 (H)   10/3/2018      3:45 .0 (H)     5/16/18  Ca-125: 27    9/11/17  Ca-125 108    Imaging  PETCT  11/23/2018  FINDINGS:       PET/CT:     Reference data:     The mediastinal blood pool SUV max value = 1.96. The liver SUV Max value  = 2.19  .      HEAD/NECK:  There is a hypermetabolic adenopathy identified in the right  supraclavicular region, measuring 1.5 x 1.7 cm, SUV Max = 7.11.] It was 7 x 9 mm  and not hypermetabolic on prior study. No additional adenopathy or  hypermetabolic activity seen in the neck. CHEST: There is interval development of multiple nodular lesions scattered in  both lungs, most likely suggestive of lung metastasis. -The dominant nodule in left lung is in medial anterior left upper lobe  measuring 1.0 x 1.4 cm, #70. Mild hypermetabolic activity noted, SUV Max = 1.7.   -The second largest nodule in left lower lobe infrahilar region measures 8 x 8  mm on #85, SUV Max = 1.7.   -The dominant mass in left lung measures 9 x 9 mm at lateral right lower lobe,  SUV Max = 1.0, #87 .   -The second 8 x 9 mm solid nodule at posterior right lower lobe, SUV Max = 1.0,  #87.   -There is 6 x 9 mm small cavitary lesion with thickened wall at anterior right  upper lobe, #69 and SUV Max = 1.3.   -Multiple other 3-5 mm nodules also scattered in both lungs, too small for  accurate evaluation on PET.     No mediastinal or axillary adenopathy or hypermetabolic chest wall finding.      ABDOMEN/PELVIS:  There is a large mass with central necrosis identified in the  left subphrenic region, most likely arising from the spleen and measures 10.2 x  14.3 x 12.5 cm. There is significant hypermetabolic activity identified along  the periphery of the mass, SUV Max = 8.79. The mass was much smaller and barely  visible on prior CT but hypermetabolic on prior PET scan, roughly measured 1.8 x  2.5 cm, SUV Max was 4.47. The second exophytic mass also developed at posterior  aspect of the spleen abutting the dominant mass which measures 4.1 x 5.1 x 6.0  cm, SUV Max = 6.99. The mass is tightly abutting the greater curvature of  gastric wall. Direct invasion cannot be entirely excluded.      There is a large ill-defined soft tissue mass identified in the midline pelvic  floor and roughly measures 8.2 x 9.9 cm with intensive FDG uptake, SUV Max =  14.2. This is new finding since the prior study as most likely recurrent  malignancy.     There are multiple areas of hypermetabolic soft tissue masses identified in the  retroperitoneal regions, the largest masses are abutting and invading the  bilateral iliopsoas muscles, measuring 5.3 x 7.1 cm and SUV Max = 13.4 on the  left and 5.7 x 6.3 cm on the right with SUV Max of 10.6. There is moderate  adjacent large soft tissue mass abutting the lateral right iliac crest measuring  5.0 x 6.2 cm, SUV Max = 9.6.     Multiple hypermetabolic nodular lesions also identified scattered in the  peritoneal cavity and compatible with carcinomatosis. The largest two are 2.4 x  3.0 cm, SUV Max = 9.1 in the lateral right pericolic gutter and 2.5 x 4.3 cm  with SUV max of 13.2 at anterior right pelvic cavity abutting the peritoneum. Multiple other smaller hypermetabolic foci also scattered in the peritoneal  cavities.     Mild retroperitoneal adenopathy also developed, the largest is 1.3 x 1.5 cm in  caval aortic region, SUV Max = 6.9.  Multiple hypermetabolic lymph nodes also  seen in the bilateral pelvic sidewalls.     BONES:  No malignant FDG activity.      ADDITIONAL CT FINDINGS:    (Exam is limited due to lack of intravenous contrast.)     Moderate interstitial lung process again seen.      Interval development of moderate bilateral hydronephrosis and hydroureter up to  the very distal portions in the pelvis, most likely due to extrinsic compression  by large pelvic mass. The bladder is poorly distended. Mild fatty stranding in  the pelvis.     IMPRESSION  IMPRESSION:     1. Interval development of multiple hypermetabolic nodular lesions in bilateral  lungs, most likely consistent with lung metastasis.     2. Interval development of large ill-defined soft tissue mass in the surgical  bed in the pelvis, most consistent with recurrent malignancy.     3. Interval development of 2 large necrotic masses in the hilum of spleen with  malignant FDG uptake. Multiple large soft tissue masses also seen within and  abutting the iliopsoas muscles along with multiple hypermetabolic nodular  lesions in the hernial cavities, most likely suggesting metastasis.     4. Interval development of mild adenopathy in pelvic sidewalls, retroperitoneal  regions as wall as right supraclavicular region as most likely becky metastasis.     5. Interval development of moderate bilateral hydronephroses and hydroureters  all the way to the very distal ureters, most likely due to extrinsic compression  by pelvic mass. Local invasion cannot be entirely excluded.   5/16/18  Thyroid   Scanned in media    3/6/18  US head/neck      3/12/18  CT Chest  Scanned in media   (r/o PE) delayed thryoid uptake 8 weeks after    2/27/18  PETCT  Scanned in media    8/23/17  MRI pelvis  Scanned in media    8/24/17  MRI abdomen  Scanned in media        ROS:   As above      Patient Active Problem List    Diagnosis Date Noted    Cervical cancer ([facility] [address] 01/22/2019    ARF (acute renal failure) ([address] 12/05/2018    Severe obesity (BMI 35.0-39.9) 09/27/2018    Endometrial ca ([address] 09/15/2017 Past Medical History:   Diagnosis Date    Acute kidney failure ([facility] [address] 2019    BMI 34.0-34.9,adult     34.8    Caffeine adverse reaction     elevated BP    Cancer (HCC)     uterine, cervical    Cervical cancer (HCC)     Chronic sinusitis     Dizziness     Endometriosis     Hiatal hernia     High cholesterol     Palpitation     PMB (postmenopausal bleeding)     Rash     eczemz    Urinary incontinence       Past Surgical History:   Procedure Laterality Date    HX ABDOMINAL LAPAROSCOPY      HX BILATERAL SALPINGO-OOPHORECTOMY Bilateral     HX BREAST LUMPECTOMY Right 1973    benign    HX HYSTERECTOMY      radical    HX OTHER SURGICAL  2017    Exam under anesthesia: Cystoscopy, sigmoidoscopy    IR MEDIPORT        OB History      Para Term  AB Living    2 2 2     2    SAB TAB Ectopic Molar Multiple Live Births              2        Social History     Tobacco Use    Smoking status: Never Smoker    Smokeless tobacco: Never Used   Substance Use Topics    Alcohol use: No      Family History   Problem Relation Age of Onset    Cancer Mother         left ovary      Current Outpatient Medications   Medication Sig    b complex vitamins (B COMPLEX 1) tablet Take 1 Tab by mouth daily.  ascorbic acid (VAMSI-C PO) Take  by mouth.  bisacodyl (DULCOLAX) 10 mg suppository Insert 10 mg into rectum daily as needed.  bisacodyl (DULCOLAX) 5 mg EC tablet Take 2 Tabs by mouth daily as needed for Constipation.  cyclobenzaprine (FLEXERIL) 10 mg tablet Take 0.5 Tabs by mouth three (3) times daily as needed for Muscle Spasm(s).  docusate sodium (COLACE) 100 mg capsule Take 1 Cap by mouth two (2) times a day for 90 days.  ondansetron hcl (ZOFRAN) 8 mg tablet Take 1 Tab by mouth every eight (8) hours as needed for Nausea.  oxyCODONE-acetaminophen (PERCOCET) 5-325 mg per tablet Take 1-2 Tabs by mouth every six (6) hours as needed. Max Daily Amount: 8 Tabs.     megestrol (MEGACE) 40 mg tablet Take 2 Tabs by mouth two (2) times a day.  tamoxifen (NOLVADEX) 20 mg tablet Take 1 Tab by mouth two (2) times a day.  ondansetron hcl (ZOFRAN) 4 mg tablet Take 1 Tab by mouth every six (6) hours as needed for Nausea.  GARLIC PO Take  by mouth daily.  RESVERATROL PO Take  by mouth daily.  calcium carbonate (OS-MAYELA) 500 mg calcium (1,250 mg) tablet Take  by mouth daily.  DOCOSAHEXANOIC ACID/EPA (FISH OIL PO) Take 1,000 mg by mouth daily.  LACTOBACILLUS ACIDOPHILUS (PROBIOTIC PO) Take  by mouth daily. No current facility-administered medications for this visit. Allergies   Allergen Reactions    Other Food Nausea and Vomiting     Artificial sweeteners    Neulasta [Pegfilgrastim] Swelling    Aspirin Other (comments)     Gi upset    Milk Itching and Atopic Dermatitis     Eczema (dairy cow)    Tetracycline Unknown (comments)     Patient does not remember    Wheat Atopic Dermatitis     eczema    Other Plant, Animal, Environmental Other (comments)     Pt states she has \"springtime grass allergies. \"     Reports reaction: Sinus infection          OBJECTIVE:    Physical Exam  VITAL SIGNS: There were no vitals taken for this visit. GENERAL CRISTINO: in no apparent distress and well developed and well nourished   MUSCULOSKEL: no joint tenderness, deformity or swelling   INTEGUMENT:  warm and dry, no rashes or lesions   ABDOMEN . soft, NT, ND, No masses appreciated   EXTREMITIES: extremities normal, atraumatic, no cyanosis or edema   PELVIC: Exam deferred. RECTAL: deferred   LOKI SURVEY: Cervical, supraclavicular, axillary and inguinal nodes normal.   NEURO: Grossly normal         IMPRESSION/PLAN:  1. Recurrent tage IVB endometrioid cervical cancer vs. 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  We will be monitoring closely    -pt agreeable with avastin and topotecan   -hypertension- improved   -all of ms. pruett's questioins/concerns addressed    The total time spent was 25 minutes regarding this patients diagnosis of cervical cancer and >50% of this time was spent counseling and coordinating care    [address] [place] [address]  Gynecologic Oncology  1/23/20191:28PM

## 2019-01-24 NOTE — TELEPHONE ENCOUNTER
I called and spoke with patient to find out what pharmacy she was going to use. She [facility] on [facility] [place]. She said she has zofran at home 4mg and 8 mg, but is not sure where there are, as \"I just moved\". She has not gotten her Ativan prescription filled yet. Advised her to try to get it filled and find the zofran before starting chemo next week. She agreed to try to do this.

## 2019-01-28 NOTE — PROGRESS NOTES
[facility] Progress Note    Date: 2019    Name: Reed Bueno              MRN: [id number]              : 1950    Chemotherapy Cycle: C1 D1 Topotecan/Avastin       Pt to [facility], ambulatory, at 0905. Ms. Susana Espitia was assessed and education was provided. Ms. Hewitt's vitals were reviewed. Visit Vitals  /79 (BP 1 Location: Right arm, BP Patient Position: Sitting)   Pulse (!) 110   Temp 97.7 °F (36.5 °C)   Resp 18   Ht 5' 5\" (1.651 m)   Wt 84.7 kg (186 lb 11.2 oz)   SpO2 98%   Breastfeeding? No   BMI 31.07 kg/m²       Right chest mediport accessed with 20 g 1 inch salgado needle. Port flushed easily without blood return. Cathflo instilled for 20 minutes and brisk blood return noted. Blood drawn off and sent for CBC, BMP, Hepatic Function Panel, Magnesium, CA-125, UA and Urine Culture via nephrostomy per written orders after 10 ml waste. NS initiated @ 100. Lab results were obtained and reviewed.    Recent Results (from the past 12 hour(s))   URINALYSIS W/ RFLX MICROSCOPIC    Collection Time: 19  9:57 AM   Result Value Ref Range    Color DARK YELLOW      Appearance TURBID      Specific gravity 1.021 1.005 - 1.030      pH (UA) 5.5 5.0 - 8.0      Protein 300 (A) NEG mg/dL    Glucose NEGATIVE  NEG mg/dL    Ketone TRACE (A) NEG mg/dL    Bilirubin NEGATIVE  NEG      Blood LARGE (A) NEG      Urobilinogen 0.2 0.2 - 1.0 EU/dL    Nitrites NEGATIVE  NEG      Leukocyte Esterase LARGE (A) NEG     URINE MICROSCOPIC ONLY    Collection Time: 19  9:57 AM   Result Value Ref Range    WBC >100 (H) 0 - 4 /hpf    RBC 10 to 20 0 - 5 /hpf    Epithelial cells FEW 0 - 5 /lpf    Bacteria 3+ (A) NEG /hpf   CBC WITH 3 PART DIFF    Collection Time: 19 10:05 AM   Result Value Ref Range    WBC 8.0 4.5 - 13.0 K/uL    RBC 3.17 (L) 4.10 - 5.10 M/uL    HGB 9.5 (L) 12.0 - 16 g/dL    HCT 29.2 (L) 36 - 48 %    MCV 92.1 78 - 102 FL    MCH 30.0 25.0 - 35.0 PG    MCHC 32.5 31 - 37 g/dL    RDW 15.6 (H) 11.5 - 14.5 %    PLATELET 897 139 - 368 K/uL    NEUTROPHILS 71 (H) 40 - 70 %    MIXED CELLS 11 0.1 - 17 %    LYMPHOCYTES 19 14 - 44 %    ABS. NEUTROPHILS 5.7 1.8 - 9.5 K/UL    ABS. MIXED CELLS 0.8 0.0 - 2.3 K/uL    ABS. LYMPHOCYTES 1.5 1.1 - 5.9 K/UL    DF AUTOMATED     POC CHEM8    Collection Time: 01/28/19 10:10 AM   Result Value Ref Range    CO2, POC 21 19 - 24 MMOL/L    Glucose,  (H) 74 - 106 MG/DL    BUN, POC 13 7 - 18 MG/DL    Creatinine, POC 0.8 0.6 - 1.3 MG/DL    GFRAA, POC >60 >60 ml/min/1.73m2    GFRNA, POC >60 >60 ml/min/1.73m2    Sodium,  136 - 145 MMOL/L    Potassium, POC 3.6 3.5 - 5.5 MMOL/L    Calcium, ionized (POC) 1.17 1.12 - 1.32 mmol/L    Chloride,  100 - 108 MMOL/L    Anion gap, POC 19 10 - 20      Hematocrit, POC 29 (L) 36 - 49 %    Hemoglobin, POC 9.9 (L) 12 - 16 G/DL       Lab results within ordered parameters to give chemo today. ANC = 5.7, PLT = 282. Chemo dosages verified with today's BSA and found to be within 10% of ordered dosages. Pre-medications (Pepcid,Zofran, and Decadron) were administered as ordered and chemotherapy was initiated after blood return from port re-verified. Reviewed expected side effects of premeds with patient. Patient declined Ativan. Topotecan 1.9 mg was infused at  224 ml/hr. VS stable at end of infusion and pt denied complaints. Line flushed with NS and blood return from port re-verified. Urinalysis resulted protein 300. Per Guido March, Dr. Polly Ryan office ok to proceed today. Order will be scanned in to media tab. Avastin 1280 mg was infused at  108 ml/hr. VS stable at end of infusion and pt denied complaints. Line flushed with NS and blood return from port re-verified. Ms. Leticia Evangelista tolerated infusion, and had no complaints at this time. Mediport flushed with NS 20 ml and Heparin 500 units then remained accessed for tomorrows treatment. Patient remained for 30 minute observation period.     Patient armband removed and shredded. Ms. Pola Castro was discharged from [place] [address] in stable condition at 1430. She is to return on 1/29/19 at 0900 for her next D2 Topotecan appointment.     Alice Mccrary RN  January 28, 2019  1430

## 2019-01-29 NOTE — PROGRESS NOTES
[facility] Progress Note    Date: 2019    Name: Anival Mccurdy              MRN: [id number]              : 1950    Chemotherapy Cycle: C1 D2 Topotecan       Pt to [facility], ambulatory, at 0900. Ms. Baudilio Rick was assessed and education was provided. Ms. Hewitt's vitals were reviewed. Visit Vitals  /78 (BP 1 Location: Left arm, BP Patient Position: Sitting)   Pulse 90   Temp 97.6 °F (36.4 °C)   Resp 18   SpO2 98%       Right chest mediport accessed with 20 g 1 inch salgado needle on 19. Port flushed easily with brisk  blood return. NS initiated @ 100. Lab results were obtained and reviewed from 19. No results found for this or any previous visit (from the past 12 hour(s)). Lab results within ordered parameters to give chemo today. ANC = 5.7, PLT = 282. Chemo dosages verified with today's BSA and found to be within 10% of ordered dosages. Pre-medications (Pepcid,Zofran, and Decadron) were administered as ordered and chemotherapy was initiated after blood return from port re-verified. Reviewed expected side effects of premeds with patient. Patient declined Ativan. Topotecan 1.9 mg was infused at  224 ml/hr. VS stable at end of infusion and pt denied complaints. Line flushed with NS and blood return from port re-verified. Ms. Baudilio Rick tolerated infusion, and had no complaints at this time. Mediport flushed with NS 20 ml and Heparin 500 units then remained accessed for tomorrows treatment. Patient armband removed and shredded. Ms. Baudilio Rick was discharged from [place] [address] in stable condition at 1050. She is to return on 19 at 0900 for her next D3 Topotecan appointment.     Joy Salazar RN  2019  1050

## 2019-01-30 NOTE — PROGRESS NOTES
[facility] Progress Note    Date: 2019    Name: Jasper Turner              MRN: [id number]              : 1950    Chemotherapy Cycle: C1 D3 Topotecan       Pt to [facility], ambulatory, at 0915. Ms. Jeremiha Blas was assessed and education was provided. Patient stated Gwen Drivers said she is not sleeping well and wanted to know if she could get less dexamethsone with her treatment\"  I relayed the information to Vance Humphrey RN with Dr. Shaye Jefferson office and relayed to patient his suggested to take an Ativan (to place it under her tongue) before bedtime starting tonight; and decrease the dexamethasone to 8 mg (from 10 mg) with her daily Topotecan to start tomorrow 19. Patient verbally understood. Ms. Hewitt's vitals were reviewed. Visit Vitals  /80 (BP 1 Location: Left arm, BP Patient Position: Sitting)   Pulse 82   Temp 98 °F (36.7 °C)   Resp 18   SpO2 98%       Right chest mediport accessed with 20 g 1 inch salgado needle on 19. Port flushed easily with brisk  blood return. NS initiated @ 100. Lab results were obtained and reviewed from 19. Recent Results (from the past 12 hour(s))   PROTEIN/CREATININE RATIO, URINE    Collection Time: 19 10:57 AM   Result Value Ref Range    Protein, urine random 96 (H) <11.9 mg/dL    Creatinine, urine 124.00 30 - 125 mg/dL    Protein/Creat. urine Ratio 0.8         Lab results within ordered parameters to give chemo today. ANC = 5.7, PLT = 282. Chemo dosages verified with today's BSA and found to be within 10% of ordered dosages. Pre-medications (Pepcid,Zofran, and Decadron) were administered as ordered and chemotherapy was initiated after blood return from port re-verified. Reviewed expected side effects of premeds with patient. Patient declined Ativan. Topotecan 1.9 mg was infused at  224 ml/hr. VS stable at end of infusion and pt denied complaints. Line flushed with NS and blood return from port re-verified.     Ms. Jeremiah Blas tolerated infusion, and had no complaints at this time. Mediport flushed with NS 20 ml and Heparin 500 units then remained accessed for tomorrows treatment. Patient armband removed and shredded. Ms. Funmilayo Kim was discharged from [place] [address] in stable condition at 1140. She is to return on 1/31/19 at 0900 for her next D4 Topotecan appointment.     Saray Hazel RN  January 30, 2019  1140

## 2019-01-30 NOTE — PROGRESS NOTES
Janna Holland called to say patient was in [facility] and Mary Bond said she is not sleeping well and wanted to know if she could get less dexamethsone with her treatment\"  I discussed with Dr. General Jeans and relayed his suggested to take an Ativan (to place it under her tongue) before bedtime and decrease the dexamethasone to 8 mg (from 10 mg) with her daily Topotecan.

## 2019-01-31 NOTE — PROGRESS NOTES
[facility] Progress Note    Date: 2019    Name: Anival Mccurdy              MRN: [id number]              : 1950    Chemotherapy Cycle: C1 D4 Topotecan       [facility], [facility], at 0900. Ms. Baudilio Rick was assessed and education was provided. Ms. Hewitt's vitals were reviewed. Visit Vitals  /72 (BP 1 Location: Left arm, BP Patient Position: Sitting)   Pulse 72   Temp 97.9 °F (36.6 °C)   Resp 18   SpO2 99%       Right chest mediport accessed with 20 g 1 inch salgado needle on 19. Port flushed easily with brisk  blood return. NS initiated @ 100. Lab results were obtained and reviewed from 19. Lab results within ordered parameters to give chemo today. ANC = 5.7, PLT = 282. Chemo dosages verified with today's BSA and found to be within 10% of ordered dosages. Pre-medications (Pepcid,Zofran, and Decadron) were administered as ordered and chemotherapy was initiated after blood return from port re-verified. Reviewed expected side effects of premeds with patient. Patient declined Ativan. Topotecan 1.9 mg was infused at  224 ml/hr. VS stable at end of infusion and pt denied complaints. Line flushed with NS and blood return from port re-verified. 500 mL NS given of the course of treatment today. Ms. Baudilio Rick tolerated infusion, and had no complaints at this time. Mediport flushed with NS 20 ml and Heparin 500 units then remained accessed for tomorrows treatment. Patient armband removed and shredded. Ms. Baudilio Rick was discharged from [place] [address] in stable condition at 1100. She is to return on 19 at 1300 for her next D5 Topotecan appointment.     Khanh Doan RN  2019

## 2019-02-04 NOTE — PROGRESS NOTES
Patient here ambulatory for follow up with Dr. Flash Stevenson. She has completed Cycle #1 (Avastin/D1, Topotecan D1-5. She denies any nosebleeds, headaches, n/v/d/constipation. Admits to \"Throat is hoarse off and on\". Denies any new neuropathies. She did have some \"mouthsores\" and \"my throat felt hard to swallow\"  \"I used salt and heavy garlic\". No mouth sores visualized, but she said her \"throat is fine, but my tongue is sore\". Advised to use the baking soda and salt recipe. She was eating more soups, as I get more fluids. BP within parameters. She recently purchased 2 blue parakeets. I asked her who was cleaning the cage,she replied  \"I Am\". Advised patient to double glove due to risk of infection. She sees the nephrologist on 3-11-18. Education provided and patient to follow up as scheduled. She was given he March calendar.

## 2019-02-04 NOTE — PROGRESS NOTES
[facility] Progress Note Date: 2019 Name: Tushar Carl MRN: [id number] : 1950 Here for hydration Pt to [facility], ambulatory, at 1415. No complaints or concerns voiced Ms. Hewitt was assessed and education was provided. Ms. Hewitt's vitals were reviewed. Visit Vitals /69 (BP 1 Location: Left arm, BP Patient Position: At rest) Pulse 94 Temp 98.5 °F (36.9 °C) Resp 18 SpO2 98% Right chest mediport accessed with 20 g 1 inch salgado needle. Port flushed easily with brisk  blood return. One litre normal saline infused over 2 hours as ordered Patient Vitals for the past 4 hrs: 
 Temp Pulse Resp BP SpO2  
19 1644  94 18 114/69   
19 1418 98.5 °F (36.9 °C) 88 18 118/69 98 % Ms. Hewitt tolerated infusion, and had no complaints. Mediport flushed with NS 20 ml and Heparin 500 units then de- accessed. Site without redness, swelling, bleeding or tenderness. Band aid applied Patient armband removed and shredded. Ms. Hernando Vásquez was discharged from [place] [address] in stable condition at [facility] 81.. She is to return on 19 at 0900 for her next hydration appointment. Ora Rodriguez RN 2019

## 2019-02-04 NOTE — PATIENT INSTRUCTIONS
Uterine Cancer: Care Instructions Your Care Instructions Uterine cancer is the rapid growth of abnormal cells that line the uterus. It also is called endometrial cancer. These cells may spread to nearby organs, lymph glands, or distant organs. Uterine cancer can be cured most often when found early. Treatment may include surgery to remove the uterus, ovaries, fallopian tubes, and sometimes the pelvic lymph nodes. Radiation and hormones to stop cancer growth also are sometimes used. Chemotherapy may be used if the cancer has spread. Having cancer can be scary. You may feel many emotions and may need some help coping. Seek out family, friends, and counselors for support. You can do things at home to make yourself feel better while you go through treatment. You also can call the [employer] (9-214.298.1050) or visit its website at [address] [employer] for more information. Follow-up care is a key part of your treatment and safety. Be sure to make and go to all appointments, and call your doctor if you are having problems. It's also a good idea to know your test results and keep a list of the medicines you take. How can you care for yourself at home? · Take your medicines exactly as prescribed. Call your doctor if you think you are having a problem with your medicine. You may get medicine for nausea and vomiting if you have these side effects. · Eat healthy food. If you do not feel like eating, try to eat food that has protein and extra calories to keep up your strength and prevent weight loss. Drink liquid meal replacements for extra calories and protein. Try to eat your main meal early. · Get some physical activity every day, but do not get too tired. Keep doing the hobbies you enjoy as your energy allows. · Take steps to control your stress and workload. Learn relaxation techniques. ? Share your feelings. Stress and tension affect our emotions.  By expressing your feelings to others, you may be able to understand and cope with them. ? Consider joining a support group. Talking about a problem with your spouse, a good friend, or other people with similar problems is a good way to reduce tension and stress. ? Express yourself through art. Try writing, dance, art, or crafts to relieve tension. Some dance, writing, or art groups may be available just for people who have cancer. ? Be kind to your body and mind. Getting enough sleep, eating a healthy diet, and taking time to do things you enjoy can contribute to an overall feeling of balance in your life and help reduce stress. ? Get help if you need it. Discuss your concerns with your doctor or counselor. · If you are vomiting or have diarrhea: ? Drink plenty of fluids (enough so that your urine is light yellow or clear like water) to prevent dehydration. Choose water and other caffeine-free clear liquids. If you have kidney, heart, or liver disease and have to limit fluids, talk with your doctor before you increase the amount of fluids you drink. ? When you are able to eat, try clear soups, mild foods, and liquids until all symptoms are gone for 12 to 48 hours. Other good choices include dry toast, crackers, cooked cereal, and gelatin dessert, such as Jell-O. 
· Take care of your urinary tract to prevent problems such as infection, which can be caused by uterine cancer and its treatment. Limit drinks with caffeine, drink plenty of fluids, and urinate every 3 to 4 hours. · If you have not already done so, prepare a list of advance directives. Advance directives are instructions to your doctor and family members about what kind of care you want if you become unable to speak or express yourself. When should you call for help? Call 911 anytime you think you may need emergency care. For example, call if: 
  · You passed out (lost consciousness).  
 Call your doctor now or seek immediate medical care if:   · You have a fever.  
  · You have abnormal bleeding.  
  · You have new or worse pain.  
  · You think you have an infection.  
  · You have new symptoms, such as a cough, belly pain, vomiting, diarrhea, or a rash.  
 Watch closely for changes in your health, and be sure to contact your doctor if: 
  · You are much more tired than usual.  
  · You have swollen glands in your armpits, groin, or neck.  
  · You do not get better as expected. Where can you learn more? Go to http://kj-ron.info/. Enter E343 in the search box to learn more about \"Uterine Cancer: Care Instructions. \" Current as of: March 27, 2018 Content Version: 11.9 © 6485-2135 [employer]. Care instructions adapted under license by [employer] (which disclaims liability or warranty for this information). If you have questions about a medical condition or this instruction, always ask your healthcare professional. [address] any warranty or liability for your use of this information.

## 2019-02-04 NOTE — PROGRESS NOTES
[address], [address], [address] [facility] 
[address] [facility], [address] [place], [address] 
 [phone] [place] Patient ID: 
Name:  Olga Dangelo MRN:  [id number] :   y.o. Date:  2019 HISTORY OF PRESENT ILLNESS: 
Olga Dangelo is a 76 y.o.   postmenopausal female self-referred for Stage IVB Grade 2 endometrioid adenocarcinoma, favor cervical primary. She initially presented with complaint of PMB x8-10 months to PCP. She was referred to gyn Dr. Samson Landaverde, who referred to Dr. Julito Blackmon for large cervical mass. Last pap 17 years prior. Biopsy of cervical mass 17 showed adenocarcinoma, most c/w endometrioid adenocarcinoma, with initial staining suggesting endometrial origin. Patient had MRI of pelvis 17, which showed BL parametrial involvement, L>R, cancer extending from the large cervical mass into fundus and anterior 1/3 of vagina, no obvious adenopathy. PETCT 17 suspicious for metastatic disease to left ovary, omentum, liver report scanned in media. Patient is s/p EUA, cysto, sigmoidoscopy 17, medial left parametrial involvement, no tumor in bladder or rectosigmoid colon. She is s/p exploratory laparotomy with radical hysterectomy, BSO, resection of omental mass 9/15/17, pathology consistent with metastatic endometrioid adenocarcinoma, favor cervical primary, pathology report scanned in media. Patient is s/p 6 cycles Carbo/Taxol, completed 2018. Most recent PETCT 18 demonstrated new uptake in small nodular densities adjacent to cecum concerning for metastatic disease, increasing uptake in right thyroid gland concerning for neoplasm, as well as interval response to prior uptake. She was referred to Dr. Kimberli Lawton for further workup, suggestive of goiters, she has follow up in a few months.  Was seen and discussed proceeding with avastin and hormonal treatment. Pt was then admitted 12/5-12/13 with renal failure and hydronephrosis and had bilateral PCN placed. S/p cycles #1 of topotecan/avastin completed 2/1/2019. Tolerating well. Had some hoarseness. Pathology: 
9/15/17 Metastatic endometrioid adenocarcinoma, favor cervical primary Labs: 
Component Cancer Ag (CA) 125 Latest Ref Rng & Units 0.0 - 38.1 U/mL  
11/14/2018 
    4:40 .0 (H)  
10/3/2018 3:45 .0 (H)  
5/16/18 Ca-125: 27 
 
9/11/17 Ca-125 108 Imaging PETCT 
11/23/2018 FINDINGS:   
  
PET/CT: 
  
Reference data: 
  
The mediastinal blood pool SUV max value = 1.96. The liver SUV Max value  = 2.19 .  
  
HEAD/NECK:  There is a hypermetabolic adenopathy identified in the right 
supraclavicular region, measuring 1.5 x 1.7 cm, SUV Max = 7.11.] It was 7 x 9 mm 
and not hypermetabolic on prior study. No additional adenopathy or 
hypermetabolic activity seen in the neck. CHEST: There is interval development of multiple nodular lesions scattered in 
both lungs, most likely suggestive of lung metastasis. -The dominant nodule in left lung is in medial anterior left upper lobe 
measuring 1.0 x 1.4 cm, #70. Mild hypermetabolic activity noted, SUV Max = 1.7. -The second largest nodule in left lower lobe infrahilar region measures 8 x 8 
mm on #85, SUV Max = 1.7. -The dominant mass in left lung measures 9 x 9 mm at lateral right lower lobe, SUV Max = 1.0, #87 .  
-The second 8 x 9 mm solid nodule at posterior right lower lobe, SUV Max = 1.0, 
#87.  
-There is 6 x 9 mm small cavitary lesion with thickened wall at anterior right 
upper lobe, #69 and SUV Max = 1.3.  
-Multiple other 3-5 mm nodules also scattered in both lungs, too small for 
accurate evaluation on PET. 
  
No mediastinal or axillary adenopathy or hypermetabolic chest wall finding. ABDOMEN/PELVIS:  There is a large mass with central necrosis identified in the 
left subphrenic region, most likely arising from the spleen and measures 10.2 x 
14.3 x 12.5 cm. There is significant hypermetabolic activity identified along 
the periphery of the mass, SUV Max = 8.79. The mass was much smaller and barely 
visible on prior CT but hypermetabolic on prior PET scan, roughly measured 1.8 x 
2.5 cm, SUV Max was 4.47. The second exophytic mass also developed at posterior 
aspect of the spleen abutting the dominant mass which measures 4.1 x 5.1 x 6.0 
cm, SUV Max = 6.99. The mass is tightly abutting the greater curvature of 
gastric wall. Direct invasion cannot be entirely excluded.  
  
There is a large ill-defined soft tissue mass identified in the midline pelvic 
floor and roughly measures 8.2 x 9.9 cm with intensive FDG uptake, SUV Max = 
14.2. This is new finding since the prior study as most likely recurrent 
malignancy. 
  
There are multiple areas of hypermetabolic soft tissue masses identified in the 
retroperitoneal regions, the largest masses are abutting and invading the 
bilateral iliopsoas muscles, measuring 5.3 x 7.1 cm and SUV Max = 13.4 on the 
left and 5.7 x 6.3 cm on the right with SUV Max of 10.6. There is moderate 
adjacent large soft tissue mass abutting the lateral right iliac crest measuring 5.0 x 6.2 cm, SUV Max = 9.6. 
  
Multiple hypermetabolic nodular lesions also identified scattered in the 
peritoneal cavity and compatible with carcinomatosis. The largest two are 2.4 x 
3.0 cm, SUV Max = 9.1 in the lateral right pericolic gutter and 2.5 x 4.3 cm 
with SUV max of 13.2 at anterior right pelvic cavity abutting the peritoneum. Multiple other smaller hypermetabolic foci also scattered in the peritoneal 
cavities. 
  
Mild retroperitoneal adenopathy also developed, the largest is 1.3 x 1.5 cm in 
caval aortic region, SUV Max = 6.9. Multiple hypermetabolic lymph nodes also seen in the bilateral pelvic sidewalls. 
  
BONES:  No malignant FDG activity.  
  
ADDITIONAL CT FINDINGS:   
(Exam is limited due to lack of intravenous contrast.) Moderate interstitial lung process again seen.  
  
Interval development of moderate bilateral hydronephrosis and hydroureter up to 
the very distal portions in the pelvis, most likely due to extrinsic compression 
by large pelvic mass. The bladder is poorly distended. Mild fatty stranding in 
the pelvis. 
  
IMPRESSION IMPRESSION: 
  
1. Interval development of multiple hypermetabolic nodular lesions in bilateral 
lungs, most likely consistent with lung metastasis. 
  
2. Interval development of large ill-defined soft tissue mass in the surgical 
bed in the pelvis, most consistent with recurrent malignancy. 
  
3. Interval development of 2 large necrotic masses in the hilum of spleen with 
malignant FDG uptake. Multiple large soft tissue masses also seen within and 
abutting the iliopsoas muscles along with multiple hypermetabolic nodular 
lesions in the hernial cavities, most likely suggesting metastasis. 
  
4. Interval development of mild adenopathy in pelvic sidewalls, retroperitoneal 
regions as wall as right supraclavicular region as most likely becky metastasis. 
  
5. Interval development of moderate bilateral hydronephroses and hydroureters 
all the way to the very distal ureters, most likely due to extrinsic compression 
by pelvic mass. Local invasion cannot be entirely excluded. 5/16/18 Thyroid Scanned in media 3/6/18 US head/neck 3/12/18 CT Chest 
Scanned in media  
(r/o PE) delayed thryoid uptake 8 weeks after 2/27/18 PETCT Scanned in media 8/23/17 MRI pelvis Scanned in media 8/24/17 MRI abdomen Scanned in media ROS:  
As above Patient Active Problem List  
 Diagnosis Date Noted  Cervical cancer ([facility] [address] 01/22/2019  ARF (acute renal failure) ([facility] 12/05/2018  Severe obesity (BMI 35.0-39.9) 2018  Endometrial ca ([facility] 09/15/2017 Past Medical History:  
Diagnosis Date  Acute kidney failure ([facility] 2019  BMI 34.0-34.9,adult 34.8  Caffeine adverse reaction   
 elevated BP  Cancer ([facility] [address] [facility] uterine, cervical  
 Cervical cancer (HCC)  Chronic sinusitis  Dizziness  Endometriosis  Hiatal hernia  High cholesterol  Palpitation  PMB (postmenopausal bleeding)  Rash   
 eczemz  Urinary incontinence Past Surgical History:  
Procedure Laterality Date  HX ABDOMINAL LAPAROSCOPY    
 HX BILATERAL SALPINGO-OOPHORECTOMY Bilateral   
 HX BREAST LUMPECTOMY Right 1973  
 benign  HX HYSTERECTOMY    
 radical  
 HX OTHER SURGICAL  2017 Exam under anesthesia: Cystoscopy, sigmoidoscopy  IR MEDIPORT    
  
OB History  Para Term  AB Living 2 2 2     2 SAB TAB Ectopic Molar Multiple Live Births 2  
  
 
Social History Tobacco Use  Smoking status: Never Smoker  Smokeless tobacco: Never Used Substance Use Topics  Alcohol use: No  
  
Family History Problem Relation Age of Onset  Cancer Mother   
     left ovary Current Outpatient Medications Medication Sig  LORazepam (ATIVAN) 0.5 mg tablet Take 1 Tab by mouth every six (6) hours as needed for Anxiety. Max Daily Amount: 2 mg.  bisacodyl (DULCOLAX) 10 mg suppository Insert 10 mg into rectum daily as needed.  bisacodyl (DULCOLAX) 5 mg EC tablet Take 2 Tabs by mouth daily as needed for Constipation.  cyclobenzaprine (FLEXERIL) 10 mg tablet Take 0.5 Tabs by mouth three (3) times daily as needed for Muscle Spasm(s).  docusate sodium (COLACE) 100 mg capsule Take 1 Cap by mouth two (2) times a day for 90 days.  megestrol (MEGACE) 40 mg tablet Take 2 Tabs by mouth two (2) times a day.   
 ondansetron hcl (ZOFRAN) 4 mg tablet Take 1 Tab by mouth every six (6) hours as needed for Nausea.  b complex vitamins (B COMPLEX 1) tablet Take 1 Tab by mouth daily.  ascorbic acid (VAMSI-C PO) Take  by mouth.  ondansetron hcl (ZOFRAN) 8 mg tablet Take 1 Tab by mouth every eight (8) hours as needed for Nausea.  oxyCODONE-acetaminophen (PERCOCET) 5-325 mg per tablet Take 1-2 Tabs by mouth every six (6) hours as needed. Max Daily Amount: 8 Tabs.  tamoxifen (NOLVADEX) 20 mg tablet Take 1 Tab by mouth two (2) times a day.  GARLIC PO Take  by mouth daily.  RESVERATROL PO Take  by mouth daily.  calcium carbonate (OS-MAYELA) 500 mg calcium (1,250 mg) tablet Take  by mouth daily.  DOCOSAHEXANOIC ACID/EPA (FISH OIL PO) Take 1,000 mg by mouth daily.  LACTOBACILLUS ACIDOPHILUS (PROBIOTIC PO) Take  by mouth daily. No current facility-administered medications for this visit. Facility-Administered Medications Ordered in Other Visits Medication Dose Route Frequency  sodium chloride (NS) flush 10-40 mL  10-40 mL IntraVENous PRN  
 heparin (porcine) pf 500 Units  500 Units InterCATHeter PRN  
 0.9% sodium chloride infusion 1,000 mL  1,000 mL IntraVENous ONCE  
 PHARMACY INFORMATION NOTE  1 Each Other Rx Dosing/Monitoring Allergies Allergen Reactions  Other Food Nausea and Vomiting Artificial sweeteners  Neulasta [Pegfilgrastim] Swelling  Aspirin Other (comments) Gi upset  Milk Itching and Atopic Dermatitis Eczema (dairy cow)  Tetracycline Unknown (comments) Patient does not remember  Wheat Atopic Dermatitis  
  eczema  Other Plant, Animal, Environmental Other (comments) Pt states she has \"springtime grass allergies. \" Reports reaction: Sinus infection OBJECTIVE: 
 
Physical Exam 
VITAL SIGNS: Visit Vitals /75 (BP 1 Location: Right arm, BP Patient Position: Sitting) Pulse 91 Temp 97.8 °F (36.6 °C) (Oral) Resp 18 Wt 85.4 kg (188 lb 4.8 oz) SpO2 98% BMI 31.33 kg/m² GENERAL CRISTINO: in no apparent distress and well developed and well nourished MUSCULOSKEL: no joint tenderness, deformity or swelling INTEGUMENT:  warm and dry, no rashes or lesions ABDOMEN . soft, NT, ND, No masses appreciated EXTREMITIES: extremities normal, atraumatic, no cyanosis or edema PELVIC: Exam deferred. RECTAL: deferred LOKI SURVEY: Cervical, supraclavicular, axillary and inguinal nodes normal.  
NEURO: Grossly normal  
 
 
 
IMPRESSION/PLAN: 
1. Recurrent tage IVB endometrioid cervical cancer vs. Endometrial cancer 
 -previously reviewed blood work and PETCT c/w recurrent disease 
 -previously discussed options of chemotherapy, avastin and hormonal therapy 
 -Initially patient reluctant to do chemo but has decided to go ahead with chemo 
 -d/c hormonal treatment 
 -will plan on topotecan 1.0 mg/m2 D1-D5, Avastin 15 mg/kg D1 every 21 days until CCR, Toxicity, or progression  
 -s/p mediport placement on 12/14/2018 
 -hydronephrosis- s/p b/l nephrostomy tube placement 
 -plan to treat for 3 cycles and then get repeat PETCT 
 -tolerating chemo well. No G3 or G4 toxicity 
 -s/p cycle #1 completed 2/1 
 -hypertension- improved 
 -sore throat-magic mouthwash prn 
 -all of ms. pruett's questioins/concerns addressed The total time spent was 25 minutes regarding this patients diagnosis of cervical cancer and >50% of this time was spent counseling and coordinating care Angel Camacho DO Gynecologic Oncology 2/4/20191:28PM

## 2019-02-14 NOTE — PROGRESS NOTES
[facility] Progress Note Date: 2019 Name: Luh Garrett MRN: [id number] : 1950 Here for hydration Pt to [facility], ambulatory, at 0905. No complaints or concerns voiced Ms. Hewitt was assessed and education was provided. Ms. Hewitt's vitals were reviewed. Visit Vitals /82 (BP 1 Location: Right arm, BP Patient Position: At rest) Pulse (!) 108 Temp 97.5 °F (36.4 °C) Resp 18 SpO2 96% Right chest mediport accessed with 20 g 1 inch salgado needle. Port flushed easily with brisk  blood return. One litre normal saline infused over 2 hours as ordered Patient Vitals for the past 4 hrs: 
 Temp Pulse Resp BP SpO2  
19 1119  (!) 108 18 124/82   
19 0905 97.5 °F (36.4 °C) (!) 124 18 123/83 96 % Ms. Hewitt tolerated infusion, and had no complaints. Mediport flushed with NS 20 ml and Heparin 500 units then de- accessed. Site without redness, swelling, bleeding or tenderness. Band aid applied Patient armband removed and shredded. Ms. Tasha Emmanuel was discharged from [place] [address] in stable condition at 1120. She is to return on 19 at 0900 for her next chemo appointment. Javier Gruber RN 2019

## 2019-02-18 NOTE — PROGRESS NOTES
[facility] Progress Note    Date: 2019    Name: Clover Cadet              MRN: [id number]              : 1950    Chemotherapy Cycle: C2 D1 Topotecan/Avastin       Pt to [facility], ambulatory, at 0905. Ms. Felicity Harvey was assessed and education was provided. Ms. Hewitt's vitals were reviewed. Visit Vitals  /73 (BP 1 Location: Left arm, BP Patient Position: Sitting)   Pulse (!) 117   Temp 98.3 °F (36.8 °C)   Resp 18   Ht 5' 5\" (1.651 m)   Wt 83.4 kg (183 lb 12.8 oz)   SpO2 99%   BMI 30.59 kg/m²       Right chest mediport accessed with 20 g 1 inch salgado needle. Port flushed easily and brisk blood return noted. Blood drawn off and sent for CBC, BMP, Hepatic Function Panel, Magnesium, CA-125, UA and Urine Culture via nephrostomy per written orders after 10 ml waste. NS initiated @ 100. Lab results were obtained and reviewed.    Recent Results (from the past 12 hour(s))   URINALYSIS W/ RFLX MICROSCOPIC    Collection Time: 19  9:12 AM   Result Value Ref Range    Color YELLOW      Appearance TURBID      Specific gravity 1.016 1.005 - 1.030      pH (UA) 7.0 5.0 - 8.0      Protein 100 (A) NEG mg/dL    Glucose NEGATIVE  NEG mg/dL    Ketone NEGATIVE  NEG mg/dL    Bilirubin NEGATIVE  NEG      Blood MODERATE (A) NEG      Urobilinogen 1.0 0.2 - 1.0 EU/dL    Nitrites NEGATIVE  NEG      Leukocyte Esterase LARGE (A) NEG     URINE MICROSCOPIC ONLY    Collection Time: 19  9:12 AM   Result Value Ref Range    WBC TOO NUMEROUS TO COUNT 0 - 4 /hpf    RBC 10 to 15 0 - 5 /hpf    Bacteria 4+ (A) NEG /hpf    Amorphous Crystals 2+ (A) NEG   POC CHEM8    Collection Time: 19  9:34 AM   Result Value Ref Range    CO2, POC 24 19 - 24 MMOL/L    Glucose, POC 98 74 - 106 MG/DL    BUN, POC 9 7 - 18 MG/DL    Creatinine, POC 0.7 0.6 - 1.3 MG/DL    GFRAA, POC >60 >60 ml/min/1.73m2    GFRNA, POC >60 >60 ml/min/1.73m2    Sodium,  136 - 145 MMOL/L    Potassium, POC 4.2 3.5 - 5.5 MMOL/L    Calcium, ionized (POC) 1.14 1.12 - 1.32 mmol/L    Chloride,  100 - 108 MMOL/L    Anion gap, POC 16 10 - 20      Hematocrit, POC 28 (L) 36 - 49 %    Hemoglobin, POC 9.5 (L) 12 - 16 G/DL       Lab results within ordered parameters to give chemo today. ANC = 2.8, PLT = 529. Chemo dosages verified with today's BSA and found to be within 10% of ordered dosages. Pre-medications (Pepcid,Zofran, and Decadron) were administered as ordered and chemotherapy was initiated after blood return from port re-verified. Reviewed expected side effects of premeds with patient. Patient declined Ativan. Topotecan 1.9 mg was infused over 30 minutes. VS stable at end of infusion and pt denied complaints. Line flushed with NS and blood return from port re-verified. Urinalysis resulted protein 100. Per order parameters, ok to proceed today. Avastin 1280 mg was infused over 60 minutes. VS stable at end of infusion and pt denied complaints. Line flushed with NS and blood return from port re-verified. Ms. Tiffani Harris tolerated infusion, and had no complaints at this time. Mediport flushed with NS 20 ml and Heparin 500 units then remained accessed for tomorrows treatment. Patient armband removed and shredded. Ms. Tiffani Harris was discharged from [place] [address] in stable condition at [phone]. She is to return on 2/19/19 at 1000 for her next D2 Topotecan appointment.     Haylie Hernandez RN  February 18, 2019  [id number]

## 2019-02-19 NOTE — PROGRESS NOTES
[facility] Progress Note    Date: 2019    Name: Mally Kearney              MRN: [id number]              : 1950    Chemotherapy Cycle: C2 D2 Topotecan       Pt to [facility], ambulatory, at 1000. Ms. Venkat Mcfarlane was assessed and education was provided. Ms. Hewitt's vitals were reviewed. Visit Vitals  /83 (BP 1 Location: Left arm, BP Patient Position: Sitting)   Pulse 88   Temp 97.5 °F (36.4 °C)   Resp 18   SpO2 98%       Right chest mediport accessed with 20 g 1 inch salgado needle on 19. Port flushed easily with brisk  blood return. NS initiated @ 100. Lab results were obtained and reviewed from 19. No results found for this or any previous visit (from the past 12 hour(s)). Lab results within ordered parameters to give chemo today. Chemo dosages verified with today's BSA and found to be within 10% of ordered dosages. Pre-medications (Pepcid,Zofran, and Decadron) were administered as ordered and chemotherapy was initiated after blood return from port re-verified. Reviewed expected side effects of premeds with patient. Patient declined Ativan. Topotecan 1.9 mg was infused at  224 ml/hr. VS stable at end of infusion and pt denied complaints. Line flushed with NS and blood return from port re-verified. Ms. Venkat Mcfarlane tolerated infusion, and had no complaints at this time. Mediport flushed with NS 20 ml and Heparin 500 units then remained accessed for tomorrows treatment. Patient armband removed and shredded. Ms. Venkat Mcfarlane was discharged from [place] [address] in stable condition at 1150. She is to return on 19 at 0900 for her next D3 Topotecan appointment.     Rain Fan RN  2019  [phone]

## 2019-02-20 NOTE — PROGRESS NOTES
I received a call from Bar[place] Coreas, RN at [id number] [facility] regarding this patient not having any dressings on her nephrostomy tubes. I called Dr. Eliana Paredes office and spoke with his nurse Crow Torres. The patient was is going to be seen tomorrow in their office and Crow Torres said they would address the situation. I suggessted having Home Health evaluate or coming to the office weekly for dressing changes as patient lives by herself. Crow Torres asked [facility] place a dressing on the sites until she is seen tomorrow. I asked Robert Adler , but the patient had already left [facility]. Dr. Flores Lab aware.

## 2019-02-20 NOTE — PROGRESS NOTES
[facility] Progress Note    Date: 2019    Name: Mitchel Fowler              MRN: [id number]              : 1950    Chemotherapy Cycle: C1 D3 Topotecan       [facility] to [facility], ambulatory, at 0915. Ms. Tea Hodges was assessed and education was provided. Patient stated she has been having increased pain and a small amount of bleeding to right nephrostomy tubing. Assessed tubing, no dressing over insertion sites, patient stated \"there haven't been any there since December\". Rakel at Dr. Gildardo Thacker office notified, she was going to call Urologist office to inform. Patient has appt tomorrow with urologist after chemo. Ms. Hewitt's vitals were reviewed. Visit Vitals  /87 (BP 1 Location: Left arm, BP Patient Position: At rest;Sitting)   Pulse 91   Temp 97.9 °F (36.6 °C)   Resp 18   SpO2 98%       Right chest mediport accessed with 20 g 1 inch salgado needle on 19. Port flushed easily with brisk  blood return. NS initiated @ 100. Lab results were obtained and reviewed from 19. No results found for this or any previous visit (from the past 12 hour(s)). Lab results within ordered parameters to give chemo today. Chemo dosages verified with today's BSA and found to be within 10% of ordered dosages. Pre-medications (Pepcid,Zofran, and Decadron) were administered as ordered and chemotherapy was initiated after blood return from port re-verified. Reviewed expected side effects of premeds with patient. Patient declined Ativan. Topotecan 1.9 mg was infused at  224 ml/hr. VS stable at end of infusion and pt denied complaints. Line flushed with NS and blood return from port re-verified. Ms. Tea Hodges tolerated infusion, and had no complaints at this time. Mediport flushed with NS 20 ml and Heparin 500 units then remained accessed for tomorrows treatment. Patient armband removed and shredded.     Ms. Tea Hodges was discharged from [place] [address] in stable condition at 1135. She is to return on 2/21/19 at 0900 for her next D4 Topotecan appointment.     Yumi Ospina RN  February 20, 2019  [id number]

## 2019-02-21 NOTE — PROGRESS NOTES
[facility] Progress Note    Date: 2019    Name: Alexandra Weaver              MRN: [id number]              : 1950    Chemotherapy Cycle: C2 D4 Topotecan       Pt to [facility], ambulatory, at . Ms. Hewitt was assessed and education was provided. Patient stated she has been having increased pain and a small amount of bleeding to right nephrostomy tubing. Assessed tubing, no dressing over insertion sites, patient stated \"there haven't been any there since December\". Patient has appt today with urologist after chemo. Ms. Hewitt's vitals were reviewed. Visit Vitals  /79 (BP 1 Location: Right arm, BP Patient Position: Sitting)   Pulse 85   Temp 98.1 °F (36.7 °C)   Resp 18   SpO2 100%       Right chest mediport accessed with 20 g 1 inch salgado needle on 19. Port flushed easily with brisk  blood return. NS initiated @ 100. Lab results were obtained and reviewed from 19. No results found for this or any previous visit (from the past 12 hour(s)). Lab results within ordered parameters to give chemo today. Chemo dosages verified with today's BSA and found to be within 10% of ordered dosages. Pre-medications (Pepcid,Zofran, and Decadron) were administered as ordered and chemotherapy was initiated after blood return from port re-verified. Reviewed expected side effects of premeds with patient. Patient declined Ativan. Topotecan 1.9 mg was infused at  224 ml/hr. VS stable at end of infusion and pt denied complaints. Line flushed with NS and blood return from port re-verified. Ms. Islas Friday tolerated infusion, and had no complaints at this time. Mediport flushed with NS 20 ml and Heparin 500 units then remained accessed for tomorrows treatment. Patient armband removed and shredded. Ms. Islas Friday was discharged from [place] [address] in stable condition at 1045. She is to return on 19 at 0900 for her next D5 Topotecan appointment.     Malathi Ocampo RN  February 21, 2019  [phone]

## 2019-02-22 NOTE — PROGRESS NOTES
[facility] Progress Note    Date: 2019    Name: Gómez Nicole              MRN: [id number]              : 1950    Chemotherapy Cycle: C2 D5 Topotecan       Pt to [facility], ambulatory, at . Ms. Hewitt was assessed and education was provided. Ms. Hewitt's vitals were reviewed. Visit Vitals  BP (!) 153/91 (BP 1 Location: Left arm, BP Patient Position: Sitting)   Pulse 76   Temp 97.6 °F (36.4 °C)   Resp 18   SpO2 100%       Right chest mediport accessed with 20 g 1 inch salgado needle on 19. Port flushed easily with brisk  blood return. NS initiated @ 100. Lab results were obtained and reviewed from 19. No results found for this or any previous visit (from the past 12 hour(s)). Lab results within ordered parameters to give chemo today. Chemo dosages verified with today's BSA and found to be within 10% of ordered dosages. Pre-medications (Pepcid,Emend, Aloxi, and Decadron) were administered as ordered and chemotherapy was initiated after blood return from port re-verified. Reviewed expected side effects of premeds with patient. Patient declined Ativan. Topotecan 1.9 mg was infused at  224 ml/hr. VS stable at end of infusion and pt denied complaints. Line flushed with NS and blood return from port re-verified. Ms. Dominique Bradshaw tolerated infusion, and had no complaints at this time. Mediport flushed with NS 20 ml and Heparin 500 units then remained accessed for  hydration treatment per patients request.     Patient armband removed and shredded. Ms. Dominique Bradshaw was discharged from [place] [address] in stable condition at . She is to return on 19 at 1100 for her next hydration appointment.     Scooter Prieto RN  2019  [phone]

## 2019-02-25 NOTE — PROGRESS NOTES
[facility] Progress Note Date: 2019 Name: Tushar Carl MRN: [id number] : 1950 Here for hydration Pt to [facility], ambulatory, at 1110. No complaints or concerns voiced Ms. Hewitt was assessed and education was provided. Ms. Hewitt's vitals were reviewed. Visit Vitals /87 (BP 1 Location: Left arm, BP Patient Position: Sitting) Pulse 87 Temp 98.3 °F (36.8 °C) Resp 18 SpO2 97% Right chest mediport accessed on 19 with 20 g 1 inch salgado needle. Port flushed easily with brisk  blood return. One litre normal saline infused over 2 hours as ordered Patient Vitals for the past 4 hrs: 
 Temp Pulse Resp BP SpO2  
19 1335 98.3 °F (36.8 °C) 87 18 143/87 97 % Ms. Hewitt tolerated infusion, and had no complaints. Mediport flushed with NS 20 ml and Heparin 500 units then de- accessed. Site without redness, swelling, bleeding or tenderness. Band aid applied Patient armband removed and shredded. Ms. Hernando Vásquez was discharged from [place] [address] in stable condition at 1340. She is to return on 19 at 0900 for her next chemo appointment. Kori Iglesias RN 2019

## 2019-02-26 NOTE — PROGRESS NOTES
[address], [address] [facility] Courtney Katz, [address] [facility] 
[address] [place] [address], [address] [place], [address] 
 [phone] Anselmo Barnes DO Patient ID: 
Name:  Margie Thurman MRN:  [id number] :  1950/68 y.o. Date:  2019 HISTORY OF PRESENT ILLNESS: 
Margie Thurman is a 76 y.o.   postmenopausal female self-referred for Stage IVB Grade 2 endometrioid adenocarcinoma, favor cervical primary. She initially presented with complaint of PMB x8-10 months to PCP. She was referred to gyn Dr. Danielle West, who referred to Dr. Flash Garcia for large cervical mass. Last pap 17 years prior. Biopsy of cervical mass 17 showed adenocarcinoma, most c/w endometrioid adenocarcinoma, with initial staining suggesting endometrial origin. Patient had MRI of pelvis 17, which showed BL parametrial involvement, L>R, cancer extending from the large cervical mass into fundus and anterior 1/3 of vagina, no obvious adenopathy. PETCT 17 suspicious for metastatic disease to left ovary, omentum, liver report scanned in media. Patient is s/p EUA, cysto, sigmoidoscopy 17, medial left parametrial involvement, no tumor in bladder or rectosigmoid colon. She is s/p exploratory laparotomy with radical hysterectomy, BSO, resection of omental mass 9/15/17, pathology consistent with metastatic endometrioid adenocarcinoma, favor cervical primary, pathology report scanned in media. Patient is s/p 6 cycles Carbo/Taxol, completed 2018. Most recent PETCT 18 demonstrated new uptake in small nodular densities adjacent to cecum concerning for metastatic disease, increasing uptake in right thyroid gland concerning for neoplasm, as well as interval response to prior uptake. She was referred to Dr. Keli Carpio for further workup, suggestive of goiters, she has follow up in a few months.  Was seen and discussed proceeding with avastin and hormonal treatment. Pt was then admitted 12/5-12/13 with renal failure and hydronephrosis and had bilateral PCN placed. S/p cycles #2 of topotecan/avastin completed 2/22/2019. Having trouble with constipation and gas pains with associated nausea. Denies numbness/tingling. Pathology: 
9/15/17 Metastatic endometrioid adenocarcinoma, favor cervical primary Labs: 
Component Cancer Ag (CA) 125 Latest Ref Rng & Units 0.0 - 38.1 U/mL  
2/18/2019 
    9:30 AM 8.7  
1/28/2019 
    10:05 .7 (H)  
11/14/2018 
    4:40 .0 (H)  
10/3/2018 3:45 .0 (H)  
6/18/2018 
    1:13 PM 45 (H)  
5/16/2018 
    8:20 AM 27  
4/23/2018 12:33 PM 17  
3/23/2018 
    2:44 PM 16  
2/23/2018 5:14 PM 15  
1/18/2018 
    3:56 PM 14  
12/21/2017 
    10:42 AM 15  
12/7/2017 
    1:53 PM 16  
9/11/2017 11:30  (H) Imaging PETCT 
11/23/2018 FINDINGS:   
  
PET/CT: 
  
Reference data: 
  
The mediastinal blood pool SUV max value = 1.96. The liver SUV Max value  = 2.19 .  
  
HEAD/NECK:  There is a hypermetabolic adenopathy identified in the right 
supraclavicular region, measuring 1.5 x 1.7 cm, SUV Max = 7.11.] It was 7 x 9 mm 
and not hypermetabolic on prior study. No additional adenopathy or 
hypermetabolic activity seen in the neck. CHEST: There is interval development of multiple nodular lesions scattered in 
both lungs, most likely suggestive of lung metastasis. -The dominant nodule in left lung is in medial anterior left upper lobe 
measuring 1.0 x 1.4 cm, #70. Mild hypermetabolic activity noted, SUV Max = 1.7. -The second largest nodule in left lower lobe infrahilar region measures 8 x 8 
mm on #85, SUV Max = 1.7. -The dominant mass in left lung measures 9 x 9 mm at lateral right lower lobe, SUV Max = 1.0, #87 .  
-The second 8 x 9 mm solid nodule at posterior right lower lobe, SUV Max = 1.0, 
#87. -There is 6 x 9 mm small cavitary lesion with thickened wall at anterior right 
upper lobe, #69 and SUV Max = 1.3.  
-Multiple other 3-5 mm nodules also scattered in both lungs, too small for 
accurate evaluation on PET. 
  
No mediastinal or axillary adenopathy or hypermetabolic chest wall finding. ABDOMEN/PELVIS:  There is a large mass with central necrosis identified in the 
left subphrenic region, most likely arising from the spleen and measures 10.2 x 
14.3 x 12.5 cm. There is significant hypermetabolic activity identified along 
the periphery of the mass, SUV Max = 8.79. The mass was much smaller and barely 
visible on prior CT but hypermetabolic on prior PET scan, roughly measured 1.8 x 
2.5 cm, SUV Max was 4.47. The second exophytic mass also developed at posterior 
aspect of the spleen abutting the dominant mass which measures 4.1 x 5.1 x 6.0 
cm, SUV Max = 6.99. The mass is tightly abutting the greater curvature of 
gastric wall. Direct invasion cannot be entirely excluded.  
  
There is a large ill-defined soft tissue mass identified in the midline pelvic 
floor and roughly measures 8.2 x 9.9 cm with intensive FDG uptake, SUV Max = 
14.2. This is new finding since the prior study as most likely recurrent 
malignancy. 
  
There are multiple areas of hypermetabolic soft tissue masses identified in the 
retroperitoneal regions, the largest masses are abutting and invading the 
bilateral iliopsoas muscles, measuring 5.3 x 7.1 cm and SUV Max = 13.4 on the 
left and 5.7 x 6.3 cm on the right with SUV Max of 10.6. There is moderate 
adjacent large soft tissue mass abutting the lateral right iliac crest measuring 5.0 x 6.2 cm, SUV Max = 9.6. 
  
Multiple hypermetabolic nodular lesions also identified scattered in the 
peritoneal cavity and compatible with carcinomatosis.  The largest two are 2.4 x 
3.0 cm, SUV Max = 9.1 in the lateral right pericolic gutter and 2.5 x 4.3 cm 
 with SUV max of 13.2 at anterior right pelvic cavity abutting the peritoneum. Multiple other smaller hypermetabolic foci also scattered in the peritoneal 
cavities. 
  
Mild retroperitoneal adenopathy also developed, the largest is 1.3 x 1.5 cm in 
caval aortic region, SUV Max = 6.9. Multiple hypermetabolic lymph nodes also 
seen in the bilateral pelvic sidewalls. 
  
BONES:  No malignant FDG activity.  
  
ADDITIONAL CT FINDINGS:   
(Exam is limited due to lack of intravenous contrast.) Moderate interstitial lung process again seen.  
  
Interval development of moderate bilateral hydronephrosis and hydroureter up to 
the very distal portions in the pelvis, most likely due to extrinsic compression 
by large pelvic mass. The bladder is poorly distended. Mild fatty stranding in 
the pelvis. 
  
IMPRESSION IMPRESSION: 
  
1. Interval development of multiple hypermetabolic nodular lesions in bilateral 
lungs, most likely consistent with lung metastasis. 
  
2. Interval development of large ill-defined soft tissue mass in the surgical 
bed in the pelvis, most consistent with recurrent malignancy. 
  
3. Interval development of 2 large necrotic masses in the hilum of spleen with 
malignant FDG uptake. Multiple large soft tissue masses also seen within and 
abutting the iliopsoas muscles along with multiple hypermetabolic nodular 
lesions in the hernial cavities, most likely suggesting metastasis. 
  
4. Interval development of mild adenopathy in pelvic sidewalls, retroperitoneal 
regions as wall as right supraclavicular region as most likely becky metastasis. 
  
5. Interval development of moderate bilateral hydronephroses and hydroureters 
all the way to the very distal ureters, most likely due to extrinsic compression 
by pelvic mass. Local invasion cannot be entirely excluded. 5/16/18 Thyroid Scanned in media 3/6/18 US head/neck 3/12/18 CT Chest 
Scanned in media (r/o PE) delayed thryoid uptake 8 weeks after 18 PETCT Scanned in media 17 MRI pelvis Scanned in media 17 MRI abdomen Scanned in media ROS:  
As above Patient Active Problem List  
 Diagnosis Date Noted  Cervical cancer ([facility] [address] 2019  ARF (acute renal failure) ([facility] [address] 2018  Severe obesity (BMI 35.0-39.9) 2018  Endometrial ca ([facility] [address] 09/15/2017 Past Medical History:  
Diagnosis Date  Acute kidney failure ([facility] [address] 2019  BMI 34.0-34.9,adult 34.8  Caffeine adverse reaction   
 elevated BP  Cancer ([facility] [address] uterine, cervical  
 Cervical cancer (HCC)  Chronic sinusitis  Dizziness  Endometriosis  Hiatal hernia  High cholesterol  Palpitation  PMB (postmenopausal bleeding)  Rash   
 eczemz  Urinary incontinence Past Surgical History:  
Procedure Laterality Date  HX ABDOMINAL LAPAROSCOPY    
 HX BILATERAL SALPINGO-OOPHORECTOMY Bilateral   
 HX BREAST LUMPECTOMY Right 1973  
 benign  HX HYSTERECTOMY    
 radical  
 HX OTHER SURGICAL  2017 Exam under anesthesia: Cystoscopy, sigmoidoscopy  IR MEDIPORT    
  
OB History  Para Term  AB Living 2 2 2     2 SAB TAB Ectopic Molar Multiple Live Births 2  
  
 
Social History Tobacco Use  Smoking status: Never Smoker  Smokeless tobacco: Never Used Substance Use Topics  Alcohol use: No  
  
Family History Problem Relation Age of Onset  Cancer Mother   
     left ovary Current Outpatient Medications Medication Sig  
 trimethoprim-sulfamethoxazole (BACTRIM DS, SEPTRA DS) 160-800 mg per tablet Take one tablet by mouth twice a day starting 3 days prior to nephrostomy tube change  magic mouthwash solution Magic mouth wash Maalox Lidocaine 2% viscous Diphenhydramine oral solution Pharmacy to mix equal portions of ingredients to a total volume as indicated in the dispense amount.  LORazepam (ATIVAN) 0.5 mg tablet Take 1 Tab by mouth every six (6) hours as needed for Anxiety. Max Daily Amount: 2 mg.  b complex vitamins (B COMPLEX 1) tablet Take 1 Tab by mouth daily.  ascorbic acid (VAMSI-C PO) Take  by mouth.  bisacodyl (DULCOLAX) 10 mg suppository Insert 10 mg into rectum daily as needed.  bisacodyl (DULCOLAX) 5 mg EC tablet Take 2 Tabs by mouth daily as needed for Constipation.  cyclobenzaprine (FLEXERIL) 10 mg tablet Take 0.5 Tabs by mouth three (3) times daily as needed for Muscle Spasm(s).  docusate sodium (COLACE) 100 mg capsule Take 1 Cap by mouth two (2) times a day for 90 days.  ondansetron hcl (ZOFRAN) 8 mg tablet Take 1 Tab by mouth every eight (8) hours as needed for Nausea.  oxyCODONE-acetaminophen (PERCOCET) 5-325 mg per tablet Take 1-2 Tabs by mouth every six (6) hours as needed. Max Daily Amount: 8 Tabs.  megestrol (MEGACE) 40 mg tablet Take 2 Tabs by mouth two (2) times a day.  tamoxifen (NOLVADEX) 20 mg tablet Take 1 Tab by mouth two (2) times a day.  ondansetron hcl (ZOFRAN) 4 mg tablet Take 1 Tab by mouth every six (6) hours as needed for Nausea.  GARLIC PO Take  by mouth daily.  RESVERATROL PO Take  by mouth daily.  calcium carbonate (OS-MAYELA) 500 mg calcium (1,250 mg) tablet Take  by mouth daily.  DOCOSAHEXANOIC ACID/EPA (FISH OIL PO) Take 1,000 mg by mouth daily.  LACTOBACILLUS ACIDOPHILUS (PROBIOTIC PO) Take  by mouth daily. No current facility-administered medications for this visit. Facility-Administered Medications Ordered in Other Visits Medication Dose Route Frequency  heparin (porcine) pf 500 Units  500 Units InterCATHeter PRN  
 sodium chloride (NS) flush 10-40 mL  10-40 mL IntraVENous PRN Allergies Allergen Reactions  Other Food Nausea and Vomiting Artificial sweeteners  Neulasta [Pegfilgrastim] Swelling  Aspirin Other (comments) Gi upset  Milk Itching and Atopic Dermatitis Eczema (dairy cow)  Tetracycline Unknown (comments) Patient does not remember  Wheat Atopic Dermatitis  
  eczema  Other Plant, Animal, Environmental Other (comments) Pt states she has \"springtime grass allergies. \" Reports reaction: Sinus infection OBJECTIVE: 
 
Physical Exam 
VITAL SIGNS: There were no vitals taken for this visit. GENERAL CRISTINO: in no apparent distress and well developed and well nourished MUSCULOSKEL: no joint tenderness, deformity or swelling INTEGUMENT:  warm and dry, no rashes or lesions ABDOMEN . soft, NT, ND, No masses appreciated EXTREMITIES: extremities normal, atraumatic, no cyanosis or edema PELVIC: Exam deferred. RECTAL: deferred LOKI SURVEY: Cervical, supraclavicular, axillary and inguinal nodes normal.  
NEURO: Grossly normal  
 
 
 
IMPRESSION/PLAN: 
1. Recurrent tage IVB endometrioid cervical cancer vs. Endometrial cancer 
 -previously reviewed blood work and PETCT c/w recurrent disease 
 -previously discussed options of chemotherapy, avastin and hormonal therapy 
 -Initially patient reluctant to do chemo but has decided to go ahead with chemo 
 -d/c hormonal treatment 
 -will plan on topotecan 1.0 mg/m2 D1-D5, Avastin 15 mg/kg D1 every 21 days until CCR, Toxicity, or progression  
 -s/p mediport placement on 12/14/2018 
 -hydronephrosis- s/p b/l nephrostomy tube placement, having CT scan with plan for removal nephrostomy tubes next week.  
 -given she will be getting scan will plan to repeat PETCT after 6 cycles 
 -tolerating chemo well. No G3 or G4 toxicity 
 -s/p cycle #2 completed 2/22 
 -ok for cycle #3 pending labs,  
 -hypertension- improved 
 -constipation- reviewed options including power pudding and miralax 
 -labs reviewed.  normalized 
 -sore throat-magic mouthwash prn 
 -all of ms. pruett's questioins/concerns addressed The total time spent was 25 minutes regarding this patients diagnosis of cervical cancer and >50% of this time was spent counseling and coordinating care Manan Watt DO Gynecologic Oncology 2/26/20191:28PM

## 2019-02-27 NOTE — PROGRESS NOTES
Patient here ambulatory for follow up with Dr. Rohini Washington. Completed Cycle 2 Avastin D1/Topotecan D1-5. BP within parameters, denies  Nosebleeds, occais headaches \"not very strong\", voice is hoarse. \"I have a lot of gas front and back started this weekend ,took  tums that helped and I bought Gas-X that helped a little bit, and took tylenol\". \"I had blood from my vagina this morning once, but none now\". She is having a scan next Tuesday and is getting the nephrostomy tubes removed on 3-7-19. She had some nausea, diarrhea, some constipation and is belching frequently. She receives weekly hydration. Is she said she was drinking okay, and eating okay until this weekend. Educationn provided an patient to follow up as scheduled. March calendar given. Patient given samples of vicki candies and power pudding recipe.

## 2019-02-27 NOTE — PATIENT INSTRUCTIONS
Uterine Cancer: Care Instructions Your Care Instructions Uterine cancer is the rapid growth of abnormal cells that line the uterus. It also is called endometrial cancer. These cells may spread to nearby organs, lymph glands, or distant organs. Uterine cancer can be cured most often when found early. Treatment may include surgery to remove the uterus, ovaries, fallopian tubes, and sometimes the pelvic lymph nodes. Radiation and hormones to stop cancer growth also are sometimes used. Chemotherapy may be used if the cancer has spread. Having cancer can be scary. You may feel many emotions and may need some help coping. Seek out family, friends, and counselors for support. You can do things at home to make yourself feel better while you go through treatment. You also can call the "[employer] (6-905.231.8661) or visit its website at [employer] for more information. Follow-up care is a key part of your treatment and safety. Be sure to make and go to all appointments, and call your doctor if you are having problems. It's also a good idea to know your test results and keep a list of the medicines you take. How can you care for yourself at home? · Take your medicines exactly as prescribed. Call your doctor if you think you are having a problem with your medicine. You may get medicine for nausea and vomiting if you have these side effects. · Eat healthy food. If you do not feel like eating, try to eat food that has protein and extra calories to keep up your strength and prevent weight loss. Drink liquid meal replacements for extra calories and protein. Try to eat your main meal early. · Get some physical activity every day, but do not get too tired. Keep doing the hobbies you enjoy as your energy allows. · Take steps to control your stress and workload. Learn relaxation techniques. ? Share your feelings. Stress and tension affect our emotions.  By expressing your feelings to others, you may be able to understand and cope with them. ? Consider joining a support group. Talking about a problem with your spouse, a good friend, or other people with similar problems is a good way to reduce tension and stress. ? Express yourself through art. Try writing, dance, art, or crafts to relieve tension. Some dance, writing, or art groups may be available just for people who have cancer. ? Be kind to your body and mind. Getting enough sleep, eating a healthy diet, and taking time to do things you enjoy can contribute to an overall feeling of balance in your life and help reduce stress. ? Get help if you need it. Discuss your concerns with your doctor or counselor. · If you are vomiting or have diarrhea: ? Drink plenty of fluids (enough so that your urine is light yellow or clear like water) to prevent dehydration. Choose water and other caffeine-free clear liquids. If you have kidney, heart, or liver disease and have to limit fluids, talk with your doctor before you increase the amount of fluids you drink. ? When you are able to eat, try clear soups, mild foods, and liquids until all symptoms are gone for 12 to 48 hours. Other good choices include dry toast, crackers, cooked cereal, and gelatin dessert, such as Jell-O. 
· Take care of your urinary tract to prevent problems such as infection, which can be caused by uterine cancer and its treatment. Limit drinks with caffeine, drink plenty of fluids, and urinate every 3 to 4 hours. · If you have not already done so, prepare a list of advance directives. Advance directives are instructions to your doctor and family members about what kind of care you want if you become unable to speak or express yourself. When should you call for help? Call 911 anytime you think you may need emergency care. For example, call if: 
  · You passed out (lost consciousness).  
 Call your doctor now or seek immediate medical care if:   · You have a fever.  
  · You have abnormal bleeding.  
  · You have new or worse pain.  
  · You think you have an infection.  
  · You have new symptoms, such as a cough, belly pain, vomiting, diarrhea, or a rash.  
 Watch closely for changes in your health, and be sure to contact your doctor if: 
  · You are much more tired than usual.  
  · You have swollen glands in your armpits, groin, or neck.  
  · You do not get better as expected. Where can you learn more? Go to http://kj-ron.info/. Enter E343 in the search box to learn more about \"Uterine Cancer: Care Instructions. \" Current as of: March 27, 2018 Content Version: 11.9 © 5781-6467 [employer]. Care instructions adapted under license by [employer] (which disclaims liability or warranty for this information). If you have questions about a medical condition or this instruction, always ask your healthcare professional. [address] any warranty or liability for your use of this information.

## 2019-03-04 NOTE — PROGRESS NOTES
[facility] Progress Note Date: 2019 Name: Senia Zhang MRN: [id number] : 1950 Ms. Hewitt arrived in the [facility] today at 0910, in stable condition, here for IV Hydration. She was assessed and education was provided. Upon arrival to the [facility] today, Ms. Hewitt complained of some mild shortness of breath and feeling very tired. She was instructed to notify Dr. Christine Allen and his nurse Tom Robertson ASAP, regarding the new symptom of shortness of breath, and she verbalized understanding. (She stated that the fatigue was not new, and that Dr. Christine Allen was already aware of that, because she discussed that with him during her office visit last week.) Ms. Hewitt's vitals were reviewed. Visit Vitals /79 (BP 1 Location: Right arm, BP Patient Position: Post activity) Pulse (!) 125 Temp 97.5 °F (36.4 °C) Resp 24 SpO2 99% Breastfeeding? No  
 
 
 
Her right chest single lumen port was accessed without incident at 0920. NS   1,000 ml IV Hydration, was administered over approximately 2 hours, per order, and without incident. After completion of the IV NS, her port was flushed very well per protocol with NS & Heparin, and then, the salgado needle was removed, and gauze/bandaid was applied. Ms. Georgia Miles tolerated well, and had no complaints. Ms. Georgia Miles was discharged from [place] [address] in stable condition at 1150. Yudi Watkins She is to return on Monday, 3-11-19, at 0900, for her next appointment, for her next dose of chemotherapy.   
 
Pavel Lubin RN 
2019 
10:59 AM

## 2019-03-07 PROBLEM — K68.19 RETROPERITONEAL ABSCESS (HCC): Status: ACTIVE | Noted: 2019-01-01

## 2019-03-07 PROBLEM — I82.409 DVT (DEEP VENOUS THROMBOSIS) (HCC): Status: ACTIVE | Noted: 2019-01-01

## 2019-03-07 NOTE — H&P
History & Physical    Patient: Jeremiah Carr MRN: [id number]  CSN: [id number]    YOB: 1950  Age: 76 y.o. Sex: female      DOA: 3/7/2019    Abdominal pain         HPI:     Jeremiah Carr is a 76 y.o. female who has a h/o endometrial cancer, was sent to IR suite for replacement of the nephrostomy tubes. CT scan of the abd was done few days before the procedure. It showed retroperitoneal abscess, right iliac vein DVT. She is being admitted for further treatment. . She has the tachycardia. Past Medical History:   Diagnosis Date    Acute kidney failure ([facility] [address] 12/05/2019    BMI 34.0-34.9,adult     34.8    Caffeine adverse reaction     elevated BP    Cancer (HCC)     uterine, cervical    Cervical cancer (HCC)     Chronic sinusitis     Dizziness     Endometriosis     Hiatal hernia     High cholesterol     Palpitation     PMB (postmenopausal bleeding)     Rash     eczemz    Urinary incontinence        Past Surgical History:   Procedure Laterality Date    HX ABDOMINAL LAPAROSCOPY      HX BILATERAL SALPINGO-OOPHORECTOMY Bilateral     HX BREAST LUMPECTOMY Right 1973    benign    HX HYSTERECTOMY      radical    HX OTHER SURGICAL  09/01/2017    Exam under anesthesia: Cystoscopy, sigmoidoscopy    IR CHANGE EXIST CATHETER/TUBE LT  3/7/2019    IR MEDIPORT         Family History   Problem Relation Age of Onset    Cancer Mother         left ovary       Social History     Socioeconomic History    Marital status: SINGLE     Spouse name: Not on file    Number of children: Not on file    Years of education: Not on file    Highest education level: Not on file   Tobacco Use    Smoking status: Never Smoker    Smokeless tobacco: Never Used   Substance and Sexual Activity    Alcohol use: No    Drug use: No    Sexual activity: No       Prior to Admission medications    Medication Sig Start Date End Date Taking?  Authorizing Provider   simethicone (GAS-X) 80 mg chewable tablet Take 80 mg by mouth every six (6) hours as needed for Flatulence. Provider, Historical   trimethoprim-sulfamethoxazole (BACTRIM DS, SEPTRA DS) 160-800 mg per tablet Take one tablet by mouth twice a day starting 3 days prior to nephrostomy tube change 2/25/19   Natalia Hoyt MD   magic mouthwash solution Magic mouth wash   Maalox  Lidocaine 2% viscous   Diphenhydramine oral solution     Pharmacy to mix equal portions of ingredients to a total volume as indicated in the dispense amount. 2/4/19   Lorie Singer MD   LORazepam (ATIVAN) 0.5 mg tablet Take 1 Tab by mouth every six (6) hours as needed for Anxiety. Max Daily Amount: 2 mg. 1/23/19   Lorie Singer MD   b complex vitamins (B COMPLEX 1) tablet Take 1 Tab by mouth daily. Provider, Historical   ascorbic acid (VAMSI-C PO) Take  by mouth. Provider, Historical   bisacodyl (DULCOLAX) 10 mg suppository Insert 10 mg into rectum daily as needed. 12/15/18   Marv Chavez MD   bisacodyl (DULCOLAX) 5 mg EC tablet Take 2 Tabs by mouth daily as needed for Constipation. 12/13/18   Marv Chavez MD   cyclobenzaprine (FLEXERIL) 10 mg tablet Take 0.5 Tabs by mouth three (3) times daily as needed for Muscle Spasm(s). 12/13/18   Marv Chavez MD   docusate sodium (COLACE) 100 mg capsule Take 1 Cap by mouth two (2) times a day for 90 days. 12/13/18 3/13/19  Marv Chavez MD   ondansetron hcl (ZOFRAN) 8 mg tablet Take 1 Tab by mouth every eight (8) hours as needed for Nausea. 12/13/18   Marv Chavez MD   oxyCODONE-acetaminophen (PERCOCET) 5-325 mg per tablet Take 1-2 Tabs by mouth every six (6) hours as needed. Max Daily Amount: 8 Tabs. 12/11/18   Marv Chavez MD   megestrol (MEGACE) 40 mg tablet Take 2 Tabs by mouth two (2) times a day. 12/4/18   Lorie Singer MD   ondansetron hcl [facility]) 4 mg tablet Take 1 Tab by mouth every six (6) hours as needed for Nausea.  12/4/18   Lorie Singer MD   GARLIC PO Take  by mouth daily.    Provider, Historical   RESVERATROL PO Take  by mouth daily. Provider, Historical   calcium carbonate (OS-MAYELA) 500 mg calcium (1,250 mg) tablet Take  by mouth daily. Provider, Historical   DOCOSAHEXANOIC ACID/EPA (FISH OIL PO) Take 1,000 mg by mouth daily. Provider, Historical   LACTOBACILLUS ACIDOPHILUS (PROBIOTIC PO) Take  by mouth daily. Provider, Historical       Allergies   Allergen Reactions    Other Food Nausea and Vomiting     Artificial sweeteners    Neulasta [Pegfilgrastim] Swelling    Aspirin Other (comments)     Gi upset    Milk Itching and Atopic Dermatitis     Eczema (dairy cow)    Tetracycline Unknown (comments)     Patient does not remember    Wheat Atopic Dermatitis     eczema    Other Plant, Animal, Environmental Other (comments)     Pt states she has \"springtime grass allergies. \"     Reports reaction: Sinus infection       Review of Systems  GENERAL: No fevers or chills. HEENT: No change in vision, no earache, tinnitus, sore throat or sinus congestion. NECK: No pain or stiffness. CARDIOVASCULAR: No chest pain or pressure. No palpitations. PULMONARY: + shortness of breath, cough or wheeze. GASTROINTESTINAL: + abdominal pain, nausea, vomiting or diarrhea, melena or       bright red blood per rectum. GENITOURINARY: No urinary frequency, urgency, hesitancy or dysuria. MUSCULOSKELETAL: No joint or muscle pain, + back pain, no recent trauma. DERMATOLOGIC: No rash, no itching, no lesions. ENDOCRINE: No polyuria, polydipsia, no heat or cold intolerance. No recent change in    weight. HEMATOLOGICAL: No anemia or easy bruising or bleeding. NEUROLOGIC: No headache, seizures, numbness, tingling or weakness. PSYCHIATRIC: No depression, anxiety, mood disorder, no loss of interest in normal       activity or change in sleep pattern.         Physical Exam:     Physical Exam:  Visit Vitals  /63 (BP 1 Location: Left arm, BP Patient Position: Prone)   Pulse (!) 116 Temp 98.2 °F (36.8 °C)   Resp 22   Ht 5' 5\" (1.651 m)   SpO2 100%   Breastfeeding? No   BMI 31.45 kg/m²    O2 Flow Rate (L/min): 2 l/min O2 Device: Nasal cannula    Temp (24hrs), Av.2 °F (36.8 °C), Min:98.2 °F (36.8 °C), Max:98.2 °F (36.8 °C)       No intake/output data recorded. No intake/output data recorded. General:  Alert, cooperative, + distress, appears stated age. Head:  Normocephalic, without obvious abnormality, atraumatic. Eyes:  Conjunctivae/corneas clear. PERRL, EOMs intact. Nose: Nares normal. No drainage or sinus tenderness. Throat: Lips, mucosa, and tongue normal. Teeth and gums normal.   Neck: Supple, symmetrical, trachea midline, no adenopathy, thyroid: no enlargement/tenderness/nodules, no carotid bruit and no JVD. Back:   ROM normal. + tenderness. Lungs:   Clear to auscultation bilaterally. Chest wall:  No tenderness or deformity. Heart:  Regular rate and rhythm, S1, S2 normal, no murmur, click, rub or gallop. Abdomen: Soft, non-tender. Bowel sounds normal. No masses,  No organomegaly. Extremities: Extremities normal, atraumatic, no cyanosis or edema. Pulses: 2+ and symmetric all extremities. Skin: Skin color, texture, turgor normal. No rashes or lesions   Neurologic: CNII-XII intact. No focal motor or sensory deficit. Labs Reviewed:    Recent Results (from the past 24 hour(s))   EKG, 12 LEAD, INITIAL    Collection Time: 19  1:14 PM   Result Value Ref Range    Ventricular Rate 152 BPM    Atrial Rate 152 BPM    P-R Interval 118 ms    QRS Duration 70 ms    Q-T Interval 266 ms    QTC Calculation (Bezet) 422 ms    Calculated P Axis 34 degrees    Calculated R Axis -3 degrees    Calculated T Axis 60 degrees    Diagnosis       Sinus tachycardia  Otherwise normal ECG  When compared with ECG of 05-DEC-2018 22:01,  Vent.  rate has increased BY  62 BPM  T wave amplitude has decreased in Lateral leads         Procedures/imaging: see electronic medical records for all procedures/Xrays and details which were not copied into this note but were reviewed prior to creation of Plan        Assessment/Plan     Active Problems:      Retroperitoneal abscess ([address] (3/7/2019)  - IR to drain abscess tomorrow. - ON IV vanco and Zosyn. DVT (deep venous thrombosis) (HCC) (3/7/2019)  - IV heparin.  - IVC filter    Endometrial ca  - as per oncology    Bilateral hydronephrosis  - Bilateral nephrostomy tubes. Code status  - full code    DVT prophylaxis  - on IV heparin.           Susie Oconnell MD  March 7, 2019

## 2019-03-07 NOTE — PROGRESS NOTES
Dr. Kodi Orona notified of patient's heart rate. Sepsis bundle has been initiated. 500 cc bolus ordered. Phlebotomist called to inform of stat lab orders. Will initiate heparin after PTT results. Continue to monitor.

## 2019-03-07 NOTE — PROGRESS NOTES
Pt's sister, who lives in [place] called to speak with Dr Dasia Lowery, she was updated regarding pt's condition, sister would like to be called for any information.     [facility] contact number [phone]

## 2019-03-07 NOTE — PROGRESS NOTES
Bedside and Verbal shift change report given to Ramon Boone RN (oncoming nurse) by Ortiz Casillas RN (offgoing nurse). Report included the following information SBAR, Kardex, Procedure Summary and MAR.

## 2019-03-07 NOTE — PROGRESS NOTES
Dr. Patricio Olea aware of  bpm. Additional 500 cc NS bolus ordered. No bolus of heparin to be given with heparin infusion. Will continue to monitor.

## 2019-03-07 NOTE — PROGRESS NOTES
PT needs 20g PIV in [facility] for STAT CTA Chest ordered @ 1430 today. Please call cT @ [phone] when PIV placed so transport can be arranged.

## 2019-03-07 NOTE — PROGRESS NOTES
Interventional Radiology      IR Consult for retroperitoneal abscess by Dr Renate Stover was received and reviewed with Dr Angela Lucero. 75 yo female with h/o metastatic cervical cancer with bilateral obstructive uropathy s/p bilateral PCN's presents today as an outpatient for bilateral nephrostograms with bilateral ureteral stent placement and PCN replacement. Upon CT review, Dr Angela Lucero identified a retroperitoneal abscess, right iliac DVT, and bilateral PCN's that were dislodged. Pt is s/p bilateral PCN exchange and was admitted to Medicine for further evaluation and treatment. Image guided retroperitoneal drain and possible IVC filter pending positive CT chest and labs  Will review imaging with the IR MD attending in the AM.    NPO at [place]. Heparin hold at 0700. Updated labs pending. Full consult note to follow.       BEVERLY Kong

## 2019-03-07 NOTE — Clinical Note
TRANSFER - IN REPORT:  
 
Verbal report received from: Timo Fischer. Report consisted of patient's Situation, Background, Assessment and  
Recommendations(SBAR). Opportunity for questions and clarification was provided. Assessment completed upon patient's arrival to unit and care assumed. Patient transported with a Cardiac Cath Tech / Patient Care Tech.

## 2019-03-07 NOTE — H&P
OUTPATIENT HISTORY AND PHYSICAL      Today 3/7/2019     Indication/Symptoms:   Jackie Coleman is a 76 y.o. female with h/o metastatic cervical cancer presents for an image guided antegrade bilateral ureteral stent placement and PCN replacement. Current Meds:    Prior to Admission medications    Medication Sig Start Date End Date Taking? Authorizing Provider   simethicone (GAS-X) 80 mg chewable tablet Take 80 mg by mouth every six (6) hours as needed for Flatulence. Provider, Historical   trimethoprim-sulfamethoxazole (BACTRIM DS, SEPTRA DS) 160-800 mg per tablet Take one tablet by mouth twice a day starting 3 days prior to nephrostomy tube change 2/25/19   Perfecto Lainez MD   magic mouthwash solution Magic mouth wash   Maalox  Lidocaine 2% viscous   Diphenhydramine oral solution     Pharmacy to mix equal portions of ingredients to a total volume as indicated in the dispense amount. 2/4/19   Joanne Roger MD   LORazepam (ATIVAN) 0.5 mg tablet Take 1 Tab by mouth every six (6) hours as needed for Anxiety. Max Daily Amount: 2 mg. 1/23/19   Joanne Roger MD   b complex vitamins (B COMPLEX 1) tablet Take 1 Tab by mouth daily. Provider, Historical   ascorbic acid (VAMSI-C PO) Take  by mouth. Provider, Historical   bisacodyl (DULCOLAX) 10 mg suppository Insert 10 mg into rectum daily as needed. 12/15/18   Feroz Perla MD   bisacodyl (DULCOLAX) 5 mg EC tablet Take 2 Tabs by mouth daily as needed for Constipation. 12/13/18   Feroz Perla MD   cyclobenzaprine (FLEXERIL) 10 mg tablet Take 0.5 Tabs by mouth three (3) times daily as needed for Muscle Spasm(s). 12/13/18   Feroz Perla MD   docusate sodium (COLACE) 100 mg capsule Take 1 Cap by mouth two (2) times a day for 90 days. 12/13/18 3/13/19  Feroz Perla MD   ondansetron hcl (ZOFRAN) 8 mg tablet Take 1 Tab by mouth every eight (8) hours as needed for Nausea.  12/13/18   Feroz Perla MD oxyCODONE-acetaminophen (PERCOCET) 5-325 mg per tablet Take 1-2 Tabs by mouth every six (6) hours as needed. Max Daily Amount: 8 Tabs. 12/11/18   Homar Hendrickson MD   megestrol (MEGACE) 40 mg tablet Take 2 Tabs by mouth two (2) times a day. 12/4/18   Norberto Og MD   ondansetron hcl [facility]) 4 mg tablet Take 1 Tab by mouth every six (6) hours as needed for Nausea. 12/4/18   Norberto Og MD   GARLIC PO Take  by mouth daily. Provider, Historical   RESVERATROL PO Take  by mouth daily. Provider, Historical   calcium carbonate (OS-MAYELA) 500 mg calcium (1,250 mg) tablet Take  by mouth daily. Provider, Historical   DOCOSAHEXANOIC ACID/EPA (FISH OIL PO) Take 1,000 mg by mouth daily. Provider, Historical   LACTOBACILLUS ACIDOPHILUS (PROBIOTIC PO) Take  by mouth daily. Provider, Historical       Allergies: Allergies   Allergen Reactions    Other Food Nausea and Vomiting     Artificial sweeteners    Neulasta [Pegfilgrastim] Swelling    Aspirin Other (comments)     Gi upset    Milk Itching and Atopic Dermatitis     Eczema (dairy cow)    Tetracycline Unknown (comments)     Patient does not remember    Wheat Atopic Dermatitis     eczema    Other Plant, Animal, Environmental Other (comments)     Pt states she has \"springtime grass allergies. \"     Reports reaction: Sinus infection       Comorbid Conditions:    Past Medical History:   Diagnosis Date    Acute kidney failure ([facility] [address] 12/05/2019    BMI 34.0-34.9,adult     34.8    Caffeine adverse reaction     elevated BP    Cancer (HCC)     uterine, cervical    Cervical cancer (HCC)     Chronic sinusitis     Dizziness     Endometriosis     Hiatal hernia     High cholesterol     Palpitation     PMB (postmenopausal bleeding)     Rash     eczemz    Urinary incontinence           Past Surgical History:   Procedure Laterality Date    HX ABDOMINAL LAPAROSCOPY      HX BILATERAL SALPINGO-OOPHORECTOMY Bilateral     HX BREAST LUMPECTOMY Right 1973    benign    HX HYSTERECTOMY      radical    HX OTHER SURGICAL  09/01/2017    Exam under anesthesia: Cystoscopy, sigmoidoscopy    IR MEDIPORT         Data:    There were no vitals taken for this visit.:  No results for input(s): PLT, PLTEXT in the last 72 hours. No lab exists for component:  HCT  No results for input(s): INR, APTT in the last 72 hours. No lab exists for component: PT, INREXT    The H & P and/or progress notes and any available imaging were reviewed. The risks, indications and possible alternatives to the procedure, including doing nothing, were discussed and informed consent was obtained. Physical Exam:      Mental status:   Alert and oriented. Examination specific to the procedure proposed to be performed and any co morbid conditions:      Mallampati classification 3 ,  ASA III   Heart:   RRR. Lungs:   CTAB  No wheezes, rales or rhonchi. The patient is an appropriate candidate to undergo the planned procedure and sedation.     BEVERLY Shah

## 2019-03-07 NOTE — CONSULTS
INTERVENTIONAL RADIOLOGY CONSULT                  Jared Llamas is a 76 y.o. female who is being seen at the request of Dr Carlos Roe for retroperitoneal abscess. Indication/CC:  Flank pain    Impression:     Patient Active Problem List    Diagnosis Date Noted    Retroperitoneal abscess ([facility] 03/07/2019    [facility]07/[facility]    Cervical cancer ([facility] [address] 01/22/2019    ARF (acute renal failure) ([facility] [address] 12/05/2018    Severe obesity (BMI 35.0-39.9) 09/27/2018    Endometrial ca ([facility] [address] 09/15/2017     Medical decision making:  Pt with h/o metastatic cervical cancer with bilateral obstructive uropathy s/p bilateral PCN's was admitted with left flank pain and tachycardia and presents febrile with a retroperitoneal abscess requiring drainage. Image guided drainage is indicated to treat the infectious process. Plan:   1. Image guided retroperitoneal abscess drain  2. Possible image guided IVC filter placement pending CT chest  2. Consents for procedure and sedation will be obtained    NPO:  At [place]    Blood Thinners:  Heparin drip to be held at 0700    Coag/Plt count: INR 1.2  PTT 32.3       Cr 1.22  K+ 4.3    History of present illness:  75 yo female with h/o metastatic cervical cancer with bilateral obstructive uropathy s/p bilateral PCN's presents today as an outpatient for bilateral nephrostograms with bilateral ureteral stent placement and PCN replacement. Upon CT review, Dr Patito Emmanuel identified a retroperitoneal abscess, right iliac DVT, and bilateral PCN's that were dislodged. Pt is s/p bilateral PCN exchange and was admitted to Medicine for further evaluation and treatment. Currently, pt is resting comfortably laying in bed but reports left flank pain for the past few days, described as constant, sharp/aching, 8/10 at its worst, with no relief with changing position, exacerbated with catheter flush. Pt also reports no having a good memory recently.   Denies f/c, n/v, CP, SOB. Imaging:  3/5/19    Past Medical History:  Past Medical History:   Diagnosis Date    Acute kidney failure ([facility] [address] 12/05/2019    BMI 34.0-34.9,adult     34.8    Caffeine adverse reaction     elevated BP    Cancer (HCC)     uterine, cervical    Cervical cancer (HCC)     Chronic sinusitis     Dizziness     Endometriosis     Hiatal hernia     High cholesterol     Palpitation     PMB (postmenopausal bleeding)     Rash     eczemz    Urinary incontinence        Medications:   Prior to Admission medications    Medication Sig Start Date End Date Taking? Authorizing Provider   simethicone (GAS-X) 80 mg chewable tablet Take 80 mg by mouth every six (6) hours as needed for Flatulence. Provider, Historical   trimethoprim-sulfamethoxazole (BACTRIM DS, SEPTRA DS) 160-800 mg per tablet Take one tablet by mouth twice a day starting 3 days prior to nephrostomy tube change 2/25/19   Radha Morgan MD   magic mouthwash solution Magic mouth wash   Maalox  Lidocaine 2% viscous   Diphenhydramine oral solution     Pharmacy to mix equal portions of ingredients to a total volume as indicated in the dispense amount. 2/4/19   Kermit Zarcor, MD   LORazepam (ATIVAN) 0.5 mg tablet Take 1 Tab by mouth every six (6) hours as needed for Anxiety. Max Daily Amount: 2 mg. 1/23/19   Kermit Officer, MD   b complex vitamins (B COMPLEX 1) tablet Take 1 Tab by mouth daily. Provider, Historical   ascorbic acid (VAMSI-C PO) Take  by mouth. Provider, Historical   bisacodyl (DULCOLAX) 10 mg suppository Insert 10 mg into rectum daily as needed. 12/15/18   Joleen Weathers MD   bisacodyl (DULCOLAX) 5 mg EC tablet Take 2 Tabs by mouth daily as needed for Constipation. 12/13/18   Joleen Weathers MD   cyclobenzaprine (FLEXERIL) 10 mg tablet Take 0.5 Tabs by mouth three (3) times daily as needed for Muscle Spasm(s).  12/13/18   Joleen Weathers MD   docusate sodium (COLACE) 100 mg capsule Take 1 Cap by mouth two (2) times a day for 90 days. 12/13/18 3/13/19  Debbie Wilde MD   ondansetron hcl (ZOFRAN) 8 mg tablet Take 1 Tab by mouth every eight (8) hours as needed for Nausea. 12/13/18   Debbie Wilde MD   oxyCODONE-acetaminophen (PERCOCET) 5-325 mg per tablet Take 1-2 Tabs by mouth every six (6) hours as needed. Max Daily Amount: 8 Tabs. 12/11/18   Debbie Wilde MD   megestrol (MEGACE) 40 mg tablet Take 2 Tabs by mouth two (2) times a day. 12/4/18   Ahmet Berkowitz MD   ondansetron hcl [facility]) 4 mg tablet Take 1 Tab by mouth every six (6) hours as needed for Nausea. 12/4/18   Ahmet Berkowitz MD   GARLIC PO Take  by mouth daily. Provider, Historical   RESVERATROL PO Take  by mouth daily. Provider, Historical   calcium carbonate (OS-MAYELA) 500 mg calcium (1,250 mg) tablet Take  by mouth daily. Provider, Historical   DOCOSAHEXANOIC ACID/EPA (FISH OIL PO) Take 1,000 mg by mouth daily. Provider, Historical   LACTOBACILLUS ACIDOPHILUS (PROBIOTIC PO) Take  by mouth daily. Provider, Historical       Allergies: Allergies   Allergen Reactions    Other Food Nausea and Vomiting     Artificial sweeteners    Neulasta [Pegfilgrastim] Swelling    Aspirin Other (comments)     Gi upset    Milk Itching and Atopic Dermatitis     Eczema (dairy cow)    Tetracycline Unknown (comments)     Patient does not remember    Wheat Atopic Dermatitis     eczema    Other Plant, Animal, Environmental Other (comments)     Pt states she has \"springtime grass allergies. \"     Reports reaction: Sinus infection       Social History:  Social History     Socioeconomic History    Marital status: SINGLE     Spouse name: Not on file    Number of children: Not on file    Years of education: Not on file    Highest education level: Not on file   Social Needs    Financial resource strain: Not on file    Food insecurity - worry: Not on file    Food insecurity - inability: Not on file   [employer] needs - medical: Not on file    Transportation needs - non-medical: Not on file   Occupational History    Not on file   Tobacco Use    Smoking status: Never Smoker    Smokeless tobacco: Never Used   Substance and Sexual Activity    Alcohol use: No    Drug use: No    Sexual activity: No   Other Topics Concern    Not on file   Social History Narrative    Not on file       Family History:  Family History   Problem Relation Age of Onset    Cancer Mother         left ovary       Review of Systems:    General:  No f/c, n/v  Cardio:  No CP  Pulm:  No SOB  Abd:  No abdominal pain  Back:  + left flank pain    Data:  Basic Metabolic Profile   Lab Results   Component Value Date     (L) 12/14/2018    CO2 27 12/14/2018    BUN 23 (H) 12/14/2018        CBC w/Diff   Lab Results   Component Value Date/Time    WBC 5.5 02/18/2019 09:30 AM    HCT 30.5 (L) 02/18/2019 09:30 AM        Coagulation   Lab Results   Component Value Date    INR 1.1 12/14/2018    APTT 34.4 12/14/2018          Physical Assessment:  Visit Vitals  /59 (BP 1 Location: Left arm, BP Patient Position: At rest)   Pulse (!) 136   Temp 98.2 °F (36.8 °C)   Resp 23   Ht 5' 5\" (1.651 m)   Wt 85.7 kg (188 lb 15 oz)   SpO2 98%   Breastfeeding?  No   BMI 31.44 kg/m²       Const:  NAD, alert and oriented x3  Cardio:  RRR, no murmurs, rubs, or gallops  Pulm:  CTAB, no wheezes, rales, or rhonchi  Abd:  Soft, NT, ND  Back:  Right PCN intact with clear yellow urine output, Left PCN is dislodged with the anchor suture a few inches away from the skin and mild TTP  Skin:  Warm and dry      Nikolas Magallon PA-C  For Makenna Frey MD

## 2019-03-07 NOTE — CONSULTS
Consult Note    Patient: Josh Ferrari               Sex: female          DOA: 3/7/2019       YOB: 1950      Age:  76 y.o. Referring Provider: Aureliano Rain     ASSESSMENT:   1. Sepsis, urinary source vs. periocolonic abscess   2. Maura-colonic abscess  2. Acute extensive iliac DVT  3. Bilateral hydronephrosis s/p bilateral PCN placement 12/6/18, exchanged 3/7/2019 - antegrade study showed flow into bladder   4. Metastatic cervical cancer with obstructive uropathy, on chemotherapy Avastin and Topotecan    Tubes draining well, would keep open to drainage and treat with Abx. No  intervention needed. Abx and management of pericolonic abscess per primary team/CRS. Once acute issues resolved, will have her return to office for bilateral nephrostograms and possible cap both tubes given the excellent tumor response to chemo. If no drainage or sluggish, will convert to stents. PLAN:    1. No  intervention necessary, continue Antibiotics, adjust per cultures   2. Will arrange follow up  3. Rest of cares per primary service     Vishal Silver MD  [phone] Office  [phone]  Pager    CC: Hydronephrosis, UTI     HISTORY OF PRESENT ILLNESS:  Josh Ferrari is a 76 y.o. female who is seen in consultation as referred by Dr. Laz Newsome for bilateral hydronephrosis, possible sepsis of urinary source. Bilateral nephrostomy tubes were exchanged earlier today. She was found on CT to have pericolonic abscess and DVT. Antegrade stents were not performed because of patients acute illness. Tubes were exchanged. She has been having LLQ pain for some time. It does not improve. She is tachycardic but answering questions. Denies flank pain or urinary symptoms. No flowsheet data found.     Past Medical History:   Diagnosis Date    Acute kidney failure ([facility] [address] 12/05/2019    BMI 34.0-34.9,adult     34.8    Caffeine adverse reaction     elevated BP    Cancer [facility])     uterine, cervical    Cervical cancer (HCC)     Chronic sinusitis     Dizziness     Endometriosis     Hiatal hernia     High cholesterol     Palpitation     PMB (postmenopausal bleeding)     Rash     eczemz    Urinary incontinence        Past Surgical History:   Procedure Laterality Date    HX ABDOMINAL LAPAROSCOPY      HX BILATERAL SALPINGO-OOPHORECTOMY Bilateral     HX BREAST LUMPECTOMY Right 1973    benign    HX HYSTERECTOMY      radical    HX OTHER SURGICAL  09/01/2017    Exam under anesthesia: Cystoscopy, sigmoidoscopy    IR CHANGE EXIST CATHETER/TUBE LT  3/7/2019    IR MEDIPORT         Social History     Tobacco Use    Smoking status: Never Smoker    Smokeless tobacco: Never Used   Substance Use Topics    Alcohol use: No    Drug use: No       Allergies   Allergen Reactions    Other Food Nausea and Vomiting     Artificial sweeteners    Neulasta [Pegfilgrastim] Swelling    Aspirin Other (comments)     Gi upset    Milk Itching and Atopic Dermatitis     Eczema (dairy cow)    Tetracycline Unknown (comments)     Patient does not remember    Wheat Atopic Dermatitis     eczema    Other Plant, Animal, Environmental Other (comments)     Pt states she has \"springtime grass allergies. \"     Reports reaction: Sinus infection       Family History   Problem Relation Age of Onset    Cancer Mother         left ovary       Current Facility-Administered Medications   Medication Dose Route Frequency Provider Last Rate Last Dose    morphine injection 4 mg  4 mg IntraVENous Q4H PRN Faustino Frey MD        0.9% sodium chloride infusion  75 mL/hr IntraVENous CONTINUOUS Remedios Espinoza MD        diphenhydrAMINE (BENADRYL) injection 12.5 mg  12.5 mg IntraVENous ONCE Remedios Espinoza MD        vancomycin (VANCOCIN) 1750 mg in  ml infusion  1,750 mg IntraVENous ONCE Remedios Espinoza MD        [START ON 3/8/2019] vancomycin (VANCOCIN) 1250 mg in  ml infusion 1,250 mg IntraVENous Q24H Carl Garcia MD        heparin 25,000 units in D5W 250 ml infusion  18-36 Units/kg/hr IntraVENous TITRATE Ari Espinoza MD        piperacillin-tazobactam (ZOSYN) 3.375 g in 0.9% sodium chloride (MBP/ADV) 100 mL MBP  3.375 g IntraVENous Q6H Ari Espinoza MD         Facility-Administered Medications Ordered in Other Encounters   Medication Dose Route Frequency Provider Last Rate Last Dose    sodium chloride (NS) flush 10-40 mL  10-40 mL IntraVENous PRN Emmanuel Arango MD   20 mL at 03/04/19 1145    heparin (porcine) pf 500 Units  500 Units InterCATHeter PRN Emmanuel Arango MD   500 Units at 03/04/19 1145       Review of Systems  Constitutional: No fever, chills, or weight loss  Respiratory: No dyspnea  Cardiovascular: No chest pain  Gastrointestinal:+ LLQ pain   Genitourinary: Denies frequency, urgency, dysuria, hematuria. Neurological: No focal motor changes. PHYSICAL EXAMINATION:   Visit Vitals  /65 (BP 1 Location: Left arm, BP Patient Position: At rest)   Pulse (!) 132 Comment: Reported to [employer]   Temp 97.3 °F (36.3 °C)   Resp 20   Ht 5' 5\" (1.651 m)   Wt 188 lb 15 oz (85.7 kg)   SpO2 94%   Breastfeeding? No   BMI 31.44 kg/m²     Constitutional: Mild distress     HEENT: Normocephalic, Atraumatic, EOM's intact   CV:  Normal radial pulse. Respiratory: Tachypnic    Abdomen:  + LLQ tenderness     Female:  No CVA tenderness  Skin: No evidence of jaundice. Normal color  Neuro/Psych:  Alert and oriented. Affect appropriate. Lymphatic:   No enlarged inguinal lymph nodes. REVIEW OF LABS AND IMAGING:      Labs: Results:   Chemistry No results for input(s): GLU, NA, K, CL, CO2, BUN, CREA, CA, AGAP, BUCR, TBIL, GPT, AP, TP, ALB, GLOB, AGRAT in the last 72 hours. CBC w/Diff No results for input(s): WBC, RBC, HGB, HCT, PLT, GRANS, LYMPH, EOS, HGBEXT, HCTEXT, PLTEXT in the last 72 hours. Cultures No results for input(s): CULT in the last 72 hours.   All Micro Results     None            Urinalysis Color   Date Value Ref Range Status   02/18/2019 YELLOW   Final     Appearance   Date Value Ref Range Status   02/18/2019 TURBID   Final     Specific gravity   Date Value Ref Range Status   02/18/2019 1.016 1.005 - 1.030   Final     pH (UA)   Date Value Ref Range Status   02/18/2019 7.0 5.0 - 8.0   Final     Protein   Date Value Ref Range Status   02/18/2019 100 (A) NEG mg/dL Final     Ketone   Date Value Ref Range Status   02/18/2019 NEGATIVE  NEG mg/dL Final     Bilirubin   Date Value Ref Range Status   02/18/2019 NEGATIVE  NEG   Final     Blood   Date Value Ref Range Status   02/18/2019 MODERATE (A) NEG   Final     Urobilinogen   Date Value Ref Range Status   02/18/2019 1.0 0.2 - 1.0 EU/dL Final     Nitrites   Date Value Ref Range Status   02/18/2019 NEGATIVE  NEG   Final     Leukocyte Esterase   Date Value Ref Range Status   02/18/2019 LARGE (A) NEG   Final     Potassium   Date Value Ref Range Status   12/14/2018 4.4 3.5 - 5.5 mmol/L Final     Creatinine   Date Value Ref Range Status   12/14/2018 1.32 (H) 0.6 - 1.3 MG/DL Final     BUN   Date Value Ref Range Status   12/14/2018 23 (H) 7.0 - 18 MG/DL Final      PSA No results for input(s): PSA in the last 72 hours. Coagulation Lab Results   Component Value Date/Time    Prothrombin time 13.4 12/14/2018 03:44 AM    Prothrombin time 11.2 09/11/2017 11:30 AM    INR 1.1 12/14/2018 03:44 AM    INR 1.0 09/11/2017 11:30 AM    INR (POC) 0.9 12/06/2018 11:04 AM    aPTT 34.4 12/14/2018 03:44 AM    aPTT 31.8 12/06/2018 03:24 AM           US Results (most recent):  Results from Hospital Encounter encounter on 03/06/18   US HEAD NECK SOFT TISSUE    Narrative Indication: Increased uptake right thyroid on PET scan. On chemotherapy. Impression IMPRESSION: Solid right thyroid nodules, largest 3.2 cm. Subcentimeter left  thyroid nodules. Comment: Sonography of the thyroid was performed. No prior studies for  comparison.     The right lobe measures 4.9 x 2.3 x 2.5 cm. It harbors a 3.2 x 1.8 x 2.3 cm  solid nodule in the midpole and a 1.9 x 1.7 x 2.1 cm lower pole nodule. The left lobe measures 4.6 x 1.0 x 1.3 cm and harbors 2 subcentimeter nodules. The largest measures 8 mm in greatest dimension. The isthmus is normal.            chest    CT Results (most recent):   Results from Hospital Encounter encounter on 03/05/19   CT ABD PELV W WO CONT    Addendum Addendum: The patient presents today for placement of bilateral double-J  stents. I am currently reviewing the CT scan from 3/5/2019. Not mentioned in the  report the left-sided nephrostomy tube has been pulled back considerably into the perinephric fat and renal parenchyma. The tip of the left nephrostomy tube  may still be in the collecting system. I do not see any hydronephrosis. The  right nephrostomy tube is in good position. In addition, there are significant inflammatory changes involving the  descending colon suspicious for diverticular disease. There is evidence of adjacent retroperitoneal and psoas abscess possibly involving retroperitoneal tumor  on the left side. This will probably require percutaneous drainage. In addition, there is a large right iliac DVT involving the right external  iliac vein extending into the right common iliac vein. The patient will probably need to be admitted to the hospital. We are  going to try to reposition the left nephrostomy tube in the left collecting system. I believe she will require drainage of the retroperitoneal abscess and  infected retroperitoneal tumor on the left. She will also require treatment for her  deep venous thrombosis, possibly an IVC filter. I am currently trying to  contact the patient's urologist.      Felix Ford MD 3/7/2019 10:59 AM          Narrative EXAM: CT ABD PELV W WO CONT    INDICATION: Bilateral hydronephrosis    COMPARISON: Comparison made with a CT scan dated December 5, 2018. Edgar Rogers     CONTRAST: 80 mL of Isovue-300. TECHNIQUE:    Multislice helical CT was performed from the diaphragm to the iliac crest prior  to intravenous contrast administration and from the diaphragm to the symphysis  pubis during uneventful rapid bolus intravenous contrast administration. Oral  contrast was not administered. Contiguous 5 mm axial images were reconstructed  and lung and soft tissue windows were generated. Coronal and sagittal  reformations were generated. CT dose reduction was achieved through use of a  standardized protocol tailored for this examination and automatic exposure  control for dose modulation. FINDINGS:  The lung bases visualized on the current exam failed to show the nodular  densities reported on the previous study. The cardiac silhouette is normal in  size and no pericardial effusion is seen. .    The precontrast images demonstrate bilateral percutaneous nephrostomies and  absence of the bilateral hydronephrosis present on the previous study. The  spleen appears enlarged. .    Following contrast administration, there are 2 low-attenuation lesions within  the spleen. The larger lesion measures 10.5 x 9.1 cm. The smaller lesion  measures 3.1 x 3.9 cm. The larger lesion appears slightly smaller when compared  with the previous study while the smaller lesion appears to have enlarged. The  liver, adrenal glands, pancreas and kidneys show uniform attenuation. No high  attenuation filling defects are seen in the gallbladder. Small focal areas of  diminished attenuation within cortex of both kidneys are compatible with small  cysts measuring in the order from 1 to 1.5 cm. The aorta tapers without aneurysm. The masses posterior to the left and right  psoas muscles reported on the previous study are again seen and appears slightly  less prominent than on the previous study. There measurements are not  significantly changed.  Pelvic sidewall adenopathy is again present but appears  slightly decreased on the right from 2.2 to 1.6 cm and on the left from 2 cm to  1.1 cm. The large right paracolic gutter mass measures 4.0 x 2.9 cm which is  decreased from the previous measurement. The aortocaval lymph nodes are in the  order of 1.5 cm. .    The current exam shows normal caliber large and small bowel. The descending  colon and proximal sigmoid colon shows wall thickening with pericolonic  inflammatory change compatible with either acute diverticulitis or colitis. No  free fluid is seen in the peritoneum. No free air is apparent. The appendix is  not visualized. . There is no ascites or free intraperitoneal air. The there is been a prior radical hysterectomy and bilateral oophorectomy. . The  recurrent pelvic mass measures on the current exam 7.0 x 3.8 cm. This is smaller  than the previous measurement of 8.2 x 7 cm. Bones: Bone window images shows degenerative changes in the lower thoracic and  lumbar spine. A focal area of sclerotic activity is noted in the left iliac  bone. When compared with a previous study this nodule appears slightly larger on  the current exam. Metastases cannot be excluded. A radionuclide bone scan may  better define this sclerotic lesion. .      Impression IMPRESSION: Interval reduction in the nodular densities at the lung bases. Bilateral percutaneous nephrostomies, no evidence for hydronephrosis. Essentially stable appearing lesions in the spleen 1 slightly smaller and one  slightly larger since the previous study    Mild reduction in size of the psoas adenopathy right paracolic gutter soft  tissue density and pelvic mass. Bowel wall thickening with pericolonic inflammatory changes in the distal  descending colon compatible with diverticulitis. No free fluid or free air in the peritoneum. Focal sclerotic area in the left iliac bone appears slightly larger than the  previous study and follow-up investigation to exclude metastases is  recommended. .          ABD      MRI Results (most recent): No procedure found.                                         1. Hydronephrosis

## 2019-03-07 NOTE — PROGRESS NOTES
03/07/19 0825   Vital Signs   Temp 98.2 °F (36.8 °C)   Temp Source Oral   Pulse (Heart Rate) (!) 120   Heart Rate Source Monitor   Cardiac Rhythm Sinus Tach   Resp Rate 26   O2 Sat (%) 99 %   Level of Consciousness Alert   /69   MAP (Monitor) 84   BP 1 Method Automatic   BP 1 Location Left arm   BP Patient Position At rest   MEWS Score 4   Pt AXO, 4 brought to [facility] ambulatory via wheel chair, appears weak, vitals stable,. Pt placed in bed 16 and connected to monitor. Baseline VS taken and PIV started x 1. Admission database completed. Pt ready for ordered procedure.

## 2019-03-07 NOTE — Clinical Note
IVC consent obtained. Plan for Filter insertion. IVC site prepped. Right femoral vein accessed. IVC wire inserted. Pre-deployment venacavogram done. Renal veins visualized. IVC filter inserted. Post-deployment venacavogram done. IVC placement verified. IVC sheath removed. A Celect filter placed in Inferior Vena Cava. Access site: hemostasis. IVC tolerated well.   
Wire out, venous pressure held

## 2019-03-07 NOTE — Clinical Note
TRANSFER - OUT REPORT:  
 
Verbal report given to: Pau GRANT. Report consisted of patient's Situation, Background, Assessment and  
Recommendations(SBAR). Opportunity for questions and clarification was provided. Patient transported with a Cardiac Cath Tech / Patient Care Tech. Patient transported to: [address]

## 2019-03-07 NOTE — PROCEDURES
RADIOLOGY POST PROCEDURE NOTE     March 7, 2019       11:58 AM     Preoperative Diagnosis:   Ureteral obstruction. Left nephrostomy pulled out. Postoperative Diagnosis:  Same. :  Kristi Chan    Assistant:  None. Type of Anesthesia: moderate sedation    Procedure/Description:  Replacement of left nephrostomy tube and right nephrostomy exchange    Findings:   As above. No hydronephrosis. Estimated blood Loss:  Minimal    Specimen Removed:   no    Blood transfusions:  None. Implants:  None. Complications: None    Condition: Stable    Discharge Plan:  Admit to hosptal.  Will need drainage of retroperitoneal abscess and treatment of iliac DVT. ?  IVC filter    Pepper Perdomo MD

## 2019-03-07 NOTE — Clinical Note
Contrast Dose Calculator:  
Patient's age: 76.  
Patient's sex: Female. Patient weight (kg) = 83.6. Creatinine level (mg/dL) = 0.65. Creatinine clearance (mL/min): 109. Contrast concentration (mg/mL) = 300. MACD = 300 mL. Max Contrast dose per Creatinine Cl calculator = 245.25 mL.

## 2019-03-08 NOTE — ROUTINE PROCESS
Bedside shift change report given to [facility] [address] (oncoming nurse) by Jinny Salazar (offgoing nurse). Report included the following information SBAR, Kardex and MAR.

## 2019-03-08 NOTE — PROGRESS NOTES
Attempted to complete initial assessment with pt, in AM pt was sound asleep, in afternoon pt was out of room. Case management will continue to follow for discharge needs.        EMORY Merlos  Case Management  [phone]

## 2019-03-08 NOTE — PROGRESS NOTES
Preprocedure Assessment      Today 3/8/2019     Indication/Symptoms:   Mary Grace Wiggins is a 76 y.o. with h/o retroperitoneal abscess presents for an image guided retroperitoneal drain. The H & P and/or progress notes and any available imaging were reviewed. The risks, indications and possible alternatives to the procedure, including doing nothing, were discussed and informed consent was obtained. Physical Exam:         General:  Alert and oriented   Heart:   Sinus tachycardia   Lungs:   CTAB, no wheezes, rhonchi or rales. The patient is an appropriate candidate to undergo the planned procedure and sedation.     BEVERLY Elise

## 2019-03-08 NOTE — PROGRESS NOTES
[facility] Hospitalist Group  Progress Note    Patient: Jayson Dang Age: 76 y.o. : 1950 MR#: [id number] SSN: [id number]  Date/Time: 3/8/2019 1:19 PM    Subjective/24-hour events:     Has continued to run fever this AM.    Assessment:   Acute diverticulitis with pericolic abscess  Suspected UTI  Sepsis, POA   GNR + urine culture  Sinus tachycardia in setting of underlying infection  Iliac vein DVT  Metastatic gynecologic malignancy, endometrioid cervical CA vs endometrial CA  Hx obstructive uropathy with history of bilateral hydronephrosis and PCN placement  HTN  Obesity     Plan:  Abscess drainage today per IR. ID evaluation for recommendations on antibiotic therapy. Limited culture data available at this point, but patient at high risk for clinical deterioration even with appropriate therapy. Will continue current therapy as ordered on admission and await any additional recs on empiric therapy until further culture  information available. D/W Dr. Rush Gilbert via phone, assistance in advance. Urology evaluation noted, recs appreciated. Resume heparin for DVT following completion of IR procedure if no issues from radiology perspective. Will like ask vascular surgery to see. D/C megace in setting of DVT. Trend labs. PT/OT. Follow. Case discussed with:  [x]Patient  [x]Family  []Nursing  []Case Management  DVT Prophylaxis:  []Lovenox  []Hep SQ  []SCDs  []Coumadin   []On Heparin gtt    Objective:   VS:   Visit Vitals  /78 (BP 1 Location: Right arm, BP Patient Position: At rest)   Pulse (!) 134   Temp (!) 102.8 °F (39.3 °C) Comment: Nurse Tra Hernandez notified   Resp 24   Ht 5' 5\" (1.651 m)   Wt 81.8 kg (180 lb 6.4 oz)   SpO2 94%   Breastfeeding?  No   BMI 30.02 kg/m²      Tmax/24hrs: Temp (24hrs), Av.3 °F (37.4 °C), Min:97.3 °F (36.3 °C), Max:102.8 °F (39.3 °C)      Intake/Output Summary (Last 24 hours) at 3/8/2019 1319  Last data filed at 3/8/2019 1226  Gross per 24 hour   Intake 810.83 ml   Output 100 ml   Net 710.83 ml       General:  In NAD. Ill, but nontoxic-appearing. Cardiovascular:  Regular, tachycardic. Pulmonary:  Clear no wheezes. Effort nonlabored. GI:  Abdomen soft, NTTP. Extremities:  Warm, no ischemia. Neuro:  Awake and alert.     Labs:    Recent Results (from the past 24 hour(s))   URINALYSIS W/ RFLX MICROSCOPIC    Collection Time: 03/07/19  5:04 PM   Result Value Ref Range    Color DARK YELLOW      Appearance CLOUDY      Specific gravity >1.030 (H) 1.005 - 1.030    pH (UA) 6.0 5.0 - 8.0      Protein 100 (A) NEG mg/dL    Glucose NEGATIVE  NEG mg/dL    Ketone NEGATIVE  NEG mg/dL    Bilirubin NEGATIVE  NEG      Blood LARGE (A) NEG      Urobilinogen 1.0 0.2 - 1.0 EU/dL    Nitrites NEGATIVE  NEG      Leukocyte Esterase SMALL (A) NEG     CULTURE, URINE    Collection Time: 03/07/19  5:04 PM   Result Value Ref Range    Special Requests: NO SPECIAL REQUESTS      Culture result: [id number] COLONIES/mL GRAM NEGATIVE RODS (A)      Culture result: CULTURE IN PROGRESS,FURTHER UPDATES TO FOLLOW     URINE MICROSCOPIC ONLY    Collection Time: 03/07/19  5:04 PM   Result Value Ref Range    WBC 2 to 4 0 - 4 /hpf    RBC TOO NUMEROUS TO COUNT 0 - 5 /hpf    Epithelial cells FEW 0 - 5 /lpf    Bacteria 2+ (A) NEG /hpf    Amorphous Crystals 2+ (A) NEG   CULTURE, URINE    Collection Time: 03/07/19  5:04 PM   Result Value Ref Range    Special Requests: LEFT  NEPHROSTOMY        Culture result: CULTURE IN PROGRESS,FURTHER UPDATES TO FOLLOW     METABOLIC PANEL, COMPREHENSIVE    Collection Time: 03/07/19  5:24 PM   Result Value Ref Range    Sodium 138 136 - 145 mmol/L    Potassium 4.7 3.5 - 5.5 mmol/L    Chloride 105 100 - 108 mmol/L    CO2 24 21 - 32 mmol/L    Anion gap 9 3.0 - 18 mmol/L    Glucose 82 74 - 99 mg/dL    BUN 20 (H) 7.0 - 18 MG/DL    Creatinine 1.31 (H) 0.6 - 1.3 MG/DL    BUN/Creatinine ratio 15 12 - 20      GFR est AA 49 (L) >60 ml/min/1.73m2    GFR est non-AA 40 (L) >60 ml/min/1.73m2    Calcium 7.7 (L) 8.5 - 10.1 MG/DL    Bilirubin, total 0.3 0.2 - 1.0 MG/DL    ALT (SGPT) 42 13 - 56 U/L    AST (SGOT) 19 15 - 37 U/L    Alk. phosphatase 287 (H) 45 - 117 U/L    Protein, total 5.4 (L) 6.4 - 8.2 g/dL    Albumin 1.9 (L) 3.4 - 5.0 g/dL    Globulin 3.5 2.0 - 4.0 g/dL    A-G Ratio 0.5 (L) 0.8 - 1.7     CKMB PROFILE    Collection Time: 03/07/19  5:24 PM   Result Value Ref Range    CK 30 26 - 192 U/L    CK - MB <1.0 <3.6 ng/ml    CK-MB Index  0.0 - 4.0 %     CALCULATION NOT PERFORMED WHEN RESULT IS BELOW LINEAR LIMIT   C REACTIVE PROTEIN, QT    Collection Time: 03/07/19  5:24 PM   Result Value Ref Range    C-Reactive protein 20.1 (H) 0 - 0.3 mg/dL   FERRITIN    Collection Time: 03/07/19  5:24 PM   Result Value Ref Range    Ferritin 603 (H) 8 - 388 NG/ML   IRON    Collection Time: 03/07/19  5:24 PM   Result Value Ref Range    Iron 12 (L) 50 - 175 ug/dL   MAGNESIUM    Collection Time: 03/07/19  5:24 PM   Result Value Ref Range    Magnesium 1.8 1.6 - 2.6 mg/dL   PHOSPHORUS    Collection Time: 03/07/19  5:24 PM   Result Value Ref Range    Phosphorus 2.0 (L) 2.5 - 4.9 MG/DL   TROPONIN I    Collection Time: 03/07/19  5:24 PM   Result Value Ref Range    Troponin-I, QT <0.02 0.0 - 0.045 NG/ML   CBC WITH AUTOMATED DIFF    Collection Time: 03/07/19  5:24 PM   Result Value Ref Range    WBC 8.9 4.6 - 13.2 K/uL    RBC 2.61 (L) 4.20 - 5.30 M/uL    HGB 7.6 (L) 12.0 - 16.0 g/dL    HCT 23.3 (L) 35.0 - 45.0 %    MCV 89.3 74.0 - 97.0 FL    MCH 29.1 24.0 - 34.0 PG    MCHC 32.6 31.0 - 37.0 g/dL    RDW 16.2 (H) 11.6 - 14.5 %    PLATELET 279 853 - 869 K/uL    MPV 9.0 (L) 9.2 - 11.8 FL    NEUTROPHILS 60 42 - 75 %    BAND NEUTROPHILS 14 (H) 0 - 5 %    LYMPHOCYTES 13 (L) 20 - 51 %    MONOCYTES 10 (H) 2 - 9 %    EOSINOPHILS 0 0 - 5 %    BASOPHILS 0 0 - 3 %    OTHER CELL 3 (H) 0      NRBC 2.0 (H) 0  WBC    ABS. NEUTROPHILS 6.6 1.8 - 8.0 K/UL    ABS. LYMPHOCYTES 1.2 0.8 - 3.5 K/UL    ABS.  MONOCYTES 0.9 0 - 1.0 K/UL    ABS. EOSINOPHILS 0.0 0.0 - 0.4 K/UL    ABS. BASOPHILS 0.0 0.0 - 0.06 K/UL    DF MANUAL      PLATELET COMMENTS Increased Platelets      RBC COMMENTS ANISOCYTOSIS  2+        RBC COMMENTS POLYCHROMASIA  FEW  MICROCYTOSIS  1+        RBC COMMENTS STOMATOCYTES  1+       PTT    Collection Time: 03/07/19  5:24 PM   Result Value Ref Range    aPTT 32.3 23.0 - 36.4 SEC   PROTHROMBIN TIME + INR    Collection Time: 03/07/19  5:24 PM   Result Value Ref Range    Prothrombin time 15.2 11.5 - 15.2 sec    INR 1.2 0.8 - 1.2     LACTIC ACID    Collection Time: 03/07/19  5:24 PM   Result Value Ref Range    Lactic acid 1.1 0.4 - 2.0 MMOL/L   CULTURE, BLOOD    Collection Time: 03/07/19  5:24 PM   Result Value Ref Range    Special Requests: NO SPECIAL REQUESTS      Culture result: NO GROWTH AFTER 12 HOURS     CULTURE, BLOOD    Collection Time: 03/07/19  5:35 PM   Result Value Ref Range    Special Requests: NO SPECIAL REQUESTS      Culture result: NO GROWTH AFTER 12 HOURS     CBC WITH AUTOMATED DIFF    Collection Time: 03/08/19 12:26 AM   Result Value Ref Range    WBC 10.5 4.6 - 13.2 K/uL    RBC 2.53 (L) 4.20 - 5.30 M/uL    HGB 7.3 (L) 12.0 - 16.0 g/dL    HCT 23.1 (L) 35.0 - 45.0 %    MCV 91.3 74.0 - 97.0 FL    MCH 28.9 24.0 - 34.0 PG    MCHC 31.6 31.0 - 37.0 g/dL    RDW 16.3 (H) 11.6 - 14.5 %    PLATELET 432 964 - 568 K/uL    MPV 8.9 (L) 9.2 - 11.8 FL    NEUTROPHILS 46 42 - 75 %    BAND NEUTROPHILS 16 (H) 0 - 5 %    LYMPHOCYTES 20 20 - 51 %    MONOCYTES 13 (H) 2 - 9 %    EOSINOPHILS 3 0 - 5 %    BASOPHILS 0 0 - 3 %    METAMYELOCYTES 1 (H) 0 %    MYELOCYTES 1 (H) 0 %    NRBC 2.0 (H) 0  WBC    ABS. NEUTROPHILS 6.5 1.8 - 8.0 K/UL    ABS. LYMPHOCYTES 2.1 0.8 - 3.5 K/UL    ABS. MONOCYTES 1.4 (H) 0 - 1.0 K/UL    ABS. EOSINOPHILS 0.3 0.0 - 0.4 K/UL    ABS.  BASOPHILS 0.0 0.0 - 0.06 K/UL    DF MANUAL      PLATELET COMMENTS ADEQUATE PLATELETS      RBC COMMENTS ANISOCYTOSIS  1+        RBC COMMENTS POLYCHROMASIA  1+        RBC COMMENTS STOMATOCYTES  1+       PTT    Collection Time: 03/08/19 12:26 AM   Result Value Ref Range    aPTT 54.2 (H) 23.0 - 47.6 SEC   METABOLIC PANEL, BASIC    Collection Time: 03/08/19 12:26 AM   Result Value Ref Range    Sodium 137 136 - 145 mmol/L    Potassium 4.3 3.5 - 5.5 mmol/L    Chloride 105 100 - 108 mmol/L    CO2 24 21 - 32 mmol/L    Anion gap 8 3.0 - 18 mmol/L    Glucose 96 74 - 99 mg/dL    BUN 19 (H) 7.0 - 18 MG/DL    Creatinine 1.22 0.6 - 1.3 MG/DL    BUN/Creatinine ratio 16 12 - 20      GFR est AA 53 (L) >60 ml/min/1.73m2    GFR est non-AA 44 (L) >60 ml/min/1.73m2    Calcium 7.8 (L) 8.5 - 10.1 MG/DL   PTT    Collection Time: 03/08/19  9:30 AM   Result Value Ref Range    aPTT 44.5 (H) 23.0 - 36.4 SEC       Signed By: Jere Story MD     March 8, 2019 1:19 PM

## 2019-03-08 NOTE — PROGRESS NOTES
Dr. Maris Rivero aware of vital signs. No orders received. Continue to monitor.       03/08/19 1320   Vitals   Temp (!) 101.6 °F (38.7 °C)   Temp Source Oral   Pulse (Heart Rate) (!) 129   Heart Rate Source Monitor   Resp Rate 26   O2 Sat (%) 91 %   Level of Consciousness Alert   /74   MAP (Calculated) 93   BP 1 Location Right arm   BP 1 Method Automatic   BP Patient Position At rest   MEWS Score 6

## 2019-03-08 NOTE — PROGRESS NOTES
CXR ordered per CT to verify type of medpiort. CT notified that CXR completed. Per Dr. Lubna Del Cid, continue to hold dvt prophylaxis until after drain placement. Will continue to monitor.

## 2019-03-08 NOTE — PROGRESS NOTES
Dr. Potter Self aware of below vitals and MEWS of 7. Tylenol and 250 cc bolus of saline ordered. Continue to monitor.       03/08/19 1059   Vitals   Temp (!) 102.8 °F (39.3 °C)  (Nurse Aries Enriquez notified)   Temp Source Oral   Pulse (Heart Rate) (!) 134   Heart Rate Source Monitor   Resp Rate 24   O2 Sat (%) 94 %   Level of Consciousness Alert   /78   MAP (Calculated) 97   BP 1 Location Right arm   BP Patient Position At rest   MEWS Score 7

## 2019-03-08 NOTE — ROUTINE PROCESS
Bedside verbal report given to [facility], reviewed SBAR, VS, MAR and summary of care. Patient refuse repositioning, requesting pain medication every 4 hours, with periods of drowsiness. Will cont to monitor patient.

## 2019-03-08 NOTE — PROGRESS NOTES
ASSESSMENT:   1. Sepsis, urinary source vs. periocolonic abscess   2. Maura-colonic abscess  2. Acute extensive iliac DVT  3. Bilateral hydronephrosis s/p bilateral PCN placement 12/6/18, exchanged 3/7/2019 - antegrade study showed flow into bladder   4. Metastatic cervical cancer with obstructive uropathy, on chemotherapy Avastin and Topotecan        Patient was not room during rounds. Neph tubes replaced yesterday. Going for abscess drainage currently. Message arranged for return with me in 2-4 weeks with nephrostogram in office to decide about tube removal vs. Plan for stenting.        PLAN:    1. No  intervention necessary, continue Antibiotics, adjust per cultures   2. Will arrange follow up  3. Rest of cares per primary service      Booker Stephenson MD  [phone] Office  [phone]  Pager      Signing off.

## 2019-03-08 NOTE — PROGRESS NOTES
Patient's power port was accessed in CT with power salgado needle by [facility] CT tech (trained to access ports). Patients nurse is aware. 3/8/19 9217.

## 2019-03-09 NOTE — ROUTINE PROCESS
Bedside shift change report given to Netta Piedra (oncoming nurse) by Too Vazquez (offgoing nurse). Report included the following information SBAR, Kardex and MAR.

## 2019-03-09 NOTE — PROCEDURES
RADIOLOGY POST PROCEDURE NOTE     March 8, 2019       8:02 PM     Preoperative Diagnosis:   Left lower quadrant fluid collection. Postoperative Diagnosis:  Same. :  Dr. Mervyn Castleman    Assistant:  None. Type of Anesthesia: 1% plain lidocaine and IV moderate sedation with Versed and Fentanyl. Procedure/Description:  Image guided left lower quadrant fluid collection drainage and drain placement. Findings:   ~100 cc of purulent fluid was aspirated and sent for C/S. Estimated blood Loss:  Minimal    Specimen Removed:   yes    Blood transfusions:  None. Implants:  12F Resolve APD drain to suction.     Complications: None    Condition: Stable    Discharge Plan:  continue present therapy    Judith Wells MD

## 2019-03-09 NOTE — ROUTINE PROCESS
Bedside shift change report given to Emanuel Llanos (oncoming nurse) by Kennedy Crow   (offgoing nurse). Report included the following information SBAR, Kardex and MAR.

## 2019-03-09 NOTE — PROGRESS NOTES
[facility] Hospitalist Group  Progress Note    Patient: Vidal Whitley Age: 76 y.o. : 1950 MR#: [id number] SSN: [id number]  Date/Time: 3/9/2019 11:48 AM    Subjective/24-hour events:     Abscess drained yesterday, feeling better overall this AM.  HRs have improved, no fever since yesterday afternoon. Assessment:   Acute diverticulitis with pericolic abscess  Suspected UTI  Sepsis, POA   GNR + urine culture  Sinus tachycardia in setting of underlying infection  Iliac vein DVT  Metastatic gynecologic malignancy, endometrioid cervical CA vs endometrial CA  Hx obstructive uropathy with history of bilateral hydronephrosis and PCN placement  HTN  Obesity     Plan:  Continue zosyn , vancomycin discontinued. Await culture data. ID to see formally on Monday. Hold heparin drip for now given worsening anemia and sanguinous fluid in drain. Transfuse 1 unit PRBCs and f/u H/H. Megace discontinued yesterday. Continue to hold in setting of DVT. PT/OT evals. Follow. Case discussed with:  [x]Patient  []Family  [x]Nursing  []Case Management  DVT Prophylaxis:  []Lovenox  []Hep SQ  []SCDs  []Coumadin   []On Heparin gtt    Objective:   VS:   Visit Vitals  /77 (BP 1 Location: Left arm, BP Patient Position: At rest)   Pulse 97   Temp 97.7 °F (36.5 °C)   Resp 16   Ht 5' 5\" (1.651 m)   Wt 83.1 kg (183 lb 4.8 oz)   SpO2 96%   Breastfeeding? No   BMI 30.50 kg/m²      Tmax/24hrs: Temp (24hrs), Av.5 °F (36.9 °C), Min:97.7 °F (36.5 °C), Max:101.6 °F (38.7 °C)      Intake/Output Summary (Last 24 hours) at 3/9/2019 1148  Last data filed at 3/9/2019 0952  Gross per 24 hour   Intake 240 ml   Output 1740 ml   Net -1500 ml       General:  In NAD. Ill, but nontoxic-appearing. Cardiovascular:  Regular, tachycardic. Pulmonary:  Clear no wheezes. Effort nonlabored. GI:  Abdomen soft, NTTP. Extremities:  Warm, no ischemia. Neuro:  Awake and alert.     Labs:    Recent Results (from the past 24 hour(s))   CULTURE, ANAEROBIC    Collection Time: 03/08/19  5:05 PM   Result Value Ref Range    Special Requests: NO SPECIAL REQUESTS      Culture result: CULTURE IN PROGRESS,FURTHER UPDATES TO FOLLOW     CULTURE, BODY FLUID W GRAM STAIN    Collection Time: 03/08/19  5:05 PM   Result Value Ref Range    Special Requests: NO SPECIAL REQUESTS      GRAM STAIN MANY WBC'S      GRAM STAIN RARE GRAM POSITIVE RODS      GRAM STAIN RARE GRAM NEGATIVE RODS      Culture result: FEW GRAM NEGATIVE RODS (A)     CBC WITH AUTOMATED DIFF    Collection Time: 03/08/19  7:54 PM   Result Value Ref Range    WBC 11.6 4.6 - 13.2 K/uL    RBC 2.42 (L) 4.20 - 5.30 M/uL    HGB 6.9 (L) 12.0 - 16.0 g/dL    HCT 22.0 (L) 35.0 - 45.0 %    MCV 90.9 74.0 - 97.0 FL    MCH 28.5 24.0 - 34.0 PG    MCHC 31.4 31.0 - 37.0 g/dL    RDW 16.4 (H) 11.6 - 14.5 %    PLATELET 849 086 - 471 K/uL    MPV 8.4 (L) 9.2 - 11.8 FL    NEUTROPHILS 69 42 - 75 %    BAND NEUTROPHILS 1 0 - 5 %    LYMPHOCYTES 18 (L) 20 - 51 %    MONOCYTES 5 2 - 9 %    EOSINOPHILS 3 0 - 5 %    BASOPHILS 0 0 - 3 %    METAMYELOCYTES 2 (H) 0 %    MYELOCYTES 2 (H) 0 %    NRBC 1.0 (H) 0  WBC    ABS. NEUTROPHILS 8.1 (H) 1.8 - 8.0 K/UL    ABS. LYMPHOCYTES 2.1 0.8 - 3.5 K/UL    ABS. MONOCYTES 0.6 0 - 1.0 K/UL    ABS. EOSINOPHILS 0.3 0.0 - 0.4 K/UL    ABS.  BASOPHILS 0.0 0.0 - 0.06 K/UL    DF MANUAL      PLATELET COMMENTS ADEQUATE PLATELETS      RBC COMMENTS ANISOCYTOSIS  1+        RBC COMMENTS POLYCHROMASIA  1+       PTT    Collection Time: 03/08/19 10:37 PM   Result Value Ref Range    aPTT 32.1 23.0 - 36.4 SEC   CBC WITH AUTOMATED DIFF    Collection Time: 03/09/19  4:00 AM   Result Value Ref Range    WBC 14.5 (H) 4.6 - 13.2 K/uL    RBC 2.13 (L) 4.20 - 5.30 M/uL    HGB 6.2 (L) 12.0 - 16.0 g/dL    HCT 19.5 (L) 35.0 - 45.0 %    MCV 91.5 74.0 - 97.0 FL    MCH 29.1 24.0 - 34.0 PG    MCHC 31.8 31.0 - 37.0 g/dL    RDW 16.5 (H) 11.6 - 14.5 %    PLATELET 379 818 - 868 K/uL    MPV 8.7 (L) 9.2 - 11.8 FL NEUTROPHILS 60 42 - 75 %    BAND NEUTROPHILS 2 0 - 5 %    LYMPHOCYTES 18 (L) 20 - 51 %    MONOCYTES 17 (H) 2 - 9 %    EOSINOPHILS 0 0 - 5 %    BASOPHILS 0 0 - 3 %    MYELOCYTES 3 (H) 0 %    ABS. NEUTROPHILS 9.0 (H) 1.8 - 8.0 K/UL    ABS. LYMPHOCYTES 2.6 0.8 - 3.5 K/UL    ABS. MONOCYTES 2.5 (H) 0 - 1.0 K/UL    ABS. EOSINOPHILS 0.0 0.0 - 0.4 K/UL    ABS.  BASOPHILS 0.0 0.0 - 0.06 K/UL    DF MANUAL      PLATELET COMMENTS Increased Platelets      RBC COMMENTS ANISOCYTOSIS  1+        RBC COMMENTS POLYCHROMASIA  1+        RBC COMMENTS STOMATOCYTES  FEW       METABOLIC PANEL, BASIC    Collection Time: 03/09/19  4:00 AM   Result Value Ref Range    Sodium 136 136 - 145 mmol/L    Potassium 3.6 3.5 - 5.5 mmol/L    Chloride 105 100 - 108 mmol/L    CO2 24 21 - 32 mmol/L    Anion gap 7 3.0 - 18 mmol/L    Glucose 110 (H) 74 - 99 mg/dL    BUN 13 7.0 - 18 MG/DL    Creatinine 0.87 0.6 - 1.3 MG/DL    BUN/Creatinine ratio 15 12 - 20      GFR est AA >60 >60 ml/min/1.73m2    GFR est non-AA >60 >60 ml/min/1.73m2    Calcium 7.3 (L) 8.5 - 10.1 MG/DL   PTT    Collection Time: 03/09/19  6:00 AM   Result Value Ref Range    aPTT 115.2 (H) 23.0 - 36.4 SEC       Signed By: Evgeny Lara MD     March 9, 2019 11:48 AM

## 2019-03-10 NOTE — PROGRESS NOTES
[facility] Hospitalist Group  Progress Note    Patient: Josh Ferrari Age: 76 y.o. : 1950 MR#: [id number] SSN: [id number]  Date/Time: 3/10/2019 11:48 AM    Subjective/24-hour events:     Abscess drained yesterday, feeling better overall this AM.  HRs have improved, no fever since yesterday afternoon. Assessment:   Acute diverticulitis with pericolic abscess  Suspected UTI  Sepsis, POA   GNR + urine culture  Sinus tachycardia in setting of underlying infection  Iliac vein DVT  Metastatic gynecologic malignancy, endometrioid cervical CA vs endometrial CA  Hx obstructive uropathy with history of bilateral hydronephrosis and PCN placement  HTN  Obesity     Plan:  Continue zosyn as ordered. Formal ID f/u in AM.  H/H up s/p transfusion yesterday - F/U in AM.  Continue to hold heparin for now. Megace discontinued yesterday. Continue to hold in setting of DVT. PT/OT evals, await recs. Supportive care o/w. Disposition TBD. Case discussed with:  [x]Patient  []Family  []Nursing  []Case Management  DVT Prophylaxis:  []Lovenox  []Hep SQ  []SCDs  []Coumadin   []On Heparin gtt    Objective:   VS:   Visit Vitals  /87 (BP 1 Location: Left arm, BP Patient Position: Supine)   Pulse 96   Temp 97.3 °F (36.3 °C)   Resp 16   Ht 5' 5\" (1.651 m)   Wt 85 kg (187 lb 8 oz)   SpO2 99%   Breastfeeding? No   BMI 31.20 kg/m²      Tmax/24hrs: Temp (24hrs), Av.5 °F (36.4 °C), Min:96.6 °F (35.9 °C), Max:98.3 °F (36.8 °C)      Intake/Output Summary (Last 24 hours) at 3/10/2019 1334  Last data filed at 3/10/2019 0514  Gross per 24 hour   Intake 1512.54 ml   Output 900 ml   Net 612.54 ml       General:  In NAD. Ill, but nontoxic-appearing. Cardiovascular:  Regular, tachycardic. Pulmonary:  Clear no wheezes. Effort nonlabored. GI:  Abdomen soft, NTTP. Extremities:  Warm, no ischemia. Neuro:  Awake and alert.     Labs:    Recent Results (from the past 24 hour(s))   PTT    Collection Time: 03/09/19  2:05 PM   Result Value Ref Range    aPTT 36.0 23.0 - 36.4 SEC   HGB & HCT    Collection Time: 03/09/19  2:05 PM   Result Value Ref Range    HGB 6.8 (L) 12.0 - 16.0 g/dL    HCT 21.2 (L) 35.0 - 45.0 %   TYPE + CROSSMATCH    Collection Time: 03/09/19  3:05 PM   Result Value Ref Range    Crossmatch Expiration 03/12/2019     ABO/Rh(D) O POSITIVE     Antibody screen NEG     Unit number [id number]     Blood component type RC LR     Unit division 00     Status of unit TRANSFUSED     Crossmatch result Compatible    METABOLIC PANEL, BASIC    Collection Time: 03/10/19  5:30 AM   Result Value Ref Range    Sodium 136 136 - 145 mmol/L    Potassium 3.7 3.5 - 5.5 mmol/L    Chloride 106 100 - 108 mmol/L    CO2 23 21 - 32 mmol/L    Anion gap 7 3.0 - 18 mmol/L    Glucose 127 (H) 74 - 99 mg/dL    BUN 10 7.0 - 18 MG/DL    Creatinine 0.62 0.6 - 1.3 MG/DL    BUN/Creatinine ratio 16 12 - 20      GFR est AA >60 >60 ml/min/1.73m2    GFR est non-AA >60 >60 ml/min/1.73m2    Calcium 7.4 (L) 8.5 - 10.1 MG/DL   CBC WITH AUTOMATED DIFF    Collection Time: 03/10/19  5:30 AM   Result Value Ref Range    WBC 13.7 (H) 4.6 - 13.2 K/uL    RBC 2.81 (L) 4.20 - 5.30 M/uL    HGB 8.2 (L) 12.0 - 16.0 g/dL    HCT 25.0 (L) 35.0 - 45.0 %    MCV 89.0 74.0 - 97.0 FL    MCH 29.2 24.0 - 34.0 PG    MCHC 32.8 31.0 - 37.0 g/dL    RDW 15.8 (H) 11.6 - 14.5 %    PLATELET 033 326 - 649 K/uL    MPV 8.4 (L) 9.2 - 11.8 FL    NEUTROPHILS 57 42 - 75 %    BAND NEUTROPHILS 5 0 - 5 %    LYMPHOCYTES 12 (L) 20 - 51 %    MONOCYTES 13 (H) 2 - 9 %    EOSINOPHILS 2 0 - 5 %    BASOPHILS 0 0 - 3 %    METAMYELOCYTES 5 (H) 0 %    MYELOCYTES 6 (H) 0 %    ABS. NEUTROPHILS 8.5 (H) 1.8 - 8.0 K/UL    ABS. LYMPHOCYTES 1.6 0.8 - 3.5 K/UL    ABS. MONOCYTES 1.8 (H) 0 - 1.0 K/UL    ABS. EOSINOPHILS 0.3 0.0 - 0.4 K/UL    ABS.  BASOPHILS 0.0 0.0 - 0.06 K/UL    DF MANUAL      RBC COMMENTS ANISOCYTOSIS  1+        RBC COMMENTS POLYCHROMASIA  1+        RBC COMMENTS OVALOCYTES  1+ Signed By: Baron Stephane MD     March 10, 2019 11:48 AM

## 2019-03-10 NOTE — ROUTINE PROCESS
Bedside shift change report given to Ingrid Moreno (oncoming nurse) by Jero Arnold   (offgoing nurse). Report included the following information SBAR, Kardex and MAR.

## 2019-03-10 NOTE — ROUTINE PROCESS
Bedside shift change report given to Mary White (oncoming nurse) by Cassandra Wallace RN (offgoing nurse). Report included the following information SBAR, Kardex, Intake/Output, Recent Results and Cardiac Rhythm NSR.

## 2019-03-10 NOTE — ROUTINE PROCESS
Bedside shift change report given to Sushila Mojica (oncoming nurse) by Jennifer Lorenz RN (offgoing nurse). Report included the following information SBAR, Kardex, Intake/Output, Recent Results and Cardiac Rhythm NSR.

## 2019-03-11 NOTE — PROGRESS NOTES
Patient reports that she did not vomit augmentin, however feels \"sick\" all over her body - bloated, achy, hot. Per Lennox Lieberman, care manager, patient can get affordable infusions of IV abx outpatient, which is what patient wants. Dr. Lane Ma aware. Levoquin order changed from po to iv. States will look into converting augmentin to an iv abx. Continue to monitor.

## 2019-03-11 NOTE — PROGRESS NOTES
Pt will be discharged on IV ABX,  Order noted. Referral sent to [facility] (due to pt's [place] insruance) will run a cost analysis and get back to CM. If too expensive, Dr. Kirill Cross has also written an order for the [facility]. Likely d/c tomorrow. Cost will be 1,200 for home IV ABX, [facility], [facility] sent, appt scheduled to begin at [address] [place] [address] on Wednesday 3/13. Discussed with pt who is agreeable to going to infusion center at [facility], okay with 20% copayments.      Olu Goodwin MSW  Case Management  [phone]

## 2019-03-11 NOTE — ROUTINE PROCESS
Bedside shift change report given to Deonna (oncoming nurse) by Judith Caba   (offgoing nurse). Report included the following information SBAR, Kardex and MAR.

## 2019-03-11 NOTE — PROGRESS NOTES
Reason for Admission:  Hydronephrosis [N13.30]  DVT (deep venous thrombosis) (HCC) [I82.409]  Retroperitoneal abscess (HCC) [K68.19]  Retroperitoneal abscess ([address] [K68.19]                 RRAT Score:    9            Plan for utilizing home health:    Awaiting PT/OT evaluations to determine d/c plan. Likelihood of Readmission:   LOW                         Transition of Care Plan:              Initial assessment completed with patient. Cognitive status of patient: oriented to time, place, person and situation. Face sheet information confirmed:  yes. This patient lives in a apartment with patient. Patient states she was independent prior to hospitalization, however pt has not been out of bed since Thursday and is very weak. PT/OT to see pt today. Patient has a current ACP document on file: no  Family will be available to transport patient home upon discharge. The patient  Has NO medical equipment available in the home. Patient is not currently active with home health. Patient has not stayed in a skilled nursing facility or rehab. This patient is on dialysis :no     Currently, the discharge plan is unable to determine at this time, patient awaiting PT/OT evals. The patient states that she can obtain her medications from the pharmacy, and take her medications as directed. Patient's current insurance is [employer] and Medicare part A       Care Management Interventions  PCP Verified by CM:  Yes  Palliative Care Criteria Met (RRAT>21 & CHF Dx)?: No  Mode of Transport at Discharge: Self  Transition of Care Consult (CM Consult): Discharge Planning  MyChart Signup: No  Discharge Durable Medical Equipment: No  Physical Therapy Consult: Yes  Occupational Therapy Consult: Yes  Speech Therapy Consult: No  Current Support Network: Own Home  Confirm Follow Up Transport: Family  Plan discussed with Pt/Family/Caregiver: Yes   Resource Information Provided?: No  Discharge Location  Discharge Placement: Unable to determine at this time        EMORY Cast  Case Management  [phone]

## 2019-03-11 NOTE — ROUTINE PROCESS
Bedside shift change report given to Fabrice Simmons (oncoming nurse) by Conchita Calderon (offgoing nurse). Report included the following information SBAR, Kardex and MAR.

## 2019-03-11 NOTE — ROUTINE PROCESS
Rec'd pr alert and oriented x4. Nephrostomy tubes drain to left and right intact and patent. Call bell within reach. Will continue to monitor pt.   0200 pt is resting quietly in bed. Breathing regular and non labored. Call bell within reach. 0740 Bedside shift change report given to [facility] (oncoming nurse) by Josue Cleary (offgoing nurse). Report given with SBAR, Kardex, Intake/Output, MAR and Recent Results.

## 2019-03-11 NOTE — CONSULTS
Infectious Disease Consultation Note    Requested by: dr. Marie Human    Reason: complicated UTI, paracolic abscess    Current abx Prior abx   Pip/tazo since 3/7  Levofloxacin since 3/8   Vancomycin 3/7-3/8  Ciprofloxacin 3/7     Lines:       Assessment :    77 yo female with h/o metastatic cervical cancer with bilateral obstructive uropathy s/p bilateral PCN's presented to [facility] on 3/7/19 as an outpatient for bilateral nephrostograms with bilateral ureteral stent placement and PCN replacement. Now with complex fluid collection left paracolic region s/p IR guided drainage. Clinical presentation c/w left paracolic abscess, acute diverticulitis s/p IR guided drainage 3/8. Cultures; alpha strep, enterobacter    Complicated UTI associated with obstructive uropathy, PCNL tubes. Urine cultures from nephrostomy tubes 3/7 enterobacter, ampicillin susceptible enterococcus faecalis. Urine cultures 2/18 revealed pseudomonas, enterobacter (both susceptible to quinolones). S/p PCNL exchange 3/8/19.     etiology of left paracolic abscess not entirely clear. it could be colitis/diverticulitis flare up due to severe constipation & microperforation leading to abscess versus colitis due to avastin. Immunosuppressed state from chemotherapy will likely delay recovery from infection. Also, h/o intermittent nausea. Hence, will attempt to make arrangements for oupatient iv antibiotics. If outpatient iv abx not feasible, will have to give po antibiotics (if patient tolerates) - I will give trial of po as inpatient to determine this. Recommendations:    1. D/c pip/tazo. Start po levofloxacin, amoxicillin/clavulanate to see if she tolerates it or not. 2. Follow up colorectal surgery recommendations  3. Consider using alternative agents for chemotherapy other than avastin   4.  Will write tentative outpatient iv abx orders (for home, [facility])    Advance Care planning: full code; discussed  with patient/surrogate decision maker; Mary Motley Tana Lopez; [phone]    Thank you for consultation request. Above plan was discussed in details with patient,  and dr Hardik Gentile. D/w . All questions answered to their full satisfaction. Spent additional 35 minutes in management and evaluation of this patient. >50% time spent in counselling and coordination of care. Please call me if any further questions or concerns. Will continue to participate in the care of this patient. HPI:    77 yo female with h/o metastatic cervical cancer with bilateral obstructive uropathy s/p bilateral PCN's presented to [facility] on 3/7/19 as an outpatient for bilateral nephrostograms with bilateral ureteral stent placement and PCN replacement.  Upon CT review, Dr Katty Alexandra identified a retroperitoneal abscess, right iliac DVT, and bilateral PCN's that were dislodged.   s/p bilateral PCNL exchange 3/7/19 and was admitted to Medicine for further evaluation and treatment. She was started on broad spectrum abx and I was consulted for further recommendations. Of note, patient was noted to have complex collection in the left  paracolic gutter extending into a lower anterior pelvis s/p ct guided drainage 3/8 - 100 cc of purulent fluid noted. Cultures 3/8- alpha strep, e.coli. Urine cultures from nephrostomy tubes 3/7 enterobacter, ampicillin susceptible enterococcus faecalis. Urine cultures 2/18 revealed pseudomonas, enterobacter (both susceptible to quinolones). Patient states that she felt very weak for a week prior to presentation. She was severely constipated too. Had throbbing left lower abdominal pain 10/10 in severity radiating to left leg. Her pain is better now but she still has it. She also had significant nausea. She feels somewhat better now. Patient denies headaches, visual disturbances, sore throat, runny nose, earaches, cp, sob, chills, cough,  burning micturition, pain  in extremities. denies back pain/flank pain.   No known h/o MRSA colonization or infection in the past.            Past Medical History:   Diagnosis Date    Acute kidney failure ([facility] [address] 12/05/2019    BMI 34.0-34.9,adult     34.8    Caffeine adverse reaction     elevated BP    Cancer (HCC)     uterine, cervical    Cervical cancer (HCC)     Chronic sinusitis     Dizziness     Endometriosis     Hiatal hernia     High cholesterol     Palpitation     PMB (postmenopausal bleeding)     Rash     eczemz    Urinary incontinence        Past Surgical History:   Procedure Laterality Date    HX ABDOMINAL LAPAROSCOPY      HX BILATERAL SALPINGO-OOPHORECTOMY Bilateral     HX BREAST LUMPECTOMY Right 1973    benign    HX HYSTERECTOMY      radical    HX OTHER SURGICAL  09/01/2017    Exam under anesthesia: Cystoscopy, sigmoidoscopy    IR CHANGE EXIST CATHETER/TUBE LT  3/7/2019    IR MEDIPORT            Medication List      ASK your doctor about these medications    B COMPLEX 1 tablet  Generic drug:  b complex vitamins     * bisacodyl 5 mg EC tablet  Commonly known as:  DULCOLAX  Take 2 Tabs by mouth daily as needed for Constipation. * bisacodyl 10 mg suppository  Commonly known as:  DULCOLAX  Insert 10 mg into rectum daily as needed. calcium carbonate 500 mg calcium (1,250 mg) tablet  Commonly known as:  OS-MAYELA     cyclobenzaprine 10 mg tablet  Commonly known as:  FLEXERIL  Take 0.5 Tabs by mouth three (3) times daily as needed for Muscle Spasm(s). docusate sodium 100 mg capsule  Commonly known as:  COLACE  Take 1 Cap by mouth two (2) times a day for 90 days. FISH OIL PO     GARLIC PO     GAS-X 80 mg chewable tablet  Generic drug:  simethicone     LORazepam 0.5 mg tablet  Commonly known as:  ATIVAN  Take 1 Tab by mouth every six (6) hours as needed for Anxiety.  Max Daily Amount: 2 mg.     magic mouthwash solution  Magic mouth wash   Maalox  Lidocaine 2% viscous   Diphenhydramine oral solution     Pharmacy to mix equal portions of ingredients to a total volume as indicated in the dispense amount. megestrol 40 mg tablet  Commonly known as:  MEGACE  Take 2 Tabs by mouth two (2) times a day. * ondansetron hcl 4 mg tablet  Commonly known as:  ZOFRAN  Take 1 Tab by mouth every six (6) hours as needed for Nausea. * ondansetron hcl 8 mg tablet  Commonly known as:  ZOFRAN  Take 1 Tab by mouth every eight (8) hours as needed for Nausea. oxyCODONE-acetaminophen 5-325 mg per tablet  Commonly known as:  PERCOCET  Take 1-2 Tabs by mouth every six (6) hours as needed. Max Daily Amount: 8 Tabs. PROBIOTIC PO     RESVERATROL PO     trimethoprim-sulfamethoxazole 160-800 mg per tablet  Commonly known as:  BACTRIM DS, SEPTRA DS  Take one tablet by mouth twice a day starting 3 days prior to nephrostomy tube change     VAMSI-C PO         * This list has 4 medication(s) that are the same as other medications prescribed for you. Read the directions carefully, and ask your doctor or other care provider to review them with you.                 Current Facility-Administered Medications   Medication Dose Route Frequency    0.9% sodium chloride infusion 250 mL  250 mL IntraVENous PRN    acetaminophen (TYLENOL) suppository 650 mg  650 mg Rectal Q4H PRN    0.9% sodium chloride infusion  75 mL/hr IntraVENous CONTINUOUS    sodium chloride (NS) flush 5-40 mL  5-40 mL IntraVENous Q8H    sodium chloride (NS) flush 5-40 mL  5-40 mL IntraVENous PRN    naloxone (NARCAN) injection 0.2 mg  0.2 mg IntraVENous PRN    flumazenil (ROMAZICON) 0.1 mg/mL injection 0.2 mg  0.2 mg IntraVENous Multiple    levoFLOXacin (LEVAQUIN) 750 mg in D5W IVPB  750 mg IntraVENous Q24H    0.9% sodium chloride infusion  75 mL/hr IntraVENous CONTINUOUS    sodium chloride (NS) flush 5-40 mL  5-40 mL IntraVENous Q8H    sodium chloride (NS) flush 5-40 mL  5-40 mL IntraVENous PRN    naloxone (NARCAN) injection 0.2 mg  0.2 mg IntraVENous PRN    flumazenil (ROMAZICON) 0.1 mg/mL injection 0.2 mg  0.2 mg IntraVENous Multiple    omega 3-DHA-EPA-fish oil 1,000 mg (120 mg-180 mg) capsule 1 Cap  1 Cap Oral DAILY    Lactobacillus Acidoph & Bulgar (FLORANEX) tablet 1 Tab  1 Tab Oral DAILY    calcium carbonate (OS-MAYELA) tablet 500 mg [elemental]  500 mg Oral BID WITH MEALS    ondansetron hcl (ZOFRAN) tablet 4 mg  4 mg Oral Q6H PRN    bisacodyl (DULCOLAX) tablet 10 mg  10 mg Oral DAILY PRN    cyclobenzaprine (FLEXERIL) tablet 5 mg  5 mg Oral TID PRN    docusate sodium (COLACE) capsule 100 mg  100 mg Oral BID    bisacodyl (DULCOLAX) suppository 10 mg  10 mg Rectal QHS PRN    vitamin B complex tablet  1 Tab Oral DAILY    ascorbic acid (vitamin C) (VITAMIN C) tablet   Oral DAILY    LORazepam (ATIVAN) tablet 0.5 mg  0.5 mg Oral Q6H PRN    simethicone (MYLICON) tablet 80 mg  80 mg Oral QID PRN    morphine 10 mg/ml injection 5 mg  5 mg IntraVENous Q4H PRN    ondansetron (ZOFRAN) injection 4 mg  4 mg IntraVENous Q4H PRN    piperacillin-tazobactam (ZOSYN) 3.375 g in 0.9% sodium chloride (MBP/ADV) 100 mL MBP  3.375 g IntraVENous Q6H    0.9% sodium chloride infusion 500 mL  500 mL IntraVENous CONTINUOUS       Allergies: Other food; Neulasta [pegfilgrastim]; Aspirin; Milk; Tetracycline;  Wheat; and Other plant, animal, environmental    Family History   Problem Relation Age of Onset    Cancer Mother         left ovary     Social History     Socioeconomic History    Marital status: SINGLE     Spouse name: Not on file    Number of children: Not on file    Years of education: Not on file    Highest education level: Not on file   Social Needs    Financial resource strain: Not on file    Food insecurity - worry: Not on file    Food insecurity - inability: Not on file   [employer] needs - medical: Not on file   [employer] needs - non-medical: Not on file   Occupational History    Not on file   Tobacco Use    Smoking status: Never Smoker    Smokeless tobacco: Never Used   Substance and Sexual Activity    Alcohol use: No    Drug use: No    Sexual activity: No   Other Topics Concern    Not on file   Social History Narrative    Not on file     Social History     Tobacco Use   Smoking Status Never Smoker   Smokeless Tobacco Never Used        Temp (24hrs), Av.8 °F (36.6 °C), Min:97.3 °F (36.3 °C), Max:98.3 °F (36.8 °C)    Visit Vitals  /89 (BP 1 Location: Left arm, BP Patient Position: At rest)   Pulse 92   Temp 97.3 °F (36.3 °C)   Resp 18   Ht 5' 5\" (1.651 m)   Wt 85 kg (187 lb 8 oz)   SpO2 97%   Breastfeeding? No   BMI 31.20 kg/m²       ROS: 12 point ROS obtained in details. Pertinent positives as mentioned in HPI,   otherwise negative    Physical Exam:    General:  Alert, cooperative, + distress, appears stated age. Head:  Normocephalic, without obvious abnormality, atraumatic. Eyes:  Conjunctivae/corneas clear. PERRL, EOMs intact. Nose: Nares normal. No drainage or sinus tenderness. Throat: Lips, mucosa, and tongue normal. Teeth and gums normal.   Neck: Supple, symmetrical, trachea midline, no adenopathy, thyroid: no enlargement/tenderness/nodules, no carotid bruit and no JVD. Back:   ROM normal. + tenderness. Lungs:   Clear to auscultation bilaterally. Chest wall:  No tenderness or deformity. Heart:  Regular rate and rhythm, S1, S2 normal, no murmur, click, rub or gallop. Abdomen: Soft, LLQ abdominal tenderness. Drain LLQ with bloody drainage. Bowel sounds normal. No masses,  No organomegaly. Extremities: Extremities normal, atraumatic, no cyanosis or edema. Pulses: 2+ and symmetric all extremities. Skin: Skin color, texture, turgor normal. No rashes or lesions   Neurologic: CNII-XII intact. No focal motor or sensory deficit.   Back; bilateral PCNL in place, no cva tenderness                 Labs: Results:   Chemistry Recent Labs     03/10/19  0530 19  0400   * 110*    136   K 3.7 3.6    105   CO2 23 24   BUN 10 13   CREA 0.62 0.87   CA 7.4* 7.3*   AGAP 7 7   BUCR 16 15      CBC w/Diff Recent Labs     03/10/19  0530 03/09/19  1405 03/09/19  0400 03/08/19  1954   WBC 13.7*  --  14.5* 11.6   RBC 2.81*  --  2.13* 2.42*   HGB 8.2* 6.8* 6.2* 6.9*   HCT 25.0* 21.2* 19.5* 22.0*     --  408 394   GRANS 57  --  60 69   LYMPH 12*  --  18* 18*   EOS 2  --  0 3      Microbiology Recent Labs     03/08/19  1805   CULT ANAEROBES NOT RULED OUT. SCREENING IN PROGRESS.   FEW ESCHERICHIA COLI*  MODERATE POSSIBLE STREPTOCOCCI, ALPHA HEMOLYTIC*          RADIOLOGY:    All available imaging studies/reports in [facility] care for this admission were reviewed    Dr. Debra Valdovinos, Infectious Disease Specialist  [phone]  March 11, 2019  9:47 AM

## 2019-03-11 NOTE — PROGRESS NOTES
[facility]  Progress Note    Patient: Lois Frias Age: 76 y.o. : 1950 MR#: [id number] SSN: [id number]  Date/Time: 3/11/2019 11:48 AM    Subjective/24-hour events:     Continues to improve clinically. Had some nausea and vomited  this AM with administration of oral antibiotic. Assessment:   Acute diverticulitis with pericolic abscess  Enterobacter UTI  Sepsis, POA   GNR + urine culture  Sinus tachycardia in setting of underlying infection  Iliac vein DVT  Metastatic gynecologic malignancy, endometrioid cervical CA vs endometrial CA  Hx obstructive uropathy with history of bilateral hydronephrosis and PCN placement  HTN  Obesity     Plan:  Antibiotics per ID. Will see if she can tolerate oral therapy. If not, will need IV therapy and will need to have this set up as outpatient if able to be covered by insurance. H/H continues to remain stable. No evidence for acute/ongoing blood loss. Will resume heparin infusion for now, await recs on treatment of DVT going forward. Will d/w hematology. Continue to hold megace in setting of DVT. Add remeron. PT/OT evals. Supportive care o/w. Disposition TBD. Case discussed with:  [x]Patient  []Family  []Nursing  []Case Management  DVT Prophylaxis:  []Lovenox  []Hep SQ  []SCDs  []Coumadin   []On Heparin gtt    Objective:   VS:   Visit Vitals  BP (!) 150/94 (BP 1 Location: Left arm, BP Patient Position: At rest)   Pulse 96   Temp 96.3 °F (35.7 °C)   Resp 18   Ht 5' 5\" (1.651 m)   Wt 85 kg (187 lb 8 oz)   SpO2 99%   Breastfeeding? No   BMI 31.20 kg/m²      Tmax/24hrs: Temp (24hrs), Av.6 °F (36.4 °C), Min:96.3 °F (35.7 °C), Max:98.3 °F (36.8 °C)      Intake/Output Summary (Last 24 hours) at 3/11/2019 1223  Last data filed at 3/11/2019 0719  Gross per 24 hour   Intake 2500 ml   Output 2800 ml   Net -300 ml       General:  In NAD. Ill, but nontoxic-appearing.   Cardiovascular:  Regular, tachycardic. Pulmonary:  Clear no wheezes. Effort nonlabored. GI:  Abdomen soft, NTTP. Extremities:  Warm, no ischemia. Neuro:  Awake and alert. Labs:    No results found for this or any previous visit (from the past 24 hour(s)).     Signed By: Leilani Church MD     March 11, 2019 11:48 AM

## 2019-03-12 NOTE — PROGRESS NOTES
Problem: Self Care Deficits Care Plan (Adult)  Goal: *Acute Goals and Plan of Care (Insert Text)  Occupational Therapy Goals  Initiated 3/12/2019 within 7 day(s). 1.  Patient will perform lower body dressing with modified independence using AE. 2.  Patient will perform toilet transfers with supervision/set-up. 3.  Patient will perform functional task in standing for 3 minutes with supervision for balance. Outcome: Progressing Towards Goal  Occupational Therapy EVALUATION    Patient: Nydia Gray [de-identified]76 y.o. female)  Date: 3/12/2019  Primary Diagnosis: Hydronephrosis [N13.30]  DVT (deep venous thrombosis) (HCC) [I82.409]  Retroperitoneal abscess (HCC) [K68.19]  Retroperitoneal abscess (HCC) [K68.19]   5 Days Post-Op   Precautions:   Fall(multiple drains)    ASSESSMENT :  Based on the objective data described below, the patient presents with decreased LB ADLs and decreased functional mobility, complicated by discomfort in her abdomen secondary to multiple drains. Patient with difficulty reaching her LLE for self care tasks and will benefit from AE training to increase her independence. Min assist given for functional standing and transfers. Patient encouraged to be up in the chair daily to increase her tolerance/endurance for OOB tasks. Patient will benefit from skilled intervention to address the above impairments.   Patients rehabilitation potential is considered to be Good  Factors which may influence rehabilitation potential include:   []             None noted  []             Mental ability/status  [x]             Medical condition  []             Home/family situation and support systems  []             Safety awareness  []             Pain tolerance/management  []             Other:      PLAN :  Recommendations and Planned Interventions:   [x]               Self Care Training                  [x]        Therapeutic Activities  [x]               Functional Mobility Training    [] Cognitive Retraining  [x]               Therapeutic Exercises           [x]        Endurance Activities  [x]               Balance Training                   []        Neuromuscular Re-Education  []               Visual/Perceptual Training     [x]   Home Safety Training  [x]               Patient Education                 [x]        Family Training/Education  []               Other (comment):    Frequency/Duration: Patient will be followed by occupational therapy 1-2 times per day/4-7 days per week to address goals. Discharge Recommendations: None  Further Equipment Recommendations for Discharge: N/A     Barriers to Learning/Limitations: None  Compensate with: visual, verbal, tactile, kinesthetic cues/model     PATIENT COMPLEXITY      Eval Complexity: History: LOW Complexity : Brief history review ; Examination: LOW Complexity : 1-3 performance deficits relating to physical, cognitive , or psychosocial skils that result in activity limitations and / or participation restrictions ; Decision Making:LOW Complexity : No comorbidities that affect functional and no verbal or physical assistance needed to complete eval tasks  Assessment: Low Complexity     SUBJECTIVE:   Patient stated I haven't really gotten up but I have gone to the bathroom.     OBJECTIVE DATA SUMMARY:     Past Medical History:   Diagnosis Date    Acute kidney failure ([facility] [address] 12/05/2019    BMI 34.0-34.9,adult     34.8    Caffeine adverse reaction     elevated BP    Cancer (HCC)     uterine, cervical    Cervical cancer (HCC)     Chronic sinusitis     Dizziness     Endometriosis     Hiatal hernia     High cholesterol     Palpitation     PMB (postmenopausal bleeding)     Rash     eczemz    Urinary incontinence      Past Surgical History:   Procedure Laterality Date    HX ABDOMINAL LAPAROSCOPY      HX BILATERAL SALPINGO-OOPHORECTOMY Bilateral     HX BREAST LUMPECTOMY Right 1973    benign    HX HYSTERECTOMY      radical    HX OTHER SURGICAL 09/01/2017    Exam under anesthesia: Cystoscopy, sigmoidoscopy    IR CHANGE EXIST CATHETER/TUBE LT  3/7/2019    IR MEDIPORT       Prior Level of Function/Home Situation: Pt was modified independent with basic self care tasks and functional mobility PTA. Home Situation  Home Environment: Private residence  One/Two Story Residence: One story  Living Alone: Yes  Support Systems: Family member(s), [employer] community  Patient Expects to be Discharged to[de-identified] Private residence  Current DME Used/Available at Home: None  Tub or Shower Type: (Pt will sponge bathe at home.)  [x]  Right hand dominant   []  Left hand dominant  Cognitive/Behavioral Status:  Neurologic State: Alert  Orientation Level: Oriented X4  Cognition: Follows commands  Safety/Judgement: Awareness of environment; Fall prevention    Skin: Intact on UEs    Edema: None noted in UEs    Vision/Perceptual:    Acuity: Within Defined Limits      Coordination:  Fine Motor Skills-Upper: Left Intact; Right Intact    Gross Motor Skills-Upper: Left Intact; Right Intact    Balance:  Sitting: Intact  Standing: With support    Strength:  Strength: Within functional limits(UEs)    Tone & Sensation:  Tone: Normal(UEs)  Sensation: Intact(UEs)    Range of Motion:  AROM: Within functional limits(UEs)    Functional Mobility and Transfers for ADLs:  Bed Mobility:  Supine to Sit: (Pt sitting up on EOB upon entering room.)  Transfers:  Sit to Stand: Minimum assistance   Toilet Transfer : Minimum assistance    ADL Assessment:  Feeding: Modified independent    Oral Facial Hygiene/Grooming: Modified Independent    Bathing: Minimum assistance    Upper Body Dressing: Setup    Lower Body Dressing: Moderate assistance    Toileting: Supervision    Pain:  Pt reports 0/10 pain or discomfort prior to treatment.    Pt reports 0/10 pain or discomfort post treatment. Activity Tolerance:   Good    Please refer to the flowsheet for vital signs taken during this treatment.   After treatment: [x] Patient left in no apparent distress sitting up in chair  [] Patient left in no apparent distress in bed  [x] Call bell left within reach  [x] Nursing notified  [] Caregiver present  [] Bed alarm activated    COMMUNICATION/EDUCATION:   [x] Home safety education was provided and the patient/caregiver indicated understanding. [x] Patient/family have participated as able in goal setting and plan of care. [x] Patient/family agree to work toward stated goals and plan of care. [] Patient understands intent and goals of therapy, but is neutral about his/her participation. [] Patient is unable to participate in goal setting and plan of care.     Thank you for this referral.  Renetta Chairez MS OTR/L  Time Calculation: 24 mins

## 2019-03-12 NOTE — PROGRESS NOTES
Patient is set up and agreeable to outpatient IV ABX at [facility]. Her appointment is 9AM everyday, pt will be driving herself to center. CM consulted Dr. Caitlin Casiano for PT evaluation. Called PT and stressed importance of pt being seen today.     Ashley Sullivan, MSW  Case Management  [phone]

## 2019-03-12 NOTE — PROGRESS NOTES
Problem: Mobility Impaired (Adult and Pediatric)  Goal: *Acute Goals and Plan of Care (Insert Text)  Physical Therapy Goals  Initiated 3/12/2019 and to be accomplished within 7 day(s)  1. Patient will move from supine to sit and sit to supine , scoot up and down and roll side to side in bed with modified independence. 2.  Patient will transfer from bed to chair and chair to bed with modified independence using the least restrictive device. 3.  Patient will perform sit to stand with modified independence. 4.  Patient will ambulate with modified independence for >200 feet with the least restrictive device. Outcome: Progressing Towards Goal  physical Therapy EVALUATION    Patient: Luis Manuel Denton (2 y.o. female)  Date: 3/12/2019  Primary Diagnosis: Hydronephrosis [N13.30]  DVT (deep venous thrombosis) (HCC) [I82.409]  Retroperitoneal abscess (HCC) [K68.19]  Retroperitoneal abscess (HCC) [K68.19]   5 Days Post-Op   Precautions:   Fall(multiple drains)    ASSESSMENT :  PT orders received and patient cleared by nursing to participate with therapy. Patient is a 76 y.o. female admitted to the hospital due to abdominal pain. Patient consents to PT evaluation and treatment. Performed partial co-treatment with occupational therapy to increase safety and functional mobility. Pt performing supine to sit with HOB raised contact guard assistance. Sit to stands minimal assistance/contact guard assistance for safety. Cues with transfers for hand placement with RW. Gait training 50 feet with RW contact guard assistance for safety. Pt has wider SAMMI and decreased step length. Educated pt on importance of being OOB more with nursing including to/from bathroom and sitting in chair for all meals. Pt lives alone and needs to continue increasing mobility for safe d/c home after hospital. Pt may have her niece stay with her but unsure at this time. Pt does fatigue quickly and needs increased time with activities.  Pt ended therapy sitting in recliner with all needs met. Patient will benefit from skilled intervention to address the above impairments and increase functional independence. Patients rehabilitation potential is considered to be Fair  Factors which may influence rehabilitation potential include:   []         None noted  []         Mental ability/status  [x]         Medical condition  []         Home/family situation and support systems  []         Safety awareness  []         Pain tolerance/management  []         Other:      PLAN :  Recommendations and Planned Interventions:  [x]           Bed Mobility Training             [x]    Neuromuscular Re-Education  [x]           Transfer Training                   []    Orthotic/Prosthetic Training  [x]           Gait Training                          []    Modalities  [x]           Therapeutic Exercises          []    Edema Management/Control  [x]           Therapeutic Activities            [x]    Patient and Family Training/Education  []           Other (comment):    Frequency/Duration: Patient will be followed by physical therapy 1-2 times per day to address goals. Discharge Recommendations: Home Health  Further Equipment Recommendations for Discharge: rolling walker     SUBJECTIVE:   Patient stated It took 2 people to help me to the commode.     OBJECTIVE DATA SUMMARY:     Past Medical History:   Diagnosis Date    Acute kidney failure ([facility] [address] 12/05/2019    BMI 34.0-34.9,adult     34.8    Caffeine adverse reaction     elevated BP    Cancer (HCC)     uterine, cervical    Cervical cancer (HCC)     Chronic sinusitis     Dizziness     Endometriosis     Hiatal hernia     High cholesterol     Palpitation     PMB (postmenopausal bleeding)     Rash     eczemz    Urinary incontinence      Past Surgical History:   Procedure Laterality Date    HX ABDOMINAL LAPAROSCOPY      HX BILATERAL SALPINGO-OOPHORECTOMY Bilateral     HX BREAST LUMPECTOMY Right 1973    benign    HX HYSTERECTOMY      radical    HX OTHER SURGICAL  09/01/2017    Exam under anesthesia: Cystoscopy, sigmoidoscopy    IR CHANGE EXIST CATHETER/TUBE LT  3/7/2019    IR MEDIPORT       Barriers to Learning/Limitations: None  Compensate with: N/A  Prior Level of Function/Home Situation: Independent with mobility including gait no AD. Home Situation  Home Environment: Apartment  # Steps to Enter: 0  One/Two Story Residence: One story  Living Alone: Yes  Support Systems: Family member(s), [employer] / [employer] community  Patient Expects to be Discharged to[de-identified] Apartment  Current DME Used/Available at Home: None  Tub or Shower Type: (Pt will sponge bathe at home.)  Critical Behavior:  Neurologic State: Alert  Psychosocial  Patient Behaviors: Calm; Cooperative  Strength:    Strength: Generally decreased, functional(B LE L hip weaker than R)  Tone & Sensation:   Tone: Normal(B LE)  Sensation: Intact(B LE)   Range Of Motion:  AROM: Generally decreased, functional(B LE)  Functional Mobility:  Bed Mobility:  Supine to Sit: Contact guard assistance  Transfers:  Sit to Stand: Minimum assistance  Stand to Sit: Contact guard assistance  Balance:   Sitting: Intact  Standing: Impaired; With support  Standing - Static: Good  Standing - Dynamic : Fair  Ambulation/Gait Training:  Distance (ft): 50 Feet (ft)  Assistive Device: Walker, rolling  Ambulation - Level of Assistance: Contact guard assistance  Base of Support: Center of gravity altered  Speed/Natalya: Pace decreased (<100 feet/min)  Therapeutic Exercises:   Reviewed and performed ankle pumps. Pain:  Pre: 7-8/10 abdominal L side  Post: 7-8/10 abdominal L side  Activity Tolerance:   fair  Please refer to the flowsheet for vital signs taken during this treatment.   After treatment:   [x] Patient left in no apparent distress sitting up in chair  [] Patient left sitting on EOB  [] Patient left in no apparent distress in bed  [] Patient declined to be OOB at this time due to    [x] Call lozano left within reach  [x] Nursing notified(Babita)  [] Caregiver present  [] Bed alarm activated  [x] Personal items in reach     COMMUNICATION/EDUCATION:   [x]         Fall prevention education was provided and the patient/caregiver indicated understanding. [x]         Patient/family have participated as able in goal setting and plan of care. [x]         Patient/family agree to work toward stated goals and plan of care. []         Patient understands intent and goals of therapy, but is neutral about his/her participation. []         Patient is unable to participate in goal setting and plan of care. [x]         Role of physical therapy. [x]         Out of bed with nursing assistance 3-5 times a day.     Thank you for this referral.  Denilson [facility], PT, DPT   Time Calculation: 44 mins        Eval Complexity: History: MEDIUM  Complexity : 1-2 comorbidities / personal factors will impact the outcome/ POC Exam:HIGH Complexity : 4+ Standardized tests and measures addressing body structure, function, activity limitation and / or participation in recreation  Presentation: MEDIUM Complexity : Evolving with changing characteristics  Clinical Decision Making:Medium Complexity   Overall Complexity:MEDIUM

## 2019-03-12 NOTE — PROGRESS NOTES
HPI: Nydia Gray is a 76 y.o. female presenting with chief complain of colonic perforation with abscess. Pt has metastatic cervical cancer and recently was started on Avastin. She had CT imaging for neprhostomy/ureteral stent placement and abscess beside left colon along psoas muscle identified. Pt states she had been having fatigue at home but felt this was likely from the chemotherapy. Currently pt complains of LLQ pain, mild. Denies nausea or vomiting. She had some recent constipation she attributes to her chemo. She has never had a colonoscopy.      Past Medical History:   Diagnosis Date    Acute kidney failure ([facility] [address] 12/05/2019    BMI 34.0-34.9,adult     34.8    Caffeine adverse reaction     elevated BP    Cancer (HCC)     uterine, cervical    Cervical cancer (HCC)     Chronic sinusitis     Dizziness     Endometriosis     Hiatal hernia     High cholesterol     Palpitation     PMB (postmenopausal bleeding)     Rash     eczemz    Urinary incontinence        Past Surgical History:   Procedure Laterality Date    HX ABDOMINAL LAPAROSCOPY      HX BILATERAL SALPINGO-OOPHORECTOMY Bilateral     HX BREAST LUMPECTOMY Right 1973    benign    HX HYSTERECTOMY      radical    HX OTHER SURGICAL  09/01/2017    Exam under anesthesia: Cystoscopy, sigmoidoscopy    IR CHANGE EXIST CATHETER/TUBE LT  3/7/2019    IR MEDIPORT         Family History   Problem Relation Age of Onset    Cancer Mother         left ovary       Social History     Socioeconomic History    Marital status: SINGLE     Spouse name: Not on file    Number of children: Not on file    Years of education: Not on file    Highest education level: Not on file   Tobacco Use    Smoking status: Never Smoker    Smokeless tobacco: Never Used   Substance and Sexual Activity    Alcohol use: No    Drug use: No    Sexual activity: No       Review of Systems - aside from above all others negative        Allergies   Allergen Reactions    Other Food Nausea and Vomiting     Artificial sweeteners    Neulasta [Pegfilgrastim] Swelling    Aspirin Other (comments)     Gi upset    Milk Itching and Atopic Dermatitis     Eczema (dairy cow)    Tetracycline Unknown (comments)     Patient does not remember    Wheat Atopic Dermatitis     eczema    Other Plant, Animal, Environmental Other (comments)     Pt states she has \"springtime grass allergies. \"     Reports reaction: Sinus infection       Vitals:    03/12/19 0351 03/12/19 0432 03/12/19 0709 03/12/19 1044   BP: 125/77  156/84 136/82   Pulse: 95  94 91   Resp: 18  18 18   Temp: 98 °F (36.7 °C)  97.7 °F (36.5 °C) 97.9 °F (36.6 °C)   SpO2: 98%  97% 98%   Weight:  86.7 kg (191 lb 1.6 oz)     Height:           Physical Exam   Constitutional: She appears well-developed and well-nourished. HENT:   Head: Normocephalic and atraumatic. Eyes: Conjunctivae and EOM are normal.   Abdominal: Soft. She exhibits no distension. There is no tenderness (mild, LLQ and lower midline). Musculoskeletal: Normal range of motion. Lymphadenopathy:     She has no cervical adenopathy. Right: No inguinal adenopathy present. Left: No inguinal adenopathy present. Neurological: She exhibits normal muscle tone. Skin: Skin is warm and dry. No rash noted. Psychiatric: She has a normal mood and affect.  Her speech is normal.     CBC:   Lab Results   Component Value Date/Time    WBC 15.1 (H) 03/12/2019 12:30 AM    HGB 8.8 (L) 03/12/2019 12:30 AM    HCT 27.5 (L) 03/12/2019 12:30 AM     (H) 03/12/2019 12:30 AM     CT personally visualized by me, consistent with colonic perforation with abscess    Assessment / Plan    S/p IR drain for colon perforation likely secondary to Avastin for metastatic cervical cancer  Other possibilities include diverticulitis and colon cancer (no prior colo)  Continue with abx per ID  Maintain drain, will need flushing by nursing after discharge  Pt can follow up with me in office for drain care  Hopefully can avoid surgery if the abscess resolves and she does not develop fistula  Will follow    The diagnoses and plan were discussed with the patient. All questions answered. Plan of care agreed to by all concerned.

## 2019-03-12 NOTE — ROUTINE PROCESS
Bedside shift change report given to Kimberly Rain (oncoming nurse) by Emeterio Degroot (offgoing nurse). Report included the following information SBAR, Kardex and MAR. Patient was in chair, transferred to bed, which caused pain. PRN morphine given. Heparin rate verified.  PTT ordered for AM.

## 2019-03-12 NOTE — PROGRESS NOTES
Infectious Disease progress Note    Requested by: dr. Linda Mast    Reason: complicated UTI, paracolic abscess    Current abx Prior abx   Pip/tazo since 3/7-3/11  Levofloxacin since 3/8  Amoxicillin/clavulanate since 3/11   Vancomycin 3/7-3/8  Ciprofloxacin 3/7     Lines:       Assessment :    75 yo female with h/o metastatic cervical cancer with bilateral obstructive uropathy s/p bilateral PCN's presented to [facility] on 3/7/19 as an outpatient for bilateral nephrostograms with bilateral ureteral stent placement and PCN replacement. Now with complex fluid collection left paracolic region s/p IR guided drainage. Clinical presentation c/w left paracolic abscess, acute diverticulitis s/p IR guided drainage 3/8. Cultures; alpha strep, enterobacter    Complicated UTI associated with obstructive uropathy, PCNL tubes. Urine cultures from nephrostomy tubes 3/7 enterobacter, ampicillin susceptible enterococcus faecalis. Urine cultures 2/18 revealed pseudomonas, enterobacter (both susceptible to quinolones). S/p PCNL exchange 3/8/19.     etiology of left paracolic abscess not entirely clear. it could be colitis/diverticulitis flare up due to severe constipation & microperforation leading to abscess versus colitis due to avastin. Immunosuppressed state from chemotherapy will likely delay recovery from infection. Also, h/o intermittent nausea. Hence, will attempt to make arrangements for oupatient iv antibiotics. Patient unable to tolerate po antibiotics. Agrees to pay copay for abx at [facility]    Recommendations:    1. D/c po levofloxacin, amoxicillin/clavulanate. Switch levofloxacin to IV. Recommend to using daptomycin, ertapenem tentatively till 4/9/19.   2. Follow up colorectal surgery recommendations  3. Will need follow up CT scan to determine treatment response and need for surgical interventions.         Advance Care planning: full code; discussed  with patient/surrogate decision maker; Phuong Hopkins; [phone]     Above plan was discussed in details with patient,  and dr Neeraj Gibson. D/w . Please call me if any further questions or concerns. Will continue to participate in the care of this patient. HPI:      Patient denies headaches, visual disturbances, sore throat, runny nose, earaches, cp, sob, chills, cough,  burning micturition, pain  in extremities. denies back pain/flank pain. Medication List      ASK your doctor about these medications    B COMPLEX 1 tablet  Generic drug:  b complex vitamins     * bisacodyl 5 mg EC tablet  Commonly known as:  DULCOLAX  Take 2 Tabs by mouth daily as needed for Constipation. * bisacodyl 10 mg suppository  Commonly known as:  DULCOLAX  Insert 10 mg into rectum daily as needed. calcium carbonate 500 mg calcium (1,250 mg) tablet  Commonly known as:  OS-MAYELA     cyclobenzaprine 10 mg tablet  Commonly known as:  FLEXERIL  Take 0.5 Tabs by mouth three (3) times daily as needed for Muscle Spasm(s). docusate sodium 100 mg capsule  Commonly known as:  COLACE  Take 1 Cap by mouth two (2) times a day for 90 days. FISH OIL PO     GARLIC PO     GAS-X 80 mg chewable tablet  Generic drug:  simethicone     LORazepam 0.5 mg tablet  Commonly known as:  ATIVAN  Take 1 Tab by mouth every six (6) hours as needed for Anxiety. Max Daily Amount: 2 mg.     magic mouthwash solution  Magic mouth wash   Maalox  Lidocaine 2% viscous   Diphenhydramine oral solution     Pharmacy to mix equal portions of ingredients to a total volume as indicated in the dispense amount. megestrol 40 mg tablet  Commonly known as:  MEGACE  Take 2 Tabs by mouth two (2) times a day. * ondansetron hcl 4 mg tablet  Commonly known as:  ZOFRAN  Take 1 Tab by mouth every six (6) hours as needed for Nausea. * ondansetron hcl 8 mg tablet  Commonly known as:  ZOFRAN  Take 1 Tab by mouth every eight (8) hours as needed for Nausea.      oxyCODONE-acetaminophen 5-325 mg per tablet  Commonly known as:  PERCOCET  Take 1-2 Tabs by mouth every six (6) hours as needed. Max Daily Amount: 8 Tabs. PROBIOTIC PO     RESVERATROL PO     trimethoprim-sulfamethoxazole 160-800 mg per tablet  Commonly known as:  BACTRIM DS, SEPTRA DS  Take one tablet by mouth twice a day starting 3 days prior to nephrostomy tube change     VAMSI-C PO         * This list has 4 medication(s) that are the same as other medications prescribed for you. Read the directions carefully, and ask your doctor or other care provider to review them with you.                 Current Facility-Administered Medications   Medication Dose Route Frequency    amoxicillin-clavulanate (AUGMENTIN) 875-125 mg per tablet 1 Tab  1 Tab Oral Q12H    polyethylene glycol (MIRALAX) packet 17 g  17 g Oral DAILY    heparin 25,000 units in D5W 250 ml infusion  18-36 Units/kg/hr IntraVENous TITRATE    levoFLOXacin (LEVAQUIN) 500 mg in D5W IVPB  500 mg IntraVENous Q24H    0.9% sodium chloride infusion 250 mL  250 mL IntraVENous PRN    acetaminophen (TYLENOL) suppository 650 mg  650 mg Rectal Q4H PRN    0.9% sodium chloride infusion  75 mL/hr IntraVENous CONTINUOUS    sodium chloride (NS) flush 5-40 mL  5-40 mL IntraVENous Q8H    sodium chloride (NS) flush 5-40 mL  5-40 mL IntraVENous PRN    naloxone (NARCAN) injection 0.2 mg  0.2 mg IntraVENous PRN    flumazenil (ROMAZICON) 0.1 mg/mL injection 0.2 mg  0.2 mg IntraVENous Multiple    0.9% sodium chloride infusion  75 mL/hr IntraVENous CONTINUOUS    sodium chloride (NS) flush 5-40 mL  5-40 mL IntraVENous Q8H    sodium chloride (NS) flush 5-40 mL  5-40 mL IntraVENous PRN    naloxone (NARCAN) injection 0.2 mg  0.2 mg IntraVENous PRN    flumazenil (ROMAZICON) 0.1 mg/mL injection 0.2 mg  0.2 mg IntraVENous Multiple    omega 3-DHA-EPA-fish oil 1,000 mg (120 mg-180 mg) capsule 1 Cap  1 Cap Oral DAILY    Lactobacillus Acidoph & Bulgar (FLORANEX) tablet 1 Tab  1 Tab Oral DAILY    ondansetron hcl [facility]) tablet 4 mg  4 mg Oral Q6H PRN    bisacodyl (DULCOLAX) tablet 10 mg  10 mg Oral DAILY PRN    cyclobenzaprine (FLEXERIL) tablet 5 mg  5 mg Oral TID PRN    docusate sodium (COLACE) capsule 100 mg  100 mg Oral BID    bisacodyl (DULCOLAX) suppository 10 mg  10 mg Rectal QHS PRN    vitamin B complex tablet  1 Tab Oral DAILY    ascorbic acid (vitamin C) (VITAMIN C) tablet   Oral DAILY    LORazepam (ATIVAN) tablet 0.5 mg  0.5 mg Oral Q6H PRN    simethicone (MYLICON) tablet 80 mg  80 mg Oral QID PRN    morphine 10 mg/ml injection 5 mg  5 mg IntraVENous Q4H PRN    ondansetron (ZOFRAN) injection 4 mg  4 mg IntraVENous Q4H PRN    0.9% sodium chloride infusion 500 mL  500 mL IntraVENous CONTINUOUS       Allergies: Other food; Neulasta [pegfilgrastim]; Aspirin; Milk; Tetracycline; Wheat; and Other plant, animal, environmental      Temp (24hrs), Av.4 °F (36.3 °C), Min:96.3 °F (35.7 °C), Max:98 °F (36.7 °C)    Visit Vitals  /84 (BP 1 Location: Right arm, BP Patient Position: At rest)   Pulse 94   Temp 97.7 °F (36.5 °C)   Resp 18   Ht 5' 5\" (1.651 m)   Wt 86.7 kg (191 lb 1.6 oz)   SpO2 97%   Breastfeeding? No   BMI 31.80 kg/m²       ROS: 12 point ROS obtained in details. Pertinent positives as mentioned in HPI,   otherwise negative    Physical Exam:    General:  Alert, cooperative, + distress, appears stated age. Head:  Normocephalic, without obvious abnormality, atraumatic. Eyes:  Conjunctivae/corneas clear. PERRL, EOMs intact. Nose: Nares normal. No drainage or sinus tenderness. Throat: Lips, mucosa, and tongue normal. Teeth and gums normal.   Neck: Supple, symmetrical, trachea midline, no adenopathy, thyroid: no enlargement/tenderness/nodules, no carotid bruit and no JVD. Back:   ROM normal. + tenderness. Lungs:   Clear to auscultation bilaterally. Chest wall:  No tenderness or deformity. Heart:  Regular rate and rhythm, S1, S2 normal, no murmur, click, rub or gallop.      Abdomen: Soft, LLQ abdominal tenderness. Drain LLQ with bloody drainage. Bowel sounds normal. No masses,  No organomegaly. Extremities: Extremities normal, atraumatic, no cyanosis or edema. Pulses: 2+ and symmetric all extremities. Skin: Skin color, texture, turgor normal. No rashes or lesions   Neurologic: CNII-XII intact. No focal motor or sensory deficit. Back; bilateral PCNL in place, no cva tenderness                 Labs: Results:   Chemistry Recent Labs     03/10/19  0530   *      K 3.7      CO2 23   BUN 10   CREA 0.62   CA 7.4*   AGAP 7   BUCR 16      CBC w/Diff Recent Labs     03/12/19  0030 03/11/19  1653 03/11/19  1341 03/10/19  0530   WBC 15.1* 14.1*  --  13.7*   RBC 3.08* 3.23*  --  2.81*   HGB 8.8* 9.5* 9.0* 8.2*   HCT 27.5* 28.7* 27.8* 25.0*   * 456*  --  382   GRANS 46 53  --  57   LYMPH 28 25  --  12*   EOS 1 3  --  2      Microbiology No results for input(s): CULT in the last 72 hours.        RADIOLOGY:    All available imaging studies/reports in [facility] for this admission were reviewed    Dr. Yandy Herring, Infectious Disease Specialist  [phone]  March 12, 2019  9:47 AM

## 2019-03-12 NOTE — PROGRESS NOTES
Patient refused AM dose of augmentin due to it being an oral abx and her feeling nauseous, hot and achy after taking it yesterday.  Will notify MD.

## 2019-03-13 NOTE — PROGRESS NOTES
[facility] Hospitalist Group  Progress Note    Patient: Ashley Roof Age: 76 y.o. : 1950 MR#: [id number] SSN: [id number]  Date/Time: 3/12/2019 10:26 PM    Subjective/24-hour events:     Patient seen earlier this afternoon. No new complaints. Preparing to work with therapy. Assessment:   Acute diverticulitis with pericolic abscess  Enterobacter UTI  Sepsis, POA   GNR + urine culture  Sinus tachycardia in setting of underlying infection  Iliac vein DVT  Metastatic gynecologic malignancy, endometrioid cervical CA vs endometrial CA  Hx obstructive uropathy with history of bilateral hydronephrosis and PCN placement  HTN  Obesity     Plan:  Outpatient IV antibiotic thearpy arranged by ID. Continue as ordered while inpatient. H/H stable - monitor. Continue heparin for now. AC thearpy going forward TBD. Continue to hold megace in setting of DVT. Remeron added yesterday. Disposition pending therapy recs. Supportive care o/w. Follow. Case discussed with:  [x]Patient  []Family  [x]Nursing  []Case Management  DVT Prophylaxis:  []Lovenox  []Hep SQ  []SCDs  []Coumadin   []On Heparin gtt    Objective:   VS:   Visit Vitals  /86 (BP 1 Location: Left arm, BP Patient Position: At rest)   Pulse 88   Temp 98 °F (36.7 °C)   Resp 20   Ht 5' 5\" (1.651 m)   Wt 86.7 kg (191 lb 1.6 oz)   SpO2 95%   Breastfeeding? No   BMI 31.80 kg/m²      Tmax/24hrs: Temp (24hrs), Av °F (36.7 °C), Min:97.7 °F (36.5 °C), Max:98.1 °F (36.7 °C)      Intake/Output Summary (Last 24 hours) at 3/12/2019 2226  Last data filed at 3/12/2019 1430  Gross per 24 hour   Intake 1060.19 ml   Output 1700 ml   Net -639.81 ml       General:  In NAD. Cardiovascular:  RRR. Pulmonary:  Clear no wheezes. Effort nonlabored. GI:  Abdomen soft, NTTP. Extremities:  Warm, no ischemia. Neuro:  Awake and alert.     Labs:    Recent Results (from the past 24 hour(s))   CBC WITH AUTOMATED DIFF    Collection Time: 03/12/19 12:30 AM   Result Value Ref Range    WBC 15.1 (H) 4.6 - 13.2 K/uL    RBC 3.08 (L) 4.20 - 5.30 M/uL    HGB 8.8 (L) 12.0 - 16.0 g/dL    HCT 27.5 (L) 35.0 - 45.0 %    MCV 89.3 74.0 - 97.0 FL    MCH 28.6 24.0 - 34.0 PG    MCHC 32.0 31.0 - 37.0 g/dL    RDW 15.8 (H) 11.6 - 14.5 %    PLATELET 595 (H) 500 - 420 K/uL    MPV 8.4 (L) 9.2 - 11.8 FL    NEUTROPHILS 46 42 - 75 %    BAND NEUTROPHILS 7 (H) 0 - 5 %    LYMPHOCYTES 28 20 - 51 %    MONOCYTES 10 (H) 2 - 9 %    EOSINOPHILS 1 0 - 5 %    BASOPHILS 0 0 - 3 %    METAMYELOCYTES 6 (H) 0 %    MYELOCYTES 2 (H) 0 %    ABS. NEUTROPHILS 8.0 1.8 - 8.0 K/UL    ABS. LYMPHOCYTES 4.2 (H) 0.8 - 3.5 K/UL    ABS. MONOCYTES 1.5 (H) 0 - 1.0 K/UL    ABS. EOSINOPHILS 0.2 0.0 - 0.4 K/UL    ABS.  BASOPHILS 0.0 0.0 - 0.06 K/UL    DF MANUAL      PLATELET COMMENTS Increased Platelets      RBC COMMENTS ANISOCYTOSIS  1+        RBC COMMENTS POLYCHROMASIA  1+        RBC COMMENTS OVALOCYTES  1+       PTT    Collection Time: 03/12/19 12:30 AM   Result Value Ref Range    aPTT >180.0 (HH) 23.0 - 36.4 SEC   PROTHROMBIN TIME + INR    Collection Time: 03/12/19 12:30 AM   Result Value Ref Range    Prothrombin time 14.5 11.5 - 15.2 sec    INR 1.2 0.8 - 1.2     PTT    Collection Time: 03/12/19 10:08 AM   Result Value Ref Range    aPTT 72.9 (H) 23.0 - 36.4 SEC   PTT    Collection Time: 03/12/19  4:15 PM   Result Value Ref Range    aPTT 110.3 (H) 23.0 - 36.4 SEC       Signed By: Brooke Whelan MD     March 12, 2019 10:26 PM

## 2019-03-13 NOTE — HOME CARE
Received  referral for PT,OT ,spoke to pt ,she states she does not need or want OT, but will accept [facility] PT; per  Kobe Garcia) ,states pt has been set up at  outpatient Infusion center at [facility] for IV Abx; informed  Kobe Garcia) that pt probably will need [facility] orders for drain care prior to discharge ( no orders noted  yet for [facility] drain care/flushes) ; [facility] will follow. EDENILSON HENDERSON.

## 2019-03-13 NOTE — PROGRESS NOTES
conducted a Follow up consultation and Spiritual Assessment for Richie Hunt, who is a 76 y.o.,female. The  provided the following Interventions:  Continued the relationship of care and support. Patient indicated she is feeling better. She said she has had cancer for a couple of years and has received treatment. She has family and friend who are supporting her. Her [place] jasmina is very important to her life. Listened empathically. Provided a Bible, a devotional and spiritual care greeting card. Offered prayer and assurance of continued prayer on patients behalf. Chart reviewed. The following outcomes were achieved:  Patient expressed gratitude for pastoral care visit. Assessment:  There are no further spiritual or Nondenominational issues which require Spiritual Care Services interventions at this time. Plan:  Chaplains will continue to follow and will provide pastoral care on an as needed/requested basis.  recommends patient page  on duty if patient shows signs of acute spiritual or emotional distress. Trenton Owens MDiv.   Board Certified [employer] [phone]

## 2019-03-13 NOTE — PROGRESS NOTES
Thank you for your referral for a bedside commode, shower chair and front wheel walker-[facility] is working on getting authorization for the equipment.     [address] [facility]

## 2019-03-13 NOTE — PROGRESS NOTES
[facility] 2S TELEMETRY  [address] [place] [address]  [phone]  Colon and Rectal Surgery Progress Note      Patient: Stacie Hughes MRN: [id number]  SSN: [id number]    YOB: 1950  Age: 76 y.o. Sex: female      Admit Date: 3/7/2019    LOS: 6 days     Subjective:     Minimal pain. Bleeding per vagina. Heparin gtt stopped for iliac DVT.      Objective:     Vitals:    03/13/19 0452 03/13/19 0744 03/13/19 1127 03/13/19 1438   BP:  113/62 138/75 146/81   Pulse:  94 (!) 104 (!) 104   Resp:  20 20 20   Temp:  97 °F (36.1 °C) 96.4 °F (35.8 °C) 97 °F (36.1 °C)   SpO2: 99% 98% 95% 96%   Weight:       Height:            Intake and Output:  Current Shift: 03/13 0701 - 03/13 1900  In: 240 [P.O.:240]  Out: 1740 [Urine:1700; Drains:40]  Last three shifts: 03/11 1901 - 03/13 0700  In: 2734.2 [P.O.:480; I.V.:2254.2]  Out: 3850 [Urine:3800; Drains:50]    Physical Exam:     abd soft, mild tenderness LLQ, ND    Lab/Data Review:    CBC:   Lab Results   Component Value Date/Time    WBC 11.6 03/13/2019 06:50 AM    HGB 8.6 (L) 03/13/2019 06:50 AM    HCT 27.1 (L) 03/13/2019 06:50 AM     03/13/2019 06:50 AM        Assessment:     Colonic perforation with pericolic abscess from Avastin, s/p IR drain    Plan:     Continue abx per ID  IVC filter pending  Diet as tolerated  Hold laxatives, can continue colace    Signed By: Katya Crandall MD        March 13, 2019

## 2019-03-13 NOTE — PROGRESS NOTES
Pt is having vaginal bleed  Hx of Cervical cancer and on active treatment  Chemo was held for diverticular abscess     Hold Heparin drip for 2- 3 hours   Repeat PTT in 2 hours   Will give another try of heparin drip after repeat PTT  Will keep heparin drip on minimal starting dose  Vascular consult in AM  For possible IVC filter placement

## 2019-03-13 NOTE — PROGRESS NOTES
[facility] Hospitalist Group  Progress Note    Patient: Nydia Gray Age: 76 y.o. : 1950 MR#: [id number] SSN: [id number]  Date/Time: 3/13/2019 10:26 PM    Subjective/24-hour events:     Complains of legs swelling, denies chest pain and SOB. Had some vaginal bleeding overnight, heparin infusion discontinued. Assessment:   Acute diverticulitis with pericolic abscess  Enterobacter UTI  Sepsis, POA   GNR + urine culture  Sinus tachycardia in setting of underlying infection  Iliac vein DVT  Metastatic gynecologic malignancy, endometrioid cervical CA vs endometrial CA  Hx obstructive uropathy with history of bilateral hydronephrosis and PCN placement  HTN  Obesity     Plan:  Continue antibiotics. Outpatient therapy arranged on discharge. Unable to tolerate [facility] therapy due to bleeding issues. Will ask vascular to see for recs re: management of iliac DVT. Continue to hold megace in setting of DVT. Therapy recommending [address] with PT/OT. Have ordered necessary DME.  D/C IVF. Bowel regimen. Disposition soon if stable and once vascular recs obtained. Supportive care o/w. Follow. Case discussed with:  [x]Patient  []Family  [x]Nursing  []Case Management  DVT Prophylaxis:  []Lovenox  []Hep SQ  []SCDs  []Coumadin   []On Heparin gtt    Objective:   VS:   Visit Vitals  /75 (BP 1 Location: Left arm, BP Patient Position: At rest)   Pulse (!) 104   Temp 96.4 °F (35.8 °C)   Resp 20   Ht 5' 5\" (1.651 m)   Wt 87 kg (191 lb 11.2 oz)   SpO2 95%   Breastfeeding? No   BMI 31.90 kg/m²      Tmax/24hrs: Temp (24hrs), Av.6 °F (36.4 °C), Min:96.4 °F (35.8 °C), Max:98.1 °F (36.7 °C)      Intake/Output Summary (Last 24 hours) at 3/13/2019 1357  Last data filed at 3/13/2019 1003  Gross per 24 hour   Intake 1673.97 ml   Output 3650 ml   Net -1976.03 ml       General:  In NAD. Cardiovascular:  Regular mildly tachy. Pulmonary:  Clear no wheezes. Effort nonlabored.   GI:  Abdomen soft, NTTP.  Extremities:  Warm, no ischemia. Neuro:  Awake and alert. Labs:    Recent Results (from the past 24 hour(s))   PTT    Collection Time: 03/12/19  4:15 PM   Result Value Ref Range    aPTT 110.3 (H) 23.0 - 36.4 SEC   PTT    Collection Time: 03/13/19  6:50 AM   Result Value Ref Range    aPTT 50.1 (H) 23.0 - 36.4 SEC   CBC WITH AUTOMATED DIFF    Collection Time: 03/13/19  6:50 AM   Result Value Ref Range    WBC 11.6 4.6 - 13.2 K/uL    RBC 2.99 (L) 4.20 - 5.30 M/uL    HGB 8.6 (L) 12.0 - 16.0 g/dL    HCT 27.1 (L) 35.0 - 45.0 %    MCV 90.6 74.0 - 97.0 FL    MCH 28.8 24.0 - 34.0 PG    MCHC 31.7 31.0 - 37.0 g/dL    RDW 16.0 (H) 11.6 - 14.5 %    PLATELET 540 925 - 430 K/uL    MPV 7.9 (L) 9.2 - 11.8 FL    NEUTROPHILS 63 42 - 75 %    BAND NEUTROPHILS 2 0 - 5 %    LYMPHOCYTES 18 (L) 20 - 51 %    MONOCYTES 4 2 - 9 %    EOSINOPHILS 2 0 - 5 %    BASOPHILS 0 0 - 3 %    METAMYELOCYTES 5 (H) 0 %    MYELOCYTES 6 (H) 0 %    ABS. NEUTROPHILS 7.5 1.8 - 8.0 K/UL    ABS. LYMPHOCYTES 2.1 0.8 - 3.5 K/UL    ABS. MONOCYTES 0.5 0 - 1.0 K/UL    ABS. EOSINOPHILS 0.2 0.0 - 0.4 K/UL    ABS.  BASOPHILS 0.0 0.0 - 0.06 K/UL    DF MANUAL      PLATELET COMMENTS ADEQUATE PLATELETS      RBC COMMENTS ANISOCYTOSIS  2+           Signed By: Isreal Paul MD     March 13, 2019 10:26 PM

## 2019-03-13 NOTE — PROGRESS NOTES
Problem: Mobility Impaired (Adult and Pediatric)  Goal: *Acute Goals and Plan of Care (Insert Text)  Physical Therapy Goals  Initiated 3/12/2019 and to be accomplished within 7 day(s)  1. Patient will move from supine to sit and sit to supine , scoot up and down and roll side to side in bed with modified independence. 2.  Patient will transfer from bed to chair and chair to bed with modified independence using the least restrictive device. 3.  Patient will perform sit to stand with modified independence. 4.  Patient will ambulate with modified independence for >200 feet with the least restrictive device. physical Therapy TREATMENT    Patient: Richie Bound (54 y.o. female)  Date: 3/13/2019  Diagnosis: Hydronephrosis [N13.30]  DVT (deep venous thrombosis) ([facility]) [I82.409]  Retroperitoneal abscess ([facility]) [K68.19]  Retroperitoneal abscess ([address] [K68.19] <principal problem not specified>   6 Days Post-Op  Precautions: Fall(multiple drains)  Chart, physical therapy assessment, plan of care and goals were reviewed. ASSESSMENT:  Patient presents today alert and agreeable to therapy and was sitting in locked recline upon arrival. Patient educated on role and goals of therapy and was agreeable to ambulation. Drains secured and patient stood from recliner with SBA before ambulating with RW 200ft total. Patient required 2 brief standing rest breaks apprx 15-30sec in length. Patient demos good carryover of walker use and increasing endurance with therapy. Will continue to benefit from therapy for increased endurance and mobility. At conclusion of session patient left sitting in locked recliner with feet elevated as LLE swollen > RLE. Educated not to get up without assist and she acknowledged understanding.    Progression toward goals:  [x]      Improving appropriately and progressing toward goals  []      Improving slowly and progressing toward goals  []      Not making progress toward goals and plan of care will be adjusted     PLAN:  Patient continues to benefit from skilled intervention to address the above impairments. Continue treatment per established plan of care. Discharge Recommendations:  Home Health  Further Equipment Recommendations for Discharge:  rolling walker and Shower chair       SUBJECTIVE:   Patient stated It feels so good to  the sun.     OBJECTIVE DATA SUMMARY:   Critical Behavior:  Neurologic State: Alert  Orientation Level: Oriented X4  Cognition: Follows commands  Safety/Judgement: Fall prevention  Functional Mobility Training:  Bed Mobility:   Supine to Sit: Contact guard assistance   Scooting: Supervision   Transfers:  Sit to Stand: Stand-by assistance  Stand to Sit: Stand-by assistance   Balance:  Sitting: Intact  Standing: Impaired; With support  Standing - Static: Good  Standing - Dynamic : Fair  Ambulation/Gait Training:  Distance (ft): 200 Feet (ft)(2 standing rest breaks)  Assistive Device: Walker, rolling  Ambulation - Level of Assistance: Supervision;Stand-by assistance   Gait Abnormalities: Decreased step clearance    Base of Support: Center of gravity altered   Speed/Natalya: Pace decreased (<100 feet/min)  Step Length: Left shortened;Right shortened  Education:  [x]         Bed mobility  [x]         Transfers  [x]         Ambulation  [x]         Assistive device management  []         Stairs  [x]         Body mechanics  [x]         Position change  [x]         Activity pacing/energy conservation  []         Other:    Pain:  Pt reports 0/10 pain or discomfort prior to treatment.    Pt reports 0/10 pain or discomfort post treatment. Activity Tolerance:   Patient tolerated activity well and denied dizziness or chest pain. Patient endorsed minimal SOB that was relieved by    Please refer to the flowsheet for vital signs taken during this treatment.   After treatment:   [x] Patient left in no apparent distress sitting up in chair  [] Patient left in no apparent distress in bed  [x] Call bell left within reach  [] Nursing notified  [] Caregiver present  [] Bed alarm activated      Sandra Garcia PT   Time Calculation: 24 mins

## 2019-03-13 NOTE — ROUTINE PROCESS
Bedside shift change report given to Sravani Townsend (oncoming nurse) by Jarett Kaplan RN (offgoing nurse). Report included the following information SBAR, Kardex, Intake/Output and Cardiac Rhythm NSR.

## 2019-03-13 NOTE — ROUTINE PROCESS
Rec'd pt up in recliner alert and oriented x4. Assisted back in bed with no SOB. Call bell within reach. 2300 Bilateral Nephrostomy tubes in place and flushed with sterile NS 10 ml as ordered. Tube drainage abscess flushed with sterile NS as ordered. 0450 Pt c/o bleeding small amount from her vagina -kept pt clean and dry. Dr Royer Lucas here and examined pt. Heparin gtt stopped as ordered. 0710 PTT and H/H result called to Dr Royer Lucas with order to D/C heparin. 7814 Bedside shift change report given to [address] (oncoming nurse) by Scott Roque (offgoing nurse). Report given with SBAR, Kardex, Intake/Output, MAR and Recent Results.

## 2019-03-13 NOTE — PROGRESS NOTES
Problem: Self Care Deficits Care Plan (Adult)  Goal: *Acute Goals and Plan of Care (Insert Text)  Occupational Therapy Goals  Initiated 3/12/2019 within 7 day(s). 1.  Patient will perform lower body dressing with modified independence using AE. 2.  Patient will perform toilet transfers with supervision/set-up. 3.  Patient will perform functional task in standing for 3 minutes with supervision for balance. Outcome: Progressing Towards Goal  Occupational Therapy TREATMENT    Patient: Nydia Gray (75 y.o. female)  Date: 3/13/2019  Diagnosis: Hydronephrosis [N13.30]  DVT (deep venous thrombosis) ([facility]) [I82.409]  Retroperitoneal abscess ([facility]) [K68.19]  Retroperitoneal abscess ([address] [K68.19] <principal problem not specified>   6 Days Post-Op  Precautions: Fall(multiple drains)  Chart, occupational therapy assessment, plan of care, and goals were reviewed. ASSESSMENT:  Pt is very motivated to participate in OT ang get OOB. Pt's nurse cleared pt to participate in OT. Pt requesting to maneuver to the BR and attempt to have a BM. Upon std pt noted to have a blood on the tejal pad and between her legs, stating the vaginal bleeding started yesterday. Pt insisting on going to the BR and reports no dizziness with upright position. Pt required CGA for toilet txfr and SBA for toileting hygiene in std. Pt didn't have a BM, however, urinated in the toilet, which she reports she hasn't done since the nephrostomy drains were put in. Pt maneuvered to the chair after toileting. Pt educated on AE for LB ADls, issued reacher and sock aid. Following education pt was able to doff/don socks w/Supervised A and min VCs for proper technique. Pt was positioned for comfort in the chair. Pt's nurse notified of pt's vaginal bleeding, urinating in the BR and concern of not having a BM. Pt's nurse is present at the end of the session.   Progression toward goals:  [x]          Improving appropriately and progressing toward goals  [] Improving slowly and progressing toward goals  []          Not making progress toward goals and plan of care will be adjusted     PLAN:  Patient continues to benefit from skilled intervention to address the above impairments. Continue treatment per established plan of care. Discharge Recommendations: none  Further Equipment Recommendations for Discharge:  bedside commode, shower chair and rolling walker        SUBJECTIVE:   Patient stated I am just tired from carrying all these tubes.     OBJECTIVE DATA SUMMARY:   Cognitive/Behavioral Status:  Neurologic State: Alert  Orientation Level: Oriented X4  Cognition: Follows commands  Safety/Judgement: Fall prevention    Functional Mobility and Transfers for ADLs:   Bed Mobility:     Supine to Sit: Contact guard assistance         Transfers:  Sit to Stand: Contact guard assistance(with bed elevated)    Toilet Transfer : Contact guard assistance(to the BR w/FWW)          Balance:  Sitting: Intact  Standing: Impaired; With support  Standing - Static: Good  Standing - Dynamic : Fair    ADL Intervention:     Grooming  Grooming Assistance: Modified independent(seated in the chair)  Washing Hands: Modified independent  Brushing/Combing Hair: Modified independent    Lower Body Dressing Assistance  Socks: Supervision/set-up  Adaptive Equipment Used: Reacher;Sock aid    Toileting  Toileting Assistance: Stand-by assistance(std)  Pain:  Pt reports 0/10 pain or discomfort prior to treatment.    Pt reports 0/10 pain or discomfort post treatment. Pt reports discomfort from constipation    Activity Tolerance:    Fair    Please refer to the flowsheet for vital signs taken during this treatment.   After treatment:   [x]  Patient left in no apparent distress sitting up in chair  []  Patient left in no apparent distress in bed  [x]  Call bell left within reach  [x]  Nursing notified  [x]  Caregiver present  []  Bed alarm activated      MONY Ann/KENYETTA  Time Calculation: 47 mins

## 2019-03-13 NOTE — PROGRESS NOTES
Discharge planning:    [facility] [address]. FOC signed for [facility], patient only wants PT. Order sent to [facility] via que. DME order for RW, bedside commode, and shower chair noted and sent to [facility] [employer], who will run authorization regarding coverage for this equipment.          EMORY Quick  Case Management  [phone]

## 2019-03-14 NOTE — PROGRESS NOTES
[facility] Hospitalist Group  Progress Note    Patient: Anna Rudd Age: 76 y.o. : 1950 MR#: [id number] SSN: [id number]  Date/Time: 3/14/2019 12:56 PM    Subjective/24-hour events:     Swelling better since IVF discontinued. No chest pain or other complaints. Denies further vaginal bleeding since heparin discontinued. Assessment:   Acute diverticulitis with pericolic abscess  Enterobacter UTI  Sepsis, POA   GNR + urine culture  Sinus tachycardia in setting of underlying infection  Iliac vein DVT  Metastatic gynecologic malignancy, endometrioid cervical CA vs endometrial CA  Hx obstructive uropathy with history of bilateral hydronephrosis and PCN placement  HTN  Obesity     Plan:  Continue antibiotics. Outpatient therapy arranged on discharge. Vascular surgery to see today for eval for possible filter placement. Continue to hold megace in setting of DVT. Drain care per CRS recs. Will need home care orders for this - f/u with Dr. Susan Loyola prior to discharge. [facility] arrangements and DME already in place. Anticipate discharge tomorrow, if stable. Case discussed with:  [x]Patient  []Family  [x]Nursing  []Case Management  DVT Prophylaxis:  []Lovenox  []Hep SQ  []SCDs  []Coumadin   []On Heparin gtt    Objective:   VS:   Visit Vitals  /87 (BP 1 Location: Left arm, BP Patient Position: At rest)   Pulse (!) 109   Temp 97.6 °F (36.4 °C)   Resp 16   Ht 5' 5\" (1.651 m)   Wt 85.4 kg (188 lb 4.9 oz)   SpO2 95%   Breastfeeding? No   BMI 31.34 kg/m²      Tmax/24hrs: Temp (24hrs), Av.7 °F (36.5 °C), Min:97 °F (36.1 °C), Max:98.3 °F (36.8 °C)      Intake/Output Summary (Last 24 hours) at 3/14/2019 1256  Last data filed at 3/13/2019 2217  Gross per 24 hour   Intake 1560 ml   Output 1180 ml   Net 380 ml       General:  In NAD. Cardiovascular:  Regular mildly tachy. Pulmonary:  Clear no wheezes. Effort nonlabored. GI:  Abdomen soft, NTTP.   Extremities:  Warm, no ischemia. Neuro:  Awake and alert.     Labs:    Recent Results (from the past 24 hour(s))   METABOLIC PANEL, BASIC    Collection Time: 03/14/19  5:15 AM   Result Value Ref Range    Sodium 138 136 - 145 mmol/L    Potassium 4.1 3.5 - 5.5 mmol/L    Chloride 107 100 - 108 mmol/L    CO2 25 21 - 32 mmol/L    Anion gap 6 3.0 - 18 mmol/L    Glucose 104 (H) 74 - 99 mg/dL    BUN 7 7.0 - 18 MG/DL    Creatinine 0.65 0.6 - 1.3 MG/DL    BUN/Creatinine ratio 11 (L) 12 - 20      GFR est AA >60 >60 ml/min/1.73m2    GFR est non-AA >60 >60 ml/min/1.73m2    Calcium 8.2 (L) 8.5 - 10.1 MG/DL   CBC W/O DIFF    Collection Time: 03/14/19  5:15 AM   Result Value Ref Range    WBC 11.3 4.6 - 13.2 K/uL    RBC 3.08 (L) 4.20 - 5.30 M/uL    HGB 9.0 (L) 12.0 - 16.0 g/dL    HCT 28.1 (L) 35.0 - 45.0 %    MCV 91.2 74.0 - 97.0 FL    MCH 29.2 24.0 - 34.0 PG    MCHC 32.0 31.0 - 37.0 g/dL    RDW 16.6 (H) 11.6 - 14.5 %    PLATELET 160 307 - 629 K/uL    MPV 8.2 (L) 9.2 - 11.8 FL       Signed By: Flavio Young MD     March 14, 2019 12:56 PM

## 2019-03-14 NOTE — PROGRESS NOTES
Does not want IVC filter under local  Just ate  Discussed most filters done under local  Will put on for tomorrow for filter when npo

## 2019-03-14 NOTE — PROGRESS NOTES
Chart reviewed and pt noted to have recently d/c'd Heparin and pending IVC filter placement. Discussed with DANITA [facility], RN who is in agreement to hold PT at this time pending completion of medical procedure. Will continue with POC as patient is able. Thank you,  Alexandra Higuera, PT

## 2019-03-14 NOTE — ROUTINE PROCESS
Bedside shift change report given to Claudia Enrique RN (oncoming nurse) by Miguel Medel   (offgoing nurse). Report included the following information SBAR, Kardex, Intake/Output and Cardiac Rhythm NSR.

## 2019-03-14 NOTE — PERIOP NOTES
Patients procedure rescheduled for tomorrow. Patient ate full lunch at 1300 and had something to drink 2 hours ago. Offered MAC anesthesia, but patient declined. Dr. Robert Anaya spoke with patient. States she wants general anesthesia. Spoke with Fabiola on 2S. Provided with information above and that case rescheduled for tomorrow. Patient transferred back to room 205.

## 2019-03-14 NOTE — PROGRESS NOTES
Infectious Disease progress Note    Requested by: dr. Valorie Gibson    Reason: complicated UTI, paracolic abscess    Current abx Prior abx   Pip/tazo since 3/7-3/11  Levofloxacin since 3/8  Amoxicillin/clavulanate since 3/11   Vancomycin 3/7-3/8  Ciprofloxacin 3/7     Lines:       Assessment :    77 yo female with h/o metastatic cervical cancer with bilateral obstructive uropathy s/p bilateral PCN's presented to [facility] on 3/7/19 as an outpatient for bilateral nephrostograms with bilateral ureteral stent placement and PCN replacement. Now with complex fluid collection left paracolic region s/p IR guided drainage. Clinical presentation c/w left paracolic abscess, acute diverticulitis s/p IR guided drainage 3/8. Cultures; alpha strep, enterobacter    Complicated UTI associated with obstructive uropathy, PCNL tubes. Urine cultures from nephrostomy tubes 3/7 enterobacter, ampicillin susceptible enterococcus faecalis. Urine cultures 2/18 revealed pseudomonas, enterobacter (both susceptible to quinolones). S/p PCNL exchange 3/8/19.     etiology of left paracolic abscess not entirely clear. it could be colitis/diverticulitis flare up due to severe constipation & microperforation leading to abscess versus colitis due to avastin. Immunosuppressed state from chemotherapy will likely delay recovery from infection. Also, h/o intermittent nausea. Hence, will attempt to make arrangements for oupatient iv antibiotics. Patient unable to tolerate po antibiotics. Agrees to pay copay for abx at [facility]    Episode of vaginal bleeding. Anticoagulation on hold. Plans to place ivc filter for iliac vein dvt. Discharge delayed due to this. Recommendations:    1. D/c levofloxacin. Start pip/tazo while inpatient. Recommend to using daptomycin, ertapenem tentatively till 4/9/19.   2. Follow up colorectal surgery recommendations  3. Will need follow up CT scan to determine treatment response and need for surgical interventions. Advance Care planning: full code; discussed  with patient/surrogate decision maker; Rosi Dacosta; [phone]     Above plan was discussed in details with patient,  Dr. Lowell Aguirre. Please call me if any further questions or concerns. Will continue to participate in the care of this patient. HPI:      Patient denies headaches, visual disturbances, sore throat, runny nose, earaches, cp, sob, chills, cough,  burning micturition, pain  in extremities. denies back pain/flank pain. Medication List      ASK your doctor about these medications    B COMPLEX 1 tablet  Generic drug:  b complex vitamins     * bisacodyl 5 mg EC tablet  Commonly known as:  DULCOLAX  Take 2 Tabs by mouth daily as needed for Constipation. * bisacodyl 10 mg suppository  Commonly known as:  DULCOLAX  Insert 10 mg into rectum daily as needed. calcium carbonate 500 mg calcium (1,250 mg) tablet  Commonly known as:  OS-MAYELA     cyclobenzaprine 10 mg tablet  Commonly known as:  FLEXERIL  Take 0.5 Tabs by mouth three (3) times daily as needed for Muscle Spasm(s). docusate sodium 100 mg capsule  Commonly known as:  COLACE  Take 1 Cap by mouth two (2) times a day for 90 days. Ask about: Should I take this medication? FISH OIL PO     GARLIC PO     GAS-X 80 mg chewable tablet  Generic drug:  simethicone     LORazepam 0.5 mg tablet  Commonly known as:  ATIVAN  Take 1 Tab by mouth every six (6) hours as needed for Anxiety. Max Daily Amount: 2 mg.     magic mouthwash solution  Magic mouth wash   Maalox  Lidocaine 2% viscous   Diphenhydramine oral solution     Pharmacy to mix equal portions of ingredients to a total volume as indicated in the dispense amount. megestrol 40 mg tablet  Commonly known as:  MEGACE  Take 2 Tabs by mouth two (2) times a day. * ondansetron hcl 4 mg tablet  Commonly known as:  ZOFRAN  Take 1 Tab by mouth every six (6) hours as needed for Nausea.      * ondansetron hcl 8 mg tablet  Commonly known as: ZOFRAN  Take 1 Tab by mouth every eight (8) hours as needed for Nausea. oxyCODONE-acetaminophen 5-325 mg per tablet  Commonly known as:  PERCOCET  Take 1-2 Tabs by mouth every six (6) hours as needed. Max Daily Amount: 8 Tabs. PROBIOTIC PO     RESVERATROL PO     trimethoprim-sulfamethoxazole 160-800 mg per tablet  Commonly known as:  BACTRIM DS, SEPTRA DS  Take one tablet by mouth twice a day starting 3 days prior to nephrostomy tube change     VAMSI-C PO         * This list has 4 medication(s) that are the same as other medications prescribed for you. Read the directions carefully, and ask your doctor or other care provider to review them with you.                 Current Facility-Administered Medications   Medication Dose Route Frequency    levoFLOXacin (LEVAQUIN) 500 mg in D5W IVPB  500 mg IntraVENous Q24H    0.9% sodium chloride infusion 250 mL  250 mL IntraVENous PRN    acetaminophen (TYLENOL) suppository 650 mg  650 mg Rectal Q4H PRN    sodium chloride (NS) flush 5-40 mL  5-40 mL IntraVENous Q8H    sodium chloride (NS) flush 5-40 mL  5-40 mL IntraVENous PRN    naloxone (NARCAN) injection 0.2 mg  0.2 mg IntraVENous PRN    sodium chloride (NS) flush 5-40 mL  5-40 mL IntraVENous Q8H    sodium chloride (NS) flush 5-40 mL  5-40 mL IntraVENous PRN    omega 3-DHA-EPA-fish oil 1,000 mg (120 mg-180 mg) capsule 1 Cap  1 Cap Oral DAILY    Lactobacillus Acidoph & Bulgar (FLORANEX) tablet 1 Tab  1 Tab Oral DAILY    ondansetron hcl (ZOFRAN) tablet 4 mg  4 mg Oral Q6H PRN    cyclobenzaprine (FLEXERIL) tablet 5 mg  5 mg Oral TID PRN    docusate sodium (COLACE) capsule 100 mg  100 mg Oral BID    vitamin B complex tablet  1 Tab Oral DAILY    ascorbic acid (vitamin C) (VITAMIN C) tablet   Oral DAILY    LORazepam (ATIVAN) tablet 0.5 mg  0.5 mg Oral Q6H PRN    simethicone (MYLICON) tablet 80 mg  80 mg Oral QID PRN    morphine 10 mg/ml injection 5 mg  5 mg IntraVENous Q4H PRN    ondansetron (ZOFRAN) injection 4 mg  4 mg IntraVENous Q4H PRN       Allergies: Other food; Neulasta [pegfilgrastim]; Aspirin; Milk; Tetracycline; Wheat; and Other plant, animal, environmental      Temp (24hrs), Av.4 °F (36.3 °C), Min:96.4 °F (35.8 °C), Max:98.3 °F (36.8 °C)    Visit Vitals  BP (!) 156/95 (BP 1 Location: Left arm, BP Patient Position: At rest)   Pulse (!) 109   Temp 97.8 °F (36.6 °C)   Resp 18   Ht 5' 5\" (1.651 m)   Wt 85.4 kg (188 lb 4.9 oz)   SpO2 98%   Breastfeeding? No   BMI 31.34 kg/m²       ROS: 12 point ROS obtained in details. Pertinent positives as mentioned in HPI,   otherwise negative    Physical Exam:    General:  Alert, cooperative, + distress, appears stated age. Head:  Normocephalic, without obvious abnormality, atraumatic. Eyes:  Conjunctivae/corneas clear. PERRL, EOMs intact. Nose: Nares normal. No drainage or sinus tenderness. Throat: Lips, mucosa, and tongue normal. Teeth and gums normal.   Neck: Supple, symmetrical, trachea midline, no adenopathy, thyroid: no enlargement/tenderness/nodules, no carotid bruit and no JVD. Back:   ROM normal. + tenderness. Lungs:   Clear to auscultation bilaterally. Chest wall:  No tenderness or deformity. Heart:  Regular rate and rhythm, S1, S2 normal, no murmur, click, rub or gallop. Abdomen: Soft, LLQ abdominal tenderness. Drain LLQ with bloody drainage. Bowel sounds normal. No masses,  No organomegaly. Extremities: Extremities normal, atraumatic, no cyanosis or edema. Pulses: 2+ and symmetric all extremities. Skin: Skin color, texture, turgor normal. No rashes or lesions   Neurologic: CNII-XII intact. No focal motor or sensory deficit.   Back; bilateral PCNL in place, no cva tenderness                 Labs: Results:   Chemistry Recent Labs     19  0515   *      K 4.1      CO2 25   BUN 7   CREA 0.65   CA 8.2*   AGAP 6   BUCR 11*      CBC w/Diff Recent Labs     19  0515 19  0650 19  0030 03/11/19  1653   WBC 11.3 11.6 15.1* 14.1*   RBC 3.08* 2.99* 3.08* 3.23*   HGB 9.0* 8.6* 8.8* 9.5*   HCT 28.1* 27.1* 27.5* 28.7*    328 423* 456*   GRANS  --  63 46 53   LYMPH  --  18* 28 25   EOS  --  2 1 3      Microbiology No results for input(s): CULT in the last 72 hours.        RADIOLOGY:    All available imaging studies/reports in [facility] for this admission were reviewed    Dr. Debra Valdovinos, Infectious Disease Specialist  [phone]  March 14, 2019  9:47 AM

## 2019-03-15 NOTE — PROGRESS NOTES
Infectious Disease progress Note    Requested by: dr. Mor Cabral    Reason: complicated UTI, paracolic abscess    Current abx Prior abx   Pip/tazo since 3/7-3/11  Levofloxacin since 3/8  Amoxicillin/clavulanate since 3/11   Vancomycin 3/7-3/8  Ciprofloxacin 3/7     Lines:       Assessment :    75 yo female with h/o metastatic cervical cancer with bilateral obstructive uropathy s/p bilateral PCN's presented to [facility] on 3/7/19 as an outpatient for bilateral nephrostograms with bilateral ureteral stent placement and PCN replacement. Now with complex fluid collection left paracolic region s/p IR guided drainage. Clinical presentation c/w left paracolic abscess, acute diverticulitis s/p IR guided drainage 3/8. Cultures; alpha strep, enterobacter    Complicated UTI associated with obstructive uropathy, PCNL tubes. Urine cultures from nephrostomy tubes 3/7 enterobacter, ampicillin susceptible enterococcus faecalis. Urine cultures 2/18 revealed pseudomonas, enterobacter (both susceptible to quinolones). S/p PCNL exchange 3/8/19.     etiology of left paracolic abscess not entirely clear. it could be colitis/diverticulitis flare up due to severe constipation & microperforation leading to abscess versus colitis due to avastin. Immunosuppressed state from chemotherapy will likely delay recovery from infection. Also, h/o intermittent nausea. Hence, will attempt to make arrangements for oupatient iv antibiotics. Patient unable to tolerate po antibiotics. Agrees to pay copay [facility] abx at [facility]    Episode of vaginal bleeding. Anticoagulation on hold. Plans to place ivc filter for iliac vein dvt. Discharge delayed due to this. Recommendations:    1. D/c levofloxacin. Start pip/tazo while inpatient. Recommend to using daptomycin, ertapenem tentatively till 4/9/19.   2. Follow up colorectal surgery recommendations  3. Recommend follow up CT scan to determine treatment response and need for surgical interventions. Advance Care planning: full code; discussed  with patient/surrogate decision maker; Eladio Cramer; [phone]     Above plan was discussed in details with patient,  Dr. Leana Murray. Please call me if any further questions or concerns. Will continue to participate in the care of this patient. HPI:      Patient denies headaches, visual disturbances, sore throat, runny nose, earaches, cp, sob, chills, cough,  burning micturition, pain  in extremities. denies back pain/flank pain. Medication List      ASK your doctor about these medications    B COMPLEX 1 tablet  Generic drug:  b complex vitamins     * bisacodyl 5 mg EC tablet  Commonly known as:  DULCOLAX  Take 2 Tabs by mouth daily as needed for Constipation. * bisacodyl 10 mg suppository  Commonly known as:  DULCOLAX  Insert 10 mg into rectum daily as needed. calcium carbonate 500 mg calcium (1,250 mg) tablet  Commonly known as:  OS-MAYELA     cyclobenzaprine 10 mg tablet  Commonly known as:  FLEXERIL  Take 0.5 Tabs by mouth three (3) times daily as needed for Muscle Spasm(s). docusate sodium 100 mg capsule  Commonly known as:  COLACE  Take 1 Cap by mouth two (2) times a day for 90 days. Ask about: Should I take this medication? FISH OIL PO     GARLIC PO     GAS-X 80 mg chewable tablet  Generic drug:  simethicone     LORazepam 0.5 mg tablet  Commonly known as:  ATIVAN  Take 1 Tab by mouth every six (6) hours as needed for Anxiety. Max Daily Amount: 2 mg.     magic mouthwash solution  Magic mouth wash   Maalox  Lidocaine 2% viscous   Diphenhydramine oral solution     Pharmacy to mix equal portions of ingredients to a total volume as indicated in the dispense amount. megestrol 40 mg tablet  Commonly known as:  MEGACE  Take 2 Tabs by mouth two (2) times a day. * ondansetron hcl 4 mg tablet  Commonly known as:  ZOFRAN  Take 1 Tab by mouth every six (6) hours as needed for Nausea.      * ondansetron hcl 8 mg tablet  Commonly known as: ZOFRAN  Take 1 Tab by mouth every eight (8) hours as needed for Nausea. oxyCODONE-acetaminophen 5-325 mg per tablet  Commonly known as:  PERCOCET  Take 1-2 Tabs by mouth every six (6) hours as needed. Max Daily Amount: 8 Tabs. PROBIOTIC PO     RESVERATROL PO     trimethoprim-sulfamethoxazole 160-800 mg per tablet  Commonly known as:  BACTRIM DS, SEPTRA DS  Take one tablet by mouth twice a day starting 3 days prior to nephrostomy tube change     VAMSI-C PO         * This list has 4 medication(s) that are the same as other medications prescribed for you. Read the directions carefully, and ask your doctor or other care provider to review them with you.                 Current Facility-Administered Medications   Medication Dose Route Frequency    fentaNYL citrate (PF) injection    PRN    midazolam (VERSED) injection    PRN    piperacillin-tazobactam (ZOSYN) 3.375 g in 0.9% sodium chloride (MBP/ADV) 100 mL MBP  3.375 g IntraVENous Q6H    0.9% sodium chloride infusion 250 mL  250 mL IntraVENous PRN    acetaminophen (TYLENOL) suppository 650 mg  650 mg Rectal Q4H PRN    sodium chloride (NS) flush 5-40 mL  5-40 mL IntraVENous Q8H    sodium chloride (NS) flush 5-40 mL  5-40 mL IntraVENous PRN    naloxone (NARCAN) injection 0.2 mg  0.2 mg IntraVENous PRN    omega 3-DHA-EPA-fish oil 1,000 mg (120 mg-180 mg) capsule 1 Cap  1 Cap Oral DAILY    Lactobacillus Acidoph & Bulgar (FLORANEX) tablet 1 Tab  1 Tab Oral DAILY    ondansetron hcl (ZOFRAN) tablet 4 mg  4 mg Oral Q6H PRN    cyclobenzaprine (FLEXERIL) tablet 5 mg  5 mg Oral TID PRN    docusate sodium (COLACE) capsule 100 mg  100 mg Oral BID    vitamin B complex tablet  1 Tab Oral DAILY    ascorbic acid (vitamin C) (VITAMIN C) tablet   Oral DAILY    LORazepam (ATIVAN) tablet 0.5 mg  0.5 mg Oral Q6H PRN    simethicone (MYLICON) tablet 80 mg  80 mg Oral QID PRN    morphine 10 mg/ml injection 5 mg  5 mg IntraVENous Q4H PRN    ondansetron (ZOFRAN) injection 4 mg  4 mg IntraVENous Q4H PRN       Allergies: Other food; Neulasta [pegfilgrastim]; Aspirin; Milk; Tetracycline; Wheat; and Other plant, animal, environmental      Temp (24hrs), Av.7 °F (36.5 °C), Min:97.1 °F (36.2 °C), Max:98.2 °F (36.8 °C)    Visit Vitals  /83 (BP 1 Location: Left arm, BP Patient Position: At rest)   Pulse 100   Temp 97.7 °F (36.5 °C)   Resp 18   Ht 5' 5\" (1.651 m)   Wt 83.6 kg (184 lb 4.8 oz)   SpO2 98%   Breastfeeding? No   BMI 30.67 kg/m²       ROS: 12 point ROS obtained in details. Pertinent positives as mentioned in HPI,   otherwise negative    Physical Exam:    General:  Alert, cooperative, + distress, appears stated age. Head:  Normocephalic, without obvious abnormality, atraumatic. Eyes:  Conjunctivae/corneas clear. PERRL, EOMs intact. Nose: Nares normal. No drainage or sinus tenderness. Throat: Lips, mucosa, and tongue normal. Teeth and gums normal.   Neck: Supple, symmetrical, trachea midline, no adenopathy, thyroid: no enlargement/tenderness/nodules, no carotid bruit and no JVD. Back:   ROM normal. + tenderness. Lungs:   Clear to auscultation bilaterally. Chest wall:  No tenderness or deformity. Heart:  Regular rate and rhythm, S1, S2 normal, no murmur, click, rub or gallop. Abdomen: Soft, LLQ abdominal tenderness. Drain LLQ with bloody drainage. Bowel sounds normal. No masses,  No organomegaly. Extremities: Extremities normal, atraumatic, no cyanosis or edema. Pulses: 2+ and symmetric all extremities. Skin: Skin color, texture, turgor normal. No rashes or lesions   Neurologic: CNII-XII intact. No focal motor or sensory deficit.   Back; bilateral PCNL in place, no cva tenderness                 Labs: Results:   Chemistry Recent Labs     19  0515   *      K 4.1      CO2 25   BUN 7   CREA 0.65   CA 8.2*   AGAP 6   BUCR 11*      CBC w/Diff Recent Labs     19  0515 19  0650   WBC 11.3 11.6   RBC 3.08* 2.99* HGB 9.0* 8.6*   HCT 28.1* 27.1*    328   GRANS  --  63   LYMPH  --  18*   EOS  --  2      Microbiology No results for input(s): CULT in the last 72 hours.        RADIOLOGY:    All available imaging studies/reports in [facility] for this admission were reviewed    Dr. [place] Moore, Infectious Disease Specialist  [phone]  March 15, 2019  9:47 AM

## 2019-03-15 NOTE — ROUTINE PROCESS
Bedside shift change report given to Slaoni Mcgarry RN (oncoming nurse) by Lyndsay Lora   (offgoing nurse). Report included the following information SBAR, Kardex, Intake/Output and Cardiac Rhythm NSR.

## 2019-03-15 NOTE — PROGRESS NOTES
Problem: Mobility Impaired (Adult and Pediatric)  Goal: *Acute Goals and Plan of Care (Insert Text)  Physical Therapy Goals  Initiated 3/12/2019 and to be accomplished within 7 day(s)  1. Patient will move from supine to sit and sit to supine , scoot up and down and roll side to side in bed with modified independence. 2.  Patient will transfer from bed to chair and chair to bed with modified independence using the least restrictive device. 3.  Patient will perform sit to stand with modified independence. 4.  Patient will ambulate with modified independence for >200 feet with the least restrictive device. 8034 - pt off unit at this time for procedure x2 attempts ([id number] and 1116). Will f/u at later date.

## 2019-03-15 NOTE — PROGRESS NOTES
TRANSFER - IN REPORT:    Verbal report received from Olga Schwab RN(name) on [facility]  being received from 2S(unit) for ordered procedure      Report consisted of patients Situation, Background, Assessment and   Recommendations(SBAR). Information from the following report(s) SBAR, Kardex, Procedure Summary and MAR was reviewed with the receiving nurse. Opportunity for questions and clarification was provided. Assessment completed upon patients arrival to unit and care assumed.

## 2019-03-15 NOTE — PROGRESS NOTES
NUTRITION    Nutrition Screen     RECOMMENDATIONS / PLAN:     - Update supplement flavor preferences. - Monitor and encourage po/supplement intake as tolerated. - Continue RD inpatient monitoring and evaluation. NUTRITION INTERVENTIONS & DIAGNOSIS:     [x] Meals/snacks: modify composition  [x] Medical food supplement therapy: Ensure Enlive, TID  [x] Collaboration and referral of nutrition care: Discussed pt's reported allergies with Dr. Alexis Marie, (MD to assess and change/update allergies as needed). Okay for pt to have Ensure Enlive supplements. Nutrition Diagnosis: Predicted inadequate energy intake related to decreased appetite as evidenced by pt report of poor meal intake x several days during admission, now improving. ASSESSMENT:     Hx of metastatic cervical cancer. CT scan of abdomen revealing retroperitoneal abscess, s/p IR drain. Surgery following. Pt reports recent improvement in appetite/meal intake since beginning of admission. Noted some nausea this afternoon. Consuming around 50% of meals and likes ensure supplements. Discussed pt's allergies. States she eats ice cream and cheese without reactions however if she drinks cow's milk she will have a reaction of itching. Discussed with Dr. Alexis Marie.     Average po intake adequate to meet patients estimated nutritional needs:   [x] Yes     [] No   [] Unable to determine at this time    Diet: DIET NUTRITIONAL SUPPLEMENTS Breakfast, Lunch, Dinner; ENSURE ENLIVE  DIET CARDIAC Regular      Food Allergies: wheat, milk, artificial sweeteners  Current Appetite:   [] Good     [x] Fair     [] Poor     [] Other:  Appetite/meal intake prior to admission:   [] Good     [x] Fair     [] Poor     [] Other:  Feeding Limitations:  [] Swallowing difficulty    [] Chewing difficulty    [] Other:  Current Meal Intake:   Patient Vitals for the past 100 hrs:   % Diet Eaten   03/13/19 1818 75 %   03/13/19 0927 25 %       BM: 3/14  Skin Integrity: WDL- nephrostomy tube x2 and drain to lower abdomen  Edema:   [] No     [x] Yes   Pertinent Medications: Reviewed: ascorbic acid, colace, lactobacillus, fish oil. Zofran, vitamin B complex    Recent Labs     03/14/19  0515      K 4.1      CO2 25   *   BUN 7   CREA 0.65   CA 8.2*       Intake/Output Summary (Last 24 hours) at 3/15/2019 1352  Last data filed at 3/15/2019 0339  Gross per 24 hour   Intake --   Output 1920 ml   Net -1920 ml       Anthropometrics:  Ht Readings from Last 1 Encounters:   03/07/19 5' 5\" (1.651 m)     Last 3 Recorded Weights in this Encounter    03/13/19 0439 03/14/19 0200 03/15/19 0423   Weight: 87 kg (191 lb 11.2 oz) 85.4 kg (188 lb 4.9 oz) 83.6 kg (184 lb 4.8 oz)     Body mass index is 30.67 kg/m². Weight History: Pt reports stable weight hx PTA. Weight Metrics 3/15/2019 3/1/2019 2/27/2019 2/21/2019 2/18/2019 2/4/2019 1/28/2019   Weight 184 lb 4.8 oz 189 lb 189 lb 3.2 oz 184 lb 183 lb 12.8 oz 188 lb 4.8 oz 186 lb 11.2 oz   BMI 30.67 kg/m2 31.45 kg/m2 31.48 kg/m2 30.62 kg/m2 30.59 kg/m2 31.33 kg/m2 31.07 kg/m2        Admitting Diagnosis: Hydronephrosis [N13.30]  DVT (deep venous thrombosis) (HCC) [I82.409]  Retroperitoneal abscess (HCC) [K68.19]  Retroperitoneal abscess ([address] [K68.19]  Pertinent PMHx: metastatic cervical cancer, high cholesterol, BECK    Education Needs:        [x] None identified  [] Identified - Not appropriate at this time  []  Identified and addressed - refer to education log  Learning Limitations:   [x] None identified  [] Identified    Cultural, Methodist & ethnic food preferences:  [x] None identified    [] Identified and addressed     ESTIMATED NUTRITION NEEDS:     Calories: 2106-6064 kcal (HBEx1.2-1.5) based on  [x] Actual BW: 84 kg      [] IBW   Protein:  gm (1-1.2 gm/kg) based on  [x] Actual BW      [] IBW   Fluid: 1 mL/kcal     MONITORING & EVALUATION:     Nutrition Goal(s):   1.  Po intake of meals will meet >75% of patient estimated nutritional needs within the next 7 days.   Outcome:  [] Met/Ongoing    []  Not Met    [x] New/Initial Goal     Monitoring:   [x] Food and beverage intake   [x] Diet order   [x] Nutrition-focused physical findings   [x] Treatment/therapy   [] Weight   [] Enteral nutrition intake        Previous Recommendations (for follow-up assessments only):     []   Implemented       []   Not Implemented (RD to address)      [] No Longer Appropriate     [] No Recommendation Made     Discharge Planning: cardiac diet   [x] Participated in care planning, discharge planning, & interdisciplinary rounds as appropriate      Jennifer Sanches RD   Pager: [phone]

## 2019-03-15 NOTE — PROGRESS NOTES
[facility] 2S TELEMETRY  [address] [place] [id number]  [phone]  Colon and Rectal Surgery Progress Note      Patient: Luis Manuel Denton MRN: [id number]  SSN: [id number]    YOB: 1950  Age: 76 y.o. Sex: female      Admit Date: 3/7/2019    LOS: 8 days     Subjective:     Minimal pain. Ryan diet. Had IVC filter placed this morning. Objective:     Vitals:    03/15/19 0339 03/15/19 0423 03/15/19 0711 03/15/19 1122   BP: (!) 147/94  150/83 155/90   Pulse: 99  100 98   Resp: 18  18 18   Temp: 97.9 °F (36.6 °C)  97.7 °F (36.5 °C) 97.9 °F (36.6 °C)   SpO2: 95%  98% 98%   Weight:  83.6 kg (184 lb 4.8 oz)     Height:            Intake and Output:  Current Shift: No intake/output data recorded.   Last three shifts: 03/13 1901 - 03/15 0700  In: -   Out: 4246 [Urine:3140; Drains:370]    Physical Exam:     abd soft, mild tenderness LLQ, ND    Lab/Data Review:      Assessment:     Colonic perforation with pericolic abscess from Avastin, s/p IR drain    Plan:     Continue abx per ID  Diet as tolerated  Hold laxatives, can continue colace  F/U with me in office 3/25, appointment made    Signed By: Eden Irizarry MD        March 15, 2019

## 2019-03-15 NOTE — ROUTINE PROCESS
Pt stated pain at 7/10, was given 5mg Morphine. Will reassess on rounding. Pt currently reports nausea. Green emesis bag provided. PT alert and oriented x 4. Pt has call light and side rails raised with bed in lowest position. Pt has no additional wants or needs at this time.

## 2019-03-15 NOTE — PROGRESS NOTES
Thank you for your referral for a front wheel walker, patient did not want the commode-shower chair will get shipped to patients home if approved. Delivered front wheel walker to patients room for [facility].     [address] [facility] Liaison  [facility] [employer]

## 2019-03-15 NOTE — PROGRESS NOTES
[facility] Hospitalist [facility]  Progress Note    Patient: Olga Dangelo Age: 76 y.o. : 1950 MR#: [id number] SSN: [id number]  Date/Time: 3/15/2019     Subjective/24-hour events:     Seen earlier this afternoon. Filter placed this AM without difficulty. Complains of some pain but remains without chest pain and SOB. Assessment:   Acute diverticulitis with pericolic abscess  Enterobacter UTI  Sepsis, POA   GNR + urine culture  Sinus tachycardia in setting of underlying infection  Iliac vein DVT  Metastatic gynecologic malignancy, endometrioid cervical CA vs endometrial CA  Hx obstructive uropathy with history of bilateral hydronephrosis and PCN placement  HTN  Obesity     Plan:  Same medical management. Home tomorrow AM if nothing new overnight. HHC and outpatient antibiotics arranged, DME ordered. Case discussed with:  [x]Patient  []Family  [x]Nursing  [x]Case Management  DVT Prophylaxis:  []Lovenox  []Hep SQ  []SCDs  []Coumadin   []On Heparin gtt    Objective:   VS:   Visit Vitals  /89 (BP 1 Location: Right arm, BP Patient Position: At rest)   Pulse (!) 109   Temp 97.6 °F (36.4 °C)   Resp 18   Ht 5' 5\" (1.651 m)   Wt 83.6 kg (184 lb 4.8 oz)   SpO2 95%   Breastfeeding? No   BMI 30.67 kg/m²      Tmax/24hrs: Temp (24hrs), Av.8 °F (36.6 °C), Min:97.6 °F (36.4 °C), Max:97.9 °F (36.6 °C)      Intake/Output Summary (Last 24 hours) at 3/15/2019 1940  Last data filed at 3/15/2019 0339  Gross per 24 hour   Intake --   Output 1020 ml   Net -1020 ml       General:  In NAD. Cardiovascular:  Regular mildly tachy. Pulmonary:  Clear no wheezes. Effort nonlabored. GI:  Abdomen soft, NTTP. Extremities:  Warm, no ischemia. Neuro:  Awake and alert. Labs:    No results found for this or any previous visit (from the past 24 hour(s)).     Signed By: Tyree Orozco MD     March 15, 2019

## 2019-03-15 NOTE — INTERVAL H&P NOTE
H&P Update:  Martha Colindres was seen and examined. History and physical has been reviewed. Significant clinical changes have occurred as noted:  DVT in left lower extremity and bleed now in need of ivc filter placement.     Signed By: Arthur London MD     March 15, 2019 8:49 AM

## 2019-03-15 NOTE — HOME CARE
Updated [facility] referral,received [facility] orders for drain care and flushes by Dr Sheila Mcgarry ,per DME rep note, she states pt has been provided with RW , [facility] will continue to follow for SN and PT, pt declined OT . EDENILSON HENDERSON.

## 2019-03-16 NOTE — DISCHARGE SUMMARY
[facility]  Discharge Summary       Patient: Jackie Coleman Age: 76 y.o. : 1950 MR#: [id number] SSN: [id number]  PCP on record: Lalito Felton MD  Admit date: 3/7/2019  Discharge date: 3/16/2019    Consults: CAROLE Dietz MD-urology  -Kerry Mccallum MD, ID  Procedures:-19 :Image guided left lower quadrant fluid collection drainage and drain placement.  -19: Replacement of left nephrostomy tube and right nephrostomy exchange  -IVC filter placement 03/15/19  Significant Diagnostic Studies: -3/8/19: IMPRESSION:  Negative for pulmonary embolism or acute aortic syndrome. New bronchial thickening and bronchocentric basal infiltrate siphon  bronchopneumonia versus aspiration.  -    Discharge Diagnoses: -Acute diverticulitis w/ pericolic abscess s/p  Drainage and drain placement.                            -Enterobacter UTI  -Sepsis            Patient Active Problem List   Diagnosis Code    [facility] C54.1    Severe obesity (BMI 35.0-39. 9) E66.01    ARF (acute renal failure) (HCC) N17.9    Cervical cancer ([facility]) C53.9    Retroperitoneal abscess ([facility]) K68.19    DVT (deep venous thrombosis) [facility]) I82.409       Hospital Course by Problem     76year old female w/ h/o endometrial cancer presented due to recommendations made by IR b/c of finding of retroperitoneal abscess, right iliac vein DVT. S/p drainage of abscess and drain placement by IR during this admission. Did not tolerate anticoagulation w/ heparin drip due to vaginal bleeding likely from her gyn malignancy. IVC filter was therefore placed. She had low grade tachycardia during most of her stay likely due to underlying infection. CTA chest was negative for PE. Abx regimen per ID, pt to have cont'd course after dc per ID recommendations as follows:  \" Recommend to using daptomycin, ertapenem tentatively till 19.   2. Follow up colorectal surgery recommendations  3. Recommend follow up CT scan to determine treatment response and need for surgical interventions\"     Pt to f/u w/ colorectal surgeon and IR after dc. On date of dc pt had no complaints. Exam reassuring. Stable overall for dc. Today's examination of the patient revealed:     Subjective:     Objective:   VS:   Visit Vitals  /62 (BP 1 Location: Left arm, BP Patient Position: At rest)   Pulse 95   Temp 97.7 °F (36.5 °C)   Resp 16   Ht 5' 5\" (1.651 m)   Wt 83.6 kg (184 lb 4.8 oz)   SpO2 98%   Breastfeeding? No   BMI 30.67 kg/m²      Tmax/24hrs: Temp (24hrs), Av.8 °F (36.6 °C), Min:97 °F (36.1 °C), Max:98.2 °F (36.8 °C)     Input/Output:     Intake/Output Summary (Last 24 hours) at 3/16/2019 1528  Last data filed at 3/16/2019 1582  Gross per 24 hour   Intake 240 ml   Output 2290 ml   Net -2050 ml       General: alert, awake, in nad   Cardiovascular:  Rrr, no murmurs  Pulmonary:  ctab  GI:  Soft, nt, nd  Extremities:  No edema  Additional:      Labs:    No results found for this or any previous visit (from the past 24 hour(s)). Additional Data Reviewed:     Condition:   Disposition:    []Home   []Home with Home Health   []SNF/NH   []Rehab   []Home with family   []Alternate Facility:____________________      Discharge Medications:     Current Discharge Medication List      START taking these medications    Details   HYDROcodone-acetaminophen (VICODIN) 5-300 mg tablet Take 1 Tab by mouth every six (6) hours as needed for Pain for up to 3 days. Max Daily Amount: 4 Tabs. Qty: 21 Tab, Refills: 0    Associated Diagnoses: Retroperitoneal abscess ([address]         CONTINUE these medications which have NOT CHANGED    Details   simethicone (GAS-X) 80 mg chewable tablet Take 80 mg by mouth every six (6) hours as needed for Flatulence.       magic mouthwash solution Magic mouth wash   Maalox  Lidocaine 2% viscous   Diphenhydramine oral solution     Pharmacy to mix equal portions of ingredients to a total volume as indicated in the dispense amount. Qty: 120 mL, Refills: 3      LORazepam (ATIVAN) 0.5 mg tablet Take 1 Tab by mouth every six (6) hours as needed for Anxiety. Max Daily Amount: 2 mg. Qty: 30 Tab, Refills: 0    Associated Diagnoses: Anxiety      b complex vitamins (B COMPLEX 1) tablet Take 1 Tab by mouth daily. ascorbic acid (VAMSI-C PO) Take  by mouth.      bisacodyl (DULCOLAX) 10 mg suppository Insert 10 mg into rectum daily as needed. Qty: 10 Suppository, Refills: 0      cyclobenzaprine (FLEXERIL) 10 mg tablet Take 0.5 Tabs by mouth three (3) times daily as needed for Muscle Spasm(s). Qty: 30 Tab, Refills: 0      megestrol (MEGACE) 40 mg tablet Take 2 Tabs by mouth two (2) times a day. Qty: 120 Tab, Refills: 3      ondansetron hcl (ZOFRAN) 4 mg tablet Take 1 Tab by mouth every six (6) hours as needed for Nausea. Qty: 30 Tab, Refills: 3      GARLIC PO Take  by mouth daily. RESVERATROL PO Take  by mouth daily. calcium carbonate (OS-MAYELA) 500 mg calcium (1,250 mg) tablet Take  by mouth daily. DOCOSAHEXANOIC ACID/EPA (FISH OIL PO) Take 1,000 mg by mouth daily. LACTOBACILLUS ACIDOPHILUS (PROBIOTIC PO) Take  by mouth daily. STOP taking these medications       trimethoprim-sulfamethoxazole (BACTRIM DS, SEPTRA DS) 160-800 mg per tablet Comments:   Reason for Stopping:         bisacodyl (DULCOLAX) 5 mg EC tablet Comments:   Reason for Stopping:         docusate sodium (COLACE) 100 mg capsule Comments:   Reason for Stopping:         oxyCODONE-acetaminophen (PERCOCET) 5-325 mg per tablet Comments:   Reason for Stopping: Follow-up Appointments:   1. Your PCP: Katiuska Salomon MD, within 7-10days  2.  Colorectal surgeon, Dr. Joanne Bloom  3. IR            >30 minutes spent coordinating this discharge (review instructions/follow-up, prescriptions, preparing report for sign off)    Signed:  Scotty Dover MD  3/16/2019  3:28 PM

## 2019-03-16 NOTE — DISCHARGE INSTRUCTIONS
Patient Education        Learning About Deep Vein Thrombosis  What is deep vein thrombosis? A deep vein thrombosis (DVT) is a blood clot in certain veins of the legs, pelvis, or arms. The clot is usually in the legs. DVT may damage the vein and cause the area to ache, swell, and change color. DVT also can lead to sores. DVT in these veins needs to be treated because the clots can get bigger, break loose, and travel through the bloodstream to the lungs. A blood clot in a lung can cause death. Blood clots can form in the veins when you are not active for a long period of time. For example, they can form if you need to stay in bed because of a health problem or must sit for a long time on an airplane or in a car. Surgery or an injury can damage your blood vessels and cause a clot to form. Cancer also can cause DVT. And some people have blood that clots too easily, which is a problem that may run in families. A risk factor is something that makes you more likely to develop a disease. Here are some major risk factors for DVT:  · You have surgery. · You have to stay in bed for more than 3 days (such as in the hospital). · Your blood is likely to clot because of an injury, cancer, or inherited condition. Here are some minor risk factors for DVT:  · You take birth control hormones. · You are pregnant. · You are in a car or airplane for a long trip. What are the symptoms? Symptoms of DVT may include:  · Swelling in the affected area. · Redness and warmth in the affected area. · Pain or tenderness. You may have pain only when you touch the affected area or when you stand or walk. If your doctor thinks you may have DVT, you will probably have an ultrasound test. You may have other tests as well. How can you prevent DVT? · Exercise your lower leg muscles to help blood flow in your legs. Point your toes up toward your head so the calves of your legs are stretched, then relax and repeat.  This is a good exercise to do when you are sitting for long periods of time. · Get out of bed as soon as you can after an illness or surgery. If you need to stay in bed, do the leg exercise noted above every hour when you are awake. · Use special stockings called compression stockings. These stockings are tight at the feet with a gradually looser fit on the leg. Many doctors recommend that you wear compression stockings during a journey longer than 8 hours. · Take breaks when you are on long trips. Stop the car and walk around. On long airplane flights, walk up and down the aisle hourly, and flex and point your feet every 20 minutes while sitting. · Take blood-thinning medicines after some types of surgery if your doctor recommends it. Blood thinners also may be used if you are likely to develop clots. How is DVT treated? Treatment for DVT usually involves taking blood thinners. These medicines are given through a vein (intravenously, or IV) or as a pill. Talk with your doctor about which medicine is right for you. Your doctor also may suggest that you prop up or elevate your leg when possible, take walks, and wear compression stockings. These measures may help reduce the pain and swelling that can happen with DVT. Follow-up care is a key part of your treatment and safety. Be sure to make and go to all appointments, and call your doctor if you are having problems. It's also a good idea to know your test results and keep a list of the medicines you take. Where can you learn more? Go to http://kj-ron.info/. Enter P945 in the search box to learn more about \"Learning About Deep Vein Thrombosis. \"  Current as of: September 26, 2018  Content Version: 11.9  © 4918-5885 [employer]. Care instructions adapted under license by [employer] (which disclaims liability or warranty for this information).  If you have questions about a medical condition or this instruction, always ask your healthcare professional. [address] any warranty or liability for your use of this information.

## 2019-03-16 NOTE — PROGRESS NOTES
Discharge:    Discharge order noted for today. Pt has been accepted to [facility] agency. Met with patient and she is agreeable to the transition plan today. Transport has been arranged through her friend. Patient's discharge summary and home health  orders have been forwarded to [facility] home health  agency via [address] [place] [address] Rd. Updated bedside RN to the transition plan. Discharge information has been documented on the AVS.       Sonido Patterson MSW  Care Manager  Pager#: [phone]

## 2019-03-16 NOTE — ROUTINE PROCESS
Bedside shift change report given to Rolando Lebron RN (oncoming nurse) by Didi Lacy   (offgoing nurse). Report included the following information SBAR, Kardex, Intake/Output and Cardiac Rhythm NSR.

## 2019-03-16 NOTE — PROGRESS NOTES
Progress Note    Patient: Johana Recio MRN: [id number]  SSN: [id number]    YOB: 1950  Age: 76 y.o. Sex: female      Admit Date: 3/7/2019    9 Days Post-Op    Procedure:  * No procedures listed *    Subjective:     Patient has no new complaints. Ryan PO. Minimal abd pain. BM w flatus today. Objective:     Visit Vitals  /62 (BP 1 Location: Left arm, BP Patient Position: At rest)   Pulse 95   Temp 97.7 °F (36.5 °C)   Resp 16   Ht 5' 5\" (1.651 m)   Wt 83.6 kg (184 lb 4.8 oz)   SpO2 98%   Breastfeeding? No   BMI 30.67 kg/m²       Temp (24hrs), Av.8 °F (36.6 °C), Min:97 °F (36.1 °C), Max:98.2 °F (36.8 °C)      Physical Exam:    General:  Alert,, no distress. Lungs:   Clear to auscultation bilaterally. Heart:  Regular rate and rhythm. Abdomen:   Soft, minimal LLQ tenderness. Bowel sounds normal.    Extremities: Extremities normal, atraumatic, no cyanosis or edema. No calf tenderness. Data Review: images and reports reviewed    Lab Review: All lab results for the last 24 hours reviewed. No labs since 3/14    Assessment:     Hospital Problems  Date Reviewed: 2019          Codes Class Noted POA    Retroperitoneal abscess [facility]) ICD-10-CM: S88.69  ICD-9-CM: 567.38  3/7/2019 Unknown        DVT (deep venous thrombosis) ([facility] ICD-10-CM: I82.409  ICD-9-CM: 453.40  3/7/2019 Unknown              Plan/Recommendations/Medical Decision Making:     Continue present treatment. Stable s/p IR drain of colon perf w abscess. Clear for d/c. F/U with Dr. Jeannine Weber -- appt already scheduled.       Signed By: Hiren Agustin MD     2019

## 2019-03-16 NOTE — PROGRESS NOTES
D/C pt home, waiting family at this time. Discharge instructions given as well as RX. Follow up appointments addressed.

## 2019-03-18 NOTE — PROGRESS NOTES
[facility] Progress Note    Date: 2019    Name: Kelley Lovell    MRN: [id number]         : 1950    Invanz/Dapto Infusion    Ms. Hewitt to [facility], via wheelchair at [address] [place] [address]. Pt was assessed and education was provided. Ms. Hewitt's vitals were reviewed. Visit Vitals  /84 (BP 1 Location: Right arm, BP Patient Position: Sitting)   Pulse (!) 113   Temp 97.8 °F (36.6 °C)   Resp 18   SpO2 97%       Right chest mediport accessed with a 20G 1 inch salgado using sterile technique. []  Vancomycin     [x]  Invanz 1G     [x]  Cubicin 350 mg     []  Rocephin   Invanz over 30 minutes and Dapto slow push over 2-3 minutes. Ms. Sera Morley tolerated infusion, and had no complaints at this time. Mediport flushed with NS 10 ml and Heparin 250 units. Green end caps applied, and lumens secured with paper tape. Patient armband removed and shredded. Ms. Sera Morley was discharged from [place] [address] in stable condition at 1015. She is to return on 19 at 0930 for her next antibiotic appointment.     Consuelo Alvarez RN  2019

## 2019-03-18 NOTE — HOME CARE
Discharge noted over the weekend. Referral completed by [facility]. [facility] to follow for SN and PT.  Liz Huston LPN

## 2019-03-19 NOTE — PROGRESS NOTES
[facility] Progress Note Date: 2019 Name: Oleg Brennan MRN: [id number] : 1950 Invanz/Dapto Infusion 
  
Ms. Hewitt to [facility], via wheelchair at [address] Pt was assessed and education was provided.  
  
Ms. Hewitt's vitals were reviewed. Visit Vitals /84 (BP 1 Location: Right arm, BP Patient Position: Sitting) Pulse (!) 113 Temp 97.8 °F (36.6 °C) Resp 18 SpO2 97%  
  
  
Right chest mediport accessed on 3/18/19 with a 20G 1 inch salgado, positive for brisk blood return. 
  
  
  []  Vancomycin  
  [x]  Invanz 1G [x]  Cubicin 350 mg  
  []  Rocephin Invanz over 30 minutes; and Dapto slow push over 2-3 minutes.  
  
Ms. Hewitt tolerated infusion, and had no complaints at this time. 
  
Mediport flushed with NS 10 ml and Heparin 500 units. Green end caps applied, and lumens secured with paper tape.  
  
Patient armband removed and shredded. 
  
Ms. Hewitt was discharged from [place] [address] in stable condition at 1030. She is to return on 19 at 0930 for her next antibiotic appointment. 
  
Saray Hazel RN 
2019 
1030

## 2019-03-21 NOTE — PROGRESS NOTES
[facility] Progress Note Date: 2019 Name: Jaspreet Patrick MRN: [id number] : 1950 Invanz/Dapto Infusion 
  
Ms. Hewitt to [facility], via wheelchair at 5. Denies pain. Pt was assessed and education was provided.  
  
Ms. Hewitt's vitals were reviewed. Visit Vitals /84 (BP 1 Location: Right arm, BP Patient Position: Sitting) Pulse (!) 113 Temp 97.8 °F (36.6 °C) Resp 18 SpO2 97%  
  
  
Right chest mediport accessed on 3/18/19 with a 20G 1 inch salgado, positive for brisk blood return. Hiren Claw and drsg d/i. No redness, swelling, drainage or tenderness to site noted 
  
  
  []  Vancomycin  
  [x]  Invanz 1G [x]  Cubicin 350 mg  
  []  Rocephin Invanz over 30 minutes, followed by and Daptomycin slow push over 2-3 minutes.  
  
Port site drsg and salgado remains dry and intact and s signs/symptoms. Ms. Lizzeth Mayfield tolerated infusion, and had no complaints.  
  
Mediport flushed with NS 10 ml and Heparin 500 units. Green end caps applied, and lumens secured with paper tape.  
  
Patient Vitals for the past 4 hrs: 
 Temp Pulse Resp BP SpO2  
19 0915 98.3 °F (36.8 °C) (!) 115 18 132/85 97 % Patient armband removed and shredded. 
  
Ms. Hewitt was discharged from [place] [address] in stable condition at 1020. She is to return on 19 at 0900 for her next antibiotic appointment. 
  
Cathleen Mcdowell RN 
2019 
1030

## 2019-03-22 NOTE — PROGRESS NOTES
[facility] Progress Note Date: 2019 Name: Kelley Lovell MRN: [id number] : 1950 Invanz/Dapto Infusion 
  
Ms. Hewitt to [facility], via wheelchair at 5. Denies pain or nausea. Pt was assessed and education was provided.  
  
Ms. Hewitt's vitals were reviewed. Visit Vitals /84 (BP 1 Location: Right arm, BP Patient Position: Sitting) Pulse (!) 113 Temp 97.8 °F (36.6 °C) Resp 18 SpO2 97%  
  
  
Right chest mediport accessed on 3/18/19 with a 20G 1 inch salgado, and sluggish blood return, but good flush noted. Will reassess after invanz infusion. Eliezer Meneses and drsg d/i. No redness, swelling, drainage or tenderness to site noted 
  
  
  []  Vancomycin  
  [x]  Invanz 1G  
  []    
  []  Rocephin Invanz over 30 minutes, followed by normal saline flush, but no blood returns.  
  
Cath mariola 2mg/2 ml instilled in port to dwell over 30-45 minutes. Brisk blood returns noted, and 10 ml blood discarded, then blood sample obtained for cbc, bmp and cpk per Dr Lupe Syed verbal order for today. Written lab order to follow per Dr Mar Villalobos. Port flushed with normal saline, followed by daptomycin 350 mg over 2 minutes IVP Port flushed with normal saline and heparin per protocol, and port left accessed for use tomorrow. Port site drsg and salgado remains dry and intact and s signs/symptoms. Green end caps applied, and lumens secured with paper tape.  
    
Ms. Hewitt tolerated infusion, and had no complaints. Patient armband removed and shredded. 
  
Ms. Hewitt was discharged from [place] [address] in stable condition at 1120. She is to return on 19 at 0900 for her next antibiotic appointment. 
  
Carroll De León RN 
2019 
1030

## 2019-03-24 NOTE — PROGRESS NOTES
[facility] Progress Note Date: 2019 Name: Earnest Romano MRN: [id number] : 1950 Invanz/Dapto Infusion 
  
Ms. Hewitt to [facility], via wheelchair at 0900. Patient c/o pain 4/10 in left flank. Pt was assessed and education was provided.  
  
Ms. Hewitt's vitals were reviewed. Visit Vitals /84 (BP 1 Location: Right arm, BP Patient Position: Sitting) Pulse (!) 113 Temp 97.8 °F (36.6 °C) Resp 18 SpO2 97%  
  
  
Right chest mediport accessed on 3/18/19 with a 20G 1 inch salgado, positive for brisk blood return. Thang Prader and drsg d/i. No redness, swelling, drainage or tenderness to site noted.  
  
  []  Vancomycin  
  [x]  Invanz 1G [x]  Cubicin 350 mg  
  []  Rocephin Invanz over 30 minutes, followed by and Daptomycin slow push over 2-3 minutes.  
  
Port site drsg and salgado remains dry and intact and s signs/symptoms. Ms. Althea Gamez tolerated infusion, and had no complaints.  
  
Mediport flushed with NS 20 ml and Heparin 500 units. Port deaccessed since Thang Prader will need to be replaced Monday morning, to give skin a rest from tsm dressings. No bleeding from site. Site covered with a bandaid.  
  
Patient Vitals for the past 4 hrs: 
 Temp Pulse Resp BP SpO2  
19 0905 98.2 °F (36.8 °C) (!) 107 18 122/87 96 % Patient armband removed and shredded. 
  
Ms. Hewitt was discharged from [place] [address] in stable condition at 0950. She is to return on 19 at 0900 for her next antibiotic appointment. 
  
Mark Wallace RN 
2019 
0957

## 2019-03-25 PROBLEM — K63.1 COLON PERFORATION (HCC): Status: ACTIVE | Noted: 2019-01-01

## 2019-03-25 NOTE — PROGRESS NOTES
[facility] Progress Note Date: 2019 Name: Saint Singh MRN: [id number] : 1950 Invanz/Dapto Infusion 
  
Ms. Hewitt to [facility], via wheelchair at [phone]. Patient c/o pain 4/10 in left flank. Pt was assessed and education was provided.  
  
Ms. Hewitt's vitals were reviewed. Visit Vitals /84 (BP 1 Location: Right arm, BP Patient Position: Sitting) Pulse (!) 113 Temp 97.8 °F (36.6 °C) Resp 18 SpO2 97%  
  
  
Right chest mediport accessed using sterile technique per policy, with a 45B 1 inch salgado, positive for brisk blood return. Site covered with biopatch and TSM dressing. No redness, swelling, drainage or tenderness to site noted.  
  
  []  Vancomycin  
  [x]  Invanz 1G [x]  Cubicin 350 mg  
  []  Rocephin Invanz over 30 minutes, followed by and Daptomycin slow push over 2-3 minutes.  
  
Port site drsg and salgado remains dry and intact and s signs/symptoms. Ms. Kellie Temple tolerated infusion, and had no complaints.  
  
Mediport flushed with NS 20 ml and Heparin 500 units. Port remained accessed per pt preference for remaining infusions this week. 
  
Patient armband removed and shredded. 
  
Ms. Hewitt was discharged from [place] [address] in stable condition at 0950. She is to return on 19 at 0900 for her next antibiotic appointment. 
  
Domenico Albrecht RN 
2019

## 2019-03-25 NOTE — PROGRESS NOTES
Subjective: She is seen here for follow-up after recent hospitalization for colonic perforation secondary to a Avastin. She had IR drain placed. She is currently tolerating diet. Her pain is at a minimum. She is on outpatient IV antibiotics. Past medical history and ROS were reviewed and unchanged. Abdomen: Soft, nontender nondistended  Drain with scant output, serosanguineous    Assessment / Plan    Status post colonic perforation with abscess secondary to a Avastin during treatment for metastatic gynecologic cancer  Schedule repeat CT and drain study  Follow-up in 10 days    A total of 15 minutes was spent with the patient, with >50% of time spent on counseling and coordination of care. The diagnoses and plan were discussed with patient. All questions answered. Plan of care agreed to by all concerned.

## 2019-03-26 NOTE — PROGRESS NOTES
[facility] Progress Note Date: 2019 Name: Oleg Brennan MRN: [id number] : 1950 Invanz/Dapto Infusion 
  
Ms. Hewitt to [facility], via wheelchair at 1010. Patient c/o pain 2/10 in left flank. Pt was assessed and education was provided.  
  
Ms. Hewitt's vitals were reviewed. Visit Vitals /84 (BP 1 Location: Right arm, BP Patient Position: Sitting) Pulse (!) 113 Temp 97.8 °F (36.6 °C) Resp 18 SpO2 97%  
  
  
Right chest mediport accessed from 3/25/19, positive for brisk blood return. No redness, swelling, drainage or tenderness to site noted.  
  
  []  Vancomycin  
  [x]  Invanz 1G [x]  Cubicin 350 mg  
  []  Rocephin Invanz over 30 minutes, followed by and Daptomycin slow push over 2-3 minutes.  
  
Port site drsg and salgado remains dry and intact and s signs/symptoms. Ms. Funmilayo Kim tolerated infusion, and had no complaints.  
  
Mediport flushed with NS 20 ml and Heparin 500 units. Port remained accessed per pt preference for remaining infusions this week, secured with paper tape, curos caps placed. 
  
Patient armband removed and shredded. 
  
Ms. Hewitt was discharged from [place] [address] in stable condition at 1110. She is to return on 19 at 0900 for her next antibiotic appointment. 
  
Aida Strong RN 
2019 
[id number]

## 2019-03-27 NOTE — PROGRESS NOTES
[facility] Progress Note Date: 2019 Name: David Nguyễn MRN: [id number] : 1950 In[facility] 
  
Ms. Hewitt to [facility], via wheelchair at 36. Patient c/o pain 2/10 in left flank. Pt was assessed and education was provided.  
  
Ms. Hewitt's vitals were reviewed. Visit Vitals /84 (BP 1 Location: Right arm, BP Patient Position: Sitting) Pulse (!) 113 Temp 97.8 °F (36.6 °C) Resp 18 SpO2 97%  
  
  
Right chest mediport accessed from 3/25/19, positive for brisk blood return. No redness, swelling, drainage or tenderness to site noted.  
  
  []  Vancomycin  
  [x]  Invanz 1G [x]  Cubicin 350 mg  
  []  Rocephin Invanz over 30 minutes, followed by and Daptomycin slow push over 2-3 minutes.  
  
Port site drsg and salgado remains dry and intact and s signs/symptoms. Ms. Naseem Kaye tolerated infusion, and had no complaints.  
  
Mediport flushed with NS 20 ml and Heparin 500 units. Port remained accessed per pt preference for remaining infusions this week, secured with paper tape, curos caps placed. 
  
Patient armband removed and shredded. 
  
Ms. Hewitt was discharged from [place] [address] in stable condition at 1100. She is to return on 19 at 1000 for her next antibiotic appointment. 
  
Karina Benavides RN 
2019

## 2019-03-27 NOTE — PROGRESS NOTES
Patient here via wheelchair from [facility] after receiving antibiotics. I took her after Follow up with Dr. Saundra Wyatt to her car vi wheelchair.

## 2019-03-27 NOTE — PATIENT INSTRUCTIONS
Cervical Cancer: Care Instructions  Your Care Instructions    Cervical cancer occurs when abnormal cells on the cervix grow out of control. These cells may spread to nearby organs, lymph glands, or distant organs. The cervix is the lower part of the uterus that opens into the vagina. This form of cancer can be cured most of the time, especially when found at an early stage. It is usually found at a very early stage through a Pap smear. If the cancer is in an early stage, you may need to have only a small part of the cervix removed. This type of surgery may allow a woman to become pregnant later. In other cases, removal of the cervix and uterus (hysterectomy) may be the better choice. Treatment also may include radiation or chemotherapy. You may get medicines to help with chemotherapy or radiation side effects, such as nausea and vomiting or tiredness. Finding out that you have cancer is scary. You may feel many emotions and may need some help coping. Seek out family, friends, and counselors for support. You also can do things at home to make yourself feel better while you go through treatment. Call the [facility] (6-238.919.9748) or visit its website at [address] [employer] for more information. Follow-up care is a key part of your treatment and safety. Be sure to make and go to all appointments, and call your doctor if you are having problems. It's also a good idea to know your test results and keep a list of the medicines you take. How can you care for yourself at home? · Take your medicines exactly as prescribed. Call your doctor if you think you are having a problem with your medicine. You may get medicine for nausea and vomiting if you have these side effects. · Eat healthy food. If you do not feel like eating, try to eat food that has protein and extra calories to keep up your strength and prevent weight loss. Drink liquid meal replacements for extra calories and protein.  Try to eat your main meal early. · Get some physical activity every day, but do not get too tired. Keep doing the hobbies you enjoy as your energy allows. · Take steps to control your stress and workload. Learn relaxation techniques. ? Share your feelings. Stress and tension affect our emotions. By expressing your feelings to others, you may be able to understand and cope with them. ? Consider joining a support group. Talking about a problem with your spouse, a good friend, or other people with similar problems is a good way to reduce tension and stress. ? Express yourself through art. Try writing, dance, art, or crafts to relieve tension. Some dance, writing, or art groups may be available just for people who have cancer. ? Be kind to your body and mind. Getting enough sleep, eating a healthy diet, and taking time to do things you enjoy can contribute to an overall feeling of balance in your life and help reduce stress. ? Get help if you need it. Discuss your concerns with your doctor or counselor. · If you are vomiting or have diarrhea:  ? Drink plenty of fluids (enough so that your urine is light yellow or clear like water) to prevent dehydration. Choose water and other caffeine-free clear liquids. If you have kidney, heart, or liver disease and have to limit fluids, talk with your doctor before you increase the amount of fluids you drink. ? When you are able to eat, try clear soups, mild foods, and liquids until all symptoms are gone for 12 to 48 hours. Other good choices include dry toast, crackers, cooked cereal, and gelatin dessert, such as Jell-O.  ? Take care of your urinary tract to prevent problems such as infection, which can be caused by cervical cancer and its treatment. Limit drinks with caffeine, drink plenty of fluids, and urinate every 3 or 4 hours. · If you have not already done so, prepare a list of advance directives.  Advance directives are instructions to your doctor and family members about what kind of care you want if you become unable to speak or express yourself. When should you call for help? Call 911 anytime you think you may need emergency care. For example, call if:    · You passed out (lost consciousness).    Call your doctor now or seek immediate medical care if:    · You have a fever.     · You have abnormal bleeding.     · You think you have an infection.     · You have new or worse pain.     · You have new symptoms, such as a cough, belly pain, vomiting, diarrhea, or a rash.    Watch closely for changes in your health, and be sure to contact your doctor if:    · You are much more tired than usual.     · You have swollen glands in your armpits, groin, or neck.     · You do not get better as expected. Where can you learn more? Go to http://kj-ron.info/. Enter F505 in the search box to learn more about \"Cervical Cancer: Care Instructions. \"  Current as of: March 27, 2018  Content Version: 11.9  © 9769-1555 [employer]. Care instructions adapted under license by [employer] (which disclaims liability or warranty for this information). If you have questions about a medical condition or this instruction, always ask your healthcare professional. [address] any warranty or liability for your use of this information.

## 2019-03-27 NOTE — PROGRESS NOTES
[facility]  [address] [place] [address], [address], [address]  [address] [facility], [address] [place] [address]  [place], [address]   [phone]  Marga Pedro DO      Patient ID:  Name:  Leann Baez  MRN:  [id number]  :  68 y.o. Date:  3/27/2019      HISTORY OF PRESENT ILLNESS:  Leann Baez is a 76 y.o.   postmenopausal female self-referred for Stage IVB Grade 2 endometrioid adenocarcinoma, favor cervical primary. She initially presented with complaint of PMB x8-10 months to PCP. She was referred to gyn Dr. Monserrat Vela, who referred to Dr. Hobson Canavan for large cervical mass. Last pap 17 years prior. Biopsy of cervical mass 17 showed adenocarcinoma, most c/w endometrioid adenocarcinoma, with initial staining suggesting endometrial origin. Patient had MRI of pelvis 17, which showed BL parametrial involvement, L>R, cancer extending from the large cervical mass into fundus and anterior 1/3 of vagina, no obvious adenopathy. PETCT 17 suspicious for metastatic disease to left ovary, omentum, liver report scanned in media. Patient is s/p EUA, cysto, sigmoidoscopy 17, medial left parametrial involvement, no tumor in bladder or rectosigmoid colon. She is s/p exploratory laparotomy with radical hysterectomy, BSO, resection of omental mass 9/15/17, pathology consistent with metastatic endometrioid adenocarcinoma, favor cervical primary, pathology report scanned in media. Patient is s/p 6 cycles Carbo/Taxol, completed 2018. Most recent PETCT 18 demonstrated new uptake in small nodular densities adjacent to cecum concerning for metastatic disease, increasing uptake in right thyroid gland concerning for neoplasm, as well as interval response to prior uptake. She was referred to Dr. Maria Teresa Cordova for further workup, suggestive of goiters, she has follow up in a few months.  Was seen and discussed proceeding with avastin and hormonal treatment. Pt was then admitted 12/5-12/13 with renal failure and hydronephrosis and had bilateral PCN placed. S/p cycles #2 of topotecan/avastin completed 2/22/2019. Having trouble with constipation and gas pains with associated nausea. Denies numbness/tingling. Was admitted to [facility] perforated colon and diverticulitis. Had a drain placed and has been on antibiotics. Feeling better now. No vaginal bleeding. Pathology:  9/15/17  Metastatic endometrioid adenocarcinoma, favor cervical primary      Labs:  Component       Cancer Ag (CA) 125   Latest Ref Rng & Units       0.0 - 38.1 U/mL   2/18/2019      9:30 AM 8.7   1/28/2019      10:05 .7 (H)   11/14/2018      4:40 .0 (H)   10/3/2018      3:45 .0 (H)   6/18/2018      1:13 PM 45 (H)   5/16/2018      8:20 AM 27   4/23/2018      12:33 PM 17   3/23/2018      2:44 PM 16   2/23/2018      5:14 PM 15   1/18/2018      3:56 PM 14   12/21/2017      10:42 AM 15   12/7/2017      1:53 PM 16   9/11/2017      11:30  (H)       Imaging  PETCT  11/23/2018  FINDINGS:       PET/CT:     Reference data:     The mediastinal blood pool SUV max value = 1.96. The liver SUV Max value  = 2.19  .      HEAD/NECK:  There is a hypermetabolic adenopathy identified in the right  supraclavicular region, measuring 1.5 x 1.7 cm, SUV Max = 7.11.] It was 7 x 9 mm  and not hypermetabolic on prior study. No additional adenopathy or  hypermetabolic activity seen in the neck. CHEST: There is interval development of multiple nodular lesions scattered in  both lungs, most likely suggestive of lung metastasis. -The dominant nodule in left lung is in medial anterior left upper lobe  measuring 1.0 x 1.4 cm, #70.  Mild hypermetabolic activity noted, SUV Max = 1.7.   -The second largest nodule in left lower lobe infrahilar region measures 8 x 8  mm on #85, SUV Max = 1.7.   -The dominant mass in left lung measures 9 x 9 mm at lateral right lower lobe,  SUV Max = 1.0, #87 .   -The second 8 x 9 mm solid nodule at posterior right lower lobe, SUV Max = 1.0,  #87.   -There is 6 x 9 mm small cavitary lesion with thickened wall at anterior right  upper lobe, #69 and SUV Max = 1.3.   -Multiple other 3-5 mm nodules also scattered in both lungs, too small for  accurate evaluation on PET.     No mediastinal or axillary adenopathy or hypermetabolic chest wall finding. ABDOMEN/PELVIS:  There is a large mass with central necrosis identified in the  left subphrenic region, most likely arising from the spleen and measures 10.2 x  14.3 x 12.5 cm. There is significant hypermetabolic activity identified along  the periphery of the mass, SUV Max = 8.79. The mass was much smaller and barely  visible on prior CT but hypermetabolic on prior PET scan, roughly measured 1.8 x  2.5 cm, SUV Max was 4.47. The second exophytic mass also developed at posterior  aspect of the spleen abutting the dominant mass which measures 4.1 x 5.1 x 6.0  cm, SUV Max = 6.99. The mass is tightly abutting the greater curvature of  gastric wall. Direct invasion cannot be entirely excluded.      There is a large ill-defined soft tissue mass identified in the midline pelvic  floor and roughly measures 8.2 x 9.9 cm with intensive FDG uptake, SUV Max =  14.2. This is new finding since the prior study as most likely recurrent  malignancy.     There are multiple areas of hypermetabolic soft tissue masses identified in the  retroperitoneal regions, the largest masses are abutting and invading the  bilateral iliopsoas muscles, measuring 5.3 x 7.1 cm and SUV Max = 13.4 on the  left and 5.7 x 6.3 cm on the right with SUV Max of 10.6. There is moderate  adjacent large soft tissue mass abutting the lateral right iliac crest measuring  5.0 x 6.2 cm, SUV Max = 9.6.     Multiple hypermetabolic nodular lesions also identified scattered in the  peritoneal cavity and compatible with carcinomatosis.  The largest two are 2.4 x  3.0 cm, SUV Max = 9.1 in the lateral right pericolic gutter and 2.5 x 4.3 cm  with SUV max of 13.2 at anterior right pelvic cavity abutting the peritoneum. Multiple other smaller hypermetabolic foci also scattered in the peritoneal  cavities.     Mild retroperitoneal adenopathy also developed, the largest is 1.3 x 1.5 cm in  caval aortic region, SUV Max = 6.9. Multiple hypermetabolic lymph nodes also  seen in the bilateral pelvic sidewalls.     BONES:  No malignant FDG activity.      ADDITIONAL CT FINDINGS:    (Exam is limited due to lack of intravenous contrast.)     Moderate interstitial lung process again seen.      Interval development of moderate bilateral hydronephrosis and hydroureter up to  the very distal portions in the pelvis, most likely due to extrinsic compression  by large pelvic mass. The bladder is poorly distended. Mild fatty stranding in  the pelvis.     IMPRESSION  IMPRESSION:     1. Interval development of multiple hypermetabolic nodular lesions in bilateral  lungs, most likely consistent with lung metastasis.     2. Interval development of large ill-defined soft tissue mass in the surgical  bed in the pelvis, most consistent with recurrent malignancy.     3. Interval development of 2 large necrotic masses in the hilum of spleen with  malignant FDG uptake. Multiple large soft tissue masses also seen within and  abutting the iliopsoas muscles along with multiple hypermetabolic nodular  lesions in the hernial cavities, most likely suggesting metastasis.     4. Interval development of mild adenopathy in pelvic sidewalls, retroperitoneal  regions as wall as right supraclavicular region as most likely becky metastasis.     5. Interval development of moderate bilateral hydronephroses and hydroureters  all the way to the very distal ureters, most likely due to extrinsic compression  by pelvic mass. Local invasion cannot be entirely excluded.   5/16/18  Thyroid   Scanned in media    3/6/18  US head/neck      3/12/18  CT Chest  Scanned in media   (r/o PE) delayed thryoid uptake 8 weeks after    18  PETCT  Scanned in media    17  MRI pelvis  Scanned in media    17  MRI abdomen  Scanned in media        ROS:   As above      Patient Active Problem List    Diagnosis Date Noted    Colon perforation ([facility] 2019    Retroperitoneal abscess ([facility] 2019    DVT (deep venous thrombosis) ([facility] 2019    Cervical cancer ([facility] 2019    ARF (acute renal failure) ([facility] 2018    Severe obesity (BMI 35.0-39.9) 2018    Endometrial ca ([facility] 09/15/2017     Past Medical History:   Diagnosis Date    Acute kidney failure ([facility] 2019    BMI 34.0-34.9,adult     34.8    Caffeine adverse reaction     elevated BP    Cancer (HCC)     uterine, cervical    Cervical cancer (HCC)     Chronic sinusitis     Dizziness     Endometriosis     Hiatal hernia     High cholesterol     Palpitation     PMB (postmenopausal bleeding)     Rash     eczemz    Urinary incontinence       Past Surgical History:   Procedure Laterality Date    HX ABDOMINAL LAPAROSCOPY      HX BILATERAL SALPINGO-OOPHORECTOMY Bilateral     HX BREAST LUMPECTOMY Right 1973    benign    HX HYSTERECTOMY      radical    HX OTHER SURGICAL  2017    Exam under anesthesia: Cystoscopy, sigmoidoscopy    IR CHANGE EXIST CATHETER/TUBE LT  3/7/2019    IR MEDIPORT        OB History        2    Para   2    Term   2            AB        Living   2       SAB        TAB        Ectopic        Molar        Multiple        Live Births   2              Social History     Tobacco Use    Smoking status: Never Smoker    Smokeless tobacco: Never Used   Substance Use Topics    Alcohol use: No      Family History   Problem Relation Age of Onset    Cancer Mother         left ovary      Current Outpatient Medications   Medication Sig    0.9 % sodium chloride (NORMAL SALINE FLUSH INJECTION) 10 mL by Other route daily. to drain    acetaminophen (TYLENOL) 500 mg tablet Take 1,000 mg by mouth every six (6) hours as needed for Pain.  simethicone (GAS-X) 80 mg chewable tablet Take 80 mg by mouth every six (6) hours as needed for Flatulence.  magic mouthwash solution Magic mouth wash   Maalox  Lidocaine 2% viscous   Diphenhydramine oral solution     Pharmacy to mix equal portions of ingredients to a total volume as indicated in the dispense amount.  LORazepam (ATIVAN) 0.5 mg tablet Take 1 Tab by mouth every six (6) hours as needed for Anxiety. Max Daily Amount: 2 mg.  b complex vitamins (B COMPLEX 1) tablet Take 1 Tab by mouth daily.  ascorbic acid (VAMSI-C PO) Take  by mouth.  bisacodyl (DULCOLAX) 10 mg suppository Insert 10 mg into rectum daily as needed.  cyclobenzaprine (FLEXERIL) 10 mg tablet Take 0.5 Tabs by mouth three (3) times daily as needed for Muscle Spasm(s).  megestrol (MEGACE) 40 mg tablet Take 2 Tabs by mouth two (2) times a day.  ondansetron hcl (ZOFRAN) 4 mg tablet Take 1 Tab by mouth every six (6) hours as needed for Nausea.  GARLIC PO Take  by mouth daily.  RESVERATROL PO Take  by mouth daily.  calcium carbonate (OS-MAYELA) 500 mg calcium (1,250 mg) tablet Take  by mouth daily.  DOCOSAHEXANOIC ACID/EPA (FISH OIL PO) Take 1,000 mg by mouth daily.  LACTOBACILLUS ACIDOPHILUS (PROBIOTIC PO) Take  by mouth daily. No current facility-administered medications for this visit.       Facility-Administered Medications Ordered in Other Visits   Medication Dose Route Frequency    sodium chloride (NS) flush 10-40 mL  10-40 mL IntraVENous PRN    [START ON 4/2/2019] DAPTOmycin (CUBICIN) 350 mg in water, bacteriostatic (WATER) 7 mL IV syringe RF formulation  350 mg IntraVENous ONCE    sodium chloride (NS) flush 10-40 mL  10-40 mL IntraVENous PRN    [START ON 4/1/2019] DAPTOmycin (CUBICIN) 350 mg in water, bacteriostatic (WATER) 7 mL IV syringe RF formulation  350 mg IntraVENous ONCE    [START ON 3/29/2019] DAPTOmycin (CUBICIN) 350 mg in water, bacteriostatic (WATER) 7 mL IV syringe RF formulation  350 mg IntraVENous ONCE    [START ON 3/28/2019] DAPTOmycin (CUBICIN) 350 mg in water, bacteriostatic (WATER) 7 mL IV syringe RF formulation  350 mg IntraVENous ONCE     Allergies   Allergen Reactions    Other Food Nausea and Vomiting     Artificial sweeteners    Neulasta [Pegfilgrastim] Swelling    Aspirin Other (comments)     Gi upset    Milk Itching and Atopic Dermatitis     Eczema (dairy cow)    Tetracycline Unknown (comments)     Patient does not remember    Wheat Atopic Dermatitis     eczema    Other Plant, Animal, Environmental Other (comments)     Pt states she has \"springtime grass allergies. \"     Reports reaction: Sinus infection          OBJECTIVE:    Physical Exam  VITAL SIGNS: There were no vitals taken for this visit. GENERAL CRISTINO: in no apparent distress and well developed and well nourished   MUSCULOSKEL: no joint tenderness, deformity or swelling   INTEGUMENT:  warm and dry, no rashes or lesions   ABDOMEN . soft, NT, ND, No masses appreciated   EXTREMITIES: extremities normal, atraumatic, no cyanosis or edema   PELVIC: Exam deferred. RECTAL: deferred   LOKI SURVEY: Cervical, supraclavicular, axillary and inguinal nodes normal.   NEURO: Grossly normal         IMPRESSION/PLAN:  1. Recurrent tage IVB endometrioid cervical cancer vs. Endometrial cancer   -previously reviewed blood work and PETCT c/w recurrent disease   -previously discussed options of chemotherapy, avastin and hormonal therapy   -Initially patient reluctant to do chemo but has decided to go ahead with chemo   -d/c hormonal treatment   -will plan on topotecan 1.0 mg/m2 D1-D5, Avastin 15 mg/kg D1 every 21 days until CCR, Toxicity, or progression. Given bowel perforation will d/c avastin from regimen and plan to treat with topotecan alone.   -hydronephrosis- s/p b/l nephrostomy tube placement   -given she will be getting scan will plan to repeat PETCT after 6 cycles   -tolerating chemo well. No G3 or G4 toxicity   -s/p cycle #2 completed 2/22   -ok for cycle #3 pending repeat CT on 4/5 and f/u dr. Rajendra Escoto and myself   -hypertension- improved   -labs reviewed.   normalized   -all of ms. pruett's questioins/concerns addressed    The total time spent was 40 minutes regarding this patients diagnosis of cervical cancer and >50% of this time was spent counseling and coordinating care    [address] [place] [address]  Gynecologic Oncology  3/27/04206:28PM

## 2019-03-28 NOTE — PROGRESS NOTES
[facility] Progress Note Date: 2019 Name: Earnest Romano MRN: [id number] : 1950 [facility] In[facility] 
  
Ms. Hewitt to [facility], via wheelchair at 1025. Pt was assessed and education was provided.  
  
Ms. Hewitt's vitals were reviewed. Visit Vitals /84 (BP 1 Location: Right arm, BP Patient Position: Sitting) Pulse (!) 113 Temp 97.8 °F (36.6 °C) Resp 18 SpO2 97%  
  
  
Right chest mediport accessed from 3/25/19, positive for brisk blood return. No redness, swelling, drainage or tenderness to site noted.  
  
  []  Vancomycin  
  [x]  Invanz 1G [x]  Cubicin 350 mg  
  []  Rocephin Invanz over 30 minutes, followed by and Daptomycin slow push over 2-3 minutes.  
  
Port site drsg and salgado remains dry and intact and s signs/symptoms. Ms. Althea Gamez tolerated infusion, and had no complaints.  
  
Mediport flushed with NS 20 ml and Heparin 500 units. Port remained accessed per pt preference for remaining infusions this week, secured with paper tape, curos caps placed. 
  
Patient armband removed and shredded. 
  
Ms. Hewitt was discharged from [place] [address] in stable condition at 1110. She is to return on 19 at 1000 for her next antibiotic appointment. 
  
Vladimir Graf RN 
2019 
1110

## 2019-03-29 NOTE — PROGRESS NOTES
[facility] Progress Note Date: 2019 Name: Farhad Salas MRN: [id number] : 1950 [facility] 
  
Ms. Hewitt to [facility], using walker at 1505. Denies pain. Pt was assessed and education was provided.  
  
Ms. Hewitt's vitals were reviewed. Visit Vitals /84 (BP 1 Location: Right arm, BP Patient Position: Sitting) Pulse (!) 113 Temp 97.8 °F (36.6 °C) Resp 18 SpO2 97%  
  
  
Right chest mediport accessed on 3/25/19 with a 20G 1 inch salgado, positive for brisk blood return. Loomis Croon and drsg d/i. No redness, swelling, drainage or tenderness to site noted. Blood sample obtained for cbc,bmp and cpk as ordered 
  
  
  []  Vancomycin  
  [x]  Invanz 1G [x]  Cubicin 350 mg  
  []  Rocephin Invanz over 30 minutes, followed by  Daptomycin slow push over 2-3 minutes.  
  
Port site drsg and salgado remains dry and intact and s signs/symptoms. Ms. Mt Perez tolerated infusion, and had no complaints.  
  
Mediport flushed with NS 10 ml and Heparin 500 units. Green end caps applied, and lumens secured with paper tape.  
  
Patient Vitals for the past 4 hrs: 
 Temp Pulse Resp BP SpO2  
19 1505 97.4 °F (36.3 °C) (!) 112 18 105/69 98 % Patient armband removed and shredded. 
  
Ms. Hewitt was discharged from [place] [address] in stable condition at 1555. She is to return on 19 at 0900 for her next antibiotic appointment. 
  
Kamille Vergara RN 
2019 
1030

## 2019-03-30 NOTE — PROGRESS NOTES
\[facility]\"" Progress Note Date: 2019 Name: Shahram Roe MRN: [id number] : 1950 Invanz/Dapto Infusion 
  
Ms. Hewitt to [facility], using walker at 0920. Denies pain. Pt was assessed and education was provided.  
  
Ms. Hewitt's vitals were reviewed. Visit Vitals /84 (BP 1 Location: Right arm, BP Patient Position: Sitting) Pulse (!) 113 Temp 97.8 °F (36.6 °C) Resp 18 SpO2 97%  
  
  
Right chest mediport accessed on 3/25/19 with a 20G 1 inch salgado, positive for brisk blood return. Stephenson Cramp and drsg d/i. No redness, swelling, drainage or tenderness to site noted. Blood sample obtained for cbc,bmp and cpk as ordered 
  
  
  []  Vancomycin  
  [x]  Invanz 1G [x]  Cubicin 350 mg  
  []  Rocephin Invanz over 30 minutes, followed by  Daptomycin slow push over 2-3 minutes.  
  
Port site drsg and salgado remains dry and intact and s signs/symptoms. Ms. Junella Spatz tolerated infusion, and had no complaints.  
  
Mediport flushed with NS 10 ml and Heparin 500 units. Green end caps applied, and lumens secured with paper tape.  
  
Patient Vitals for the past 4 hrs: 
 Temp Pulse Resp BP SpO2  
19 0920 97.9 °F (36.6 °C) (!) 115 18 129/73 98 % Patient armband removed and shredded. 
  
Ms. Hewitt was discharged from [place] [address] in stable condition at 1005. She is to return on 19 at 0830 for her next antibiotic appointment. 
  
Mary Clements RN 
2019 
1030

## 2019-03-31 NOTE — PROGRESS NOTES
[facility] Progress Note Date: 2019 Name: Shun Sierra MRN: [id number] : 1950 Invanz/Dapto Infusion 
  
Ms. Hewitt to [address] [place] [address], using walker at [id number]. Denies pain. Pt was assessed and education was provided.  
  
Ms. Hewitt's vitals were reviewed. Visit Vitals /84 (BP 1 Location: Right arm, BP Patient Position: Sitting) Pulse (!) 113 Temp 97.8 °F (36.6 °C) Resp 18 SpO2 97%  
  
  
Right chest mediport accessed on 3/25/19 with a 20G 1 inch salgado, positive for brisk blood return. Sue Monse and drsg d/i. No redness, swelling, drainage or tenderness to site noted. Blood sample obtained for cbc,bmp and cpk as ordered 
  
  
  []  Vancomycin  
  [x]  Invanz 1G [x]  Cubicin 350 mg  
  []  Rocephin Invanz over 30 minutes, followed by  Daptomycin slow push over 2-3 minutes.  
  
Port site drsg and salgado remains dry and intact and s signs/symptoms. Ms. Drummond Centers tolerated infusion, and had no complaints.  
  
Mediport flushed with NS 10 ml and Heparin 500 units. Green end caps applied, and lumens secured with paper tape.  
  
Patient Vitals for the past 4 hrs: 
 Temp Pulse Resp BP SpO2  
19 0835 98.2 °F (36.8 °C) (!) 112 18 136/58 98 % Patient armband removed and shredded. 
  
Ms. Hewitt was discharged from [place] [address] in stable condition at [place] [address]. She is to return on 19 at 0900 for her next antibiotic appointment. 
  
Rogelio Gil RN 
2019 
1030

## 2019-04-01 NOTE — PROGRESS NOTES
[facility] Progress Note Date: 2019 Name: Dennis Oneil MRN: [id number] : 1950 Invanz/Dapto Infusion 
  
Ms. Hewitt to [place], ambulatory with walker at 0915. Pt was assessed and education was provided.  
  
Ms. Hewitt's vitals were reviewed. Visit Vitals /84 (BP 1 Location: Right arm, BP Patient Position: Sitting) Pulse (!) 113 Temp 97.8 °F (36.6 °C) Resp 18 SpO2 97%  
  
  
Right chest mediport salgado needle changed using sterile technique per policy, with a 82O 1 inch salgado, positive for brisk blood return. Site covered with biopatch and TSM dressing. No redness, swelling, drainage or tenderness to site noted.  
  
  []  Vancomycin  
  [x]  Invanz 1G [x]  Cubicin 350 mg  
  []  Rocephin Invanz over 30 minutes, followed by and Daptomycin slow push over 2-3 minutes.  
  
Port site drsg and salgado remains dry and intact and s signs/symptoms. Ms. Khanh Bonilla tolerated infusion, and had no complaints.  
  
Mediport flushed with NS 20 ml and Heparin 500 units. Port remained accessed per pt preference for remaining infusions this week. 
  
Patient armband removed and shredded. 
  
Ms. Hewitt was discharged from [place] [address] in stable condition at 1010. She is to return on 19 at 0900 for her next antibiotic appointment. 
Marly Pimentel RN 2019 
1010

## 2019-04-01 NOTE — TELEPHONE ENCOUNTER
Left voicemail for patient to call the office to confirm appointment change from 4/19/19 to 4/10/19 at 1:30 pm.

## 2019-04-02 NOTE — PROGRESS NOTES
[facility] Progress Note Date: 2019 Name: Marina Chatman MRN: [id number] : 1950 Invanz/Dapto Infusion 
  
Ms. Hewitt to [facility], ambulatory with walker at 0915. Pt was assessed and education was provided.  
  
Ms. Hewitt's vitals were reviewed. Visit Vitals /84 (BP 1 Location: Right arm, BP Patient Position: Sitting) Pulse (!) 113 Temp 97.8 °F (36.6 °C) Resp 18 SpO2 97%  
  
  
Right chest mediport accessed on 19 is clean, dry and intact. Flushes well with good blood return. 
  
  []  Vancomycin  
  [x]  Invanz 1G [x]  Cubicin 350 mg  
  []  Rocephin Invanz over 30 minutes, followed by and Daptomycin slow push over 2-3 minutes.  
  
Port site drsg and salgado remains dry and intact and s signs/symptoms. Ms. Bell Hansen tolerated infusion, and had no complaints.  
  
Mediport flushed with NS 20 ml and Heparin 500 units. Port remained accessed per pt preference for remaining infusions this week. 
  
Patient armband removed and shredded. 
  
Ms. Hewitt was discharged from [place] [address] in stable condition at 1005. She is to return on 19 at 0900 for her next antibiotic appointment. 
  
Isidro Henderson 2019 
1010

## 2019-04-04 NOTE — PROGRESS NOTES
[facility] Progress Note Date: 2019 Name: Oleg Brennan MRN: [id number] : 1950 Invanz/Dapto Infusion 
  
Ms. Hewitt to [facility], ambulatory with walker at 0930. Pt was assessed and education was provided.  
  
Ms. Hewitt's vitals were reviewed. Visit Vitals /84 (BP 1 Location: Right arm, BP Patient Position: Sitting) Pulse (!) 113 Temp 97.8 °F (36.6 °C) Resp 18 SpO2 97%  
  
  
Right chest mediport accessed on 19 is clean, dry and intact. Flushes well with good blood return. 
  
  []  Vancomycin  
  [x]  Invanz 1G [x]  Cubicin 350 mg  
  []  Rocephin Invanz over 30 minutes, followed by and Daptomycin slow push over 2-3 minutes.  
  
Port site drsg and salgado remains dry and intact and s signs/symptoms. Ms. Funmilayo Kim tolerated infusion, and had no complaints.  
  
Mediport flushed with NS 20 ml and Heparin 500 units. Port remained accessed per pt preference for remaining infusions this week. 
  
Patient armband removed and shredded. 
  
Ms. Hewitt was discharged from [place] [address] in stable condition at 1005. She is to return on 19 at 0900 for her next antibiotic appointment. 
Alma Sanderson RN 2019 
1010

## 2019-04-05 NOTE — PROGRESS NOTES
[facility] Progress Note Date: 2019 Name: Mitchel Fowler MRN: [id number] : 1950 Invanz/Dapto Infusion 
  
Ms. Hewitt to [address], ambulatory with walker at 0915. Pt was assessed and education was provided.  
  
Ms. Hewitt's vitals were reviewed. Visit Vitals /84 (BP 1 Location: Right arm, BP Patient Position: Sitting) Pulse (!) 113 Temp 97.8 °F (36.6 °C) Resp 18 SpO2 97%  
  
  
Right chest mediport accessed on 19 is clean, dry and intact. Flushes well with good blood return. 
  
  []  Vancomycin  
  [x]  Invanz 1G [x]  Cubicin 350 mg  
  []  Rocephin Invanz over 30 minutes, followed by and Daptomycin slow push over 2-3 minutes.  
  
Port site dressing and salgado remains dry and intact and s signs/symptoms. Ms. Tea Hodges tolerated infusion, and had no complaints.  
  
Mediport flushed with NS 20 ml and Heparin 500 units. Port remained accessed per pt preference for remaining infusions this week. Weekly labs (CBC and CMP) drawn. Patient armband removed and shredded. 
  
Ms. Hewitt was discharged from [place] [address] in stable condition at 1055. She is to return on 19 at 0900 for her next antibiotic appointment. 
  
Adriana Chen RN 2019 
1055

## 2019-04-06 NOTE — PROGRESS NOTES
[facility] Progress Note Date: 2019 Name: Tushar Carl MRN: [id number] : 1950 Invanz/Dapto Infusion 
  
Ms. Hewitt to [facility], using walker at 0900. Pt was assessed and education was provided.  
  
Ms. Hewitt's vitals were reviewed. Visit Vitals /84 (BP 1 Location: Right arm, BP Patient Position: Sitting) Pulse (!) 113 Temp 97.8 °F (36.6 °C) Resp 18 SpO2 97%  
  
  
Right chest mediport accessed on 19 with a 20G 1 inch salgado, positive for brisk blood return. Chance Silverio and drsg d/i. No redness, swelling, drainage or tenderness to site noted. Blood sample obtained for cbc,bmp and cpk as ordered 
  
  
  []  Vancomycin  
  [x]  Invanz 1G [x]  Cubicin 350 mg  
  []  Rocephin Invanz over 30 minutes, followed by  Daptomycin slow push over 2-3 minutes.  
  
Port site drsg and salgado remains dry and intact and s signs/symptoms. Ms. Hernando Vásquez tolerated infusion, and had no complaints.  
  
Mediport flushed with NS 10 ml and Heparin 500 units. Green end caps applied, and lumens secured with paper tape.  
  
 
Patient armband removed and shredded. 
  
Ms. Hewitt was discharged from [place] [address] in stable condition at [address] She is to return on 19 at 0900 for her next antibiotic appointment. 
  
Daren Saint, RN 2019

## 2019-04-08 NOTE — PROGRESS NOTES
[facility] Progress Note Date: 2019 Name: Shahram Roe MRN: [id number] : 1950 Invanz/Dapto Infusion 
  
Ms. Hewitt to [facility], ambulatory with walker, at 0910. No complaints or concerns voiced Pt was assessed and education was provided.  
  
Ms. Hewitt's vitals were reviewed. Visit Vitals /84 (BP 1 Location: Right arm, BP Patient Position: Sitting) Pulse (!) 113 Temp 97.8 °F (36.6 °C) Resp 18 SpO2 97%  
  
  
Right chest mediport accessed on 19 is clean, dry and intact. Flushes well with good blood return. 
  
  []  Vancomycin  
  [x]  Invanz 1G [x]  Cubicin 350 mg  
  []  Rocephin Invanz over 30 minutes, followed by and Daptomycin slow push over 2-3 minutes.  
  
Port site drsg and salgado remains dry and intact and s signs/symptoms. Ms. Junella Spatz tolerated infusion, and had no complaints.  
  
Mediport flushed with NS 20 ml and Heparin 500 units. Port remained accessed per pt preference for remaining infusion tomorrow. 
  
Patient armband removed and shredded. 
  
Ms. Hewitt was discharged from [place] [address] in stable condition at 1000. She is to return on 19 at 0930 for her next antibiotic appointment. 
Auburn Cabot, RN 2019 
1010

## 2019-04-08 NOTE — PROGRESS NOTES
Called outpatient infusion and verbalized order for renewing IV antibiotics for 3 more weeks. Also, called home health and renewed order for 30 more daily flushes of pateint's drain. This was verbalized and faxed to [facility].

## 2019-04-08 NOTE — PROGRESS NOTES
Subjective: She is tolerating diet. She had a drain study and CT imaging. She has pain around the drain site. Past medical history and ROS were reviewed and unchanged. Abdomen: Soft, nontender nondistended    Drain study and CT imaging personally visualized by me  Persistent fistula  Small abscess a little distant to the drain    Assessment / Plan    Perforation on a Avastin for gynecologic malignancy  Continue IV antibiotics  Continue drain  To have her percutaneous nephrostomy tubes internalized by urology  Follow-up in 2 weeks  Will likely need to repeat both imaging studies    A total of 15 minutes was spent with the patient, with >50% of time spent on counseling and coordination of care. The diagnoses and plan were discussed with patient. All questions answered. Plan of care agreed to by all concerned.

## 2019-04-09 NOTE — PROGRESS NOTES
[facility] Progress Note Date: 2019 Name: Kelley Lovell MRN: [id number] : 1950 Invanz/Dapto Infusion 
  
Ms. Hewitt to [facility], using walker at 0940. Pt was assessed and education was provided.  
  
Ms. Hewitt's vitals were reviewed. Visit Vitals /84 (BP 1 Location: Right arm, BP Patient Position: Sitting) Pulse (!) 113 Temp 97.8 °F (36.6 °C) Resp 18 SpO2 97%  
  
  
Right chest mediport salgado changed today on 19 with a 20G 1 inch salgado, positive for brisk blood return.  
  
  
  []  Vancomycin  
  [x]  Invanz 1G [x]  Cubicin 350 mg  
  []  Rocephin Invanz over 30 minutes, followed by  Daptomycin slow push over 2-3 minutes.  
  
Port site drsg and salgado remains dry and intact and s signs/symptoms. Ms. Sera Morley tolerated infusion, and had no complaints.  
  
Mediport flushed with NS 10 ml and Heparin 500 units. Green end caps applied, and lumens secured with paper tape. Patient armband removed and shredded. 
  
Ms. Hewitt was discharged from [place] [address] in stable condition at 1035. She is to return on 4/10/19 at 0900 for her next antibiotic appointment. 
Dianelys Chavez RN 2019 
1035

## 2019-04-10 NOTE — PATIENT INSTRUCTIONS
Learning About Chemotherapy Side Effects and Safety  What is chemotherapy? Chemotherapy uses medicines to kill cancer cells. It's often called \"chemo. \" Chemo may slow cancer growth, stop cancer from spreading, or help get rid of the cancer. Chemo can be given at different locations, such as a hospital, a doctor's office, or a clinic. Sometimes chemo treatments may be done at home. You may get chemo in \"cycles. \" This means that you get a number of treatments over a set period of time. Then you take a break before you start again. Chemo helps to treat many kinds of cancer. But it can also affect healthy cells along with the cancer cells. This is why some types of chemo cause side effects, like nausea, losing your hair, or feeling tired. What are the possible side effects of chemotherapy? Side effects depend on which medicines you take, how much you take, and how the medicines affect you. Your doctor can tell you what to expect when you take these medicines. Some of them may cause symptoms such as:  · Fatigue. · Nausea. · Vomiting. · A rash. · Hair loss. · Pain or tingling in your hands or the soles of your feet. Chemo treatment can be hard. It can keep you from doing the things you were doing every day, like going to work or school. But keep in mind that most side effects don't last. They will go away after you finish the treatment. And many side effects can be managed with medicine. Your doctor will tell you what to do if you have side effects. Kevin Matter also learn which ones you need to tell your doctor about right away. How can you keep yourself and your family safe? If you take chemo at home, take steps to protect your family. Keep your medicine in a locked cabinet if you can. Or keep it on a high shelf out of the reach of children. Make sure the containers have childproof lids.   IV chemo treatments  Chemo medicines that you get in your vein through an IV can stay in your body fluids (vomit, urine, or stool) for several days. Some medicines are radioactive, so they can be harmful if someone touches waste from your body. If your medicine is radioactive, any of your clothes or bed linens or cloth diapers that have medicine or body fluids on them need to be handled separately from other household laundry. Have caregivers use gloves when they wash your bed linens and clothes. Bed linens and cloth diapers should be machine washed twice in hot water, using regular detergent. For the first 48 hours after each treatment:  · Sit on the toilet seat to prevent splashing. · Put the toilet lid down before you flush. · Always flush twice after you use the toilet. Couples should use a condom during sex while a partner is getting chemo treatments and for several days after treatment ends. Follow-up care is a key part of your treatment and safety. Be sure to make and go to all appointments, and call your doctor if you are having problems. It's also a good idea to know your test results and keep a list of the medicines you take. Where can you learn more? Go to http://kj-ron.info/. Enter 91 21 06 in the search box to learn more about \"Learning About Chemotherapy Side Effects and Safety. \"  Current as of: March 27, 2018  Content Version: 11.9  © 4825-8141 [employer]. Care instructions adapted under license by [employer] (which disclaims liability or warranty for this information). If you have questions about a medical condition or this instruction, always ask your healthcare professional. [place] [address] any warranty or liability for your use of this information.

## 2019-04-10 NOTE — PROGRESS NOTES
[address]  [address] [place] [address], [address], [address] [facility]  [address] [facility], [address] [facility], [address]   [phone]  Jon Chambers DO      Patient ID:  Name:  Carmen Royal  MRN:  [id number]  :   y.o. Date:  4/10/2019      HISTORY OF PRESENT ILLNESS:  Carmen Royal is a 76 y.o.   postmenopausal female self-referred for Stage IVB Grade 2 endometrioid adenocarcinoma, favor cervical primary. She initially presented with complaint of PMB x8-10 months to PCP. She was referred to gyn Dr. Newton Baires, who referred to Dr. Manju Foster for large cervical mass. Last pap 17 years prior. Biopsy of cervical mass 17 showed adenocarcinoma, most c/w endometrioid adenocarcinoma, with initial staining suggesting endometrial origin. Patient had MRI of pelvis 17, which showed BL parametrial involvement, L>R, cancer extending from the large cervical mass into fundus and anterior 1/3 of vagina, no obvious adenopathy. PETCT 17 suspicious for metastatic disease to left ovary, omentum, liver report scanned in media. Patient is s/p EUA, cysto, sigmoidoscopy 17, medial left parametrial involvement, no tumor in bladder or rectosigmoid colon. She is s/p exploratory laparotomy with radical hysterectomy, BSO, resection of omental mass 9/15/17, pathology consistent with metastatic endometrioid adenocarcinoma, favor cervical primary, pathology report scanned in media. Patient is s/p 6 cycles Carbo/Taxol, completed 2018. Most recent PETCT 18 demonstrated new uptake in small nodular densities adjacent to cecum concerning for metastatic disease, increasing uptake in right thyroid gland concerning for neoplasm, as well as interval response to prior uptake. She was referred to Dr. Fab Sutherland for further workup, suggestive of goiters, she has follow up in a few months.  Was seen and discussed proceeding with avastin and hormonal treatment. Pt was then admitted 12/5-12/13 with renal failure and hydronephrosis and had bilateral PCN placed. S/p cycles #2 of topotecan/avastin completed 2/22/2019. Having trouble with constipation and gas pains with associated nausea. Denies numbness/tingling. Was admitted to [facility] perforated colon and diverticulitis. Had a drain placed and has been on antibiotics. Had repeat CT scan with persistent fistula. To continue drain and abx. Feels well. Energy is back. Pathology:  9/15/17  Metastatic endometrioid adenocarcinoma, favor cervical primary      Labs:  Component       Cancer Ag (CA) 125   Latest Ref Rng & Units       0.0 - 38.1 U/mL   2/18/2019      9:30 AM 8.7   1/28/2019      10:05 .7 (H)   11/14/2018      4:40 .0 (H)   10/3/2018      3:45 .0 (H)   6/18/2018      1:13 PM 45 (H)   5/16/2018      8:20 AM 27   4/23/2018      12:33 PM 17   3/23/2018      2:44 PM 16   2/23/2018      5:14 PM 15   1/18/2018      3:56 PM 14   12/21/2017      10:42 AM 15   12/7/2017      1:53 PM 16   9/11/2017      11:30  (H)       Imaging  PETCT  11/23/2018  FINDINGS:       PET/CT:     Reference data:     The mediastinal blood pool SUV max value = 1.96. The liver SUV Max value  = 2.19  .      HEAD/NECK:  There is a hypermetabolic adenopathy identified in the right  supraclavicular region, measuring 1.5 x 1.7 cm, SUV Max = 7.11.] It was 7 x 9 mm  and not hypermetabolic on prior study. No additional adenopathy or  hypermetabolic activity seen in the neck. CHEST: There is interval development of multiple nodular lesions scattered in  both lungs, most likely suggestive of lung metastasis. -The dominant nodule in left lung is in medial anterior left upper lobe  measuring 1.0 x 1.4 cm, #70.  Mild hypermetabolic activity noted, SUV Max = 1.7.   -The second largest nodule in left lower lobe infrahilar region measures 8 x 8  mm on #85, SUV Max = 1.7.   -The dominant mass in left lung measures 9 x 9 mm at lateral right lower lobe,  SUV Max = 1.0, #87 .   -The second 8 x 9 mm solid nodule at posterior right lower lobe, SUV Max = 1.0,  #87.   -There is 6 x 9 mm small cavitary lesion with thickened wall at anterior right  upper lobe, #69 and SUV Max = 1.3.   -Multiple other 3-5 mm nodules also scattered in both lungs, too small for  accurate evaluation on PET.     No mediastinal or axillary adenopathy or hypermetabolic chest wall finding. ABDOMEN/PELVIS:  There is a large mass with central necrosis identified in the  left subphrenic region, most likely arising from the spleen and measures 10.2 x  14.3 x 12.5 cm. There is significant hypermetabolic activity identified along  the periphery of the mass, SUV Max = 8.79. The mass was much smaller and barely  visible on prior CT but hypermetabolic on prior PET scan, roughly measured 1.8 x  2.5 cm, SUV Max was 4.47. The second exophytic mass also developed at posterior  aspect of the spleen abutting the dominant mass which measures 4.1 x 5.1 x 6.0  cm, SUV Max = 6.99. The mass is tightly abutting the greater curvature of  gastric wall. Direct invasion cannot be entirely excluded.      There is a large ill-defined soft tissue mass identified in the midline pelvic  floor and roughly measures 8.2 x 9.9 cm with intensive FDG uptake, SUV Max =  14.2. This is new finding since the prior study as most likely recurrent  malignancy.     There are multiple areas of hypermetabolic soft tissue masses identified in the  retroperitoneal regions, the largest masses are abutting and invading the  bilateral iliopsoas muscles, measuring 5.3 x 7.1 cm and SUV Max = 13.4 on the  left and 5.7 x 6.3 cm on the right with SUV Max of 10.6.  There is moderate  adjacent large soft tissue mass abutting the lateral right iliac crest measuring  5.0 x 6.2 cm, SUV Max = 9.6.     Multiple hypermetabolic nodular lesions also identified scattered in the  peritoneal cavity and compatible with carcinomatosis. The largest two are 2.4 x  3.0 cm, SUV Max = 9.1 in the lateral right pericolic gutter and 2.5 x 4.3 cm  with SUV max of 13.2 at anterior right pelvic cavity abutting the peritoneum. Multiple other smaller hypermetabolic foci also scattered in the peritoneal  cavities.     Mild retroperitoneal adenopathy also developed, the largest is 1.3 x 1.5 cm in  caval aortic region, SUV Max = 6.9. Multiple hypermetabolic lymph nodes also  seen in the bilateral pelvic sidewalls.     BONES:  No malignant FDG activity.      ADDITIONAL CT FINDINGS:    (Exam is limited due to lack of intravenous contrast.)     Moderate interstitial lung process again seen.      Interval development of moderate bilateral hydronephrosis and hydroureter up to  the very distal portions in the pelvis, most likely due to extrinsic compression  by large pelvic mass. The bladder is poorly distended. Mild fatty stranding in  the pelvis.     IMPRESSION  IMPRESSION:     1. Interval development of multiple hypermetabolic nodular lesions in bilateral  lungs, most likely consistent with lung metastasis.     2. Interval development of large ill-defined soft tissue mass in the surgical  bed in the pelvis, most consistent with recurrent malignancy.     3. Interval development of 2 large necrotic masses in the hilum of spleen with  malignant FDG uptake. Multiple large soft tissue masses also seen within and  abutting the iliopsoas muscles along with multiple hypermetabolic nodular  lesions in the hernial cavities, most likely suggesting metastasis.     4. Interval development of mild adenopathy in pelvic sidewalls, retroperitoneal  regions as wall as right supraclavicular region as most likely becky metastasis.     5. Interval development of moderate bilateral hydronephroses and hydroureters  all the way to the very distal ureters, most likely due to extrinsic compression  by pelvic mass.  Local invasion cannot be entirely excluded.   18  Thyroid   Scanned in media    3/6/18  US head/neck      3/12/18  CT Chest  Scanned in media   (r/o PE) delayed thryoid uptake 8 weeks after    18  PETCT  Scanned in media    17  MRI pelvis  Scanned in media    17  MRI abdomen  Scanned in media        ROS:   As above      Patient Active Problem List    Diagnosis Date Noted    Colon perforation ([address] 2019    Retroperitoneal abscess ([address] 2019    DVT (deep venous thrombosis) ([address] 2019    Cervical cancer ([address] 2019    ARF (acute renal failure) ([address] 2018    Severe obesity (BMI 35.0-39.9) 2018    Endometrial ca ([address] 09/15/2017     Past Medical History:   Diagnosis Date    Acute kidney failure ([address] 2019    BMI 34.0-34.9,adult     34.8    Caffeine adverse reaction     elevated BP    Cancer (HCC)     uterine, cervical    Cervical cancer (HCC)     Chronic sinusitis     Dizziness     Endometriosis     Hiatal hernia     High cholesterol     Palpitation     PMB (postmenopausal bleeding)     Rash     eczemz    Urinary incontinence       Past Surgical History:   Procedure Laterality Date    HX ABDOMINAL LAPAROSCOPY      HX BILATERAL SALPINGO-OOPHORECTOMY Bilateral     HX BREAST LUMPECTOMY Right 1973    benign    HX HYSTERECTOMY      radical    HX OTHER SURGICAL  2017    Exam under anesthesia: Cystoscopy, sigmoidoscopy    IR CHANGE EXIST CATHETER/TUBE LT  3/7/2019    IR MEDIPORT        OB History        2    Para   2    Term   2            AB        Living   2       SAB        TAB        Ectopic        Molar        Multiple        Live Births   2              Social History     Tobacco Use    Smoking status: Never Smoker    Smokeless tobacco: Never Used   Substance Use Topics    Alcohol use: No      Family History   Problem Relation Age of Onset    Cancer Mother         left ovary      Current Outpatient Medications   Medication Sig    0.9 % sodium chloride (NORMAL SALINE FLUSH INJECTION) 10 mL by Other route daily. to drain    acetaminophen (TYLENOL) 500 mg tablet Take 1,000 mg by mouth every six (6) hours as needed for Pain.  simethicone (GAS-X) 80 mg chewable tablet Take 80 mg by mouth every six (6) hours as needed for Flatulence.  magic mouthwash solution Magic mouth wash   Maalox  Lidocaine 2% viscous   Diphenhydramine oral solution     Pharmacy to mix equal portions of ingredients to a total volume as indicated in the dispense amount.  b complex vitamins (B COMPLEX 1) tablet Take 1 Tab by mouth daily.  ascorbic acid (VAMSI-C PO) Take  by mouth.  bisacodyl (DULCOLAX) 10 mg suppository Insert 10 mg into rectum daily as needed.  cyclobenzaprine (FLEXERIL) 10 mg tablet Take 0.5 Tabs by mouth three (3) times daily as needed for Muscle Spasm(s).  megestrol (MEGACE) 40 mg tablet Take 2 Tabs by mouth two (2) times a day.  ondansetron hcl (ZOFRAN) 4 mg tablet Take 1 Tab by mouth every six (6) hours as needed for Nausea.  GARLIC PO Take  by mouth daily.  RESVERATROL PO Take  by mouth daily.  calcium carbonate (OS-MAYELA) 500 mg calcium (1,250 mg) tablet Take  by mouth daily.  DOCOSAHEXANOIC ACID/EPA (FISH OIL PO) Take 1,000 mg by mouth daily.  LACTOBACILLUS ACIDOPHILUS (PROBIOTIC PO) Take  by mouth daily. No current facility-administered medications for this visit.       Facility-Administered Medications Ordered in Other Visits   Medication Dose Route Frequency    heparin (porcine) pf 500 Units  500 Units InterCATHeter PRN    sodium chloride (NS) flush 10-40 mL  10-40 mL IntraVENous PRN    sodium chloride (NS) flush 10-40 mL  10-40 mL IntraVENous PRN    heparin (porcine) pf 500 Units  500 Units InterCATHeter PRN    sodium chloride (NS) flush 10-40 mL  10-40 mL IntraVENous PRN     Allergies   Allergen Reactions    Other Food Nausea and Vomiting     Artificial sweeteners    Neulasta [Pegfilgrastim] Swelling    Aspirin Other (comments)     Gi upset    Milk Itching and Atopic Dermatitis     Eczema (dairy cow)    Tetracycline Unknown (comments)     Patient does not remember    Wheat Atopic Dermatitis     eczema    Other Plant, Animal, Environmental Other (comments)     Pt states she has \"springtime grass allergies. \"     Reports reaction: Sinus infection          OBJECTIVE:    Physical Exam  VITAL SIGNS: There were no vitals taken for this visit. GENERAL CRISTINO: in no apparent distress and well developed and well nourished   MUSCULOSKEL: no joint tenderness, deformity or swelling   INTEGUMENT:  warm and dry, no rashes or lesions   ABDOMEN . soft, NT, ND, No masses appreciated   EXTREMITIES: extremities normal, atraumatic, no cyanosis or edema   PELVIC: Exam deferred. RECTAL: deferred   LOKI SURVEY: Cervical, supraclavicular, axillary and inguinal nodes normal.   NEURO: Grossly normal         IMPRESSION/PLAN:  1. Recurrent tage IVB endometrioid cervical cancer vs. Endometrial cancer   -previously reviewed blood work and PETCT c/w recurrent disease   -previously discussed options of chemotherapy, avastin and hormonal therapy   -Initially patient reluctant to do chemo but has decided to go ahead with chemo   -d/c hormonal treatment   -will plan on topotecan 1.0 mg/m2 D1-D5, Avastin 15 mg/kg D1 every 21 days until CCR, Toxicity, or progression. Given bowel perforation will d/c avastin from regimen and plan to treat with topotecan alone.   -hydronephrosis- s/p b/l nephrostomy tube placement, for stents on 4/16   -given she will be getting scan will plan to repeat PETCT after 6 cycles   -tolerating chemo well. No G3 or G4 toxicity   -s/p cycle #2 completed 2/22   -chemo on hold for now. Asked her to call and let us know when cleared by robert to resume chemo   -hypertension- improved   -labs reviewed.   normalized   -all of ms. pruett's questioins/concerns addressed    The total time spent was 40 minutes regarding this patients diagnosis of cervical cancer and >50% of this time was spent counseling and coordinating care    [facility]  4/10/89841:28PM

## 2019-04-10 NOTE — H&P (VIEW-ONLY)
[address], [address] [place], [address] [place] [address] 
[address] [place] [address], [address] [place], 12 [facility] [place] 
 [phone] Vesna Gutierrez DO Patient ID: 
Name:  Zhen Hurley MRN:  [id number] :  1950/68 y.o. Date:  4/10/2019 HISTORY OF PRESENT ILLNESS: 
Zhen Hurley is a 76 y.o.   postmenopausal female self-referred for Stage IVB Grade 2 endometrioid adenocarcinoma, favor cervical primary. She initially presented with complaint of PMB x8-10 months to PCP. She was referred to gyn Dr. Stevenson Hughes, who referred to Dr. Anders Ledezma for large cervical mass. Last pap 17 years prior. Biopsy of cervical mass 17 showed adenocarcinoma, most c/w endometrioid adenocarcinoma, with initial staining suggesting endometrial origin. Patient had MRI of pelvis 17, which showed BL parametrial involvement, L>R, cancer extending from the large cervical mass into fundus and anterior 1/3 of vagina, no obvious adenopathy. PETCT 17 suspicious for metastatic disease to left ovary, omentum, liver report scanned in media. Patient is s/p EUA, cysto, sigmoidoscopy 17, medial left parametrial involvement, no tumor in bladder or rectosigmoid colon. She is s/p exploratory laparotomy with radical hysterectomy, BSO, resection of omental mass 9/15/17, pathology consistent with metastatic endometrioid adenocarcinoma, favor cervical primary, pathology report scanned in media. Patient is s/p 6 cycles Carbo/Taxol, completed 2018. Most recent PETCT 18 demonstrated new uptake in small nodular densities adjacent to cecum concerning for metastatic disease, increasing uptake in right thyroid gland concerning for neoplasm, as well as interval response to prior uptake. She was referred to Dr. Brynn Valdez for further workup, suggestive of goiters, she has follow up in a few months.  Was seen and discussed proceeding with avastin and hormonal treatment. Pt was then admitted 12/5-12/13 with renal failure and hydronephrosis and had bilateral PCN placed. S/p cycles #2 of topotecan/avastin completed 2/22/2019. Having trouble with constipation and gas pains with associated nausea. Denies numbness/tingling. Was admitted to [facility] perforated colon and diverticulitis. Had a drain placed and has been on antibiotics. Had repeat CT scan with persistent fistula. To continue drain and abx. Feels well. Energy is back. Pathology: 
9/15/17 Metastatic endometrioid adenocarcinoma, favor cervical primary Labs: 
Component Cancer Ag (CA) 125 Latest Ref Rng & Units 0.0 - 38.1 U/mL  
2/18/2019 
    9:30 AM 8.7  
1/28/2019 
    10:05 .7 (H)  
11/14/2018 
    4:40 .0 (H)  
10/3/2018 3:45 .0 (H)  
6/18/2018 
    1:13 PM 45 (H)  
5/16/2018 
    8:20 AM 27  
4/23/2018 12:33 PM 17  
3/23/2018 
    2:44 PM 16  
2/23/2018 5:14 PM 15  
1/18/2018 
    3:56 PM 14  
12/21/2017 
    10:42 AM 15  
12/7/2017 
    1:53 PM 16  
9/11/2017 11:30  (H) Imaging PETCT 
11/23/2018 FINDINGS:   
  
PET/CT: 
  
Reference data: 
  
The mediastinal blood pool SUV max value = 1.96. The liver SUV Max value  = 2.19 .  
  
HEAD/NECK:  There is a hypermetabolic adenopathy identified in the right 
supraclavicular region, measuring 1.5 x 1.7 cm, SUV Max = 7.11.] It was 7 x 9 mm 
and not hypermetabolic on prior study. No additional adenopathy or 
hypermetabolic activity seen in the neck. CHEST: There is interval development of multiple nodular lesions scattered in 
both lungs, most likely suggestive of lung metastasis. -The dominant nodule in left lung is in medial anterior left upper lobe 
measuring 1.0 x 1.4 cm, #70. Mild hypermetabolic activity noted, SUV Max = 1.7. -The second largest nodule in left lower lobe infrahilar region measures 8 x 8 
mm on #85, SUV Max = 1.7. -The dominant mass in left lung measures 9 x 9 mm at lateral right lower lobe, SUV Max = 1.0, #87 .  
-The second 8 x 9 mm solid nodule at posterior right lower lobe, SUV Max = 1.0, 
#87.  
-There is 6 x 9 mm small cavitary lesion with thickened wall at anterior right 
upper lobe, #69 and SUV Max = 1.3.  
-Multiple other 3-5 mm nodules also scattered in both lungs, too small for 
accurate evaluation on PET. 
  
No mediastinal or axillary adenopathy or hypermetabolic chest wall finding. ABDOMEN/PELVIS:  There is a large mass with central necrosis identified in the 
left subphrenic region, most likely arising from the spleen and measures 10.2 x 
14.3 x 12.5 cm. There is significant hypermetabolic activity identified along 
the periphery of the mass, SUV Max = 8.79. The mass was much smaller and barely 
visible on prior CT but hypermetabolic on prior PET scan, roughly measured 1.8 x 
2.5 cm, SUV Max was 4.47. The second exophytic mass also developed at posterior 
aspect of the spleen abutting the dominant mass which measures 4.1 x 5.1 x 6.0 
cm, SUV Max = 6.99. The mass is tightly abutting the greater curvature of 
gastric wall. Direct invasion cannot be entirely excluded.  
  
There is a large ill-defined soft tissue mass identified in the midline pelvic 
floor and roughly measures 8.2 x 9.9 cm with intensive FDG uptake, SUV Max = 
14.2. This is new finding since the prior study as most likely recurrent 
malignancy. 
  
There are multiple areas of hypermetabolic soft tissue masses identified in the 
retroperitoneal regions, the largest masses are abutting and invading the 
bilateral iliopsoas muscles, measuring 5.3 x 7.1 cm and SUV Max = 13.4 on the 
left and 5.7 x 6.3 cm on the right with SUV Max of 10.6. There is moderate 
adjacent large soft tissue mass abutting the lateral right iliac crest measuring 5.0 x 6.2 cm, SUV Max = 9.6. 
  
Multiple hypermetabolic nodular lesions also identified scattered in the 
 peritoneal cavity and compatible with carcinomatosis. The largest two are 2.4 x 
3.0 cm, SUV Max = 9.1 in the lateral right pericolic gutter and 2.5 x 4.3 cm 
with SUV max of 13.2 at anterior right pelvic cavity abutting the peritoneum. Multiple other smaller hypermetabolic foci also scattered in the peritoneal 
cavities. 
  
Mild retroperitoneal adenopathy also developed, the largest is 1.3 x 1.5 cm in 
caval aortic region, SUV Max = 6.9. Multiple hypermetabolic lymph nodes also 
seen in the bilateral pelvic sidewalls. 
  
BONES:  No malignant FDG activity.  
  
ADDITIONAL CT FINDINGS:   
(Exam is limited due to lack of intravenous contrast.) Moderate interstitial lung process again seen.  
  
Interval development of moderate bilateral hydronephrosis and hydroureter up to 
the very distal portions in the pelvis, most likely due to extrinsic compression 
by large pelvic mass. The bladder is poorly distended. Mild fatty stranding in 
the pelvis. 
  
IMPRESSION IMPRESSION: 
  
1. Interval development of multiple hypermetabolic nodular lesions in bilateral 
lungs, most likely consistent with lung metastasis. 
  
2. Interval development of large ill-defined soft tissue mass in the surgical 
bed in the pelvis, most consistent with recurrent malignancy. 
  
3. Interval development of 2 large necrotic masses in the hilum of spleen with 
malignant FDG uptake. Multiple large soft tissue masses also seen within and 
abutting the iliopsoas muscles along with multiple hypermetabolic nodular 
lesions in the hernial cavities, most likely suggesting metastasis. 
  
4. Interval development of mild adenopathy in pelvic sidewalls, retroperitoneal 
regions as wall as right supraclavicular region as most likely becky metastasis. 
  
5. Interval development of moderate bilateral hydronephroses and hydroureters 
all the way to the very distal ureters, most likely due to extrinsic compression by pelvic mass. Local invasion cannot be entirely excluded. 18 Thyroid Scanned in media 3/6/18 US head/neck 3/12/18 CT Chest 
Scanned in media  
(r/o PE) delayed thryoid uptake 8 weeks after 18 PETCT Scanned in media 17 MRI pelvis Scanned in media 17 MRI abdomen Scanned in media ROS:  
As above Patient Active Problem List  
 Diagnosis Date Noted  Colon perforation ([address] 2019  Retroperitoneal abscess ([address] 2019  DVT (deep venous thrombosis) ([address] 2019  Cervical cancer ([address] 2019  ARF (acute renal failure) ([address] 2018  Severe obesity (BMI 35.0-39.9) 2018  Endometrial ca ([address] 09/15/2017 Past Medical History:  
Diagnosis Date  Acute kidney failure ([address] 2019  BMI 34.0-34.9,adult 34.8  Caffeine adverse reaction   
 elevated BP  Cancer ([address] uterine, cervical  
 Cervical cancer (HCC)  Chronic sinusitis  Dizziness  Endometriosis  Hiatal hernia  High cholesterol  Palpitation  PMB (postmenopausal bleeding)  Rash   
 eczemz  Urinary incontinence Past Surgical History:  
Procedure Laterality Date  HX ABDOMINAL LAPAROSCOPY    
 HX BILATERAL SALPINGO-OOPHORECTOMY Bilateral   
 HX BREAST LUMPECTOMY Right 1973  
 benign  HX HYSTERECTOMY    
 radical  
 HX OTHER SURGICAL  2017 Exam under anesthesia: Cystoscopy, sigmoidoscopy  IR CHANGE EXIST CATHETER/TUBE LT  3/7/2019  IR MEDIPORT    
  
OB History Shivam Locket 2 Para 2 Term 2  AB Living  
2 SAB  
   
 TAB Ectopic Molar Multiple Live Births 2 Social History Tobacco Use  Smoking status: Never Smoker  Smokeless tobacco: Never Used Substance Use Topics  Alcohol use: No  
  
Family History Problem Relation Age of Onset  Cancer Mother   
     left ovary Current Outpatient Medications Medication Sig  
 0.9 % sodium chloride (NORMAL SALINE FLUSH INJECTION) 10 mL by Other route daily. to drain  acetaminophen (TYLENOL) 500 mg tablet Take 1,000 mg by mouth every six (6) hours as needed for Pain.  simethicone (GAS-X) 80 mg chewable tablet Take 80 mg by mouth every six (6) hours as needed for Flatulence.  magic mouthwash solution Magic mouth wash Maalox Lidocaine 2% viscous Diphenhydramine oral solution Pharmacy to mix equal portions of ingredients to a total volume as indicated in the dispense amount.  b complex vitamins (B COMPLEX 1) tablet Take 1 Tab by mouth daily.  ascorbic acid (VAMSI-C PO) Take  by mouth.  bisacodyl (DULCOLAX) 10 mg suppository Insert 10 mg into rectum daily as needed.  cyclobenzaprine (FLEXERIL) 10 mg tablet Take 0.5 Tabs by mouth three (3) times daily as needed for Muscle Spasm(s).  megestrol (MEGACE) 40 mg tablet Take 2 Tabs by mouth two (2) times a day.  ondansetron hcl (ZOFRAN) 4 mg tablet Take 1 Tab by mouth every six (6) hours as needed for Nausea.  GARLIC PO Take  by mouth daily.  RESVERATROL PO Take  by mouth daily.  calcium carbonate (OS-MAYELA) 500 mg calcium (1,250 mg) tablet Take  by mouth daily.  DOCOSAHEXANOIC ACID/EPA (FISH OIL PO) Take 1,000 mg by mouth daily.  LACTOBACILLUS ACIDOPHILUS (PROBIOTIC PO) Take  by mouth daily. No current facility-administered medications for this visit. Facility-Administered Medications Ordered in Other Visits Medication Dose Route Frequency  heparin (porcine) pf 500 Units  500 Units InterCATHeter PRN  
 sodium chloride (NS) flush 10-40 mL  10-40 mL IntraVENous PRN  
 sodium chloride (NS) flush 10-40 mL  10-40 mL IntraVENous PRN  
 heparin (porcine) pf 500 Units  500 Units InterCATHeter PRN  
 sodium chloride (NS) flush 10-40 mL  10-40 mL IntraVENous PRN Allergies Allergen Reactions  Other Food Nausea and Vomiting Artificial sweeteners  Neulasta [Pegfilgrastim] Swelling  Aspirin Other (comments) Gi upset  Milk Itching and Atopic Dermatitis Eczema (dairy cow)  Tetracycline Unknown (comments) Patient does not remember  Wheat Atopic Dermatitis  
  eczema  Other Plant, Animal, Environmental Other (comments) Pt states she has \"springtime grass allergies. \" Reports reaction: Sinus infection OBJECTIVE: 
 
Physical Exam 
VITAL SIGNS: There were no vitals taken for this visit. GENERAL CRISTINO: in no apparent distress and well developed and well nourished MUSCULOSKEL: no joint tenderness, deformity or swelling INTEGUMENT:  warm and dry, no rashes or lesions ABDOMEN . soft, NT, ND, No masses appreciated EXTREMITIES: extremities normal, atraumatic, no cyanosis or edema PELVIC: Exam deferred. RECTAL: deferred LOKI SURVEY: Cervical, supraclavicular, axillary and inguinal nodes normal.  
NEURO: Grossly normal  
 
 
 
IMPRESSION/PLAN: 
1. Recurrent tage IVB endometrioid cervical cancer vs. Endometrial cancer 
 -previously reviewed blood work and PETCT c/w recurrent disease 
 -previously discussed options of chemotherapy, avastin and hormonal therapy 
 -Initially patient reluctant to do chemo but has decided to go ahead with chemo 
 -d/c hormonal treatment 
 -will plan on topotecan 1.0 mg/m2 D1-D5, Avastin 15 mg/kg D1 every 21 days until CCR, Toxicity, or progression. Given bowel perforation will d/c avastin from regimen and plan to treat with topotecan alone. 
 -hydronephrosis- s/p b/l nephrostomy tube placement, for stents on 4/16 
 -given she will be getting scan will plan to repeat PETCT after 6 cycles 
 -tolerating chemo well. No G3 or G4 toxicity 
 -s/p cycle #2 completed 2/22 
 -chemo on hold for now. Asked her to call and let us know when cleared by robert to resume chemo 
 -hypertension- improved 
 -labs reviewed.  normalized -all of ms. pruett's questioins/concerns addressed The total time spent was 40 minutes regarding this patients diagnosis of cervical cancer and >50% of this time was spent counseling and coordinating care Glenn Dai DO Gynecologic Oncology 4/10/92215:28PM

## 2019-04-10 NOTE — LETTER
4/10/19 Patient: Carlos Garces YOB: 1950 Date of Visit: 4/10/2019 Waleska Riley MD 
[address] [place] [address] [place] [address] VIA Facsimile: [phone] Dear Waleska Riley MD, Thank you for referring Ms. Phyllis Hewitt to [facility] for evaluation. My notes for this consultation are attached. If you have questions, please do not hesitate to call me. I look forward to following your patient along with you. Sincerely, Jadiel Schneider MD

## 2019-04-10 NOTE — PROGRESS NOTES
Alejandrina Oconnor, a 76 y.o. female,  is here for   Chief Complaint   Patient presents with    Chemotherapy     follow up       Visit Vitals  /80 (BP 1 Location: Left arm, BP Patient Position: Sitting)   Pulse (!) 109   Temp 97.5 °F (36.4 °C) (Oral)   Resp 18   Ht 5' 5\" (1.651 m)   Wt 83.7 kg (184 lb 8 oz)   SpO2 99%   BMI 30.70 kg/m²           1. Have you been to the ER, urgent care clinic since your last visit? Hospitalized since your last visit? No    2. Have you seen or consulted any other health care providers outside of the [address] [place] [address] since your last visit? Include any pap smears or colon screening.  No

## 2019-04-11 NOTE — PROGRESS NOTES
[facility] Progress Note Date: 2019 Name: Stevenson Pollack MRN: [id number] : 1950 Invanz/Dapto Infusion 
  
Ms. Hewitt to [facility], ambulatory with walker, at 0935. No complaints or concerns voiced Pt was assessed and education was provided.  
  
Ms. Hewitt's vitals were reviewed. Visit Vitals /84 (BP 1 Location: Right arm, BP Patient Position: Sitting) Pulse (!) 113 Temp 97.8 °F (36.6 °C) Resp 18 SpO2 97%  
  
  
Right chest mediport accessed on 19 is clean, dry and intact. Flushes well with good blood return. 
  
  []  Vancomycin  
  [x]  Invanz 1G [x]  Cubicin 350 mg  
  []  Rocephin Daptomycin slow push over 2-3 minutes, followed by Invanz over 30 minutes. Line flushed with NS between medications.  
  
Port site drsg and salgado remains dry and intact and s signs/symptoms. Ms. Nathalie Jo tolerated infusion, and had no complaints.  
  
Mediport flushed with NS 20 ml and Heparin 500 units. Port remained accessed per pt preference for remaining infusion tomorrow. 
  
Patient armband removed and shredded. 
  
Ms. Hewitt was discharged from [place] [address] in stable condition at 1025. She is to return on 19 for her next antibiotic appointment. 
  
Echo Eason RN 2019 
[id number]

## 2019-04-13 NOTE — PROGRESS NOTES
[address] [facility] Progress Note Date: 2019 Name: Dennis Oneil MRN: [id number] : 1950 Ms. Hewitt arrived in the [facility] today at [phone], in stable condition, here for Daily IV Invanz & Daptomycin antibiotic therapies. She was assessed and education was provided. Ms. Hewitt's vitals were reviewed. Visit Vitals /85 (BP 1 Location: Right arm, BP Patient Position: Sitting) Pulse (!) 118 Temp 97.8 °F (36.6 °C) Resp 20 SpO2 97% Her right chest single lumen port was noted to be already accessed and intact. Daptomycin (Cubicin) 350 mg IV, was administered over approximately 2 minutes, per order, and without incident. Invanz (Ertapenem) 1 gram IV, was administered over approximately 30 minutes, per order, and without incident. After completion of both IV antibiotics, her port was flushed well per protocol with NS & Heparin, and was left accessed, with dressing dry and intact. Ms. Khanh Bonilla tolerated well, and had no complaints. Ms. Khanh Bonilla was discharged from [place] [address] in stable condition at 1145. She is to return on tomorrow, , 19,  at 56, for her next appointment, for her next doses of Daptomycin and Invanz antibiotic therapies. Aj Paez RN 2019 10:47 AM

## 2019-04-14 NOTE — PROGRESS NOTES
[facility] Progress Note Date: 2019 Name: Mitchel Fowler MRN: [id number] : 1950 Ms. Hewitt arrived in the [facility] today at 1030, in stable condition, here for Daily IV Invanz & Daptomycin antibiotic therapies. She was assessed and education was provided. Ms. Hewitt's vitals were reviewed. Visit Vitals /87 (BP 1 Location: Right arm, BP Patient Position: Sitting) Pulse (!) 104 Temp 97.8 °F (36.6 °C) Resp 16 SpO2 98% Her right chest single lumen port was noted to be already accessed and intact. Daptomycin (Cubicin) 350 mg IV, was administered over approximately 2 minutes, per order, and without incident. Invanz (Ertapenem) 1 gram IV, was administered over approximately 30 minutes, per order, and without incident. After completion of both IV antibiotics, her port was flushed well per protocol with NS & Heparin, and was left accessed, with dressing dry and intact. Ms. Tea Hodges tolerated well, and had no complaints. Ms. Tea Hodges was discharged from [place] [address] in stable condition at 1140. She is to return on tomorrow, Monday, 4-15-19,  at 0930, for her next appointment, for her next doses of Daptomycin and Invanz antibiotic therapies. Archie Wynn RN 2019 10:47 AM

## 2019-04-15 NOTE — PROGRESS NOTES
[facility] Progress Note Date: April 15, 2019 Name: Tushar Carl MRN: [id number] : 1950 Invanz/Dapto Infusion 
  
Ms. Hewitt to [facility], ambulatory, at 0935. No complaints or concerns voiced Pt was assessed and education was provided.  
  
Ms. Hewitt's vitals were reviewed. Visit Vitals /84 (BP 1 Location: Right arm, BP Patient Position: Sitting) Pulse (!) 113 Temp 97.8 °F (36.6 °C) Resp 18 SpO2 97%  
  
  
Right chest mediport accessed on 19 is clean, dry and intact. Flushes well with good blood return. 
  
  []  Vancomycin  
  [x]  Invanz 1G [x]  Cubicin 350 mg  
  []  Rocephin Daptomycin slow push over 2-3 minutes, followed by Invanz over 30 minutes. Line flushed with NS between medications.  
  
Port site drsg and salgado remains dry and intact and s signs/symptoms. Ms. Hernando Vásquez tolerated infusion, and had no complaints.  
  
Mediport flushed with NS 20 ml and Heparin 500 units. Port remained accessed per pt preference for remaining infusion tomorrow. 
  
Patient armband removed and shredded. 
  
Ms. Hewitt was discharged from [place] [address] in stable condition at 1025. She is to return on 19 for her next antibiotic appointment. 
  
Kori Iglesias RN April 15, 2019 
[id number]

## 2019-04-16 PROBLEM — N13.30 HYDRONEPHROSIS: Status: ACTIVE | Noted: 2019-01-01

## 2019-04-16 NOTE — ANESTHESIA POSTPROCEDURE EVALUATION
Procedure(s): 
CYSTOSCOPY WITH BILATERAL RETROGRADES/STENT/C-ARM. general 
 
Anesthesia Post Evaluation Multimodal analgesia: multimodal analgesia used between 6 hours prior to anesthesia start to PACU discharge Patient location during evaluation: bedside Patient participation: complete - patient participated Level of consciousness: awake Pain management: adequate Airway patency: patent Anesthetic complications: no 
Cardiovascular status: stable Respiratory status: acceptable Hydration status: acceptable Post anesthesia nausea and vomiting:  controlled Vitals Value Taken Time /64 4/16/2019  1:05 PM  
Temp 36.9 °C (98.5 °F) 4/16/2019 12:07 PM  
Pulse 66 4/16/2019  1:10 PM  
Resp 17 4/16/2019  1:10 PM  
SpO2 94 % 4/16/2019  1:10 PM  
Vitals shown include unvalidated device data.

## 2019-04-16 NOTE — DISCHARGE INSTRUCTIONS
Discharge Instructions      Patient: Sravani Cash               Sex: female          DOA: 4/16/2019         YOB: 1950      Age:  76 y.o. ACUTE DIAGNOSES:  Ureteral obstruction     DIET: General     ACTIVITY: No restrictions     ADDITIONAL INFORMATION:   You have indwelling ureteral stents which frequently causes slight discomfort in the flank region and bladder spasms. If you are bothered by these symptoms, please contact our office and we can presribe you flomax as needed for flank discomfort or Ditropan for bladder spasms. The indwelling stent will need to be removed/exchanged as directed below. If the stent is left in place without appropriate follow-up, it may become encrusted with stone and/or infected. If that occurs, you will require multiple additional surgeries to fix this. It is very important you follow up for appropriate removal or exchange of ureteral stents. If you experience any fevers > 100.4, significant nausea/vomiting, or significant worsening of pain, please contact us at the number below. It is common to experience mild, recurrent blood in your urine and this is likely due to stent irritation. If the bleeding continues to worsen with passage of clots, you are unable to urinate, or you experience significant light-headedness, please contact us at the number below. FOLLOW UP CARE:  You have a return appointment with Dr. Maribel Amezuca in 2 weeks for review of blood work. DISCHARGE SUMMARY from Nurse    PATIENT INSTRUCTIONS:    After general anesthesia or intravenous sedation, for 24 hours or while taking prescription Narcotics:  · Limit your activities  · Do not drive and operate hazardous machinery  · Do not make important personal or business decisions  · Do  not drink alcoholic beverages  · If you have not urinated within 8 hours after discharge, please contact your surgeon on call.     Report the following to your surgeon:  · Excessive pain, swelling, redness or odor of or around the surgical area  · Temperature over 100.5  · Nausea and vomiting lasting longer than 4 hours or if unable to take medications  · Any signs of decreased circulation or nerve impairment to extremity: change in color, persistent  numbness, tingling, coldness or increase pain  · Any questions    *  Please give a list of your current medications to your Primary Care Provider. *  Please update this list whenever your medications are discontinued, doses are      changed, or new medications (including over-the-counter products) are added. *  Please carry medication information at all times in case of emergency situations. These are general instructions for a healthy lifestyle:    No smoking/ No tobacco products/ Avoid exposure to second hand smoke  Surgeon General's Warning:  Quitting smoking now greatly reduces serious risk to your health. Obesity, smoking, and sedentary lifestyle greatly increases your risk for illness    A healthy diet, regular physical exercise & weight monitoring are important for maintaining a healthy lifestyle    You may be retaining fluid if you have a history of heart failure or if you experience any of the following symptoms:  Weight gain of 3 pounds or more overnight or 5 pounds in a week, increased swelling in our hands or feet or shortness of breath while lying flat in bed. Please call your doctor as soon as you notice any of these symptoms; do not wait until your next office visit. Recognize signs and symptoms of STROKE:    F-face looks uneven    A-arms unable to move or move unevenly    S-speech slurred or non-existent    T-time-call 911 as soon as signs and symptoms begin-DO NOT go       Back to bed or wait to see if you get better-TIME IS BRAIN. Warning Signs of HEART ATTACK     Call 911 if you have these symptoms:   Chest discomfort.  Most heart attacks involve discomfort in the center of the chest that lasts more than a few minutes, or that goes away and comes back. It can feel like uncomfortable pressure, squeezing, fullness, or pain.  Discomfort in other areas of the upper body. Symptoms can include pain or discomfort in one or both arms, the back, neck, jaw, or stomach.  Shortness of breath with or without chest discomfort.  Other signs may include breaking out in a cold sweat, nausea, or lightheadedness. Don't wait more than five minutes to call 911 - MINUTES MATTER! Fast action can save your life. Calling 911 is almost always the fastest way to get lifesaving treatment. Emergency Medical Services staff can begin treatment when they arrive -- up to an hour sooner than if someone gets to the hospital by car. The discharge information has been reviewed with the patient/family/friend. The patient/family/friend verbalized understanding. Discharge medications reviewed with the patient/friend and appropriate educational materials and side effects teaching were provided.   ___________________________________________________________________________________________________________________________________

## 2019-04-16 NOTE — INTERVAL H&P NOTE
H&P Update: 
Debo Marker was seen and examined. History and physical has been reviewed. The patient has been examined.  There have been no significant clinical changes since the completion of the originally dated History and Physical.

## 2019-04-16 NOTE — ANESTHESIA PREPROCEDURE EVALUATION
Relevant Problems No relevant active problems Anesthetic History No history of anesthetic complications Review of Systems / Medical History Patient summary reviewed and pertinent labs reviewed Pulmonary Within defined limits Neuro/Psych Within defined limits Cardiovascular Within defined limits Exercise tolerance: >4 METS 
  
GI/Hepatic/Renal 
  
GERD: poorly controlled Renal disease Comments: Recent abdominal abcess. Drain  In place Endo/Other Cancer (cervical.  on chemo) Other Findings Comments:  
 
 
  
 
 
 
 
Physical Exam 
 
Airway Mallampati: II 
TM Distance: 4 - 6 cm Neck ROM: normal range of motion Mouth opening: Normal 
 
 Cardiovascular Regular rate and rhythm,  S1 and S2 normal,  no murmur, click, rub, or gallop Rhythm: regular Rate: normal 
 
 
 
 Dental 
No notable dental hx Pulmonary Breath sounds clear to auscultation Abdominal 
GI exam deferred Other Findings Anesthetic Plan ASA: 3 Anesthesia type: general 
 
 
 
 
Induction: Intravenous and RSI Anesthetic plan and risks discussed with: Patient

## 2019-04-16 NOTE — OP NOTES
Operative Note Patient: Jluis Da Silva               Sex: female             MRN: [id number] YOB: 1950      Age:  76 y.o. 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 She is s/p chemotherapy with interval shrinkage of the mass on CT quite significantly. Office nephrostograms did not show drainage into the bladder meaning persistent obstruction. She is here today for conversion to stents and capping of her nephrostomy tube to see if we can rid her of nephrostomy tubes. Procedure:   
1) Cystoscopy 2) Bilateral Retrograde pyelogram with interpretation 3) Bilateral Ureteral stent placement 4) Bilateral nephrostomy tubes capped Findings:   
1). Bladder revealed a posterior indentation, consistent with known tumor 2). Retrograde pyelogram revealed bilateral distal ureteral narrowing 3). Bilateral 7x24 JJ stents placed without complication, was very tight, high risk for stent failure therefore keeping neph tubes in place, but capped 4). Bilateral nephrostomy tubes capped. Narrative of Events: The patient was brought to the operative suite. Anesthesia was induced and preoperative antibiotics were administered. They were then placed in the dorsal lithotomy position and their external genitalia was prepped and draped in the usual fashion. A surgical timeout was performed confirming the patient's name, date of birth, laterality, and antibiotics. All were in agreement. A 22 Wolof cystourethroscope was then inserted into the patients bladder. The urethra revealed no abnormalities.  Thorough cystoscopy was performed with both the 30 degree and 70 degree lens which revealed posterior bladder wall indentation consistent with known tumor. We began with the right side. The ureteral orifice was cannulated with a  0.035 sensor tip wire. The wire was negotiated past the stone obstruction in the distal ureter and confirmed in the renal pelvis. A ureteral catheter was advanced over the wire and retrograde pyelogram was performed confirming appropriate location. A ureteral stent was then advanced over the wire and deployed in the standard fashion with an excellent curl in the bladder and renal pelvis. The stent was tough to advance meaning significant extrinsic compression was still present on this side. We then performed the same procedure on the left side. This side, the stent passed a lot easier with less resistance. The bladder was drained and the nephrostomy tubes were capped. The patient was then awoken and transferred to the recovery room in stable condition. Estimated Blood Loss: <5CC Anesthesia:  General        
        
Implants:  
Implant Name Type Inv. Item Serial No.  Lot No. LRB No. Used Action Nakul [employer]water 7FR Viviann [employer]akes - [id number]  Petar Alicia POLARIS 7FR 24CM -- [employer] [id number] Right 1 Implanted Nakul Freshwater 7FR Viviann Stakes - [id number]  Petar Alicia POLARIS 7FR 24CM -- [employer] [id number] Left 1 Implanted Drains: Bilateral 7x24 JJ stents, Bilateral Neph tubes, capped Complications:  None Plan: 1. Return in 2 weeks with BMP prior, if stable, will pull neph tubes out Nirmal Thomas MD 
4/16/2019

## 2019-04-19 NOTE — PROGRESS NOTES
[facility] Progress Note Date: 2019 Name: Marina Chatman MRN: [id number] : 1950 Invanz/Dapto Infusion 
  
[facility]. Hewitt to [facility], ambulatory at 0900. Pain to lower abdomen 5/10. Took pain medication today Pt was assessed and education was provided.  
  
Ms. Hewitt's vitals were reviewed. Visit Vitals /84 (BP 1 Location: Right arm, BP Patient Position: Sitting) Pulse (!) 113 Temp 97.8 °F (36.6 °C) Resp 18 SpO2 97%  
  
  
Right chest mediport accessed on 19 is clean, dry and intact, and due to be changed. Same done using # 20-1' salgado needle. Site without redness, swelling or tenderness. Brisk flush/blood returns noted 
  
  []  Vancomycin  
  [x]  Invanz 1G [x]  Cubicin 350 mg  
  []  Rocephin Invanz over 30 minutes, followed by and Daptomycin slow push over 2-3 minutes.  
  
Port site drsg and salgado remains dry and intact and s signs/symptoms. Ms. Bell Hansen tolerated infusion, and had no complaints.  
  
Mediport flushed with NS 20 ml and Heparin 500 units. Port remained accessed per pt preference for further ABX use. 
  
Patient armband removed and shredded. 
  
Ms. Hewitt was discharged from [place] [address] in stable condition at 1000. She is to return on 19 at 0930 for her next antibiotic appointment. 
Gill Ordonez RN 2019 
1010

## 2019-04-21 NOTE — PROGRESS NOTES
[facility] Progress Note Date: 2019 Name: Earnest Romano MRN: [id number] : 1950 Invanz/Dapto Infusion 
  
Ms. Hewitt to [place], ambulatory with walker at 0930. Pt was assessed and education was provided.  
  
Ms. Hewitt's vitals were reviewed. Visit Vitals /84 (BP 1 Location: Right arm, BP Patient Position: Sitting) Pulse (!) 113 Temp 97.8 °F (36.6 °C) Resp 18 SpO2 97%  
  
  
Right chest mediport accessed on 19 is clean, dry and intact. Flushes well with good blood return. 
  
  []  Vancomycin  
  [x]  Invanz 1G [x]  Cubicin 350 mg  
  []  Rocephin Invanz over 30 minutes, followed by and Daptomycin slow push over 2-3 minutes. Port site dressing and salgado remains dry and intact and s signs/symptoms. Ms. Althea Gamez tolerated infusion, and had no complaints.  
  
Mediport flushed with NS 20 ml and Heparin 500 units. Port remained accessed per pt preference for remaining infusions this week. Patient armband removed and shredded. 
  
Ms. Hewitt was discharged from [place] [address] in stable condition at 1015. She is to return on 19  for her next antibiotic appointment. 
Nidia Lo RN 2019 
1015

## 2019-04-24 NOTE — PROGRESS NOTES
[facility] Progress Note Date: 2019 Name: Mitchel Fowler MRN: [id number] : 1950 Invanz/Dapto Infusion 
  
Ms. Hewitt to [facility], ambulatory without walker at 0915. Pt was assessed and education was provided.  
  
Ms. Hewitt's vitals were reviewed. Visit Vitals /84 (BP 1 Location: Right arm, BP Patient Position: Sitting) Pulse (!) 113 Temp 97.8 °F (36.6 °C) Resp 18 SpO2 97%  
  
  
Right chest mediport accessed on 19 is clean, dry and intact. Flushes well with good blood return. 
  
  []  Vancomycin  
  [x]  Invanz 1G [x]  Cubicin 350 mg  
  []  Rocephin Invanz over 30 minutes, followed by and Daptomycin slow push over 2-3 minutes. Port site dressing and salgado remains dry and intact and s signs/symptoms. Ms. Tea Hodges tolerated infusion, and had no complaints.  
  
Mediport flushed with NS 20 ml and Heparin 500 units. Port remained accessed per pt preference for remaining infusions this week. Patient armband removed and shredded. 
  
Ms. Hewitt was discharged from [place] [address] in stable condition at 1000. She is to return on 19  for her next antibiotic appointment. 
  
Saniya Meza RN 2019

## 2019-04-25 NOTE — PROGRESS NOTES
[facility] Progress Note Date: 2019 Name: Alexandra Weaver MRN: [id number] : 1950 Invanz/Dapto Infusion 
  
Ms. Hewitt to [facility], ambulatory without walker at 0915. Pt was assessed and education was provided.  
  
Ms. Hewitt's vitals were reviewed. Visit Vitals /84 (BP 1 Location: Right arm, BP Patient Position: Sitting) Pulse (!) 113 Temp 97.8 °F (36.6 °C) Resp 18 SpO2 97%  
  
  
Right chest mediport accessed on 19 is clean, dry and intact. Flushes well with good blood return. 
  
  []  Vancomycin  
  [x]  Invanz 1G [x]  Cubicin 350 mg  
  []  Rocephin Invanz over 30 minutes, followed by and Daptomycin slow push over 2-3 minutes. Port site dressing and salgado remains dry and intact and s signs/symptoms. Ms. Islas Friday tolerated infusion, and had no complaints.  
  
Mediport flushed with NS 20 ml and Heparin 500 units and deaccessed. Patient armband removed and shredded. 
  
Ms. Hewitt was discharged from [place] [address] in stable condition at 1000. She is to return on 19  for her next antibiotic appointment. 
  
Carlos Osborn RN 2019

## 2019-04-25 NOTE — PROGRESS NOTES
Subjective: Tolerating diet moving bowels. Small amount of drainage from percutaneous drainage catheter. Denies fevers. Continues on antibiotics. Past medical history and ROS were reviewed and unchanged. Abdomen: Soft, nontender nondistended  Somewhat murky fluid in her percutaneous drainage catheter    Assessment / Plan    Colonic perforation with abscess likely secondary to chemotherapy  CT of the abdomen pelvis next week to evaluate abscess  Keep percutaneous drainage catheter as there still appears to be feculent drainage  Follow-up in 2weeks, consider repeating fistulogram after that  Antibiotics will end next Tuesday, restart if there is significant abscess on CT    A total of 15 minutes was spent with the patient, with >50% of time spent on counseling and coordination of care. The diagnoses and plan were discussed with patient. All questions answered. Plan of care agreed to by all concerned.

## 2019-04-26 NOTE — PROGRESS NOTES
[facility] Progress Note Date: 2019 Name: Kelley Lovell MRN: [id number] : 1950 Invanz/Dapto Infusion 
  
Ms. Hewitt to [facility], ambulatory without walker at 0915. Patient stated she saw Dr. Per Leung yesterday and he stated she would complete antibiotics thru 19. I spoke with Prieto Andersen, Dr. Sim Galloway nurse via telephone and requested an order that states antibiotic stop date be faxed to [facility]. Pt was assessed and education was provided.  
  
Ms. Hewitt's vitals were reviewed. Visit Vitals /84 (BP 1 Location: Right arm, BP Patient Position: Sitting) Pulse (!) 113 Temp 97.8 °F (36.6 °C) Resp 18 SpO2 97%  
  
  
Right chest mediport accessed today 19 is clean, dry and intact. Flushes well with good blood return. 
  
  []  Vancomycin  
  [x]  Invanz 1G [x]  Cubicin 350 mg  
  []  Rocephin Invanz over 30 minutes, followed by and Daptomycin slow push over 2-3 minutes. Port site dressing and salgado remains dry and intact and s signs/symptoms. Ms. Sera Morley tolerated infusion, and had no complaints.  
  
Mediport flushed with NS 20 ml and Heparin 500 units and deaccessed. Patient armband removed and shredded. 
  
Ms. Hewitt was discharged from [place] [address] in stable condition at 1010. She is to return on 19  for her next antibiotic appointment. 
Dianelys Chavez RN 2019 
1010

## 2019-04-27 NOTE — PROGRESS NOTES
[facility] Progress Note Date: 2019 Name: Shun Sierra MRN: [id number] : 1950 Invanz/Dapto Infusion 
  
Ms. Hewitt to [facility], ambulatory without walker at 0902. Pt was assessed and education was provided.  
  
Ms. Hewitt's vitals were reviewed. Visit Vitals /84 (BP 1 Location: Right arm, BP Patient Position: Sitting) Pulse (!) 113 Temp 97.8 °F (36.6 °C) Resp 18 SpO2 97%  
  
  
Right chest mediport accessed today 19 is clean, dry and intact. Flushes well with good blood return. 
  
  []  Vancomycin  
  [x]  Invanz 1G [x]  Cubicin 350 mg  
  []  Rocephin Invanz over 30 minutes, followed by and Daptomycin slow push over 2-3 minutes. Port site dressing and salgado remains dry and intact and s signs/symptoms. Ms. Drummond Centers tolerated infusion, and had no complaints.  
  
Mediport flushed with NS 30 ml and Heparin 500 units and deaccessed. Patient armband removed and shredded. 
  
Ms. Hewitt was discharged from [place] [address] in stable condition at 1007. She is to return on   for her next antibiotic appointment. 
Floy Soulier, RN 2019 Ok to give patient work note for missing for flu?

## 2019-04-29 NOTE — PROGRESS NOTES
[facility] Progress Note Date: 2019 Name: Alexandra Weaver MRN: [id number] : 1950 Invanz/Dapto Infusion 
  
Ms. Hewitt to [facility], ambulatory at 0905 No complaints of pain. Has slight nausea. Ginger ale provided Pt was assessed and education was provided.  
  
Ms. Hewitt's vitals were reviewed. Visit Vitals /84 (BP 1 Location: Right arm, BP Patient Position: Sitting) Pulse (!) 113 Temp 97.8 °F (36.6 °C) Resp 18 SpO2 97%  
  
  
Right chest mediport accessed on 19 is clean, dry and intact. Flushes well with good blood return. 
  
  []  Vancomycin  
  [x]  Invanz 1G [x]  Cubicin 350 mg  
  []  Rocephin Invanz over 30 minutes, followed by and Daptomycin slow push over 2-3 minutes.  
  
Port site drsg and salgado remains dry and intact and s signs/symptoms. Ms. Islas Friday tolerated infusion, and had no complaints.  
  
Mediport flushed with NS 20 ml and Heparin 500 units. Port remained accessed per pt preference for remaining infusion tomorrow. 
  
Patient armband removed and shredded. 
  
Ms. Hewitt was discharged from [place] [address] in stable condition at 0950. She is to return on 19 at 1000 for her next antibiotic appointment. 
Nichole Tang RN 2019 
1010

## 2019-05-09 NOTE — LETTER
NOTIFICATION RETURN TO WORK / SCHOOL 
 
5/9/2019 3:47 PM 
 
Ms. Cecile Magana [place] [address] [address] [address] To Whom It May Concern: 
 
Cecile Magana is currently under the care of [facility] [address]. She will return to work on 5/20/19 with no restrictions. If there are questions or concerns please have the patient contact our office. Sincerely, Rubio Macias MD

## 2019-05-09 NOTE — PROGRESS NOTES
Subjective: She is tolerating diet and moving her bowels. She denies fevers. She is come off antibiotics. Past medical history and ROS were reviewed and unchanged. Abdomen: Soft, nontender nondistended  Drain with scant feculent material and air in the bag    CT of the abdomen pelvis shows no residual collection at the drain site, however there is a very small psoas fluid collection     Assessment / Plan    colonic perforation and abscess  Drain study, possible drain removal if negative    Discuss psoas abscess with radiology, may need to be put back on antibiotics or have IR drain placed for this  May prefer to manage this expectantly    Follow-up in 3 weeks    A total of 15 minutes was spent with the patient, with >50% of time spent on counseling and coordination of care. The diagnoses and plan were discussed with patient. All questions answered. Plan of care agreed to by all concerned.

## 2019-05-09 NOTE — PROGRESS NOTES
Sravani Cash presents today for   Chief Complaint   Patient presents with    Follow-up     colonic perforation w/abscess f/u, drain present, f/u CT 5/7/19, nausea and vomiting, gagging, feeling very tired       Is someone accompanying this pt? no    Is the patient using any DME equipment during [address]? Yes drain placed on right lower quad    Abuse Screening:  Abuse Screening Questionnaire 9/25/2018   Do you ever feel afraid of your partner? N   Are you in a relationship with someone who physically or mentally threatens you? N   Is it safe for you to go home? Y       Fall Risk  Fall Risk Assessment, last 12 mths 2/27/2019   Able to walk? Yes   Fall in past 12 months? No       Coordination of Care:  1. Have you been to the ER, urgent care clinic since your last visit? Hospitalized since your last visit? No    2. Have you seen or consulted any other health care providers outside of the [facility] since your last visit? Include any pap smears or colon screening.  Infusions stopped on April 30 and Urology

## 2019-05-20 NOTE — PROCEDURES
Birth Control Pills Counseling: Birth Control Pill Counseling: I discussed with the patient the potential side effects of OCPs including but not limited to increased risk of stroke, heart attack, thrombophlebitis, deep venous thrombosis, hepatic adenomas, breast changes, GI upset, headaches, and depression.  The patient verbalized understanding of the proper use and possible adverse effects of OCPs. All of the patient's questions and concerns were addressed. Interventional Radiology Brief Procedure Note    Patient: Debo Howell MRN: [id number]  SSN: [id number]    YOB: 1950  Age: 76 y.o. Sex: female      Date of Procedure: 5/20/2019    Procedure(s): Abscessogram    Performed By: BEVERLY Alex Sa    Anesthesia: None    Estimated Blood Loss: None    Specimens: None    Findings:     - Written and verbal informed consent was obtained. - Time Out was performed. - Permanent image storage performed on PACS. - Image-guided abscessogram via existing LLQ drain performed using maximum barrier precautions and sterile technique. - Please refer to report on PACS for full details. Implants: None    Plan: Patient to follow-up with Dr. Golden Martin.      Signed By: Isai Franklin, [id number]     May 20, 2019 Include Pregnancy/Lactation Warning?: No Topical Clindamycin Pregnancy And Lactation Text: This medication is Pregnancy Category B and is considered safe during pregnancy. It is unknown if it is excreted in breast milk. Spironolactone Pregnancy And Lactation Text: This medication can cause feminization of the male fetus and should be avoided during pregnancy. The active metabolite is also found in breast milk. Topical Retinoid Pregnancy And Lactation Text: This medication is Pregnancy Category C. It is unknown if this medication is excreted in breast milk. Tetracycline Counseling: Patient counseled regarding possible photosensitivity and increased risk for sunburn.  Patient instructed to avoid sunlight, if possible.  When exposed to sunlight, patients should wear protective clothing, sunglasses, and sunscreen.  The patient was instructed to call the office immediately if the following severe adverse effects occur:  hearing changes, easy bruising/bleeding, severe headache, or vision changes.  The patient verbalized understanding of the proper use and possible adverse effects of tetracycline.  All of the patient's questions and concerns were addressed. Patient understands to avoid pregnancy while on therapy due to potential birth defects. Topical Sulfur Applications Pregnancy And Lactation Text: This medication is Pregnancy Category C and has an unknown safety profile during pregnancy. It is unknown if this topical medication is excreted in breast milk. Erythromycin Counseling:  I discussed with the patient the risks of erythromycin including but not limited to GI upset, allergic reaction, drug rash, diarrhea, increase in liver enzymes, and yeast infections. Bactrim Pregnancy And Lactation Text: This medication is Pregnancy Category D and is known to cause fetal risk.  It is also excreted in breast milk. Yes Dapsone Pregnancy And Lactation Text: This medication is Pregnancy Category C and is not considered safe during pregnancy or breast feeding. Tazorac Pregnancy And Lactation Text: This medication is not safe during pregnancy. It is unknown if this medication is excreted in breast milk. Tetracycline Pregnancy And Lactation Text: This medication is Pregnancy Category D and not consider safe during pregnancy. It is also excreted in breast milk. High Dose Vitamin A Pregnancy And Lactation Text: High dose vitamin A therapy is contraindicated during pregnancy and breast feeding. Isotretinoin Pregnancy And Lactation Text: This medication is Pregnancy Category X and is considered extremely dangerous during pregnancy. It is unknown if it is excreted in breast milk. Minocycline Counseling: Patient advised regarding possible photosensitivity and discoloration of the teeth, skin, lips, tongue and gums.  Patient instructed to avoid sunlight, if possible.  When exposed to sunlight, patients should wear protective clothing, sunglasses, and sunscreen.  The patient was instructed to call the office immediately if the following severe adverse effects occur:  hearing changes, easy bruising/bleeding, severe headache, or vision changes.  The patient verbalized understanding of the proper use and possible adverse effects of minocycline.  All of the patient's questions and concerns were addressed. Benzoyl Peroxide Counseling: Patient counseled that medicine may cause skin irritation and bleach clothing.  In the event of skin irritation, the patient was advised to reduce the amount of the drug applied or use it less frequently.   The patient verbalized understanding of the proper use and possible adverse effects of benzoyl peroxide.  All of the patient's questions and concerns were addressed. Benzoyl Peroxide Pregnancy And Lactation Text: This medication is Pregnancy Category C. It is unknown if benzoyl peroxide is excreted in breast milk. Topical Clindamycin Counseling: Patient counseled that this medication may cause skin irritation or allergic reactions.  In the event of skin irritation, the patient was advised to reduce the amount of the drug applied or use it less frequently.   The patient verbalized understanding of the proper use and possible adverse effects of clindamycin.  All of the patient's questions and concerns were addressed. Birth Control Pills Pregnancy And Lactation Text: This medication should be avoided if pregnant and for the first 30 days post-partum. High Dose Vitamin A Counseling: Side effects reviewed, pt to contact office should one occur. Bactrim Counseling:  I discussed with the patient the risks of sulfa antibiotics including but not limited to GI upset, allergic reaction, drug rash, diarrhea, dizziness, photosensitivity, and yeast infections.  Rarely, more serious reactions can occur including but not limited to aplastic anemia, agranulocytosis, methemoglobinemia, blood dyscrasias, liver or kidney failure, lung infiltrates or desquamative/blistering drug rashes. Topical Retinoid counseling:  Patient advised to apply a pea-sized amount only at bedtime and wait 30 minutes after washing their face before applying.  If too drying, patient may add a non-comedogenic moisturizer. The patient verbalized understanding of the proper use and possible adverse effects of retinoids.  All of the patient's questions and concerns were addressed. Erythromycin Pregnancy And Lactation Text: This medication is Pregnancy Category B and is considered safe during pregnancy. It is also excreted in breast milk. Spironolactone Counseling: Patient advised regarding risks of diarrhea, abdominal pain, hyperkalemia, birth defects (for female patients), liver toxicity and renal toxicity. The patient may need blood work to monitor liver and kidney function and potassium levels while on therapy. The patient verbalized understanding of the proper use and possible adverse effects of spironolactone.  All of the patient's questions and concerns were addressed. Azithromycin Counseling:  I discussed with the patient the risks of azithromycin including but not limited to GI upset, allergic reaction, drug rash, diarrhea, and yeast infections. Doxycycline Pregnancy And Lactation Text: This medication is Pregnancy Category D and not consider safe during pregnancy. It is also excreted in breast milk but is considered safe for shorter treatment courses. Tazorac Counseling:  Patient advised that medication is irritating and drying.  Patient may need to apply sparingly and wash off after an hour before eventually leaving it on overnight.  The patient verbalized understanding of the proper use and possible adverse effects of tazorac.  All of the patient's questions and concerns were addressed. Dapsone Counseling: I discussed with the patient the risks of dapsone including but not limited to hemolytic anemia, agranulocytosis, rashes, methemoglobinemia, kidney failure, peripheral neuropathy, headaches, GI upset, and liver toxicity.  Patients who start dapsone require monitoring including baseline LFTs and weekly CBCs for the first month, then every month thereafter.  The patient verbalized understanding of the proper use and possible adverse effects of dapsone.  All of the patient's questions and concerns were addressed. Azithromycin Pregnancy And Lactation Text: This medication is considered safe during pregnancy and is also secreted in breast milk. Topical Sulfur Applications Counseling: Topical Sulfur Counseling: Patient counseled that this medication may cause skin irritation or allergic reactions.  In the event of skin irritation, the patient was advised to reduce the amount of the drug applied or use it less frequently.   The patient verbalized understanding of the proper use and possible adverse effects of topical sulfur application.  All of the patient's questions and concerns were addressed. Detail Level: Detailed Doxycycline Counseling:  Patient counseled regarding possible photosensitivity and increased risk for sunburn.  Patient instructed to avoid sunlight, if possible.  When exposed to sunlight, patients should wear protective clothing, sunglasses, and sunscreen.  The patient was instructed to call the office immediately if the following severe adverse effects occur:  hearing changes, easy bruising/bleeding, severe headache, or vision changes.  The patient verbalized understanding of the proper use and possible adverse effects of doxycycline.  All of the patient's questions and concerns were addressed. Isotretinoin Counseling: Patient should get monthly blood tests, not donate blood, not drive at night if vision affected, not share medication, and not undergo elective surgery for 6 months after tx completed. Side effects reviewed, pt to contact office should one occur.

## 2019-05-20 NOTE — PROGRESS NOTES
Cath holding summary     Patient escorted to cath holding from waiting area ambulatory, alert and oriented x 4, voicing no complaints. Changed into gown and placed on monitor. NPO since MN. Lab results, med rec and H&P reviewed on chart.
Pt returned from procedure 15 mins ago. No sedation given. Dressing to drain site clean, dry, and intact. Pt denies pain or distress. I have reviewed discharge instructions with the patient. The patient verbalized understanding.
Principal Discharge DX:	Musculoskeletal pain

## 2019-05-20 NOTE — DISCHARGE INSTRUCTIONS
Post Procedure Instructions: Follow up with Dr. Mellissa Cueto. Continue with drain management as before. Report any distress, bleeding or significant pain to doctor. Fistulogram and Sinogram: About This Test  What is it? A fistulogram is a test to look at a fistula, which is like an abnormal tube between organs or from an organ to the skin. A sinogram is a test to look at a sinus. A sinus is like a fistula. But instead of connecting two organs, it's like a tube that's closed on one end. Both of these tests use X-rays with a special dye that is injected into the area of the fistula or sinus. The dye allows the fistula or sinus to show up on the X-ray. A CT scan can also be used for these tests. Why is it done? The test may be done to diagnose a fistula or sinus. These tests also help your doctor find out details about the fistula or sinus, such as where it is and the organs it affects. This helps your doctor plan how to treat the problem. How can you prepare for the test?  Your doctor may tell you how to prepare for the test. What you'll need to do depends on where the fistula or sinus is. Tell your doctor if:  · You are taking any medicine. · You are allergic to any medicines, barium, or any other X-ray contrast material.  · You are or might be pregnant. This test isn't done during pregnancy because of the risk of radiation to the baby (fetus). What happens before the test?  The test is usually done in a clinic or a hospital.  · You will need to take off your clothes and put on a hospital gown. · Take off any jewelry. Take out any dentures if the test is done near your head. What happens during the test?  · You will lie on your back on an X-ray or CT machine table. · You may get medicine to help you relax. · The doctor will find where the fistula or sinus starts. He or she will put a thin tube, called a catheter, into the fistula or sinus. Dye will be injected through the tube.   · The doctor will watch where the dye goes. This tells him or her where the fistula or sinus is and what organs it affects. How long does it take? The test will take about 30 minutes to an hour. What happens after the test?  · You can go home soon after the test. You may have a bandage to prevent bleeding. · You will get instructions for what to do at home. Aileen Pa probably be able to go back to your usual activities the next day. You may eat and drink whatever you like, unless your doctor tells you not to. When should you call for help? Call your doctor now or seek immediate medical care if:    · You have new pain, or your pain gets worse.     · You have symptoms of infection, such as:  ? Increased pain, swelling, warmth, or redness. ? Red streaks leading from the area. ? Pus draining from the area. ? A fever.    Watch closely for changes in your health, and be sure to contact your doctor if:    · You do not get better as expected. Follow-up care is a key part of your treatment and safety. Be sure to make and go to all appointments, and call your doctor if you are having problems. Ask your doctor when you can expect to have your test results. Current as of: June 25, 2018  Content Version: 11.9  © 5668-8048 [employer]. Care instructions adapted under license by [employer] (which disclaims liability or warranty for this information). If you have questions about a medical condition or this instruction, always ask your healthcare professional. [place] [address] any warranty or liability for your use of this information.

## 2019-05-20 NOTE — H&P
OUTPATIENT HISTORY AND PHYSICAL      Today 5/20/2019     Indication/Symptoms:   Thom Zazueta is a 76 y.o. 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 She now presents for an image-guided abscessogram through drain. Current Meds:    Prior to Admission medications    Medication Sig Start Date End Date Taking? Authorizing Provider   oxybutynin chloride XL (DITROPAN XL) 10 mg CR tablet Take 1 Tab by mouth daily as needed (bladder spasms/ urinary frequency from stents). For BLADDER SPASMS/Urinary frequency from stents 4/16/19   Nallely Alejandre MD   0.9 % sodium chloride (NORMAL SALINE FLUSH INJECTION) 10 mL by Other route daily. to drain    Provider, Historical   acetaminophen (TYLENOL) 500 mg tablet Take 1,000 mg by mouth every six (6) hours as needed for Pain. Provider, Historical   simethicone (GAS-X) 80 mg chewable tablet Take 80 mg by mouth every six (6) hours as needed for Flatulence. Provider, Historical   magic mouthwash solution Magic mouth wash   Maalox  Lidocaine 2% viscous   Diphenhydramine oral solution     Pharmacy to mix equal portions of ingredients to a total volume as indicated in the dispense amount. 2/4/19   Deya Cabral MD   b complex vitamins (B COMPLEX 1) tablet Take 1 Tab by mouth daily. Provider, Historical   ascorbic acid (VAMSI-C PO) Take  by mouth. Provider, Historical   bisacodyl (DULCOLAX) 10 mg suppository Insert 10 mg into rectum daily as needed. 12/15/18   Hipolito Altamirano MD   cyclobenzaprine (FLEXERIL) 10 mg tablet Take 0.5 Tabs by mouth three (3) times daily as needed for Muscle Spasm(s). 12/13/18   Hipolito Altamirano MD   megestrol (MEGACE) 40 mg tablet Take 2 Tabs by mouth two (2) times a day. 12/4/18   Deya Cabral MD   ondansetron hcl [facility]) 4 mg tablet Take 1 Tab by mouth every six (6) hours as needed for Nausea.  12/4/18   Suleman Harding Yvette Marrero MD   GARLIC PO Take  by mouth daily. Provider, Historical   RESVERATROL PO Take  by mouth daily. Provider, Historical   calcium carbonate (OS-MAYELA) 500 mg calcium (1,250 mg) tablet Take  by mouth daily. Provider, Historical   DOCOSAHEXANOIC ACID/EPA (FISH OIL PO) Take 1,000 mg by mouth daily. Provider, Historical   LACTOBACILLUS ACIDOPHILUS (PROBIOTIC PO) Take  by mouth daily. Provider, Historical       Allergies: Allergies   Allergen Reactions    Other Food Nausea and Vomiting     Artificial sweeteners    Neulasta [Pegfilgrastim] Swelling    Aspirin Other (comments)     Gi upset    Milk Itching and Atopic Dermatitis     Eczema (dairy cow)    Tetracycline Unknown (comments)     Patient does not remember    Wheat Atopic Dermatitis     eczema    Other Plant, Animal, Environmental Other (comments)     Pt states she has \"springtime grass allergies. \"     Reports reaction: Sinus infection       Comorbid Conditions:    Past Medical History:   Diagnosis Date    Acute kidney failure ([address] 12/05/2019    BMI 34.0-34.9,adult     34.8    Caffeine adverse reaction     elevated BP    Cancer (HCC)     uterine, cervical    Cervical cancer (HCC)     Chronic kidney disease     acute kidney failure    Chronic sinusitis     Dizziness     Endometriosis     Hiatal hernia     High cholesterol     Palpitation     PMB (postmenopausal bleeding)     Rash     eczemz    Urinary incontinence           Past Surgical History:   Procedure Laterality Date    HX ABDOMINAL LAPAROSCOPY      HX BILATERAL SALPINGO-OOPHORECTOMY Bilateral     HX BREAST LUMPECTOMY Right 1973    benign    HX HYSTERECTOMY      radical    HX OTHER SURGICAL  09/01/2017    Exam under anesthesia: Cystoscopy, sigmoidoscopy    HX UROLOGICAL      nephrostomy tubes and stents    IR CHANGE EXIST CATHETER/TUBE LT  3/7/2019    IR MEDIPORT      VASCULAR SURGERY PROCEDURE UNLIST      filter      Data:    Visit Vitals  BP 150/87 (BP 1 Location: Left arm, BP Patient Position: Sitting)   Pulse (!) 105   Temp 99 °F (37.2 °C)   Resp 18   Wt 76.7 kg (169 lb)   SpO2 98%   Breastfeeding? No   BMI 28.12 kg/m²   :  No results for input(s): PLT, PLTEXT in the last 72 hours. No lab exists for component:  HCT  No results for input(s): INR, APTT in the last 72 hours. No lab exists for component: PT, INREXT    The H & P and/or progress notes and any available imaging were reviewed. The risks, indications and possible alternatives to the procedure, including doing nothing, were discussed and informed consent was obtained. Physical Exam:      Mental status:   Alert and oriented. Examination specific to the procedure proposed to be performed and any co morbid conditions:     Heart:   Regular rate. Lungs:   Normal respiratory effort. Symmetrical rise and fall of chest    The patient is an appropriate candidate to undergo the planned procedure.     Kayleen Oneil

## 2019-05-30 NOTE — PROGRESS NOTES
Subjective: Pain around the drain site. No longer able to flush drain. Has been minimal output from the drain. Past medical history and ROS were reviewed and unchanged. Abdomen: Soft, nontender nondistended  Drain removed as there is no output    Fistulogram shows persistent abscess    Assessment / Plan    Status post drain removal after percutaneous drainage for abscess from colon perforation on chemotherapy  I explained that abscess could reform she should be letting us know if she develops fevers or worsening abdominal pain  Low fiber diet  Follow-up in 2 weeks    A total of 15 minutes was spent with the patient, with >50% of time spent on counseling and coordination of care. The diagnoses and plan were discussed with patient. All questions answered. Plan of care agreed to by all concerned.

## 2019-06-13 NOTE — PROGRESS NOTES
[facility] Progress Note Date: 2019 Name: Anival Mccurdy MRN: [id number] : 1950 Monthly Port flush Ms. Hewitt to [facility], ambulatory, at 1540. Pt was assessed and education was provided. Ms. Hewitt's vitals were reviewed. Visit Vitals /84 (BP 1 Location: Left arm, BP Patient Position: Sitting) Pulse 96 Temp 97.9 °F (36.6 °C) Resp 18 SpO2 100% Right chest mediport was accessed with 20 gauge 1 inch salgado(s) after chloroprep. Port flushed easily and had brisk blood return. Mediport flushed with NS 20 ml and Heparin 500 units then de-accessed. No irritation or bleeding noted. Band-Aid applied. Ms. Baudilio Rick tolerated the procedure, and had no complaints. Patient armband removed and shredded. Ms. Baudilio Rick was discharged from [place] [address] in stable condition at [phone]. She is to return in 4 weeks for her next appointment. Khanh Doan RN 
2019

## 2019-06-17 NOTE — PROGRESS NOTES
Patients STAT CT cancelled due to not being able to get prior-auth in time. Sonya was told to go to ED but she refused, stating she is too tired and doesn't trust that insurance will cover CT scan through the ED. [facility], surgery scheduler, will obtain pre-auth in am, contact patient and get patient back on CT schedule for tomorrow. Dr. Aishwarya Bourne notified.

## 2019-06-17 NOTE — PROGRESS NOTES
Subjective: She notes some abdominal discomfort. She has been belching frequently. She has constipation. She states she may have had chills. Past medical history and ROS were reviewed and unchanged. Abdomen: Soft, mildly distended and mildly tender diffusely    Assessment / Plan    Status post colonic perforation from chemotherapy and drain removal, now with abdominal pain  Schedule stat CT of the abdomen pelvis   May need to be hospitalized if abscess has recurred    A total of 15 minutes was spent with the patient, with >50% of time spent on counseling and coordination of care. The diagnoses and plan were discussed with patient. All questions answered. Plan of care agreed to by all concerned.

## 2019-06-20 PROBLEM — N73.9 PELVIC ABSCESS IN FEMALE: Status: ACTIVE | Noted: 2019-01-01

## 2019-06-20 NOTE — PROGRESS NOTES
Problem: Falls - Risk of  Goal: *Absence of Falls  Description  Document Aman Carcamo Fall Risk and appropriate interventions in the flowsheet.   Outcome: Progressing Towards Goal     Problem: Patient Education: Go to Patient Education Activity  Goal: Patient/Family Education  Outcome: Progressing Towards Goal     Problem: Pain  Goal: *Control of Pain  Outcome: Progressing Towards Goal  Goal: *PALLIATIVE CARE:  Alleviation of Pain  Outcome: Progressing Towards Goal

## 2019-06-20 NOTE — ROUTINE PROCESS
Bedside shift change report given to González Torres (oncoming nurse) by Thang Francois (offgoing nurse). Report included the following information SBAR, Kardex, Intake/Output, MAR and Recent Results.

## 2019-06-20 NOTE — ROUTINE PROCESS
Pt arrived on unit in no apparent signs of distress. Pt reported nausea and pain. Zofran and morphine given. Pt still complaining of pain at 1904. MD notified of pain and leg swelling on left leg. MD increased dose range to 2-4 mg of Zofran.

## 2019-06-21 NOTE — PROGRESS NOTES
Preprocedure Assessment      Today 6/21/2019     Indication/Symptoms:   Kira Mims is a 76 y.o. female with a history of pelvic abscess who presents to IR for an image-guided pelvic abscess drainage with moderate sedation. Medications and labs reviewed. Patient has been NPO since midnight. Blood thinners held. The H & P and/or progress notes and any available imaging were reviewed. The risks, indications and possible alternatives to the procedure, including doing nothing, were discussed and informed consent was obtained. Physical Exam:      Heart:  Regular rate   Lungs:  Normal respiratory effort    The patient is an appropriate candidate to undergo the planned procedure and sedation.     Ousmane Carroll, [id number]

## 2019-06-21 NOTE — CONSULTS
Consult Note    Patient: Carlos Garces               Sex: female          DOA: 6/20/2019         YOB: 1950      Age:  76 y.o.        LOS:  LOS: 1 day              HPI:     Carlos Garces is a 76 y.o. female who has been seen in evaluation of pelvic fluid collection at the request of Dr. Grupo Arceo. Patient has a history of metastatic cervical cancer with recently treated colonic perforation (from chemotherapy) and abscess back in March. She developed a fistula as evidenced by the sinogram performed on 5/20/19. Per Dr. Shine Gomez note on 5/30, the drain was removed given lack of output. She presented to Dr. Shine Gomez office on 6/17 with complaints of abdominal pain, constipation and chills. CT A/P was ordered to further evaluate and showed recurrence of a pelvic abscess in the region of the previously treated area. She was directly admitted for further management.      Past Medical History:   Diagnosis Date    Acute kidney failure ([facility] [address] 12/05/2019    BMI 34.0-34.9,adult     34.8    Caffeine adverse reaction     elevated BP    Cancer (HCC)     uterine, cervical    Cervical cancer (HCC)     Chronic kidney disease     acute kidney failure    Chronic sinusitis     Dizziness     Endometriosis     Hiatal hernia     High cholesterol     Palpitation     PMB (postmenopausal bleeding)     Rash     eczemz    Urinary incontinence        Past Surgical History:   Procedure Laterality Date    HX ABDOMINAL LAPAROSCOPY      HX BILATERAL SALPINGO-OOPHORECTOMY Bilateral     HX BREAST LUMPECTOMY Right 1973    benign    HX HYSTERECTOMY      radical    HX OTHER SURGICAL  09/01/2017    Exam under anesthesia: Cystoscopy, sigmoidoscopy    HX UROLOGICAL      nephrostomy tubes and stents    IR CHANGE EXIST CATHETER/TUBE LT  3/7/2019    IR MEDIPORT      VASCULAR SURGERY PROCEDURE UNLIST      filter        Family History   Problem Relation Age of Onset    Cancer Mother         left ovary Social History     Socioeconomic History    Marital status: SINGLE     Spouse name: Not on file    Number of children: Not on file    Years of education: Not on file    Highest education level: Not on file   Tobacco Use    Smoking status: Never Smoker    Smokeless tobacco: Never Used   Substance and Sexual Activity    Alcohol use: No    Drug use: No    Sexual activity: Never       Prior to Admission medications    Medication Sig Start Date End Date Taking? Authorizing Provider   acetaminophen (TYLENOL) 500 mg tablet Take 1,000 mg by mouth every six (6) hours as needed for Pain. Yes Provider, Historical   multivitamin (ONE A DAY) tablet Take 1 Tab by mouth daily. Provider, Historical   b complex vitamins (B COMPLEX 1) tablet Take 1 Tab by mouth daily. Provider, Historical   bisacodyl (DULCOLAX) 10 mg suppository Insert 10 mg into rectum daily as needed. 12/15/18   Zulma Boyd MD       Allergies   Allergen Reactions    Other Food Nausea and Vomiting     Artificial sweeteners    Neulasta [Pegfilgrastim] Swelling    Aspirin Other (comments)     Gi upset    Milk Itching and Atopic Dermatitis     Eczema (dairy cow)    Tetracycline Unknown (comments)     Patient does not remember    Wheat Atopic Dermatitis     eczema    Other Plant, Animal, Environmental Other (comments)     Pt states she has \"springtime grass allergies. \"     Reports reaction: Sinus infection       Review of Systems  Pertinent items are noted in the History of Present Illness. Physical Exam:      Visit Vitals  /67 (BP 1 Location: Left arm, BP Patient Position: At rest)   Pulse 90   Temp 98.1 °F (36.7 °C)   Resp 16   SpO2 95%       Physical Exam:  Constitutional: NAD. A&Ox4. Resting comfortably in bed. Respiratory: Normal respiratory effort. Cardiovascular: Regular rate. Gastrointestinal: Soft, tender to palpation in lower abdomen.      Labs Reviewed:  CMP:   Lab Results   Component Value Date/Time     06/21/2019 03:53 AM    K 4.8 06/21/2019 03:53 AM     06/21/2019 03:53 AM    CO2 26 06/21/2019 03:53 AM    AGAP 7 06/21/2019 03:53 AM    GLU 90 06/21/2019 03:53 AM    BUN 20 (H) 06/21/2019 03:53 AM    CREA 1.84 (H) 06/21/2019 03:53 AM    GFRAA 33 (L) 06/21/2019 03:53 AM    GFRNA 27 (L) 06/21/2019 03:53 AM    CA 8.7 06/21/2019 03:53 AM    MG 2.0 06/21/2019 03:53 AM    PHOS 4.9 06/21/2019 03:53 AM     CBC:   Lab Results   Component Value Date/Time    WBC 9.5 06/21/2019 03:53 AM    HGB 10.1 (L) 06/21/2019 03:53 AM    HCT 32.2 (L) 06/21/2019 03:53 AM     06/21/2019 03:53 AM     COAGS:   Lab Results   Component Value Date/Time    APTT 27.9 06/20/2019 06:57 PM    PTP 13.0 06/20/2019 06:57 PM    INR 1.0 06/20/2019 06:57 PM       Assessment   Active Problems:    Pelvic abscess in female (6/20/2019)        Brady Gibson is a 76 y.o. female with a history of metastatic cervical cancer presenting with new onset abdominal pain and chills with associated pelvic abscess on CT. Plan   Case and images reviewed by Dr. Juan Floyd. Discussions held between Dr. Juan Floyd and Dr. Phani Feldman regarding treatments options and they include percutaneous aspiration/drainage, surgical washout or IV antibiotics. Given that the abscess is accessible percutaneously and she is not an ideal surgical candidate given her co-morbidities,  will plan for image-guided pelvic abscess drainage with moderate sedation as IR schedule allows. Patient to remain NPO with blood thinning medications held. Consent to be obtained prior to procedure.

## 2019-06-21 NOTE — CDMP QUERY
Could you please document if there is any significance to patient's creatinine levels: 
 
5/20/2019  Creatinine: 0.80 
6/20/2019  Creatinine: 1.56 
6/21/2019  Creatinine: 1.84 Thank you. Lizeth Rick, RN 
[phone]

## 2019-06-21 NOTE — PROGRESS NOTES
Bedside and Verbal shift change report given to Navin Sutherland RN (oncoming nurse) by Maday Nuñez RN (off going nurse). Report included the following information SBAR, Kardex, Intake/Output and MAR.

## 2019-06-21 NOTE — PROGRESS NOTES
Problem: Falls - Risk of  Goal: *Absence of Falls  Description  Document Tia Manus Fall Risk and appropriate interventions in the flowsheet.   6/20/2019 2219 by Hulen Babinski., RN  Outcome: Progressing Towards Goal  6/20/2019 2215 by Hulen Babinski., RN  Outcome: Progressing Towards Goal     Problem: Patient Education: Go to Patient Education Activity  Goal: Patient/Family Education  6/20/2019 2219 by Hulen Babinski., RN  Outcome: Progressing Towards Goal  6/20/2019 2215 by Hulen Babinski., RN  Outcome: Progressing Towards Goal     Problem: Pain  Goal: *Control of Pain  6/20/2019 2219 by Hulen Babinski., RN  Outcome: Progressing Towards Goal  6/20/2019 2215 by Hulen Babinski., RN  Outcome: Progressing Towards Goal  Goal: *PALLIATIVE CARE:  Alleviation of Pain  6/20/2019 2219 by Hulen Babinski., RN  Outcome: Progressing Towards Goal  6/20/2019 2215 by Hulen Babinski., RN  Outcome: Progressing Towards Goal

## 2019-06-21 NOTE — PROGRESS NOTES
Problem: Falls - Risk of 
Goal: *Absence of Falls Description Document Amada Bunting Fall Risk and appropriate interventions in the flowsheet. Outcome: Progressing Towards Goal 
  
Problem: Patient Education: Go to Patient Education Activity Goal: Patient/Family Education Outcome: Progressing Towards Goal 
  
Problem: Pain Goal: *Control of Pain Outcome: Progressing Towards Goal 
Goal: *PALLIATIVE CARE:  Alleviation of Pain Outcome: Progressing Towards Goal

## 2019-06-21 NOTE — H&P
HPI: Vita Brunner is a 76 y.o. female presenting with chief complain of abdominal pain. Pt has history of perforated colon from chemotherapy, recently drain removed. She developed worsening lower abdominal pain which was sharp, constant and severe. Fevers and chills at home as well. Tolerating diet. Recently drain removed, and CT showed small psoas abscess which on follow up CT was larger. Admitted for IV abx, hydration and CT guided drainage after discussing with IR. Collection seems separate from site of prior perc drainage.      Past Medical History:   Diagnosis Date    Acute kidney failure ([address] 12/05/2019    BMI 34.0-34.9,adult     34.8    Caffeine adverse reaction     elevated BP    Cancer (HCC)     uterine, cervical    Cervical cancer (HCC)     Chronic kidney disease     acute kidney failure    Chronic sinusitis     Dizziness     Endometriosis     Hiatal hernia     High cholesterol     Palpitation     PMB (postmenopausal bleeding)     Rash     eczemz    Urinary incontinence        Past Surgical History:   Procedure Laterality Date    HX ABDOMINAL LAPAROSCOPY      HX BILATERAL SALPINGO-OOPHORECTOMY Bilateral     HX BREAST LUMPECTOMY Right 1973    benign    HX HYSTERECTOMY      radical    HX OTHER SURGICAL  09/01/2017    Exam under anesthesia: Cystoscopy, sigmoidoscopy    HX UROLOGICAL      nephrostomy tubes and stents    IR CHANGE EXIST CATHETER/TUBE LT  3/7/2019    IR MEDIPORT      VASCULAR SURGERY PROCEDURE UNLIST      filter        Family History   Problem Relation Age of Onset    Cancer Mother         left ovary       Social History     Socioeconomic History    Marital status: SINGLE     Spouse name: Not on file    Number of children: Not on file    Years of education: Not on file    Highest education level: Not on file   Tobacco Use    Smoking status: Never Smoker    Smokeless tobacco: Never Used   Substance and Sexual Activity    Alcohol use: No    Drug use: No    Sexual activity: Never       Review of Systems - aside from above all others negative    Outpatient Medications Marked as [facility])   Medication Sig Dispense Refill    acetaminophen (TYLENOL) 500 mg tablet Take 1,000 mg by mouth every six (6) hours as needed for Pain. Allergies   Allergen Reactions    Other Food Nausea and Vomiting     Artificial sweeteners    Neulasta [Pegfilgrastim] Swelling    Aspirin Other (comments)     Gi upset    Milk Itching and Atopic Dermatitis     Eczema (dairy cow)    Tetracycline Unknown (comments)     Patient does not remember    Wheat Atopic Dermatitis     eczema    Other Plant, Animal, Environmental Other (comments)     Pt states she has \"springtime grass allergies. \"     Reports reaction: Sinus infection       Vitals:    06/20/19 1549 06/20/19 1840 06/20/19 2129 06/21/19 0609   BP: (!) 133/92 107/59 120/75 110/67   Pulse: (!) 107 99 100 90   Resp: 15 16 18 16   Temp: 98.1 °F (36.7 °C) 97.3 °F (36.3 °C) 98.1 °F (36.7 °C) 98.1 °F (36.7 °C)   SpO2: 97% 96% 98% 95%       Physical Exam   Constitutional: She appears well-developed and well-nourished. HENT:   Head: Normocephalic and atraumatic. Eyes: Conjunctivae and EOM are normal.   Abdominal: Soft. She exhibits no distension. There is tenderness (LLQ, moderate). Musculoskeletal: Normal range of motion. Lymphadenopathy:     She has no cervical adenopathy. Right: No inguinal adenopathy present. Left: No inguinal adenopathy present. Neurological: No sensory deficit. Skin: Skin is warm and dry. No rash noted. Psychiatric: She has a normal mood and affect.  Her speech is normal.     CMP:   Lab Results   Component Value Date/Time     06/21/2019 03:53 AM    K 4.8 06/21/2019 03:53 AM     06/21/2019 03:53 AM    CO2 26 06/21/2019 03:53 AM    AGAP 7 06/21/2019 03:53 AM    GLU 90 06/21/2019 03:53 AM    BUN 20 (H) 06/21/2019 03:53 AM    CREA 1.84 (H) 06/21/2019 03:53 AM    GFRAA 33 (L) 06/21/2019 03:53 AM    GFRNA 27 (L) 06/21/2019 03:53 AM    CA 8.7 06/21/2019 03:53 AM    MG 2.0 06/21/2019 03:53 AM    PHOS 4.9 06/21/2019 03:53 AM     CBC:   Lab Results   Component Value Date/Time    WBC 9.5 06/21/2019 03:53 AM    HGB 10.1 (L) 06/21/2019 03:53 AM    HCT 32.2 (L) 06/21/2019 03:53 AM     06/21/2019 03:53 AM     CT as described above, personally visualized; psoas abscess; pericolonic abscess appears resolved    Assessment / Plan    Psoas abscess after perforated colitis from chemo  NPO today for IR drainage  Zosyn until cultures are back    The diagnoses and plan were discussed with the patient. All questions answered. Plan of care agreed to by all concerned.

## 2019-06-21 NOTE — PROGRESS NOTES
conducted an initial consultation and Spiritual Assessment for [employer], who is a 76 y.o.,female. Patient's Primary Language is: [place]. According to the patient's EMR Roman Catholic Affiliation is: Sikh.     The reason the Patient came to the hospital is:   Patient Active Problem List    Diagnosis Date Noted    Pelvic abscess in female 06/20/2019    Hydronephrosis 04/16/2019    Colon perforation ([address] 03/25/2019    Retroperitoneal abscess ([address] 03/07/2019    DVT (deep venous thrombosis) ([address] 03/07/2019    Cervical cancer ([address] 01/22/2019    ARF (acute renal failure) ([facility] [address] 12/05/2018    Severe obesity (BMI 35.0-39.9) 09/27/2018    Endometrial ca ([facility] [address] 09/15/2017        The  provided the following Interventions:  Initiated a relationship of care and support. Explored issues of jasmina, belief, spirituality and Adventism/ritual needs while hospitalized. Listened empathically as the patient shared the reason for her hospital visit. Provided chaplaincy education. Provided information about Spiritual Care Services. Offered prayer and assurance of continued prayers on patient's behalf. The following outcomes where achieved:  Patient shared limited information about both her medical narrative and spiritual journey/beliefs.  confirmed Patient's Roman Catholic Affiliation in the Sikh tradition. Patient processed feeling about current hospitalization. Patient expressed gratitude for 's visit. Assessment:  Patient does not have any Adventism/cultural needs that will affect patient's preferences in health care. There are no spiritual or Adventism issues which require intervention at this time. Plan:  Chaplains will continue to follow and will provide pastoral care on an as needed/requested basis.  recommends bedside caregivers page  on duty if patient shows signs of acute spiritual or emotional distress. Keesha Mirza Daniel  Spiritual Care   [phone]

## 2019-06-21 NOTE — ROUTINE PROCESS
1913 Bedside and Verbal shift change  Received from Buzz (outgoing nurse), to LITTLE Hart (oncoming)  Pt. Is AOX 4. IV patent and infusing well, Pt. denies  pain at this time. Report included the following information SBAR, Kardex, Procedure Summary, Intake/Output, MAR, Recent Lab Results. Will resume care and monitor Pt. Condition. Pt. Educated on call bell when in need of help and assistance. Pt. Verbalized understanding. 2005 Pt. Head to toe Assessment Done and documented. 2050  Pt. Given pain meds per [facility] for pain management. 2200  Pt. Resting comfortably in bed, pain is relieved per Pt.    2300  Pt. Made no complaints. 0015  Pt. Given pain meds per [facility] for pian management. 0115  Pt. Resting with eyes closed, easily awaken. 0315  Pt. Made no complaints. 0426  Pt. Given morphine per [facility] for pain management. 0600  Pt. Able to rest and sleep throughout the shift. Verbal and bedside Shift changed report given to Coral Solis RN (oncoming RN) on Pt. Condition. Report consisted of patients Situation, History, Activities, intake/output,  Background, Assessment and Recommendations(SBAR). Information from the following report(s) Kardex, order Summary, Lab results and MAR was reviewed with the receiving nurse. Opportunity for questions and clarification was provided.

## 2019-06-21 NOTE — OP NOTES
Interventional Radiology Brief Procedure Note    Interventional Radiologist: Annie Pena MD    Pre-operative Diagnosis: Left pelvic fluid collection    Post-operative Diagnosis: Same as pre-operative diagnosis    Procedure(s) Performed:  CT guided pelvic fluid collection drain    Anesthesia:  Local and Moderate Sedation    Findings: 40 mL of cloudy yellow fluid, Specimen sent for culture. 8.5 Fr drain left in place. Complications: None    Estimated Blood Loss:  minimal    Tubes and Drains: None    Specimens: Yes    Condition: Good    Disposition: Back to nursing unit.     Annie Pena MD  6/21/2019

## 2019-06-21 NOTE — PROGRESS NOTES
Addended by: FRANK CRUM on: 6/17/2019 05:03 PM     Modules accepted: Orders     Patients arrives via bed alert awake and oriented with now DEX drain to Lpelvic area patent draining serosanguinous drainage.  No s/s of distress or sob

## 2019-06-22 NOTE — PROGRESS NOTES
Problem: Falls - Risk of  Goal: *Absence of Falls  Description  Document Gregory Waite Fall Risk and appropriate interventions in the flowsheet.   Outcome: Progressing Towards Goal     Problem: Patient Education: Go to Patient Education Activity  Goal: Patient/Family Education  Outcome: Progressing Towards Goal     Problem: Pain  Goal: *Control of Pain  Outcome: Progressing Towards Goal  Goal: *PALLIATIVE CARE:  Alleviation of Pain  Outcome: Progressing Towards Goal     Problem: Pain  Goal: *Control of Pain  Outcome: Progressing Towards Goal

## 2019-06-22 NOTE — ROUTINE PROCESS
1905 Bedside and Verbal shift change  Received from May Canada RN (outgoing nurse), to LITTLE Guzman (oncoming)  Pt. Is AOX 4. New Iv inserted, Pt. denies  pain at this time. Report included the following information SBAR, Kardex, Procedure Summary, Intake/Output, MAR, Recent Lab Results. Will resume care and monitor Pt. Condition. Pt. Educated on call bell when in need of help and assistance. Pt. verbalized understanding. Pt. Head to toe Assessment Done and documented. 200 0Pt. Made no complaints. 2030  Pt. Assisted to bathroom, able to ambulate well. 2130  Pt. Made no complaints. 2230  Pt. Given pain meds per [facility] for pain management. 2330  Pt. Resting comfortably in bed, no sign of distress. 0130  Pt. Eyes closed, easily awaken. 0315  Pt. Given morphine per MAr.    0400  Pt. Resting comfortably in bed, not in distress. 0545  Pt. Made no complaints. 0645  Pt. Given pain meds per mar. 0715  Pt. Verbalized relief.

## 2019-06-22 NOTE — PROGRESS NOTES
Bedside and Verbal shift change report given to Dunia Quiroga RN (oncoming nurse) by Felton Harris RN (offgoing nurse). Report included the following information SBAR and Kardex.

## 2019-06-22 NOTE — PROGRESS NOTES
Problem: Falls - Risk of  Goal: *Absence of Falls  Description  Document Farheen Latin Fall Risk and appropriate interventions in the flowsheet.   Outcome: Progressing Towards Goal     Problem: Patient Education: Go to Patient Education Activity  Goal: Patient/Family Education  Outcome: Progressing Towards Goal     Problem: Pain  Goal: *Control of Pain  Outcome: Progressing Towards Goal  Goal: *PALLIATIVE CARE:  Alleviation of Pain  Outcome: Progressing Towards Goal     Problem: Pain  Goal: *Control of Pain  Outcome: Progressing Towards Goal

## 2019-06-22 NOTE — PROGRESS NOTES
[facility]  [address] [facility] [address] [phone]  Colon and Rectal Surgery Progress Note      Patient: Gonzalez Sandoval MRN: [id number]  SSN: [id number]    YOB: 1950  Age: 76 y.o. Sex: female      Admit Date: 6/20/2019    LOS: 2 days     Subjective:     Had IR drain. Pain resolved. Tolerating diet better than previously. Objective:     Vitals:    06/21/19 1541 06/21/19 1823 06/21/19 2142 06/22/19 0533   BP: 118/70 116/73 107/67 123/76   Pulse: 94 85 100 82   Resp: 18 18 17 16   Temp: 97.2 °F (36.2 °C) 97.3 °F (36.3 °C) 98.3 °F (36.8 °C) 98 °F (36.7 °C)   SpO2: 98% 94% 95% 95%        Intake and Output:  Current Shift: 06/22 0701 - 06/22 1900  In: 360 [P.O.:360]  Out: -   Last three shifts: 06/20 1901 - 06/22 0700  In: 4253.3 [P.O.:1320;  I.V.:2933.3]  Out: 1520 [Urine:1450; Drains:70]    Physical Exam:     abd soft, NTND    Lab/Data Review:    BMP:   Lab Results   Component Value Date/Time     06/22/2019 04:28 AM    K 4.3 06/22/2019 04:28 AM     06/22/2019 04:28 AM    CO2 30 06/22/2019 04:28 AM    AGAP 5 06/22/2019 04:28 AM    GLU 93 06/22/2019 04:28 AM    BUN 19 (H) 06/22/2019 04:28 AM    CREA 1.75 (H) 06/22/2019 04:28 AM    GFRAA 35 (L) 06/22/2019 04:28 AM    GFRNA 29 (L) 06/22/2019 04:28 AM     CMP:   Lab Results   Component Value Date/Time     06/22/2019 04:28 AM    K 4.3 06/22/2019 04:28 AM     06/22/2019 04:28 AM    CO2 30 06/22/2019 04:28 AM    AGAP 5 06/22/2019 04:28 AM    GLU 93 06/22/2019 04:28 AM    BUN 19 (H) 06/22/2019 04:28 AM    CREA 1.75 (H) 06/22/2019 04:28 AM    GFRAA 35 (L) 06/22/2019 04:28 AM    GFRNA 29 (L) 06/22/2019 04:28 AM    CA 8.1 (L) 06/22/2019 04:28 AM    MG 1.9 06/22/2019 04:28 AM    PHOS 4.3 06/22/2019 04:28 AM     CBC:   Lab Results   Component Value Date/Time    WBC 7.4 06/22/2019 04:28 AM    HGB 9.4 (L) 06/22/2019 04:28 AM    HCT 30.8 (L) 06/22/2019 04:28 AM     06/22/2019 04:28 AM      Cultures pending    Assessment:     S/p IR drain for psoas abscess related to perforated colon from chemo    Plan:     Continue IV abx  F/U cultures  Acute renal failure - starting to resolve, likely from dehydration; IVF given  Will involve renal if does not continue to trend downward  Continue with gentle rehydration with NS  Home tomorrow    Signed By: Jack Olson MD        June 22, 2019

## 2019-06-22 NOTE — ROUTINE PROCESS
1920 Bedside and Verbal shift change  Received from Gopal Cabello RN (outgoing nurse), to LITTLE Cronin (oncoming)  Pt. Is AOX 4. IV patent and infusing well, Pt. Given pain meds per [facility] for  pain at this time. Report included the following information SBAR, Kardex, Procedure Summary, Intake/Output, MAR, Recent Lab Results. Will resume care and monitor Pt. Condition. Pt. Educated on call bell when in need of help and assistance. Pt. verbalized understanding. Pt. Head to toe Assessment Done and documented. 2100  Pt. Not in distress. 2200  Pt. Made no complaints. 2300  Pt. Given pain meds per [facility] for pain management. 0000  Pt. Resting comfortably in bed, eyes closed, easily awaken. 0200  Pt. Denies discomfort. 0400 Pt. Given pain meds, for lower back pain. 0500  Pt. Denies pain. 0650  Pt. Given pain meds per MAR. Verbal and bedside Shift changed report given to Lynda Matta RN (oncoming RN) on Pt. Condition. Report consisted of patients Situation, History, Activities, intake/output,  Background, Assessment and Recommendations(SBAR). Information from the following report(s) Kardex, order Summary, Lab results and MAR was reviewed with the receiving nurse. Opportunity for questions and clarification was provided.

## 2019-06-23 NOTE — ROUTINE PROCESS
Bedside and Verbal shift change report given to Natalia Lake RN (oncoming nurse) by Victor Manuel Cardoso RN (offgoing nurse). Report included the following information SBAR, Kardex and MAR.

## 2019-06-23 NOTE — ROUTINE PROCESS
Patient states codeine upset her stomach and can not take it. Notified Dr. Zayda Sandoval. Zayda Sandoval wanted to ask me to ask her what medcines works for her. Patient state she is fine with Tylenol. Notified Dr. Zayda Sandoval. New orders for tylenol 650 mg every 6 hours.

## 2019-06-23 NOTE — ROUTINE PROCESS
Bedside and Verbal shift change report given to [facility], RN (oncoming nurse) by Ailyn Dumont RN (offgoing nurse). Report included the following information SBAR, Kardex and MAR.

## 2019-06-23 NOTE — PROGRESS NOTES
[facility]  [address] [place] [address]  [phone]  Colon and Rectal Surgery Progress Note      Patient: Lula Mcardle MRN: [id number]  SSN: [id number]    YOB: 1950  Age: 76 y.o. Sex: female      Admit Date: 6/20/2019    LOS: 3 days     Subjective:     C/O leg pain. Hx of DVT, IVC filter placed because anticoagulation not tolerated due to vaginal bleeding.      Objective:     Vitals:    06/22/19 1602 06/22/19 1817 06/22/19 2120 06/23/19 0547   BP:  121/78 125/63 132/75   Pulse:  99 93 88   Resp:  15 18 18   Temp:  98.2 °F (36.8 °C) 98.4 °F (36.9 °C) 98.8 °F (37.1 °C)   SpO2:  90% 95% 94%   Weight: 75.8 kg (167 lb)           Intake and Output:  Current Shift: 06/23 0701 - 06/23 1900  In: 240 [P.O.:240]  Out: -   Last three shifts: 06/21 1901 - 06/23 0700  In: 3890.4 [P.O.:1080; I.V.:2810.4]  Out: 2130 [Urine:2100; Drains:30]    Physical Exam:     abd soft, NTND    Lab/Data Review:    BMP:   Lab Results   Component Value Date/Time     06/23/2019 04:58 AM    K 4.4 06/23/2019 04:58 AM     06/23/2019 04:58 AM    CO2 29 06/23/2019 04:58 AM    AGAP 4 06/23/2019 04:58 AM    GLU 89 06/23/2019 04:58 AM    BUN 14 06/23/2019 04:58 AM    CREA 1.46 (H) 06/23/2019 04:58 AM    GFRAA 43 (L) 06/23/2019 04:58 AM    GFRNA 36 (L) 06/23/2019 04:58 AM     CMP:   CBC:   Lab Results   Component Value Date/Time    WBC 7.9 06/23/2019 04:58 AM    HGB 9.2 (L) 06/23/2019 04:58 AM    HCT 30.2 (L) 06/23/2019 04:58 AM     06/23/2019 04:58 AM      Cultures negative thus far    Assessment:     S/p IR drain for psoas abscess related to perforated colon from chemo    Plan:     Continue IV abx  F/U cultures  Acute renal failure - resolving, HLIV  Home tomorrow  Leg elevation for swelling    Signed By: Colletta Skelton, MD        June 23, 2019

## 2019-06-24 NOTE — DISCHARGE INSTRUCTIONS
Colon and Rectal Surgery Discharge Summary      Patient: Nelia Munoz MRN: [id number]  SSN: [id number]    YOB: 1950  Age: 76 y.o. Sex: female       Admit Date: 6/20/2019     Discharge Date: 6/24/2019       Admission Diagnoses: Pelvic abscess in female [N73.9]     Discharge Diagnoses: Pelvic abscess     Procedure: IR drainage                Problem List as of 6/24/2019 Date Reviewed: 6/17/2019           Codes Class Noted - Resolved     Pelvic abscess in female ICD-10-CM: N73.9  ICD-9-CM: 319. 4   6/20/2019 - Present           Hydronephrosis ICD-10-CM: N13.30  ICD-9-CM: 458   4/16/2019 - Present           Colon perforation (HCC) ICD-10-CM: K63.1  ICD-9-CM: 569.83   3/25/2019 - Present           Retroperitoneal abscess ([facility] ICD-10-CM: G55.98  ICD-9-CM: 567.38   3/7/2019 - Present           DVT (deep venous thrombosis) ([facility]) ICD-10-CM: I82.409  ICD-9-CM: 453.40   3/7/2019 - Present           Cervical cancer (HCC) ICD-10-CM: C53.9  ICD-9-CM: 180. 9   1/22/2019 - Present           ARF (acute renal failure) ([facility] ICD-10-CM: N17.9  ICD-9-CM: 584. 9   12/5/2018 - Present           Severe obesity (BMI 35.0-39. 9) ICD-10-CM: E66.01  ICD-9-CM: 278.01   9/27/2018 - Present           Endometrial ca [facility]) ICD-10-CM: C54.1  ICD-9-CM: 182.0   9/15/2017 - Present                   Discharge Condition: Good     Hospital Course: Admitted for IR drainage pelvic abscess. Diet advanced.  Afebrile on discharge.     Consults: None     Significant Diagnostic Studies: None     Disposition: home     Discharge Medications:       Current Discharge Medication List           CONTINUE these medications which have NOT CHANGED     Details   acetaminophen (TYLENOL) 500 mg tablet Take 1,000 mg by mouth every six (6) hours as needed for Pain.       multivitamin (ONE A DAY) tablet Take 1 Tab by mouth daily.       b complex vitamins (B COMPLEX 1) tablet Take 1 Tab by mouth daily.       bisacodyl (DULCOLAX) 10 mg suppository Insert 10 mg into rectum daily as needed.   Qty: 10 Suppository, Refills: 0                 Activity: Activity as tolerated  Diet: Regular Diet  Wound Care: Drain flushes           Follow-up Appointments   Procedures    FOLLOW UP VISIT Appointment in: Ten Days       Standing Status:   Standing       Number of Occurrences:   1       Order Specific Question:   Appointment in       Answer:   Ten Days         Signed By: Jovani Washington MD      June 24, 2019

## 2019-06-24 NOTE — PROGRESS NOTES
Discharge planning    Discharge order noted for today. Referral sent to [address] [place] [address]. Met with patient and agreeable to the transition plan today. Transport has been arranged with son. Patient's discharge summary and home health  orders have been forwarded to [facility] health  agency via [employer]. Updated bedside RN, Candido Puente,  to the transition plan.   Discharge information has been documented on the AVS.     BEREKET Sierra, RN  Pager # [phone]  Care Manager

## 2019-06-24 NOTE — HOME CARE
Discharge noted for today. Received home health referral for [facility] for SN - drain care. Order processed and called to Sachi MALIN in intake. Patient had questions concerning co-pays. I spoke with Hershell , . Per Griselda Friday, patient has met out of pocket and deductible. Her co pay for home care visits are $30. Patient notified and agreeable.    Sharyle Mouse, LPN

## 2019-06-24 NOTE — PROGRESS NOTES
Reason for Admission:  Pelvic abscess in female [N73.9]                 RRAT Score:   12           Plan for utilizing home health: [address]                      Likelihood of Readmission:   LOW                         Transition of Care Plan:              Initial assessment completed with patient. Cognitive status of patient: oriented to time, place, person and situation. Face sheet information confirmed:  yes. The patient designates fidel Agrawal ([phone]) to participate in her discharge plan and to receive any needed information. This patient lives in a apartment with son. Patient is not able to navigate steps as needed. Prior to hospitalization, patient was considered to be independent with ADLs/IADLS : yes . Patient has a current ACP document on file: no     The patient's son will be available to transport patient home upon discharge. The patient already has [address] equipment available in the home. Patient is not currently active with home health. Patient has not stayed in a skilled nursing facility or rehab. This patient is on dialysis :no      List of available Home Health agencies were provided and reviewed with the patient prior to discharge. Freedom of choice signed: yes, for [address] and referral sent. Currently, the discharge plan is Home with [address] [facility]. The patient states that she can obtain her medications from the pharmacy, and take her medications as directed.     Patient's current insurance is [employer] and [place] Medicare part A       Care Management Interventions  PCP Verified by CM: Yes(Per pt, saw pcp 4 months ago.)  Mode of Transport at Discharge: Self  Transition of Care Consult (CM Consult): Discharge Planning, [address]: Yes  MyChart Signup: No  Discharge Durable Medical Equipment: No  Physical Therapy Consult: No  Occupational Therapy Consult: No  Speech Therapy Consult: No  Current Support Network: Other(Live with son.)  Confirm Follow Up Transport: Self  Plan discussed with Pt/Family/Caregiver: Yes  Freedom of Choice Offered: Yes  Discharge Location  Discharge Placement: Home with home health        MORALES CullenN, RN  Pager # [phone]  Care Manager

## 2019-06-24 NOTE — DISCHARGE SUMMARY
Colon and Rectal Surgery Discharge Summary     Patient: Mahsa Doran MRN: [id number]  SSN: [id number]    YOB: 1950  Age: 76 y.o. Sex: female       Admit Date: 6/20/2019    Discharge Date: 6/24/2019      Admission Diagnoses: Pelvic abscess in female [N73.9]    Discharge Diagnoses: Pelvic abscess    Procedure: IR drainage    Problem List as of 6/24/2019 Date Reviewed: 6/17/2019          Codes Class Noted - Resolved    Pelvic abscess in female ICD-10-CM: N73.9  ICD-9-CM: 614.4  6/20/2019 - Present        Hydronephrosis ICD-10-CM: N13.30  ICD-9-CM: 591  4/16/2019 - Present        Colon perforation ([facility] ICD-10-CM: K63.1  ICD-9-CM: 569.83  3/25/2019 - Present        Retroperitoneal abscess ([facility] ICD-10-CM: F22.12  ICD-9-CM: 567.38  3/7/2019 - Present        DVT (deep venous thrombosis) ([facility] ICD-10-CM: I82.409  ICD-9-CM: 453.40  3/7/2019 - Present        Cervical cancer ([facility] ICD-10-CM: C53.9  ICD-9-CM: 180.9  1/22/2019 - Present        ARF (acute renal failure) ([facility] ICD-10-CM: N17.9  ICD-9-CM: 584.9  12/5/2018 - Present        Severe obesity (BMI 35.0-39. 9) ICD-10-CM: E66.01  ICD-9-CM: 278.01  9/27/2018 - Present        Endometrial ca [facility]) ICD-10-CM: C54.1  ICD-9-CM: 182.0  9/15/2017 - Present               Discharge Condition: Good    Hospital Course: Admitted for IR drainage pelvic abscess. Diet advanced. Afebrile on discharge. Consults: None    Significant Diagnostic Studies: None    Disposition: home    Discharge Medications:   Current Discharge Medication List      CONTINUE these medications which have NOT CHANGED    Details   acetaminophen (TYLENOL) 500 mg tablet Take 1,000 mg by mouth every six (6) hours as needed for Pain.      multivitamin (ONE A DAY) tablet Take 1 Tab by mouth daily. b complex vitamins (B COMPLEX 1) tablet Take 1 Tab by mouth daily. bisacodyl (DULCOLAX) 10 mg suppository Insert 10 mg into rectum daily as needed.   Qty: 10 Suppository, Refills: 0 Activity: Activity as tolerated  Diet: Regular Diet  Wound Care: Drain flushes    Follow-up Appointments   Procedures    FOLLOW UP VISIT Appointment in: Ten Days     Standing Status:   Standing     Number of Occurrences:   1     Order Specific Question:   Appointment in     Answer:   Ten Days       Signed By: Nayely Banks MD     June 24, 2019

## 2019-06-26 NOTE — PROGRESS NOTES
Spoke to patient about drain in place  Will d/w dr. Lopez Chaudhry  If drain out will plan to see in office on 7/5 to discuss chemo

## 2019-06-26 NOTE — TELEPHONE ENCOUNTER
Called to inform patient that due to her recent hospitalization we don't need to see her until cleared by Dr. Aishwarya Bourne. Pt reports light vaginal bleeding and weight loss. Pt reports she still has a drain in. Pt reports she still has a \"hole\" in her intestines but she \"really wants to gets a handle on this cancer\". Explained to pt I will discuss with Dr. Armando Lee and return her call in regards to his recommendations. Patient verbalized understanding and agreed to plan. Patient instructed to contact office for any question or concerns.

## 2019-06-26 NOTE — TELEPHONE ENCOUNTER
Dr. Paco Henry discussed pt concerns via phone call. He also spoke with Dr. Margie Alcala regarding surgical intervention. Pt appointment for today cancelled.

## 2019-07-01 NOTE — PROGRESS NOTES
Subjective: Tolerating diet. Denies fevers or chills. Drain with 10 to 15 mL/day. Past medical history and ROS were reviewed and unchanged. Abdomen: Soft, nontender nondistended  Sanguinous drainage in the drain    Assessment / Plan    Status post repeat IR drain after colonic perforation  Drain was mainly serous output, no growth of bacteria and not on antibiotics  Have her perform drain study and likely remove drain  Follow-up in the office in 2 weeks  If drain comes out she may proceed with chemotherapy    A total of 15 minutes was spent with the patient, with >50% of time spent on counseling and coordination of care. The diagnoses and plan were discussed with patient. All questions answered. Plan of care agreed to by all concerned.

## 2019-07-05 NOTE — PATIENT INSTRUCTIONS
Uterine Cancer: Care Instructions  Your Care Instructions  Uterine cancer is the rapid growth of abnormal cells that line the uterus. It also is called endometrial cancer. These cells may spread to nearby organs, lymph glands, or distant organs. Uterine cancer can be cured most often when found early. Treatment may include surgery to remove the uterus, ovaries, fallopian tubes, and sometimes the pelvic lymph nodes. Radiation and hormones to stop cancer growth also are sometimes used. Chemotherapy may be used if the cancer has spread. Having cancer can be scary. You may feel many emotions and may need some help coping. Seek out family, friends, and counselors for support. You can do things at home to make yourself feel better while you go through treatment. You also can call the [facility] ([phone]) or visit its website at [employer] for more information. Follow-up care is a key part of your treatment and safety. Be sure to make and go to all appointments, and call your doctor if you are having problems. It's also a good idea to know your test results and keep a list of the medicines you take. How can you care for yourself at home? · Take your medicines exactly as prescribed. Call your doctor if you think you are having a problem with your medicine. You may get medicine for nausea and vomiting if you have these side effects. · Eat healthy food. If you do not feel like eating, try to eat food that has protein and extra calories to keep up your strength and prevent weight loss. Drink liquid meal replacements for extra calories and protein. Try to eat your main meal early. · Get some physical activity every day, but do not get too tired. Keep doing the hobbies you enjoy as your energy allows. · Take steps to control your stress and workload. Learn relaxation techniques. ? Share your feelings. Stress and tension affect our emotions.  By expressing your feelings to others, you may be able to understand and cope with them. ? Consider joining a support group. Talking about a problem with your spouse, a good friend, or other people with similar problems is a good way to reduce tension and stress. ? Express yourself through art. Try writing, dance, art, or crafts to relieve tension. Some dance, writing, or art groups may be available just for people who have cancer. ? Be kind to your body and mind. Getting enough sleep, eating a healthy diet, and taking time to do things you enjoy can contribute to an overall feeling of balance in your life and help reduce stress. ? Get help if you need it. Discuss your concerns with your doctor or counselor. · If you are vomiting or have diarrhea:  ? Drink plenty of fluids (enough so that your urine is light yellow or clear like water) to prevent dehydration. Choose water and other caffeine-free clear liquids. If you have kidney, heart, or liver disease and have to limit fluids, talk with your doctor before you increase the amount of fluids you drink. ? When you are able to eat, try clear soups, mild foods, and liquids until all symptoms are gone for 12 to 48 hours. Other good choices include dry toast, crackers, cooked cereal, and gelatin dessert, such as Jell-O.  · Take care of your urinary tract to prevent problems such as infection, which can be caused by uterine cancer and its treatment. Limit drinks with caffeine, drink plenty of fluids, and urinate every 3 to 4 hours. · If you have not already done so, prepare a list of advance directives. Advance directives are instructions to your doctor and family members about what kind of care you want if you become unable to speak or express yourself. When should you call for help? Call 911 anytime you think you may need emergency care.  For example, call if:    · You passed out (lost consciousness).    Call your doctor now or seek immediate medical care if:    · You have a fever.     · You have abnormal bleeding.     · You have new or worse pain.     · You think you have an infection.     · You have new symptoms, such as a cough, belly pain, vomiting, diarrhea, or a rash.    Watch closely for changes in your health, and be sure to contact your doctor if:    · You are much more tired than usual.     · You have swollen glands in your armpits, groin, or neck.     · You do not get better as expected. Where can you learn more? Go to http://kj-ron.info/. Enter E343 in the search box to learn more about \"Uterine Cancer: Care Instructions. \"  Current as of: March 27, 2018  Content Version: 11.9  © 9554-0496 [place]. Care instructions adapted under license by [employer] (which disclaims liability or warranty for this information). If you have questions about a medical condition or this instruction, always ask your healthcare professional. [address] any warranty or liability for your use of this information.

## 2019-07-05 NOTE — LETTER
7/5/19 Patient: Na Bishop YOB: 1950 Date of Visit: 7/5/2019 Teena Wolfe MD 
[address] [place] [id number] VIA Facsimile: [phone] Dear Teena Wolfe MD, Thank you for referring Ms. Phyllis Hewitt to [facility] for evaluation. My notes for this consultation are attached. If you have questions, please do not hesitate to call me. I look forward to following your patient along with you. Sincerely, Addis Rubio MD

## 2019-07-05 NOTE — PROGRESS NOTES
Olga Lidia Wyatt, a 76 y.o. female,  is here for   Chief Complaint   Patient presents with    Chemotherapy     discuss treatment       Visit Vitals  /84 (BP 1 Location: Left arm, BP Patient Position: Sitting)   Pulse (!) 106   Temp 98.1 °F (36.7 °C) (Oral)   Resp 16   Ht 5' 5\" (1.651 m)   Wt 75.6 kg (166 lb 9.6 oz)   SpO2 97%   BMI 27.72 kg/m²       Patient reports light vaginal bleeding. Patient denies any persistent or worsening abdominal or pelvic pain. Denies any unusual vaginal bleeding, discharge, irritation, or odor. No burning, discomfort, or irritation with urination. 1. Have you been to the ER, urgent care clinic since your last visit? Hospitalized since your last visit? No    2. Have you seen or consulted any other health care providers outside of the [address] [place] [address] since your last visit? Include any pap smears or colon screening.  No

## 2019-07-05 NOTE — PROGRESS NOTES
[facility]  [address] [place] [address], [address], [address] [facility]  [address] [facility], [address] [facility], [address]   [phone]  Roland Collins DO      Patient ID:  Name:  Johnny Paz  MRN:  [id number]  :  68 y.o. Date:  2019      HISTORY OF PRESENT ILLNESS:  Johnny Paz is a 76 y.o.   postmenopausal female self-referred for Stage IVB Grade 2 endometrioid adenocarcinoma, favor cervical primary. She initially presented with complaint of PMB x8-10 months to PCP. She was referred to gyn Dr. Ramon Gonzalez, who referred to Dr. Daniel Linton for large cervical mass. Last pap 17 years prior. Biopsy of cervical mass 17 showed adenocarcinoma, most c/w endometrioid adenocarcinoma, with initial staining suggesting endometrial origin. Patient had MRI of pelvis 17, which showed BL parametrial involvement, L>R, cancer extending from the large cervical mass into fundus and anterior 1/3 of vagina, no obvious adenopathy. PETCT 17 suspicious for metastatic disease to left ovary, omentum, liver report scanned in media. Patient is s/p EUA, cysto, sigmoidoscopy 17, medial left parametrial involvement, no tumor in bladder or rectosigmoid colon. She is s/p exploratory laparotomy with radical hysterectomy, BSO, resection of omental mass 9/15/17, pathology consistent with metastatic endometrioid adenocarcinoma, favor cervical primary, pathology report scanned in media. Patient is s/p 6 cycles Carbo/Taxol, completed 2018. Most recent PETCT 18 demonstrated new uptake in small nodular densities adjacent to cecum concerning for metastatic disease, increasing uptake in right thyroid gland concerning for neoplasm, as well as interval response to prior uptake. She was referred to Dr. Hunter Limon for further workup, suggestive of goiters, she has follow up in a few months.  Was seen and discussed proceeding with avastin and hormonal treatment. Pt was then admitted 12/5-12/13 with renal failure and hydronephrosis and had bilateral PCN placed. S/p cycles #2 of topotecan/avastin completed 2/22/2019. Having trouble with constipation and gas pains with associated nausea. Denies numbness/tingling. Was admitted to [facility] perforated colon and diverticulitis. Had a drain placed and was on antibiotics. Was re-admitted with possible abscess and had drain placed but did not grow any bacteria. Recently seen by dr. Zane Ardon and plan is to have drain removed after fistulogram.     Pathology:  9/15/17  Metastatic endometrioid adenocarcinoma, favor cervical primary      Labs:  Component       Cancer Ag (CA) 125   Latest Ref Rng & Units       0.0 - 38.1 U/mL   2/18/2019      9:30 AM 8.7   1/28/2019      10:05 .7 (H)   11/14/2018      4:40 .0 (H)   10/3/2018      3:45 .0 (H)   6/18/2018      1:13 PM 45 (H)   5/16/2018      8:20 AM 27   4/23/2018      12:33 PM 17   3/23/2018      2:44 PM 16   2/23/2018      5:14 PM 15   1/18/2018      3:56 PM 14   12/21/2017      10:42 AM 15   12/7/2017      1:53 PM 16   9/11/2017      11:30  (H)       Imaging  PETCT  11/23/2018  FINDINGS:       PET/CT:     Reference data:     The mediastinal blood pool SUV max value = 1.96. The liver SUV Max value  = 2.19  .      HEAD/NECK:  There is a hypermetabolic adenopathy identified in the right  supraclavicular region, measuring 1.5 x 1.7 cm, SUV Max = 7.11.] It was 7 x 9 mm  and not hypermetabolic on prior study. No additional adenopathy or  hypermetabolic activity seen in the neck. CHEST: There is interval development of multiple nodular lesions scattered in  both lungs, most likely suggestive of lung metastasis. -The dominant nodule in left lung is in medial anterior left upper lobe  measuring 1.0 x 1.4 cm, #70.  Mild hypermetabolic activity noted, SUV Max = 1.7.   -The second largest nodule in left lower lobe infrahilar region measures 8 x 8  mm on #85, SUV Max = 1.7.   -The dominant mass in left lung measures 9 x 9 mm at lateral right lower lobe,  SUV Max = 1.0, #87 .   -The second 8 x 9 mm solid nodule at posterior right lower lobe, SUV Max = 1.0,  #87.   -There is 6 x 9 mm small cavitary lesion with thickened wall at anterior right  upper lobe, #69 and SUV Max = 1.3.   -Multiple other 3-5 mm nodules also scattered in both lungs, too small for  accurate evaluation on PET.     No mediastinal or axillary adenopathy or hypermetabolic chest wall finding. ABDOMEN/PELVIS:  There is a large mass with central necrosis identified in the  left subphrenic region, most likely arising from the spleen and measures 10.2 x  14.3 x 12.5 cm. There is significant hypermetabolic activity identified along  the periphery of the mass, SUV Max = 8.79. The mass was much smaller and barely  visible on prior CT but hypermetabolic on prior PET scan, roughly measured 1.8 x  2.5 cm, SUV Max was 4.47. The second exophytic mass also developed at posterior  aspect of the spleen abutting the dominant mass which measures 4.1 x 5.1 x 6.0  cm, SUV Max = 6.99. The mass is tightly abutting the greater curvature of  gastric wall. Direct invasion cannot be entirely excluded.      There is a large ill-defined soft tissue mass identified in the midline pelvic  floor and roughly measures 8.2 x 9.9 cm with intensive FDG uptake, SUV Max =  14.2. This is new finding since the prior study as most likely recurrent  malignancy.     There are multiple areas of hypermetabolic soft tissue masses identified in the  retroperitoneal regions, the largest masses are abutting and invading the  bilateral iliopsoas muscles, measuring 5.3 x 7.1 cm and SUV Max = 13.4 on the  left and 5.7 x 6.3 cm on the right with SUV Max of 10.6.  There is moderate  adjacent large soft tissue mass abutting the lateral right iliac crest measuring  5.0 x 6.2 cm, SUV Max = 9.6.     Multiple hypermetabolic nodular lesions also identified scattered in the  peritoneal cavity and compatible with carcinomatosis. The largest two are 2.4 x  3.0 cm, SUV Max = 9.1 in the lateral right pericolic gutter and 2.5 x 4.3 cm  with SUV max of 13.2 at anterior right pelvic cavity abutting the peritoneum. Multiple other smaller hypermetabolic foci also scattered in the peritoneal  cavities.     Mild retroperitoneal adenopathy also developed, the largest is 1.3 x 1.5 cm in  caval aortic region, SUV Max = 6.9. Multiple hypermetabolic lymph nodes also  seen in the bilateral pelvic sidewalls.     BONES:  No malignant FDG activity.      ADDITIONAL CT FINDINGS:    (Exam is limited due to lack of intravenous contrast.)     Moderate interstitial lung process again seen.      Interval development of moderate bilateral hydronephrosis and hydroureter up to  the very distal portions in the pelvis, most likely due to extrinsic compression  by large pelvic mass. The bladder is poorly distended. Mild fatty stranding in  the pelvis.     IMPRESSION  IMPRESSION:     1. Interval development of multiple hypermetabolic nodular lesions in bilateral  lungs, most likely consistent with lung metastasis.     2. Interval development of large ill-defined soft tissue mass in the surgical  bed in the pelvis, most consistent with recurrent malignancy.     3. Interval development of 2 large necrotic masses in the hilum of spleen with  malignant FDG uptake. Multiple large soft tissue masses also seen within and  abutting the iliopsoas muscles along with multiple hypermetabolic nodular  lesions in the hernial cavities, most likely suggesting metastasis.     4. Interval development of mild adenopathy in pelvic sidewalls, retroperitoneal  regions as wall as right supraclavicular region as most likely becky metastasis.     5.  Interval development of moderate bilateral hydronephroses and hydroureters  all the way to the very distal ureters, most likely due to extrinsic compression  by pelvic mass. Local invasion cannot be entirely excluded.   18  Thyroid   Scanned in media    3/6/18  US head/neck      3/12/18  CT Chest  Scanned in media   (r/o PE) delayed thryoid uptake 8 weeks after    18  PETCT  Scanned in media    17  MRI pelvis  Scanned in media    17  MRI abdomen  Scanned in media        ROS:   As above      Patient Active Problem List    Diagnosis Date Noted    Pelvic abscess in female 2019    Hydronephrosis 2019    Colon perforation ([address] 2019    Retroperitoneal abscess ([address] 2019    DVT (deep venous thrombosis) ([address] 2019    Cervical cancer ([address] 2019    ARF (acute renal failure) ([address] 2018    Severe obesity (BMI 35.0-39.9) 2018    Endometrial ca ([address] 09/15/2017     Past Medical History:   Diagnosis Date    Acute kidney failure ([address] 2019    BMI 34.0-34.9,adult     34.8    Caffeine adverse reaction     elevated BP    Cancer (HCC)     uterine, cervical    Cervical cancer (HCC)     Chronic kidney disease     acute kidney failure    Chronic sinusitis     Dizziness     Endometriosis     Hiatal hernia     High cholesterol     Palpitation     PMB (postmenopausal bleeding)     Rash     eczemz    Urinary incontinence       Past Surgical History:   Procedure Laterality Date    HX ABDOMINAL LAPAROSCOPY      HX BILATERAL SALPINGO-OOPHORECTOMY Bilateral     HX BREAST LUMPECTOMY Right 1973    benign    HX HYSTERECTOMY      radical    HX OTHER SURGICAL  2017    Exam under anesthesia: Cystoscopy, sigmoidoscopy    HX UROLOGICAL      nephrostomy tubes and stents    IR CHANGE EXIST CATHETER/TUBE LT  3/7/2019    IR MEDIPORT      VASCULAR SURGERY PROCEDURE UNLIST      filter       OB History        2    Para   2    Term   2            AB        Living   2       SAB        TAB        Ectopic        Molar        Multiple        Live Births   2              Social History     Tobacco Use    Smoking status: Never Smoker    Smokeless tobacco: Never Used   Substance Use Topics    Alcohol use: No      Family History   Problem Relation Age of Onset    Cancer Mother         left ovary      Current Outpatient Medications   Medication Sig    sodium chloride (NORMAL SALINE FLUSH) 5-10 mL by IntraPERitoneal route daily.  ibuprofen 100 mg tablet Take 200 mg by mouth every six (6) hours as needed for Pain.  multivitamin (ONE A DAY) tablet Take 1 Tab by mouth daily.  acetaminophen (TYLENOL) 500 mg tablet Take 1,000 mg by mouth every six (6) hours as needed for Pain.  b complex vitamins (B COMPLEX 1) tablet Take 1 Tab by mouth daily.  bisacodyl (DULCOLAX) 10 mg suppository Insert 10 mg into rectum daily as needed. No current facility-administered medications for this visit. Allergies   Allergen Reactions    Other Food Nausea and Vomiting     Artificial sweeteners    Neulasta [Pegfilgrastim] Swelling    Aspirin Other (comments)     Gi upset    Milk Itching and Atopic Dermatitis     Eczema (dairy cow)    Tetracycline Unknown (comments)     Patient does not remember    Wheat Atopic Dermatitis     eczema    Other Plant, Animal, Environmental Other (comments)     Pt states she has \"springtime grass allergies. \"     Reports reaction: Sinus infection          OBJECTIVE:    Physical Exam  VITAL SIGNS: There were no vitals taken for this visit. GENERAL CRISTINO: in no apparent distress and well developed and well nourished   MUSCULOSKEL: no joint tenderness, deformity or swelling   INTEGUMENT:  warm and dry, no rashes or lesions   ABDOMEN . soft, NT, ND, No masses appreciated   EXTREMITIES: extremities normal, atraumatic, no cyanosis or edema   PELVIC: Exam deferred. RECTAL: deferred   LOKI SURVEY: Cervical, supraclavicular, axillary and inguinal nodes normal.   NEURO: Grossly normal         IMPRESSION/PLAN:  1. Recurrent tage IVB endometrioid cervical cancer vs. Endometrial cancer   -previously reviewed blood work and PETCT c/w recurrent disease   -previously discussed options of chemotherapy, avastin and hormonal therapy   -Initially patient reluctant to do chemo but has decided to go ahead with chemo   -d/c hormonal treatment   -will plan on topotecan 1.0 mg/m2 D1-D5, Avastin 15 mg/kg D1 every 21 days until CCR, Toxicity, or progression. Given bowel perforation will d/c avastin from regimen and plan to treat with topotecan alone.   -hydronephrosis- s/p b/l nephrostomy tube placement, for stents on 4/16   -s/p cycle #2 completed 2/22   -plan to resume chemo in 3 wks   -ok for cycle #3. F/u prior to cycle #4   -plan for 3 cycles and repeat imaging   -hypertension- improved   -labs reviewed.   normalized   -all of ms. pruett's questioins/concerns addressed    The total time spent was 40 minutes regarding this patients diagnosis of cervical cancer and >50% of this time was spent counseling and coordinating care    [address] [place] [address] Oncology  7/5/20191:28PM

## 2019-07-11 NOTE — PROGRESS NOTES
Pt brought back to [address] [place] [address] for planned procedure. Pt gowned and Vitals taken. Per IR no preop needed. Pt ready.

## 2019-07-11 NOTE — PROCEDURES
RADIOLOGY POST PROCEDURE NOTE     July 11, 2019       10:30 AM     Preoperative Diagnosis:   Pelvic fluid collection s/p draiange    Postoperative Diagnosis:  Same. : Lucien White PA-C    Assistant:  None. Type of Anesthesia: 1% plain lidocaine    Procedure/Description:  Abscessogram and drain removal    Findings:   Successful abscessogram showing filling of small residual cavity no obvious communication to bowel, filing of subcutaneous collection . Successful drain removal.    Estimated blood Loss:  Minimal    Specimen Removed:   no    Blood transfusions:  None. Implants:  None.     Complications: None    Condition: Stable    Discharge Plan:  discharge home     Lucien White PA-C

## 2019-07-12 NOTE — PROGRESS NOTES
[facility] Progress Note Date: 2019 Name: Kelley Lovell MRN: [id number] : 1950 Monthly Port flush Ms. Hewitt to [facility], ambulatory, at 1045. Pt was assessed and education was provided. No complaints or concerns voiced Ms. Hewitt's vitals were reviewed. Visit Vitals /86 (BP 1 Location: Right arm, BP Patient Position: Post activity) Pulse (!) 105 Temp 98.4 °F (36.9 °C) Resp 18 SpO2 98% Right chest mediport was accessed with 20 gauge 1 inch salgado(s) after chloroprep. Port flushed easily and had brisk blood return. Mediport flushed with NS 20 ml and Heparin 500 units then de-accessed. No irritation or bleeding noted. Band-Aid applied. Ms. Sera Morley tolerated the procedure, and had no complaints. Patient armband removed and shredded. Ms. Sera Morley was discharged from [place] [address] in stable condition at 1100. She is to return in 4 weeks for her next port flush appointment. Carroll De León RN 
2019

## 2019-07-15 NOTE — TELEPHONE ENCOUNTER
Patient contacted the office stating that Dr. Carleen Alvarenga told her to expect a phonecall in regards to restarting her chemotherapy and that she hasn't received one. She is requesting a call back at [phone].

## 2019-07-15 NOTE — PROGRESS NOTES
Subjective: Tolerating her diet moving her bowels. Denies fevers. Drain was removed by IR. Past medical history and ROS were reviewed and unchanged. Abdomen: Soft, nontender nondistended    Assessment / Plan    Status post colonic perforation likely from chemotherapy, status post IR drainage x2, recovered  Diet as tolerated  Planning on restarting chemotherapy for gynecologic malignancy in 1 to 2 weeks which is okay  Follow-up here as needed  I tell her that she should ultimately have a colonoscopy to be sure this is not a colonic perforation from tumor but that focusing on chemotherapy for now is reasonable  She states she will follow-up with me should she decide to pursue colonoscopy    A total of 15 minutes was spent with the patient, with >50% of time spent on counseling and coordination of care. The diagnoses and plan were discussed with patient. All questions answered. Plan of care agreed to by all concerned.

## 2019-07-15 NOTE — TELEPHONE ENCOUNTER
Informed pt we are still currently trying to schedule her chemotherapy and she will know by close of business today her scheduled appointment time. Patient verbalized understanding and agreed to plan.

## 2019-07-15 NOTE — TELEPHONE ENCOUNTER
Informed pt her chemotherapy will be scheduled from 07/29-08/02. Reviewed times with patient. Patient verbalized understanding and agreed to plan. Patient instructed to contact office for any question or concerns.

## 2019-07-15 NOTE — LETTER
7/15/19 Patient: Himanshu Marrero YOB: 1950 Date of Visit: 7/15/2019 Gurvinder Callahan, [address] [facility] [address] [facility] [address] VIA In Basket Abel Gray MD 
[address] [place] [address] [place] [address] VIA Facsimile: [phone] Dear Haylie Maynard is seen in follow-up after colonic perforation, status post IR drainage x2. This is felt to possibly be from chemotherapy, though diverticulitis and tumor have not been entirely excluded. She had her second drainage catheter removed last week and is tolerating diet and moving her bowels. She denies any fevers and her exam is benign. It should be okay to restart her chemotherapy in the next 2 weeks. If you have questions, please do not hesitate to call me. I look forward to following your patient along with you. Sincerely, Shraddha Rowland MD

## 2019-07-30 NOTE — PROGRESS NOTES
[facility] Progress Note    Date: 2019    Name: Anival Mccurdy              MRN: [id number]              : 1950    Chemotherapy Cycle: C3 D2 Topotecan       Pt to [facility], ambulatory, at 0910. Ms. Baudilio Rick was assessed and education was provided. Ms. Hewitt's vitals were reviewed. Visit Vitals  /86 (BP 1 Location: Left arm, BP Patient Position: Sitting)   Pulse 97   Temp 98.2 °F (36.8 °C)   Resp 18   SpO2 99%       Right chest mediport accessed with 20 g 1 inch salgado needle on 19. Port flushed easily with brisk  blood return. NS initiated @ 100. Lab results were obtained and reviewed from 19. No results found for this or any previous visit (from the past 12 hour(s)). Lab results within ordered parameters to give chemo today. Chemo dosages verified with today's BSA and found to be within 10% of ordered dosages. Pre-medications (Pepcid,Zofran, and Decadron) were administered as ordered and chemotherapy was initiated after blood return from port re-verified. Reviewed expected side effects of premeds with patient. Patient declined Ativan. Topotecan 1.3 mg was administered over 30 minutes. Line flushed with NS and blood return from port re-verified. Ms. Baudilio Rick tolerated infusion, and had no complaints at this time. Mediport flushed with NS 20 ml and Heparin 500 units then remained accessed for tomorrows treatment. Patient armband removed and shredded. Ms. Baudilio Rick was discharged from [place] [address] in stable condition at 1110. She is to return on 19 at 0900 for her next D3 Topotecan appointment.     Aminah Curtis  2019  1150

## 2019-07-31 NOTE — PROGRESS NOTES
[facility] Progress Note    Date: 2019    Name: Clover Cadet              MRN: [id number]              : 1950    Chemotherapy Cycle: C3 D3 Topotecan       Pt to [facility], ambulatory, at 0910. Ms. Felicity Harvey was assessed and education was provided. Ms. Hewitt's vitals were reviewed. Visit Vitals  /83 (BP 1 Location: Left arm, BP Patient Position: Sitting)   Pulse 87   Temp 97.5 °F (36.4 °C)   Resp 18   SpO2 98%       Right chest mediport accessed with 20 g 1 inch salgado needle on 19. Port flushed easily with brisk  blood return. NS (500 ml) initiated @ 100. Lab results were obtained and reviewed from 19. Recent Results (from the past 12 hour(s))   URINALYSIS W/ RFLX MICROSCOPIC    Collection Time: 19  9:30 AM   Result Value Ref Range    Color DARK YELLOW      Appearance TURBID      Specific gravity 1.023 1.005 - 1.030      pH (UA) 6.0 5.0 - 8.0      Protein 300 (A) NEG mg/dL    Glucose NEGATIVE  NEG mg/dL    Ketone NEGATIVE  NEG mg/dL    Bilirubin NEGATIVE  NEG      Blood LARGE (A) NEG      Urobilinogen 1.0 0.2 - 1.0 EU/dL    Nitrites NEGATIVE  NEG      Leukocyte Esterase LARGE (A) NEG     URINE MICROSCOPIC ONLY    Collection Time: 19  9:30 AM   Result Value Ref Range    WBC 30 to 35 0 - 4 /hpf    RBC TOO NUMEROUS TO COUNT 0 - 5 /hpf    Epithelial cells FEW 0 - 5 /lpf    Bacteria 2+ (A) NEG /hpf       Lab results within ordered parameters to give chemo today. Chemo dosages verified with today's BSA and found to be within 10% of ordered dosages. Pre-medications (Pepcid,Zofran, and Decadron) were administered as ordered and chemotherapy was initiated after blood return from port re-verified. Reviewed expected side effects of premeds with patient. Patient declined Ativan. Topotecan 1.3 mg was administered over 30 minutes. Line flushed with NS and blood return from port re-verified.     Ms. Felicity Harvey tolerated infusion, and had no complaints at this time.    Mediport flushed with NS 20 ml and Heparin 500 units then remained accessed for tomorrows treatment. Patient armband removed and shredded. Ms. Jeremiah Blas was discharged from [place] [address] in stable condition at 1130. She is to return on 8/1/19 at 0900 for her next D4 Topotecan appointment.     Herb Melvin RN  July 31, 2019  1130

## 2019-08-02 NOTE — PROGRESS NOTES
[facility] Progress Note    Date: 2019    Name: Delores London              MRN: [id number]              : 1950    Chemotherapy Cycle: C3 D5 Topotecan       Pt to [facility], ambulatory, at . Ms. Hewitt was assessed and education was provided. Ms. Hewitt's vitals were reviewed. Visit Vitals  /77 (BP 1 Location: Left arm, BP Patient Position: At rest;Sitting)   Pulse 92   Temp 97.8 °F (36.6 °C)   Resp 18   SpO2 99%       Right chest mediport accessed with 20 g 1 inch salgado needle on 19. Port flushed easily with brisk  blood return. NS initiated @ 200. Lab results were obtained and reviewed from 19. No results found for this or any previous visit (from the past 12 hour(s)). Lab results within ordered parameters to give chemo today. Chemo dosages verified with today's BSA and found to be within 10% of ordered dosages. Pre-medications (Pepcid,Emend, Aloxi, and Decadron) were administered as ordered and chemotherapy was initiated after blood return from port re-verified. Reviewed expected side effects of premeds with patient. Patient declined Ativan. Topotecan 1.3 mg was infused over 30 mins. VS stable at end of infusion and pt denied complaints. Line flushed with NS and blood return from port re-verified. Ms. Pola Castro tolerated infusion, and had no complaints at this time. Mediport flushed with NS 20 ml and Heparin 500 units then deaccessed, gauze and bandaid applied. Patient armband removed and shredded. Ms. Pola Castro was discharged from [place] [address] in stable condition at 1030. She is to return on 19 at 0800 for her next appointment Topotecan C4D1.     Ab Sinclair RN  2019  3533

## 2019-08-06 NOTE — ANESTHESIA POSTPROCEDURE EVALUATION
Procedure(s): 
CYSTOSCOPY WITH BILATERAL RETROGRADE PYELOGRAM/BILATERAL DOUBLE J STENT EXCHANGE/C-ARM. general 
 
Anesthesia Post Evaluation Multimodal analgesia: multimodal analgesia used between 6 hours prior to anesthesia start to PACU discharge Patient location during evaluation: bedside Patient participation: complete - patient participated Level of consciousness: awake Pain management: adequate Airway patency: patent Anesthetic complications: no 
Cardiovascular status: stable Respiratory status: acceptable Hydration status: acceptable Post anesthesia nausea and vomiting:  controlled Vitals Value Taken Time /62 8/6/2019  2:55 PM  
Temp 36.4 °C (97.6 °F) 8/6/2019  2:25 PM  
Pulse 83 8/6/2019  2:56 PM  
Resp 16 8/6/2019  2:56 PM  
SpO2 100 % 8/6/2019  2:56 PM  
Vitals shown include unvalidated device data.

## 2019-08-06 NOTE — DISCHARGE INSTRUCTIONS
Discharge Instructions    DIET: General     ACTIVITY: No restrictions     ADDITIONAL INFORMATION:   You have indwelling ureteral stents which frequently causes slight discomfort in the flank region and bladder spasms. If you are bothered by these symptoms, please contact our office and we can presribe you flomax as needed for flank discomfort or Ditropan for bladder spasms. The indwelling stent will need to be removed/exchanged as directed below. If the stent is left in place without appropriate follow-up, it may become encrusted with stone and/or infected. If that occurs, you will require multiple additional surgeries to fix this. It is very important you follow up for appropriate removal or exchange of ureteral stents. If you experience any fevers > 100.4, significant nausea/vomiting, or significant worsening of pain, please contact us at the number below. It is common to experience mild, recurrent blood in your urine and this is likely due to stent irritation. If the bleeding continues to worsen with passage of clots, you are unable to urinate, or you experience significant light-headedness, please contact us at the number below. FOLLOW UP CARE:  You have a return appointment with Dr. Sim Solis in 2 months for discussion of next stent exchange. DISCHARGE SUMMARY from Nurse    PATIENT INSTRUCTIONS:    After general anesthesia or intravenous sedation, for 24 hours or while taking prescription Narcotics:  · Limit your activities  · Do not drive and operate hazardous machinery  · Do not make important personal or business decisions  · Do  not drink alcoholic beverages  · If you have not urinated within 8 hours after discharge, please contact your surgeon on call.     Report the following to your surgeon:  · Excessive pain, swelling, redness or odor of or around the surgical area  · Temperature over 100.5  · Nausea and vomiting lasting longer than 4 hours or if unable to take medications  · Any signs of decreased circulation or nerve impairment to extremity: change in color, persistent  numbness, tingling, coldness or increase pain  · Any questions    *  Please give a list of your current medications to your Primary Care Provider. *  Please update this list whenever your medications are discontinued, doses are      changed, or new medications (including over-the-counter products) are added. *  Please carry medication information at all times in case of emergency situations. These are general instructions for a healthy lifestyle:    No smoking/ No tobacco products/ Avoid exposure to second hand smoke  Surgeon General's Warning:  Quitting smoking now greatly reduces serious risk to your health. Obesity, smoking, and sedentary lifestyle greatly increases your risk for illness    A healthy diet, regular physical exercise & weight monitoring are important for maintaining a healthy lifestyle    You may be retaining fluid if you have a history of heart failure or if you experience any of the following symptoms:  Weight gain of 3 pounds or more overnight or 5 pounds in a week, increased swelling in our hands or feet or shortness of breath while lying flat in bed. Please call your doctor as soon as you notice any of these symptoms; do not wait until your next office visit. The discharge information has been reviewed with the patient/son. The patient/son verbalized understanding. Discharge medications reviewed with the patient and appropriate educational materials and side effects teaching were provided.   ___________________________________________________________________________________________________________________________________

## 2019-08-06 NOTE — OP NOTES
Operative Note  Patient: Stephania Butts               Sex: female             MRN: [id number]  YOB: 1950      Age:  76 y.o. Preoperative Diagnosis: N13.30 BILATERAL HYDRONEPHROSIS  Postoperative Diagnosis:  N13.30 BILATERAL HYDRONEPHROSIS  Surgeon: Janna Pacheco     Indication: This is a 75 y/o woman with metastatic endometrioid cancer of cervical origin with bilateral ureteral obstruction here today for bilateral ureteral stent exchange. Preop culture negative. Procedure:    1) Cystoscopy  2) Retrograde pyelogram with interpretation  3) Bilateral ureteral stent exchange   4). Pelvic exam     Findings:    1). Posterior bladder wall concerning for local invasion of carcinoma, progressing since last cystoscopy  2). Retrograde pyelogram revealed bilateral hydronephrosis   3). Bilateral 7x24 JJ stents exchanged  4). Pelvic reveals firm, fixed mass at the cervix, more involving left side     Narrative of Events: The patient was brought to the operative suite. Anesthesia was induced and preoperative antibiotics were administered. They were then placed in the dorsal lithotomy position and their external genitalia was prepped and draped in the usual fashion. A surgical timeout was performed confirming the patient's name, date of birth, laterality, and antibiotics. All were in agreement. A 22 [place] cystourethroscope was then inserted into the patients bladder. The urethra revealed no abnormalities. Thorough cystoscopy was performed with both the 30 degree and 70 degree lens which revealed likely submucosal invasion of tumor on the posterior bladder wall/trigone. Began with the right side. Stent was grasped, removed and wire was advanced. Duel lumen was used to perform retrograde which showed hydronephrosis. A new 7x24 JJ stent was then advanced and deployed in the standard fashion. The same was performed on the left side. Hydro was worse on the left.   A new 7x24 JJ stent placed on the left in same fashion. Bimanual exam revealed fixed, pelvic mass involving posterior bladder wall and trigone, more heavily on the left side. The bladder was drained and the patient was then awoken and transferred to the recovery room in stable condition. Estimated Blood Loss: <5CC     Anesthesia:  General                  Implants:   Implant Name Type Inv. Item Serial No.  Lot No. LRB No. Used Action   bard inlay optima 7fr x24cm     [id number] Bilateral 2 Implanted       Specimens: * No specimens in log *     Drains: Bilateral 7x24 JJ stent            Complications:  None           Plan:  1.  Return in 2 months to schedule next stent exchange     Sonia Devine MD  8/6/2019

## 2019-08-06 NOTE — ANESTHESIA PREPROCEDURE EVALUATION
Relevant Problems No relevant active problems Anesthetic History No history of anesthetic complications Review of Systems / Medical History Patient summary reviewed and pertinent labs reviewed Pulmonary Within defined limits Neuro/Psych Within defined limits Cardiovascular Within defined limits Exercise tolerance: >4 METS 
  
GI/Hepatic/Renal 
  
 
 
Renal disease Endo/Other Within defined limits Other Findings Comments:  
 
 
  
 
 
 
 
Physical Exam 
 
Airway Mallampati: II 
TM Distance: 4 - 6 cm Neck ROM: normal range of motion Mouth opening: Normal 
 
 Cardiovascular Regular rate and rhythm,  S1 and S2 normal,  no murmur, click, rub, or gallop Rhythm: regular Rate: normal 
 
 
 
 Dental 
No notable dental hx Pulmonary Breath sounds clear to auscultation Abdominal 
GI exam deferred Other Findings Anesthetic Plan ASA: 2 Anesthesia type: general 
 
 
 
 
Induction: Intravenous Anesthetic plan and risks discussed with: Patient

## 2019-08-06 NOTE — H&P
H&P    Patient: Reggie Salinas               Sex: female          DOA: 8/6/2019       YOB: 1950      Age:  76 y.o.                  ASSESSMENT:   1. Bilateral ureteral obstruction with bilateral indwelling JJ stents  (left possibly failing)    2. Metastatic endometrioid adenocarcinoma, cervical origin       Proceed to cysto, bilateral stent exchange. Val Snyder MD  [phone] Office  [phone]  Pager    Chief complaint: bilateral ureteral obstruction     HISTORY OF PRESENT ILLNESS:  Reggie Salinas is a 76 y.o. female with bilateral ureteral obstruction due to metastatic GYN cancer. Initially managed with PCN's now converted to [address] stents. Has some hydro on left and possible stent starting to fail. No interval change in health. On Abx. On Chemo right now. AUA Symptom Score 5/29/2019   Over the past month how often have you had the sensation that your bladder was not completely empty after you finished urinating? 0   Over the past month, how often have had to urinate again less than 2 hours after you last finished urinating? 0   Over the past month, how often have you found you stopped and started again several times when you urinated? 1   Over the past month, how often have you found it difficult to postpone urination? 1   Over the past month, how often have you had a weak urinary stream? 2   Over the past month, how often have you had to push or strain to begin urinating? 1   Over the past month, how many times did you most typically get up to urinate from the time you went to bed at night until the time you got up in the morning? 1   AUA Score 6   If you were to spend the rest of your life with your urinary condition the way it is now, how would you feel about that?  Mostly satisfied       Past Medical History:   Diagnosis Date    Acute kidney failure ([address] 12/05/2019    BMI 34.0-34.9,adult     34.8    Caffeine adverse reaction     elevated BP    Cancer [facility])     uterine, cervical    Cervical cancer (HCC)     Chronic kidney disease     acute kidney failure    Chronic sinusitis     Dizziness     Endometriosis     Hiatal hernia     High cholesterol     Palpitation     PMB (postmenopausal bleeding)     Rash     eczemz    Thromboembolus ([address] 03/15/2019    IVC filter placed    Urinary incontinence        Past Surgical History:   Procedure Laterality Date    HX ABDOMINAL LAPAROSCOPY      HX BILATERAL SALPINGO-OOPHORECTOMY Bilateral     HX BREAST LUMPECTOMY Right 1973    benign    HX HYSTERECTOMY      radical    HX OTHER SURGICAL  09/01/2017    Exam under anesthesia: Cystoscopy, sigmoidoscopy    HX UROLOGICAL      nephrostomy tubes and stents    IR CHANGE EXIST CATHETER/TUBE LT  3/7/2019    IR MEDIPORT      VASCULAR SURGERY PROCEDURE UNLIST      filter        Social History     Tobacco Use    Smoking status: Never Smoker    Smokeless tobacco: Never Used   Substance Use Topics    Alcohol use: No    Drug use: No       Allergies   Allergen Reactions    Other Food Nausea and Vomiting     Artificial sweeteners    Neulasta [Pegfilgrastim] Swelling    Aspirin Other (comments)     Gi upset    Avastin [Bevacizumab] Other (comments)     Bowel perforation    Milk Itching and Atopic Dermatitis     Eczema (dairy cow)    Tetracycline Unknown (comments)     Patient does not remember, reaction many years ago    Wheat Atopic Dermatitis     eczema    Other Plant, Animal, Environmental Other (comments)     Pt states she has \"springtime grass allergies. \"     Reports reaction: Sinus infection       Family History   Problem Relation Age of Onset    Cancer Mother         left ovary       Current Facility-Administered Medications   Medication Dose Route Frequency Provider Last Rate Last Dose    sodium chloride (NS) flush 5-40 mL  5-40 mL IntraVENous Q8H Thuan Trinidad CRNA        sodium chloride (NS) flush 5-40 mL  5-40 mL IntraVENous PRN Garth Kubas, Shahrzad Crwo CRNA        lactated Ringers infusion  75 mL/hr IntraVENous CONTINUOUS Carleen Lewis CRNA 75 mL/hr at 08/06/19 1029 75 mL/hr at 08/06/19 1029    cefTRIAXone (ROCEPHIN) 1 g in sterile water (preservative free) 10 mL IV syringe  1 g IntraVENous ONCE Kristine Jenkins MD           Review of Systems  Constitutional: No fever, chills, or weight loss  Respiratory: No dyspnea  Cardiovascular: No chest pain  Gastrointestinal: No vomiting or abdominal pain. Genitourinary: Denies frequency, urgency, dysuria, hematuria. Neurological: No focal motor changes. PHYSICAL EXAMINATION:   Visit Vitals  /76 (BP 1 Location: Left arm, BP Patient Position: At rest)   Pulse 94   Temp 98 °F (36.7 °C)   Resp 17   Ht 5' 5\" (1.651 m)   Wt 156 lb (70.8 kg)   SpO2 100%   BMI 25.96 kg/m²     Constitutional: Well developed, well nourished male. No acute distress. HEENT: Normocephalic, Atraumatic, EOM's intact   CV:  Normal radial pulse. Respiratory: No respiratory distress or difficulties breathing   Abdomen:  Nontender, nondistended.  FEMale:  No CVA tenderness  Skin: No evidence of jaundice. Normal color  Neuro/Psych:  Alert and oriented. Affect appropriate. Lymphatic:   No enlarged inguinal lymph nodes. REVIEW OF LABS AND IMAGING:      Labs: Results:   Chemistry  No results for input(s): GLU, NA, K, CL, CO2, BUN, CREA, CA, AGAP, BUCR, TBIL, GPT, AP, TP, ALB, GLOB, AGRAT in the last 72 hours. CBC w/Diff No results for input(s): WBC, RBC, HGB, HCT, PLT, GRANS, LYMPH, EOS, HGBEXT, HCTEXT, PLTEXT in the last 72 hours. Cultures No results for input(s): CULT in the last 72 hours.   All Micro Results     None            Urinalysis Color   Date Value Ref Range Status   07/31/2019 DARK YELLOW   Final     Appearance   Date Value Ref Range Status   07/31/2019 TURBID   Final     Specific gravity   Date Value Ref Range Status   07/31/2019 1.023 1.005 - 1.030   Final     pH (UA)   Date Value Ref Range Status 07/31/2019 6.0 5.0 - 8.0   Final     Protein   Date Value Ref Range Status   07/31/2019 300 (A) NEG mg/dL Final     Ketone   Date Value Ref Range Status   07/31/2019 NEGATIVE  NEG mg/dL Final     Bilirubin   Date Value Ref Range Status   07/31/2019 NEGATIVE  NEG   Final     Blood   Date Value Ref Range Status   07/31/2019 LARGE (A) NEG   Final     Urobilinogen   Date Value Ref Range Status   07/31/2019 1.0 0.2 - 1.0 EU/dL Final     Nitrites   Date Value Ref Range Status   07/31/2019 NEGATIVE  NEG   Final     Leukocyte Esterase   Date Value Ref Range Status   07/31/2019 LARGE (A) NEG   Final     Potassium   Date Value Ref Range Status   06/23/2019 4.4 3.5 - 5.5 mmol/L Final     Creatinine   Date Value Ref Range Status   06/23/2019 1.46 (H) 0.6 - 1.3 MG/DL Final     BUN   Date Value Ref Range Status   06/23/2019 14 7.0 - 18 MG/DL Final      PSA No results for input(s): PSA in the last 72 hours. Coagulation Lab Results   Component Value Date/Time    Prothrombin time 13.0 06/20/2019 06:57 PM    Prothrombin time 14.5 03/12/2019 12:30 AM    INR 1.0 06/20/2019 06:57 PM    INR 1.2 03/12/2019 12:30 AM    aPTT 27.9 06/20/2019 06:57 PM    aPTT 50.1 (H) 03/13/2019 06:50 AM           US Results (most recent):  Results from Hospital Encounter encounter on 03/06/18   US HEAD NECK SOFT TISSUE    Narrative Indication: Increased uptake right thyroid on PET scan. On chemotherapy. Impression IMPRESSION: Solid right thyroid nodules, largest 3.2 cm. Subcentimeter left  thyroid nodules. Comment: Sonography of the thyroid was performed. No prior studies for  comparison. The right lobe measures 4.9 x 2.3 x 2.5 cm. It harbors a 3.2 x 1.8 x 2.3 cm  solid nodule in the midpole and a 1.9 x 1.7 x 2.1 cm lower pole nodule. The left lobe measures 4.6 x 1.0 x 1.3 cm and harbors 2 subcentimeter nodules. The largest measures 8 mm in greatest dimension.     The isthmus is normal.            chest    CT Results (most recent): Results from [facility] encounter on 06/20/19   CT DRAIN ABS W CATH PERC    Narrative CT DRAIN ABS W CATH PERC     : Minna Pham MD  Assistant: None    Preoperative Diagnosis: Pelvic fluid collection    Postoperative Diagnosis: Same    Anesthesia: Local anesthesia with 1% lidocaine. Moderate sedation with Versed  and Fentanyl given and monitored per independently trained interventional  radiology nurse under my direct supervision, see detailed nursing records for  precise medication dosing. Sedation time: 15 minutes    Technique: The risks, benefits, and alternatives of the procedure were discussed  with the patient. Verbal and written consent was obtained. Time-out was  performed to confirm the correct patient, procedure, and site. With the patient  supine, the skin was prepped and draped in usual sterile fashion. Maximum  sterile barrier technique used. Local anesthesia was administered. Under CT  guidance, a 18-gauge trocar needle was directed to the target through which a  drain was inserted into the target over a wire. Clear yellow fluid was  aspirated. Specimen sent for culture. Manual compression performed for  hemostasis. Sterile dressing was applied. Postprocedure imaging was performed. Please note that all CT scans at this facility are performed using dose  optimization technique as appropriate to a performed exam, to include automated  exposure control, adjustment of the mA and/or kV according to patient size  (including appropriate matching for site-specific examinations), or use of  iterative reconstruction technique. Specimen: Routine culture and sensitivity     Estimated Blood Loss: Minimal     Contrast: None    Complications: No immediate    Drain/Implant: 8 Fr drain to left pelvic fluid collection drainage     Condition: Stable    Disposition: Nursing unit.  _______________      Impression Impression:     Successful CT-guided drainage of a left pelvic fluid collection. ABD      MRI Results (most recent): No procedure found. No diagnosis found.

## 2019-08-13 NOTE — TELEPHONE ENCOUNTER
Informed pt she needed to be scheduled for chemotherapy f/u prior to cycle 4 scheduled on 08/19/2019. Pt scheduled for 0945 08/14/2019.        Ashley Moses, NP

## 2019-08-14 NOTE — PROGRESS NOTES
Zeus Banks, a 71 y.o. female,  is here for   Chief Complaint   Patient presents with    Chemotherapy     follow up prior to cycle 4       Visit Vitals  /72 (BP 1 Location: Left arm, BP Patient Position: Sitting)   Pulse (!) 106   Temp 98.6 °F (37 °C) (Oral)   Resp 16   Ht 5' 5\" (1.651 m)   Wt 74.2 kg (163 lb 8 oz)   SpO2 100%   BMI 27.21 kg/m²       Patient reports weakness and occasional dizziness. Patient denies any persistent or worsening abdominal or pelvic pain. Denies any unusual vaginal bleeding, discharge, irritation, or odor. Denies experiencing any constipation or diarrhea. No burning, discomfort, or irritation with urination. 1. Have you been to the ER, urgent care clinic since your last visit? Hospitalized since your last visit? No    2. Have you seen or consulted any other health care providers outside of the [address] [place] [address] since your last visit? Include any pap smears or colon screening.  No

## 2019-08-14 NOTE — LETTER
8/14/19 Patient: Panchito Hanks YOB: 1950 Date of Visit: 8/14/2019 Pamela Quinn MD 
[address] [place] [address] VIA Facsimile: [phone] Dear Pamela Quinn MD, Thank you for referring Ms. Phyllis Hewitt to [facility] for evaluation. My notes for this consultation are attached. If you have questions, please do not hesitate to call me. I look forward to following your patient along with you. Sincerely, Mary Morillo MD

## 2019-08-14 NOTE — PATIENT INSTRUCTIONS
Learning About Chemotherapy Side Effects and Safety  What is chemotherapy? Chemotherapy uses medicines to kill cancer cells. It's often called \"chemo. \" Chemo may slow cancer growth, stop cancer from spreading, or help get rid of the cancer. Chemo can be given at different locations, such as a hospital, a doctor's office, or a clinic. Sometimes chemo treatments may be done at home. You may get chemo in \"cycles. \" This means that you get a number of treatments over a set period of time. Then you take a break before you start again. Chemo helps to treat many kinds of cancer. But it can also affect healthy cells along with the cancer cells. This is why some types of chemo cause side effects, like nausea, losing your hair, or feeling tired. What are the possible side effects of chemotherapy? Side effects depend on which medicines you take, how much you take, and how the medicines affect you. Your doctor can tell you what to expect when you take these medicines. Some of them may cause symptoms such as:  · Fatigue. · Nausea. · Vomiting. · A rash. · Hair loss. · Pain or tingling in your hands or the soles of your feet. Chemo treatment can be hard. It can keep you from doing the things you were doing every day, like going to work or school. But keep in mind that most side effects don't last. They will go away after you finish the treatment. And many side effects can be managed with medicine. Your doctor will tell you what to do if you have side effects. Ezra Bills also learn which ones you need to tell your doctor about right away. How can you keep yourself and your family safe? If you take chemo at home, take steps to protect your family. Keep your medicine in a locked cabinet if you can. Or keep it on a high shelf out of the reach of children. Make sure the containers have childproof lids. Chemo medicines can stay in your body fluids (vomit, urine, or stool) for several days.  These medicines contain strong chemicals, so they can be harmful if someone touches waste from your body. If you take chemo at home, any of your clothes or bed linens or cloth diapers that have medicine or body fluids on them need to be handled separately from other household laundry. Have caregivers use gloves when they wash your bed linens and clothes. Bed linens and cloth diapers should be machine washed twice in hot water, using regular detergent. For the first 48 hours after each treatment:  · Sit on the toilet seat to prevent splashing. · Put the toilet lid down before you flush. · Always flush twice after you use the toilet. Couples should use a condom during sex while a partner is getting chemo treatments and for several days after treatment ends. Follow-up care is a key part of your treatment and safety. Be sure to make and go to all appointments, and call your doctor if you are having problems. It's also a good idea to know your test results and keep a list of the medicines you take. Where can you learn more? Go to http://kj-ron.info/. Enter [phone] 21 06 in the search box to learn more about \"Learning About Chemotherapy Side Effects and Safety. \"  Current as of: December 19, 2018  Content Version: 12.1  © 4502-6575 [employer]. Care instructions adapted under license by [employer] (which disclaims liability or warranty for this information). If you have questions about a medical condition or this instruction, always ask your healthcare professional. [place] [address] any warranty or liability for your use of this information.

## 2019-08-14 NOTE — PROGRESS NOTES
[address] [facility]  [address], [address], [address] [facility]  [address] [facility], [address] [facility]  [place], [address]   [phone]  Sharda Fraction DO      Patient ID:  Name:  Earl Grullon  MRN:  [id number]  :  1950/69 y.o. Date:  2019      HISTORY OF PRESENT ILLNESS:  Earl Grullon is a 71 y.o.   postmenopausal female self-referred for Stage IVB Grade 2 endometrioid adenocarcinoma, favor cervical primary. She initially presented with complaint of PMB x8-10 months to PCP. She was referred to gyn Dr. Carissa Mcintosh, who referred to Dr. Dio Dimas for large cervical mass. Last pap 17 years prior. Biopsy of cervical mass 17 showed adenocarcinoma, most c/w endometrioid adenocarcinoma, with initial staining suggesting endometrial origin. Patient had MRI of pelvis 17, which showed BL parametrial involvement, L>R, cancer extending from the large cervical mass into fundus and anterior 1/3 of vagina, no obvious adenopathy. PETCT 17 suspicious for metastatic disease to left ovary, omentum, liver report scanned in media. Patient is s/p EUA, cysto, sigmoidoscopy 17, medial left parametrial involvement, no tumor in bladder or rectosigmoid colon. She is s/p exploratory laparotomy with radical hysterectomy, BSO, resection of omental mass 9/15/17, pathology consistent with metastatic endometrioid adenocarcinoma, favor cervical primary, pathology report scanned in media. Patient is s/p 6 cycles Carbo/Taxol, completed 2018. Most recent PETCT 18 demonstrated new uptake in small nodular densities adjacent to cecum concerning for metastatic disease, increasing uptake in right thyroid gland concerning for neoplasm, as well as interval response to prior uptake. She was referred to Dr. Suly Leon for further workup, suggestive of goiters, she has follow up in a few months.  Was seen and discussed proceeding with avastin and hormonal treatment. Pt was then admitted 12/5-12/13 with renal failure and hydronephrosis and had bilateral PCN placed. S/p cycles #2 of topotecan/avastin completed 2/22/2019. S/p abscess managed by dr. Danita Sebastian. Has resumed chemo s/p cycle #3 of topotecan completed on 8/3/2019. Pt did ok with chemo. Had worse days after day 5. No nausea. Some neuropathy in feet. Eating well. Gained some weight     Pathology:  9/15/17  Metastatic endometrioid adenocarcinoma, favor cervical primary      Labs:  Component      Latest Ref Rng & Units 7/29/2019           9:21 AM   CA-125      1.5 - 35.0 U/mL 223 (H)     Component       Cancer Ag (CA) 125   Latest Ref Rng & Units       0.0 - 38.1 U/mL   2/18/2019      9:30 AM 8.7   1/28/2019      10:05 .7 (H)   11/14/2018      4:40 .0 (H)   10/3/2018      3:45 .0 (H)   6/18/2018      1:13 PM 45 (H)   5/16/2018      8:20 AM 27   4/23/2018      12:33 PM 17   3/23/2018      2:44 PM 16   2/23/2018      5:14 PM 15   1/18/2018      3:56 PM 14   12/21/2017      10:42 AM 15   12/7/2017      1:53 PM 16   9/11/2017      11:30  (H)       Imaging  PETCT  11/23/2018  FINDINGS:       PET/CT:     Reference data:     The mediastinal blood pool SUV max value = 1.96. The liver SUV Max value  = 2.19  .      HEAD/NECK:  There is a hypermetabolic adenopathy identified in the right  supraclavicular region, measuring 1.5 x 1.7 cm, SUV Max = 7.11.] It was 7 x 9 mm  and not hypermetabolic on prior study. No additional adenopathy or  hypermetabolic activity seen in the neck. CHEST: There is interval development of multiple nodular lesions scattered in  both lungs, most likely suggestive of lung metastasis. -The dominant nodule in left lung is in medial anterior left upper lobe  measuring 1.0 x 1.4 cm, #70.  Mild hypermetabolic activity noted, SUV Max = 1.7.   -The second largest nodule in left lower lobe infrahilar region measures 8 x 8  mm on #85, SUV Max = 1.7.   -The dominant mass in left lung measures 9 x 9 mm at lateral right lower lobe,  SUV Max = 1.0, #87 .   -The second 8 x 9 mm solid nodule at posterior right lower lobe, SUV Max = 1.0,  #87.   -There is 6 x 9 mm small cavitary lesion with thickened wall at anterior right  upper lobe, #69 and SUV Max = 1.3.   -Multiple other 3-5 mm nodules also scattered in both lungs, too small for  accurate evaluation on PET.     No mediastinal or axillary adenopathy or hypermetabolic chest wall finding. ABDOMEN/PELVIS:  There is a large mass with central necrosis identified in the  left subphrenic region, most likely arising from the spleen and measures 10.2 x  14.3 x 12.5 cm. There is significant hypermetabolic activity identified along  the periphery of the mass, SUV Max = 8.79. The mass was much smaller and barely  visible on prior CT but hypermetabolic on prior PET scan, roughly measured 1.8 x  2.5 cm, SUV Max was 4.47. The second exophytic mass also developed at posterior  aspect of the spleen abutting the dominant mass which measures 4.1 x 5.1 x 6.0  cm, SUV Max = 6.99. The mass is tightly abutting the greater curvature of  gastric wall. Direct invasion cannot be entirely excluded.      There is a large ill-defined soft tissue mass identified in the midline pelvic  floor and roughly measures 8.2 x 9.9 cm with intensive FDG uptake, SUV Max =  14.2. This is new finding since the prior study as most likely recurrent  malignancy.     There are multiple areas of hypermetabolic soft tissue masses identified in the  retroperitoneal regions, the largest masses are abutting and invading the  bilateral iliopsoas muscles, measuring 5.3 x 7.1 cm and SUV Max = 13.4 on the  left and 5.7 x 6.3 cm on the right with SUV Max of 10.6.  There is moderate  adjacent large soft tissue mass abutting the lateral right iliac crest measuring  5.0 x 6.2 cm, SUV Max = 9.6.     Multiple hypermetabolic nodular lesions also identified scattered in the  peritoneal cavity and compatible with carcinomatosis. The largest two are 2.4 x  3.0 cm, SUV Max = 9.1 in the lateral right pericolic gutter and 2.5 x 4.3 cm  with SUV max of 13.2 at anterior right pelvic cavity abutting the peritoneum. Multiple other smaller hypermetabolic foci also scattered in the peritoneal  cavities.     Mild retroperitoneal adenopathy also developed, the largest is 1.3 x 1.5 cm in  caval aortic region, SUV Max = 6.9. Multiple hypermetabolic lymph nodes also  seen in the bilateral pelvic sidewalls.     BONES:  No malignant FDG activity.      ADDITIONAL CT FINDINGS:    (Exam is limited due to lack of intravenous contrast.)     Moderate interstitial lung process again seen.      Interval development of moderate bilateral hydronephrosis and hydroureter up to  the very distal portions in the pelvis, most likely due to extrinsic compression  by large pelvic mass. The bladder is poorly distended. Mild fatty stranding in  the pelvis.     IMPRESSION  IMPRESSION:     1. Interval development of multiple hypermetabolic nodular lesions in bilateral  lungs, most likely consistent with lung metastasis.     2. Interval development of large ill-defined soft tissue mass in the surgical  bed in the pelvis, most consistent with recurrent malignancy.     3. Interval development of 2 large necrotic masses in the hilum of spleen with  malignant FDG uptake. Multiple large soft tissue masses also seen within and  abutting the iliopsoas muscles along with multiple hypermetabolic nodular  lesions in the hernial cavities, most likely suggesting metastasis.     4. Interval development of mild adenopathy in pelvic sidewalls, retroperitoneal  regions as wall as right supraclavicular region as most likely becky metastasis.     5. Interval development of moderate bilateral hydronephroses and hydroureters  all the way to the very distal ureters, most likely due to extrinsic compression  by pelvic mass.  Local invasion cannot be entirely excluded.   18  Thyroid   Scanned in media    3/6/18  US head/neck      3/12/18  CT Chest  Scanned in media   (r/o PE) delayed thryoid uptake 8 weeks after    18  PETCT  Scanned in media    17  MRI pelvis  Scanned in media    17  MRI abdomen  Scanned in media        ROS:   As above      Patient Active Problem List    Diagnosis Date Noted    Pelvic abscess in female 2019    Hydronephrosis 2019    Colon perforation ([address] 2019    Retroperitoneal abscess ([address] 2019    DVT (deep venous thrombosis) ([address] 2019    Cervical cancer ([address] 2019    ARF (acute renal failure) ([address] 2018    Severe obesity (BMI 35.0-39.9) 2018    Endometrial ca ([address] 09/15/2017     Past Medical History:   Diagnosis Date    Acute kidney failure ([address] 2019    BMI 34.0-34.9,adult     34.8    Caffeine adverse reaction     elevated BP    Cancer (HCC)     uterine, cervical    Cervical cancer (HCC)     Chronic kidney disease     acute kidney failure    Chronic sinusitis     Dizziness     Endometriosis     Hiatal hernia     High cholesterol     Palpitation     PMB (postmenopausal bleeding)     Rash     eczemz    Thromboembolus ([address] 03/15/2019    IVC filter placed    Urinary incontinence       Past Surgical History:   Procedure Laterality Date    HX ABDOMINAL LAPAROSCOPY      HX BILATERAL SALPINGO-OOPHORECTOMY Bilateral     HX BREAST LUMPECTOMY Right 1973    benign    HX HYSTERECTOMY      radical    HX OTHER SURGICAL  2017    Exam under anesthesia: Cystoscopy, sigmoidoscopy    HX UROLOGICAL      nephrostomy tubes and stents    IR CHANGE EXIST CATHETER/TUBE LT  3/7/2019    IR MEDIPORT      VASCULAR SURGERY PROCEDURE UNLIST      filter       OB History        2    Para   2    Term   2            AB        Living   2       SAB        TAB        Ectopic        Molar        Multiple        Live Births   2 Social History     Tobacco Use    Smoking status: Never Smoker    Smokeless tobacco: Never Used   Substance Use Topics    Alcohol use: No      Family History   Problem Relation Age of Onset    Cancer Mother         left ovary      Current Outpatient Medications   Medication Sig    trimethoprim-sulfamethoxazole (BACTRIM DS) 160-800 mg per tablet Take 1 Tab by mouth two (2) times a day.  ibuprofen 100 mg tablet Take 200 mg by mouth every six (6) hours as needed for Pain.  multivitamin (ONE A DAY) tablet Take 1 Tab by mouth daily.  acetaminophen (TYLENOL) 500 mg tablet Take 1,000 mg by mouth every six (6) hours as needed for Pain.  b complex vitamins (B COMPLEX 1) tablet Take 1 Tab by mouth daily.  bisacodyl (DULCOLAX) 10 mg suppository Insert 10 mg into rectum daily as needed. No current facility-administered medications for this visit. Allergies   Allergen Reactions    Other Food Nausea and Vomiting     Artificial sweeteners    Neulasta [Pegfilgrastim] Swelling    Aspirin Other (comments)     Gi upset    Avastin [Bevacizumab] Other (comments)     Bowel perforation    Milk Itching and Atopic Dermatitis     Eczema (dairy cow)    Tetracycline Unknown (comments)     Patient does not remember, reaction many years ago    Wheat Atopic Dermatitis     eczema    Other Plant, Animal, Environmental Other (comments)     Pt states she has \"springtime grass allergies. \"     Reports reaction: Sinus infection          OBJECTIVE:    Physical Exam  VITAL SIGNS: There were no vitals taken for this visit. GENERAL CRISTINO: in no apparent distress and well developed and well nourished   MUSCULOSKEL: no joint tenderness, deformity or swelling   INTEGUMENT:  warm and dry, no rashes or lesions   ABDOMEN . soft, NT, ND, No masses appreciated   EXTREMITIES: extremities normal, atraumatic, no cyanosis or edema   PELVIC: Exam deferred.    RECTAL: deferred   LOKI SURVEY: Cervical, supraclavicular, axillary and inguinal nodes normal.   NEURO: Grossly normal         IMPRESSION/PLAN:  1. Recurrent tage IVB endometrioid cervical cancer vs. Endometrial cancer   -previously reviewed blood work and PETCT c/w recurrent disease   -previously discussed options of chemotherapy, avastin and hormonal therapy   -Initially patient reluctant to do chemo but has decided to go ahead with chemo   -d/c hormonal treatment   -will plan on topotecan 1.0 mg/m2 D1-D5, Avastin 15 mg/kg D1 every 21 days until CCR, Toxicity, or progression. Given bowel perforation will d/c avastin from regimen and plan to treat with topotecan alone.   -hydronephrosis- s/p b/l nephrostomy tube placement, for stents on 4/16   -s/p cycle #3    -ok for cycle #4. F/u prior to cycle #5   -plan for 3 cycles and repeat imaging   -hypertension- improved   -labs reviewed.   increased   -all of ms. pruett's questioins/concerns addressed    The total time spent was 40 minutes regarding this patients diagnosis of cervical cancer and >50% of this time was spent counseling and coordinating care    [facility]  8/14/20191:28PM

## 2019-08-19 NOTE — PROGRESS NOTES
[facility] Progress Note    Date: 2019    Name: Betty Buitrago    MRN: [id number]         : 1950      Ms. Hewitt arrived in the [facility] today at 0900, in stable condition, here for Cycle 4, Day 1 of 5, IV Topotecan Chemotherapy Regimen. She was assessed and education was provided. Ms. Hewitt's vitals were reviewed. Visit Vitals  /82 (BP 1 Location: Left arm, BP Patient Position: Sitting)   Pulse (!) 101   Temp 98.6 °F (37 °C)   Resp 16   Ht 5' 5\" (1.651 m)   Wt 74.8 kg (164 lb 12.8 oz)   SpO2 100%   BMI 27.42 kg/m²         Her right chest single lumen port was accessed without incident at 0920, and blood for ordered labs was drawn. (CBC, BMP, Hepatic Function Panel, Magnesium, & CA-125)        Lab results were obtained and reviewed, and her preliminary CBC results were as follows:    WBC 4.8  ANC 2.1  HGB 8.6  HCT 27.6        Urine specimen for U/A & Culture, was obtained at 0955, and was sent out by , for processing. The Preliminary CBC results, as well as the BMP results were noted to be satisfactory to proceed with chemotherapy today as planned.          Recent Results (from the past 12 hour(s))   POC CHEM8    Collection Time: 19  9:26 AM   Result Value Ref Range    CO2, POC 24 19 - 24 MMOL/L    Glucose,  74 - 106 MG/DL    BUN, POC 15 7 - 18 MG/DL    Creatinine, POC 0.9 0.6 - 1.3 MG/DL    GFRAA, POC >60 >60 ml/min/1.73m2    GFRNA, POC >60 >60 ml/min/1.73m2    Sodium,  136 - 145 MMOL/L    Potassium, POC 4.0 3.5 - 5.5 MMOL/L    Calcium, ionized (POC) 1.19 1.12 - 1.32 mmol/L    Chloride,  100 - 108 MMOL/L    Anion gap, POC 16 10 - 20      Hematocrit, POC 25 (L) 36 - 49 %    Hemoglobin, POC 8.5 (L) 12 - 16 G/DL       Recent Results (from the past 12 hour(s))   HEPATIC FUNCTION PANEL    Collection Time: 19  9:20 AM   Result Value Ref Range    Protein, total 6.0 (L) 6.4 - 8.2 g/dL    Albumin 3.0 (L) 3.4 - 5.0 g/dL    Globulin 3.0 2.0 - 4.0 g/dL    A-G Ratio 1.0 0.8 - 1.7      Bilirubin, total 0.2 0.2 - 1.0 MG/DL    Bilirubin, direct <0.1 0.0 - 0.2 MG/DL    Alk. phosphatase 125 (H) 45 - 117 U/L    AST (SGOT) 13 10 - 38 U/L    ALT (SGPT) 17 13 - 56 U/L   MAGNESIUM    Collection Time: 08/19/19  9:20 AM   Result Value Ref Range    Magnesium 1.9 1.6 - 2.6 mg/dL   CBC WITH AUTOMATED DIFF    Collection Time: 08/19/19  9:20 AM   Result Value Ref Range    WBC 5.0 4.6 - 13.2 K/uL    RBC 3.08 (L) 4.20 - 5.30 M/uL    HGB 8.6 (L) 12.0 - 16.0 g/dL    HCT 27.1 (L) 35.0 - 45.0 %    MCV 88.0 74.0 - 97.0 FL    MCH 27.9 24.0 - 34.0 PG    MCHC 31.7 31.0 - 37.0 g/dL    RDW 18.1 (H) 11.6 - 14.5 %    PLATELET 995 993 - 743 K/uL    MPV 8.4 (L) 9.2 - 11.8 FL    NEUTROPHILS PENDING %    LYMPHOCYTES PENDING %    MONOCYTES PENDING %    EOSINOPHILS PENDING %    BASOPHILS PENDING %    ABS. NEUTROPHILS PENDING K/UL    ABS. LYMPHOCYTES PENDING K/UL    ABS. MONOCYTES PENDING K/UL    ABS. EOSINOPHILS PENDING K/UL    ABS.  BASOPHILS PENDING K/UL    DF PENDING    POC CHEM8    Collection Time: 08/19/19  9:26 AM   Result Value Ref Range    CO2, POC 24 19 - 24 MMOL/L    Glucose,  74 - 106 MG/DL    BUN, POC 15 7 - 18 MG/DL    Creatinine, POC 0.9 0.6 - 1.3 MG/DL    GFRAA, POC >60 >60 ml/min/1.73m2    GFRNA, POC >60 >60 ml/min/1.73m2    Sodium,  136 - 145 MMOL/L    Potassium, POC 4.0 3.5 - 5.5 MMOL/L    Calcium, ionized (POC) 1.19 1.12 - 1.32 mmol/L    Chloride,  100 - 108 MMOL/L    Anion gap, POC 16 10 - 20      Hematocrit, POC 25 (L) 36 - 49 %    Hemoglobin, POC 8.5 (L) 12 - 16 G/DL   URINALYSIS W/ RFLX MICROSCOPIC    Collection Time: 08/19/19  9:55 AM   Result Value Ref Range    Color YELLOW      Appearance CLEAR      Specific gravity 1.008 1.005 - 1.030      pH (UA) 7.5 5.0 - 8.0      Protein TRACE (A) NEG mg/dL    Glucose NEGATIVE  NEG mg/dL    Ketone NEGATIVE  NEG mg/dL    Bilirubin NEGATIVE  NEG      Blood MODERATE (A) NEG      Urobilinogen 0.2 0.2 - 1.0 EU/dL Nitrites NEGATIVE  NEG      Leukocyte Esterase MODERATE (A) NEG                ml IV Hydration was infused throughout chemotherapy today. The following pre-medications were administered per order, and without incident:  Zofran 16 mg IV, Decadron 8 mg IV, & Pepcid 20 mg IV. Topotecan (Hycamptin) 1.3 mg IV (0.75 mg/m2) was administered over approximately 30 minutes, per order, and without incident. After completion of all ordered medications, her port was flushed well per protocol with NS & Heparin, and was left accessed for her chemotherapy tomorrow, and the remainder of this week. Ms. Marian Modi tolerated well, and had no complaints. Ms. Marian Modi was discharged from [place] [address] in stable condition at 1230. Mohini Shabazz She is to return on tomorrow, Tuesday, 8-20-19, at 0900, for her next appointment, for cycle 4, day 2 of 5, IV Topotecan Chemotherapy Regimen. And then Cycle 5, Day 1 is scheduled in 3 weeks, on Monday, 9-9-19 at 0900.     Kiara Mayorga RN  August 19, 2019  9:44 AM

## 2019-08-20 NOTE — PROGRESS NOTES
[facility] Progress Note    Date: 2019    Name: Delores London              MRN: [id number]              : 1950    Chemotherapy Cycle: C4 D2    Ms. Hewitt was assessed and education was provided. Patient arrived ambulatory for scheduled infusion. No changes noted other than insomnia. Patient states started yesterday. Side effect of steroids discussed with patient. Mediport accessed, blood return sluggish but flushed with ease. No c/o pain on flushing or fluid administration. Educated patient on management of nausea. Ms. Hewitt's vitals were reviewed and patient was observed for 5 minutes prior to treatment. Visit Vitals  /76   Pulse 86   Temp 98.1 °F (36.7 °C)   Resp 18   SpO2 97%       Lab results were obtained on 2019  and reviewed. Pre-medications were administered as ordered and chemotherapy was initiated. Patient refused Ativan     Topotecan 1.3 mg  was infused over 30 min as ordered. Patient received approx 400 cc IV fluids during treatment. Mediport flushed, left accessed, capped. Ms. Pola Castro tolerated infusion, and had no complaints at this time. Patient armband removed and shredded. Ms. Pola Castro was discharged from [place] [address] in stable condition at 1130. She is to return on 2019 at 0900 for her next appointment.      Summer Derald Canavan, RN  2019  11:50 AM

## 2019-08-21 NOTE — PROGRESS NOTES
[facility] Progress Note    Date: 2019    Name: Eren Collins              MRN: [id number]              : 1950    Chemotherapy Cycle: C4 D3    Ms. Hewitt was assessed and education was provided. Patient arrived ambulatory for scheduled infusion. No changes noted other than insomnia/tired from steroids and on going  Nausea that is slight currently. Patient states insomia started 19. Side effect of steroids discussed with patient. Educated patient on management of nausea. Dick Bates NP aware of patient issues with receiving decadron. Corry Guadarrama states that patient needs the decadron based on her on going nausea. Patient is aware, and agreeable to receiving decadron. Shellylenin Thierry stated that she will call in prescription for PO ativan for insomia. Patient is agreeable to same. Ms. Hewitt's vitals were reviewed and patient was observed for 5 minutes prior to treatment. Visit Vitals  /83 (BP 1 Location: Left arm, BP Patient Position: At rest)   Pulse 75   Temp 97.8 °F (36.6 °C)   Resp 18   SpO2 100%       Lab results were obtained on 2019  and reviewed. Results for Odette Denver (MRN [id number])    Ref.  Range 2019 09:20   WBC Latest Ref Range: 4.6 - 13.2 K/uL 5.0   RBC Latest Ref Range: 4.20 - 5.30 M/uL 3.08 (L)   HGB Latest Ref Range: 12.0 - 16.0 g/dL 8.6 (L)   HCT Latest Ref Range: 35.0 - 45.0 % 27.1 (L)   MCV Latest Ref Range: 74.0 - 97.0 FL 88.0   MCH Latest Ref Range: 24.0 - 34.0 PG 27.9   MCHC Latest Ref Range: 31.0 - 37.0 g/dL 31.7   RDW Latest Ref Range: 11.6 - 14.5 % 18.1 (H)   PLATELET Latest Ref Range: 135 - 420 K/uL 282   MPV Latest Ref Range: 9.2 - 11.8 FL 8.4 (L)   NEUTROPHILS Latest Ref Range: 42 - 75 % 49   BAND NEUTROPHILS Latest Ref Range: 0 - 5 % 1   LYMPHOCYTES Latest Ref Range: 20 - 51 % 39   MONOCYTES Latest Ref Range: 2 - 9 % 10 (H)   EOSINOPHILS Latest Ref Range: 0 - 5 % 1   BASOPHILS Latest Ref Range: 0 - 3 % 0   DF Latest Units:   MANUAL ABS. NEUTROPHILS Latest Ref Range: 1.8 - 8.0 K/UL 2.4   ABS. LYMPHOCYTES Latest Ref Range: 0.8 - 3.5 K/UL 2.0   ABS. MONOCYTES Latest Ref Range: 0 - 1.0 K/UL 0.5   ABS. EOSINOPHILS Latest Ref Range: 0.0 - 0.4 K/UL 0.1   ABS. BASOPHILS Latest Ref Range: 0.0 - 0.06 K/UL 0.0   RBC COMMENTS Latest Units:   POLYCHROMASIA. .. PLATELET COMMENTS Latest Units:   ADEQUATE PLATELETS   Results for Jamison Whitfield (MRN [id number])    Ref. Range 8/19/2019 09:26   GLUCOSE,FAST - POC Latest Ref Range: 74 - 106 MG/   Sodium (POC) Latest Ref Range: 136 - 145 MMOL/L 139   Potassium (POC) Latest Ref Range: 3.5 - 5.5 MMOL/L 4.0   Chloride, POC Latest Ref Range: 100 - 108 MMOL/L 104   CO2, POC Latest Ref Range: 19 - 24 MMOL/L 24   BUN, POC Latest Ref Range: 7 - 18 MG/DL 15   Anion gap, POC Latest Ref Range: 10 - 20   16   Creatinine, POC Latest Ref Range: 0.6 - 1.3 MG/DL 0.9   Calcium, ionized (POC) Latest Ref Range: 1.12 - 1.32 mmol/L 1.19   GFRNA, POC Latest Ref Range: >60 ml/min/1.73m2 >60   GFRAA, POC Latest Ref Range: >60 ml/min/1.73m2 >60   Hemoglobin (POC) Latest Ref Range: 12 - 16 G/DL 8.5 (L)   Hematocrit (POC) Latest Ref Range: 36 - 49 % 25 (L)     Medi-port to right chest accessed on 8/19/19. Same with dry drsg at site, and area without redness, swelling or tenderness. Brisk flush/blood returns noted. 500 ml N.S bag hung to run before, during and after chemotherapy. Pre-medications of Zofran, decadron, ativan and pepcid IV  were administered as ordered and chemotherapy was initiated. During ativan administration, Patient stated that she is usually asked if she wants ativan, and would refuse it based on ativan making her sleepy and tired. Approximately 0.20 mg given, and the rest wasted     Topotecan 1.3 mg  was infused over 30 min as ordered, followed by normal saline flush. Tolerated well. Brisk flush/blood returns noted from port. Heparinized per protocol, and left accessed for chemo tomorrow.  Brain [place]bour cap applied and drsg/salgado secured. No swelling, redness or tenderness to site     Patient Vitals for the past 4 hrs:   Temp Pulse Resp BP SpO2   08/21/19 1138 97.8 °F (36.6 °C) 75 18 129/83 --   08/21/19 0915 98.1 °F (36.7 °C) 79 18 132/75 100 %           Ms. Hewitt tolerated infusion, and had no complaints of pain, but slight nausea persist and she states she is very tired and \"dragging\" from IV ativan. Patient educated on symptom management for nausea and relaxation/rest post chemotherapy. Patient armband removed and shredded. Ms. Marian Modi was discharged from [place] [address] in stable condition at 1145. She is to return on 08/21/2019 at 0900 for her next appointment.      Elsy Mascorro RN  August 21, 2019  11:50 AM

## 2019-08-21 NOTE — TELEPHONE ENCOUNTER
Informed pt of Ativan prescription called into pharmacy and currently UTI requiring antibiotics. Cipro 500 mg po bid x 7 days sent to pharmacy as well. Patient verbalized understanding and agreed to plan. Patient instructed to contact office for any question or concerns.      Romana Gutierrez NP

## 2019-08-22 NOTE — PROGRESS NOTES
[facility] Progress Note    Date: 2019    Name: David Nguyễn    MRN: [id number]         : 1950      Ms. Hewitt arrived in the [facility] today at 0915, in stable condition, here for Cycle 4, Day 4 of 5, IV Topotecan Chemotherapy Regimen. She was assessed and education was provided. She states that she feels less tired today. Still has on going insomia due to pre med decadron, and nausea. States she did not take her ativan last night, but took anti-nausea medication as ordered. Ms. Hewitt's vitals were reviewed. Visit Vitals  /83 (BP 1 Location: Left arm, BP Patient Position: At rest)   Pulse 69   Temp 97.6 °F (36.4 °C)   Resp 18   SpO2 99%         Her right chest single lumen port was accessed on 19. Site with salgado/drsg intact, and area W/O redness, swelling, or tenderness. Port with brisk flush/blood returns. Lab results were obtained and reviewed, and her preliminary CBC results were as follows--done on 19    WBC 4.8  ANC 2.1  HGB 8.6  HCT 27.6         ml IV Hydration was infused throughout chemotherapy today. The following pre-medications were administered per order, and without incident:  Zofran 16 mg IV, Decadron 8 mg IV, & Pepcid 20 mg IV. Patient refused IV ativan    Topotecan (Hycamptin) 1.3 mg IV (0.75 mg/m2) was administered over approximately 30 minutes, per order, and without incident. After completion of all ordered medications, her port was flushed well per protocol with NS & Heparin, and was left accessed for her chemotherapy tomorrow, and the remainder of this week. Salgado/drsg to site intact, and area remains without redness, swelling, or tenderness. Patient Vitals for the past 8 hrs:   Temp Pulse Resp BP SpO2   19 1110 97.6 °F (36.4 °C) 69 18 129/83 99 %   19 0915 98 °F (36.7 °C) 74 18 126/78 100 %          Ms. Codispoti tolerated well, and had no complaints. Reviewed discharge instructions with patient. She verbalized understanding. Ms. Bell Hansen was discharged from [place] [address] in stable condition at 1140. Genet Hind She is to return on tomorrow, Friday, 8-23-19, at 0900, for her next appointment, of cycle 4, day 5 of 5, IV Topotecan Chemotherapy Regimen.    Isabel Tristan RN  August 22, 2019  9:44 AM

## 2019-08-23 NOTE — PROGRESS NOTES
[facility] Progress Note    Date: 2019    Name: Moraima Hoang    MRN: [id number]         : 1950      Ms. Hewitt arrived in the [facility] today at [id number], in stable condition, here for Cycle 4, Day 5 of 5, IV Topotecan Chemotherapy Regimen. She was assessed and education was provided. She states that she feels less tired today. Still has on going insomia due to pre med decadron, and nausea. States she did not take her ativan last night, but took anti-nausea medication as ordered. Ms. Hewitt's vitals were reviewed. Visit Vitals  /80 (BP 1 Location: Left arm, BP Patient Position: Sitting)   Pulse 81   Temp 98 °F (36.7 °C)   Resp 18   SpO2 100%     Ms Hewitt refuses emend and aloxi. She states they make her sick, and only [employer] works for her. Patti Ha NP made aware. Barbra Pedersen give verbal order for her to have zofran today, and she said to educate patient on risk of not taking aloxi and emend per treatment plan. Same done and patient verbalized understanding. Her right chest single lumen port was accessed on 19. Site with salgado/drsg intact, and area W/O redness, swelling, or tenderness. Port with brisk flush/blood returns. Lab results were obtained and reviewed, and her preliminary CBC results were as follows--done on 19    WBC 4.8  ANC 2.1  HGB 8.6  HCT 27.6         ml IV Hydration was infused throughout chemotherapy today. The following pre-medications were administered per order, and without incident:  Zofran 16 mg IV, Decadron 8 mg IV, & Pepcid 20 mg IV. Patient refused IV ativan    Topotecan (Hycamptin) 1.3 mg IV (0.75 mg/m2) was administered over approximately 30 minutes, per order, and without incident. After completion of all ordered medications, her port was flushed well per protocol with NS & Heparin, and de- accessed. Site and area remains without redness, swelling, or tenderness. Band aid applied.     Patient Vitals for the past 8 hrs:   Temp Pulse Resp BP SpO2   08/23/19 1556 97.8 °F (36.6 °C) 80 18 125/78 100 %   08/23/19 1355 98 °F (36.7 °C) 81 18 133/80 100 %          Ms. Hewitt tolerated well, and had no complaints. Reviewed discharge instructions with patient. She verbalized understanding. Ms. Hong Storey was discharged from [place] [address] in stable condition at [phone]. . She is to return on 9/9/19 at 2 pm, for her next appointment, of cycle 5, day 1 of 5, IV Topotecan Chemotherapy Regimen.    Jimenez Fishman RN  August 23, 2019  9:44 AM

## 2019-09-09 NOTE — PROGRESS NOTES
[facility] Progress Note    Date: 2019    Name: Alexandra Weaver    MRN: [id number]         : 1950      Ms. Hewitt arrived in the [facility] today at [phone], in stable condition, here for Cycle 5, Day 1 of 5, IV Topotecan Chemotherapy Regimen. She was assessed and education was provided. She states that she feels less tired today. Right chest mediport accessed with sterile technique, brisk blood return noted. After wasting 10mls labs drawn as ordered. NS infusing at [facility]. Recent Results (from the past 12 hour(s))   METABOLIC PANEL, COMPREHENSIVE    Collection Time: 19  2:15 PM   Result Value Ref Range    Sodium 142 136 - 145 mmol/L    Potassium 4.3 3.5 - 5.5 mmol/L    Chloride 108 100 - 111 mmol/L    CO2 25 21 - 32 mmol/L    Anion gap 9 3.0 - 18 mmol/L    Glucose 99 74 - 99 mg/dL    BUN 30 (H) 7.0 - 18 MG/DL    Creatinine 2.45 (H) 0.6 - 1.3 MG/DL    BUN/Creatinine ratio 12 12 - 20      GFR est AA 24 (L) >60 ml/min/1.73m2    GFR est non-AA 20 (L) >60 ml/min/1.73m2    Calcium 9.0 8.5 - 10.1 MG/DL    Bilirubin, total 0.2 0.2 - 1.0 MG/DL    ALT (SGPT) 13 13 - 56 U/L    AST (SGOT) 12 10 - 38 U/L    Alk. phosphatase 112 45 - 117 U/L    Protein, total 6.8 6.4 - 8.2 g/dL    Albumin 3.1 (L) 3.4 - 5.0 g/dL    Globulin 3.7 2.0 - 4.0 g/dL    A-G Ratio 0.8 0.8 - 1.7     MAGNESIUM    Collection Time: 19  2:15 PM   Result Value Ref Range    Magnesium 2.2 1.6 - 2.6 mg/dL   CBC WITH 3 PART DIFF    Collection Time: 19  2:30 PM   Result Value Ref Range    WBC 7.0 4.5 - 13.0 K/uL    RBC 2.52 (L) 4.10 - 5.10 M/uL    HGB 7.2 (L) 12.0 - 16 g/dL    HCT 22.6 (L) 36 - 48 %    MCV 89.7 78 - 102 FL    MCH 28.6 25.0 - 35.0 PG    MCHC 31.9 31 - 37 g/dL    RDW 16.7 (H) 11.5 - 14.5 %    PLATELET 699 (H) 052 - 440 K/uL    NEUTROPHILS 52 40 - 70 %    MIXED CELLS 4 0.1 - 17 %    LYMPHOCYTES 45 (H) 14 - 44 %    ABS. NEUTROPHILS 3.7 1.8 - 9.5 K/UL    ABS. MIXED CELLS 0.2 0.0 - 2.3 K/uL    ABS.  LYMPHOCYTES 3.1 1.1 - 5.9 K/UL    DF AUTOMATED     URINALYSIS W/ RFLX MICROSCOPIC    Collection Time: 09/09/19  2:52 PM   Result Value Ref Range    Color YELLOW      Appearance CLOUDY      Specific gravity 1.017 1.005 - 1.030      pH (UA) 5.0 5.0 - 8.0      Protein 30 (A) NEG mg/dL    Glucose NEGATIVE  NEG mg/dL    Ketone NEGATIVE  NEG mg/dL    Bilirubin NEGATIVE  NEG      Blood TRACE (A) NEG      Urobilinogen 0.2 0.2 - 1.0 EU/dL    Nitrites NEGATIVE  NEG      Leukocyte Esterase MODERATE (A) NEG           Ms. Hewitt's vitals were reviewed. Visit Vitals  /88 (BP 1 Location: Left arm, BP Patient Position: At rest)   Pulse (!) 115   Temp 98.8 °F (37.1 °C)   Resp 20   Ht 5' 5\" (1.651 m)   Wt 75.9 kg (167 lb 5 oz)   SpO2 100%   BMI 27.84 kg/m²     Recent Results (from the past 8 hour(s))   METABOLIC PANEL, COMPREHENSIVE    Collection Time: 09/09/19  2:15 PM   Result Value Ref Range    Sodium 142 136 - 145 mmol/L    Potassium 4.3 3.5 - 5.5 mmol/L    Chloride 108 100 - 111 mmol/L    CO2 25 21 - 32 mmol/L    Anion gap 9 3.0 - 18 mmol/L    Glucose 99 74 - 99 mg/dL    BUN 30 (H) 7.0 - 18 MG/DL    Creatinine 2.45 (H) 0.6 - 1.3 MG/DL    BUN/Creatinine ratio 12 12 - 20      GFR est AA 24 (L) >60 ml/min/1.73m2    GFR est non-AA 20 (L) >60 ml/min/1.73m2    Calcium 9.0 8.5 - 10.1 MG/DL    Bilirubin, total 0.2 0.2 - 1.0 MG/DL    ALT (SGPT) 13 13 - 56 U/L    AST (SGOT) 12 10 - 38 U/L    Alk.  phosphatase 112 45 - 117 U/L    Protein, total 6.8 6.4 - 8.2 g/dL    Albumin 3.1 (L) 3.4 - 5.0 g/dL    Globulin 3.7 2.0 - 4.0 g/dL    A-G Ratio 0.8 0.8 - 1.7     MAGNESIUM    Collection Time: 09/09/19  2:15 PM   Result Value Ref Range    Magnesium 2.2 1.6 - 2.6 mg/dL   CBC WITH 3 PART DIFF    Collection Time: 09/09/19  2:30 PM   Result Value Ref Range    WBC 7.0 4.5 - 13.0 K/uL    RBC 2.52 (L) 4.10 - 5.10 M/uL    HGB 7.2 (L) 12.0 - 16 g/dL    HCT 22.6 (L) 36 - 48 %    MCV 89.7 78 - 102 FL    MCH 28.6 25.0 - 35.0 PG    MCHC 31.9 31 - 37 g/dL    RDW 16.7 (H) 11.5 - 14.5 %    PLATELET 588 (H) 511 - 440 K/uL    NEUTROPHILS 52 40 - 70 %    MIXED CELLS 4 0.1 - 17 %    LYMPHOCYTES 45 (H) 14 - 44 %    ABS. NEUTROPHILS 3.7 1.8 - 9.5 K/UL    ABS. MIXED CELLS 0.2 0.0 - 2.3 K/uL    ABS. LYMPHOCYTES 3.1 1.1 - 5.9 K/UL    DF AUTOMATED     URINALYSIS W/ RFLX MICROSCOPIC    Collection Time: 09/09/19  2:52 PM   Result Value Ref Range    Color YELLOW      Appearance CLOUDY      Specific gravity 1.017 1.005 - 1.030      pH (UA) 5.0 5.0 - 8.0      Protein 30 (A) NEG mg/dL    Glucose NEGATIVE  NEG mg/dL    Ketone NEGATIVE  NEG mg/dL    Bilirubin NEGATIVE  NEG      Blood TRACE (A) NEG      Urobilinogen 0.2 0.2 - 1.0 EU/dL    Nitrites NEGATIVE  NEG      Leukocyte Esterase MODERATE (A) NEG         ELOISE Shankar made aware of today's labs and ordered to hold chemo this week and reschedule next Monday. Also ELOISE Shankar ordered 2 units of blood tomorrow. Type and cross obtained. Patient Vitals for the past 8 hrs:   Temp Pulse Resp BP SpO2   09/09/19 1443 98.8 °F (37.1 °C) (!) 115 20 137/88 100 %          Ms. Hewitt tolerated well, and had no complaints. Port left accessed for tomorrows appointment. Ms. Tasha Emmanuel was discharged from [place] [address] in stable condition at 155. She is to return on 9/10/19 at 0900 am, for her next appointment for blood transfusion.     Constantino Guerin RN  September 9, 2019

## 2019-09-09 NOTE — TELEPHONE ENCOUNTER
Informed her creatinine is 2.45 which is up from 0.9 on 08/19/19. Given her history of bilateral ureteral stents and recent stent exchanged on 08/06/2019 I have made her an appointment this week with Dr. Frank Welsh 09/11/19 at 1100. Patient verbalized understanding and agreed to plan. Patient instructed to contact office for any question or concerns.      Teresa Julio NP

## 2019-09-10 NOTE — PROGRESS NOTES
conducted an initial consultation and Spiritual Assessment for [employer], who is a 71 y.o.,female. Patient's Primary Language is: [place]. According to the patient's EMR Scientologist Affiliation is: Abdon Guerrier. The reason the Patient came to the hospital is:   Patient Active Problem List    Diagnosis Date Noted    Pelvic abscess in female 06/20/2019    Hydronephrosis 04/16/2019    Colon perforation ([facility] 03/25/2019    Retroperitoneal abscess ([facility] 03/07/2019    DVT (deep venous thrombosis) ([facility] 03/07/2019    Cervical cancer ([facility] [address] [facility] 01/22/2019    ARF (acute renal failure) ([facility] 12/05/2018    Severe obesity (BMI 35.0-39.9) 09/27/2018    Endometrial ca ([facility] 09/15/2017        The  provided the following Interventions:  Initiated a relationship of care and support. Explored issues of jasmina, belief, spirituality and Mandaeism/ritual needs while hospitalized. Listened empathically. Provided chaplaincy education. Provided information about Spiritual Care Services. Offered prayer and assurance of continued prayers on patient's behalf. Chart reviewed. The following outcomes where achieved:  Patient shared limited information about both their medical narrative and spiritual journey/beliefs.  confirmed Patient's Scientologist Affiliation. Patient processed feeling about current hospitalization. Patient expressed gratitude for 's visit. Assessment:  Patient does not have any Mandaeism/cultural needs that will affect patient's preferences in health care. There are no spiritual or Mandaeism issues which require intervention at this time. Plan:  Chaplains will continue to follow and will provide pastoral care on an as needed/requested basis.  recommends bedside caregivers page  on duty if patient shows signs of acute spiritual or emotional distress. Chaplain Akil LUO  [address] [facility]   [phone]

## 2019-09-10 NOTE — PROGRESS NOTES
[facility] Progress Note    Date: September 10, 2019    Name: Guevara Willett    MRN: [id number]         : 1950      Ms. Hewitt arrived to [facility] at [facility]. Ms. Eula Wakefield was assessed and education was provided. Discussed risks and benefits of blood transfusion with patient, including risk of transfusion reaction and disease transmission. Patient verbalized understanding and signed consent placed on chart. Ms. Hewitt's vitals were reviewed. Visit Vitals  /87 (BP 1 Location: Left arm, BP Patient Position: At rest;Sitting)   Pulse 95   Temp 97.4 °F (36.3 °C)   Resp 18   SpO2 99%       Right upper chest mediport accessed from 19. Positive for blood return and flushes without difficulty. Normal saline initiated at [facility]. Pre-medications (Tylenol 650 mg and Benadryl 25 mg) were administered as ordered. First unit of two ordered units of PRBCs initiated @ 75 ml/hr at 1105. Fifteen minutes into infusion, VS stable and pt denied c/o SOB, itching/hives, lip/tongue/facial swelling, CP or other complaints. Infusion rate increased to 155 ml/hr for the remainder of the transfusion. Unit finished @ 1329. VS stable and no transfusion reaction suspected. Second unit of two ordered units of PRBCs initiated @ 75 ml/hr at 1335. Fifteen minutes into infusion, VS stable and pt denied c/o SOB, itching/hives, lip/tongue/facial swelling, CP or other complaints. Infusion rate increased to 155 ml/hr for the remainder of the transfusion. Unit finished @ 070 [id number]. VS stable and no transfusion reaction suspected. Ms. Eula Wakefield tolerated infusion without complaints. Right upper chest mediport flushed per protocol and deaccessed. Gauze and bandaid applied to site. Patient declined to stay for observation. No signs of allergic reaction noted. Discharge instructions reviewed with pt. Pt instructed to report SOB, CP, elevated temp, back pain, or other symptoms of transfusion reaction to MD or ED.   Pt verbalized understanding. Patient armband removed and shredded    Ms. Hewitt was discharged from [place] [address] in stable condition at 1555. Return appt is 9/16/19 at 1400.     Ab Sinclair RN  September 10, 2019   7115

## 2019-09-16 NOTE — PROGRESS NOTES
[facility] Progress Note    Date: 2019    Name: Shun Sierra    MRN: [id number]         : 1950      Ms. Hewitt arrived in the [facility] today at 1430, in stable condition, here for Cycle 5, Day 1, Topotecan  Chemotherapy Regimen. (X 5 Days, Q 21 Day Regimen.)  She was assessed and education was provided. Upon arrival to the [facility] today, Ms. Hewitt stated that she didn't feel well at all. She complained of lower back pain, which she rated a # 6 on the pain scale, and also complained of extreme fatigue, and nausea/vomiting. She stated that she had already vomited X 2 today, and then she vomited while she was in the [facility] today X 1. Ms. Hewitt's vitals were reviewed. Visit Vitals  BP (!) 153/97 (BP 1 Location: Left arm, BP Patient Position: Sitting)   Pulse (!) 103   Temp 98.7 °F (37.1 °C)   Resp 16   Ht 5' 5\" (1.651 m)   Wt 76.3 kg (168 lb 3.2 oz)   SpO2 96%   BMI 27.99 kg/m²           Her right chest single lumen port was accessed without incident at 1445. However, no blood return could be obtained, even though the port flushed very easily with 40 ml NS. No edema was noted with flushing, and Ms. Hewitt denied any pain with flushing. Blood for ordered labs (CBC, BMP, Hepatic Function Panel, Magnesium, & CA-125) was drawn without incident from her right AC at 1504. The CBC was processed in house, and the BMP results were obtained via I-STAT. And, the remaining labs were sent out by STAT , for processing. Ms. Drummond Centers stated that she had just voided prior to coming to the [facility] today, and stated that she was unable to provide a urine specimen for the ordered urinalysis & culture.          Lab results were obtained and reviewed, and were as follows:      Recent Results (from the past 12 hour(s))   HEPATIC FUNCTION PANEL    Collection Time: 19  3:04 PM   Result Value Ref Range    Protein, total 6.6 6.4 - 8.2 g/dL    Albumin 3.2 (L) 3.4 - 5.0 g/dL Globulin 3.4 2.0 - 4.0 g/dL    A-G Ratio 0.9 0.8 - 1.7      Bilirubin, total 0.3 0.2 - 1.0 MG/DL    Bilirubin, direct 0.1 0.0 - 0.2 MG/DL    Alk. phosphatase 127 (H) 45 - 117 U/L    AST (SGOT) 15 10 - 38 U/L    ALT (SGPT) 14 13 - 56 U/L   MAGNESIUM    Collection Time: 09/16/19  3:04 PM   Result Value Ref Range    Magnesium 2.3 1.6 - 2.6 mg/dL   CBC WITH AUTOMATED DIFF    Collection Time: 09/16/19  3:04 PM   Result Value Ref Range    WBC 11.6 4.6 - 13.2 K/uL    RBC 3.41 (L) 4.20 - 5.30 M/uL    HGB 9.9 (L) 12.0 - 16.0 g/dL    HCT 30.2 (L) 35.0 - 45.0 %    MCV 88.6 74.0 - 97.0 FL    MCH 29.0 24.0 - 34.0 PG    MCHC 32.8 31.0 - 37.0 g/dL    RDW 16.6 (H) 11.6 - 14.5 %    PLATELET 405 652 - 876 K/uL    MPV 8.4 (L) 9.2 - 11.8 FL    NEUTROPHILS 69 42 - 75 %    LYMPHOCYTES 17 (L) 20 - 51 %    MONOCYTES 13 (H) 2 - 9 %    EOSINOPHILS 0 0 - 5 %    BASOPHILS 1 0 - 3 %    ABS. NEUTROPHILS 8.0 1.8 - 8.0 K/UL    ABS. LYMPHOCYTES 2.0 0.8 - 3.5 K/UL    ABS. MONOCYTES 1.5 (H) 0 - 1.0 K/UL    ABS. EOSINOPHILS 0.0 0.0 - 0.4 K/UL    ABS. BASOPHILS 0.1 (H) 0.0 - 0.06 K/UL    DF MANUAL      PLATELET COMMENTS ADEQUATE PLATELETS      RBC COMMENTS NORMOCYTIC, NORMOCHROMIC      RBC COMMENTS ANISOCYTOSIS  SLIGHT       POC CHEM8    Collection Time: 09/16/19  3:08 PM   Result Value Ref Range    CO2, POC 25 (H) 19 - 24 MMOL/L    Glucose,  74 - 106 MG/DL    BUN, POC 30 (H) 7 - 18 MG/DL    Creatinine, POC 3.3 (H) 0.6 - 1.3 MG/DL    GFRAA, POC 17 (L) >60 ml/min/1.73m2    GFRNA, POC 14 (L) >60 ml/min/1.73m2    Sodium,  136 - 145 MMOL/L    Potassium, POC 5.2 3.5 - 5.5 MMOL/L    Calcium, ionized (POC) 1.16 1.12 - 1.32 mmol/L    Chloride,  100 - 108 MMOL/L    Anion gap, POC 16 10 - 20      Hematocrit, POC 28 (L) 36 - 49 %    Hemoglobin, POC 9.5 (L) 12 - 16 G/DL              The elevated creatinine of 3.3, listed above, was reported to Rocio Gloria NP, at 7670, who in turn, reported it to Dr. Ricky Bella.   [facility] was made aware of Ms. Hewitt's complaints of N/V, extreme fatigue, & lower back pain. Order was received from [facility], to [facility] today, and for the remainder of this week, and have Ms. Hewitt report to the Emergency Room, for further evaluation and treatment. Ms. Khanh Bonilla was made aware, that Dr. Cy Lira wanted her to report directly to the ER from the [facility] today, for further evaluation and treatment. However, she was very hesitant, and stated that she didn't want to go to the ER, because she was scheduled to see her urologist, Dr. Michael Perez, on Wednesday, 9-18-19, and he was supposed to do some type of follow up tests. So, I called and spoke with Dr. Damian Benitez at 0, and made him aware of Ms. Hewitt's complaints from today, as well as her elevated creatinine of 3.3, and also made him aware, that Dr. Cy Lira had wanted her to report directly to the emergency room for further evaluation and treatment. Dr. Damian Benitez agreed that Ms. Hewitt should indeed report to the ER today, and he asked me to tell her that he would follow up with her there (@  [facility] ER). After I informed Ms. Hewitt that Dr. Damian Benitez agreed [facility] and [facility], she agreed that she would go. However, she stated that she needed to 1st go home, and wait for her son to take her to the ER, because she didn't feel well enough to drive herself there. I offered to call for ambulance transport for her to the ER, but she adamantly refused, and stated that she would go later this evening, when her son could take her. Therefore, her port was flushed with NS & Heparin, and clamped, and left intact. (She refused to have her port de-accessed, and stated that she didn't want to keep getting needlessly poked with needles.)        Report was called to Jacobo Martinez RN, at  [facility] Emergency Department at 1630.       Ms. Khanh Bonilla had no additional complaints. Ms. Marian Modi was discharged from [place] [address] in stable condition at 1630. She is to tentatively return on Monday, 10-7-19, at 1400, for her next appointment, for Cycle 5, Day 1, Topotecan Chemotherapy Regimen.      Kiara Mayorga RN  September 16, 2019  4:11 PM

## 2019-09-16 NOTE — ED TRIAGE NOTES
Patient states her physician sent her here due to her kidney functions going up. Creatine was 3.3. She is also c/o of abdominal pain. She states she was having kidney pain but she is now having abdominal pain.

## 2019-09-16 NOTE — PROGRESS NOTES
Let her know CT looks like both kidneys more swollen despite stents being in good position. We will do a CYSTOGRAM (can you please double check that its scheduled this way) when she comes in on Wednesday. We will discuss next steps based off that finding and her goals.  Thanks

## 2019-09-16 NOTE — ED PROVIDER NOTES
Emergency Department Treatment Report    Patient: Guevara Willett Age: 71 y.o. Sex: female    YOB: 1950 Admit Date: 9/16/2019 PCP: Yoel Taylor MD   MRN: [id number]  CSN: [id number]     Room: ER18/18 Time Dictated: 5:17 PM      Chief Complaint   Chief Complaint   Patient presents with    Flank Pain    Abnormal Lab Results       History of Present Illness   71 y.o. female, PMH metastatic cervical cancer, CKD, presents with increasing creatinine on outpatient labs. She spoke to her urologist, Dr. Emily Zacarias, and her oncologist, Dr. Nikki Bray, who instructed her to go to the ER for further evaluation and treatment. She had an outpatient CT scan ordered as ago that showed increased hydronephrosis of her bilateral kidneys without an obstructing process. She is having her usual lower abdominal pain and nausea, but denies any other acute issues. Review of Systems   Constitutional: No fever, chills, or weight loss  Eyes: No visual symptoms. ENT: No sore throat, runny nose or ear pain. Respiratory: No cough, dyspnea or wheezing. Cardiovascular: No chest pain, pressure, palpitations, tightness or heaviness. Gastrointestinal: +lower abdominal pain, nausea, vomiting  Genitourinary: No dysuria, frequency, or urgency. Musculoskeletal: No joint pain or swelling. Integumentary: No rashes. Neurological: No headaches, sensory or motor symptoms. Denies complaints in all other systems.     Past Medical/Surgical History     Past Medical History:   Diagnosis Date    Acute kidney failure ([address] 12/05/2019    BMI 34.0-34.9,adult     34.8    Caffeine adverse reaction     elevated BP    Cancer (HCC)     uterine, cervical    Cervical cancer (HCC)     Chronic kidney disease     acute kidney failure    Chronic sinusitis     Dizziness     Endometriosis     Hiatal hernia     High cholesterol     Palpitation     PMB (postmenopausal bleeding)     Rash     eczemz    Thromboembolus ([address] 03/15/2019    IVC filter placed    Urinary incontinence      Past Surgical History:   Procedure Laterality Date    HX ABDOMINAL LAPAROSCOPY      HX BILATERAL SALPINGO-OOPHORECTOMY Bilateral     HX BREAST LUMPECTOMY Right 1973    benign    HX HYSTERECTOMY      radical    HX OTHER SURGICAL  09/01/2017    Exam under anesthesia: Cystoscopy, sigmoidoscopy    HX UROLOGICAL      nephrostomy tubes and stents    IR CHANGE EXIST CATHETER/TUBE LT  3/7/2019    IR MEDIPORT      VASCULAR SURGERY PROCEDURE UNLIST      filter        Social History     Social History     Socioeconomic History    Marital status:      Spouse name: Not on file    Number of children: Not on file    Years of education: Not on file    Highest education level: Not on file   Tobacco Use    Smoking status: Never Smoker    Smokeless tobacco: Never Used   Substance and Sexual Activity    Alcohol use: No    Drug use: No    Sexual activity: Not Currently       Family History     Family History   Problem Relation Age of Onset    Cancer Mother         left ovary       Home Medications     Prior to Admission Medications   Prescriptions Last Dose Informant Patient Reported? Taking? LORazepam (ATIVAN) 0.5 mg tablet   No No   Sig: Take 1 Tab by mouth nightly. Max Daily Amount: 0.5 mg. Indications: anxious, chronic trouble sleeping   acetaminophen (TYLENOL) 500 mg tablet   Yes No   Sig: Take 1,000 mg by mouth every six (6) hours as needed for Pain. b complex vitamins (B COMPLEX 1) tablet   Yes No   Sig: Take 1 Tab by mouth daily. bisacodyl (DULCOLAX) 10 mg suppository   No No   Sig: Insert 10 mg into rectum daily as needed. ibuprofen 100 mg tablet   Yes No   Sig: Take 200 mg by mouth every six (6) hours as needed for Pain.   multivitamin (ONE A DAY) tablet   Yes No   Sig: Take 1 Tab by mouth daily.       Facility-Administered Medications: None       Allergies     Allergies   Allergen Reactions    Other Food Nausea and Vomiting     Artificial sweeteners    Neulasta [Pegfilgrastim] Swelling    Aspirin Other (comments)     Gi upset    Avastin [Bevacizumab] Other (comments)     Bowel perforation    Milk Itching and Atopic Dermatitis     Eczema (dairy cow)    Tetracycline Unknown (comments)     Patient does not remember, reaction many years ago    Wheat Atopic Dermatitis     eczema    Other Plant, Animal, Environmental Other (comments)     Pt states she has \"springtime grass allergies. \"     Reports reaction: Sinus infection       Physical Exam     ED Triage Vitals [09/16/19 1854]   ED Encounter Vitals Group      BP (!) 160/98      Pulse (Heart Rate) (!) 106      Resp Rate 17      Temp 98.8 °F (37.1 °C)      Temp src       O2 Sat (%) 97 %      Weight       Height      Constitutional: Patient appears well developed and well nourished. Appearance and behavior are age and situation appropriate. HEENT: Conjunctiva clear. PERRLA. Mucous membranes moist, non-erythematous. Surface of the pharynx, palate, and tongue are pink, moist and without lesions. Neck: supple, non tender, symmetrical, no masses or JVD. Respiratory: lungs clear to auscultation, nonlabored respirations. No tachypnea or accessory muscle use. Cardiovascular: Tachycardia, regular rhythm. Calves soft and non-tender. Distal pulses 2+ and equal bilaterally. No peripheral edema. Right upper chest wall port without surrounding erythema or tenderness. Gastrointestinal: Mild lower abdominal tenderness, worse in left lower quadrant and left upper quadrant, normal bowel sounds. Musculoskeletal: Nail beds pink with prompt capillary refill, swelling of LLE  Integumentary: warm and dry without rashes or lesions. Neurologic: alert and oriented, Sensation intact, motor strength equal and symmetric. No facial asymmetry or dysarthria.     Diagnostic Studies   Lab:   Recent Results (from the past 12 hour(s))   HEPATIC FUNCTION PANEL    Collection Time: 09/16/19  3:04 PM   Result Value Ref Range Protein, total 6.6 6.4 - 8.2 g/dL    Albumin 3.2 (L) 3.4 - 5.0 g/dL    Globulin 3.4 2.0 - 4.0 g/dL    A-G Ratio 0.9 0.8 - 1.7      Bilirubin, total 0.3 0.2 - 1.0 MG/DL    Bilirubin, direct 0.1 0.0 - 0.2 MG/DL    Alk. phosphatase 127 (H) 45 - 117 U/L    AST (SGOT) 15 10 - 38 U/L    ALT (SGPT) 14 13 - 56 U/L   MAGNESIUM    Collection Time: 09/16/19  3:04 PM   Result Value Ref Range    Magnesium 2.3 1.6 - 2.6 mg/dL   CBC WITH AUTOMATED DIFF    Collection Time: 09/16/19  3:04 PM   Result Value Ref Range    WBC 11.6 4.6 - 13.2 K/uL    RBC 3.41 (L) 4.20 - 5.30 M/uL    HGB 9.9 (L) 12.0 - 16.0 g/dL    HCT 30.2 (L) 35.0 - 45.0 %    MCV 88.6 74.0 - 97.0 FL    MCH 29.0 24.0 - 34.0 PG    MCHC 32.8 31.0 - 37.0 g/dL    RDW 16.6 (H) 11.6 - 14.5 %    PLATELET 838 616 - 098 K/uL    MPV 8.4 (L) 9.2 - 11.8 FL    NEUTROPHILS 69 42 - 75 %    LYMPHOCYTES 17 (L) 20 - 51 %    MONOCYTES 13 (H) 2 - 9 %    EOSINOPHILS 0 0 - 5 %    BASOPHILS 1 0 - 3 %    ABS. NEUTROPHILS 8.0 1.8 - 8.0 K/UL    ABS. LYMPHOCYTES 2.0 0.8 - 3.5 K/UL    ABS. MONOCYTES 1.5 (H) 0 - 1.0 K/UL    ABS. EOSINOPHILS 0.0 0.0 - 0.4 K/UL    ABS.  BASOPHILS 0.1 (H) 0.0 - 0.06 K/UL    DF MANUAL      PLATELET COMMENTS ADEQUATE PLATELETS      RBC COMMENTS NORMOCYTIC, NORMOCHROMIC      RBC COMMENTS ANISOCYTOSIS  SLIGHT       POC CHEM8    Collection Time: 09/16/19  3:08 PM   Result Value Ref Range    CO2, POC 25 (H) 19 - 24 MMOL/L    Glucose,  74 - 106 MG/DL    BUN, POC 30 (H) 7 - 18 MG/DL    Creatinine, POC 3.3 (H) 0.6 - 1.3 MG/DL    GFRAA, POC 17 (L) >60 ml/min/1.73m2    GFRNA, POC 14 (L) >60 ml/min/1.73m2    Sodium,  136 - 145 MMOL/L    Potassium, POC 5.2 3.5 - 5.5 MMOL/L    Calcium, ionized (POC) 1.16 1.12 - 1.32 mmol/L    Chloride,  100 - 108 MMOL/L    Anion gap, POC 16 10 - 20      Hematocrit, POC 28 (L) 36 - 49 %    Hemoglobin, POC 9.5 (L) 12 - 16 G/DL   URINALYSIS W/ RFLX MICROSCOPIC    Collection Time: 09/16/19  7:27 PM   Result Value Ref Range    Color YELLOW      Appearance CLOUDY      Specific gravity 1.016 1.005 - 1.030      pH (UA) 5.5 5.0 - 8.0      Protein 300 (A) NEG mg/dL    Glucose NEGATIVE  NEG mg/dL    Ketone TRACE (A) NEG mg/dL    Bilirubin NEGATIVE  NEG      Blood MODERATE (A) NEG      Urobilinogen 0.2 0.2 - 1.0 EU/dL    Nitrites NEGATIVE  NEG      Leukocyte Esterase MODERATE (A) NEG     URINE MICROSCOPIC ONLY    Collection Time: 09/16/19  7:27 PM   Result Value Ref Range    WBC 50 to 75 0 - 4 /hpf    RBC 10 to 20 0 - 5 /hpf    Epithelial cells 1+ 0 - 5 /lpf    Bacteria 1+ (A) NEG /hpf   CBC WITH AUTOMATED DIFF    Collection Time: 09/16/19  7:50 PM   Result Value Ref Range    WBC 10.6 4.6 - 13.2 K/uL    RBC 3.26 (L) 4.20 - 5.30 M/uL    HGB 9.3 (L) 12.0 - 16.0 g/dL    HCT 29.1 (L) 35.0 - 45.0 %    MCV 89.3 74.0 - 97.0 FL    MCH 28.5 24.0 - 34.0 PG    MCHC 32.0 31.0 - 37.0 g/dL    RDW 16.6 (H) 11.6 - 14.5 %    PLATELET 048 174 - 631 K/uL    MPV 8.4 (L) 9.2 - 11.8 FL    NEUTROPHILS 65 42 - 75 %    LYMPHOCYTES 22 20 - 51 %    MONOCYTES 13 (H) 2 - 9 %    EOSINOPHILS 0 0 - 5 %    BASOPHILS 0 0 - 3 %    ABS. NEUTROPHILS 6.9 1.8 - 8.0 K/UL    ABS. LYMPHOCYTES 2.3 0.8 - 3.5 K/UL    ABS. MONOCYTES 1.4 (H) 0 - 1.0 K/UL    ABS. EOSINOPHILS 0.0 0.0 - 0.4 K/UL    ABS.  BASOPHILS 0.0 0.0 - 0.06 K/UL    DF MANUAL      PLATELET COMMENTS ADEQUATE PLATELETS      RBC COMMENTS ANISOCYTOSIS  1+       METABOLIC PANEL, COMPREHENSIVE    Collection Time: 09/16/19  7:50 PM   Result Value Ref Range    Sodium 139 136 - 145 mmol/L    Potassium 4.9 3.5 - 5.5 mmol/L    Chloride 106 100 - 111 mmol/L    CO2 27 21 - 32 mmol/L    Anion gap 6 3.0 - 18 mmol/L    Glucose 93 74 - 99 mg/dL    BUN 33 (H) 7.0 - 18 MG/DL    Creatinine 3.22 (H) 0.6 - 1.3 MG/DL    BUN/Creatinine ratio 10 (L) 12 - 20      GFR est AA 17 (L) >60 ml/min/1.73m2    GFR est non-AA 14 (L) >60 ml/min/1.73m2    Calcium 8.8 8.5 - 10.1 MG/DL    Bilirubin, total 0.3 0.2 - 1.0 MG/DL    ALT (SGPT) 14 13 - 56 U/L    AST (SGOT) 18 10 - 38 U/L    Alk. phosphatase 113 45 - 117 U/L    Protein, total 6.9 6.4 - 8.2 g/dL    Albumin 2.9 (L) 3.4 - 5.0 g/dL    Globulin 4.0 2.0 - 4.0 g/dL    A-G Ratio 0.7 (L) 0.8 - 1.7     LIPASE    Collection Time: 09/16/19  7:50 PM   Result Value Ref Range    Lipase 127 73 - 393 U/L   POC LACTIC ACID    Collection Time: 09/17/19  1:03 AM   Result Value Ref Range    Lactic Acid (POC) 0.42 0.40 - 2.00 mmol/L       Imaging:    No results found. [unfilled]    CT A/P (9/13/2019): Increased bilateral hydroureteronephrosis without discrete obstructing process. Extensive metastatic disease of the chest, abdomen and pelvis which are similar to mildly improved. ED Course/Medical Decision Making   Patient CT abdomen pelvis and prior blood work were reviewed from earlier this week. Repeat blood work shows worsening creatinine and urinalysis shows evidence of a UTI. Provided her Zosyn after looking through her prior microbiology urine culture results that grew out Pseudomonas. She requires admission for further evaluation treatment of her BECK. Will also consult urology (Dr. London Magana patient), Nephrology (Dr. Willi Solis patient) and her Gyn Oncologist (Dr. Loraine Hayden patient). Patient's LLE is swollen and with her history of cancer, will obtain a LLE DVT US in the morning. 9:15 PM: Spoke with Dr. Shelly Chandra and he will consult on the patient. 1:00 AM: Spoke to Dr. Tariq Weber [facility]) and she has accepted the patient to the Surgery floor.   Medications   morphine injection 4 mg (4 mg IntraVENous Given 9/17/19 0014)   ondansetron (ZOFRAN) injection 4 mg (has no administration in time range)   sodium chloride 0.9 % bolus infusion 1,000 mL (0 mL IntraVENous IV Completed 9/16/19 2236)   morphine injection 4 mg (4 mg IntraVENous Given 9/16/19 2023)   ondansetron (ZOFRAN) injection 4 mg (4 mg IntraVENous Given 9/16/19 2022)   piperacillin-tazobactam (ZOSYN) 2.25 g in 0.9% sodium chloride (MBP/ADV) 50 mL MBP (0 g IntraVENous IV Completed 9/17/19 0016)       Final Diagnosis       ICD-10-CM ICD-9-CM   1. BECK (acute kidney injury) ([facility] N17.9 584.9   2. Hydronephrosis, unspecified hydronephrosis type N13.30 591   3. Urinary tract infection with hematuria, site unspecified N39.0 599.0    R31.9 599.70       Disposition   Patient is admitted. My Medications      ASK your doctor about these medications      Instructions Each Dose to Equal Morning Noon Evening Bedtime   acetaminophen 500 mg tablet  Commonly known as:  TYLENOL    Your last dose was: Your next dose is: Take 1,000 mg by mouth every six (6) hours as needed for Pain.   1,000 mg                 B COMPLEX 1 tablet  Generic drug:  b complex vitamins    Your last dose was: Your next dose is: Take 1 Tab by mouth daily. 1 Tab                 bisacodyl 10 mg suppository  Commonly known as:  DULCOLAX    Your last dose was: Your next dose is: Insert 10 mg into rectum daily as needed. 10 mg                 ibuprofen 100 mg tablet    Your last dose was: Your next dose is: Take 200 mg by mouth every six (6) hours as needed for Pain. 200 mg                 LORazepam 0.5 mg tablet  Commonly known as:  ATIVAN    Your last dose was: Your next dose is: Take 1 Tab by mouth nightly. Max Daily Amount: 0.5 mg. Indications: anxious, chronic trouble sleeping   0.5 mg                 multivitamin tablet  Commonly known as:  ONE A DAY    Your last dose was: Your next dose is: Take 1 Tab by mouth daily. 1 Tab                          Alejandra Hancock MD  September 17, 2019    My signature above authenticates this document and my orders, the final    diagnosis (es), discharge prescription (s), and instructions in the Epic    record. If you have any questions please contact [phone]. Nursing notes have been reviewed by the physician/ advanced practice    Clinician.     Disclaimer: Sections of this note are dictated using utilizing voice recognition software. Minor typographical errors may be present. If questions arise, please do not hesitate to contact me or call our department.

## 2019-09-17 PROBLEM — N39.0 UTI (URINARY TRACT INFECTION): Status: ACTIVE | Noted: 2019-01-01

## 2019-09-17 PROBLEM — N17.9 AKI (ACUTE KIDNEY INJURY) (HCC): Status: ACTIVE | Noted: 2019-01-01

## 2019-09-17 PROBLEM — N13.1 HYDRONEPHROSIS WITH URETERAL STRICTURE, NOT ELSEWHERE CLASSIFIED: Status: ACTIVE | Noted: 2019-01-01

## 2019-09-17 NOTE — PROGRESS NOTES
Admit Date: 9/16/2019  Date of Service: 9/17/2019    Reason for follow-up: ARF      Assessment:         Acute on chronic renal failure: multifactorial  Bilateral hydronephrosis with hx of ureteral stents  Metastatic cervical cancer with obstructive uropathy:  Follows with H/O (Dr. Yandy Alonso); on Avastin and Topotecan  Anemia of chronic disease  Mild protein calorie malnutrition  Hx of DVT s/p IVF filter placement: LE doppler negative for DVT at present  Plan:   Appreciate Urology assistance  NPO   Pain management  To OR this am for stent exchange  Follow CBC, BMP  Cotinue with Ceftriaxone for prophylaxis lalo-procedure  F/u urine cx    Current Antibtiocs:   Ceftriaxone 9/17 - present    Lines:   Peripheral    I spent 45 minutes with the patient in face-to-face consultation, of which greater than 50% was spent in counseling and coordination of care as described above. Case discussed with:  [x]Patient  []Family  [x]Nursing  []Case Management  DVT Prophylaxis:  []Lovenox  []Hep SQ  []SCDs  []Coumadin   []On Heparin gtt  I have independently examined the patient and reviewed all lab studies and imgaing as well as review of nursing notes and physican notes from the past 24 hours. The plan of care has been discussed with the patient and all questions are answered. [facility] D[facility] Pager [phone]      Allergies   Allergen Reactions    Other Food Nausea and Vomiting     Artificial sweeteners    Neulasta [Pegfilgrastim] Swelling    Aspirin Other (comments)     Gi upset    Avastin [Bevacizumab] Other (comments)     Bowel perforation    Milk Itching and Atopic Dermatitis     Eczema (dairy cow)    Tetracycline Unknown (comments)     Patient does not remember, reaction many years ago    Wheat Atopic Dermatitis     eczema    Other Plant, Animal, Environmental Other (comments)     Pt states she has \"springtime grass allergies. \"     Reports reaction: Sinus infection           Subjective:      Pt seen and examined. Complains that her mouth is dry. Not hungry and generally has a poor appetite. Waiting to go to the OR. No other compplaints today. Objective:        Visit Vitals  /68   Pulse 93   Temp 97.8 °F (36.6 °C)   Resp 18   Wt 77.4 kg (170 lb 9.6 oz)   SpO2 97%   Breastfeeding? No   BMI 28.39 kg/m²     Temp (24hrs), Av.2 °F (36.8 °C), Min:97.3 °F (36.3 °C), Max:98.8 °F (37.1 °C)        General:   awake alert and oriented, non-toxic   Skin:   no rashes or skin lesions noted on limited exam, dry and warm   HEENT:  No scleral icterus or pallor; oral mucosa moist, lips moist   Lymph Nodes:   not assessed today   Lungs:   non, labored; bilaterally clear to aspiration- no crackles wheezes rales or rhonchi   Heart:  RRR, s1 and s2; no murmurs rubs or gallops; no edema, + pedal pulses   Abdomen:  soft, non-distended, active bowel sounds, mild pelvic tenderness   Genitourinary:  deferred   Extremities:   average muscle tone; no contractures, no joint effusions   Neurologic:  No gross focal motor or sensory abnormalities; CN 2-12 intact; Follows commands. Psychiatric:   appropriate and interactive. Labs: Results:   Chemistry Recent Labs     [birth year]  1950 19  1504   GLU 93  --      --    K 4.9  --      --    CO2 27  --    BUN 33*  --    CREA 3.22*  --    CA 8.8  --    AGAP 6  --    BUCR 10*  --     127*   TP 6.9 6.6   ALB 2.9* 3.2*   GLOB 4.0 3.4   AGRAT 0.7* 0.9      CBC w/Diff Recent Labs     19  1950 19  1504   WBC 10.6 11.6   RBC 3.26* 3.41*   HGB 9.3* 9.9*   HCT 29.1* 30.2*    303   GRANS 65 69   LYMPH 22 17*   EOS 0 0        No results found for: SDES Lab Results   Component Value Date/Time    Culture result: NO GROWTH AFTER 13 HOURS 2019 07:27 PM    Culture result:  2019 02:52 PM     15207  COLONIES/mL  MIXED GRAM POSITIVE THOMAS, PROBABLE SKIN/GENITAL CONTAMINATION.       Culture result: [id number] COLONIES/mL PSEUDOMONAS AERUGINOSA (A) 08/19/2019 09:55 AM    Culture result: NO GROWTH 1 DAY 07/31/2019 09:30 AM    Culture result: NO GROWTH 5 DAYS 06/21/2019 11:15 AM    Culture result: NO ANAEROBES ISOLATED 5 DAYS 06/21/2019 11:15 AM          Imaging: No results found.

## 2019-09-17 NOTE — ED NOTES
TRANSFER - OUT REPORT:    Verbal report given to receiving nurse on Boyd Saunders  being transferred to [address] [place] [address] for routine progression of care       Report consisted of patients Situation, Background, Assessment and   Recommendations(SBAR). Information from the following report(s) SBAR, ED Summary, Intake/Output and MAR was reviewed with the receiving nurse. Lines:   Venous Access Device Right Chest Single Lumen Mediport Upper chest (subclavicular area, right (Active)   Central Line Being Utilized Yes 9/16/2019  4:15 PM   Criteria for Appropriate Use Irritant/vesicant medication 9/16/2019  4:15 PM   Site Assessment Clean, dry, & intact 9/16/2019  4:15 PM   Date of Last Dressing Change 09/16/19 9/16/2019  4:15 PM   Dressing Status Clean, dry, & intact 9/16/2019  4:15 PM   Dressing Type Disk with Chlorhexadine gluconate (CHG); Transparent 9/16/2019  4:15 PM   Date Accessed (Medial Site) 09/16/19 9/16/2019  2:45 PM   Access Time (Medial Site) 1445 9/16/2019  2:45 PM   Access Needle Size (Site #1) 20 G 9/16/2019  2:45 PM   Access Needle Length (Medial Site) 1 inch 9/16/2019  2:45 PM   Positive Blood Return (Medial Site) No 9/16/2019  4:15 PM   Action Taken (Medial Site) Flushed 9/16/2019  4:15 PM       Peripheral IV 09/16/19 Left Antecubital (Active)   Site Assessment Clean, dry, & intact 9/16/2019  7:50 PM   Phlebitis Assessment 0 9/16/2019  7:50 PM   Infiltration Assessment 0 9/16/2019  7:50 PM   Dressing Status Clean, dry, & intact 9/16/2019  7:50 PM   Dressing Type Tape;Transparent 9/16/2019  7:50 PM   Hub Color/Line Status Pink;Flushed;Patent 9/16/2019  7:50 PM   Action Taken Blood drawn 9/16/2019  7:50 PM   Alcohol Cap Used No 9/16/2019  7:50 PM        Opportunity for questions and clarification was provided.       Patient transported with:   [employer]

## 2019-09-17 NOTE — PROGRESS NOTES
Reason for Admission:  BECK (acute kidney injury) ([facility] [N17.9]  UTI (urinary tract infection) [N39.0]                 RRAT Score:    12            Plan for utilizing home health:    NO                      Likelihood of Readmission:   LOW                         Transition of Care Plan:              Initial assessment completed with patient. Cognitive status of patient: oriented to time, place, person and situation. Face sheet information confirmed:  yes. The patient designates Son Ny Mensah  to participate in her discharge plan and to receive any needed information. This patient lives in a single family home with patient and son. Patient is able to navigate steps as needed. Prior to hospitalization, patient was considered to be independent with ADLs/IADLS : yes . Patient has a current ACP document on file: no  The patient and son will be available to transport patient home upon discharge. The patient already has none reported, and  medical equipment available in the home. Patient is not currently active with home health. Patient has not stayed in a skilled nursing facility or rehab. This patient is on dialysis :no    Freedom of choice signed: no. Currently, the discharge plan is Home. The patient states that she can obtain her medications from the pharmacy, and take her medications as directed. Patient's current insurance is Medicare and [employer]       Care Management Interventions  PCP Verified by CM:  Yes  Last Visit to PCP: 09/17/18  Mode of Transport at Discharge: Self  Current Support Network: Relative's Home(SON)  Confirm Follow Up Transport: Family  Plan discussed with Pt/Family/Caregiver: Yes  Discharge Location  Discharge Placement: Home with family assistance        Kirstie Pantoja RN BSN  Care Manager  [phone]

## 2019-09-17 NOTE — PROGRESS NOTES
TRANSFER - IN REPORT:    Verbal report received from [facility] RN(name) on Yuri Gillis  being received from [address] [place] [address](unit) for ordered procedure      Report consisted of patients Situation, Background, Assessment and   Recommendations(SBAR). Information from the following report(s) SBAR, MAR and Recent Results was reviewed with the receiving nurse. Opportunity for questions and clarification was provided. Assessment completed upon patients arrival to unit and care assumed.

## 2019-09-17 NOTE — ANESTHESIA POSTPROCEDURE EVALUATION
Procedure(s):  CYSTOSCOPY WITH BILATERAL RETROGRADES/ BILATERAL STENT EXCHANGE/C-ARM. general    Anesthesia Post Evaluation      Multimodal analgesia: multimodal analgesia used between 6 hours prior to anesthesia start to PACU discharge  Patient location during evaluation: bedside  Patient participation: complete - patient participated  Level of consciousness: awake  Pain management: adequate  Airway patency: patent  Anesthetic complications: no  Cardiovascular status: stable  Respiratory status: acceptable  Hydration status: acceptable  Post anesthesia nausea and vomiting:  controlled      Vitals Value Taken Time   /63 9/17/2019  2:37 PM   Temp 36.5 °C (97.7 °F) 9/17/2019  2:09 PM   Pulse 86 9/17/2019  2:43 PM   Resp 16 9/17/2019  2:43 PM   SpO2 100 % 9/17/2019  2:43 PM   Vitals shown include unvalidated device data.

## 2019-09-17 NOTE — ROUTINE PROCESS
TRANSFER - IN REPORT:    Verbal report received from Deangelo Martins RN on [address]  being received from ER for routine progression of care      Report consisted of patients Situation, Background, Assessment and   Recommendations(SBAR). Information from the following report(s): SBAR, Kardex, ED Summary, Intake/Output, MAR and Recent Results was reviewed with the receiving nurse. Opportunity for questions and clarification was provided. 2712: Assessment and vitals completed upon patients arrival to unit and care assumed.

## 2019-09-17 NOTE — ANESTHESIA PREPROCEDURE EVALUATION
Relevant Problems   No relevant active problems       Anesthetic History   No history of anesthetic complications            Review of Systems / Medical History  Patient summary reviewed and pertinent labs reviewed    Pulmonary  Within defined limits                 Neuro/Psych   Within defined limits           Cardiovascular                  Exercise tolerance: >4 METS     GI/Hepatic/Renal         Renal disease: CRI       Endo/Other        Morbid obesity and cancer (H/O uterine/Cervical cancer)     Other Findings   Comments: H/O DVT           Physical Exam    Airway  Mallampati: III  TM Distance: 4 - 6 cm  Neck ROM: normal range of motion   Mouth opening: Normal     Cardiovascular  Regular rate and rhythm,  S1 and S2 normal,  no murmur, click, rub, or gallop             Dental  No notable dental hx       Pulmonary  Breath sounds clear to auscultation               Abdominal  GI exam deferred       Other Findings            Anesthetic Plan    ASA: 3  Anesthesia type: general          Induction: Intravenous  Anesthetic plan and risks discussed with: Patient

## 2019-09-17 NOTE — PROGRESS NOTES
conducted an ER consultation and Spiritual Assessment for Makenzie Hewitt, who is a 71 y.o.,female. Patient's Primary Language is: [place]. According to the patient's EMR Nondenominational Affiliation is: Flavio Osborn. The reason the Patient came to the hospital is:   Patient Active Problem List    Diagnosis Date Noted    Pelvic abscess in female 06/20/2019    Hydronephrosis 04/16/2019    Colon perforation ([facility] [address] 03/25/2019    Retroperitoneal abscess ([facility] [address] 03/07/2019    DVT (deep venous thrombosis) ([address] 03/07/2019    Cervical cancer ([address] 01/22/2019    ARF (acute renal failure) ([facility] [address] 12/05/2018    Severe obesity (BMI 35.0-39.9) 09/27/2018    Endometrial ca ([facility] [address] 09/15/2017        The  provided the following Interventions:  Initiated a relationship of care and support. Explored issues of jasmina, belief, spirituality and Yazidi/ritual needs while hospitalized. Listened empathically. Offered prayer and assurance of continued prayers on patient's behalf. Visited with family member, listened empathically; family member and patient spoke about their relationship with God, and their hope for patient's healing. Chart reviewed. The following outcomes where achieved:  Patient shared limited information about both their medical narrative and spiritual journey/beliefs.  confirmed Patient's Nondenominational Affiliation. Patient processed feeling about current hospitalization. Patient expressed gratitude for 's visit. Assessment:  Patient does not have any Yazidi/cultural needs that will affect patient's preferences in health care. There are no spiritual or Yazidi issues which require intervention at this time. Plan:  Chaplains will continue to follow and will provide pastoral care on an as needed/requested basis.  recommends bedside caregivers page  on duty if patient shows signs of acute spiritual or emotional distress.     Adair Skelton [address]   [phone]

## 2019-09-17 NOTE — H&P
History & Physical    Patient: Farhad Salas MRN: [id number]  CSN: [id number]    YOB: 1950  Age: 71 y.o. Sex: female      DOA: 9/16/2019       HPI:     Farhad Salas is a 71 y.o. female who is receiving chemotherapy   at the [facility] for metastatic endometroid adenocarcinoma, favor cervical primary diagnosed 9/15/2017. She follows with Dr Cheryl Dunlap, and had obstructive uropathy and cystoscopy with bilateral double J stent exchange performed 8/6/2019 by Dr Jemma Hodgson. She had a diverticulitis with a pericolonic abscess in March, 2019. She reported to the [facility] center on 9/16/2019 and stated that she had kidney/low back pain and vomited twice at home. Labs showed an elevated creatinine 3.3, and Dr Cheryl Dunlap and Dr Jemma Hodgson directed her to [facility] ER for further evaluation and treatment. In the ED she reported her pain has circled around to be abdominal pain. She was hypertensive 160/98, tachycardic 106, afebrile, LFT's unremarkable, WBC 11.6, Hgb 9.9, Hct 30.2, plt 303, BUN 30, creatinine 3.3, GFR 14. UA mod leuk esterase and blood. She received Zofran 4 mg x 2, morphine 4 mg x 2, 1 liter fluid bolus NSS and Zosyn. Urology, Nephrology and Kristel Benites were consulted in the ED. She was admitted to telemetry for acute renal failure and obstructive uropathy.      Past Medical History:   Diagnosis Date    Acute kidney failure ([address] 12/05/2019    BMI 34.0-34.9,adult     34.8    Caffeine adverse reaction     elevated BP    Cancer (HCC)     uterine, cervical    Cervical cancer (HCC)     Chronic kidney disease     acute kidney failure    Chronic sinusitis     Dizziness     Endometriosis     Hiatal hernia     High cholesterol     Palpitation     PMB (postmenopausal bleeding)     Rash     eczemz    Thromboembolus ([address] 03/15/2019    IVC filter placed    Urinary incontinence        Past Surgical History:   Procedure Laterality Date    HX ABDOMINAL LAPAROSCOPY      HX BILATERAL SALPINGO-OOPHORECTOMY Bilateral     HX BREAST LUMPECTOMY Right 1973    benign    HX HYSTERECTOMY      radical    HX OTHER SURGICAL  09/01/2017    Exam under anesthesia: Cystoscopy, sigmoidoscopy    HX UROLOGICAL      nephrostomy tubes and stents    IR CHANGE EXIST CATHETER/TUBE LT  3/7/2019    IR MEDIPORT      VASCULAR SURGERY PROCEDURE UNLIST      filter        Family History   Problem Relation Age of Onset    Cancer Mother         left ovary       Social History     Socioeconomic History    Marital status:      Spouse name: Not on file    Number of children: Not on file    Years of education: Not on file    Highest education level: Not on file   Tobacco Use    Smoking status: Never Smoker    Smokeless tobacco: Never Used   Substance and Sexual Activity    Alcohol use: No    Drug use: No    Sexual activity: Not Currently       Prior to Admission medications    Medication Sig Start Date End Date Taking? Authorizing Provider   cyanocobalamin (VITAMIN B12) 100 mcg tablet Take 100 mcg by mouth daily. Yes Provider, Historical   famotidine (PEPCID) 20 mg tablet Take 20 mg by mouth daily. Indications: gastroesophageal reflux disease   Yes Provider, Historical   multivitamin (ONE A DAY) tablet Take 1 Tab by mouth daily. Yes Provider, Historical   acetaminophen (TYLENOL) 500 mg tablet Take 1,000 mg by mouth every six (6) hours as needed for Pain. Yes Provider, Historical   b complex vitamins (B COMPLEX 1) tablet Take 1 Tab by mouth daily. Yes Provider, Historical   bisacodyl (DULCOLAX) 10 mg suppository Insert 10 mg into rectum daily as needed. 12/15/18  Yes Jemal Escoto MD   LORazepam (ATIVAN) 0.5 mg tablet Take 1 Tab by mouth nightly. Max Daily Amount: 0.5 mg. Indications: anxious, chronic trouble sleeping 8/21/19   Yulissa Austin NP   ibuprofen 100 mg tablet Take 200 mg by mouth every six (6) hours as needed for Pain.     Provider, Historical       Allergies   Allergen Reactions    Other Food Nausea and Vomiting     Artificial sweeteners    Neulasta [Pegfilgrastim] Swelling    Aspirin Other (comments)     Gi upset    Avastin [Bevacizumab] Other (comments)     Bowel perforation    Milk Itching and Atopic Dermatitis     Eczema (dairy cow)    Tetracycline Unknown (comments)     Patient does not remember, reaction many years ago    Wheat Atopic Dermatitis     eczema    Other Plant, Animal, Environmental Other (comments)     Pt states she has \"springtime grass allergies. \"     Reports reaction: Sinus infection     Review of Systems  A 12 point Review of Systems was performed and unremarkable except as noted in the HPI. Physical Exam:      Visit Vitals  /74 (BP 1 Location: Right arm, BP Patient Position: At rest)   Pulse 84   Temp 97.3 °F (36.3 °C)   Resp 20   Wt 77.4 kg (170 lb 9.6 oz)   SpO2 97%   Breastfeeding? No   BMI 28.39 kg/m²       Physical Exam:  GENERAL: fatigued, cooperative, mild distress  HEENT speech clear and goal directed, JUANJOSE, conjunctiva clear, facial symmetry, mucous membranes moist, no lesions or plaques  Chest right chest wall port; lungs clear, HR 84 reg  Abdomen soft, BS+, + L periumbilical and LLQ tenderness, no rebound, no psoas sign  Extremities moving all 4 extremities, LLE edema, pulses +      Lab/Data Review:  Labs: Results:       Chemistry Recent Labs     09/16/19  1950/19  1504   GLU 93  --      --    K 4.9  --      --    CO2 27  --    BUN 33*  --    CREA 3.22*  --    CA 8.8  --    AGAP 6  --    BUCR 10*  --     127*   TP 6.9 6.6   ALB 2.9* 3.2*   GLOB 4.0 3.4   AGRAT 0.7* 0.9      CBC w/Diff Recent Labs     09/16/19  1950/19  1504   WBC 10.6 11.6   RBC 3.26* 3.41*   HGB 9.3* 9.9*   HCT 29.1* 30.2*    303   GRANS 65 69   LYMPH 22 17*   EOS 0 0      Coagulation No results for input(s): PTP, INR, APTT in the last 72 hours.     No lab exists for component: INREXT    Iron/Ferritin No results for input(s): IRON in the last 72 hours. No lab exists for component: TIBCCALC   BNP No results for input(s): BNPP in the last 72 hours. Cardiac Enzymes No results for input(s): CPK, CKND1, SHOLA in the last 72 hours. No lab exists for component: CKRMB, TROIP   Liver Enzymes Recent Labs     09/16/19  1950   TP 6.9   ALB 2.9*      SGOT 18      Thyroid Studies Lab Results   Component Value Date/Time    TSH 0.006 (L) 03/05/2018 01:27 PM          All Micro Results     None          Imaging Reviewed:  CT Results (most recent):  Results from [place] encounter on 09/13/19   CT ABD PELV WO CONT    Narrative EXAM: CT of the Abdomen and Pelvis without IV contrast    CLINICAL INDICATION:  Worsening Renal Failure with Stents in . 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 Sagittal and coronal reformations    All CT scans at this facility are performed using dose optimization technique as  appropriate to a performed exam, to include automated exposure control,  adjustment of the mA and/or kV according to patient size (including appropriate  matching for site specific examination) or use of iterative reconstruction  technique. IV CONTRAST: None    ENTERIC CONTRAST: None    COMPARISON: 6/19/2019    FINDINGS:   Limitations: The evaluation of the solid organs is limited due to the lack of IV  contrast.    Lower Chest:   The lower lung fields, there are several pulmonary nodules. A few these appear  less conspicuous compared with prior imaging suggestive of improvement. Represents an axial image 4 of series 4, there is a 6 mm rim of soft tissue  which is improved compared to the 6 mm solid nodule. Additional pulmonary  nodules appear unchanged or improved no effusions identified. . The heart and  pericardium are unremarkable. Peritoneum: No free air identified. No free fluid present. No fluid collections  present.     Liver: A 1 cm hypodensity is noted in the dome of the right lobe of the liver  which is similar to prior imaging. A too small to characterize hypodensities  noted in the inferior portion of the right lobe of the liver. Biliary/Gallbladder: No intrahepatic or extrahepatic biliary ductal dilatation  appreciated. The gallbladder is unremarkable. No pericholecystic fluid or  inflammation. Spleen: The spleen is enlarged measuring 12.3 x 14.7 x 8.8 cm. Hypodense foci  are noted within the spleen concerning for lesions. Correlating with prior  imaging is mildly limited due to differences in technique. The largest lesion  approximately measures 6.8 x 7.3 cm which would be decreased in size compared  prior imaging. However, this evaluation is limited. Pancreas: No focal lesion appreciated. The pancreatic duct is unremarkable. No  peripancreatic inflammation or adenopathy. : The adrenal glands are unremarkable. No urolithiasis. No perinephric fat  stranding appreciated. Bilateral ureteral stents are noted. There is  hydroureteronephrosis of both collecting systems which is mildly progressed  since prior imaging. No discrete obstructing process identified. The bladder is  decompressed. A soft tissue mass is noted in the region of the expected uterus  measuring 3.6 x 6.6 x 6 cm. This is mildly decreased in size compared with prior  imaging. GI: The stomach is unremarkable. The small bowel is without evidence of  obstruction. No small bowel wall thickening. The mesentery is without  inflammation or adenopathy. The appendix is likely surgically absent. No  pericolonic inflammation or wall thickening appreciated. Aorta and retroperitoneum: No aortic aneurysm seen. Periaortic adenopathy is  noted in the aortocaval lymph node is noted measuring 1 cm which is minimally  improved compared to prior imaging. In the left iliopsoas, there is a likely  fluid collection. In the region of the right iliopsoas, there is a mass  measuring 4.2 x 5.3 cm.  This is similar to mildly improved compared prior  imaging. IVC filter is present. Abdominal wall/Inguinal: Nodular changes are noted in the rectus abdominis  muscles. The largest is in the right is 5.1 x 2.3 cm. This is similar to prior  imaging. Musculoskeletal: Multilevel degenerative changes and facet arthropathy are  noted. Impression IMPRESSION:   1. Increased bilateral hydroureteronephrosis without discrete obstructing  process. Extensive metastatic disease of the chest, abdomen and pelvis which are similar  to mildly improved.             Assessment:   Principal Problem:    BECK (acute kidney injury) ([facility] [address] (9/17/2019)    Active Problems:    Cervical cancer ([address] (1/22/2019)      Hydronephrosis with ureteral stricture, not elsewhere classified (4/16/2019)      UTI (urinary tract infection) (9/17/2019)           Plan:   Patient required transfusion last week and second round of   chemo post poned, no chemo for past 3 weeks  Admit to Telemetry  NPO for Urology procedure in am  Continue Rocephin  Avoid nephrotoxins  Parenteral fluids    Full Code    Enma Moise DO  9/17/2019, 6:17 AM

## 2019-09-17 NOTE — CONSULTS
Consult Note    Patient: Efrain Kinney               Sex: female          DOA: 9/16/2019       YOB: 1950      Age:  71 y.o.        LOS:  LOS: 0 days              Referring Provider: Mally Mac     ASSESSMENT:   1. Bilateral hydronephrosis initially managed with PCN/s, then converted to stents                  2. Metastatic cervical cancer with obstructive uropathy, on chemotherapy Avastin and Topotecan    3. BECK on CKD       Likely stent failure. Will proceed today to cysto, retrogrades, bilateral RESONANCE stent placement. If this fails, will convert to nephroureteral catheters with day time capping, night time drainage. Most important quality of life metric for her is to be able to work and she can't with neph tubes that are to drainage. PLAN:    1. NPO  2. Proceed to OR for bilateral metal stent placement     Ciara Giles MD  [phone] Office  [phone]  Pager    Chief Complaint   Patient presents with    Flank Pain    Abnormal Lab Results       HISTORY OF PRESENT ILLNESS:  Efrain Kinney is a 71 y.o. female who is seen in consultation as referred by Jeanine Lux well known to me for bilateral ureteral obstruction due to GYN malignancy. Initially manged with PCN's, subsequently converted to stetns. Rising creatinine now since last exchange only a month ago. CT shows worse hydro with new nausea. No chills, no fever. No hematuria. No infections. No Frequency, urgency or burning. AUA Symptom Score 5/29/2019   Over the past month how often have you had the sensation that your bladder was not completely empty after you finished urinating? 0   Over the past month, how often have had to urinate again less than 2 hours after you last finished urinating? 0   Over the past month, how often have you found you stopped and started again several times when you urinated?  1   Over the past month, how often have you found it difficult to postpone urination? 1   Over the past month, how often have you had a weak urinary stream? 2   Over the past month, how often have you had to push or strain to begin urinating? 1   Over the past month, how many times did you most typically get up to urinate from the time you went to bed at night until the time you got up in the morning? 1   AUA Score 6   If you were to spend the rest of your life with your urinary condition the way it is now, how would you feel about that?  Mostly satisfied       Past Medical History:   Diagnosis Date    Acute kidney failure ([facility] [address] 12/05/2019    BMI 34.0-34.9,adult     34.8    Caffeine adverse reaction     elevated BP    Cancer (HCC)     uterine, cervical    Cervical cancer (HCC)     Chronic kidney disease     acute kidney failure    Chronic sinusitis     Dizziness     Endometriosis     Hiatal hernia     High cholesterol     Palpitation     PMB (postmenopausal bleeding)     Rash     eczemz    Thromboembolus ([facility] [address] 03/15/2019    IVC filter placed    Urinary incontinence        Past Surgical History:   Procedure Laterality Date    HX ABDOMINAL LAPAROSCOPY      HX BILATERAL SALPINGO-OOPHORECTOMY Bilateral     HX BREAST LUMPECTOMY Right 1973    benign    HX HYSTERECTOMY      radical    HX OTHER SURGICAL  09/01/2017    Exam under anesthesia: Cystoscopy, sigmoidoscopy    HX UROLOGICAL      nephrostomy tubes and stents    IR CHANGE EXIST CATHETER/TUBE LT  3/7/2019    IR MEDIPORT      VASCULAR SURGERY PROCEDURE UNLIST      filter        Social History     Tobacco Use    Smoking status: Never Smoker    Smokeless tobacco: Never Used   Substance Use Topics    Alcohol use: No    Drug use: No       Allergies   Allergen Reactions    Other Food Nausea and Vomiting     Artificial sweeteners    Neulasta [Pegfilgrastim] Swelling    Aspirin Other (comments)     Gi upset    Avastin [Bevacizumab] Other (comments)     Bowel perforation    Milk Itching and Atopic Dermatitis Eczema (dairy cow)    Tetracycline Unknown (comments)     Patient does not remember, reaction many years ago    Wheat Atopic Dermatitis     eczema    Other Plant, Animal, Environmental Other (comments)     Pt states she has \"springtime grass allergies. \"     Reports reaction: Sinus infection       Family History   Problem Relation Age of Onset    Cancer Mother         left ovary       Current Facility-Administered Medications   Medication Dose Route Frequency Provider Last Rate Last Dose    morphine injection 4 mg  4 mg IntraVENous Q2H PRN Baldev Elizalde MD        influenza vaccine 2019-20 (6 mos+)(PF) (FLUARIX/FLULAVAL/FLUZONE QUAD) injection 0.5 mL  0.5 mL IntraMUSCular PRIOR TO DISCHARGE Drema Number A, DO        cefTRIAXone (ROCEPHIN) 2 g in sterile water (preservative free) 20 mL IV syringe  2 g IntraVENous Q24H Boone Arriaga MD        ondansetron [facility]) injection 4 mg  4 mg IntraVENous Q4H PRN Baldev Elizalde MD           Review of Systems  Constitutional: No fever, chills, or weight loss  Respiratory: No dyspnea  Cardiovascular: No chest pain  Gastrointestinal: + nausea   Genitourinary: Denies frequency, urgency, dysuria, hematuria. Neurological: No focal motor changes. PHYSICAL EXAMINATION:   Visit Vitals  /74 (BP 1 Location: Right arm, BP Patient Position: At rest)   Pulse 84   Temp 97.3 °F (36.3 °C)   Resp 20   Wt 170 lb 9.6 oz (77.4 kg)   SpO2 97%   Breastfeeding? No   BMI 28.39 kg/m²     Constitutional: Well developed, well nourished female. No acute distress. HEENT: Normocephalic, Atraumatic, EOM's intact   CV:  Normal radial pulse. Respiratory: No respiratory distress or difficulties breathing   Abdomen:  Nontender, nondistended.  Female:  No CVA tenderness  Skin: No evidence of jaundice. Normal color  Neuro/Psych:  Alert and oriented. Affect appropriate. Lymphatic:   No enlarged inguinal lymph nodes.       REVIEW OF LABS AND IMAGING:      Labs: Results: Chemistry Recent Labs     09/16/19  1950/19  1504   GLU 93  --      --    K 4.9  --      --    CO2 27  --    BUN 33*  --    CREA 3.22*  --    CA 8.8  --    AGAP 6  --    BUCR 10*  --     127*   TP 6.9 6.6   ALB 2.9* 3.2*   GLOB 4.0 3.4   AGRAT 0.7* 0.9      CBC w/Diff Recent Labs     09/16/19  1950/19  1504   WBC 10.6 11.6   RBC 3.26* 3.41*   HGB 9.3* 9.9*   HCT 29.1* 30.2*    303   GRANS 65 69   LYMPH 22 17*   EOS 0 0      Cultures No results for input(s): CULT in the last 72 hours. All Micro Results     None            Urinalysis Color   Date Value Ref Range Status   09/16/2019 YELLOW   Final     Appearance   Date Value Ref Range Status   09/16/2019 CLOUDY   Final     Specific gravity   Date Value Ref Range Status   09/16/2019 1.016 1.005 - 1.030   Final     pH (UA)   Date Value Ref Range Status   09/16/2019 5.5 5.0 - 8.0   Final     Protein   Date Value Ref Range Status   09/16/2019 300 (A) NEG mg/dL Final     Ketone   Date Value Ref Range Status   09/16/2019 TRACE (A) NEG mg/dL Final     Bilirubin   Date Value Ref Range Status   09/16/2019 NEGATIVE  NEG   Final     Blood   Date Value Ref Range Status   09/16/2019 MODERATE (A) NEG   Final     Urobilinogen   Date Value Ref Range Status   09/16/2019 0.2 0.2 - 1.0 EU/dL Final     Nitrites   Date Value Ref Range Status   09/16/2019 NEGATIVE  NEG   Final     Leukocyte Esterase   Date Value Ref Range Status   09/16/2019 MODERATE (A) NEG   Final     Potassium   Date Value Ref Range Status   09/16/2019 4.9 3.5 - 5.5 mmol/L Final     Creatinine   Date Value Ref Range Status   09/16/2019 3.22 (H) 0.6 - 1.3 MG/DL Final     BUN   Date Value Ref Range Status   09/16/2019 33 (H) 7.0 - 18 MG/DL Final      PSA No results for input(s): PSA in the last 72 hours.    Coagulation Lab Results   Component Value Date/Time    Prothrombin time 13.0 06/20/2019 06:57 PM    Prothrombin time 14.5 03/12/2019 12:30 AM    INR 1.0 06/20/2019 06:57 PM    INR 1.2 03/12/2019 12:30 AM    aPTT 27.9 06/20/2019 06:57 PM    aPTT 50.1 (H) 03/13/2019 06:50 AM           US Results (most recent):  Results from Hospital Encounter encounter on 03/06/18   US HEAD NECK SOFT TISSUE    Narrative Indication: Increased uptake right thyroid on PET scan. On chemotherapy. Impression IMPRESSION: Solid right thyroid nodules, largest 3.2 cm. Subcentimeter left  thyroid nodules. Comment: Sonography of the thyroid was performed. No prior studies for  comparison. The right lobe measures 4.9 x 2.3 x 2.5 cm. It harbors a 3.2 x 1.8 x 2.3 cm  solid nodule in the midpole and a 1.9 x 1.7 x 2.1 cm lower pole nodule. The left lobe measures 4.6 x 1.0 x 1.3 cm and harbors 2 subcentimeter nodules. The largest measures 8 mm in greatest dimension. The isthmus is normal.            chest    CT Results (most recent):   Results from Hospital Encounter encounter on 09/13/19   CT ABD PELV WO CONT    Narrative EXAM: CT of the Abdomen and Pelvis without IV contrast    CLINICAL INDICATION:  Worsening Renal Failure with Stents in . 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 Sagittal and coronal reformations    All CT scans at this facility are performed using dose optimization technique as  appropriate to a performed exam, to include automated exposure control,  adjustment of the mA and/or kV according to patient size (including appropriate  matching for site specific examination) or use of iterative reconstruction  technique. IV CONTRAST: None    ENTERIC CONTRAST: None    COMPARISON: 6/19/2019    FINDINGS:   Limitations: The evaluation of the solid organs is limited due to the lack of IV  contrast.    Lower Chest:   The lower lung fields, there are several pulmonary nodules. A few these appear  less conspicuous compared with prior imaging suggestive of improvement.   Represents an axial image 4 of series 4, there is a 6 mm rim of soft tissue  which is improved compared to the 6 mm solid nodule. Additional pulmonary  nodules appear unchanged or improved no effusions identified. . The heart and  pericardium are unremarkable. Peritoneum: No free air identified. No free fluid present. No fluid collections  present. Liver: A 1 cm hypodensity is noted in the dome of the right lobe of the liver  which is similar to prior imaging. A too small to characterize hypodensities  noted in the inferior portion of the right lobe of the liver. Biliary/Gallbladder: No intrahepatic or extrahepatic biliary ductal dilatation  appreciated. The gallbladder is unremarkable. No pericholecystic fluid or  inflammation. Spleen: The spleen is enlarged measuring 12.3 x 14.7 x 8.8 cm. Hypodense foci  are noted within the spleen concerning for lesions. Correlating with prior  imaging is mildly limited due to differences in technique. The largest lesion  approximately measures 6.8 x 7.3 cm which would be decreased in size compared  prior imaging. However, this evaluation is limited. Pancreas: No focal lesion appreciated. The pancreatic duct is unremarkable. No  peripancreatic inflammation or adenopathy. : The adrenal glands are unremarkable. No urolithiasis. No perinephric fat  stranding appreciated. Bilateral ureteral stents are noted. There is  hydroureteronephrosis of both collecting systems which is mildly progressed  since prior imaging. No discrete obstructing process identified. The bladder is  decompressed. A soft tissue mass is noted in the region of the expected uterus  measuring 3.6 x 6.6 x 6 cm. This is mildly decreased in size compared with prior  imaging. GI: The stomach is unremarkable. The small bowel is without evidence of  obstruction. No small bowel wall thickening. The mesentery is without  inflammation or adenopathy. The appendix is likely surgically absent. No  pericolonic inflammation or wall thickening appreciated.     Aorta and retroperitoneum: No aortic aneurysm seen. Periaortic adenopathy is  noted in the aortocaval lymph node is noted measuring 1 cm which is minimally  improved compared to prior imaging. In the left iliopsoas, there is a likely  fluid collection. In the region of the right iliopsoas, there is a mass  measuring 4.2 x 5.3 cm. This is similar to mildly improved compared prior  imaging. IVC filter is present. Abdominal wall/Inguinal: Nodular changes are noted in the rectus abdominis  muscles. The largest is in the right is 5.1 x 2.3 cm. This is similar to prior  imaging. Musculoskeletal: Multilevel degenerative changes and facet arthropathy are  noted. Impression IMPRESSION:   1. Increased bilateral hydroureteronephrosis without discrete obstructing  process. Extensive metastatic disease of the chest, abdomen and pelvis which are similar  to mildly improved. ABD      MRI Results (most recent): No procedure found. 1. BECK (acute kidney injury) ([address]    2. Hydronephrosis, unspecified hydronephrosis type    3.  Urinary tract infection with hematuria, site unspecified

## 2019-09-17 NOTE — ROUTINE PROCESS
Bedside verbal shift change report given to Ivonne Wiggins RN. Report included the following information: SBAR, Kardex, ED Summary, Intake/Output, MAR, Recent Results and Cardiac Rhythm (Sinus). Pt in bed with call light in reach.

## 2019-09-17 NOTE — ROUTINE PROCESS
TRANSFER - OUT REPORT:    Verbal report given to Antonio Estrella RN on Amy Laws  being transferred to room 219 for routine progression of care       Report consisted of patients Situation, Background, Assessment and   Recommendations(SBAR). Information from the following report(s) SBAR, OR Summary, Intake/Output and MAR was reviewed with the receiving nurse. Lines:   Venous Access Device Right Chest Single Lumen Mediport Upper chest (subclavicular area, right (Active)   Central Line Being Utilized Yes 9/17/2019  2:25 PM   Criteria for Appropriate Use Irritant/vesicant medication 9/16/2019  4:15 PM   Site Assessment Clean, dry, & intact 9/17/2019  2:25 PM   Date of Last Dressing Change 09/16/19 9/17/2019  2:25 PM   Dressing Status Clean, dry, & intact 9/17/2019  2:25 PM   Dressing Type Disk with Chlorhexadine gluconate (CHG); Transparent 9/17/2019  2:25 PM   Date Accessed (Medial Site) 09/16/19 9/16/2019  2:45 PM   Access Time (Medial Site) 1445 9/16/2019  2:45 PM   Access Needle Size (Site #1) 20 G 9/16/2019  2:45 PM   Access Needle Length (Medial Site) 1 inch 9/16/2019  2:45 PM   Positive Blood Return (Medial Site) No 9/16/2019  4:15 PM   Action Taken (Medial Site) Flushed 9/16/2019  4:15 PM       Peripheral IV 09/16/19 Left Antecubital (Active)   Site Assessment Clean, dry, & intact 9/16/2019  7:50 PM   Phlebitis Assessment 0 9/16/2019  7:50 PM   Infiltration Assessment 0 9/16/2019  7:50 PM   Dressing Status Clean, dry, & intact 9/16/2019  7:50 PM   Dressing Type Tape;Transparent 9/16/2019  7:50 PM   Hub Color/Line Status Pink;Flushed;Patent 9/16/2019  7:50 PM   Action Taken Blood drawn 9/16/2019  7:50 PM   Alcohol Cap Used No 9/16/2019  7:50 PM        Opportunity for questions and clarification was provided.       Patient transported with:   Registered Nurse

## 2019-09-17 NOTE — OP NOTES
Operative Note  Patient: Shun Sierra               Sex: female             MRN: [id number]  YOB: 1950      Age:  71 y.o. Preoperative Diagnosis: Hydronephrosis, unspecified hydronephrosis type [N13.30]    Postoperative Diagnosis:  Hydronephrosis, unspecified hydronephrosis type [N13.30]    Surgeon: Moisés Schwab MD    Fellow: Gary Felder MD    Indication: This is a 70 y/o female with a history of cervical cancer and resultant bilateral ureteral obstruction, admitted with acute renal insufficiency and persistent hydronephrosis despite appropriately positioned ureteral stents. Here today for ureteral stent exchange. Procedure:    1) Cystoscopy  2) Bilateral Retrograde pyelogram with interpretation  3) Bilatearl Ureteral stent exchange [facility] stents)     Findings:    1). Bladder was normal   2). Retrograde pyelogram revealed severe bilateral hydronephrosis  3). Ureteral stents placed without complication     Narrative of Events: The patient was brought to the operative suite. Anesthesia was induced and preoperative antibiotics were administered. They were then placed in the dorsal lithotomy position and their external genitalia was prepped and draped in the usual fashion. A surgical timeout was performed confirming the patient's name, date of birth, laterality, and antibiotics. All were in agreement. A 22 Yi cystourethroscope was then inserted into the patients bladder. The urethra revealed no abnormalities. Thorough cystoscopy was performed with the 30 degree lens. The J stents were seen emanating from the ureteral orifices with appropriate coils in the bladder. We started on the right side by passing a 0.035\" Sensor wire alongside the stent. The stent was then removed with the rigid grasper. A tiger tail catheter was passed over the wire and the wire was removed. Hydronephrotic drip was noted.   Retrograde pyelography demonstrated severe left hydronephrosis. The ureteral length was measured at 24 cm. The guidewire was replaced. A 6F x 24 cm Cook Metal Resonance stent was deployed in standard fashion. Good coil confirmed in the right renal pelvis via fluoroscopy and good coil in the bladder under direct vision. We then turned our attention to the left side. The distal coil of the left ureteral stent was grasped and pulled back to the meatus. A 0.035\" guidewire advanced through the stent and the stent was removed. A Tiger tail catheter passed over the wire, left ureteral length measured at 24 cm. The wire was removed. Hydronephrotic drip was noted. Retrograde pyelography showed severe left hydronephrosis. The guidewire was replaced. A 6F x 24 cm Cook Metallic Resonance Stent was deployed in the standard fashion with an excellent curl in the bladder and renal pelvis. The scope was removed. A 16F Blackwell catheter placed for pink tinged urine. The patient was then awoken and transferred to the recovery room in stable condition. Estimated Blood Loss: <5CC     Anesthesia:  General                  Implants:   Implant Name Type Inv. Item Serial No.  Lot No. LRB No. Used Action   STENT URET DBL PGTL 6FR 24CM -- [id number] - [id number]  [place] Sami DBL PGTL 6FR 24CM -- [id number]  [employer] [id number] Right 1 Implanted   STENT URET DBL PGTL 6FR 24CM -- [id number] - [id number]  STENT URET DBL PGTL 6FR 24CM -- [id number]  [employer] [id number] Left 1 Implanted       Specimens: * No specimens in log *     Drains: 16F Blackwell           Complications:  None           Counts: Sponge and needle counts were correct times two. Tere Chisholm MD  9/17/2019     I was present and scrubbed for the entirety of the procedure.

## 2019-09-18 NOTE — PROGRESS NOTES
Progress Note Patient: Oleg Brennan MRN: [id number]  SSN: [id number] YOB: 1950  Age: 71 y.o. Sex: female Admit Date: 9/16/2019 LOS: 1 day Assessment:  
1. Bilateral hydronephrosis initially managed with PCN/s, then converted to stents Failed, now converted to [address] 9/17 2. Metastatic cervical cancer with obstructive uropathy, on chemotherapy Avastin and Topotecan 3. BECK on CKD Making good urine but creatinine not changed. Will give another 24 hours and if no improvement in creatinine by tomorrow, proceed to PCN's. NPO after midnight. IR consulted in case needs to be done. Keep samaniego for now to maximize drainage. Signed By: Geovany Carlton MD   
 September 18, 2019 PAGER:  [phone] OFFICE:  [phone] [facility] Subjective:  
 
Feels well no complaints. Objective:  
 
Vitals:  
 09/18/19 0454 09/18/19 0749 09/18/19 1121 09/18/19 1432 BP:  139/85 141/82 142/86 Pulse:  85 90 85 Resp:  20 17 19 Temp:  97.3 °F (36.3 °C) 97.5 °F (36.4 °C) 97.3 °F (36.3 °C) SpO2:  99% 98% 98% Weight: 173 lb 12.8 oz (78.8 kg) Intake and Output: 
Current Shift: 09/18 0701 - 09/18 1900 In: 495 [P.O.:320; I.V.:175] Out: 400 [Urine:400] Last three shifts: 09/16 1901 - 09/18 0700 In: 2452 [P.O.:240; I.V.:1300] Out: 5155 [IVETZ:2790] Current Facility-Administered Medications Medication Dose Route Frequency  famotidine (PEPCID) tablet 20 mg  20 mg Oral DAILY  polyethylene glycol (MIRALAX) packet 17 g  17 g Oral DAILY  morphine injection 4 mg  4 mg IntraVENous Q2H PRN  
 influenza vaccine 2019-20 (6 mos+)(PF) (FLUARIX/FLULAVAL/FLUZONE QUAD) injection 0.5 mL  0.5 mL IntraMUSCular PRIOR TO DISCHARGE  hydrOXYzine HCl (ATARAX) tablet 10 mg  10 mg Oral Q6H PRN  
 ondansetron (ZOFRAN) injection 4 mg  4 mg IntraVENous Q4H PRN Physical Exam: GENERAL: alert, cooperative, no distress, appears stated age LUNG: unlabored breathing ABDOMEN: soft, non-tender. No masses,  no organomegaly EXTREMITIES:  extremities normal, atraumatic, no cyanosis or edema SKIN: no jaundice : no CVA tenderness. . Rishi RAMOS. Lab/Data Review: 
BMP:  
Lab Results Component Value Date/Time  09/18/2019 02:05 PM  
 K 4.5 09/18/2019 02:05 PM  
  09/18/2019 02:05 PM  
 CO2 24 09/18/2019 02:05 PM  
 AGAP 8 09/18/2019 02:05 PM  
  (H) 09/18/2019 02:05 PM  
 BUN 36 (H) 09/18/2019 02:05 PM  
 CREA 3.26 (H) 09/18/2019 02:05 PM  
 GFRAA 17 (L) 09/18/2019 02:05 PM  
 GFRNA 14 (L) 09/18/2019 02:05 PM  
 
CBC: No results found for: WBC, HGB, HGBEXT, HCT, HCTEXT, PLT, PLTEXT, HGBEXT, HCTEXT, PLTEXT 
COAGS: No results found for: APTT, PTP, INR  
 
 
CT Results: 
Results from Hospital Encounter encounter on 09/13/19 CT ABD PELV WO CONT Narrative EXAM: CT of the Abdomen and Pelvis without IV contrast 
 
CLINICAL INDICATION:  Worsening Renal Failure with Stents in . Metastatic 
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 Sagittal and coronal reformations All CT scans at this facility are performed using dose optimization technique as 
appropriate to a performed exam, to include automated exposure control, 
adjustment of the mA and/or kV according to patient size (including appropriate 
matching for site specific examination) or use of iterative reconstruction 
technique. IV CONTRAST: None ENTERIC CONTRAST: None COMPARISON: 6/19/2019 FINDINGS:  
Limitations: The evaluation of the solid organs is limited due to the lack of IV 
contrast. 
 
Lower Chest: The lower lung fields, there are several pulmonary nodules. A few these appear 
less conspicuous compared with prior imaging suggestive of improvement. Represents an axial image 4 of series 4, there is a 6 mm rim of soft tissue which is improved compared to the 6 mm solid nodule. Additional pulmonary 
nodules appear unchanged or improved no effusions identified. . The heart and 
pericardium are unremarkable. Peritoneum: No free air identified. No free fluid present. No fluid collections 
present. Liver: A 1 cm hypodensity is noted in the dome of the right lobe of the liver 
which is similar to prior imaging. A too small to characterize hypodensities 
noted in the inferior portion of the right lobe of the liver. Biliary/Gallbladder: No intrahepatic or extrahepatic biliary ductal dilatation 
appreciated. The gallbladder is unremarkable. No pericholecystic fluid or 
inflammation. Spleen: The spleen is enlarged measuring 12.3 x 14.7 x 8.8 cm. Hypodense foci 
are noted within the spleen concerning for lesions. Correlating with prior 
imaging is mildly limited due to differences in technique. The largest lesion 
approximately measures 6.8 x 7.3 cm which would be decreased in size compared 
prior imaging. However, this evaluation is limited. Pancreas: No focal lesion appreciated. The pancreatic duct is unremarkable. No 
peripancreatic inflammation or adenopathy. : The adrenal glands are unremarkable. No urolithiasis. No perinephric fat 
stranding appreciated. Bilateral ureteral stents are noted. There is 
hydroureteronephrosis of both collecting systems which is mildly progressed 
since prior imaging. No discrete obstructing process identified. The bladder is 
decompressed. A soft tissue mass is noted in the region of the expected uterus 
measuring 3.6 x 6.6 x 6 cm. This is mildly decreased in size compared with prior 
imaging. GI: The stomach is unremarkable. The small bowel is without evidence of 
obstruction. No small bowel wall thickening. The mesentery is without 
inflammation or adenopathy. The appendix is likely surgically absent. No 
pericolonic inflammation or wall thickening appreciated. Aorta and retroperitoneum: No aortic aneurysm seen. Periaortic adenopathy is 
noted in the aortocaval lymph node is noted measuring 1 cm which is minimally 
improved compared to prior imaging. In the left iliopsoas, there is a likely 
fluid collection. In the region of the right iliopsoas, there is a mass 
measuring 4.2 x 5.3 cm. This is similar to mildly improved compared prior 
imaging. IVC filter is present. Abdominal wall/Inguinal: Nodular changes are noted in the rectus abdominis 
muscles. The largest is in the right is 5.1 x 2.3 cm. This is similar to prior 
imaging. Musculoskeletal: Multilevel degenerative changes and facet arthropathy are 
noted. Impression IMPRESSION:  
1. Increased bilateral hydroureteronephrosis without discrete obstructing 
process. Extensive metastatic disease of the chest, abdomen and pelvis which are similar 
to mildly improved. US Results: 
Results from [place] encounter on 03/06/18 US HEAD NECK SOFT TISSUE Narrative Indication: Increased uptake right thyroid on PET scan. On chemotherapy. Impression IMPRESSION: Solid right thyroid nodules, largest 3.2 cm. Subcentimeter left 
thyroid nodules. Comment: Sonography of the thyroid was performed. No prior studies for 
comparison. The right lobe measures 4.9 x 2.3 x 2.5 cm. It harbors a 3.2 x 1.8 x 2.3 cm 
solid nodule in the midpole and a 1.9 x 1.7 x 2.1 cm lower pole nodule. The left lobe measures 4.6 x 1.0 x 1.3 cm and harbors 2 subcentimeter nodules. The largest measures 8 mm in greatest dimension. The isthmus is normal. 
  
 
 
 
 
 
 
 
 
 
 
 
 
 
 
 
Principal Problem: 
  BECK (acute kidney injury) ([address] (9/17/2019) Active Problems: 
  Cervical cancer ([address] (1/22/2019) Hydronephrosis with ureteral stricture, not elsewhere classified (4/16/2019) UTI (urinary tract infection) (9/17/2019)

## 2019-09-18 NOTE — ROUTINE PROCESS
0800 Pt received after bedside shifty report from Roslyn Loredo RN. Up in bed in no apparent distress. PIV fluid infusing as ordered. Blackwell draining to gravity with blood tinged color and sediment noted. Bedside shift change report given to Stacey Guevara RN  (oncoming nurse) by Jessie Rivera RN (offgoing nurse). Report included the following information SBAR, MAR, KARDEX AND RECENT RESULTS

## 2019-09-18 NOTE — CONSULTS
Consult Note Consult requested by: Annika Pina MD 
 
ADMIT DATE: 9/16/2019 CONSULT DATE: September 18, 2019 Admission diagnosis: BECK (acute kidney injury) ([facility] Reason for Nephrology Consultation: BECK Assessment:  
1 acute kidney injury on chronic kidney disease stage III, etiology appears multifactorial , worsening bilateral hydronephrosis with stent failure, but ATN versus AIN secondary to NSAID use may also be a possibility. I am seeing ibuprofen on her list. 
2 bilateral hydronephrosis status post ureteral stent exchanged. Etiology of hydronephrosis thought to be secondary to obstructive uropathy due to metastatic cervical adenocarcinoma. 3 metastatic cervical adenocarcinoma 4 anemia of chronic disease 5 history of DVT status post IVC filter 6 dyspepsia/GERD Plan: 
1 urine studies ordered 2 encourage p.o. intake,  
3 follow renal parameters closely 4 counseled regarding no NSAID use 5 avoid too tight blood pressure control, goal of 023L systolic Please call with questions, 
 
Hemanth Atwood MD FASN Cell [phone] Pager: [phone] HPI:  
Patient is a 40-year-old white female with history of endometrial cancer diagnosed in 2017, on chemotherapy with a previous episode of obstructive uropathy/bilateral hydronephrosis which was managed by percutaneous nephrostomy initially, but then subsequently with stents. Recently she was noted to have worsening creatinine into the 2 range and then 3 range. She was sent to the emergency room for evaluation. She had a CT scan which showed worsening hydronephrosis bilaterally. She complained of nausea but no chills, fever or dysuria or hematuria. Denies fevers. She was taken to OR yesterday for stent exchange. Subsequently has been urinating well. Mentions that her left lower extremity seems more swollen. Denies any shortness of breath or chest pain. Past Medical History:  
Diagnosis Date  Acute kidney failure ([facility] [address] 12/05/2019  BMI 34.0-34.9,adult 34.8  Caffeine adverse reaction   
 elevated BP  Cancer ([facility] uterine, cervical  
 Cervical cancer (HCC)  Chronic kidney disease   
 acute kidney failure  Chronic sinusitis  Dizziness  Endometriosis  Hiatal hernia  High cholesterol  Palpitation  PMB (postmenopausal bleeding)  Rash   
 eczemz  Thromboembolus ([facility] 03/15/2019 IVC filter placed  Urinary incontinence Past Surgical History:  
Procedure Laterality Date  HX ABDOMINAL LAPAROSCOPY    
 HX BILATERAL SALPINGO-OOPHORECTOMY Bilateral   
 HX BREAST LUMPECTOMY Right 1973  
 benign  HX HYSTERECTOMY    
 radical  
 HX OTHER SURGICAL  09/01/2017 Exam under anesthesia: Cystoscopy, sigmoidoscopy  HX UROLOGICAL    
 nephrostomy tubes and stents  IR CHANGE EXIST CATHETER/TUBE LT  3/7/2019  IR MEDIPORT  VASCULAR SURGERY PROCEDURE UNLIST    
 filter Social History Socioeconomic History  Marital status:  Spouse name: Not on file  Number of children: Not on file  Years of education: Not on file  Highest education level: Not on file Occupational History  Not on file Social Needs  Financial resource strain: Not on file  Food insecurity:  
  Worry: Not on file Inability: Not on file  Transportation needs:  
  Medical: Not on file Non-medical: Not on file Tobacco Use  Smoking status: Never Smoker  Smokeless tobacco: Never Used Substance and Sexual Activity  Alcohol use: No  
 Drug use: No  
 Sexual activity: Not Currently Lifestyle  Physical activity:  
  Days per week: Not on file Minutes per session: Not on file  Stress: Not on file Relationships  Social connections:  
  Talks on phone: Not on file Gets together: Not on file Attends Orthodox service: Not on file Active member of club or organization: Not on file Attends meetings of clubs or organizations: Not on file Relationship status: Not on file  Intimate partner violence:  
  Fear of current or ex partner: Not on file Emotionally abused: Not on file Physically abused: Not on file Forced sexual activity: Not on file Other Topics Concern  Not on file Social History Narrative  Not on file Family History Problem Relation Age of Onset  Cancer Mother   
     left ovary Allergies Allergen Reactions  Other Food Nausea and Vomiting Artificial sweeteners  Neulasta [Pegfilgrastim] Swelling  Aspirin Other (comments) Gi upset  Avastin [Bevacizumab] Other (comments) Bowel perforation  Tetracycline Unknown (comments) Patient does not remember, reaction many years ago  Wheat Atopic Dermatitis  
  eczema  Other Plant, Animal, Environmental Other (comments) Pt states she has \"springtime grass allergies. \" Reports reaction: Sinus infection Home Medications:  
 
Medications Prior to Admission Medication Sig  cyanocobalamin (VITAMIN B12) 100 mcg tablet Take 100 mcg by mouth daily.  famotidine (PEPCID) 20 mg tablet Take 20 mg by mouth daily. Indications: gastroesophageal reflux disease  multivitamin (ONE A DAY) tablet Take 1 Tab by mouth daily.  acetaminophen (TYLENOL) 500 mg tablet Take 1,000 mg by mouth every six (6) hours as needed for Pain.  b complex vitamins (B COMPLEX 1) tablet Take 1 Tab by mouth daily.  bisacodyl (DULCOLAX) 10 mg suppository Insert 10 mg into rectum daily as needed.  LORazepam (ATIVAN) 0.5 mg tablet Take 1 Tab by mouth nightly. Max Daily Amount: 0.5 mg. Indications: anxious, chronic trouble sleeping  ibuprofen 100 mg tablet Take 200 mg by mouth every six (6) hours as needed for Pain. Current Inpatient Medications:  
 
Current Facility-Administered Medications Medication Dose Route Frequency  famotidine (PEPCID) tablet 20 mg  20 mg Oral DAILY  polyethylene glycol (MIRALAX) packet 17 g  17 g Oral DAILY  morphine injection 4 mg  4 mg IntraVENous Q2H PRN  
 influenza vaccine 2019-20 (6 mos+)(PF) (FLUARIX/FLULAVAL/FLUZONE QUAD) injection 0.5 mL  0.5 mL IntraMUSCular PRIOR TO DISCHARGE  hydrOXYzine HCl (ATARAX) tablet 10 mg  10 mg Oral Q6H PRN  
 ondansetron (ZOFRAN) injection 4 mg  4 mg IntraVENous Q4H PRN Review of Systems: No fever or chills. No sore throat. No cough or hemoptysis. No shortness of breath or chest pain. No orthopnea or paroxysmal nocturnal dyspnea. No constipation or diarrhea. No dysuria, no gross hematuria of voiding difficulties. No ankle swelling, no joint paints. No muscle aches. No skin changes. No dizziness or lightheadedness. No headaches. Physical Assessment:  
 
Vitals:  
 09/18/19 0454 09/18/19 0749 09/18/19 1121 09/18/19 1432 BP:  139/85 141/82 142/86 Pulse:  85 90 85 Resp:  20 17 19 Temp:  97.3 °F (36.3 °C) 97.5 °F (36.4 °C) 97.3 °F (36.3 °C) SpO2:  99% 98% 98% Weight: 78.8 kg (173 lb 12.8 oz) Last 3 Recorded Weights in this Encounter 09/17/19 3263 09/18/19 7155 Weight: 77.4 kg (170 lb 9.6 oz) 78.8 kg (173 lb 12.8 oz) Admission weight: Weight: 77.4 kg (170 lb 9.6 oz) (09/17/19 0521) Intake/Output Summary (Last 24 hours) at 9/18/2019 1712 Last data filed at 9/18/2019 1427 Gross per 24 hour Intake 735 ml Output 2050 ml Net -1315 ml  
 
NAD HEENT: mmm Lungs: good air entry, clear to auscultation bilaterally. Cardiovascular system: S1, S2, regular rate and rhythm. Abdomen: soft, non tender, non distended. Extremities:left lower ext edema + Integumentary: skin is grossly intact. Neurologic: Alert, oriented time three. Data Review: 
 
Labs: Results:  
   
Chemistry Recent Labs  
  09/18/19 
1405 09/16/19 
1950/19 
1504 * 93  --   
 139  --   
K 4.5 4.9  --   
 106  --   
 CO2 24 27  --   
BUN 36* 33*  --   
CREA 3.26* 3.22*  --   
CA 8.3* 8.8  --   
AGAP 8 6  --   
BUCR 11* 10*  --   
AP  --  113 127* TP  --  6.9 6.6 ALB  --  2.9* 3.2*  
GLOB  --  4.0 3.4 AGRAT  --  0.7* 0.9  
  
  
CBC w/Diff Recent Labs  
  09/16/19 
1950/19 
1504 WBC 10.6 11.6 RBC 3.26* 3.41* HGB 9.3* 9.9*  
HCT 29.1* 30.2*  303 GRANS 65 69 LYMPH 22 17* EOS 0 0 Iron/Ferritin No results for input(s): IRON in the last 72 hours. No lab exists for component: TIBCCALC  
PTH/VIT D No results for input(s): PTH in the last 72 hours. No lab exists for component: VITD Prieto Lennon MD 
9/18/2019 5:12 PM 
 
 
September 18, 2019

## 2019-09-18 NOTE — ROUTINE PROCESS
Bedside and Verbal shift change report given to Donald Morris RN (oncoming nurse) by Faustino Justin RN   (offgoing nurse). Report included the following information SBAR, Kardex, MAR and Recent Results.

## 2019-09-18 NOTE — CONSULTS
[facility]  [address] [place] [address], [address], [address]  [address] [facility], [address] [facility]  [place], [address]   [phone]  Ángel Dayanna LANGFORD      Patient ID:  Name:  Saint Singh  MRN:  [id number]  :  1950/69 y.o. Date:  2019      HISTORY OF PRESENT ILLNESS:  Saint Singh is a 71 y.o.   postmenopausal female self-referred for Stage IVB Grade 2 endometrioid adenocarcinoma, favor cervical primary. She initially presented with complaint of PMB x8-10 months to PCP. She was referred to gyn Dr. Urban Mera, who referred to Dr. Kiara Parsons for large cervical mass. Last pap 17 years prior. Biopsy of cervical mass 17 showed adenocarcinoma, most c/w endometrioid adenocarcinoma, with initial staining suggesting endometrial origin. Patient had MRI of pelvis 17, which showed BL parametrial involvement, L>R, cancer extending from the large cervical mass into fundus and anterior 1/3 of vagina, no obvious adenopathy. PETCT 17 suspicious for metastatic disease to left ovary, omentum, liver report scanned in media. Patient is s/p EUA, cysto, sigmoidoscopy 17, medial left parametrial involvement, no tumor in bladder or rectosigmoid colon. She is s/p exploratory laparotomy with radical hysterectomy, BSO, resection of omental mass 9/15/17, pathology consistent with metastatic endometrioid adenocarcinoma, favor cervical primary, pathology report scanned in media. Patient is s/p 6 cycles Carbo/Taxol, completed 2018. Most recent PETCT 18 demonstrated new uptake in small nodular densities adjacent to cecum concerning for metastatic disease, increasing uptake in right thyroid gland concerning for neoplasm, as well as interval response to prior uptake. She was referred to Dr. Kathy Storey for further workup, suggestive of goiters, she has follow up in a few months.  Was seen and discussed proceeding with avastin and hormonal treatment. Pt was then admitted 12/5-12/13 with renal failure and hydronephrosis and had bilateral PCN placed. S/p cycles #2 of topotecan/avastin completed 2/22/2019. S/p abscess managed by dr. Bentley Abdullahi. Has resumed chemo s/p cycle #3 of topotecan completed on 8/3/2019. Pt did ok with chemo. Had worse days after day 5. No nausea. Some neuropathy in feet. Eating well. Gained some weight. The patient was seen at [facility] on 9/16 and was found to have Cr of 3.22. She was sent to ED for evaluation and subsequent admission. She was seen by Dr. Gaby Amaya and urology. She was taken to OR for stent exchange. She reports she is doing well today and feeling much better. She has no complaints except that her LLE is swollen. The patient is anxious to resume her chemo as soon as possible. Pathology:  9/15/17  Metastatic endometrioid adenocarcinoma, favor cervical primary      Labs:  Component      Latest Ref Rng & Units 7/29/2019           9:21 AM   CA-125      1.5 - 35.0 U/mL 223 (H)     Component       Cancer Ag (CA) 125   Latest Ref Rng & Units       0.0 - 38.1 U/mL   2/18/2019      9:30 AM 8.7   1/28/2019      10:05 .7 (H)   11/14/2018      4:40 .0 (H)   10/3/2018      3:45 .0 (H)   6/18/2018      1:13 PM 45 (H)   5/16/2018      8:20 AM 27   4/23/2018      12:33 PM 17   3/23/2018      2:44 PM 16   2/23/2018      5:14 PM 15   1/18/2018      3:56 PM 14   12/21/2017      10:42 AM 15   12/7/2017      1:53 PM 16   9/11/2017      11:30  (H)       Imaging  9/11/19  EXAM: CT of the Abdomen and Pelvis without IV contrast     CLINICAL INDICATION:  Worsening Renal Failure with Stents in . Metastatic  endometrioid adenocarcinoma from the cervix     TECHNIQUE: CT of the abdomen and pelvis.  Sagittal and coronal reformations     All CT scans at this facility are performed using dose optimization technique as  appropriate to a performed exam, to include automated exposure control,  adjustment of the mA and/or kV according to patient size (including appropriate  matching for site specific examination) or use of iterative reconstruction  technique. IV CONTRAST: None     ENTERIC CONTRAST: None     COMPARISON: 6/19/2019     FINDINGS:   Limitations: The evaluation of the solid organs is limited due to the lack of IV  contrast.     Lower Chest:   The lower lung fields, there are several pulmonary nodules. A few these appear  less conspicuous compared with prior imaging suggestive of improvement. Represents an axial image 4 of series 4, there is a 6 mm rim of soft tissue  which is improved compared to the 6 mm solid nodule. Additional pulmonary  nodules appear unchanged or improved no effusions identified. . The heart and  pericardium are unremarkable.     Peritoneum: No free air identified. No free fluid present. No fluid collections  present.     Liver: A 1 cm hypodensity is noted in the dome of the right lobe of the liver  which is similar to prior imaging. A too small to characterize hypodensities  noted in the inferior portion of the right lobe of the liver.     Biliary/Gallbladder: No intrahepatic or extrahepatic biliary ductal dilatation  appreciated. The gallbladder is unremarkable. No pericholecystic fluid or  inflammation.     Spleen: The spleen is enlarged measuring 12.3 x 14.7 x 8.8 cm. Hypodense foci  are noted within the spleen concerning for lesions. Correlating with prior  imaging is mildly limited due to differences in technique. The largest lesion  approximately measures 6.8 x 7.3 cm which would be decreased in size compared  prior imaging. However, this evaluation is limited.     Pancreas: No focal lesion appreciated. The pancreatic duct is unremarkable. No  peripancreatic inflammation or adenopathy.     : The adrenal glands are unremarkable. No urolithiasis. No perinephric fat  stranding appreciated. Bilateral ureteral stents are noted.  There is  hydroureteronephrosis of both collecting systems which is mildly progressed  since prior imaging. No discrete obstructing process identified. The bladder is  decompressed. A soft tissue mass is noted in the region of the expected uterus  measuring 3.6 x 6.6 x 6 cm. This is mildly decreased in size compared with prior  imaging.     GI: The stomach is unremarkable. The small bowel is without evidence of  obstruction. No small bowel wall thickening. The mesentery is without  inflammation or adenopathy. The appendix is likely surgically absent. No  pericolonic inflammation or wall thickening appreciated.     Aorta and retroperitoneum: No aortic aneurysm seen. Periaortic adenopathy is  noted in the aortocaval lymph node is noted measuring 1 cm which is minimally  improved compared to prior imaging. In the left iliopsoas, there is a likely  fluid collection. In the region of the right iliopsoas, there is a mass  measuring 4.2 x 5.3 cm. This is similar to mildly improved compared prior  imaging. IVC filter is present.     Abdominal wall/Inguinal: Nodular changes are noted in the rectus abdominis  muscles. The largest is in the right is 5.1 x 2.3 cm. This is similar to prior  imaging.     Musculoskeletal: Multilevel degenerative changes and facet arthropathy are  noted.     IMPRESSION  IMPRESSION:   1. Increased bilateral hydroureteronephrosis without discrete obstructing  process.     Extensive metastatic disease of the chest, abdomen and pelvis which are similar  to mildly improved.      ROS:   As above      Patient Active Problem List    Diagnosis Date Noted    UTI (urinary tract infection) 09/17/2019    BECK (acute kidney injury) ([address] 09/17/2019    Pelvic abscess in female 06/20/2019    Hydronephrosis with ureteral stricture, not elsewhere classified 04/16/2019    Colon perforation ([address] 03/25/2019    Retroperitoneal abscess ([address] 03/07/2019    DVT (deep venous thrombosis) ([address] 03/07/2019    Cervical cancer ([facility] 2019    ARF [facility] renal failure) ([facility] 2018    Severe obesity (BMI 35.0-39.9) 2018    Endometrial ca ([facility] 09/15/2017     Past Medical History:   Diagnosis Date    Acute kidney failure ([facility] 2019    BMI 34.0-34.9,adult     34.8    Caffeine adverse reaction     elevated BP    Cancer (HCC)     uterine, cervical    Cervical cancer (HCC)     Chronic kidney disease     acute kidney failure    Chronic sinusitis     Dizziness     Endometriosis     Hiatal hernia     High cholesterol     Palpitation     PMB (postmenopausal bleeding)     Rash     eczemz    Thromboembolus ([facility] 03/15/2019    IVC filter placed    Urinary incontinence       Past Surgical History:   Procedure Laterality Date    HX ABDOMINAL LAPAROSCOPY      HX BILATERAL SALPINGO-OOPHORECTOMY Bilateral     HX BREAST LUMPECTOMY Right 1973    benign    HX HYSTERECTOMY      radical    HX OTHER SURGICAL  2017    Exam under anesthesia: Cystoscopy, sigmoidoscopy    HX UROLOGICAL      nephrostomy tubes and stents    IR CHANGE EXIST CATHETER/TUBE LT  3/7/2019    IR MEDIPORT      VASCULAR SURGERY PROCEDURE UNLIST      filter       OB History        2    Para   2    Term   2            AB        Living   2       SAB        TAB        Ectopic        Molar        Multiple        Live Births   2              Social History     Tobacco Use    Smoking status: Never Smoker    Smokeless tobacco: Never Used   Substance Use Topics    Alcohol use: No      Family History   Problem Relation Age of Onset    Cancer Mother         left ovary      Current Facility-Administered Medications   Medication Dose Route Frequency    morphine injection 4 mg  4 mg IntraVENous Q2H PRN    influenza vaccine - (6 mos+)(PF) (FLUARIX/FLULAVAL/FLUZONE QUAD) injection 0.5 mL  0.5 mL IntraMUSCular PRIOR TO DISCHARGE    hydrOXYzine HCl (ATARAX) tablet 10 mg  10 mg Oral Q6H PRN    0.9% sodium chloride infusion 25 mL/hr IntraVENous CONTINUOUS    ondansetron (ZOFRAN) injection 4 mg  4 mg IntraVENous Q4H PRN     Allergies   Allergen Reactions    Other Food Nausea and Vomiting     Artificial sweeteners    Neulasta [Pegfilgrastim] Swelling    Aspirin Other (comments)     Gi upset    Avastin [Bevacizumab] Other (comments)     Bowel perforation    Tetracycline Unknown (comments)     Patient does not remember, reaction many years ago    Wheat Atopic Dermatitis     eczema    Other Plant, Animal, Environmental Other (comments)     Pt states she has \"springtime grass allergies. \"     Reports reaction: Sinus infection          OBJECTIVE:    Physical Exam  VITAL SIGNS: Visit Vitals  /85 (BP 1 Location: Left arm, BP Patient Position: At rest)   Pulse 85   Temp 97.3 °F (36.3 °C)   Resp 20   Wt 78.8 kg (173 lb 12.8 oz)   SpO2 99%   Breastfeeding? No   BMI 28.92 kg/m²      GENERAL CRISTINO: in no apparent distress and well developed and well nourished   MUSCULOSKEL: no joint tenderness, deformity. 2-3+ edema of LLE   INTEGUMENT:  warm and dry, no rashes or lesions   ABDOMEN . soft, NT, ND, No masses appreciated   EXTREMITIES: extremities normal, atraumatic, no cyanosis or edema   PELVIC: Exam deferred. RECTAL: deferred   LOKI SURVEY: Cervical, supraclavicular, axillary and inguinal nodes normal.   NEURO: Grossly normal         IMPRESSION/PLAN:  1. Recurrent tage IVB endometrioid cervical cancer vs. Endometrial cancer   -Admitted for BECK on CKD              -Chemo on hold until improvement in renal function              -Management per Urology, nephrology, and primary team              -Patient to f/u once discharged.              -Will sign off.  Reconsult as needed    Signed By: Jigar Medina DO     September 18, 2019

## 2019-09-18 NOTE — PROGRESS NOTES
Received report on pt.from off going RN. Resting quietly in bed on rounds. Denies c/o pain or SOB at this time. No acute distress noted. Call bell at side. NPO. Will cont to monitor for any changes in statu    1035 to OR per bed. 1445 returned to room. Blackwell patent. Urine rosea colored. Denies s/o pain. Call bell at side. 1515 medicated for nausea. 1730 sitting up in bed eating. No distress noted. Denies c/o pain or nausea. 1935 Bedside and Verbal shift change report given to Alyx Ritchie RN (oncoming nurse) by Aracely Joshi RN (offgoing nurse). Report given with Carmen VALDEZ and MAR.

## 2019-09-18 NOTE — PROGRESS NOTES
Hospitalist Progress Note Patient: Bela Holm Age: 71 y.o. : 1950 MR#: [id number] SSN: [id number] Date/Time: 2019 1:12 PM 
 
DOA: 2019 PCP: Leo Us MD 
 
Subjective:  
 
Feels good, has not has BP. Still with intermittent abd pain. No nausea. No fever. Appreciated her stent placement yesterday. No lab this AM. Urology and Nephrology wants labs for kidney function. ROS: no fever/chills, no headache, no dizziness, no facial pain, no sinus congestion, No swallowing pain, No chest pain, no palpitation, no shortness of breath, + abd pain, No diarrhea, no urinary complaint, no leg pain or swelling Assessment/Plan:  
 
Acute on chronic renal failure: multifactorial 
Bilateral hydronephrosis with hx of ureteral stents, s/p Metal stent exchange 19 Metastatic cervical cancer with obstructive uropathy:  Follows with H/O (Dr. Jarocho Reyna); on Avastin and Topotecan Anemia of chronic disease, stable Mild protein calorie malnutrition Hx of DVT s/p IVF filter placement: LE doppler negative for DVT at present Constipation. GERD/dyspepsia REpeat BMP stat, spoke with nephrology for further update. Urology follow up, further decision base on renal function improvement, unclear if need nephrotomy Encourage oral intake Miralax today for constipation Pain management prn Not on antibiotic. ADDENDUM: spoke with Dr. Bruce Shown, he is concern for unimproved renal function. He has ordered IR guided nephrotomy placement for tomorrow (on stand by) if renal function doesnot improved. Full code Additional Notes:   
Time spent >30 minutes Case discussed with:  [x]Patient  []Family  [x]Nursing  [x]Case Management DVT Prophylaxis:  []Lovenox  []Hep SQ  [x]SCDs  []Coumadin   []On Heparin gtt Signed By: Rita Amato MD   
 2019 1:12 PM  
  
 
 
 
 
Objective:  
VS:  
Visit Vitals /82 (BP 1 Location: Left arm, BP Patient Position: At rest) Pulse 90 Temp 97.5 °F (36.4 °C) Resp 17 Wt 78.8 kg (173 lb 12.8 oz) SpO2 98% Breastfeeding? No  
BMI 28.92 kg/m² Tmax/24hrs: Temp (24hrs), Av.7 °F (36.5 °C), Min:96.9 °F (36.1 °C), Max:98.9 °F (37.2 °C) Intake/Output Summary (Last 24 hours) at 2019 1312 Last data filed at 2019 1143 Gross per 24 hour Intake 610 ml Output 2050 ml Net -1440 ml Tele: sinus General:  Cooperative, Not in acute distress, speaks in full sentence while in bed HEENT: PERRL, EOMI, supple neck, no JVD, dry oral mucosa Cardiovascular: S1S2 regular, no rub/gallop Pulmonary: air entry bilaterally, no wheezing, no crackle GI:  Soft, + tender, non distended, +bs, no guarding Extremities:  No pedal edema, +distal pulses appreciated Neuro: AOx3, moving all extremities, no gross deficit. Additional:  
 
 
Current Facility-Administered Medications Medication Dose Route Frequency  famotidine (PEPCID) tablet 20 mg  20 mg Oral DAILY  polyethylene glycol (MIRALAX) packet 17 g  17 g Oral DAILY  morphine injection 4 mg  4 mg IntraVENous Q2H PRN  
 influenza vaccine - (6 mos+)(PF) (FLUARIX/FLULAVAL/FLUZONE QUAD) injection 0.5 mL  0.5 mL IntraMUSCular PRIOR TO DISCHARGE  hydrOXYzine HCl (ATARAX) tablet 10 mg  10 mg Oral Q6H PRN  
 ondansetron (ZOFRAN) injection 4 mg  4 mg IntraVENous Q4H PRN Lab/Data Review: 
Labs: Results:  
   
Chemistry Recent Labs  
  19 
1950 GLU 93   
K 4.9  CO2 27 BUN 33* CREA 3.22* BUCR 10* AGAP 6  
CA 8.8 Recent Labs  
  19 
1950 19 
1504 ALT 14 14 SGOT 18 15  
TP 6.9 6.6 ALB 2.9* 3.2*  
GLOB 4.0 3.4 AGRAT 0.7* 0.9  
  
CBC w/Diff Recent Labs  
  19 
1950 19 
1504 WBC 10.6 11.6 RBC 3.26* 3.41* HGB 9.3* 9.9*  
HCT 29.1* 30.2* MCV 89.3 88.6 MCH 28.5 29.0 MCHC 32.0 32.8  
RDW 16.6* 16.6*  
  303 GRANS 65 69 LYMPH 22 17* EOS 0 0 Coagulation No results for input(s): PTP, INR, APTT in the last 72 hours. No lab exists for component: INREXT Iron/Ferritin Lab Results Component Value Date/Time Iron 12 (L) 03/07/2019 05:24 PM  
 Ferritin 603 (H) 03/07/2019 05:24 PM  
   
BNP Cardiac Enzymes Lab Results Component Value Date/Time CK 34 04/12/2019 02:52 PM  
 CK - MB <1.0 03/07/2019 05:24 PM  
 CK-MB Index  03/07/2019 05:24 PM  
  CALCULATION NOT PERFORMED WHEN RESULT IS BELOW LINEAR LIMIT Troponin-I 0.020 09/18/2017 03:59 AM  
 Troponin-I, QT <0.02 03/07/2019 05:24 PM  
 
  
Lactic Acid Thyroid Studies All Micro Results None Images: 
 
CT (Most Recent). CT Results (most recent): 
Results from Hospital Encounter encounter on 09/13/19 CT ABD PELV WO CONT Narrative EXAM: CT of the Abdomen and Pelvis without IV contrast 
 
CLINICAL INDICATION:  Worsening Renal Failure with Stents in . Metastatic 
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 Sagittal and coronal reformations All CT scans at this facility are performed using dose optimization technique as 
appropriate to a performed exam, to include automated exposure control, 
adjustment of the mA and/or kV according to patient size (including appropriate 
matching for site specific examination) or use of iterative reconstruction 
technique. IV CONTRAST: None ENTERIC CONTRAST: None COMPARISON: 6/19/2019 FINDINGS:  
Limitations: The evaluation of the solid organs is limited due to the lack of IV 
contrast. 
 
Lower Chest: The lower lung fields, there are several pulmonary nodules. A few these appear 
less conspicuous compared with prior imaging suggestive of improvement. Represents an axial image 4 of series 4, there is a 6 mm rim of soft tissue 
which is improved compared to the 6 mm solid nodule. Additional pulmonary nodules appear unchanged or improved no effusions identified. . The heart and 
pericardium are unremarkable. Peritoneum: No free air identified. No free fluid present. No fluid collections 
present. Liver: A 1 cm hypodensity is noted in the dome of the right lobe of the liver 
which is similar to prior imaging. A too small to characterize hypodensities 
noted in the inferior portion of the right lobe of the liver. Biliary/Gallbladder: No intrahepatic or extrahepatic biliary ductal dilatation 
appreciated. The gallbladder is unremarkable. No pericholecystic fluid or 
inflammation. Spleen: The spleen is enlarged measuring 12.3 x 14.7 x 8.8 cm. Hypodense foci 
are noted within the spleen concerning for lesions. Correlating with prior 
imaging is mildly limited due to differences in technique. The largest lesion 
approximately measures 6.8 x 7.3 cm which would be decreased in size compared 
prior imaging. However, this evaluation is limited. Pancreas: No focal lesion appreciated. The pancreatic duct is unremarkable. No 
peripancreatic inflammation or adenopathy. : The adrenal glands are unremarkable. No urolithiasis. No perinephric fat 
stranding appreciated. Bilateral ureteral stents are noted. There is 
hydroureteronephrosis of both collecting systems which is mildly progressed 
since prior imaging. No discrete obstructing process identified. The bladder is 
decompressed. A soft tissue mass is noted in the region of the expected uterus 
measuring 3.6 x 6.6 x 6 cm. This is mildly decreased in size compared with prior 
imaging. GI: The stomach is unremarkable. The small bowel is without evidence of 
obstruction. No small bowel wall thickening. The mesentery is without 
inflammation or adenopathy. The appendix is likely surgically absent. No 
pericolonic inflammation or wall thickening appreciated. Aorta and retroperitoneum: No aortic aneurysm seen. Periaortic adenopathy is noted in the aortocaval lymph node is noted measuring 1 cm which is minimally 
improved compared to prior imaging. In the left iliopsoas, there is a likely 
fluid collection. In the region of the right iliopsoas, there is a mass 
measuring 4.2 x 5.3 cm. This is similar to mildly improved compared prior 
imaging. IVC filter is present. Abdominal wall/Inguinal: Nodular changes are noted in the rectus abdominis 
muscles. The largest is in the right is 5.1 x 2.3 cm. This is similar to prior 
imaging. Musculoskeletal: Multilevel degenerative changes and facet arthropathy are 
noted. Impression IMPRESSION:  
1. Increased bilateral hydroureteronephrosis without discrete obstructing 
process. Extensive metastatic disease of the chest, abdomen and pelvis which are similar 
to mildly improved. XRAY (Most Recent) EKG No results found for this or any previous visit. 2D ECHO

## 2019-09-18 NOTE — PROGRESS NOTES
Moderate amount of blood noted under patient on incontinence pad this morning. Pt c/o abdominal pain 6/10 for the first time this shift. Will continue to monitor these findings.

## 2019-09-19 NOTE — PROGRESS NOTES
Progress Note Patient: Shun Sierra MRN: [id number]  SSN: [id number] YOB: 1950  Age: 71 y.o. Sex: female Admit Date: 2019 LOS: 2 days Assessment:  
1. Bilateral hydronephrosis initially managed with PCN/s, then converted to stents  
            [facility], now converted to [address]   
2. Metastatic cervical cancer with obstructive uropathy, on chemotherapy Avastin and Topotecan 3. BECK on CKD 
  
 
Plan:  
 
SCr improving. Cont samaniego. VT tomorrow if Cr continues to decline. FU with Dr. Miguelito Cahmpagne as outpt. Subjective:  
 
Feels much improvement. No flank pain. Objective:  
 
Vitals:  
 19 2100 19 0024 19 0430 19 2370 BP: 153/77 132/75 136/82 127/83 Pulse: 92 85 82 82 Resp: 20   18 Temp: 97 °F (36.1 °C) 97 °F (36.1 °C) 97 °F (36.1 °C) 97 °F (36.1 °C) SpO2: 97% 96% 98% 98% Weight:      
  
 
Intake and Output: 
Current Shift:  0701 - 1900 In: 80 [P.O.:810] Out: 550 [Urine:550] Last three shifts:  190 -  0700 In: 8744 [P.O.:560; I.V.:675] Out: 1850 [GPBD] Current Facility-Administered Medications Medication Dose Route Frequency  famotidine (PEPCID) tablet 20 mg  20 mg Oral DAILY  polyethylene glycol (MIRALAX) packet 17 g  17 g Oral DAILY  morphine injection 4 mg  4 mg IntraVENous Q2H PRN  
 influenza vaccine - (6 mos+)(PF) (FLUARIX/FLULAVAL/FLUZONE QUAD) injection 0.5 mL  0.5 mL IntraMUSCular PRIOR TO DISCHARGE  hydrOXYzine HCl (ATARAX) tablet 10 mg  10 mg Oral Q6H PRN  
 ondansetron (ZOFRAN) injection 4 mg  4 mg IntraVENous Q4H PRN Physical Exam:  
GENERAL: alert, cooperative, no distress, appears stated age LUNG: unlabored breathing ABDOMEN: soft, non-tender. EXTREMITIES:  extremities normal, atraumatic, no cyanosis or edema SKIN: no jaundice : samaniego draining clear yellow Lab/Data Review: 
BMP:  
Lab Results Component Value Date/Time  09/19/2019 03:20 AM  
 K 4.5 09/19/2019 03:20 AM  
  09/19/2019 03:20 AM  
 CO2 25 09/19/2019 03:20 AM  
 AGAP 8 09/19/2019 03:20 AM  
 GLU 86 09/19/2019 03:20 AM  
 BUN 35 (H) 09/19/2019 03:20 AM  
 CREA 2.82 (H) 09/19/2019 03:20 AM  
 GFRAA 20 (L) 09/19/2019 03:20 AM  
 GFRNA 17 (L) 09/19/2019 03:20 AM  
 
CBC: No results found for: WBC, HGB, HGBEXT, HCT, HCTEXT, PLT, PLTEXT, HGBEXT, HCTEXT, PLTEXT 
COAGS: No results found for: APTT, PTP, INR  
 
 
CT Results: 
Results from Hospital Encounter encounter on 09/13/19 CT ABD PELV WO CONT Narrative EXAM: CT of the Abdomen and Pelvis without IV contrast 
 
CLINICAL INDICATION:  Worsening Renal Failure with Stents in . Metastatic 
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 Sagittal and coronal reformations All CT scans at this facility are performed using dose optimization technique as 
appropriate to a performed exam, to include automated exposure control, 
adjustment of the mA and/or kV according to patient size (including appropriate 
matching for site specific examination) or use of iterative reconstruction 
technique. IV CONTRAST: None ENTERIC CONTRAST: None COMPARISON: 6/19/2019 FINDINGS:  
Limitations: The evaluation of the solid organs is limited due to the lack of IV 
contrast. 
 
Lower Chest: The lower lung fields, there are several pulmonary nodules. A few these appear 
less conspicuous compared with prior imaging suggestive of improvement. Represents an axial image 4 of series 4, there is a 6 mm rim of soft tissue 
which is improved compared to the 6 mm solid nodule. Additional pulmonary 
nodules appear unchanged or improved no effusions identified. . The heart and 
pericardium are unremarkable. Peritoneum: No free air identified. No free fluid present. No fluid collections 
present. Liver: A 1 cm hypodensity is noted in the dome of the right lobe of the liver 
which is similar to prior imaging. A too small to characterize hypodensities 
noted in the inferior portion of the right lobe of the liver. Biliary/Gallbladder: No intrahepatic or extrahepatic biliary ductal dilatation 
appreciated. The gallbladder is unremarkable. No pericholecystic fluid or 
inflammation. Spleen: The spleen is enlarged measuring 12.3 x 14.7 x 8.8 cm. Hypodense foci 
are noted within the spleen concerning for lesions. Correlating with prior 
imaging is mildly limited due to differences in technique. The largest lesion 
approximately measures 6.8 x 7.3 cm which would be decreased in size compared 
prior imaging. However, this evaluation is limited. Pancreas: No focal lesion appreciated. The pancreatic duct is unremarkable. No 
peripancreatic inflammation or adenopathy. : The adrenal glands are unremarkable. No urolithiasis. No perinephric fat 
stranding appreciated. Bilateral ureteral stents are noted. There is 
hydroureteronephrosis of both collecting systems which is mildly progressed 
since prior imaging. No discrete obstructing process identified. The bladder is 
decompressed. A soft tissue mass is noted in the region of the expected uterus 
measuring 3.6 x 6.6 x 6 cm. This is mildly decreased in size compared with prior 
imaging. GI: The stomach is unremarkable. The small bowel is without evidence of 
obstruction. No small bowel wall thickening. The mesentery is without 
inflammation or adenopathy. The appendix is likely surgically absent. No 
pericolonic inflammation or wall thickening appreciated. Aorta and retroperitoneum: No aortic aneurysm seen. Periaortic adenopathy is 
noted in the aortocaval lymph node is noted measuring 1 cm which is minimally 
improved compared to prior imaging. In the left iliopsoas, there is a likely fluid collection. In the region of the right iliopsoas, there is a mass 
measuring 4.2 x 5.3 cm. This is similar to mildly improved compared prior 
imaging. IVC filter is present. Abdominal wall/Inguinal: Nodular changes are noted in the rectus abdominis 
muscles. The largest is in the right is 5.1 x 2.3 cm. This is similar to prior 
imaging. Musculoskeletal: Multilevel degenerative changes and facet arthropathy are 
noted. Impression IMPRESSION:  
1. Increased bilateral hydroureteronephrosis without discrete obstructing 
process. Extensive metastatic disease of the chest, abdomen and pelvis which are similar 
to mildly improved. US Results: 
Results from [place] encounter on 03/06/18 US HEAD NECK SOFT TISSUE Narrative Indication: Increased uptake right thyroid on PET scan. On chemotherapy. Impression IMPRESSION: Solid right thyroid nodules, largest 3.2 cm. Subcentimeter left 
thyroid nodules. Comment: Sonography of the thyroid was performed. No prior studies for 
comparison. The right lobe measures 4.9 x 2.3 x 2.5 cm. It harbors a 3.2 x 1.8 x 2.3 cm 
solid nodule in the midpole and a 1.9 x 1.7 x 2.1 cm lower pole nodule. The left lobe measures 4.6 x 1.0 x 1.3 cm and harbors 2 subcentimeter nodules. The largest measures 8 mm in greatest dimension. The isthmus is normal. 
  
 
 
 
Principal Problem: 
  BECK (acute kidney injury) ([facility] [address] (9/17/2019) Active Problems: 
  Cervical cancer ([facility] [address] (1/22/2019) Hydronephrosis with ureteral stricture, not elsewhere classified (4/16/2019) UTI (urinary tract infection) (9/17/2019) Signed By: Slava Sosa MD , FACS September 19, 2019 PAGER:  [phone] OFFICE:  [phone] [facility]

## 2019-09-19 NOTE — PROGRESS NOTES
Problem: Falls - Risk of 
Goal: *Absence of Falls Description Document Timi Hernandez Fall Risk and appropriate interventions in the flowsheet. Outcome: Progressing Towards Goal 
Note:  
Fall Risk Interventions: 
Mobility Interventions: Bed/chair exit alarm, Patient to call before getting OOB Medication Interventions: Teach patient to arise slowly, Patient to call before getting OOB Elimination Interventions: Call light in reach, Patient to call for help with toileting needs Problem: Pain Goal: *Control of Pain Outcome: Progressing Towards Goal 
  
Problem: Urinary Tract Infection - Adult Goal: *Absence of infection signs and symptoms Outcome: Progressing Towards Goal

## 2019-09-19 NOTE — PROGRESS NOTES
Spoke to Dr Maryuri Guerrero this AM. Pt will not need an Interventional radiology consult at this time. She will continue to be monitored to ensure her Creatinine level continues to improve. Made patient aware of information and will continue to monitor.

## 2019-09-19 NOTE — ROUTINE PROCESS
Bedside and Verbal shift change report given to Ranjith Mueller RN (oncoming nurse) by Mitul Chinchilla RN 
 (offgoing nurse). Report included the following information SBAR, Procedure Summary and Recent Results.

## 2019-09-19 NOTE — PROGRESS NOTES
Patient not seen, chart reviewed 1. Bilateral hydronephrosis initially managed with PCN/s, then converted to stents  
            Failed, now converted to [address] 9/17 2. Metastatic cervical cancer with obstructive uropathy, on chemotherapy Avastin and Topotecan 3. BECK on CKD Creatinine began to improve down to 2.8 from 3.3 after conversion to metal stents. NPO cancelled, she can eat. Hold off on neph tubes. Continue to monitor renal function. If creatinine falls again tomorrow, pull samaniego and she can likely be discharged. I will arrange follow up with my office with close interval follow up and repeat Creatinine.

## 2019-09-19 NOTE — PROGRESS NOTES
Hospitalist Progress Note Patient: Yuri Gillis Age: 71 y.o. : 1950 MR#: [id number] SSN: [id number] Date/Time: 2019 12:59 PM 
 
DOA: 2019 PCP: Yin Lora MD 
 
Subjective:  
 
Feels good, no BM this morning but feels bloated. Does not want Miralax now. No nausea/vomiting. No fever. Urology recommendation noted. Nephrology concerns NSAID use at home and potential injury. Renal function slightly improved from yesterday. ROS: no fever/chills, no headache, no dizziness, no facial pain, no sinus congestion, No swallowing pain, No chest pain, no palpitation, no shortness of breath, + abd pain, No diarrhea, no urinary complaint, no leg pain or swelling Assessment/Plan:  
 
1) Acute on chronic renal failure: multifactorial 
2) Bilateral hydronephrosis with hx of ureteral stents, s/p Metal stent exchange 19 
3) Metastatic cervical cancer with obstructive uropathy:  Follows with H/O (Dr. Jaspreet Issa); on Avastin and Topotecan 4) Anemia of chronic disease, stable 5) Mild protein calorie malnutrition 6) Hx of DVT s/p IVF filter placement: LE doppler negative for DVT at present 7) Constipation. 8) GERD/dyspepsia Improved creatinine, if further improvement, can d/c samaniego tomorrow. Hold off on neph tubes. Avoid nephrotoxic medications Encourage oral intake Miralax stop, can have soap dylan enema Pain management prn Not on antibiotic. OOB, ICS Full code Additional Notes:   
Time spent >30 minutes Case discussed with:  [x]Patient  []Family  [x]Nursing  [x]Case Management DVT Prophylaxis:  []Lovenox  []Hep SQ  [x]SCDs  []Coumadin   []On Heparin gtt Signed By: Chandler Pritchard MD   
 2019 12:59 PM  
  
 
 
 
 
Objective:  
VS:  
Visit Vitals /77 (BP 1 Location: Right arm, BP Patient Position: At rest) Pulse 90 Temp 97.3 °F (36.3 °C) Resp 18 Wt 78.8 kg (173 lb 12.8 oz) SpO2 98% Breastfeeding? No  
BMI 28.92 kg/m² Tmax/24hrs: Temp (24hrs), Av.1 °F (36.2 °C), Min:97 °F (36.1 °C), Max:97.3 °F (36.3 °C) Intake/Output Summary (Last 24 hours) at 2019 1259 Last data filed at 2019 1219 Gross per 24 hour Intake 1925 ml Output 1550 ml Net 375 ml Tele: sinus General:  Cooperative, Not in acute distress, speaks in full sentence while in bed HEENT: PERRL, EOMI, supple neck, no JVD, dry oral mucosa Cardiovascular: S1S2 regular, no rub/gallop Pulmonary: air entry bilaterally, no wheezing, no crackle GI:  Soft, + tender, non distended, +bs, no guarding Extremities:  No pedal edema, +distal pulses appreciated Neuro: AOx3, moving all extremities, no gross deficit. Additional:  
 
 
Current Facility-Administered Medications Medication Dose Route Frequency  famotidine (PEPCID) tablet 20 mg  20 mg Oral DAILY  polyethylene glycol (MIRALAX) packet 17 g  17 g Oral DAILY  morphine injection 4 mg  4 mg IntraVENous Q2H PRN  
 influenza vaccine  (6 mos+)(PF) (FLUARIX/FLULAVAL/FLUZONE QUAD) injection 0.5 mL  0.5 mL IntraMUSCular PRIOR TO DISCHARGE  hydrOXYzine HCl (ATARAX) tablet 10 mg  10 mg Oral Q6H PRN  
 ondansetron (ZOFRAN) injection 4 mg  4 mg IntraVENous Q4H PRN Lab/Data Review: 
Labs: Results:  
   
Chemistry Recent Labs  
  19 
0320 19 
1405 19 
1950 GLU 86 114* 93  138 139  
K 4.5 4.5 4.9  
 106 106 CO2 25 24 27 BUN 35* 36* 33* CREA 2.82* 3.26* 3.22* BUCR 12 11* 10* AGAP 8 8 6 CA 8.4* 8.3* 8.8 Recent Labs  
  19 
1950 19 
1504 ALT 14 14 SGOT 18 15  
TP 6.9 6.6 ALB 2.9* 3.2*  
GLOB 4.0 3.4 AGRAT 0.7* 0.9  
  
CBC w/Diff Recent Labs  
  19 
1950 19 
1504 WBC 10.6 11.6 RBC 3.26* 3.41* HGB 9.3* 9.9*  
HCT 29.1* 30.2* MCV 89.3 88.6 MCH 28.5 29.0 MCHC 32.0 32.8  
RDW 16.6* 16.6*  
 303 GRANS 65 69 LYMPH 22 17* EOS 0 0  
  
 Coagulation No results for input(s): PTP, INR, APTT in the last 72 hours. No lab exists for component: INREXT, INREXT Iron/Ferritin Lab Results Component Value Date/Time Iron 12 (L) 03/07/2019 05:24 PM  
 Ferritin 603 (H) 03/07/2019 05:24 PM  
   
BNP Cardiac Enzymes Lab Results Component Value Date/Time CK 34 04/12/2019 02:52 PM  
 CK - MB <1.0 03/07/2019 05:24 PM  
 CK-MB Index  03/07/2019 05:24 PM  
  CALCULATION NOT PERFORMED WHEN RESULT IS BELOW LINEAR LIMIT Troponin-I 0.020 09/18/2017 03:59 AM  
 Troponin-I, QT <0.02 03/07/2019 05:24 PM  
 
  
Lactic Acid Thyroid Studies All Micro Results None Images: 
 
CT (Most Recent). CT Results (most recent): 
Results from Hospital Encounter encounter on 09/13/19 CT ABD PELV WO CONT Narrative EXAM: CT of the Abdomen and Pelvis without IV contrast 
 
CLINICAL INDICATION:  Worsening Renal Failure with Stents in . Metastatic 
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 Sagittal and coronal reformations All CT scans at this facility are performed using dose optimization technique as 
appropriate to a performed exam, to include automated exposure control, 
adjustment of the mA and/or kV according to patient size (including appropriate 
matching for site specific examination) or use of iterative reconstruction 
technique. IV CONTRAST: None ENTERIC CONTRAST: None COMPARISON: 6/19/2019 FINDINGS:  
Limitations: The evaluation of the solid organs is limited due to the lack of IV 
contrast. 
 
Lower Chest: The lower lung fields, there are several pulmonary nodules. A few these appear 
less conspicuous compared with prior imaging suggestive of improvement. Represents an axial image 4 of series 4, there is a 6 mm rim of soft tissue 
which is improved compared to the 6 mm solid nodule. Additional pulmonary nodules appear unchanged or improved no effusions identified. . The heart and 
pericardium are unremarkable. Peritoneum: No free air identified. No free fluid present. No fluid collections 
present. Liver: A 1 cm hypodensity is noted in the dome of the right lobe of the liver 
which is similar to prior imaging. A too small to characterize hypodensities 
noted in the inferior portion of the right lobe of the liver. Biliary/Gallbladder: No intrahepatic or extrahepatic biliary ductal dilatation 
appreciated. The gallbladder is unremarkable. No pericholecystic fluid or 
inflammation. Spleen: The spleen is enlarged measuring 12.3 x 14.7 x 8.8 cm. Hypodense foci 
are noted within the spleen concerning for lesions. Correlating with prior 
imaging is mildly limited due to differences in technique. The largest lesion 
approximately measures 6.8 x 7.3 cm which would be decreased in size compared 
prior imaging. However, this evaluation is limited. Pancreas: No focal lesion appreciated. The pancreatic duct is unremarkable. No 
peripancreatic inflammation or adenopathy. : The adrenal glands are unremarkable. No urolithiasis. No perinephric fat 
stranding appreciated. Bilateral ureteral stents are noted. There is 
hydroureteronephrosis of both collecting systems which is mildly progressed 
since prior imaging. No discrete obstructing process identified. The bladder is 
decompressed. A soft tissue mass is noted in the region of the expected uterus 
measuring 3.6 x 6.6 x 6 cm. This is mildly decreased in size compared with prior 
imaging. GI: The stomach is unremarkable. The small bowel is without evidence of 
obstruction. No small bowel wall thickening. The mesentery is without 
inflammation or adenopathy. The appendix is likely surgically absent. No 
pericolonic inflammation or wall thickening appreciated. Aorta and retroperitoneum: No aortic aneurysm seen. Periaortic adenopathy is noted in the aortocaval lymph node is noted measuring 1 cm which is minimally 
improved compared to prior imaging. In the left iliopsoas, there is a likely 
fluid collection. In the region of the right iliopsoas, there is a mass 
measuring 4.2 x 5.3 cm. This is similar to mildly improved compared prior 
imaging. IVC filter is present. Abdominal wall/Inguinal: Nodular changes are noted in the rectus abdominis 
muscles. The largest is in the right is 5.1 x 2.3 cm. This is similar to prior 
imaging. Musculoskeletal: Multilevel degenerative changes and facet arthropathy are 
noted. Impression IMPRESSION:  
1. Increased bilateral hydroureteronephrosis without discrete obstructing 
process. Extensive metastatic disease of the chest, abdomen and pelvis which are similar 
to mildly improved. XRAY (Most Recent) EKG No results found for this or any previous visit. 2D ECHO

## 2019-09-19 NOTE — PROGRESS NOTES
In Patient Progress note Admit Date: 9/16/2019 Impression:  
 
1 Acute kidney injury on chronic kidney disease stage III,  
etiology appears multifactorial , AIN d/t NSAID use , also worsening bilateral hydronephrosis with stent failure may also be contributing. Renal parameters are improving 2 bilateral hydronephrosis status post ureteral stent exchanged. Etiology of hydronephrosis thought to be secondary to obstructive uropathy due to metastatic cervical adenocarcinoma. 3 metastatic cervical adenocarcinoma 4 anemia of chronic disease 5 history of DVT status post IVC filter 6 dyspepsia/GERD 
  
Plan: 
1 encourage po intake 2 counseled regarding no NSAIDs 3 follow renal parameters closely 4 avoid too tight blood pressure control, goal of 540Q systolic 
  
Please call with questions, 
  
Eneida Morrow MD FASN Cell [phone] Pager: [phone] Subjective:  
 
Denies SOB/CP Current Facility-Administered Medications:  
  famotidine (PEPCID) tablet 20 mg, 20 mg, Oral, DAILY, Jose Amador MD, 20 mg at 09/19/19 3645   morphine injection 4 mg, 4 mg, IntraVENous, Q2H PRN, Marian Alvarado MD, 4 mg at 09/19/19 2507   influenza vaccine 2019-20 (6 mos+)(PF) (FLUARIX/FLULAVAL/FLUZONE QUAD) injection 0.5 mL, 0.5 mL, IntraMUSCular, PRIOR TO DISCHARGE, Caridad CHARLES,  
  hydrOXYzine HCl (ATARAX) tablet 10 mg, 10 mg, Oral, Q6H PRN, Megan Villagomez MD 
  ondansetron [facility]) injection 4 mg, 4 mg, IntraVENous, Q4H PRN, Marian Alvarado MD, 4 mg at 09/19/19 1159 Objective:  
 
Visit Vitals /77 (BP 1 Location: Right arm, BP Patient Position: At rest) Pulse 97 Temp 97.6 °F (36.4 °C) Resp 18 Wt 78.8 kg (173 lb 12.8 oz) SpO2 98% Breastfeeding? No  
BMI 28.92 kg/m² Intake/Output Summary (Last 24 hours) at 9/19/2019 1637 Last data filed at 9/19/2019 1345 Gross per 24 hour Intake 1790 ml Output 1550 ml Net 240 ml Physical Exam:  
 
  
NAD 
 HEENT: mmm Lungs: good air entry, clear to auscultation bilaterally. Cardiovascular system: S1, S2, regular rate and rhythm. Abdomen: soft, non tender, non distended. Extremities:left lower ext edema + Integumentary: skin is grossly intact. Neurologic: Alert, oriented time three. Data Review: 
 
Recent Labs  
  09/1950 WBC 10.6 RBC 3.26* HCT 29.1* MCV 89.3 MCH 28.5 MCHC 32.0  
RDW 16.6* Recent Labs  
  09/19/19 
0320 09/18/19 
1405 09/16/19 
1950 BUN 35* 36* 33* CREA 2.82* 3.26* 3.22* CA 8.4* 8.3* 8.8 ALB  --   --  2.9*  
K 4.5 4.5 4.9  138 139  106 106 CO2 25 24 27 GLU 86 114* 93  
 
 
Ale Payan MD

## 2019-09-20 NOTE — PROGRESS NOTES
Patient discharged home in stable condition. AVS reviewed and provided. Copy of signature obtained and placed in chart. Son to provide transportation.

## 2019-09-20 NOTE — PROGRESS NOTES
Progress Note Patient: Patrice Finnegan MRN: [id number]  SSN: [id number] YOB: 1950  Age: 71 y.o. Sex: female Admit Date: 9/16/2019 LOS: 3 days Assessment:  
1. Bilateral hydronephrosis initially managed with PCN/s, then converted to stents  
            NMIZRZ, now converted to [address] 9/17  
2. Metastatic cervical cancer with obstructive uropathy, on chemotherapy Avastin and Topotecan 3. BECK on CKD - 2/2 NSAID use and obstruciton Plan:  
 
Scr continues to improve. DC blackwell. [facility] for DC per . Will sign off. pls contact w any further questions or concerns. Message sent to the office for FU with Dr. Madelyn Flores. Subjective:  
 
Feels improved. No flank pain. Objective:  
 
Vitals:  
 09/19/19 1921 09/20/19 0017 09/20/19 7813 09/20/19 6119 BP: 163/83 141/75 149/83 Pulse: 90 88 87 Resp: 20 20 20 Temp: 98.4 °F (36.9 °C) 98.2 °F (36.8 °C) 98.2 °F (36.8 °C) SpO2: 97% 96% 97% Weight:    136 lb 14.4 oz (62.1 kg) Intake and Output: 
Current Shift: No intake/output data recorded. Last three shifts: 09/18 1901 - 09/20 0700 In: 1050 [P.O.:1050] Out: 2450 [Urine:2450] Current Facility-Administered Medications Medication Dose Route Frequency  famotidine (PEPCID) tablet 20 mg  20 mg Oral DAILY  morphine injection 4 mg  4 mg IntraVENous Q2H PRN  
 influenza vaccine 2019-20 (6 mos+)(PF) (FLUARIX/FLULAVAL/FLUZONE QUAD) injection 0.5 mL  0.5 mL IntraMUSCular PRIOR TO DISCHARGE  hydrOXYzine HCl (ATARAX) tablet 10 mg  10 mg Oral Q6H PRN  
 ondansetron (ZOFRAN) injection 4 mg  4 mg IntraVENous Q4H PRN Physical Exam:  
GENERAL: alert, cooperative, no distress, appears stated age LUNG: unlabored breathing ABDOMEN: soft, non-tender. EXTREMITIES:  extremities normal, atraumatic, no cyanosis or edema SKIN: no jaundice : no CVA tenderness. Blackwell clear yellow Lab/Data Review: 
BMP:  
Lab Results Component Value Date/Time  09/20/2019 03:16 AM  
 K 4.1 09/20/2019 03:16 AM  
  09/20/2019 03:16 AM  
 CO2 26 09/20/2019 03:16 AM  
 AGAP 8 09/20/2019 03:16 AM  
 GLU 90 09/20/2019 03:16 AM  
 BUN 30 (H) 09/20/2019 03:16 AM  
 CREA 2.07 (H) 09/20/2019 03:16 AM  
 GFRAA 29 (L) 09/20/2019 03:16 AM  
 GFRNA 24 (L) 09/20/2019 03:16 AM  
 
CBC:  
Lab Results Component Value Date/Time WBC 13.1 09/19/2019 06:10 PM  
 HGB 8.5 (L) 09/19/2019 06:10 PM  
 HCT 27.3 (L) 09/19/2019 06:10 PM  
  09/19/2019 06:10 PM  
 
COAGS: No results found for: APTT, PTP, INR  
 
 
CT Results: 
Results from Hospital Encounter encounter on 09/13/19 CT ABD PELV WO CONT Narrative EXAM: CT of the Abdomen and Pelvis without IV contrast 
 
CLINICAL INDICATION:  Worsening Renal Failure with Stents in . Metastatic 
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 Sagittal and coronal reformations All CT scans at this facility are performed using dose optimization technique as 
appropriate to a performed exam, to include automated exposure control, 
adjustment of the mA and/or kV according to patient size (including appropriate 
matching for site specific examination) or use of iterative reconstruction 
technique. IV CONTRAST: None ENTERIC CONTRAST: None COMPARISON: 6/19/2019 FINDINGS:  
Limitations: The evaluation of the solid organs is limited due to the lack of IV 
contrast. 
 
Lower Chest: The lower lung fields, there are several pulmonary nodules. A few these appear 
less conspicuous compared with prior imaging suggestive of improvement. Represents an axial image 4 of series 4, there is a 6 mm rim of soft tissue 
which is improved compared to the 6 mm solid nodule. Additional pulmonary 
nodules appear unchanged or improved no effusions identified. . The heart and 
pericardium are unremarkable. Peritoneum: No free air identified. No free fluid present. No fluid collections 
present. Liver: A 1 cm hypodensity is noted in the dome of the right lobe of the liver 
which is similar to prior imaging. A too small to characterize hypodensities 
noted in the inferior portion of the right lobe of the liver. Biliary/Gallbladder: No intrahepatic or extrahepatic biliary ductal dilatation 
appreciated. The gallbladder is unremarkable. No pericholecystic fluid or 
inflammation. Spleen: The spleen is enlarged measuring 12.3 x 14.7 x 8.8 cm. Hypodense foci 
are noted within the spleen concerning for lesions. Correlating with prior 
imaging is mildly limited due to differences in technique. The largest lesion 
approximately measures 6.8 x 7.3 cm which would be decreased in size compared 
prior imaging. However, this evaluation is limited. Pancreas: No focal lesion appreciated. The pancreatic duct is unremarkable. No 
peripancreatic inflammation or adenopathy. : The adrenal glands are unremarkable. No urolithiasis. No perinephric fat 
stranding appreciated. Bilateral ureteral stents are noted. There is 
hydroureteronephrosis of both collecting systems which is mildly progressed 
since prior imaging. No discrete obstructing process identified. The bladder is 
decompressed. A soft tissue mass is noted in the region of the expected uterus 
measuring 3.6 x 6.6 x 6 cm. This is mildly decreased in size compared with prior 
imaging. GI: The stomach is unremarkable. The small bowel is without evidence of 
obstruction. No small bowel wall thickening. The mesentery is without 
inflammation or adenopathy. The appendix is likely surgically absent. No 
pericolonic inflammation or wall thickening appreciated. Aorta and retroperitoneum: No aortic aneurysm seen. Periaortic adenopathy is 
noted in the aortocaval lymph node is noted measuring 1 cm which is minimally improved compared to prior imaging. In the left iliopsoas, there is a likely 
fluid collection. In the region of the right iliopsoas, there is a mass 
measuring 4.2 x 5.3 cm. This is similar to mildly improved compared prior 
imaging. IVC filter is present. Abdominal wall/Inguinal: Nodular changes are noted in the rectus abdominis 
muscles. The largest is in the right is 5.1 x 2.3 cm. This is similar to prior 
imaging. Musculoskeletal: Multilevel degenerative changes and facet arthropathy are 
noted. Impression IMPRESSION:  
1. Increased bilateral hydroureteronephrosis without discrete obstructing 
process. Extensive metastatic disease of the chest, abdomen and pelvis which are similar 
to mildly improved. US Results: 
Results from [place] encounter on 03/06/18 US HEAD NECK SOFT TISSUE Narrative Indication: Increased uptake right thyroid on PET scan. On chemotherapy. Impression IMPRESSION: Solid right thyroid nodules, largest 3.2 cm. Subcentimeter left 
thyroid nodules. Comment: Sonography of the thyroid was performed. No prior studies for 
comparison. The right lobe measures 4.9 x 2.3 x 2.5 cm. It harbors a 3.2 x 1.8 x 2.3 cm 
solid nodule in the midpole and a 1.9 x 1.7 x 2.1 cm lower pole nodule. The left lobe measures 4.6 x 1.0 x 1.3 cm and harbors 2 subcentimeter nodules. The largest measures 8 mm in greatest dimension. The isthmus is normal. 
  
 
 
 
Principal Problem: 
  BECK (acute kidney injury) ([address] (9/17/2019) Active Problems: 
  Cervical cancer ([address] (1/22/2019) Hydronephrosis with ureteral stricture, not elsewhere classified (4/16/2019) UTI (urinary tract infection) (9/17/2019) Signed By: Jessica Hernandez MD , FACS September 20, 2019 PAGER:  [phone] OFFICE:  [phone] [id number] [facility]

## 2019-09-20 NOTE — ROUTINE PROCESS
Bedside and Verbal shift change report given to Adele lOivares RN (oncoming nurse) by Hernando Montgomery RN 
 (offgoing nurse). Report included the following information SBAR, Kardex, Intake/Output, MAR, Recent Results, Med Rec Status and Cardiac Rhythm NSR.

## 2019-09-20 NOTE — ROUTINE PROCESS
Bedside shift change report given to Zeinab (oncoming nurse) by Alexis Plascencia (offgoing nurse). Report included the following information SBAR, Kardex and MAR. Patient sleeping in bed.

## 2019-09-20 NOTE — CDMP QUERY
Patient admitted with ARF/BECK, noted to have CKD documented If possible, please document in progress notes and d/c summary if you are evaluating and/or treating any of the following: - CKD Stage 1 GFR>90 
- CKD Stage 2 GFR 60-90 
- CKD Stage 3 GFR 30-59 
- CKD Stage 4 GFR 15-29 
- CKD Stage 5 GFR<15 
- ESRD 
- Other, please specify 
- Unable to Determine The medical record reflects the following: 
  Risk Factors: GFR's ranging from te teen's to the 20's,back in June20019  the GFR was around the 20's Clinical Indicators: Tx'slabs,renal consult and IV flds Thank you   Candie GRANT   CDI   ext [phone]

## 2019-09-20 NOTE — DISCHARGE SUMMARY
Discharge Summary Patient: Alexandra Weaver               Sex: female          DOA: 9/16/2019 YOB: 1950      Age:  71 y.o.        LOS:  LOS: 3 days Admit Date: 9/16/2019 Discharge Date: 9/20/2019 Primary care physician: Lien Randall MD 
 
Discharge Diagnoses:   
 
1. Acute kidney injury on chronic kidney disease 3, due to obstructive uropathy, and AIN with NSAID use, resolving 2. Bilateral hydronephrosis with failure ureteral stents, NOW with Metal stent exchange 9/17/19 3. Pyuria without UTI, had 2 days of antibiotics 4. Metastatic cervical cancer with obstructive uropathy:  Follows with H/O (Dr. Joya Fournier) 5. Anemia of chronic disease, stable 6. Mild protein calorie malnutrition 7. Hx of DVT s/p IVF filter placement: LE doppler negative for DVT at present 8. Constipation. Discharge Condition: Good Disposition: home Code Status: Full Code Follow up for Primary Care Physician: 
1) please monitor her renal function, she can follow up with Dr. Sim Dickens in 2-3 weeks 2) she needs follow up with Dr. Paco Piedra, urologist in 2-3 weeks as well. 3) she follow up with her oncologist for her Hgb/Hct monitoring. 4) continue to advise her to avoid NSAID use Hospital Course:  
Per HPI, Mrs Islas Friday is a 71 y.o. female with metastatic cervical cancer, CKD3, ureteral obstruction with PCN's and then stent, presented with flank pain, and abnormal lab on outpatient. She was advised to come to ER for evaluation. Her CT abd/pelv showed increased bilateral hydronephrosis with rising creatinine since last stent exchange. Now with nausea. No chills, no fever. No hematuria. No infections. No Frequency, urgency or burning. In the ER, she was treated with antibiotics for infection. Urology followed up with suspicion of stent failure. 9/17/2019: She underwent Procedure:   
1) Cystoscopy 2) Bilateral Retrograde pyelogram with interpretation 3) Bilatearl Ureteral stent exchange [facility] Resonance stents) Findings:   
1). Bladder was normal  
2). Retrograde pyelogram revealed severe bilateral hydronephrosis 3). Ureteral stents placed without complication Nephrology also consulted for BECK, ?obstruction with underlying AIN due to NSAID use. Post stent, her renal function corrected and she had good urinary output through her samaniego. Antibiotic was stop due to less concern for urinary infection. Last 24 Hours: she remains stable, no fever. Constipation resolved. Nausea resolved. Pain improved. Samaniego was removed. She passed her post void trial.  
Creatinine improved. Urology and Nephrology ok for discharge home and follow up outpt She had repeat Urine culture but no growth today ROS: no fever/chills, no headache, no dizziness, no facial pain, no sinus congestion, No swallowing pain, No chest pain, no palpitation, no shortness of breath, no abd pain, No diarrhea, no urinary complaint, no leg pain or swelling VS:  
Visit Vitals /83 (BP 1 Location: Right arm, BP Patient Position: At rest) Pulse 87 Temp 98.2 °F (36.8 °C) Resp 20 Wt 62.1 kg (136 lb 14.4 oz) SpO2 97% Breastfeeding? No  
BMI 22.78 kg/m² Tmax/24hrs: Temp (24hrs), Av.9 °F (36.6 °C), Min:97.3 °F (36.3 °C), Max:98.4 °F (36.9 °C) Intake/Output Summary (Last 24 hours) at 2019 5969 Last data filed at 2019 3910 Gross per 24 hour Intake 1050 ml Output 1900 ml Net -850 ml General:  Cooperative, Not in acute distress, speaks in full sentence while in bed HEENT: PERRL, EOMI, supple neck, no JVD, dry oral mucosa Cardiovascular: S1S2 regular, no rub/gallop Pulmonary: Clear air entry bilaterally, no wheezing, no crackle GI:  Soft, non tender, non distended, +bs, no guarding Extremities:  No pedal edema, +distal pulses appreciated Neuro: AOx3, moving all extremities, no gross deficit. Consults:  
Urology: Dr. Mayra Birmingham Nephrology: Dr. Danny Maki Significant Diagnostic Studies: XR Results (most recent): 
Results from Hospital Encounter encounter on 09/16/19 XR RETROGRADE PYELOGRAM BILAT Narrative Exam: Fluoroscopy for retrograde pyelogram 
 
INDICATION: Cystoscopy with bilateral pyelogram and stent exchange COMPARISON: 8/6/2019 TECHNIQUE: 
Intraoperative fluoroscopy images: 12 Fluoroscopy time: 39 seconds Impression Findings and impression:  
Multiple intraoperative images obtained during exchange of bilateral ureteral 
stents. Bilateral dilated collecting systems noted. Please refer to procedure 
report. CT Results (most recent): 
Results from Hospital Encounter encounter on 09/13/19 CT ABD PELV WO CONT Narrative EXAM: CT of the Abdomen and Pelvis without IV contrast 
 
CLINICAL INDICATION:  Worsening Renal Failure with Stents in . Metastatic 
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 Sagittal and coronal reformations All CT scans at this facility are performed using dose optimization technique as 
appropriate to a performed exam, to include automated exposure control, 
adjustment of the mA and/or kV according to patient size (including appropriate 
matching for site specific examination) or use of iterative reconstruction 
technique. IV CONTRAST: None ENTERIC CONTRAST: None COMPARISON: 6/19/2019 FINDINGS:  
Limitations: The evaluation of the solid organs is limited due to the lack of IV 
contrast. 
 
Lower Chest: The lower lung fields, there are several pulmonary nodules. A few these appear 
less conspicuous compared with prior imaging suggestive of improvement. Represents an axial image 4 of series 4, there is a 6 mm rim of soft tissue 
which is improved compared to the 6 mm solid nodule. Additional pulmonary 
nodules appear unchanged or improved no effusions identified. . The heart and 
pericardium are unremarkable. Peritoneum: No free air identified. No free fluid present. No fluid collections 
present. Liver: A 1 cm hypodensity is noted in the dome of the right lobe of the liver 
which is similar to prior imaging. A too small to characterize hypodensities 
noted in the inferior portion of the right lobe of the liver. Biliary/Gallbladder: No intrahepatic or extrahepatic biliary ductal dilatation 
appreciated. The gallbladder is unremarkable. No pericholecystic fluid or 
inflammation. Spleen: The spleen is enlarged measuring 12.3 x 14.7 x 8.8 cm. Hypodense foci 
are noted within the spleen concerning for lesions. Correlating with prior 
imaging is mildly limited due to differences in technique. The largest lesion 
approximately measures 6.8 x 7.3 cm which would be decreased in size compared 
prior imaging. However, this evaluation is limited. Pancreas: No focal lesion appreciated. The pancreatic duct is unremarkable. No 
peripancreatic inflammation or adenopathy. : The adrenal glands are unremarkable. No urolithiasis. No perinephric fat 
stranding appreciated. Bilateral ureteral stents are noted. There is 
hydroureteronephrosis of both collecting systems which is mildly progressed 
since prior imaging. No discrete obstructing process identified. The bladder is 
decompressed. A soft tissue mass is noted in the region of the expected uterus 
measuring 3.6 x 6.6 x 6 cm. This is mildly decreased in size compared with prior 
imaging. GI: The stomach is unremarkable. The small bowel is without evidence of 
obstruction. No small bowel wall thickening. The mesentery is without 
inflammation or adenopathy. The appendix is likely surgically absent. No 
pericolonic inflammation or wall thickening appreciated. Aorta and retroperitoneum: No aortic aneurysm seen. Periaortic adenopathy is 
noted in the aortocaval lymph node is noted measuring 1 cm which is minimally improved compared to prior imaging. In the left iliopsoas, there is a likely 
fluid collection. In the region of the right iliopsoas, there is a mass 
measuring 4.2 x 5.3 cm. This is similar to mildly improved compared prior 
imaging. IVC filter is present. Abdominal wall/Inguinal: Nodular changes are noted in the rectus abdominis 
muscles. The largest is in the right is 5.1 x 2.3 cm. This is similar to prior 
imaging. Musculoskeletal: Multilevel degenerative changes and facet arthropathy are 
noted. Impression IMPRESSION:  
1. Increased bilateral hydroureteronephrosis without discrete obstructing 
process. Extensive metastatic disease of the chest, abdomen and pelvis which are similar 
to mildly improved. Operative Note Patient: Stevenson Pollack               Sex: female             MRN: [id number] YOB: 1950      Age:  71 y.o.           
  
Preoperative Diagnosis: Hydronephrosis, unspecified hydronephrosis type [N13.30] 
  Postoperative Diagnosis:  Hydronephrosis, unspecified hydronephrosis type [N13.30] 
  
Surgeon: Daren Muhammad MD 
 
Fellow: Rachel García MD 
  
Indication: This is a 70 y/o female with a history of cervical cancer and resultant bilateral ureteral obstruction, admitted with acute renal insufficiency and persistent hydronephrosis despite appropriately positioned ureteral stents. Here today for ureteral stent exchange. 
  
Procedure:   
1) Cystoscopy 2) Bilateral Retrograde pyelogram with interpretation 3) Bilatearl Ureteral stent exchange [employer] Resonance stents)  
  Findings:   
1). Bladder was normal  
2). Retrograde pyelogram revealed severe bilateral hydronephrosis 3). Ureteral stents placed without complication  
  
Narrative of Events: The patient was brought to the operative suite. Anesthesia was induced and preoperative antibiotics were administered.  They were then placed in the dorsal lithotomy position and their external genitalia was prepped and draped in the usual fashion. A surgical timeout was performed confirming the patient's name, date of birth, laterality, and antibiotics. All were in agreement. A 22 Sami cystourethroscope was then inserted into the patients bladder. The urethra revealed no abnormalities. Thorough cystoscopy was performed with the 30 degree lens. The J stents were seen emanating from the ureteral orifices with appropriate coils in the bladder. We started on the right side by passing a 0.035\" Sensor wire alongside the stent. The stent was then removed with the rigid grasper. A tiger tail catheter was passed over the wire and the wire was removed. Hydronephrotic drip was noted. Retrograde pyelography demonstrated severe left hydronephrosis. The ureteral length was measured at 24 cm. The guidewire was replaced. A 6F x 24 cm [employer] Resonance stent was deployed in standard fashion. Good coil confirmed in the right renal pelvis via fluoroscopy and good coil in the bladder under direct vision. We then turned our attention to the left side. The distal coil of the left ureteral stent was grasped and pulled back to the meatus. A 0.035\" guidewire advanced through the stent and the stent was removed. A Tiger tail catheter passed over the wire, left ureteral length measured at 24 cm. The wire was removed. Hydronephrotic drip was noted. Retrograde pyelography showed severe left hydronephrosis. The guidewire was replaced. A 6F x 24 cm [employer] Resonance Stent was deployed in the standard fashion with an excellent curl in the bladder and renal pelvis. The scope was removed. A 16F Blackwell catheter placed for pink tinged urine. The patient was then awoken and transferred to the recovery room in stable condition.  
  
Estimated Blood Loss: <5CC  
  
Anesthesia:  General        
        
Implants: Implant Name Type Inv. Item Serial No.  Lot No. LRB No. Used Action Baldev Moat DBL PGTL 6FR 24CM -- [id number] - [id number]   Bal[employer] DBL PGTL [id number] -- [id number]   [facility] [id number] Right 1 Implanted Baldev Moat DBL PGTL 6FR 24CM -- [id number] - [id number]   STENT URET DBL PGTL 6FR 24CM -- [id number]   [facility] [id number] Left 1 Implanted  
  
  
Specimens: * No specimens in log *  
  
Drains: 16F Blackwell Complications:  None Counts: Sponge and needle counts were correct times two. 
  
Alejandra Malin MD 
9/17/2019 Lab/Data Review: 
Labs: Results:  
   
Chemistry Recent Labs  
  09/20/19 
0316 09/19/19 
0320 09/18/19 
1405 GLU 90 86 114*  138 138  
K 4.1 4.5 4.5  
 105 106 CO2 26 25 24 BUN 30* 35* 36* CREA 2.07* 2.82* 3.26* CA 8.1* 8.4* 8.3* AGAP 8 8 8 BUCR 14 12 11* CBC w/Diff Recent Labs  
  09/19/19 
1810 WBC 13.1 RBC 3.02* HGB 8.5* HCT 27.3*  
 Coagulation No results for input(s): PTP, INR, APTT in the last 72 hours. No lab exists for component: INREXT, INREXT Iron/Ferritin No results for input(s): IRON in the last 72 hours. No lab exists for component: TIBCCALC BNP No results for input(s): BNPP in the last 72 hours. Cardiac Enzymes No results for input(s): CPK, CKND1, SHOLA in the last 72 hours. No lab exists for component: Eliseo Curd Liver Enzymes No results for input(s): TP, ALB, TBIL, AP, SGOT, GPT in the last 72 hours. No lab exists for component: DBIL Thyroid Studies No results for input(s): T4, T3U, TSH, TSHEXT, TSHEXT in the last 72 hours. No lab exists for component: T3RU All Micro Results Procedure Component Value Units Date/Time CULTURE, URINE [[id number]] Collected:  09/19/19 3224 Order Status:  Completed Specimen:  Cath Urine Updated:  09/19/19 1917  Medications at discharge  including reasons for change and indications for new ones:  
Current Discharge Medication List  
  
CONTINUE these medications which have NOT CHANGED Details  
cyanocobalamin (VITAMIN B12) 100 mcg tablet Take 100 mcg by mouth daily. famotidine (PEPCID) 20 mg tablet Take 20 mg by mouth daily. Indications: gastroesophageal reflux disease  
  
multivitamin (ONE A DAY) tablet Take 1 Tab by mouth daily. acetaminophen (TYLENOL) 500 mg tablet Take 1,000 mg by mouth every six (6) hours as needed for Pain. b complex vitamins (B COMPLEX 1) tablet Take 1 Tab by mouth daily. bisacodyl (DULCOLAX) 10 mg suppository Insert 10 mg into rectum daily as needed. Qty: 10 Suppository, Refills: 0 LORazepam (ATIVAN) 0.5 mg tablet Take 1 Tab by mouth nightly. Max Daily Amount: 0.5 mg. Indications: anxious, chronic trouble sleeping 
Qty: 20 Tab, Refills: 1 Associated Diagnoses: Endometrial cancer ([facility] Drug-induced insomnia ([facility] [address] STOP taking these medications  
  
 ibuprofen 100 mg tablet Comments:  
Reason for Stopping:   
   
  
 
       
 
Pending laboratory work and tests: urine culture Activity: Activity as tolerated Diet: Cardiac Diet and Low fat, Low cholesterol Wound Care: None needed Laurence Woodward MD 
9/20/2019 
8:46 AM

## 2019-09-20 NOTE — PROGRESS NOTES
In Patient Progress note Admit Date: 9/16/2019 Impression:  
 
1 Acute kidney injury on chronic kidney disease stage III,  
etiology appears multifactorial , AIN d/t NSAID use , also worsening  
bilateral hydronephrosis with stent failure may also be contributing. Renal parameters are improving 2 bilateral hydronephrosis status post ureteral stent exchanged. Etiology of hydronephrosis 
 thought to be secondary to obstructive uropathy due to metastatic cervical adenocarcinoma. 3 metastatic cervical adenocarcinoma 4 anemia of chronic disease 5 history of DVT status post IVC filter 6 dyspepsia/GERD 
  
Plan: 
1 encourage po intake 2 counseled regarding no NSAIDs 3 follow renal parameters closely 4 avoid too tight blood pressure control, goal of 506K systolic Ok to discharge from renal stand point F/U in few weeks in office 
  
Please call with questions, 
  
Angel Savage MD FASN Cell [phone] Pager: [phone] Subjective:  
 
Denies SOB/CP Current Facility-Administered Medications:  
  famotidine (PEPCID) tablet 20 mg, 20 mg, Oral, DAILY, Jose Amador MD, 20 mg at 09/20/19 3068   morphine injection 4 mg, 4 mg, IntraVENous, Q2H PRN, Jose Callejas MD, 4 mg at 09/19/19 2155   influenza vaccine 2019-20 (6 mos+)(PF) (FLUARIX/FLULAVAL/FLUZONE QUAD) injection 0.5 mL, 0.5 mL, IntraMUSCular, PRIOR TO DISCHARGE, Timmy CHARLES DO 
  hydrOXYzine HCl (ATARAX) tablet 10 mg, 10 mg, Oral, Q6H PRN, Robert Martinez MD 
  ondansetron [facility]) injection 4 mg, 4 mg, IntraVENous, Q4H PRN, Jose Callejas MD, 4 mg at 09/19/19 2929 Objective:  
 
Visit Vitals /87 (BP 1 Location: Left arm, BP Patient Position: At rest) Pulse 99 Temp 98.3 °F (36.8 °C) Resp 18 Wt 62.1 kg (136 lb 14.4 oz) SpO2 98% Breastfeeding? No  
BMI 22.78 kg/m² Intake/Output Summary (Last 24 hours) at 9/20/2019 1031 Last data filed at 9/20/2019 7507 Gross per 24 hour Intake 280 ml Output 2150 ml Net -1870 ml Physical Exam:  
 
NAD HEENT: mmm Lungs: good air entry, clear to auscultation bilaterally. Cardiovascular system: S1, S2, regular rate and rhythm. Abdomen: soft, non tender, non distended. Extremities:left lower ext edema + Integumentary: skin is grossly intact. Neurologic: Alert, oriented time three. Data Review: 
 
Recent Labs  
  09/19/19 
1810 WBC 13.1 RBC 3.02* HCT 27.3*  
MCV 90.4 MCH 28.1 MCHC 31.1 RDW 16.7* Recent Labs  
  09/20/19 
0316 09/19/19 
0320 09/18/19 
1405 BUN 30* 35* 36* CREA 2.07* 2.82* 3.26* CA 8.1* 8.4* 8.3*  
K 4.1 4.5 4.5  138 138  105 106 CO2 26 25 24 GLU 90 86 114* Chito Nicholas MD

## 2019-09-20 NOTE — PROGRESS NOTES
D/C orders received. No needs identified by . Chart reviewed. Pt will be transported home by Son .  available as needed. Alyse Guaman, RN BSN Care Manager [phone]

## 2019-09-23 NOTE — DISCHARGE INSTRUCTIONS
DISCHARGE SUMMARY from Nurse    PATIENT INSTRUCTIONS:    After general anesthesia or intravenous sedation, for 24 hours or while taking prescription Narcotics:  · Limit your activities  · Do not drive and operate hazardous machinery  · Do not make important personal or business decisions  · Do  not drink alcoholic beverages  · If you have not urinated within 8 hours after discharge, please contact your surgeon on call. Report the following to your surgeon:  · Excessive pain, swelling, redness or odor of or around the surgical area  · Temperature over 100.5  · Nausea and vomiting lasting longer than 4 hours or if unable to take medications  · Any signs of decreased circulation or nerve impairment to extremity: change in color, persistent  numbness, tingling, coldness or increase pain  · Any questions    What to do at Home:  Recommended activity: Activity as tolerated,     If you experience any of the following symptoms Fever Greater  Patient Education        Learning About Continuous Renal Replacement Therapy (CRRT)  What is CRRT? Continuous renal replacement therapy (CRRT) is a type of dialysis. Dialysis does the work of your kidneys when you have a serious kidney injury (also known as acute renal failure). You get CRRT for several days or weeks. It filters wastes, such as urea, from the blood. It also removes extra fluid from your body. It helps restore the right balance of chemicals in the blood. You may stay in intensive care while you get CRRT. Why is it used? You may get CRRT if your kidneys aren't working the way they should and you need a slower, gentler type of dialysis. CRRT may be used for kidney failure from an injury, an illness, or a reaction to medicine. Other organs such as the liver, heart, and lungs may not work as well either. Shock can cause this. So can sepsis, a serious reaction to an infection. CRRT filters your blood over longer periods of time than other types of dialysis.  It can also filter out more wastes and fluids than other types. How is it done? You will be connected to a CRRT machine to filter your blood. Your doctor will place a tube (catheter) in a vein in your neck or groin. It will connect to the dialyzer on the CRRT machine. That's where waste products and extra fluid are removed. Your blood is slowly pumped from your body into the dialyzer. Your filtered blood is then pumped back into your body. Follow-up care is a key part of your treatment and safety. Be sure to make and go to all appointments, and call your doctor if you are having problems. It's also a good idea to know your test results and keep a list of the medicines you take. Where can you learn more? Go to http://kj-ron.info/. Enter C225 in the search box to learn more about \"Learning About Continuous Renal Replacement Therapy (CRRT). \"  Current as of: October 31, 2018  Content Version: 12.2  © 1018-3994 [employer]. Care instructions adapted under license by [employer] (which disclaims liability or warranty for this information). If you have questions about a medical condition or this instruction, always ask your healthcare professional. [address] any warranty or liability for your use of this information. , please follow up with ***. *  Please give a list of your current medications to your Primary Care Provider. *  Please update this list whenever your medications are discontinued, doses are      changed, or new medications (including over-the-counter products) are added. *  Please carry medication information at all times in case of emergency situations. These are general instructions for a healthy lifestyle:    No smoking/ No tobacco products/ Avoid exposure to second hand smoke  Surgeon General's Warning:  Quitting smoking now greatly reduces serious risk to your health.     Obesity, smoking, and sedentary lifestyle greatly increases your risk for illness    A healthy diet, regular physical exercise & weight monitoring are important for maintaining a healthy lifestyle    You may be retaining fluid if you have a history of heart failure or if you experience any of the following symptoms:  Weight gain of 3 pounds or more overnight or 5 pounds in a week, increased swelling in our hands or feet or shortness of breath while lying flat in bed. Please call your doctor as soon as you notice any of these symptoms; do not wait until your next office visit. The discharge information has been reviewed with the {PATIENT PARENT GUARDIAN:84615}. The {PATIENT PARENT GUARDIAN:09271} verbalized understanding. Discharge medications reviewed with the {Dishcarge meds reviewed KKXU:38460} and appropriate educational materials and side effects teaching were provided. ___________________________________________________________________________________________________________________________________  Discharge Instructions    Patient: Patrice Finnegan MRN: [id number]  CSN: [id number]    YOB: 1950  Age: 71 y.o. Sex: female    DOA: 9/16/2019 LOS:  LOS: 3 days   Discharge Date:      ACUTE DIAGNOSES:  1. Acute kidney injury on chronic kidney disease 3, due to obstructive uropathy, resolving  2. Bilateral hydronephrosis with failure ureteral stents, NOW with Metal stent exchange 9/17/19  3. Pyuria without UTI, had 2 days of antibiotics. 4.  Metastatic cervical cancer with obstructive uropathy:  Follows with H/O (Dr. Shadi Alford)  5. Anemia of chronic disease, stable   6. Mild protein calorie malnutrition  7. Hx of DVT s/p IVF filter placement: LE doppler negative for DVT at present  8. Constipation. DISCHARGE MEDICATIONS:       · It is important that you take the medication exactly as they are prescribed.    · Keep your medication in the bottles provided by the pharmacist and keep a list of the medication names, dosages, and times to be taken in your wallet. · Do not take other medications without consulting your doctor. DIET:  Regular Diet    ACTIVITY: Activity as tolerated    ADDITIONAL INFORMATION: If you experience any of the following symptoms then please call your primary care physician or return to the emergency room if you cannot get hold of your doctor: Fever, chills, nausea, vomiting, diarrhea, change in mentation, falling, bleeding, shortness of breath. FOLLOW UP CARE:  Dr. Gokul Ferreira MD  you are to call and set up an appointment to see them in 5-7 days. Follow-up with Dr. Ashley Kang, Urology in 2-3 weeks  Follow up with your Oncologist as scheduled       Information obtained by :  I understand that if any problems occur once I am at home I am to contact my physician. I understand and acknowledge receipt of the instructions indicated above.                                                                                                                                            Physician's or R.N.'s Signature                                                                  Date/Time                                                                                                                                              Patient or Representative Signature                                                          Date/Time    Shelley Carroll MD  9/20/2019  8:35 AM

## 2019-09-25 NOTE — LETTER
9/25/19 Patient: Delores London YOB: 1950 Date of Visit: 9/25/2019 Sahara Zuniga MD 
[address] [place] [address] [place] [address] VIA Facsimile: [phone] Dear Sahara Zuniga MD, Thank you for referring Ms. Phyllis Hewitt to [facility] for evaluation. My notes for this consultation are attached. If you have questions, please do not hesitate to call me. I look forward to following your patient along with you. Sincerely, Nikkie Cramer MD

## 2019-09-25 NOTE — PATIENT INSTRUCTIONS
Cervical Cancer: Care Instructions  Your Care Instructions    Cervical cancer occurs when abnormal cells on the cervix grow out of control. These cells may spread to nearby organs, lymph glands, or distant organs. The cervix is the lower part of the uterus that opens into the vagina. This form of cancer can be cured most of the time, especially when found at an early stage. It is usually found at a very early stage through a Pap smear. If the cancer is in an early stage, you may need to have only a small part of the cervix removed. This type of surgery may allow a woman to become pregnant later. In other cases, removal of the cervix and uterus (hysterectomy) may be the better choice. Treatment also may include radiation or chemotherapy. You may get medicines to help with chemotherapy or radiation side effects, such as nausea and vomiting or tiredness. Finding out that you have cancer is scary. You may feel many emotions and may need some help coping. Seek out family, friends, and counselors for support. You also can do things at home to make yourself feel better while you go through treatment. Call the [facility] ([phone]) or visit its website at [address] for more information. Follow-up care is a key part of your treatment and safety. Be sure to make and go to all appointments, and call your doctor if you are having problems. It's also a good idea to know your test results and keep a list of the medicines you take. How can you care for yourself at home? · Take your medicines exactly as prescribed. Call your doctor if you think you are having a problem with your medicine. You may get medicine for nausea and vomiting if you have these side effects. · Eat healthy food. If you do not feel like eating, try to eat food that has protein and extra calories to keep up your strength and prevent weight loss. Drink liquid meal replacements for extra calories and protein.  Try to eat your main meal early. · Get some physical activity every day, but do not get too tired. Keep doing the hobbies you enjoy as your energy allows. · Take steps to control your stress and workload. Learn relaxation techniques. ? Share your feelings. Stress and tension affect our emotions. By expressing your feelings to others, you may be able to understand and cope with them. ? Consider joining a support group. Talking about a problem with your spouse, a good friend, or other people with similar problems is a good way to reduce tension and stress. ? Express yourself through art. Try writing, dance, art, or crafts to relieve tension. Some dance, writing, or art groups may be available just for people who have cancer. ? Be kind to your body and mind. Getting enough sleep, eating a healthy diet, and taking time to do things you enjoy can contribute to an overall feeling of balance in your life and help reduce stress. ? Get help if you need it. Discuss your concerns with your doctor or counselor. · If you are vomiting or have diarrhea:  ? Drink plenty of fluids (enough so that your urine is light yellow or clear like water) to prevent dehydration. Choose water and other caffeine-free clear liquids. If you have kidney, heart, or liver disease and have to limit fluids, talk with your doctor before you increase the amount of fluids you drink. ? When you are able to eat, try clear soups, mild foods, and liquids until all symptoms are gone for 12 to 48 hours. Other good choices include dry toast, crackers, cooked cereal, and gelatin dessert, such as Jell-O.  ? Take care of your urinary tract to prevent problems such as infection, which can be caused by cervical cancer and its treatment. Limit drinks with caffeine, drink plenty of fluids, and urinate every 3 or 4 hours. · If you have not already done so, prepare a list of advance directives.  Advance directives are instructions to your doctor and family members about what kind of care you want if you become unable to speak or express yourself. When should you call for help? Call 911 anytime you think you may need emergency care. For example, call if:    · You passed out (lost consciousness).    Call your doctor now or seek immediate medical care if:    · You have a fever.     · You have abnormal bleeding.     · You think you have an infection.     · You have new or worse pain.     · You have new symptoms, such as a cough, belly pain, vomiting, diarrhea, or a rash.    Watch closely for changes in your health, and be sure to contact your doctor if:    · You are much more tired than usual.     · You have swollen glands in your armpits, groin, or neck.     · You do not get better as expected. Where can you learn more? Go to http://kj-ron.info/. Enter F505 in the search box to learn more about \"Cervical Cancer: Care Instructions. \"  Current as of: December 19, 2018  Content Version: 12.2  © 6015-3970 [employer]. Care instructions adapted under license by [employer] (which disclaims liability or warranty for this information). If you have questions about a medical condition or this instruction, always ask your healthcare professional. [address] any warranty or liability for your use of this information.

## 2019-09-25 NOTE — PROGRESS NOTES
[facility]  [address] [place] [address], [address], [address] [facility]  [address] [facility], [address] [place] [address]  [place], [address]   [phone]  Fernanda Farah DO      Patient ID:  Name:  Moraima Hoang  MRN:  [id number]  :  1950/69 y.o. Date:  2019      HISTORY OF PRESENT ILLNESS:  Moraima Hoang is a 71 y.o.   postmenopausal female self-referred for Stage IVB Grade 2 endometrioid adenocarcinoma, favor cervical primary. She initially presented with complaint of PMB x8-10 months to PCP. She was referred to gyn Dr. Christopher Taylor, who referred to Dr. Leslie Sandoval for large cervical mass. Last pap 17 years prior. Biopsy of cervical mass 17 showed adenocarcinoma, most c/w endometrioid adenocarcinoma, with initial staining suggesting endometrial origin. Patient had MRI of pelvis 17, which showed BL parametrial involvement, L>R, cancer extending from the large cervical mass into fundus and anterior 1/3 of vagina, no obvious adenopathy. PETCT 17 suspicious for metastatic disease to left ovary, omentum, liver report scanned in media. Patient is s/p EUA, cysto, sigmoidoscopy 17, medial left parametrial involvement, no tumor in bladder or rectosigmoid colon. She is s/p exploratory laparotomy with radical hysterectomy, BSO, resection of omental mass 9/15/17, pathology consistent with metastatic endometrioid adenocarcinoma, favor cervical primary, pathology report scanned in media. Patient is s/p 6 cycles Carbo/Taxol, completed 2018. Most recent PETCT 18 demonstrated new uptake in small nodular densities adjacent to cecum concerning for metastatic disease, increasing uptake in right thyroid gland concerning for neoplasm, as well as interval response to prior uptake. She was referred to Dr. Elisa Enriquez for further workup, suggestive of goiters, she has follow up in a few months.    S/p cycles #2 of topotecan/avastin completed 2/22/2019. S/p abscess managed by dr. Gallito Enriquez. Has resumed chemo s/p cycle #4 of topotecan completed on 8/24/2019. Admitted to [facility] with acute on chronic renal failure s/p stent exchange. Here for f/u. Feeling fatigued. Has occasional N/V. Pathology:  9/15/17  Metastatic endometrioid adenocarcinoma, favor cervical primary      Labs:  Component      Latest Ref Rng & Units 9/16/2019           3:04 PM   CA-125      1.5 - 35.0 U/mL 113 (H)     Component      Latest Ref Rng & Units 7/29/2019           9:21 AM   CA-125      1.5 - 35.0 U/mL 223 (H)     Component       Cancer Ag (CA) 125   Latest Ref Rng & Units       0.0 - 38.1 U/mL   2/18/2019      9:30 AM 8.7   1/28/2019      10:05 .7 (H)   11/14/2018      4:40 .0 (H)   10/3/2018      3:45 .0 (H)   6/18/2018      1:13 PM 45 (H)   5/16/2018      8:20 AM 27   4/23/2018      12:33 PM 17   3/23/2018      2:44 PM 16   2/23/2018      5:14 PM 15   1/18/2018      3:56 PM 14   12/21/2017      10:42 AM 15   12/7/2017      1:53 PM 16   9/11/2017      11:30  (H)       Imaging  PETCT  11/23/2018  FINDINGS:       PET/CT:     Reference data:     The mediastinal blood pool SUV max value = 1.96. The liver SUV Max value  = 2.19  .      HEAD/NECK:  There is a hypermetabolic adenopathy identified in the right  supraclavicular region, measuring 1.5 x 1.7 cm, SUV Max = 7.11.] It was 7 x 9 mm  and not hypermetabolic on prior study. No additional adenopathy or  hypermetabolic activity seen in the neck. CHEST: There is interval development of multiple nodular lesions scattered in  both lungs, most likely suggestive of lung metastasis. -The dominant nodule in left lung is in medial anterior left upper lobe  measuring 1.0 x 1.4 cm, #70.  Mild hypermetabolic activity noted, SUV Max = 1.7.   -The second largest nodule in left lower lobe infrahilar region measures 8 x 8  mm on #85, SUV Max = 1.7.   -The dominant mass in left lung measures 9 x 9 mm at lateral right lower lobe,  SUV Max = 1.0, #87 .   -The second 8 x 9 mm solid nodule at posterior right lower lobe, SUV Max = 1.0,  #87.   -There is 6 x 9 mm small cavitary lesion with thickened wall at anterior right  upper lobe, #69 and SUV Max = 1.3.   -Multiple other 3-5 mm nodules also scattered in both lungs, too small for  accurate evaluation on PET.     No mediastinal or axillary adenopathy or hypermetabolic chest wall finding. ABDOMEN/PELVIS:  There is a large mass with central necrosis identified in the  left subphrenic region, most likely arising from the spleen and measures 10.2 x  14.3 x 12.5 cm. There is significant hypermetabolic activity identified along  the periphery of the mass, SUV Max = 8.79. The mass was much smaller and barely  visible on prior CT but hypermetabolic on prior PET scan, roughly measured 1.8 x  2.5 cm, SUV Max was 4.47. The second exophytic mass also developed at posterior  aspect of the spleen abutting the dominant mass which measures 4.1 x 5.1 x 6.0  cm, SUV Max = 6.99. The mass is tightly abutting the greater curvature of  gastric wall. Direct invasion cannot be entirely excluded.      There is a large ill-defined soft tissue mass identified in the midline pelvic  floor and roughly measures 8.2 x 9.9 cm with intensive FDG uptake, SUV Max =  14.2. This is new finding since the prior study as most likely recurrent  malignancy.     There are multiple areas of hypermetabolic soft tissue masses identified in the  retroperitoneal regions, the largest masses are abutting and invading the  bilateral iliopsoas muscles, measuring 5.3 x 7.1 cm and SUV Max = 13.4 on the  left and 5.7 x 6.3 cm on the right with SUV Max of 10.6. There is moderate  adjacent large soft tissue mass abutting the lateral right iliac crest measuring  5.0 x 6.2 cm, SUV Max = 9.6.     Multiple hypermetabolic nodular lesions also identified scattered in the  peritoneal cavity and compatible with carcinomatosis. The largest two are 2.4 x  3.0 cm, SUV Max = 9.1 in the lateral right pericolic gutter and 2.5 x 4.3 cm  with SUV max of 13.2 at anterior right pelvic cavity abutting the peritoneum. Multiple other smaller hypermetabolic foci also scattered in the peritoneal  cavities.     Mild retroperitoneal adenopathy also developed, the largest is 1.3 x 1.5 cm in  caval aortic region, SUV Max = 6.9. Multiple hypermetabolic lymph nodes also  seen in the bilateral pelvic sidewalls.     BONES:  No malignant FDG activity.      ADDITIONAL CT FINDINGS:    (Exam is limited due to lack of intravenous contrast.)     Moderate interstitial lung process again seen.      Interval development of moderate bilateral hydronephrosis and hydroureter up to  the very distal portions in the pelvis, most likely due to extrinsic compression  by large pelvic mass. The bladder is poorly distended. Mild fatty stranding in  the pelvis.     IMPRESSION  IMPRESSION:     1. Interval development of multiple hypermetabolic nodular lesions in bilateral  lungs, most likely consistent with lung metastasis.     2. Interval development of large ill-defined soft tissue mass in the surgical  bed in the pelvis, most consistent with recurrent malignancy.     3. Interval development of 2 large necrotic masses in the hilum of spleen with  malignant FDG uptake. Multiple large soft tissue masses also seen within and  abutting the iliopsoas muscles along with multiple hypermetabolic nodular  lesions in the hernial cavities, most likely suggesting metastasis.     4. Interval development of mild adenopathy in pelvic sidewalls, retroperitoneal  regions as wall as right supraclavicular region as most likely becyk metastasis.     5. Interval development of moderate bilateral hydronephroses and hydroureters  all the way to the very distal ureters, most likely due to extrinsic compression  by pelvic mass. Local invasion cannot be entirely excluded.   5/16/18  Thyroid   Scanned in media    3/6/18  US head/neck      3/12/18  CT Chest  Scanned in media   (r/o PE) delayed thryoid uptake 8 weeks after    18  PETCT  Scanned in media    17  MRI pelvis  Scanned in media    17  MRI abdomen  Scanned in media        ROS:   As above      Patient Active Problem List    Diagnosis Date Noted    UTI (urinary tract infection) 2019    BECK (acute kidney injury) ([address] 2019    Pelvic abscess in female 2019    Hydronephrosis with ureteral stricture, not elsewhere classified 2019    Colon perforation ([address] 2019    Retroperitoneal abscess ([address] 2019    DVT (deep venous thrombosis) ([address] 2019    Cervical cancer ([address] 2019    ARF (acute renal failure) ([address] 2018    Severe obesity (BMI 35.0-39.9) 2018    Endometrial ca ([address] 09/15/2017     Past Medical History:   Diagnosis Date    Acute kidney failure ([address] 2019    BMI 34.0-34.9,adult     34.8    Caffeine adverse reaction     elevated BP    Cancer (HCC)     uterine, cervical    Cervical cancer (HCC)     Chronic kidney disease     acute kidney failure    Chronic sinusitis     Dizziness     Endometriosis     Hiatal hernia     High cholesterol     Palpitation     PMB (postmenopausal bleeding)     Rash     eczemz    Thromboembolus ([address] 03/15/2019    IVC filter placed    Urinary incontinence       Past Surgical History:   Procedure Laterality Date    HX ABDOMINAL LAPAROSCOPY      HX BILATERAL SALPINGO-OOPHORECTOMY Bilateral     HX BREAST LUMPECTOMY Right 1973    benign    HX HYSTERECTOMY      radical    HX OTHER SURGICAL  2017    Exam under anesthesia: Cystoscopy, sigmoidoscopy    HX UROLOGICAL      nephrostomy tubes and stents    IR CHANGE EXIST CATHETER/TUBE LT  3/7/2019    IR MEDIPORT      VASCULAR SURGERY PROCEDURE UNLIST      filter       OB History        2    Para   2    Term   2            AB        Living   2       SAB TAB        Ectopic        Molar        Multiple        Live Births   2              Social History     Tobacco Use    Smoking status: Never Smoker    Smokeless tobacco: Never Used   Substance Use Topics    Alcohol use: No      Family History   Problem Relation Age of Onset    Cancer Mother         left ovary      Current Outpatient Medications   Medication Sig    cyanocobalamin (VITAMIN B12) 100 mcg tablet Take 100 mcg by mouth daily.  famotidine (PEPCID) 20 mg tablet Take 20 mg by mouth daily. Indications: gastroesophageal reflux disease    LORazepam (ATIVAN) 0.5 mg tablet Take 1 Tab by mouth nightly. Max Daily Amount: 0.5 mg. Indications: anxious, chronic trouble sleeping    multivitamin (ONE A DAY) tablet Take 1 Tab by mouth daily.  acetaminophen (TYLENOL) 500 mg tablet Take 1,000 mg by mouth every six (6) hours as needed for Pain.  b complex vitamins (B COMPLEX 1) tablet Take 1 Tab by mouth daily.  bisacodyl (DULCOLAX) 10 mg suppository Insert 10 mg into rectum daily as needed. No current facility-administered medications for this visit. Allergies   Allergen Reactions    Other Food Nausea and Vomiting     Artificial sweeteners    Neulasta [Pegfilgrastim] Swelling    Aspirin Other (comments)     Gi upset    Avastin [Bevacizumab] Other (comments)     Bowel perforation    Tetracycline Unknown (comments)     Patient does not remember, reaction many years ago    Wheat Atopic Dermatitis     eczema    Other Plant, Animal, Environmental Other (comments)     Pt states she has \"springtime grass allergies. \"     Reports reaction: Sinus infection          OBJECTIVE:    Physical Exam  VITAL SIGNS: Visit Vitals  /86 (BP 1 Location: Left arm, BP Patient Position: Sitting)   Pulse (!) 120   Temp 97 °F (36.1 °C) (Oral)   Wt 73 kg (160 lb 14.4 oz)   BMI 26.78 kg/m²      GENERAL CRISTINO: in no apparent distress and well developed and well nourished   MUSCULOSKEL: no joint tenderness, deformity or swelling   INTEGUMENT:  warm and dry, no rashes or lesions   ABDOMEN . soft, NT, ND, No masses appreciated   EXTREMITIES: extremities normal, atraumatic, no cyanosis or edema   PELVIC: Exam deferred. RECTAL: deferred   LOKI SURVEY: Cervical, supraclavicular, axillary and inguinal nodes normal.   NEURO: Grossly normal         IMPRESSION/PLAN:  1. Recurrent tage IVB endometrioid cervical cancer vs. Endometrial cancer   -previously reviewed blood work and PETCT c/w recurrent disease   -previously discussed options of chemotherapy, avastin and hormonal therapy   -Initially patient reluctant to do chemo but has decided to go ahead with chemo   -d/c hormonal treatment   -will plan on topotecan 1.0 mg/m2 D1-D5, Avastin 15 mg/kg D1 every 21 days until CCR, Toxicity, or progression. Given bowel perforation will d/c avastin from regimen and plan to treat with topotecan alone.   -hydronephrosis- s/p b/l nephrostomy tube placement, for stents on 4/16   -s/p cycle #4   -ok for cycle #5 pending improvement in renal function   -fatigue/dehydration- will add hydration to regimen   -reinforced poor prognosis   -plan for 3 cycles and repeat imaging   -hypertension- improved   -will attempt to send tumor for [facility]   -labs reviewed.   increased   -all of ms. pruett's questioins/concerns addressed    The total time spent was 40 minutes regarding this patients diagnosis of cervical cancer and >50% of this time was spent counseling and coordinating care    [address] [facility]  9/25/20191:28PM

## 2019-10-01 NOTE — PROGRESS NOTES
[facility] OPI Progress Note Date: 2019 Name: Betty Buitrago MRN: [id number] : 1950 Hydration Assumed report from [address] [place] [address] at 1500. Ms. Molly Kumar was assessed and education was provided. Ms. Hewitt's vitals were reviewed. Visit Vitals /89 (BP 1 Location: Left arm, BP Patient Position: Sitting) Pulse 98 Temp 97.6 °F (36.4 °C) Resp 16 SpO2 100% Breastfeeding? No  
 
Patient had a total of 1 liter of NS over an hour and half. Ms. Molly Kumar tolerated infusion, and had no complaints at this time. Mediport flushed with NS 20 ml and Heparin 500 units then remained accessed for tomorrows treatment. Patient armband removed and shredded. Ms. Molly Kumar was discharged from [place] [address] in stable condition at 1530. She is to return on 10/7/19 at 1400 for her next  Topotecan appointment. Donnell Blank 2019 
1150

## 2019-10-01 NOTE — PROGRESS NOTES
[facility] OPIC Progress Note Date: 2019 Name: Stevenson Pollack MRN: [id number] : 1950 Ms. Hewitt arrived in the [facility] today at 0910, in stable condition, here for IV Hydration. She was assessed and education was provided. Upon arrival to the [facility] today, Ms. Hewitt complained of some mild shortness of breath and feeling very tired. She was instructed to notify Dr. Freddy Herring and his nurse Caitlin VELA, regarding the new symptom of shortness of breath, and she verbalized understanding. (She stated that the fatigue was not new, and that Dr. Freddy Herring was already aware of that, because she discussed that with him during her office visit last week.) Ms. Hewitt's vitals were reviewed. There were no vitals taken for this visit. Her right chest single lumen port was accessed without incident at 0920. NS   1,000 ml IV Hydration, was administered over approximately 2 hours, per order, and without incident. After completion of the IV NS, her port was flushed very well per protocol with NS & Heparin, and then, the salgado needle was removed, and gauze/bandaid was applied. Ms. Nathalie Jo tolerated well, and had no complaints. Ms. Nathalie Jo was discharged from [place] [address] in stable condition at 1150. Thea Chess She is to return on 10/7/2019, at 1400,  for her next dose of chemotherapy. Dulce Quintana RN 2019

## 2019-10-07 NOTE — PROGRESS NOTES
[facility]    Date: 2019    Name: Alexandra Weaver    MRN: [id number]         : 1950      Ms. Hewitt arrived in the [facility] today at 1410, in stable condition, here for Cycle 5, Day 1 of 5, IV Topotecan Chemotherapy Regimen. She was assessed and education was provided. She states that she has had diarrhea all morning and moaning. Right chest mediport accessed with sterile technique, brisk blood return noted. After wasting 10mls labs (CBC, CMP and MAG  drawn as ordered. Urine culture and reflex obtained and sent to lab as ordered. 500cc NS infusing at 200cc hour as ordered. Recent Results (from the past 12 hour(s))   CBC WITH 3 PART DIFF    Collection Time: 10/07/19  1:36 PM   Result Value Ref Range    WBC 17.1 (H) 4.5 - 13.0 K/uL    RBC 2.84 (L) 4.10 - 5.10 M/uL    HGB 8.2 (L) 12.0 - 16 g/dL    HCT 26.1 (L) 36 - 48 %    MCV 91.9 78 - 102 FL    MCH 28.9 25.0 - 35.0 PG    MCHC 31.4 31 - 37 g/dL    RDW 16.6 (H) 11.5 - 14.5 %    PLATELET 010 506 - 045 K/uL    NEUTROPHILS 82 (H) 40 - 70 %    MIXED CELLS 7 0.1 - 17 %    LYMPHOCYTES 11 (L) 14 - 44 %    ABS. NEUTROPHILS 14.1 (H) 1.8 - 9.5 K/UL    ABS. MIXED CELLS 1.2 0.0 - 2.3 K/uL    ABS. LYMPHOCYTES 1.8 1.1 - 5.9 K/UL    DF AUTOMATED     MAGNESIUM    Collection Time: 10/07/19  2:22 PM   Result Value Ref Range    Magnesium 1.8 1.6 - 2.6 mg/dL   METABOLIC PANEL, COMPREHENSIVE    Collection Time: 10/07/19  2:22 PM   Result Value Ref Range    Sodium 138 136 - 145 mmol/L    Potassium 4.8 3.5 - 5.5 mmol/L    Chloride 104 100 - 111 mmol/L    CO2 25 21 - 32 mmol/L    Anion gap 9 3.0 - 18 mmol/L    Glucose 92 74 - 99 mg/dL    BUN 28 (H) 7.0 - 18 MG/DL    Creatinine 1.71 (H) 0.6 - 1.3 MG/DL    BUN/Creatinine ratio 16 12 - 20      GFR est AA 36 (L) >60 ml/min/1.73m2    GFR est non-AA 30 (L) >60 ml/min/1.73m2    Calcium 9.2 8.5 - 10.1 MG/DL    Bilirubin, total 0.4 0.2 - 1.0 MG/DL    ALT (SGPT) 22 13 - 56 U/L    AST (SGOT) 32 10 - 38 U/L    Alk. phosphatase 142 (H) 45 - 117 U/L    Protein, total 7.0 6.4 - 8.2 g/dL    Albumin 2.8 (L) 3.4 - 5.0 g/dL    Globulin 4.2 (H) 2.0 - 4.0 g/dL    A-G Ratio 0.7 (L) 0.8 - 1.7           MsAngi Hewitt's vitals were reviewed. Visit Vitals  /81 (BP 1 Location: Left arm, BP Patient Position: At rest)   Pulse (!) 111   Temp 98.6 °F (37 °C)   Resp 20   Ht 5' 5\" (1.651 m)   Wt 72.8 kg (160 lb 8 oz)   SpO2 100%   BMI 26.71 kg/m²     Recent Results (from the past 8 hour(s))   CBC WITH 3 PART DIFF    Collection Time: 10/07/19  1:36 PM   Result Value Ref Range    WBC 17.1 (H) 4.5 - 13.0 K/uL    RBC 2.84 (L) 4.10 - 5.10 M/uL    HGB 8.2 (L) 12.0 - 16 g/dL    HCT 26.1 (L) 36 - 48 %    MCV 91.9 78 - 102 FL    MCH 28.9 25.0 - 35.0 PG    MCHC 31.4 31 - 37 g/dL    RDW 16.6 (H) 11.5 - 14.5 %    PLATELET 983 323 - 814 K/uL    NEUTROPHILS 82 (H) 40 - 70 %    MIXED CELLS 7 0.1 - 17 %    LYMPHOCYTES 11 (L) 14 - 44 %    ABS. NEUTROPHILS 14.1 (H) 1.8 - 9.5 K/UL    ABS. MIXED CELLS 1.2 0.0 - 2.3 K/uL    ABS. LYMPHOCYTES 1.8 1.1 - 5.9 K/UL    DF AUTOMATED     MAGNESIUM    Collection Time: 10/07/19  2:22 PM   Result Value Ref Range    Magnesium 1.8 1.6 - 2.6 mg/dL   METABOLIC PANEL, COMPREHENSIVE    Collection Time: 10/07/19  2:22 PM   Result Value Ref Range    Sodium 138 136 - 145 mmol/L    Potassium 4.8 3.5 - 5.5 mmol/L    Chloride 104 100 - 111 mmol/L    CO2 25 21 - 32 mmol/L    Anion gap 9 3.0 - 18 mmol/L    Glucose 92 74 - 99 mg/dL    BUN 28 (H) 7.0 - 18 MG/DL    Creatinine 1.71 (H) 0.6 - 1.3 MG/DL    BUN/Creatinine ratio 16 12 - 20      GFR est AA 36 (L) >60 ml/min/1.73m2    GFR est non-AA 30 (L) >60 ml/min/1.73m2    Calcium 9.2 8.5 - 10.1 MG/DL    Bilirubin, total 0.4 0.2 - 1.0 MG/DL    ALT (SGPT) 22 13 - 56 U/L    AST (SGOT) 32 10 - 38 U/L    Alk.  phosphatase 142 (H) 45 - 117 U/L    Protein, total 7.0 6.4 - 8.2 g/dL    Albumin 2.8 (L) 3.4 - 5.0 g/dL    Globulin 4.2 (H) 2.0 - 4.0 g/dL    A-G Ratio 0.7 (L) 0.8 - 1.7         NP Voncile Player made aware of today's labs and diarrhea and came to see patient and stated okay to proceed with chemotherapy today. Premedications zofran, dexamethasone and pepcid given as ordered. Ms. Pola Castro refused ativan today. After 30 minute waiting period Topotecan infusion given as ordered. During infusion patient vomitted brown liquid, crackers and gingerale given. Upon completion unable to get a brisk blood return from port. Upon completion port flushed with 20cc NS followed by 500u heparin. Patient Vitals for the past 8 hrs:   Temp Pulse Resp BP SpO2   10/07/19 1719 98.6 °F (37 °C) (!) 111 20 127/81 100 %   10/07/19 1445 98.3 °F (36.8 °C) (!) 107 20 137/83 100 %       Unable to draw CA-125 due to no blood return and patient refused to be stuck today and asked to draw it at tomorrow's appointment. Ms. Pola Castro tolerated well, and had no complaints. Port left accessed for tomorrows appointment. Ms. Pola Castro was discharged from [place] [address] in stable condition at 1725. She is to return on 10/08/19 at 1400, for her next appointment for chemotherapy.     Susan Hodges RN  October 7, 2019

## 2019-10-08 NOTE — PROGRESS NOTES
[facility] Progress Note    Date: 2019    Name: Oleg Brennan    MRN: [id number]         : 1950      Ms. Hewitt arrived in the [facility] today at 1330, in stable condition, here for Cycle 5, Day 2. IV Topotecan Chemotherapy Regimen. She was assessed and education was provided. She states that she feels less tired today. Still has on going nausea. States she took anti-nausea medication as ordered. States she has diarrhea, but not as much as yesterday. Ms. Hewitt's vitals were reviewed. Visit Vitals  /86 (BP 1 Location: Left arm, BP Patient Position: At rest)   Pulse 93   Temp 98.1 °F (36.7 °C)   Resp 18   SpO2 100%         Her right chest single lumen port was accessed on 10/7/19. Site with salgado/drsg intact, and area W/O redness, swelling, or tenderness. Port with brisk flush/blood returns. Labs done 10/7/19 and okay for chemo    Results for Demarcus Metz (MRN [id number])    Ref. Range 10/7/2019 13:36   WBC Latest Ref Range: 4.5 - 13.0 K/uL 17.1 (H)   RBC Latest Ref Range: 4.10 - 5.10 M/uL 2.84 (L)   HGB Latest Ref Range: 12.0 - 16 g/dL 8.2 (L)   HCT Latest Ref Range: 36 - 48 % 26.1 (L)   MCV Latest Ref Range: 78 - 102 FL 91.9   MCH Latest Ref Range: 25.0 - 35.0 PG 28.9   MCHC Latest Ref Range: 31 - 37 g/dL 31.4   RDW Latest Ref Range: 11.5 - 14.5 % 16.6 (H)   PLATELET Latest Ref Range: 140 - 440 K/uL 269   NEUTROPHILS Latest Ref Range: 40 - 70 % 82 (H)   MIXED CELLS Latest Ref Range: 0.1 - 17 % 7   LYMPHOCYTES Latest Ref Range: 14 - 44 % 11 (L)   DF Latest Units:   AUTOMATED   ABS. NEUTROPHILS Latest Ref Range: 1.8 - 9.5 K/UL 14.1 (H)   ABS. LYMPHOCYTES Latest Ref Range: 1.1 - 5.9 K/UL 1.8   ABS. MIXED CELLS Latest Ref Range: 0.0 - 2.3 K/uL 1.2       Results for Demarcus Metz (MRN [id number])    Ref.  Range 10/7/2019 16:14   Color Latest Units:   YELLOW   Appearance Latest Units:   CLEAR   Specific gravity Latest Ref Range: 1.005 - 1.030   1.020   pH (UA) Latest Ref Range: 5.0 - 8.0   5.5   Protein Latest Ref Range: NEG mg/dL 100 (A)   Glucose Latest Ref Range: NEG mg/dL NEGATIVE   Ketone Latest Ref Range: NEG mg/dL NEGATIVE   Blood Latest Ref Range: NEG   LARGE (A)   Bilirubin Latest Ref Range: NEG   NEGATIVE   Urobilinogen Latest Ref Range: 0.2 - 1.0 EU/dL 0.2   Nitrites Latest Ref Range: NEG   NEGATIVE   Leukocyte Esterase Latest Ref Range: NEG   MODERATE (A)   Epithelial cells Latest Ref Range: 0 - 5 /lpf 1+   WBC Latest Ref Range: 0 - 4 /hpf 5 to 9   RBC Latest Ref Range: 0 - 5 /hpf 40 to 45   Bacteria Latest Ref Range: NEG /hpf FEW (A)     Results for Maribel Walton (MRN [id number])    Ref. Range 10/7/2019 14:22   Sodium Latest Ref Range: 136 - 145 mmol/L 138   Potassium Latest Ref Range: 3.5 - 5.5 mmol/L 4.8   Chloride Latest Ref Range: 100 - 111 mmol/L 104   CO2 Latest Ref Range: 21 - 32 mmol/L 25   Anion gap Latest Ref Range: 3.0 - 18 mmol/L 9   Glucose Latest Ref Range: 74 - 99 mg/dL 92   BUN Latest Ref Range: 7.0 - 18 MG/DL 28 (H)   Creatinine Latest Ref Range: 0.6 - 1.3 MG/DL 1.71 (H)   BUN/Creatinine ratio Latest Ref Range: 12 - 20   16   Calcium Latest Ref Range: 8.5 - 10.1 MG/DL 9.2   Magnesium Latest Ref Range: 1.6 - 2.6 mg/dL 1.8   GFR est non-AA Latest Ref Range: >60 ml/min/1.73m2 30 (L)   GFR est AA Latest Ref Range: >60 ml/min/1.73m2 36 (L)   Bilirubin, total Latest Ref Range: 0.2 - 1.0 MG/DL 0.4   Protein, total Latest Ref Range: 6.4 - 8.2 g/dL 7.0   Albumin Latest Ref Range: 3.4 - 5.0 g/dL 2.8 (L)   Globulin Latest Ref Range: 2.0 - 4.0 g/dL 4.2 (H)   A-G Ratio Latest Ref Range: 0.8 - 1.7   0.7 (L)   ALT (SGPT) Latest Ref Range: 13 - 56 U/L 22   AST Latest Ref Range: 10 - 38 U/L 32   Alk. phosphatase Latest Ref Range: 45 - 117 U/L 142 (H)        ml IV Hydration was infused throughout chemotherapy today.        The following pre-medications were administered per order, and without incident:  Zofran 16 mg IV, Decadron 8 mg IV, & Pepcid 20 mg IV.    Patient refused IV ativan    Topotecan (Hycamptin) 1.3 mg IV (0.75 mg/m2) was administered over approximately 30 minutes, per order, and without incident. After completion of all ordered medications, her port was flushed well per protocol with NS & Heparin, and de- accessed. Site and area remains without redness, swelling, or tenderness. Band aid applied. Patient Vitals for the past 8 hrs:   Temp Pulse Resp BP SpO2   10/08/19 1526 98.1 °F (36.7 °C) 93 18 137/86 100 %   10/08/19 1333 98.2 °F (36.8 °C) (!) 114 18 155/83 99 %          Ms. Hewitt tolerated well, and had no complaints. Reviewed discharge instructions with patient. She verbalized understanding. Ms. Venkat Mcfarlane was discharged from [place] [address] in stable condition at 1530. She is to return on 10/9/19 at 2 pm, for her next appointment, of cycle 5, day 3 of 5, IV Topotecan Chemotherapy Regimen.    Jerome Velez RN  October 8, 2019  9:44 AM

## 2019-10-09 NOTE — PROGRESS NOTES
[facility] Progress Note    Date: 2019    Name: Jasper Turner    MRN: [id number]         : 1950      Ms. Hewitt arrived in the [facility] today at 1400, in stable condition, here for Cycle 5, Day 3, IV Topotecan Chemotherapy Regimen. (Days 1-5 of Q 21 Day Cycle). She was assessed and education was provided. Upon arrival to the [facility] today, Ms. Hewitt complained of being in a lot of pain (abdominal pain which she rated a # 9 on the pain scale, and left thigh pain, which she also rated a # 9 on the pain scale. She also complained of having a lot of \"gas\". And she also complained of feeling very nauseated, and stated that she vomited in the car on the way here today. She was reassured, and instructed that she would probably feel a lot better once her pre-medications were administered, and she verbalized understanding. Ms. Hewitt's vitals were reviewed. Visit Vitals  /79 (BP 1 Location: Right arm, BP Patient Position: Sitting)   Pulse 92   Temp 98.3 °F (36.8 °C)   Resp 20   Ht 5' 5\" (1.651 m)   Wt 74.4 kg (164 lb)   SpO2 100%   BMI 27.29 kg/m²         Lab results were reviewed. (The CBC & CMP results from Monday, 10-7-19, were all noted to be satisfactory for treatment today.)      Results for Tulio Daley (MRN [id number])    Ref. Range 10/7/2019 13:36 10/7/2019 14:22   WBC Latest Ref Range: 4.5 - 13.0 K/uL 17.1 (H)    RBC Latest Ref Range: 4.10 - 5.10 M/uL 2.84 (L)    HGB Latest Ref Range: 12.0 - 16 g/dL 8.2 (L)    HCT Latest Ref Range: 36 - 48 % 26.1 (L)    MCV Latest Ref Range: 78 - 102 FL 91.9    MCH Latest Ref Range: 25.0 - 35.0 PG 28.9    MCHC Latest Ref Range: 31 - 37 g/dL 31.4    RDW Latest Ref Range: 11.5 - 14.5 % 16.6 (H)    PLATELET Latest Ref Range: 140 - 440 K/uL 269    NEUTROPHILS Latest Ref Range: 40 - 70 % 82 (H)    MIXED CELLS Latest Ref Range: 0.1 - 17 % 7    LYMPHOCYTES Latest Ref Range: 14 - 44 % 11 (L)    DF Latest Units:   AUTOMATED    ABS. NEUTROPHILS Latest Ref Range: 1.8 - 9.5 K/UL 14.1 (H)    ABS. LYMPHOCYTES Latest Ref Range: 1.1 - 5.9 K/UL 1.8    ABS. MIXED CELLS Latest Ref Range: 0.0 - 2.3 K/uL 1.2    Sodium Latest Ref Range: 136 - 145 mmol/L  138   Potassium Latest Ref Range: 3.5 - 5.5 mmol/L  4.8   Chloride Latest Ref Range: 100 - 111 mmol/L  104   CO2 Latest Ref Range: 21 - 32 mmol/L  25   Anion gap Latest Ref Range: 3.0 - 18 mmol/L  9   Glucose Latest Ref Range: 74 - 99 mg/dL  92   BUN Latest Ref Range: 7.0 - 18 MG/DL  28 (H)   Creatinine Latest Ref Range: 0.6 - 1.3 MG/DL  1.71 (H)   BUN/Creatinine ratio Latest Ref Range: 12 - 20    16   Calcium Latest Ref Range: 8.5 - 10.1 MG/DL  9.2   Magnesium Latest Ref Range: 1.6 - 2.6 mg/dL  1.8   GFR est non-AA Latest Ref Range: >60 ml/min/1.73m2  30 (L)   GFR est AA Latest Ref Range: >60 ml/min/1.73m2  36 (L)   Bilirubin, total Latest Ref Range: 0.2 - 1.0 MG/DL  0.4   Protein, total Latest Ref Range: 6.4 - 8.2 g/dL  7.0   Albumin Latest Ref Range: 3.4 - 5.0 g/dL  2.8 (L)   Globulin Latest Ref Range: 2.0 - 4.0 g/dL  4.2 (H)   A-G Ratio Latest Ref Range: 0.8 - 1.7    0.7 (L)   ALT (SGPT) Latest Ref Range: 13 - 56 U/L  22   AST Latest Ref Range: 10 - 38 U/L  32   Alk. phosphatase Latest Ref Range: 45 - 117 U/L  142 (H)            ml IV Bolus, was administered throughout treatment today. The following pre-medications were administered per order, and without incident:  Zofran 16 mg IV, Decadron 8 mg IV, & Pepcid 20 mg IV. She politely refused the IV Ativan. Topotecan (Hycamptin) 1.3 mg IV (0.75 mg/m2), was administered over approximately 30 minutes, per order, and without incident. After completion of all ordered medications, her port was flushed well per protocol with NS & Heparin, and then, the port was left accessed for treatment tomorrow. By the end of her treatment today, Ms. Hewitt stated that she felt much better.  She stated that her nausea was gone, and her abdominal pain was down to a # 6 on the pain scale, and her left thigh pain was down to a # 4 on the pain scale. Ms. Georgia Miles tolerated well, and had no further complaints. Ms. Georgia Miles was discharged from [place] [address] in stable condition at [phone]. Scurry Bulls She is to return on tomorrow, Thursday, 10-10-19, at 1400, for her next appointment, for Cycle 5, Day 4, IV Topotecan Chemotherapy Regimen.      Pavel Lubin RN  October 9, 2019  2:33 PM

## 2019-10-10 NOTE — PROGRESS NOTES
[facility] Progress Note    Date: October 10, 2019    Name: Anival Mccurdy    MRN: [id number]         : 1950      Ms. Hewitt arrived to [facility] at 1410. Pt is here today for Topotecan, Cycle 5, Day 4. Ms. Baudilio Rick was assessed and education was provided. Ms. Hewitt's vitals were reviewed. Visit Vitals  /85 (BP 1 Location: Left arm, BP Patient Position: Sitting)   Pulse 80   Temp 97.5 °F (36.4 °C)   Resp 16   SpO2 100%       Patient's right upper chest port w/ salgado intact. Dressing CDI. Access date 10/7/19. Brisk blood return/ flushes without difficulty. No labs required today. NS 500ml infusing as ordered. Pre-medications administered as ordered: Decadron 8mg IVP, Pepcid 20 mg IVP, and Zofran 16mg IVPB. Pt declined Ativan. Topotecan 1.3mg IV administered as ordered followed by NS flush. Ms. Baudilio Rick tolerated infusion without complaints. Port flushed with heparin per order and alcohol caps placed on open ports. Leaving accessed for day 5 chemo scheduled for tomorrow. Pt armband removed & shredded. Ms. Baudilio Rick was discharged from [place] [address] in stable condition at 1630. She is to return on 10/11/19 at 1400 for her next appointment.     Garland Trejo RN  October 10, 2019

## 2019-10-11 NOTE — PROGRESS NOTES
[facility] Progress Note    Date: 2019    Name: Jaspreet Patrick    MRN: [id number]         : 1950      Ms. Hewitt arrived to [facility] at 46. Pt is here today for Topotecan, Cycle 5, Day 5. Ms. Lizzeth Mayfiled was assessed and education was provided. Ms. Hewitt's vitals were reviewed. Visit Vitals  /79 (BP 1 Location: Left arm, BP Patient Position: Sitting)   Pulse 78   Temp 97.8 °F (36.6 °C)   Resp 16   SpO2 100%       Patient's right upper chest port w/ salgado intact. Dressing CDI. Access date 10/7/19. Brisk blood return/ flushes without difficulty. No labs required today. NS 500ml infusing as ordered. Pre-medications administered as ordered: Decadron 8mg IVP, Pepcid 20 mg IVP, and Aloxi 0.25mg IVP, and Emend 150mg IV. Pt declined Ativan. Topotecan 1.3mg IV administered as ordered followed by NS flush. Ms. Lizzeth Mayfield tolerated infusion without complaints. Port flushed with heparin per order and de-accessed. Band-aid applied to site. Pt armband removed & shredded. Ms. Lizzeth Mayfield was discharged from [place] [address] in stable condition at 1620. She is to return on 10/28/19 at 1400 for her next appointment.     Donnie Christie RN  2019

## 2019-10-14 PROBLEM — D70.9 NEUTROPENIC FEVER (HCC): Status: ACTIVE | Noted: 2019-01-01

## 2019-10-14 PROBLEM — R50.81 NEUTROPENIC FEVER (HCC): Status: ACTIVE | Noted: 2019-01-01

## 2019-10-14 NOTE — ED NOTES
Attempt to establish second peripheral IV access unsuccessful. Inquired with provider for permission to access pt's med port in place for pt's ongoing chemotherapy treatment. Informed by Bhaskar Redmond PA-C hold access and will inquire with pt's oncologist if med port can be used.

## 2019-10-14 NOTE — ED TRIAGE NOTES
C/O abdominal pain with severe fatigue. Pt states that she is currently receiving chemo tx for cervical CA.

## 2019-10-14 NOTE — ED NOTES
Med port access attempted, unable to obtain blood return with accessed port, attempt to get pt to raise arm to get blood return with no success. Port de-accessed due to uncertainty of patency.

## 2019-10-14 NOTE — ED PROVIDER NOTES
EMERGENCY DEPARTMENT HISTORY AND PHYSICAL EXAM 
 
Date: 10/14/2019 Patient Name: Guevara Willett History of Presenting Illness Chief Complaint Patient presents with  Abdominal Pain  Fatigue History Provided By: Patient Additional History (Context): Guevara Willett is a 57-year-old female with significant PMH including stage 4 cervical cancer currently receiving chemotherapy presenting to the emergency department with complaints of abdominal pain, weakness, fatigue. Son is with patient at bedside, reports patient experiencing decreased p.o. intake and appearing more lethargic and weak over the past few days. Patient son states she did start new chemotherapy medication last week and had multiple days of this which may have caused the symptoms. Patient states she has felt nauseated along with left sided abdominal pain. Occasionally SOB but no CP. Was constipated but had loose bowel movement yesterday. Son reports noting chills but denies fever. Patient states she has been urinating without any difficulty however did have renal stents placed approximately 1 month ago at [facility] view by Dr. Emily Zacarias. Patient is followed by Dr. Nikki Bray, gynecologic oncologist. No recent abdominal surgeries. Pt denies any fevers or chills, headache, dizziness or light headedness, ENT issues, CP or discomfort,cough, diaphoresis, melena/hematochezia, dysuria, hematuria, frequency, focal weakness/numbness/tingling, or rash. Patient has no other complaints at this time. PCP: Yoel Taylor MD 
 
Current Facility-Administered Medications Medication Dose Route Frequency Provider Last Rate Last Dose  sodium chloride (NS) flush 5-40 mL  5-40 mL IntraVENous Q8H Shahram Valero DO      
 sodium chloride (NS) flush 5-10 mL  5-10 mL IntraVENous PRN Yeni Chaudhary PA-C      
 sodium chloride 0.9 % bolus infusion 1,000 mL  1,000 mL IntraVENous ONCE Yeni Chaudhary PA-C  Followed by  
  sodium chloride 0.9 % bolus infusion 1,000 mL  1,000 mL IntraVENous ONCE Brittnee Crisp, PA-C Followed by  
Steve Sit sodium chloride 0.9 % bolus infusion 178 mL  178 mL IntraVENous ONCE Brittnee Crisp, PA-C      
 piperacillin-tazobactam (ZOSYN) 4.5 g in 0.9% sodium chloride (MBP/ADV) 100 mL MBP  4.5 g IntraVENous ONCE Brittnee Crisp, PA-C Followed by  
 piperacillin-tazobactam (ZOSYN) 4.5 g in 0.9% sodium chloride (MBP/ADV) 100 mL MBP  4.5 g IntraVENous Q6H Zulma Garibay, PA-C      
 vancomycin (VANCOCIN) 1000 mg in  ml infusion  1,000 mg IntraVENous ONCE Brittnee Crisp, PA-C      
 levoFLOXacin (LEVAQUIN) 750 mg in D5W IVPB  750 mg IntraVENous Q24H Brittnee Crisp, PA-C      
 morphine injection 4 mg  4 mg IntraVENous NOW Brittnee Crisp, PA-C      
 ondansetron [facility]) injection 4 mg  4 mg IntraVENous NOW Brittnee Crisp, PA-C      
 acetaminophen (TYLENOL) tablet 1,000 mg  1,000 mg Oral NOW Brittnee Crisp, PA-C Current Outpatient Medications Medication Sig Dispense Refill  cyanocobalamin (VITAMIN B12) 100 mcg tablet Take 100 mcg by mouth daily.  famotidine (PEPCID) 20 mg tablet Take 20 mg by mouth daily. Indications: gastroesophageal reflux disease  multivitamin (ONE A DAY) tablet Take 1 Tab by mouth daily.  acetaminophen (TYLENOL) 500 mg tablet Take 1,000 mg by mouth every six (6) hours as needed for Pain.  b complex vitamins (B COMPLEX 1) tablet Take 1 Tab by mouth daily. Facility-Administered Medications Ordered in Other Encounters Medication Dose Route Frequency Provider Last Rate Last Dose  PHARMACY INFORMATION NOTE  1 Each Other Rx Dosing/Monitoring Papo Portillo MD      
 
 
Past History Past Medical History: 
Past Medical History:  
Diagnosis Date  Acute kidney failure ([facility] [address] 12/05/2019  BMI 34.0-34.9,adult 34.8  Caffeine adverse reaction   
 elevated BP  Cancer ([facility] [address]  uterine, cervical  
  Cervical cancer ([facility] [address]  Chronic kidney disease   
 acute kidney failure  Chronic sinusitis  Dizziness  Endometriosis  Hiatal hernia  High cholesterol  Palpitation  PMB (postmenopausal bleeding)  Rash   
 eczemz  Thromboembolus ([facility] [address] 03/15/2019 IVC filter placed  Urinary incontinence Past Surgical History: 
Past Surgical History:  
Procedure Laterality Date  HX ABDOMINAL LAPAROSCOPY    
 HX BILATERAL SALPINGO-OOPHORECTOMY Bilateral   
 HX BREAST LUMPECTOMY Right 1973  
 benign  HX HYSTERECTOMY    
 radical  
 HX OTHER SURGICAL  09/01/2017 Exam under anesthesia: Cystoscopy, sigmoidoscopy  HX UROLOGICAL    
 nephrostomy tubes and stents  IR CHANGE EXIST CATHETER/TUBE LT  3/7/2019  IR MEDIPORT  VASCULAR SURGERY PROCEDURE UNLIST    
 filter Family History: 
Family History Problem Relation Age of Onset  Cancer Mother   
     left ovary Social History: 
Social History Tobacco Use  Smoking status: Never Smoker  Smokeless tobacco: Never Used Substance Use Topics  Alcohol use: No  
 Drug use: No  
 
 
Allergies: Allergies Allergen Reactions  Other Food Nausea and Vomiting Artificial sweeteners  Neulasta [Pegfilgrastim] Swelling  Aspirin Other (comments) Gi upset  Avastin [Bevacizumab] Other (comments) Bowel perforation  Tetracycline Unknown (comments) Patient does not remember, reaction many years ago  Wheat Atopic Dermatitis  
  eczema  Other Plant, Animal, Environmental Other (comments) Pt states she has \"springtime grass allergies. \" Reports reaction: Sinus infection Review of Systems Review of Systems Constitutional: Positive for activity change, appetite change, chills and fatigue. Negative for fever. HENT: Negative. Eyes: Negative. Respiratory: Negative for cough and shortness of breath. Cardiovascular: Negative for chest pain and palpitations. Gastrointestinal: Positive for abdominal distention and abdominal pain. Negative for constipation, diarrhea, nausea and vomiting. Genitourinary: Negative for difficulty urinating, dyspareunia, dysuria and enuresis. Musculoskeletal: Negative for back pain and neck pain. Skin: Negative. Allergic/Immunologic: Negative. Neurological: Negative for dizziness, weakness, numbness and headaches. Psychiatric/Behavioral: Negative. All other systems reviewed and are negative. All Other Systems Negative Physical Exam  
 
Vitals:  
 10/14/19 1041 BP: 157/88 Pulse: (!) 134 Resp: (!) 36 Temp: 99.6 °F (37.6 °C) SpO2: 96% Weight: 72.6 kg (160 lb) Height: 5' 5\" (1.651 m) Physical Exam  
Constitutional: She is oriented to person, place, and time. She appears well-developed and well-nourished. No distress. Dehydrated HENT:  
Head: Normocephalic and atraumatic. Nose: Nose normal.  
Mouth/Throat: Oropharynx is clear and moist. No oropharyngeal exudate. Dry lips and oral mucosa Eyes: Pupils are equal, round, and reactive to light. Conjunctivae and EOM are normal.  
Neck: Normal range of motion. Neck supple. Cardiovascular: Normal rate, regular rhythm and normal heart sounds. No murmur heard. Pulmonary/Chest: Effort normal and breath sounds normal. No respiratory distress. She has no wheezes. She has no rales. Abdominal: Soft. She exhibits distension. There is tenderness. There is rebound and guarding. Musculoskeletal: Normal range of motion. Lymphadenopathy:  
  She has no cervical adenopathy. Neurological: She is alert and oriented to person, place, and time. No cranial nerve deficit. Coordination normal.  
Skin: Skin is warm. No rash noted. She is not diaphoretic. Psychiatric: She has a normal mood and affect. Her behavior is normal.  
Nursing note and vitals reviewed. Diagnostic Study Results Labs - 
  
 Recent Results (from the past 12 hour(s)) EKG, 12 LEAD, INITIAL Collection Time: 10/14/19 11:04 AM  
Result Value Ref Range Ventricular Rate 130 BPM  
 Atrial Rate 130 BPM  
 P-R Interval 118 ms QRS Duration 82 ms Q-T Interval 280 ms QTC Calculation (Bezet) 412 ms Calculated P Axis 29 degrees Calculated R Axis -22 degrees Calculated T Axis 44 degrees Diagnosis Sinus tachycardia Possible Anterior infarct , age undetermined Abnormal ECG When compared with ECG of 07-MAR-2019 13:14, No significant change was found POC LACTIC ACID Collection Time: 10/14/19 11:23 AM  
Result Value Ref Range Lactic Acid (POC) 1.07 0.40 - 2.00 mmol/L Radiologic Studies -  
XR CHEST PORT    (Results Pending) CT Results  (Last 48 hours) None CXR Results  (Last 48 hours) None Medical Decision Making I am the first provider for this patient. I reviewed the vital signs, available nursing notes, past medical history, past surgical history, family history and social history. Vital Signs-Reviewed the patient's vital signs. Pulse Oximetry Analysis - 100 EKG: Interpreted by the EP. Time Interpreted:  
 Rate:  
 Rhythm: 
 Interpretation: 
 Comparison: 
 
Records Reviewed: Nursing notes, old medical records and any previous labs, imaging, visits, consultations pertinent to patient care Procedures: 
Procedures Provider Notes (Medical Decision Making):  
 
89PQ F currently being treated with chemotherapy here with c/o abdominal pain, fatigue, weakness. Sirs criteria met upon arrival, sepsis work-up initiated immediately including IV fluids and broad-spectrum antibiotics. Patient has abdominal pain on exam and appears dehydrated. Left LE swelling and tender. CXR concerning for possible pneumonia Labs signficant for neutropenia, thrombocytopenia, hyperkalemia, acute kidney injury PVL negative for left LE DVT Pt evaluated by Dr. Ld Woodward who agrees with admission and work up done thus far. Recommends treating electrolyte abnormalities, antibiotics continued and to admit to hospitalist.  CT without contrast of abdomen pending/. 
 
 
1:46 PM 
Consult: Discussed care with Dr. Nancie Olvera NP. Standard discussion; including history of patients chief complaint, available diagnostic results, and treatment course. Agrees to consult. Will come and evaluate patient in ED. Recommends admission. Will come to ED to evaluate patient. Spoke with hospitalist Dr. Uziel Carreno, with usual discussion of HPI, PE, and workup, she agrees to admit the patient to telemetry at this time. Requesting urology consult. Admission orders place. Reviewed workup results, any meds, and admission plan with patient and any family present. Answered all questions. They agree to the plan for admission at this time. Consult: Discussed care with Dr. Janis Mcdermott (urology). Standard discussion; including history of patients chief complaint, available diagnostic results, and treatment course. Agrees to consult. Asking for samaniego placement. Dr. Ora Schmid paged 3 times to discuss Ct results of possible abdominal abscess. No call back,   
 
4:40 PM 
Consult: Discussed care with  (Dr. Ora Schmid from surgery). Standard discussion; including history of patients chief complaint, available diagnostic results, and treatment course. Agrees to consult. Alo Fregoso PA-C  
 
 
 
MED RECONCILIATION: 
Current Facility-Administered Medications Medication Dose Route Frequency  sodium chloride (NS) flush 5-40 mL  5-40 mL IntraVENous Q8H  
 sodium chloride (NS) flush 5-10 mL  5-10 mL IntraVENous PRN  
 sodium chloride 0.9 % bolus infusion 1,000 mL  1,000 mL IntraVENous ONCE Followed by  
 sodium chloride 0.9 % bolus infusion 1,000 mL  1,000 mL IntraVENous ONCE  Followed by  
 sodium chloride 0.9 % bolus infusion 178 mL  178 mL IntraVENous ONCE  
  piperacillin-tazobactam (ZOSYN) 4.5 g in 0.9% sodium chloride (MBP/ADV) 100 mL MBP  4.5 g IntraVENous ONCE Followed by  
 piperacillin-tazobactam (ZOSYN) 4.5 g in 0.9% sodium chloride (MBP/ADV) 100 mL MBP  4.5 g IntraVENous Q6H  
 vancomycin (VANCOCIN) 1000 mg in  ml infusion  1,000 mg IntraVENous ONCE  
 levoFLOXacin (LEVAQUIN) 750 mg in D5W IVPB  750 mg IntraVENous Q24H  
 morphine injection 4 mg  4 mg IntraVENous NOW  ondansetron (ZOFRAN) injection 4 mg  4 mg IntraVENous NOW  acetaminophen (TYLENOL) tablet 1,000 mg  1,000 mg Oral NOW Current Outpatient Medications Medication Sig  cyanocobalamin (VITAMIN B12) 100 mcg tablet Take 100 mcg by mouth daily.  famotidine (PEPCID) 20 mg tablet Take 20 mg by mouth daily. Indications: gastroesophageal reflux disease  multivitamin (ONE A DAY) tablet Take 1 Tab by mouth daily.  acetaminophen (TYLENOL) 500 mg tablet Take 1,000 mg by mouth every six (6) hours as needed for Pain.  b complex vitamins (B COMPLEX 1) tablet Take 1 Tab by mouth daily. Facility-Administered Medications Ordered in Other Encounters Medication Dose Route Frequency  PHARMACY INFORMATION NOTE  1 Each Other Rx Dosing/Monitoring Disposition: 
admit Follow-up Information None Current Discharge Medication List  
  
 
 
 
Core Measures: 
 
Critical Care Time:  
Critical Care Time:  
I have spent 30 minutes of critical care time involved in lab review, consultations with specialist, family decision-making, and documentation. During this entire length of time I was immediately available to the patient. Critical Care: The reason for providing this level of medical care for this critically ill patient was due a critical illness that impaired one or more vital organ systems such that there was a high probability of imminent or life threatening deterioration in the patients condition.  This care involved high complexity decision making to assess, manipulate, and support vital system functions, to treat this degreee vital organ system failure and to prevent further life threatening deterioration of the patients condition. For Hospitalized Patients: 
 
1. Hospitalization Decision Time: The decision to hospitalize the patient was made by Dr. Panda Rogers at 1200 on 10/14/2019 2. Aspirin: Aspirin was not given because the patient did not present with a stroke at the time of their Emergency Department evaluation Diagnosis Clinical Impression: No diagnosis found.

## 2019-10-14 NOTE — ROUTINE PROCESS
TRANSFER - OUT REPORT: 
 
Verbal report given to Julio Strickland RN(name) on David Nguyễn  being transferred to (unit) for routine progression of care Report consisted of patients Situation, Background, Assessment and  
Recommendations(SBAR). Information from the following report(s) SBAR, ED Summary, Procedure Summary, Intake/Output, MAR, Recent Results and Cardiac Rhythm Sinus Tachycardia was reviewed with the receiving nurse. Lines:  
Venous Access Device 10/07/19 Upper chest (subclavicular area, right (Active) Central Line Being Utilized Yes 10/14/2019  2:48 PM  
Site Assessment Clean, dry, & intact 10/14/2019  2:48 PM  
Date of Last Dressing Change 10/14/19 10/14/2019  2:48 PM  
Dressing Status Clean, dry, & intact 10/14/2019  2:48 PM  
Dressing Type Transparent 10/14/2019  2:48 PM  
Access Medial Site Assessment Unable to draw 10/14/2019  2:48 PM  
Date Accessed (Medial Site) 10/14/19 10/14/2019  2:48 PM  
Access Time (Medial Site) 1446 10/14/2019  2:48 PM  
Access Needle Size (Site #1) 20 G 10/14/2019  2:48 PM  
Access Needle Length (Medial Site) 0.75 inches 10/14/2019  2:48 PM  
   
Peripheral IV 10/14/19 Right Antecubital (Active) Site Assessment Clean, dry, & intact 10/14/2019 11:23 AM  
Phlebitis Assessment 0 10/14/2019 11:23 AM  
Infiltration Assessment 0 10/14/2019 11:23 AM  
Dressing Status Clean, dry, & intact 10/14/2019 11:23 AM  
Dressing Type Transparent 10/14/2019 11:23 AM  
Hub Color/Line Status Pink 10/14/2019 11:23 AM  
  
 
Opportunity for questions and clarification was provided. Patient transported with: 
 Monitor O2 @ 2 liters

## 2019-10-14 NOTE — ROUTINE PROCESS
Bedside shift change report given to Cristian GRANT (oncoming nurse) by Trisha Brito (offgoing nurse). Report included the following information SBAR, Kardex, ED Summary, Intake/Output, MAR and Recent Results.

## 2019-10-14 NOTE — PROGRESS NOTES
Pt arrived via squad from [facility]. Her son accomp her also. She says she \"feels better\" she is trembling. She is on neutropenic precautions, this explained to her and her son.   
 
46 Dr. Breanne Stern notified of arrival.

## 2019-10-14 NOTE — PROGRESS NOTES
Gynecologic Oncology Consult 10/14/2019 Delores London 
[id number] Allergies Allergen Reactions  Other Food Nausea and Vomiting Artificial sweeteners  Neulasta [Pegfilgrastim] Swelling  Aspirin Other (comments) Gi upset  Avastin [Bevacizumab] Other (comments) Bowel perforation  Tetracycline Unknown (comments) Patient does not remember, reaction many years ago  Wheat Atopic Dermatitis  
  eczema  Other Plant, Animal, Environmental Other (comments) Pt states she has \"springtime grass allergies. \" Reports reaction: Sinus infection HISTORY OF PRESENT ILLNESS: 71 y.o.  female with Recurrent Stage IVB endometrioid cervical cancer vs. Endometrial cancer. She was recently discharged from [facility] after admission for acute on chronic renal failure s/p stent exchange. S/p Cycle 5 Day 5 Topotecan 10/11/19. She was advised to present to ED after call from son this morning with report of extreme fatigue, weakness, dehydration, and abdominal pain. She reports constipation over the past week with last BM yesterday. Son reports pallor and difficulty breathing in patient over the weekend as well. Past Medical History:  
Diagnosis Date  Acute kidney failure ([address] 12/05/2019  BMI 34.0-34.9,adult 34.8  Caffeine adverse reaction   
 elevated BP  Cancer ([address] uterine, cervical  
 Cervical cancer (HCC)  Chronic kidney disease   
 acute kidney failure  Chronic sinusitis  Dizziness  Endometriosis  Hiatal hernia  High cholesterol  Palpitation  PMB (postmenopausal bleeding)  Rash   
 eczemz  Thromboembolus ([address] 03/15/2019 IVC filter placed  Urinary incontinence Past Surgical History:  
Procedure Laterality Date  HX ABDOMINAL LAPAROSCOPY    
 HX BILATERAL SALPINGO-OOPHORECTOMY Bilateral   
 HX BREAST LUMPECTOMY Right 1973  
 benign  HX HYSTERECTOMY    
 radical  
 HX OTHER SURGICAL  09/01/2017 Exam under anesthesia: Cystoscopy, sigmoidoscopy  HX UROLOGICAL    
 nephrostomy tubes and stents  IR CHANGE EXIST CATHETER/TUBE LT  3/7/2019  IR MEDIPORT  VASCULAR SURGERY PROCEDURE UNLIST    
 filter Family History Problem Relation Age of Onset  Cancer Mother   
     left ovary Social History Substance and Sexual Activity Drug Use No  
 
Social History Tobacco Use Smoking Status Never Smoker Smokeless Tobacco Never Used Social History Substance and Sexual Activity Sexual Activity Not Currently REVIEW OF SYSTEMS: 
Constitutional: No fever, chills, +anorexia, +weight loss, +weakness, or sleep disturbance. Skin/Breasts: No breast pain, lumps, nipple discharge, or axillary lumps. The patient notes no rash or skin irritation systemically. Cardiovascular: No chest pain,palpitations,syncope, or claudication Respiratory: No cough, shortness of breath, hemotysis, or orthopnea Gastointestinal: No nausea, vomiting, frequency or caliber of stools, blood in the stools or diarrhea. +constipation, +abdominal pain Genitourinary: No urinary frequency, dysuria,hematuria, or history of stones. Endo: No cold intolerance,+fatigue,or sleep disturbance. Heme/Lymph: No anemia, easy bruising, history, of bleeding disorders, or lymphedema Neurological: No numbness or focal weakness. No tremors. No problems with voiding or defecation. Psychiatric:  No symptoms of depression or anxiety. No hallucinations or symptoms of psychosis. EXAMINATION: 
Constitutional: The patient is in mild distress. She is alert and oriented times three. Ill-appearing Eyes: No lesions are present. Pupils are equal. The sclerea are roopa-icteric. Neck: The neck is supple with a normal thyroid. Heme/Lymph: There is no palpable lymphadenopathy in the cervical supraclavicular, or inguinal regions. Respiratory: The chest is clear to auscultation and percussion bilaterally. Cardiovascular: The heart is regular and without murmur. Abdomen: Soft, diffusely tender, distended, hypoactive BS, rebound, guarding Genitourinary: deferred Skin: Warm and dry Musculoskeletal: Extremeties are without significant varicosities. LLE edema ASSESSMENT/PLAN: 
Recurrent Stage IVB endometrioid cervical cancer vs. Endometrial cancer, poor prognosis S/p Cycle 5 Topotecan 10/11/19 Pancytopenia, patient not eligible for Neupogen for support secondary to allergy. Chemo on hold given current clinical status CT pending Management per primary. Will follow. [facility] 
10/14/19

## 2019-10-14 NOTE — PROGRESS NOTES
10/14/19 1941 Vital Signs Temp 98.6 °F (37 °C) Temp Source Oral  
Pulse (Heart Rate) (!) 120 Resp Rate 18  
O2 Sat (%) 99 % Level of Consciousness Alert /70 MAP (Calculated) 84 BP 1 Method Automatic  
BP 1 Location Left arm BP Patient Position At rest  
MEWS Score 3 Pain 1 Pain Scale 1 Visual  
Pain Intensity 1 0 Patient Observation Observations shift report Activity Family/Visitors present Mws=3. Patjient is tachy, not in distress. Will continue to monitor.

## 2019-10-14 NOTE — PROGRESS NOTES
Surgery Asked to see this 70-year-old ill patient with at least a 6-month history of endometrial cancer which is widely metastatic. Her treatment was complicated by a sigmoid perforation likely from Neulasta, and she has been treated by Dr. Hilario Bradley for months after this. She was treated with percutaneous drainage. He was seen today in the [facility] ER noted to have generalized abdominal pain but primarily in the lower quadrants. CT scan reveals several small bubbles of air in her left lower quadrant near an old drainage site that was interpreted as possible localized perforation. It would be hard to tell this just by a noncontrasted CT but I certainly cannot rule it out either. These could also be diverticula near the questionable area. Either way she has a white blood cell count of 0.4, a platelet count of 43, and is in acute renal failure with a creatinine of 6.03. Her potassium was 6.7 earlier. Her physical exam appears as a ill-appearing woman with a tender abdomen globally. This could be from her pancytopenia or concurrent left lower quadrant infection from her drainage sites. She has no cellulitis on the skin. I have reviewed the images of her CT scan, done a physical exam and reviewed her chart back to February of this year. I discussed these findings with her and her son. Currently she is nonsurgical candidate due to her pancytopenia, trauma cytopenia, and electrolytes. Following

## 2019-10-15 NOTE — PROGRESS NOTES
Patient Vitals for the past 4 hrs: 
 Temp Pulse Resp BP SpO2  
10/15/19 0813 97.1 °F (36.2 °C) (!) 126 19 130/82 99 % Made Paige Gomez NP for Oncology aware of mews 3 and HR. Pain and nausea. She said go ahead and given zofran now. Son at bedside.

## 2019-10-15 NOTE — PROGRESS NOTES
NUTRITION Nursing Referral: [facility]  
  
RECOMMENDATIONS / PLAN:  
 
- Monitor readiness for diet and nutrition supplements to be resumed - Continue IV fluids until oral intake adequate. - Continue RD inpatient monitoring and evaluation. NUTRITION INTERVENTIONS & DIAGNOSIS:  
 
- Meals/snacks: modify composition; NPO 
- Medical food supplement therapy: resume once po diet resumed - IV fluids: continue Nutrition Diagnosis: Inadequate oral intake related to decreased appetite as evidenced by poor meal intake PTA. Unintended weight loss related to predicted prolonged inadequate oral intake as evidenced by wt loss of 8 lb, 4.8% x 1 month PTA ASSESSMENT:  
 
Pt unavailable at time of visit. Per chart review, pt had decreased appetite and meal intake PTA; unplanned wt loss of 8 lb x 1 month. Admitted with neutropenic fever; has hx of cervical cancer, receives chemotherapy. Has bilateral hydronephrosis. Was started on clear liquid diet, then made NPO for nephrostomy percutaneous right placement catheter today. Per MD, pt refusing to have procedure done; Palliative consulted to discusse goals of care. Nutritional intake adequate to meet patients estimated nutritional needs:  No 
 
Diet: DIET NUTRITIONAL SUPPLEMENTS All Meals; ENSURE CLEAR 
DIET NPO Food Allergies:  Artificial sweeteners Current Appetite: NPO Appetite/meal intake prior to admission: Poor per chart review Feeding Limitations:  [] Swallowing difficulty    [] Chewing difficulty    [] Other: 
Current Meal Intake: No data found. BM: 10/13; had been constipated x 1 week PTA per chart review. Skin Integrity:  No pressure injury or wound noted Edema:   [] No     [x] Yes Pertinent Medications: Reviewed: milk of magnesia, zofran, sodium bicarbonate at 100 mL/hr Recent Labs 10/15/19 
0710 10/15/19 
0224 10/14/19 
2355 10/14/19 
1605 10/14/19 
1120 NA  --   --   --  138 133*  
K 5.8* 6.1* 6.4* 5.9* 6.7*  
 CL  --   --   --  112* 106 CO2  --   --   --  13* 17* GLU  --   --   --  115* 107* BUN  --   --   --  109* 116* CREA  --   --   --  5.52* 6.03* CA  --   --   --  7.3* 8.3* PHOS  --   --   --   --  5.6* ALB  --   --   --   --  2.4* SGOT  --   --   --   --  30 ALT  --   --   --   --  23 Intake/Output Summary (Last 24 hours) at 10/15/2019 1145 Last data filed at 10/15/2019 0900 Gross per 24 hour Intake 2788 ml Output 150 ml Net 2638 ml Anthropometrics: 
Ht Readings from Last 1 Encounters:  
10/14/19 5' 5\" (1.651 m) Last 3 Recorded Weights in this Encounter 10/14/19 1041 Weight: 72.6 kg (160 lb) Body mass index is 26.63 kg/m². Weight History:  -8 lb, 4.8% x 1 month PTA per chart hx; noted weight fluctuations. Weight Metrics 10/14/2019 10/9/2019 10/7/2019 9/25/2019 9/20/2019 9/16/2019 9/16/2019 Weight 160 lb 164 lb 160 lb 8 oz 160 lb 14.4 oz 136 lb 14.4 oz - 168 lb 3.2 oz  
BMI 26.63 kg/m2 27.29 kg/m2 26.71 kg/m2 26.78 kg/m2 - 22.78 kg/m2 - Admitting Diagnosis: [facility]ropenic [facility] ([facility] [D70.9, R50.81] Pertinent PMHx: acute kidney failure, uterine cancer, cervical cancer, hiatal hernia, high cholesterol, urinary incontinence, sigmoid perforation Education Needs:        [x] None identified  [] Identified - Not appropriate at this time  []  Identified and addressed - refer to education log Learning Limitations:   [x] None identified  [] Identified Cultural, Islam & ethnic food preferences:  [x] None identified    [] Identified and addressed ESTIMATED NUTRITION NEEDS:  
 
Calories: 0855-7523 kcal (HBEx1.2-1.3) based on  [x] Actual BW: 73 kg      [] IBW Protein: 73-88 gm (1-1.2 gm/kg) based on  [x] Actual BW      [] IBW Fluid: 1 mL/kcal 
  
MONITORING & EVALUATION:  
 
Nutrition Goal(s):  
- PO nutrition intake will meet >75% of patient estimated nutritional needs within the next 7 days.    Outcome: New/Initial goal  
 
 Monitoring:   [x] Food and beverage intake   [x] Diet order   [x] Nutrition-focused physical findings   [x] Treatment/therapy   [] Weight   [] Enteral nutrition intake Previous Recommendations (for follow-up assessments only):  Not Applicable Discharge Planning: No nutritional discharge needs at this time. [x] Participated in care planning, discharge planning, & interdisciplinary rounds as appropriate Alex Muñiz RD Pager: [phone]

## 2019-10-15 NOTE — PROGRESS NOTES
[facility]ist [facility] Progress Note Patient: Anival Mccurdy Age: 71 y.o. : 1950 MR#: [id number] SSN: [id number] Date: 10/15/2019 Subjective/24-hoour events:  
 
Having a fair amount of pain still. Son present at bedside. Assessment:  
Obstructive uropathy Acute kidney injury on CKD 2 secondary to obstruction and likely dehydration Hyperkalemia Hypocalcemia Pancytopenia Hematuria L iliac fossa fluid collection, concern for possible abscess Neutropenic fever Metastatic GYN cancer Hx sigmoid colon perforation with percutaneous drain placement Plan: 
Need PCN placement per IR if patient amenable. [place]. Fluids/electrolyte management per nephrology. Increase analgesia but monitor for oversedation. Would benefit from palliative care evaluation - already ordered. Poor prognosis given progressive malignancy despite ongoing treatment. Follow. Case discussed with:  [x]Patient  [x]Family  [x]Nursing  [x]Case Management DVT Prophylaxis:  []Lovenox  []Hep SQ  []SCDs  []Coumadin   []On Heparin gtt Objective:  
VS:  
Visit Vitals /82 (BP 1 Location: Left arm, BP Patient Position: At rest) Pulse (!) 126 Temp 97.1 °F (36.2 °C) Resp 19 Ht 5' 5\" (1.651 m) Wt 72.6 kg (160 lb) SpO2 99% BMI 26.63 kg/m² Tmax/24hrs: Temp (24hrs), Av.2 °F (36.8 °C), Min:97 °F (36.1 °C), Max:100.9 °F (38.3 °C) Intake/Output Summary (Last 24 hours) at 10/15/2019 1052 Last data filed at 10/15/2019 0900 Gross per 24 hour Intake 2788 ml Output 150 ml Net 2638 ml General:  Appears uncomfortable. Cardiovascular:  Regular, tachy. Pulmonary:  No wheezes, effort nonlabored. GI:  Abdomen soft, NTTP. Extremities:  Warm, no ischemia. Neuro:  Awake/alert currently. Moves extremities spontaneously. Labs:   
Recent Results (from the past 24 hour(s)) CULTURE, URINE  Collection Time: 10/14/19 10:55 AM  
 Result Value Ref Range Special Requests: NO SPECIAL REQUESTS Culture result: NO GROWTH AFTER 20 HOURS    
URINALYSIS W/ RFLX MICROSCOPIC Collection Time: 10/14/19 10:55 AM  
Result Value Ref Range Color YELLOW Appearance CLOUDY Specific gravity 1.013 1.005 - 1.030    
 pH (UA) 5.5 5.0 - 8.0 Protein 30 (A) NEG mg/dL Glucose NEGATIVE  NEG mg/dL Ketone NEGATIVE  NEG mg/dL Bilirubin NEGATIVE  NEG Blood LARGE (A) NEG Urobilinogen 0.2 0.2 - 1.0 EU/dL Nitrites NEGATIVE  NEG Leukocyte Esterase SMALL (A) NEG URINE MICROSCOPIC ONLY Collection Time: 10/14/19 10:55 AM  
Result Value Ref Range WBC 0 to 3 0 - 4 /hpf  
 RBC 36 to 50 0 - 5 /hpf Epithelial cells FEW 0 - 5 /lpf Bacteria NEGATIVE  NEG /hpf Amorphous Crystals 1+ (A) NEG  
EKG, 12 LEAD, INITIAL Collection Time: 10/14/19 11:04 AM  
Result Value Ref Range Ventricular Rate 130 BPM  
 Atrial Rate 130 BPM  
 P-R Interval 118 ms QRS Duration 82 ms Q-T Interval 280 ms QTC Calculation (Bezet) 412 ms Calculated P Axis 29 degrees Calculated R Axis -22 degrees Calculated T Axis 44 degrees Diagnosis Sinus tachycardia Otherwise normal ECG When compared with ECG of 07-MAR-2019 13:14, No significant change was found Confirmed by Solomon Pereyra MD, Zulma Bennett ([id number]) on 10/14/2019 11:59:48 AM 
  
CULTURE, BLOOD Collection Time: 10/14/19 11:05 AM  
Result Value Ref Range Special Requests: NO SPECIAL REQUESTS Culture result: NO GROWTH AFTER 18 HOURS METABOLIC PANEL, COMPREHENSIVE Collection Time: 10/14/19 11:20 AM  
Result Value Ref Range Sodium 133 (L) 136 - 145 mmol/L Potassium 6.7 (HH) 3.5 - 5.5 mmol/L Chloride 106 100 - 111 mmol/L  
 CO2 17 (L) 21 - 32 mmol/L Anion gap 10 3.0 - 18 mmol/L Glucose 107 (H) 74 - 99 mg/dL  (H) 7.0 - 18 MG/DL  Creatinine 6.03 (H) 0.6 - 1.3 MG/DL  
 BUN/Creatinine ratio 19 12 - 20    
 GFR est AA 8 (L) >60 ml/min/1.73m2 GFR est non-AA 7 (L) >60 ml/min/1.73m2 Calcium 8.3 (L) 8.5 - 10.1 MG/DL Bilirubin, total 0.6 0.2 - 1.0 MG/DL  
 ALT (SGPT) 23 13 - 56 U/L  
 AST (SGOT) 30 10 - 38 U/L Alk. phosphatase 122 (H) 45 - 117 U/L Protein, total 6.7 6.4 - 8.2 g/dL Albumin 2.4 (L) 3.4 - 5.0 g/dL Globulin 4.3 (H) 2.0 - 4.0 g/dL A-G Ratio 0.6 (L) 0.8 - 1.7    
CBC WITH AUTOMATED DIFF Collection Time: 10/14/19 11:20 AM  
Result Value Ref Range WBC 0.4 (LL) 4.6 - 13.2 K/uL  
 RBC 2.58 (L) 4.20 - 5.30 M/uL HGB 7.4 (L) 12.0 - 16.0 g/dL HCT 23.0 (L) 35.0 - 45.0 % MCV 89.1 74.0 - 97.0 FL  
 MCH 28.7 24.0 - 34.0 PG  
 MCHC 32.2 31.0 - 37.0 g/dL  
 RDW 16.7 (H) 11.6 - 14.5 % PLATELET 43 (L) 044 - 420 K/uL MPV 9.4 9.2 - 11.8 FL  
 NEUTROPHILS DIFFERENTIAL NOT PERFORMED WHEN WBC IS <0.5 40 - 73 % LYMPHOCYTES DIFFERENTIAL NOT PERFORMED WHEN WBC IS <0.5 21 - 52 % MONOCYTES DIFFERENTIAL NOT PERFORMED WHEN WBC IS <0.5 3 - 10 % EOSINOPHILS DIFFERENTIAL NOT PERFORMED WHEN WBC IS <0.5 0 - 5 % BASOPHILS DIFFERENTIAL NOT PERFORMED WHEN WBC IS <0.5 0 - 2 %  
 ABS. NEUTROPHILS DIFFERENTIAL NOT PERFORMED WHEN WBC IS <0.5 1.8 - 8.0 K/UL  
 ABS. LYMPHOCYTES DIFFERENTIAL NOT PERFORMED WHEN WBC IS <0.5 0.9 - 3.6 K/UL  
 ABS. MONOCYTES DIFFERENTIAL NOT PERFORMED WHEN WBC IS <0.5 0.05 - 1.2 K/UL  
 ABS. EOSINOPHILS DIFFERENTIAL NOT PERFORMED WHEN WBC IS <0.5 0.0 - 0.4 K/UL  
 ABS. BASOPHILS DIFFERENTIAL NOT PERFORMED WHEN WBC IS <0.5 0.0 - 0.1 K/UL  
 DF DIFFERENTIAL NOT PERFORMED WHEN WBC IS <0.5 URIC ACID Collection Time: 10/14/19 11:20 AM  
Result Value Ref Range Uric acid 14.5 (H) 2.6 - 7.2 MG/DL  
PHOSPHORUS Collection Time: 10/14/19 11:20 AM  
Result Value Ref Range Phosphorus 5.6 (H) 2.5 - 4.9 MG/DL  
CULTURE, BLOOD Collection Time: 10/14/19 11:20 AM  
Result Value Ref Range Special Requests: NO SPECIAL REQUESTS Culture result: NO GROWTH AFTER 16 HOURS    
POC LACTIC ACID Collection Time: 10/14/19 11:23 AM  
Result Value Ref Range Lactic Acid (POC) 1.07 0.40 - 2.00 mmol/L INFLUENZA A & B AG (RAPID TEST) Collection Time: 10/14/19 12:42 PM  
Result Value Ref Range Influenza A Antigen NEGATIVE  NEG Influenza B Antigen NEGATIVE  NEG    
METABOLIC PANEL, BASIC Collection Time: 10/14/19  4:05 PM  
Result Value Ref Range Sodium 138 136 - 145 mmol/L Potassium 5.9 (H) 3.5 - 5.5 mmol/L Chloride 112 (H) 100 - 111 mmol/L  
 CO2 13 (L) 21 - 32 mmol/L Anion gap 13 3.0 - 18 mmol/L Glucose 115 (H) 74 - 99 mg/dL  (H) 7.0 - 18 MG/DL Creatinine 5.52 (H) 0.6 - 1.3 MG/DL  
 BUN/Creatinine ratio 20 12 - 20 GFR est AA 9 (L) >60 ml/min/1.73m2 GFR est non-AA 8 (L) >60 ml/min/1.73m2 Calcium 7.3 (L) 8.5 - 10.1 MG/DL  
POTASSIUM Collection Time: 10/14/19 11:55 PM  
Result Value Ref Range Potassium 6.4 (HH) 3.5 - 5.5 mmol/L  
POTASSIUM Collection Time: 10/15/19  2:24 AM  
Result Value Ref Range Potassium 6.1 (HH) 3.5 - 5.5 mmol/L  
VANCOMYCIN, RANDOM Collection Time: 10/15/19  7:10 AM  
Result Value Ref Range Vancomycin, random 18.2 5.0 - 40.0 UG/ML  
POTASSIUM Collection Time: 10/15/19  7:10 AM  
Result Value Ref Range Potassium 5.8 (H) 3.5 - 5.5 mmol/L Signed By: Alesha Gomez MD   
 October 15, 2019

## 2019-10-15 NOTE — PROGRESS NOTES
Patient Vitals for the past 8 hrs: 
 Temp Pulse Resp BP SpO2  
10/15/19 1137 97 °F (36.1 °C) (!) 131 19 126/80 99 % 10/15/19 0813 97.1 °F (36.2 °C) (!) 126 19 130/82 99 % 10/15/19 0418 97 °F (36.1 °C) (!) 120 20 132/88 100 % Dr. Amrita Calhoun aware of mews score elevations of 4.

## 2019-10-15 NOTE — PROGRESS NOTES
[facility] [address] [phone] Colon and Rectal Surgery Progress Note Patient: Amy Laws MRN: [id number]  SSN: [id number] YOB: 1950  Age: 71 y.o. Sex: female Admit Date: 10/14/2019 LOS: 1 day Subjective:  
 
C/o abdominal pain. Objective:  
 
Vitals:  
 10/14/19 2302 10/15/19 0418 10/15/19 0813 10/15/19 1137 BP: 126/79 132/88 130/82 126/80 Pulse: (!) 118 (!) 120 (!) 126 (!) 131 Resp: 19 20 19 19 Temp: 97.1 °F (36.2 °C) 97 °F (36.1 °C) 97.1 °F (36.2 °C) 97 °F (36.1 °C) SpO2: 100% 100% 99% 99% Weight:      
Height:      
  
 
Intake and Output: 
Current Shift: 10/15 0701 - 10/15 1900 In: 360 [I.V.:360] Out: 150 [Urine:150] Last three shifts: 10/13 1901 - 10/15 0700 In: 8272 [I.V.:2778] Out: - Physical Exam:  
 
abd distended, mildly diffusely tender Lab/Data Review: 
 
CMP:  
Lab Results Component Value Date/Time  10/15/2019 11:47 AM  
 K 5.7 (H) 10/15/2019 11:47 AM  
  10/15/2019 11:47 AM  
 CO2 18 (L) 10/15/2019 11:47 AM  
 AGAP 11 10/15/2019 11:47 AM  
 GLU 87 10/15/2019 11:47 AM  
  (H) 10/15/2019 11:47 AM  
 CREA 5.99 (H) 10/15/2019 11:47 AM  
 GFRAA 8 (L) 10/15/2019 11:47 AM  
 GFRNA 7 (L) 10/15/2019 11:47 AM  
 CA 8.3 (L) 10/15/2019 11:47 AM  
 
CBC:  
Lab Results Component Value Date/Time WBC 0.3 (LL) 10/15/2019 11:45 AM  
 HGB 6.5 (L) 10/15/2019 11:45 AM  
 HCT 19.4 (L) 10/15/2019 11:45 AM  
 PLT 16 (LL) 10/15/2019 11:45 AM  
  
 
Assessment:  
 
Metastatic uterine cancer, colon perforation secondary to chemotherapy, acute renal failure, pancytopenia Plan:  
 
CT's reviewed with radiology There would be a role for IR drainage of pelvic abscesses if pt were well enough Given pancytopenia, renal failure, perc nephrostomy tubes should be addressed first 
If pt shows any improvement can consider IR consult for drainage Continue abx for now Signed By: Tabitha Carroll MD 
 
  
 October 15, 2019

## 2019-10-15 NOTE — ROUTINE PROCESS
Bedside and Verbal shift change report given to Estella Doshi RN (oncoming nurse) by Sunny Glasgow RN (offgoing nurse). Report included the following information SBAR, Kardex, Intake/Output and Recent Results.

## 2019-10-15 NOTE — PROGRESS NOTES
Results for Monserrat Carrillo (MRN [id number]) as of 10/15/2019 14:19 Ref. Range 10/15/2019 11:45 WBC Latest Ref Range: 4.6 - 13.2 K/uL 0.3 (LL) RBC Latest Ref Range: 4.20 - 5.30 M/uL 2.29 (L) HGB Latest Ref Range: 12.0 - 16.0 g/dL 6.5 (L) HCT Latest Ref Range: 35.0 - 45.0 % 19.4 (L) MCV Latest Ref Range: 74.0 - 97.0 FL 84.7 MCH Latest Ref Range: 24.0 - 34.0 PG 28.4 MCHC Latest Ref Range: 31.0 - 37.0 g/dL 33.5 RDW Latest Ref Range: 11.6 - 14.5 % 17.5 (H) PLATELET Latest Ref Range: 135 - 420 K/uL 16 (LL) MPV Latest Ref Range: 9.2 - 11.8 FL 8.9 (L) INR Latest Ref Range: 0.8 - 1.2   1.4 (H) Prothrombin time Latest Ref Range: 11.5 - 15.2 sec 17.3 (H) aPTT Latest Ref Range: 23.0 - 36.4 SEC 28.7 Dr. Song Ruiz was made aware of CBC results. He said he will take a look at them.

## 2019-10-15 NOTE — PROGRESS NOTES
Reason for Readmission:    Neutropenic fever ([facility] [D70.9, R50.81] RRAT Score and Risk Level:      14 Level of Readmission:   1 
 (1 - First Readmission, 2- Second Readmission within 30 days or multiple admissions over previous 90 days, 3- Greater than two readmissions within 30 days or multiple admissions over previous 90 days) Care Conference scheduled:  NO 
    
Resources/supports as identified by patient/family:  Family supports. Top Challenges facing patient (as identified by patient/family and CM): Finances/Medication cost?    NO Transportation   Son will transport. Support system or lack thereof? Family Living arrangements? Lives with her son. Self-care/ADLs/Cognition? Alert and oriented. Current Advanced Directive/Advance Care Plan:  NO 
        
Plan for utilizing home health:    NO Likelihood of Readmission:  MODERATE Transition of Care Plan:     Patient will return home with family assistance. Patient's son will transport home at the time of discharge. Initial assessment completed with patient and her son Akash Boxravi). Cognitive status of patient: Alert and oriented. Face sheet information confirmed:  yes. The patient designates her son Addison Boxer) and her sister Brittney Charlton)  to participate in her discharge plan and to receive any needed information. This patient lives in a single family home with her son. Patient is able to navigate steps as needed. Prior to hospitalization, patient was considered to be independent with ADLs/IADLS : yes . Patient has a current ACP document on file: no 
 
Patient's son Oden Boxer) will be available to transport patient home upon discharge. The patient already has no medical equipment available in the home. Patient is not currently active with home health. Patient has not stayed in a skilled nursing facility or rehab.    
 
This patient is on dialysis :no 
 Currently, the discharge plan is to return home with help from her family. Patient's son will transport home at the time of discharge. Patient's son is (Mohamud Marshall, ZAHRA#[phone]). Patient's sister is (Moisés Herring, DA#[phone]). Patient's PCP is Dr. Abdoulaye Brandon. Patient is insured through Medicare part A and she also has [employer]. The patient states that she can obtain her medications from the pharmacy, and take her medications as directed. This writer will continue to closely monitor for discharge planning to ensure a safe discharge home from [place]. Care Management Interventions PCP Verified by CM: Sesar March MD) Palliative Care Criteria Met (RRAT>21 & CHF Dx)?: No 
Mode of Transport at Discharge: Other (see comment)(son will transport) Transition of Care Consult (CM Consult): Discharge Planning Current Support Network: Own Home(Her son resides with her) Confirm Follow Up Transport: Family Plan discussed with Pt/Family/Caregiver: Yes Discharge Location Discharge Placement: Home with family assistance Sonido Adame. MS Care Manager Pager#: [phone]

## 2019-10-15 NOTE — CONSULTS
[facility] Name:  Erum Candelaria 
MR#:   [id number] :  1950 ACCOUNT #:  [de-identified] DATE OF SERVICE:  10/14/2019 REASON FOR CONSULTATION:  Abdominal pain, weakness, and fatigue. 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 Starting back in February, she was seen by GYN oncology, Dr. Christine Allen, and she ultimately had a hysterectomy and bilateral salpingo-oophorectomy. Further workup over the ensuing months showed widely metastatic disease, and she had a complication of Neulasta, which was a perforated sigmoid colon. Because of her debilitation, she was treated with multiple percutaneous drainages and bilateral ureteral stents. She also had at the time renal failure and underwent dialysis for some period of time. Over the last week, she has been undergoing chemotherapy, had a very difficult last few days, and ultimately was brought to the emergency room today. Workup in the emergency room revealed pancytopenia. She had a noncontrasted CT scan, and there was some question of several dots of air in her old area of drainage, which could represent a new abscess or leakage from the colon. It could also represent diverticulosis. She has a diffusely tender abdomen. CURRENT PRIOR TO ADMISSION MEDS:  Include Pepcid, vitamins, Tylenol, B complex. PAST MEDICAL HISTORY:  Includes acute renal failure, obesity, uterine cancer, chronic renal insufficiency, hiatal hernia, history of thromboembolus. SURGICAL HISTORY:  Includes her laparoscopic hysterectomy with bilateral salpingo-oophorectomy, lumpectomy, nephrostomy stents, MediPort, and history of dialysis catheters. FAMILY HISTORY:  She has a family history of ovarian cancer. SOCIAL HISTORY:  She is a nondrinker and nonsmoker. ALLERGIES:  SHE IS ALLERGIC TO NEULASTA, ASPIRIN, AVASTIN, TETRACYCLINE, WEED, AND SOME FORM OF GRASS ALLERGY. REVIEW OF SYSTEMS: 
CONSTITUTIONAL:  Currently, her review of systems is positive for lethargy, fatigue. HEENT:  Negative. RESPIRATORY:  Negative. CARDIOVASCULAR:  Negative. GI:  Positive for diffuse abdominal pain and distention. :  Negative. MUSCULOSKELETAL:  Negative. SKIN:  Negative. NEUROLOGIC:  Negative. PSYCHIATRIC:  Negative. PHYSICAL EXAMINATION: 
VITAL SIGNS:  She has a blood pressure of 157/88, heart rate in the 130s, respiratory rate 36, temperature 99.6. GENERAL:  She is awake, alert, and oriented x3, clearly ill but in no distress. HEENT:  Normocephalic, atraumatic. Pupils are equal.  Sclerae is anicteric. Nose and throat are clear. CHEST:  Clear bilaterally. HEART:  Has regular rate and rhythm but tachycardia. ABDOMEN:  Diffusely tender without rebound, but she does guard. She has no hernias. EXTREMITIES:  Warm and dry. NEUROLOGIC:  She is intact. LABORATORY DATA:  Show a white blood cell count of 0.4, an H and H of 7.4 and 23, and a platelet count of 43. Her latest electrolytes show sodium of 138, potassium of 5.9, chloride of 112, glucose of 115, BUN of 109, creatinine of 5.5 down from earlier today of 6.03, calcium is 7.3. She has negative flu antigen, and a latest CA-125 was 149 about a week ago, upper limit of normal being 35. IMPRESSION:  Widely metastatic endometrial cancer with severe pancytopenia, etiology being either her recent chemotherapy or sepsis. It is difficult to tell on her CT whether she has perforation which will be contained or old abscess or just normal diverticulum. Would treat with antibiotics given her neutropenia, consider intensive care unit care. She has some mild hematuria. We will follow briefly with you and discuss with Dr. Hero Hoang.  
 
 
Kendrick Victoria MD 
 
 
[id number] 
D:  10/14/2019 19:04 
 T:  10/15/2019 0:12 JOB #:  [id number]

## 2019-10-15 NOTE — H&P
Date of Admission: 10/14/2019 Assessment: Hyperkalemia:  multifactorial 
Acute renal failure with hydronephrosis with hx of CKD stage 2:  S/p ureteral stent Left illiac fossa fluid collection:  Possible developing abscess from prior sigmoid performation site- unclear if new process or related to the prior one; seen by surgery (Dr. Emily Tamayo)- not currently surgical candidate Neutropenic fevers:  Blood and urine cx sent; not eligible for Neupogen due to prior adverse reaction Pancytopenia Recurrent Stage 4B Metastatic cervical vs endometrial cancer:  CT abd/pelvis with increasing nu,lynn and size of masses in pelvis, lungs, spleen and abdomen - s/p 5 day cycle of topotecan 10/11/19. Per H/O note- chemo currently on hold 
 - followed by Dr. Ebony Sanders Hematuria: hx of prior ureteral stent Electrolyte abnormalities:  Increased chloride, hypocalcemia Hx of prior sigmoid perforation:  Followed by Dr. Granados Prior; previously treated with percuaneous drain placement Plan: F/u blood cx 
F/u urine culture Continue with vancomycin for neutropenia with underlying intra-abdominal infection 
 - d/c zosyn given ARF 
 - add cefepime and flagyl CBC, CMP in am 
Seen by surgery- Patient not currently a surgical candidate Serial potassium, careful tele monitoring. 
 - kayexelate, IVF Aggressive IVF Nephrology consult- Dr. Ravin Arambula notified. Urology consult- Dr. Caitlin Sarmiento consulted Morphine 4 mg IV every 2 hours Zophran for nausea Patient is currently FULL CODE. Discussed with patient and son at bedside. Espinoza Rodriguez D.O. Internal Medicine and Infectious Diseases Subjective:  
 Patient is a 71 y. o.female who is being evaluated for abdominal pain. Ms. Megan Baltazar is a 70 yo lady with hx of metastatic uterine cancer on chemotherapy, CKD, and prior sigmoind perforation who is presenting to the Ed with abdominal painl.  She notes that she has been undergoing chemotherapy for the past week. She has not been eating or drinking well per her son and has been increasingly nauseated. On Sunday she had developed an abrupt diffuse abdominal pain which has continued to worsen over the last 24 hours. She denies have pain similar to this in the past, even with her prior sigmoid perforation and drain placement. She normally only takes an occasional Tylenol at home. She denies diarrhea or change in bowel habits. Denies fevers or chills. No appetite. No pain with swallowing. No pain with BM. Has not noticed any hematuria. Past Medical History:  
Diagnosis Date  Acute kidney failure ([facility] [address] 12/05/2019  BMI 34.0-34.9,adult 34.8  Caffeine adverse reaction   
 elevated BP  Cancer ([facility] [address] uterine, cervical  
 Cervical cancer (HCC)  Chronic kidney disease   
 acute kidney failure  Chronic sinusitis  Dizziness  Endometriosis  Hiatal hernia  High cholesterol  Palpitation  PMB (postmenopausal bleeding)  Rash   
 eczemz  Thromboembolus ([facility] [address] 03/15/2019 IVC filter placed  Urinary incontinence Past Surgical History:  
Procedure Laterality Date  HX ABDOMINAL LAPAROSCOPY    
 HX BILATERAL SALPINGO-OOPHORECTOMY Bilateral   
 HX BREAST LUMPECTOMY Right 1973  
 benign  HX HYSTERECTOMY    
 radical  
 HX OTHER SURGICAL  09/01/2017 Exam under anesthesia: Cystoscopy, sigmoidoscopy  HX UROLOGICAL    
 nephrostomy tubes and stents  IR CHANGE EXIST CATHETER/TUBE LT  3/7/2019  IR MEDIPORT  VASCULAR SURGERY PROCEDURE UNLIST    
 filter Family History Problem Relation Age of Onset  Cancer Mother   
     left ovary Medications reviewed as below:  
Current Facility-Administered Medications Medication Dose Route Frequency Provider Last Rate Last Dose  sodium chloride (NS) flush 5-40 mL  5-40 mL IntraVENous Q8H Shahram Valero DO   10 mL at 10/14/19 1859  sodium chloride (NS) flush 5-10 mL  5-10 mL IntraVENous PRN Farrukh Laureano PA-C      
 levoFLOXacin (LEVAQUIN) 750 mg in D5W IVPB  750 mg IntraVENous Q24H Stephanie Cannon   Stopped at 10/14/19 1411  piperacillin-tazobactam (ZOSYN) 2.25 g in 0.9% sodium chloride (MBP/ADV) 50 mL MBP  2.25 g IntraVENous Q8H Farrukh Laureano PA-C 100 mL/hr at 10/14/19 2030 2.25 g at 10/14/19 2030  alteplase (CATHFLO) injection 2 mg  2 mg InterCATHeter ONCE PRN Daryl Paris, NP      
 ondansetron (ZOFRAN) 4 mg/2 mL injection  VANCOMYCIN INFORMATION NOTE   Other CONTINUOUS Farrukh Laureano PA-C Facility-Administered Medications Ordered in Other Encounters Medication Dose Route Frequency Provider Last Rate Last Dose  PHARMACY INFORMATION NOTE  1 Each Other Rx Dosing/Monitoring Navid Sanderson MD      
 
Allergies Allergen Reactions  Other Food Nausea and Vomiting Artificial sweeteners  Neulasta [Pegfilgrastim] Swelling  Aspirin Other (comments) Gi upset  Avastin [Bevacizumab] Other (comments) Bowel perforation  Tetracycline Unknown (comments) Patient does not remember, reaction many years ago  Wheat Atopic Dermatitis  
  eczema  Other Plant, Animal, Environmental Other (comments) Pt states she has \"springtime grass allergies. \" Reports reaction: Sinus infection Social History Socioeconomic History  Marital status:  Spouse name: Not on file  Number of children: Not on file  Years of education: Not on file  Highest education level: Not on file Occupational History  Not on file Social Needs  Financial resource strain: Not on file  Food insecurity:  
  Worry: Not on file Inability: Not on file  Transportation needs:  
  Medical: Not on file Non-medical: Not on file Tobacco Use  Smoking status: Never Smoker  Smokeless tobacco: Never Used Substance and Sexual Activity  Alcohol use: No  
 Drug use: No  
 Sexual activity: Not Currently Lifestyle  Physical activity:  
  Days per week: Not on file Minutes per session: Not on file  Stress: Not on file Relationships  Social connections:  
  Talks on phone: Not on file Gets together: Not on file Attends Baptism service: Not on file Active member of club or organization: Not on file Attends meetings of clubs or organizations: Not on file Relationship status: Not on file  Intimate partner violence:  
  Fear of current or ex partner: Not on file Emotionally abused: Not on file Physically abused: Not on file Forced sexual activity: Not on file Other Topics Concern  Not on file Social History Narrative  Not on file Review of Systems Negative Unless BOLDED General: fevers, chills, myalgias, arthralgias, unexplained weight loss, malaise, fatigue. HEENT:  headaches,sinus pain or presure, recent URI, recent dental procedures;  tinnitus, hearing loss , visual changes, catarats, dizziness or blurred vision PUlMONARY:  cough , shortness of breath, sputum production, hx of asthma or COPD. previous treatement for TB or PPD. Cardiovascular: chest pain, previous CAD/MI, vavlular heart disease,  murmurs GI:   nausea, vomiting, diarrhea, abdominal pain, prior C.diff :  urinary frequency, dysuria, hematuria, bladder incontinence. Neurologic:  seizures, syncope or prior CVA/TIA, confusion, memory impairment, neuropathy Musculoskeletal:  myalgias arthralgias, joint pain/ swelling,  back pain Skin:  Purities,  recurrent cellulitis,  chronic stasis ulcer, diabetic foot ulcers Endocrine: polyuria, polydipsia, hair loss, weight gain Psych: Denies depression or treatment by a psychiatrist/psycologist 
Heme-Onc: prior DVT, easy bruising, fatigue, malignancy Objective:  
 
  
Visit Vitals /70 (BP 1 Location: Left arm, BP Patient Position: At rest) Pulse (!) 120 Temp 98.6 °F (37 °C) Resp 18 Ht 5' 5\" (1.651 m) Wt 72.6 kg (160 lb) SpO2 99% BMI 26.63 kg/m² Temp (24hrs), Av.4 °F (37.4 °C), Min:98.5 °F (36.9 °C), Max:100.9 °F (38.3 °C) General:   awake alert and oriented Skin:   no rashes or skin lesions noted on limited exam  
HEENT:  Normocephalic, atraumatic, PERRL, EOMI, no scleral icterus or pallor; no conjunctival hemmohage;  nasal and oral mucous are moist and without evidence of lesions. No thrush. Dentition good. Neck supple, no bruits. Lymph Nodes:   no cervical, axillary or inguinal adenopathy Lungs:   non-labored, bilaterally clear to aspiration- no crackles wheezes rales or rhonchi Heart:  RRR, s1 and s2; no murmurs rubs or gallops, trace edema, + pedal pulses Abdomen:  soft, diffusely tender, hypoactive bowel sounds Genitourinary:  deferred Extremities:   no clubbing, cyanosis; no joint effusions or swelling; Full ROM of all large joints to the upper and lower extremities; muscle mass appropriate for age; left LE with increased girth compared to right Neurologic:  No gross focal sensory abnormalities; 5/5 muscle strength to upper and lower extremities. Speech appropirate. Cranial nerves intact Psychiatric:   appropriate and interactive. anxious Labs: Results:  
Chemistry Recent Labs 10/14/19 
1605 10/14/19 
1120 * 107*  133*  
K 5.9* 6.7*  
* 106 CO2 13* 17* * 116* CREA 5.52* 6.03* CA 7.3* 8.3* AGAP 13 10 BUCR 20 19 AP  --  122* TP  --  6.7 ALB  --  2.4*  
GLOB  --  4.3* AGRAT  --  0.6* CBC w/Diff Recent Labs 10/14/19 
1120 WBC 0.4* RBC 2.58* HGB 7.4* HCT 23.0*  
PLT 43* GRANS DIFFERENTIAL NOT PERFORMED WHEN WBC IS <0.5 LYMPH DIFFERENTIAL NOT PERFORMED WHEN WBC IS <0.5 EOS DIFFERENTIAL NOT PERFORMED WHEN WBC IS <0.5 No results found for: SDES Lab Results Component Value Date/Time Culture result:  10/07/2019 04:14 PM  
  80144 COLONIES/mL MIXED GRAM POSITIVE THOMAS, PROBABLE SKIN/GENITAL CONTAMINATION. Culture result: NO GROWTH 2 DAYS 09/19/2019 04:57 PM  
 Culture result:  09/16/2019 07:27 PM  
  [id number] COLONIES/mL MIXED GRAM POSITIVE THOMAS, PROBABLE SKIN/GENITAL CONTAMINATION. Culture result:  09/09/2019 02:52 PM  
  [id number] COLONIES/mL MIXED GRAM POSITIVE THOMAS, PROBABLE SKIN/GENITAL CONTAMINATION. Culture result: [id number] COLONIES/mL PSEUDOMONAS AERUGINOSA (A) 08/19/2019 09:55 AM  
  
 
 
Imaging: All imaging reviewed from Admission to present as per radiology interpretation in [address] [place] [address]

## 2019-10-15 NOTE — PROGRESS NOTES
Palliative Medicine Consult [facility]: [phone] [facility]: [phone] Time In: 1210 Time Out: 1300 Palliative Medicine Team Hong Balbuena NP, Olaf Ann, RN, and this [facility] met with patient and son James Cleary at bedside. The reason for this palliative consult is clarification of goals of care. Patient has PMH of recurrent stage IVB endometrioid cervical cancer vs. endometrial cancer. She was recently discharged from hospital after being admitted for acute on chronic renal failure s/p stent exchange. She presented to the ED on 10/14/2019 w/ c/o extreme fatigue, dehydration, weakness, and abdominal pain. When PM Team arrived, patient stated that she was not experiencing pain, having just received morphine. Prior to the pain medication administration, patient stated that she had 10/10 pain. Patient drifted in and out during the conversation. Patient resides at home with her son James Cleary [de-identified]32years old). James Cleary has provided patient with care since she was originally diagnosed with cancer in 2017. He provided full care x 6 weeks following her hysterectomy in 2017. He is a full-time student and expressed concerns about his financial situation in the event that patient dies. James Cleary has very limited support systems. Patient's daughter, Brandi Ghosh, resides in [place] and cares full-time for her disabled child. Patient was working as a  up until last Friday. Per James Cleary, patient has been \"in denial\" about her diagnosis and stated that she did not tell him that her cancer had spread. Patient and James Cleary are Christians who find strength in their jasmina. In the absence of an AMD (and in the event that patient lacked capacity), all medical decisions would be based on a consensus of patient's adult children.  PM Team discussed the importance of establishing an Advance Medical Directive when patient is able. An AMD was not completed today due to patient's emotional state and her c/o weakness d/t not having food x 4 days. PM Team discussed the benefits and burdens of resuscitation and intubation. Patient stated, \"I'm not ready to die yet,\" but agreed to revisit the topic at a later time. PM Team also discussed the availability of care options. Patient stated that she wants \"to get [her] white blood cell count up\" before making a decision re nephrostomy tubes. In the past, patient had stated that she did not want to have nephrostomy tubes placed. PM Team discussed the availability of hospice care, at which point Ny Mensah became very tearful. Emotional support provided. Ny Mensah stated, \"I knew it was coming. I've been preparing for her to die for almost three years now. \" Patient was not ready to make any decisions today re aggressive vs. comfort care. Family stated that they are not ready for an informational hospice meeting. Patient status is FULL CODE; full interventions. PM Team to f/u to address CODE status/complete POST form and establish AMD if possible.

## 2019-10-15 NOTE — PROGRESS NOTES
[facility] Readmission Patient Interview The following is related to the patient's last admission and the events following discharge from the hospital: 
 
  Date of last hospital discharge:  9/20/2019 Date of Readmission:  10/14/2019 Reason for Readmission:  Neutropenic fever Were you kept informed about your diagnoses during your stay in the hospital and what was being done to further evaluate and treat them? * in reference to index admission* Interviewer Notes:  
 
  [] None of time 
 [] Some of time 
 [] Most of time 
 [x] All of time At the time of your discharge, did someone talk to you about: 
*if patient answers yes - ask them to recall what information they remember* 
  
1. What your diagnoses were? 2. What tests or procedures needed to be done after you left? 3. What to watch out for regarding worsening of your disease? 4. What to do if you were experiencing worsening of your disease? 5. Who to contact (and how) if you were experiencing worsening of your disease? Interviewer Notes: 
   
 
 
 [x] Yes  [] No  [] Not Sure 
 [x] Yes  [] No  [] Not Sure Were you asked about your understanding of these instructions? Interviewer Notes: 
  [x] Yes  [] No  [] Not Sure Were the discharge instructions written down and given to you before you left? Interviewer Notes: 
  [x] Yes  [] No  [] Not Sure Were the written discharge instructions and plans easy to read and understand? Interviewer Notes: 
  [x] Yes  [] No  [] Not Sure How confident were you about understanding these instructions? Interviewer Notes:  [x] Very confident 
 [] Somewhat confident 
 [] Not confident 
 [] Not Sure Do you have a regular doctor who takes care of you for most things? Primary Providers Name/Practice Name: Dr. Chas Bird 
  [x] Yes  [] No  [] Not Sure At the time of your discharge, did someone talk to you about which medications to take when you left and which ones to discontinue? Interviewer Notes: 
  [x] Yes  [] No  [] Not Sure Did you take your medications as they were prescribed? Interviewer Notes: 
  [x] Yes  [] No  [] Not Sure If not, what were the difficulties you experienced with taking your medications? Comments:   N/A After you left the hospital, did you have an appointment with your doctor? [x] Yes  [] No  [] Not Sure If yes, who made the appointment? [] I did  
 [] My family 
 [x] Hospital staff 
 [] Not Sure Were you able to get to this appointment? Interviewer Notes: 
  [x] Yes  [] No  [] Not Sure How do you think you became sick enough to be readmitted to the hospital?  
Comment:   Severe pain Source:  Modified from [facility], [place], [place] Sonido Diop. MSW Care Manager Pager#: [phone]

## 2019-10-15 NOTE — CDMP QUERY
Patient admitted with neutropenic fever. H&P  indicates pancytopenia Miguel Angry If possible, please  clarify, if known   :  
 
? Pancytopenia  due to chemo Pancytopenia due to sepsis ? Other Explanation of clinical findings ? Clinically Undetermined (no explanation for clinical findings) The medical record reflects the following: 
 
   Risk Factors: cervical cancer on chemo Clinical Indicators: WBC  0.4;   platelets 33;   H&H    7.4/  23.0;   
surgery consult suggests  pancytopenic  due to chemo or sepsis Treatment: Chemo on hold IV  ABX Thank you,   Chantal Hood  RN   CCDS  x [phone]

## 2019-10-15 NOTE — PROGRESS NOTES
Problem: Falls - Risk of 
Goal: *Absence of Falls Description Document Edita Lion Fall Risk and appropriate interventions in the flowsheet. Outcome: Progressing Towards Goal 
Note:  
Fall Risk Interventions: 
Mobility Interventions: Bed/chair exit alarm Medication Interventions: Bed/chair exit alarm Elimination Interventions: Bed/chair exit alarm, Call light in reach

## 2019-10-15 NOTE — CONSULTS
Consult Note Consult requested by: Yuliya Cruz MD 
 
ADMIT DATE: 10/14/2019 CONSULT DATE: October 15, 2019 Admission diagnosis: Abdominal pain Reason for Nephrology Consultation: BECK Assessment:  
1 Acute kidney injury on chronic kidney disease stage II. History of hydronephrosis status post ureteral stents. CT abdomen indicative of worsening hydronephrosis of the stent. Likely secondary to multiple increased size masses in the abdomen. Patient also may have a prerenal component as she has not been eating drinking. 2 hyperkalemia, multifocal factorial including BECK, metabolic acidosis 3 hypocalcemia 
4 hyperchloremic non-gap metabolic acidosis, likely from BECK 
5 history of hydronephrosis followed by Dr. Saunders, stents in place 6 prior history of sigmoid perforation 7 left iliac. Fluid collection, concern for possible abscess in the perforation site 8 thrombocytopenia  
  
Plan: 
1 continue infusion with bicarb drip 
2 glucose insulin cocktail 
3 STAT BMP this AM and Lasix 40 mg IV X 1  
4 Cj PCN per urology/IR  
4 volume resuscitation 5 empirical antibiotic's per ID recommendation 6 follow strict ins and outs, follow renal parameters closely 7 follow renal parameters and K this evening 
  
Please call with questions, 
  
Hemanth Atwood MD FASN Cell [phone] Pager: [phone] HPI:  
  
Patient is a 43-year-old white female with history of metastatic uterine cancer on chemotherapy followed by Dr. Nuno Valdez, chronic kidney disease stage, previous episode of uropathy/bilateral hydronephrosis managed initially by nephrostomy but then with stents, previous history of sigmoid perforation/pelvic abscess to the ED with abdominal pain. Patient is not been eating drinking well and has been nauseated. He has been undergoing chemotherapy. He has developed an abrupt diffuse abdominal pain worsening over the last 24 hours. Denies fevers chills, denies diarrhea. Past Medical History:  
Diagnosis Date  Acute kidney failure ([facility] [address] 12/05/2019  BMI 34.0-34.9,adult 34.8  Caffeine adverse reaction   
 elevated BP  Cancer ([facility] [address] uterine, cervical  
 Cervical cancer (HCC)  Chronic kidney disease   
 acute kidney failure  Chronic sinusitis  Dizziness  Endometriosis  Hiatal hernia  High cholesterol  Palpitation  PMB (postmenopausal bleeding)  Rash   
 eczemz  Thromboembolus ([facility] [address] 03/15/2019 IVC filter placed  Urinary incontinence Past Surgical History:  
Procedure Laterality Date  HX ABDOMINAL LAPAROSCOPY    
 HX BILATERAL SALPINGO-OOPHORECTOMY Bilateral   
 HX BREAST LUMPECTOMY Right 1973  
 benign  HX HYSTERECTOMY    
 radical  
 HX OTHER SURGICAL  09/01/2017 Exam under anesthesia: Cystoscopy, sigmoidoscopy  HX UROLOGICAL    
 nephrostomy tubes and stents  IR CHANGE EXIST CATHETER/TUBE LT  3/7/2019  IR MEDIPORT  VASCULAR SURGERY PROCEDURE UNLIST    
 filter Social History Socioeconomic History  Marital status:  Spouse name: Not on file  Number of children: Not on file  Years of education: Not on file  Highest education level: Not on file Occupational History  Not on file Social Needs  Financial resource strain: Not on file  Food insecurity:  
  Worry: Not on file Inability: Not on file  Transportation needs:  
  Medical: Not on file Non-medical: Not on file Tobacco Use  Smoking status: Never Smoker  Smokeless tobacco: Never Used Substance and Sexual Activity  Alcohol use: No  
 Drug use: No  
 Sexual activity: Not Currently Lifestyle  Physical activity:  
  Days per week: Not on file Minutes per session: Not on file  Stress: Not on file Relationships  Social connections:  
  Talks on phone: Not on file Gets together: Not on file Attends Jainism service: Not on file Active member of club or organization: Not on file Attends meetings of clubs or organizations: Not on file Relationship status: Not on file  Intimate partner violence:  
  Fear of current or ex partner: Not on file Emotionally abused: Not on file Physically abused: Not on file Forced sexual activity: Not on file Other Topics Concern  Not on file Social History Narrative  Not on file Family History Problem Relation Age of Onset  Cancer Mother   
     left ovary Allergies Allergen Reactions  Other Food Nausea and Vomiting Artificial sweeteners  Neulasta [Pegfilgrastim] Swelling  Aspirin Other (comments) Gi upset  Avastin [Bevacizumab] Other (comments) Bowel perforation  Tetracycline Unknown (comments) Patient does not remember, reaction many years ago  Wheat Atopic Dermatitis  
  eczema  Other Plant, Animal, Environmental Other (comments) Pt states she has \"springtime grass allergies. \" Reports reaction: Sinus infection Home Medications:  
 
Medications Prior to Admission Medication Sig  cyanocobalamin (VITAMIN B12) 100 mcg tablet Take 100 mcg by mouth daily.  famotidine (PEPCID) 20 mg tablet Take 20 mg by mouth daily. Indications: gastroesophageal reflux disease  multivitamin (ONE A DAY) tablet Take 1 Tab by mouth daily.  acetaminophen (TYLENOL) 500 mg tablet Take 1,000 mg by mouth every six (6) hours as needed for Pain.  b complex vitamins (B COMPLEX 1) tablet Take 1 Tab by mouth daily. Current Inpatient Medications:  
 
Current Facility-Administered Medications Medication Dose Route Frequency  ondansetron (ZOFRAN) injection 8 mg  8 mg IntraVENous Q4H PRN  
 influenza vaccine 2019-20 (6 mos+)(PF) (FLUARIX/FLULAVAL/FLUZONE QUAD) injection 0.5 mL  0.5 mL IntraMUSCular PRIOR TO DISCHARGE  cefepime (MAXIPIME) 2 g in sterile water (preservative free) 10 mL IV syringe  2 g IntraVENous Q24H  
 vancomycin (VANCOCIN) 1000 mg in  ml infusion  1,000 mg IntraVENous ONCE  
 VANCOMYCIN INFORMATION NOTE   Other CONTINUOUS  
 metroNIDAZOLE (FLAGYL) IVPB premix 500 mg  500 mg IntraVENous Q12H  
 acetaminophen (TYLENOL) tablet 650 mg  650 mg Oral Q6H PRN  
 morphine 10 mg/ml injection 5 mg  5 mg IntraVENous Q3H PRN  
 naloxone (NARCAN) injection 0.4 mg  0.4 mg IntraVENous PRN  
 magnesium hydroxide (MILK OF MAGNESIA) 400 mg/5 mL oral suspension 30 mL  30 mL Oral DAILY PRN  
 sodium bicarbonate (8.4%) 150 mEq in sterile water 1,000 mL infusion   IntraVENous CONTINUOUS Review of Systems:  
Unable to obtain as patient not able to provide ROS d/t significant pain Physical Assessment:  
 
Vitals:  
 10/14/19 1941 10/14/19 2302 10/15/19 0418 10/15/19 0813 BP: 113/70 126/79 132/88 130/82 Pulse: (!) 120 (!) 118 (!) 120 (!) 126 Resp: 18 19 20 19 Temp: 98.6 °F (37 °C) 97.1 °F (36.2 °C) 97 °F (36.1 °C) 97.1 °F (36.2 °C) SpO2: 99% 100% 100% 99% Weight:      
Height:      
 
Last 3 Recorded Weights in this Encounter 10/14/19 1041 Weight: 72.6 kg (160 lb) Admission weight: Weight: 72.6 kg (160 lb) (10/14/19 1041) Intake/Output Summary (Last 24 hours) at 10/15/2019 1032 Last data filed at 10/15/2019 0900 Gross per 24 hour Intake 2788 ml Output 150 ml Net 2638 ml Mod distress/pain HEENT: Head is normocephalic and atraumatic. Lungs: good air entry, clear to auscultation bilaterally. Cardiovascular system: S1, S2, regular rate and rhythm. Abdomen: soft, non tender, non distended. Extremities: no edema Integumentary: skin is grossly intact. Neurologic: Alert, oriented time three. Data Review: 
 
Labs: Results:  
   
Chemistry Recent Labs 10/15/19 
0710 10/15/19 
0224 10/14/19 
2355 10/14/19 
1605 10/14/19 
1120 GLU  --   --   --  115* 107* NA  --   --   --  138 133*  
K 5.8* 6.1* 6.4* 5.9* 6.7*  
 CL  --   --   --  112* 106 CO2  --   --   --  13* 17* BUN  --   --   --  109* 116* CREA  --   --   --  5.52* 6.03* CA  --   --   --  7.3* 8.3* AGAP  --   --   --  13 10 BUCR  --   --   --  20 19 AP  --   --   --   --  122* TP  --   --   --   --  6.7 ALB  --   --   --   --  2.4*  
GLOB  --   --   --   --  4.3* AGRAT  --   --   --   --  0.6* PHOS  --   --   --   --  5.6* CBC w/Diff Recent Labs 10/14/19 
1120 WBC 0.4* RBC 2.58* HGB 7.4* HCT 23.0*  
PLT 43* GRANS DIFFERENTIAL NOT PERFORMED WHEN WBC IS <0.5 LYMPH DIFFERENTIAL NOT PERFORMED WHEN WBC IS <0.5 EOS DIFFERENTIAL NOT PERFORMED WHEN WBC IS <0.5 Iron/Ferritin No results for input(s): IRON in the last 72 hours. No lab exists for component: TIBCCALC  
PTH/VIT D No results for input(s): PTH in the last 72 hours. No lab exists for component: VITD Joselyn Diggs MD 
10/15/2019 
10:32 AM 
 
 
October 15, 2019

## 2019-10-15 NOTE — PROGRESS NOTES
Chart reviewed remotely, orders placed, assessment and plan, will be staffed in the morning. Patient is a 80-year-old white female with history of metastatic uterine cancer on chemotherapy followed by Dr. Jose Reyes, chronic kidney disease stage, previous episode of uropathy/bilateral hydronephrosis managed initially by nephrostomy but then with stents, previous history of sigmoid perforation/pelvic abscess to the ED with abdominal pain. Patient is not been eating drinking well and has been nauseated. He has been undergoing chemotherapy. He has developed an abrupt diffuse abdominal pain worsening over the last 24 hours. Denies fevers chills, denies diarrhea. Assessment and plan:  
 
1 acute kidney injury on chronic kidney disease stage II. [facility] History of hydronephrosis status post ureteral stents. CT abdomen indicative of worsening hydronephrosis of the stent. Likely secondary to multiple increased size masses in the abdomen. Patient also may have a prerenal component as she has not been eating drinking. 2 hyperkalemia, multifocal factorial including BECK, metabolic acidosis, volume depletion 3 hypocalcemia 
4 hyperchloremic non-gap metabolic acidosis, likely from BECK 
5 history of hydronephrosis followed by Dr. Jolayne Rubinstein, stents in place 6 prior history of sigmoid perforation 7 left iliac. Fluid collection, concern for possible abscess in the perforation site Plan: 
1 infused with bicarb drip, this may help correct acidosis as well as hyperkalemia 
2 glucose insulin cocktail, follow potassium closely 3 follow urology and surgery recommendations 4 volume resuscitation 5 empirical antibiotic's per ID recommendation 6 follow strict ins and outs, follow renal parameters closely Please call with questions, 
 
Mary Selby MD [facility] [phone] Pager: [phone]

## 2019-10-15 NOTE — CONSULTS
Gynecologic Oncology Consult 10/15/2019 Shun Sierra 
[id number] Allergies Allergen Reactions  Other Food Nausea and Vomiting Artificial sweeteners  Neulasta [Pegfilgrastim] Swelling  Aspirin Other (comments) Gi upset  Avastin [Bevacizumab] Other (comments) Bowel perforation  Tetracycline Unknown (comments) Patient does not remember, reaction many years ago  Wheat Atopic Dermatitis  
  eczema  Other Plant, Animal, Environmental Other (comments) Pt states she has \"springtime grass allergies. \" Reports reaction: Sinus infection HISTORY OF PRESENT ILLNESS: 71 y.o.  female with Recurrent Stage IVB endometrioid cervical cancer vs. Endometrial cancer. She was recently discharged from [facility] after admission for acute on chronic renal failure s/p stent exchange. S/p Cycle 5 Day 5 Topotecan 10/11/19. She was advised to present to ED after call from son this 10/14/2019 with report of extreme fatigue, weakness, dehydration, and abdominal pain. Pt lethargic, nauseous and reporting pain 10/10 today. Past Medical History:  
Diagnosis Date  Acute kidney failure ([address] 12/05/2019  BMI 34.0-34.9,adult 34.8  Caffeine adverse reaction   
 elevated BP  Cancer ([address] uterine, cervical  
 Cervical cancer (HCC)  Chronic kidney disease   
 acute kidney failure  Chronic sinusitis  Dizziness  Endometriosis  Hiatal hernia  High cholesterol  Palpitation  PMB (postmenopausal bleeding)  Rash   
 eczemz  Thromboembolus ([address] 03/15/2019 IVC filter placed  Urinary incontinence Past Surgical History:  
Procedure Laterality Date  HX ABDOMINAL LAPAROSCOPY    
 HX BILATERAL SALPINGO-OOPHORECTOMY Bilateral   
 HX BREAST LUMPECTOMY Right 1973  
 benign  HX HYSTERECTOMY    
 radical  
 HX OTHER SURGICAL  09/01/2017 Exam under anesthesia: Cystoscopy, sigmoidoscopy  HX UROLOGICAL    
 nephrostomy tubes and stents  IR CHANGE EXIST CATHETER/TUBE LT  3/7/2019  IR MEDIPORT  VASCULAR SURGERY PROCEDURE UNLIST    
 filter Family History Problem Relation Age of Onset  Cancer Mother   
     left ovary Social History Substance and Sexual Activity Drug Use No  
 
Social History Tobacco Use Smoking Status Never Smoker Smokeless Tobacco Never Used Social History Substance and Sexual Activity Sexual Activity Not Currently REVIEW OF SYSTEMS: 
Constitutional: No fever, chills, +anorexia, +weight loss, +weakness, or sleep disturbance. Skin/Breasts: No breast pain, lumps, nipple discharge, or axillary lumps. The patient notes no rash or skin irritation systemically. Cardiovascular: No chest pain,palpitations,syncope, or claudication. + bilateral lower extremity edema, left worse then right. Respiratory: No cough, shortness of breath, hemotysis, or orthopnea Gastointestinal: No frequency or caliber of stools, blood in the stools or diarrhea. +constipation, +abdominal pain +nausea, +vomiting Genitourinary: No urinary frequency, dysuria,hematuria, or history of stones. Endo: No cold intolerance,+fatigue,or sleep disturbance. Heme/Lymph: No anemia, easy bruising, history, of bleeding disorders, no lymphedema Neurological: No numbness or focal weakness. No tremors. No problems with voiding or defecation. Psychiatric:  No symptoms of depression or anxiety. No hallucinations or symptoms of psychosis. EXAMINATION: 
Constitutional: The patient is in moderate distress. She is alert and oriented times three. Ill-appearing Eyes: No lesions are present. Pupils are equal. The sclerea are roopa-icteric. Neck: The neck is supple with a normal thyroid. Heme/Lymph: There is no palpable lymphadenopathy in the cervical supraclavicular, or inguinal regions. Respiratory: The chest is clear to auscultation and percussion bilaterally. Cardiovascular: The heart is regular and without murmur. Abdomen: Hard, diffusely tender, distended, hypoactive BS, rebound, guarding Genitourinary: deferred Skin: Warm and dry Musculoskeletal: Extremeties are without significant varicosities. + bilateral lower extremity edema, left worse then right. Recent Results (from the past 12 hour(s)) POTASSIUM Collection Time: 10/14/19 11:55 PM  
Result Value Ref Range Potassium 6.4 (HH) 3.5 - 5.5 mmol/L  
POTASSIUM Collection Time: 10/15/19  2:24 AM  
Result Value Ref Range Potassium 6.1 (HH) 3.5 - 5.5 mmol/L  
VANCOMYCIN, RANDOM Collection Time: 10/15/19  7:10 AM  
Result Value Ref Range Vancomycin, random 18.2 5.0 - 40.0 UG/ML  
POTASSIUM Collection Time: 10/15/19  7:10 AM  
Result Value Ref Range Potassium 5.8 (H) 3.5 - 5.5 mmol/L  
 
 
CT Results  (Last 48 hours) 10/14/19 1355  CT ABD PELV WO CONT Final result Impression:  IMPRESSION:  
   
1. Abdominal and pelvic masses of varying sizes. Suspected to be of  
endometrial CA metastasis based on patient history. Overall increased in size  
and overall extent compared to recent prior studies. 2.  New developing lung nodule in the left upper lobe medial aspect. 3.  New left iliac fossa abnormal fluid collection with scattered air pockets. If the patient has not had recent surgical intervention, this abnormal fluid  
collection with air pockets may suggest a developing abscess. 4.  Tiny air pocket adjacent to the right adrenal gland. Query percolation from  
pelvic disease process. 5.  Increasing splenic masses. 6.  Increasing hydronephrosis despite ureteral stent deployment. 7.  Abnormal sigmoid thickening with fat stranding. Query acute-on-chronic  
diverticulitis vs. short segment colitis. Narrative:  EXAM:  CT Abdomen-Pelvis without Contrast.  
   
CLINICAL INDICATION:  Neutropenic fever. Severe acute abdominal pain. Acute  
renal failure. COMPARISON:  09/13/19, 06/19/19, 05/07/19 TECHNIQUE:    
   
- Helical volumetric CT imaging of the abdomen and pelvis is performed without IV contrast administration. Coronal and sagittal multiplanar reconstruction  
images are generated for improved anatomic delineation.  
- All CT scans at this facility are performed using dose optimization technique  
as appropriate to the performed exam, to include automated exposure control,  
adjustment of the mA and/or kV according to patient's size (Including  
appropriate matching for site-specific examinations), or use of iterative  
reconstruction technique. FINDINGS:  
   
Lung Bases:  
   
- Developing left upper lobe medial aspect 1.3 x 0.8 cm nodule (axial #7). - A questionable new developing lung nodule in the right lower lobe measuring  
about 0.8 x 0.5 cm (axial #9). - Subsegmental atelectatic changes in both lungs. Triangular atelectatic focus  
in the lingular region measuring 1.3 x 2.4 cm. Left lower lobe anterior basal  
region with another focus of atelectatic changes.  
- Small right pleural effusion. Trace left pleural effusion.  
- Ovoid enlarged node or nodule in the left pericardial fat pad measuring 1.6 x  
1.5 cm (axial #20). Liver, Pancreas:  Unremarkable. Gallbladder:  Subtle density gradient in the gallbladder lumen, with  
denser-appearing material layering in the posterior aspect. Presence of thick  
gallbladder sludge cannot be excluded. Tiny poorly calcified gallstones may  
also be considered. Spleen:  2 splenic masses. The most superior-anteriorly located splenic mass  
appears increased in size, currently measuring about 11.1 x 9.3 cm. This mass  
measured about 10.3 x 7.9 cm on 06/19/19. More inferiorly and posteriorly located mass appears increased in size as well, currently measuring about 6.6 x  
5.2 cm. This mass measured about 5.4 x 4.1 cm on 06/19/19. Adrenal Glands:  A small low-attenuation focus in the right adrenal gland (axial  
#32). A small air pocket may be a possibility. The source for this pocket is  
unclear. Kidneys:  Bilateral hydronephrosis despite double-J ureteral stent deployment  
bilaterally. The hydronephrosis appears increased compared to prior studies. GI Tract:  Small hiatal hernia. No acute gastric abnormalities. No acute small  
bowel obstruction. The appendix is not confidently visualized. Abnormal fat  
stranding around the distal descending colon diverticulum (axial #58). Multiple  
additional diverticula along the distal descending colon and sigmoid. Mild  
colon wall thickening. Peritoneal Cavity, Retroperitoneum: - Abnormal focal complex material collection involving the left iliac fossa  
anterolateral margin (axial #65) with scattered air pockets. Suggestive of  
possible abscess if the patient has not had recent surgical intervention. Notably, this is new compared to 09/13/19. This is accompanied by abnormal left  
iliac fossa region fluid collection and masslike density. The fluid collection  
measures up to about 8.5 x 5.1 cm (axial #73). More superiorly, a  
solid-appearing component is suggested, measuring up to about 5.2 x 4.3 cm  
(axial #62). The solid component appears to extend along the anterior aspect of  
the fluid collection into the pelvic cavity. These densities appear increased  
compared to recent prior studies. - 2 large solid masses are identified in the right iliac fossa, measuring up to  
about 5.9 x 5.3 cm (axial #64). More medial aspect reveals a solid mass  
measuring up to about 5.4 x 4.3 cm (axial #68). No definite cystic component is  
demonstrated. - Additional abnormal multiple masses are noted in the pelvic cavity, i.e. 2.7 x  
2.0 cm (axial #77) along the right pelvic sidewall, 4.1 x 2.2 cm along the left  
pelvic sidewall (axial #82), 5.0 x 2.4 cm (axial #77 along the anterior  
musculature, 2.8 x 1.8 cm along the left paramedian abdominal musculature (axial  
#83). - In the central pelvic cavity, a lobulated soft tissue mass is demonstrated  
with irregular contour measuring 7.7 x 4.7 cm (axial #84). - At the aortocaval interval region, a 1.5 x 1.0 cm node is noted along the  
anterior aspect indenting the inferior cava (axial #55). Bladder:  Unremarkable urinary bladder except for indentation from pelvic  
masses. Rectum:  Grossly unremarkable. Multiple pararectal dilated tubular structures  
suggestive of dilated veins. Skeletal Structures:  No lytic lesion is detected. No compression deformities. ASSESSMENT/PLAN: 
Recurrent Stage IVB endometrioid cervical cancer vs. Endometrial cancer, poor prognosis S/p Cycle 5 Topotecan 10/11/19 Pancytopenia, patient not eligible for Neupogen for support secondary to allergy. Chemo on hold given current clinical status CT, shows progression Lengthy discussion with patient and son regarding current disease progression in the presence of chemotherapy. Discussed goal of care and recommendation for hospice due to patient's current health status. Pt in and out of coherence. Finished discussion with son. Will place palliative care consult today. Management per primary. Will follow. Asha Trinidad NP 
 [facility] 
10/15/19

## 2019-10-15 NOTE — PROGRESS NOTES
Palliative Medicine Consult DR. OLIVEIRA'[facility]: [phone] [facility]: [phone] Palliative Medicine Team including Mohini Rabago NP, Magdaleno Hebert LCSW and this Valentina Burton RN met with patient and her son, Marga Catherine (02 yrs old) [phone] at bedside . Pt does not have an AMF on file in EMR. In the absence of an AMD all medical decisions would be based on a consensus of patient's adult children when patient lacks capacity to make her own health care decisions. The reason for this palliative consult is clarification of goals of care. Patient has a prior medical history of recurrent stage IVB endometrioid cervical cancer vs. endometrial cancer. She was 1st Dx in 2017. Pt had recent hospital stay for acute on chronic renal failure, s/p stent exchange. This admission via ED on 10/14, pt c/o tachycardia, tachypnea, abdominal pain and fatigue s/p chemo. Found to have elevated K+ of 6.4. During meeting, Ms. Hewitt was lethargic and drifting off during discussion. Pt states pain level for 4/10 was 10/10 prior to receiving pain medication (morphine). Most of the history was obtained from pts son, Kirstie Zhang. Patient rents and apartment and her son resides at the home with her. He has been her caregiver since her 1st surgery on 2017. Kirstie Zhang shared that he provided total care to his mother. He stated that his sister, lives out of area in [address] [place] [address] and has a disabled son she is caring for. His aunt, Pts sister, Shankar Arenas is also is out of the area and caring for a  recovering from surgery. Kirstie Zhang is a student, working part time and trying to care for his mother. He expressed concern about his mothers care and cancer yet is conflicted over his concern for his future and his limited support. Ms. Tasha Emamnuel was working as a  up until last Friday.  Kirstie Zhang believes his mother is in \"in denial\" about her cancer diagnosis. She has avoided having direct conversation with him about the spread of her cancer even when he has confronted this   information with her MD.  PM team attempted to engage pt and discuss the benefits and burdens of resuscitation and intubation. She responded by stating I'm not ready to die yet,\". Patient stated that she wants \"to get my white blood cell count up\" before making a decisions about tube placement - nephrostomy tubes. Per MD, patient had stated that she did not want to have nephrostomy tubes placed. PM Team discussed the availability of hospice care, at which point Desmond Ortega became very tearful. Outside of room Desmond Ortega began to cry and stated I just want to understand and I dont know what to do. Compassionate listening and emotional support provided. PM Team discussed the importance of establishing an Advance Medical Directive when patient is able. An AMD was not completed today due to patient's emotional state, weakness and pain medication. Patient and son are overwhelmed with discussion and not able to make any decisions concerning goals of care today. PM team will continue to follow pt and son for support and further conversion concerning code status and goals of care. CODE STATUS- FULL CODE- FULL CARE. Giovanny Talbert RN, [facility] Palliative Medicine Inpatient RN DR. OLIVEIRA'[facility] Palliative [facility] Line: [phone])

## 2019-10-15 NOTE — CONSULTS
[address] [facility], [address] [facility] Phone: [phone] Urology Consult Note 1. Neutropenic fever ([facility] [address] 2. Sepsis, due to unspecified organism, unspecified whether acute organ dysfunction present ([address] 3. Hyperkalemia 4. Acute renal failure, unspecified acute renal failure type ([address] 5. Pneumonia of both lower lobes due to infectious organism [facility]) Assessment & Plan  
  
ASSESSMENT:  
- Bilateral hydronephrosis initially managed with PCN/s, then converted to stents  
            MDOXLR, now converted to [address] 9/17  
            CT 10/14 w/ Increasing hydronephrosis despite ureteral stent placements 
- Stage IVB Grade 2 endometrioid adenocarcinoma, favor cervical primary with obstructive uropathy, on chemotherapy Avastin and Topotecan (S/p Cycle 5 Day 5 Topotecan 10/11/19.) - CT 10/14 with progression of metastatic disease -  Left iliac fossa abnormal fluid collection - ? Abscess - Acute Diverticulitis vs Colitis - BECK on CKD II 
- Neutropenic Fever- WBC 0.4 
- Thrombocytopenia- Plt 43 
 
  
PLAN:   
Keep NPO 
IR consulted for Bilateral PCN placement CBC and BMP ordered Nursing to place samaniego. Do not Remove Ucx Pending abx per ID currently on cefepime, vanc and flagyl. Appreciate assistance Nephrology following- appreciate assistance Discussed POC with patient and her son. Patient is unsure if she want's PCN's. She will further discuss with IR Palliative care consulted to discuss [address] Discussed with Dr. Jeannine Castro who agrees with plan and will see patient later today Follow Up arranged: YES patient has an appointment with Dr. Jeannine Castro already scheduled for 11/13/19 Following Closely Carmina CORADO-[facility] Pager # [phone] Available M-F 7:30- 5pm .  After 5pm and weekends please call answering service at [phone] I have seen and examined this patient independently, I reviewed pertinent labs and imaging, and I agree with the assessing provider's assessment and plan as outlined above, with ammendments as follows: 
   
Long discussion with Palliative present Difficult situation. Needs Perc tubes but very high risk of low platelets She has also expressed to me in the clinic that she does not want nephrostomy tubes Will place samaniego and watch for additional 24 hours on Abx She will take everything under advisement  
  
Moisés Schwab MD 
[facility] [place] [place] Pager [phone] Chief Complaint Patient presents with  Abdominal Pain  Fatigue HISTORY OF PRESENT ILLNESS:   
Shun Sierra is a 71 y.o. female who is seen in consultation as referred by   for BECK on CKD and Worsening Bilateral Hydro. Patient has been seen by a urologist and is currently receiving urological care with Dr. Miguelito Chmapagne. Urological history is significant for Bilateral hydronephrosis initially managed with PCN/s, then converted to stents,Failed, now converted to [address] 9/17 . HPI:Patient originally presented to the ER on 10/14/19 with extreme fatigue, weakness, dehydration, and abdominal pain. She was recently discharged from [facility] after admission for acute on chronic renal failure s/p stent exchange 9/17/19. S/p Cycle 5 Day 5 Topotecan 10/11/19. Labs were significant for WBC 0.4, Hgb 7.4. Creat 6.03, UA w/ blood and small leuks, nitrite neg. CT was performed and showed worsening metastatic disease, ? Left illiac fossa abscess, ? collitis vs acute diverticulitis and Increasing hydronephrosis despite ureteral stent deployment. She was tranferred to [facility] for admission 2/2 neutropenic fever. Patient is currently afebrile and Tachy, other VSS.  Patient reports fatigue, fever at home. Nurses at [facility] ER were instructed to place samaniego however No samaniego in place. C/o constipation. Denies any pain Location  system Duration weeks Associated signs/symptoms as above Modifying factors as above Severity severe AUA Symptom Score 5/29/2019 Over the past month how often have you had the sensation that your bladder was not completely empty after you finished urinating? 0 Over the past month, how often have had to urinate again less than 2 hours after you last finished urinating? 0 Over the past month, how often have you found you stopped and started again several times when you urinated? 1 Over the past month, how often have you found it difficult to postpone urination? 1 Over the past month, how often have you had a weak urinary stream? 2 Over the past month, how often have you had to push or strain to begin urinating? 1 Over the past month, how many times did you most typically get up to urinate from the time you went to bed at night until the time you got up in the morning? 1  
AUA Score 6 If you were to spend the rest of your life with your urinary condition the way it is now, how would you feel about that? Mostly satisfied Past Medical History:  
Diagnosis Date  Acute kidney failure ([address] 12/05/2019  BMI 34.0-34.9,adult 34.8  Caffeine adverse reaction   
 elevated BP  Cancer ([address] uterine, cervical  
 Cervical cancer (HCC)  Chronic kidney disease   
 acute kidney failure  Chronic sinusitis  Dizziness  Endometriosis  Hiatal hernia  High cholesterol  Palpitation  PMB (postmenopausal bleeding)  Rash   
 eczemz  Thromboembolus ([address] 03/15/2019 IVC filter placed  Urinary incontinence Past Surgical History:  
Procedure Laterality Date  HX ABDOMINAL LAPAROSCOPY    
 HX BILATERAL SALPINGO-OOPHORECTOMY Bilateral   
 HX BREAST LUMPECTOMY Right 1973  
 benign  HX HYSTERECTOMY radical  
 HX OTHER SURGICAL  09/01/2017 Exam under anesthesia: Cystoscopy, sigmoidoscopy  HX UROLOGICAL    
 nephrostomy tubes and stents  IR CHANGE EXIST CATHETER/TUBE LT  3/7/2019  IR MEDIPORT  VASCULAR SURGERY PROCEDURE UNLIST    
 filter Social History Tobacco Use  Smoking status: Never Smoker  Smokeless tobacco: Never Used Substance Use Topics  Alcohol use: No  
 Drug use: No  
 
 
Allergies Allergen Reactions  Other Food Nausea and Vomiting Artificial sweeteners  Neulasta [Pegfilgrastim] Swelling  Aspirin Other (comments) Gi upset  Avastin [Bevacizumab] Other (comments) Bowel perforation  Tetracycline Unknown (comments) Patient does not remember, reaction many years ago  Wheat Atopic Dermatitis  
  eczema  Other Plant, Animal, Environmental Other (comments) Pt states she has \"springtime grass allergies. \" Reports reaction: Sinus infection Family History Problem Relation Age of Onset  Cancer Mother   
     left ovary Current Facility-Administered Medications Medication Dose Route Frequency Provider Last Rate Last Dose  ondansetron (ZOFRAN) injection 8 mg  8 mg IntraVENous Q4H PRN Elana Bhatt MD   8 mg at 10/15/19 9394  influenza vaccine 2019-20 (6 mos+)(PF) (FLUARIX/FLULAVAL/FLUZONE QUAD) injection 0.5 mL  0.5 mL IntraMUSCular PRIOR TO DISCHARGE Eva Blake MD      
 VANCOMYCIN INFORMATION NOTE   Other CONTINUOUS Elana Bhatt MD      
 cefepime (MAXIPIME) 1 g in sterile water (preservative free) 10 mL IV syringe  1 g IntraVENous Q12H Elana Bhatt MD   1 g at 10/14/19 2228  metroNIDAZOLE (FLAGYL) IVPB premix 500 mg  500 mg IntraVENous Q12H Elana Bhatt  mL/hr at 10/14/19 2237 500 mg at 10/14/19 2237  acetaminophen (TYLENOL) tablet 650 mg  650 mg Oral Q6H PRN Elana Bhatt MD      
 morphine 10 mg/ml injection 5 mg  5 mg IntraVENous Q3H PRN Isiah Hatfield MD Isadora   5 mg at 10/15/19 0842  
 naloxone [facility]) injection 0.4 mg  0.4 mg IntraVENous PRN Saadia Lemon MD      
 magnesium hydroxide (MILK OF MAGNESIA) 400 mg/5 mL oral suspension 30 mL  30 mL Oral DAILY PRN Saadia Lemon MD      
 sodium bicarbonate (8.4%) 150 mEq in sterile water 1,000 mL infusion   IntraVENous CONTINUOUS Bichu, Scar Savage  mL/hr at 10/15/19 0001 Review of Systems Pertinent items are noted in the History of Present Illness. PHYSICAL EXAMINATION:  
Visit Vitals /82 (BP 1 Location: Left arm, BP Patient Position: At rest) Pulse (!) 126 Temp 97.1 °F (36.2 °C) Resp 19 Ht 5' 5\" (1.651 m) Wt 160 lb (72.6 kg) SpO2 99% BMI 26.63 kg/m² Constitutional: i'll appearing female. mild distress. HEENT: Normocephalic, Atraumatic CV:  Tachy Pulm: No respiratory distress or difficulties breathing GI:  Abdomen distended, diffusely tender :  Bilateral CVA tenderness Skin: No evidence of jaundice. Normal color Neuro/Psych:  Drowsy, easily aroused oriented. Affect appropriate. Lymphatic:  No inguinal lymphadenopathy MSK: FROM  
 
 
REVIEW OF LABS AND IMAGING:   
 
CT abd/ pelvis w/o contrast 10/14/19 IMPRESSION: 
  
1. Abdominal and pelvic masses of varying sizes. Suspected to be of 
endometrial CA metastasis based on patient history. Overall increased in size 
and overall extent compared to recent prior studies. 2.  New developing lung nodule in the left upper lobe medial aspect.   
  
3. New left iliac fossa abnormal fluid collection with scattered air pockets. If the patient has not had recent surgical intervention, this abnormal fluid 
collection with air pockets may suggest a developing abscess.   
  
4.  Tiny air pocket adjacent to the right adrenal gland. Query percolation from 
pelvic disease process. 
  
5. Increasing splenic masses. 
  
6.   Increasing hydronephrosis despite ureteral stent deployment. 
  
 7.  Abnormal sigmoid thickening with fat stranding. Query acute-on-chronic 
diverticulitis vs. short segment colitis.   
    
 
 
Labs: Results:  
Chemistry Recent Labs 10/15/19 
0710 10/15/19 
0224 10/14/19 
2355 10/14/19 
1605 10/14/19 
1120 GLU  --   --   --  115* 107* NA  --   --   --  138 133*  
K 5.8* 6.1* 6.4* 5.9* 6.7*  
CL  --   --   --  112* 106 CO2  --   --   --  13* 17* BUN  --   --   --  109* 116* CREA  --   --   --  5.52* 6.03* CA  --   --   --  7.3* 8.3* AGAP  --   --   --  13 10 BUCR  --   --   --  20 19 AP  --   --   --   --  122* TP  --   --   --   --  6.7 ALB  --   --   --   --  2.4*  
GLOB  --   --   --   --  4.3* AGRAT  --   --   --   --  0.6* CBC w/Diff Recent Labs 10/14/19 
1120 WBC 0.4* RBC 2.58* HGB 7.4* HCT 23.0*  
PLT 43* GRANS DIFFERENTIAL NOT PERFORMED WHEN WBC IS <0.5 LYMPH DIFFERENTIAL NOT PERFORMED WHEN WBC IS <0.5 EOS DIFFERENTIAL NOT PERFORMED WHEN WBC IS <0.5 Cultures Recent Labs 10/14/19 
1120 10/14/19 
1105 CULT NO GROWTH AFTER 16 HOURS NO GROWTH AFTER 18 HOURS All Micro Results Procedure Component Value Units Date/Time CULTURE, BLOOD [[id number]] Collected:  10/14/19 1120 Order Status:  Completed Specimen:  Blood Updated:  10/15/19 8316 Special Requests: NO SPECIAL REQUESTS Culture result: NO GROWTH AFTER 16 HOURS     
 CULTURE, BLOOD [[id number]] Collected:  10/14/19 1105 Order Status:  Completed Specimen:  Blood Updated:  10/15/19 1159 Special Requests: NO SPECIAL REQUESTS Culture result: NO GROWTH AFTER 18 HOURS     
 CULTURE, URINE [[id number]] Collected:  10/14/19 1055 Order Status:  Completed Specimen:  Cath Urine Updated:  10/14/19 1407 INFLUENZA A & B AG (RAPID TEST) [[id number]] Collected:  10/14/19 1242 Order Status:  Completed Specimen:  Nasopharyngeal from Nasal washing Updated:  10/14/19 1322   Influenza A Antigen NEGATIVE      
 Comment: A negative result does not exclude influenza virus infection, seasonal or H1N1 due to suboptimal sensitivity. If influenza is circulating in your community, a diagnosis of influenza should be considered based on a patients clinical presentation and empiric antiviral treatment should be considered, if indicated. Influenza B Antigen NEGATIVE CULTURE, BLOOD, PAIRED [[id number]] Collected:  10/14/19 1100 Order Status:  Canceled Specimen:  Blood Urinalysis Color Date Value Ref Range Status 10/14/2019 YELLOW   Final  
 
Appearance Date Value Ref Range Status 10/14/2019 CLOUDY   Final  
 
Specific gravity Date Value Ref Range Status 10/14/2019 1.013 1.005 - 1.030   Final  
 
pH (UA) Date Value Ref Range Status 10/14/2019 5.5 5.0 - 8.0   Final  
 
Protein Date Value Ref Range Status 10/14/2019 30 (A) NEG mg/dL Final  
 
Ketone Date Value Ref Range Status 10/14/2019 NEGATIVE  NEG mg/dL Final  
 
Bilirubin Date Value Ref Range Status 10/14/2019 NEGATIVE  NEG   Final  
 
Blood Date Value Ref Range Status 10/14/2019 LARGE (A) NEG   Final  
 
Urobilinogen Date Value Ref Range Status 10/14/2019 0.2 0.2 - 1.0 EU/dL Final  
 
Nitrites Date Value Ref Range Status 10/14/2019 NEGATIVE  NEG   Final  
 
Leukocyte Esterase Date Value Ref Range Status 10/14/2019 SMALL (A) NEG   Final  
 
Potassium Date Value Ref Range Status 10/15/2019 5.8 (H) 3.5 - 5.5 mmol/L Final  
 
Creatinine Date Value Ref Range Status 10/14/2019 5.52 (H) 0.6 - 1.3 MG/DL Final  
 
BUN Date Value Ref Range Status 10/14/2019 109 (H) 7.0 - 18 MG/DL Final  
  
PSA No results for input(s): PSA in the last 72 hours. Coagulation Lab Results Component Value Date/Time  Prothrombin time 13.0 06/20/2019 06:57 PM  
 Prothrombin time 14.5 03/12/2019 12:30 AM  
 INR 1.0 06/20/2019 06:57 PM  
 INR 1.2 03/12/2019 12:30 AM  
 aPTT 27.9 06/20/2019 06:57 PM  
 aPTT 50.1 (H) 03/13/2019 06:50 AM

## 2019-10-15 NOTE — CONSULTS
Palliative Medicine Consult [facility]: [phone]) [facility]: [phone] [facility]: [phone] Patient Name: Yuri Gillis YOB: 1950 Date of Initial Consult: 10/15/2019 Reason for Consult: goals of care Requesting Provider: Marcin Nix MD 
Primary Care Physician: Yin Lora MD 
  
 SUMMARY:  
Yuri Gillis is a 71 y.o. female with a past history of cervical cancer, CKD, DVT who was admitted on 10/14/2019 from home with a diagnosis of acute renal failure. Patient with history of endometrial (HPV related) cancer involving cervix diagnosed in 8/16/2017. PET scan 8/31/17 suspicious for metastatic disease to left ovary, omentum, liver. Exploratory lap with radical hysterectomy, BSO, resection of omental mass 9/15/2017. She was gives 6 cycles Carbo/Taxol which she completed in Feb. 2018. PET CT 2/27/18 with new nodular densities adjacent to cecum concerning for metastatic disease which then was treated with tamoxifen and megace. Secondary to renal failure and hydronephrosis 12/2018 she had bilateral PCNs placed. After 2 cycles of topotecan/avastin she was admitted in 3/2019 with perforated colon. Chemotherapy paused for treatment of perforated colon and abscess. Resumed chemotherapy 8/3/2019. Current medical issues leading to Palliative Medicine involvement include: progressive stage 4 endometrial cancer and goals of care. PALLIATIVE DIAGNOSES:  
1. Advanced care decisions 2. Abdominal pain 3. Acute on chronic kidney failure 4. Metastatic endometrial cancer 5. Pancytopenia PLAN:  
1. Advanced care decisions: Palliative care team including Ranjith Posada LCSW, Kellee Jimenez RN and I met with patient and her son, Caitlin Govea at her bedside. Patient is drowsy from medication but oriented. She is  and the mother of two adult children.  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 She has continued to work throughout chemotherapy treatments and was working a week ago. Patient and Mauro Mora share an apartment. Her daughter, Lalitha Rock, lives in [place] and has a disabled son. No AMD is on file. We discussed the importance of completing AMD documents to establish health care decision makers. Discussed the benefits and burdens of resuscitation and intubation. Patient stated, \"I'm not ready to die yet\" and became emotional about not having eaten for days and just wanted to get her cell counts up. Dr. Jemma Hodgson was present for part of the discussion and reminded patient of her previous statements in his office of not have new nephrostomy tubes placed. Patient also stated during our visit she did not wish to have the nephrostomy tubes. However, she was unable to answer if she would elect placement if it would prolong her life. Per Mauro Mora, patient has been \"in denial\" about her diagnosis and stated that she did not tell him that her cancer had spread. As patient admitted she was tired of tubes, we discussed the availability of hospice care at which point Mauro Mora became very tearful. Emotional support provided. Patient began to use humor to avoid further discussion. Mauro Mora stated, \"I knew it was coming. I've been preparing for her to die for almost three years now. \" Patient not ready to make any decisions today re aggressive vs. comfort care. Agreed to follow up visits for further discussion and support. GOALS OF CARE: FULL CODE with FULL AGGRESSIVE MEASURES. 2. Abdominal pain: likely secondary to metastatic cervical cancer. Primary team managing. Patient states pain was 10/10 prior to morphine administration and has now reduced to a comfortable 4/10. 
3. Acute on chronic kidney failure: Serum BUN/Creatinine on admission was 116 and 6.03 respectively. IV fluids administered with improvement noted. Hyperkalemia slowly resolving. Nephrology and urology following. Plan for PCN tubes on hold. 4. Metastatic endometrial cancer: As described in summary above. S/p cycle 5, day 5 topotecan on 10/11/2019. Oncology following and states poor prognosis with recommendation for hospice due to patient's current health status. 5. Pancytopenia: s/p chemotherapy. Neutropenic precautions. 6. Initial consult note routed to primary continuity provider 7. Communicated plan of care with: Palliative IDT 
 
GOALS OF CARE: 
Patient/Health Care Proxy Stated Goals: Prolong life TREATMENT PREFERENCES:  
Code Status: Full Code Advance Care Planning: 
Advance Care Planning 10/15/2019 Patient's Healthcare Decision Maker is: Legal Next of Kin Primary Decision Maker Name -  
Primary Decision Maker Phone Number -  
Primary Decision Maker Relationship to Patient -  
Confirm Advance Directive None Patient Would Like to Complete Advance Directive - Medical Interventions: Full interventions Other: As far as possible, the palliative care team has discussed with patient / health care proxy about goals of care / treatment preferences for patient. HISTORY:  
 
History obtained from: chart CHIEF COMPLAINT: abdominal pain HPI/SUBJECTIVE: The patient is:  
[x] Verbal and participatory [] Non-participatory due to:  
Patient c/o abdominal pain and moaning with movement. States pain is improved with IV morphine. Eating jello and asking for additional serving. Denies nausea and shortness of breath. Clinical Pain Assessment (nonverbal scale for nonverbal patients): Clinical Pain Assessment Severity: 4 Duration: for how long has pt been experiencing pain (e.g., 2 days, 1 month, years) Frequency: how often pain is an issue (e.g., several times per day, once every few days, constant) FUNCTIONAL ASSESSMENT:  
 
Palliative Performance Scale (PPS): PPS: 50 ECOG 
ECOG Status : Limited self-care PSYCHOSOCIAL/SPIRITUAL SCREENING:  
  
Any spiritual / Holiness concerns: 
[] Yes /  [x] No 
 
Caregiver Burnout: 
[] Yes /  [x] No /  [] No Caregiver Present Anticipatory grief assessment:  
[x] Normal  / [] Maladaptive REVIEW OF SYSTEMS:  
 
Positive and pertinent negative findings in ROS are noted above in HPI. The following systems were [x] reviewed / [] unable to be reviewed as noted in HPI Other findings are noted below. Systems: constitutional, ears/nose/mouth/throat, respiratory, gastrointestinal, genitourinary, musculoskeletal, integumentary, neurologic, psychiatric, endocrine. Positive findings noted below. Modified ESAS Completed by: provider Pain: 4 Nausea: 0 Dyspnea: 0 PHYSICAL EXAM:  
 
Wt Readings from Last 3 Encounters:  
10/14/19 72.6 kg (160 lb) 10/09/19 74.4 kg (164 lb) 10/07/19 72.8 kg (160 lb 8 oz) Blood pressure 126/80, pulse (!) 131, temperature 99.5 °F (37.5 °C), resp. rate 19, height 5' 5\" (1.651 m), weight 72.6 kg (160 lb), SpO2 99 %. Pain: 
Pain Scale 1: Visual 
Pain Intensity 1: 0 Pain Onset 1: acute Pain Location 1: Abdomen Pain Orientation 1: Lower Pain Description 1: Aching, Shooting Pain Intervention(s) 1: Medication (see MAR) Constitutional: Pale, chronically ill, frail, thin female in minimal distress secondary to pain. Eyes: pupils equal, anicteric ENMT: no nasal discharge, moist mucous membranes Respiratory: breathing not labored, symmetric Gastrointestinal: soft non-tender Musculoskeletal: no deformity, no tenderness to palpation Skin: warm, dry Neurologic: following commands, moving all extremities Psychiatric: full affect, no hallucinations HISTORY:  
 
Active Problems: 
  Neutropenic fever ([facility] [address] (10/14/2019) Past Medical History:  
Diagnosis Date  Acute kidney failure ([facility] [address] 12/05/2019  BMI 34.0-34.9,adult 34.8  Caffeine adverse reaction   
 elevated BP  
  Cancer ([facility] [address] uterine, cervical  
 Cervical cancer (HCC)  Chronic kidney disease   
 acute kidney failure  Chronic sinusitis  Dizziness  Endometriosis  Hiatal hernia  High cholesterol  Palpitation  PMB (postmenopausal bleeding)  Rash   
 eczemz  Thromboembolus ([facility] [address] 03/15/2019 IVC filter placed  Urinary incontinence Past Surgical History:  
Procedure Laterality Date  HX ABDOMINAL LAPAROSCOPY    
 HX BILATERAL SALPINGO-OOPHORECTOMY Bilateral   
 HX BREAST LUMPECTOMY Right 1973  
 benign  HX HYSTERECTOMY    
 radical  
 HX OTHER SURGICAL  09/01/2017 Exam under anesthesia: Cystoscopy, sigmoidoscopy  HX UROLOGICAL    
 nephrostomy tubes and stents  IR CHANGE EXIST CATHETER/TUBE LT  3/7/2019  IR MEDIPORT  VASCULAR SURGERY PROCEDURE UNLIST    
 filter Family History Problem Relation Age of Onset  Cancer Mother   
     left ovary History reviewed, no pertinent family history. Social History Tobacco Use  Smoking status: Never Smoker  Smokeless tobacco: Never Used Substance Use Topics  Alcohol use: No  
 
Allergies Allergen Reactions  Other Food Nausea and Vomiting Artificial sweeteners  Neulasta [Pegfilgrastim] Swelling  Aspirin Other (comments) Gi upset  Avastin [Bevacizumab] Other (comments) Bowel perforation  Tetracycline Unknown (comments) Patient does not remember, reaction many years ago  Wheat Atopic Dermatitis  
  eczema  Other Plant, Animal, Environmental Other (comments) Pt states she has \"springtime grass allergies. \" Reports reaction: Sinus infection Current Facility-Administered Medications Medication Dose Route Frequency  ondansetron (ZOFRAN) injection 8 mg  8 mg IntraVENous Q4H PRN  
 influenza vaccine 2019-20 (6 mos+)(PF) (FLUARIX/FLULAVAL/FLUZONE QUAD) injection 0.5 mL  0.5 mL IntraMUSCular PRIOR TO DISCHARGE  
  cefepime (MAXIPIME) 2 g in sterile water (preservative free) 10 mL IV syringe  2 g IntraVENous Q24H  
 oxyCODONE ER (OxyCONTIN) tablet 10 mg  10 mg Oral Q12H  VANCOMYCIN INFORMATION NOTE   Other CONTINUOUS  
 metroNIDAZOLE (FLAGYL) IVPB premix 500 mg  500 mg IntraVENous Q12H  
 acetaminophen (TYLENOL) tablet 650 mg  650 mg Oral Q6H PRN  
 morphine 10 mg/ml injection 5 mg  5 mg IntraVENous Q3H PRN  
 naloxone (NARCAN) injection 0.4 mg  0.4 mg IntraVENous PRN  
 magnesium hydroxide (MILK OF MAGNESIA) 400 mg/5 mL oral suspension 30 mL  30 mL Oral DAILY PRN  
 sodium bicarbonate (8.4%) 150 mEq in sterile water 1,000 mL infusion   IntraVENous CONTINUOUS  
 
 
 LAB AND IMAGING FINDINGS:  
 
Lab Results Component Value Date/Time WBC 0.3 (LL) 10/15/2019 11:45 AM  
 HGB 6.5 (L) 10/15/2019 11:45 AM  
 PLATELET 16 (LL) 64/95/8182 11:45 AM  
 
Lab Results Component Value Date/Time Sodium 136 10/15/2019 11:47 AM  
 Potassium 5.7 (H) 10/15/2019 11:47 AM  
 Chloride 107 10/15/2019 11:47 AM  
 CO2 18 (L) 10/15/2019 11:47 AM  
  (H) 10/15/2019 11:47 AM  
 Creatinine 5.99 (H) 10/15/2019 11:47 AM  
 Calcium 8.3 (L) 10/15/2019 11:47 AM  
 Magnesium 1.8 10/07/2019 02:22 PM  
 Phosphorus 5.6 (H) 10/14/2019 11:20 AM  
  
Lab Results Component Value Date/Time AST (SGOT) 30 10/14/2019 11:20 AM  
 Alk. phosphatase 122 (H) 10/14/2019 11:20 AM  
 Protein, total 6.7 10/14/2019 11:20 AM  
 Albumin 2.4 (L) 10/14/2019 11:20 AM  
 Globulin 4.3 (H) 10/14/2019 11:20 AM  
 
Lab Results Component Value Date/Time INR 1.4 (H) 10/15/2019 11:45 AM  
 Prothrombin time 17.3 (H) 10/15/2019 11:45 AM  
 aPTT 28.7 10/15/2019 11:45 AM  
  
Lab Results Component Value Date/Time Iron 12 (L) 03/07/2019 05:24 PM  
 Ferritin 603 (H) 03/07/2019 05:24 PM  
  
Lab Results Component Value Date/Time  pH 7.319 (L) 09/15/2017 12:30 PM  
 PCO2 40.7 09/15/2017 12:30 PM  
 PO2 131.6 (H) 09/15/2017 12:30 PM  
 
 No components found for: [place] Lab Results Component Value Date/Time CK 34 04/12/2019 02:52 PM  
 CK - MB <1.0 03/07/2019 05:24 PM  
  
 
   
 
Total time: 70 minutes Counseling / coordination time, spent as noted above: 60 minutes > 50% counseling / coordination: patient, family, MD 
 
Prolonged service was provided for  []30 min   []75 min in face to face time in the presence of the patient, spent as noted above. Time Start:  
Time End:  
Note: this can only be billed with 09040 (initial) or 44635 (follow up). If multiple start / stop times, list each separately.

## 2019-10-15 NOTE — CDMP QUERY
Patient admitted with   neutropenic fever . Pt   noted to have Widely metastatic endometrial cancer If possible, please document in progress notes metastatic sites The medical record reflects the following: 
 
   Risk Factors: stage 4 cervical cancer Clinical Indicators: Per CT abd- \" Abdominal and pelvic masses of varying sizes. Suspected to be of 
endometrial CA metastasis based on patient history. Overall increased in size 
and overall extent compared to recent prior studies. 2.  New developing lung nodule in the left upper lobe medial aspect. Treatment: chemo currently on hold Thank you,   Brayan Capone   RN   CCDS  x [phone]

## 2019-10-16 NOTE — PROGRESS NOTES
RENAL PROGRESS NOTE [place] Assessment/Plan:  
 
1 Acute kidney injury on chronic kidney disease stage II. History of hydronephrosis status post ureteral stents. CT abdomen indicative of worsening hydronephrosis of the stent.  Likely secondary to multiple increased size masses in the abdomen.  Patient also may have a prerenal component as she has not been eating drinking. 2 hyperkalemia, multifocal factorial including BECK, metabolic acidosis 3 hypocalcemia 
4 hyperchloremic non-gap metabolic acidosis, likely from BECK 
5 history of hydronephrosis followed by Dr. Yony Cuevas, stents in place 6 prior history of sigmoid perforation 7 left iliac.  Fluid collection, concern for possible abscess in the perforation site 8 thrombocytopenia  
  
Plan: 
1 continue infusion with bicarb drip. If K cannot be controlled medically then may need to start on dialysis. I discussed with pt regarding dialysis, she is agreeable to the option of dialysis if it comes to that point. Pt does not want any surgical procedure/uldall cath placement until her platelet count improves. Urology following for percutaneous nephrostomy tube placement. 2 oliguric and high bun but no uremic symptoms yet. 3.Will need to monitor volume status closely to avoid volume overload. 4 empirical antibiotic's per ID recommendation 5 follow strict ins and outs, follow renal parameters closely 6 follow renal parameters 7 PVR showed >700 cc residual. Will place samaniego cath. Subjective: 
 
Overnight had afib rvr, got prbc rx, got tx to ICU. Sob is better now. No N/V with food, no mental confusions Patient Active Problem List  
Diagnosis Code  Endometrial ca ([facility] [address] C54.1  Severe obesity (BMI 35.0-39. 9) E66.01  
 ARF (acute renal failure) (HCC) N17.9  Cervical cancer ([facility] [address] C53.9  [facility] K68.19  
 DVT (deep venous thrombosis) ([facility]) I82.409  Colon perforation ([facility] K63.1  Hydronephrosis with ureteral stricture, not elsewhere classified N13.1  Pelvic abscess in female N75.10  
 UTI (urinary tract infection) N39.0  [facility]K (acute kidney injury) ([facility] N17.9  Neutropenic fever ([facility]) D70.9, R50.81  Stage 3 chronic kidney disease (HCC) N18.3  Generalized abdominal pain R10.84  Advanced care planning/counseling discussion Z71.89  
 Pancytopenia ([facility] U19.482 Current Facility-Administered Medications Medication Dose Route Frequency Provider Last Rate Last Dose  
 HYDROmorphone (DILAUDID) syringe 0.5 mg  0.5 mg IntraVENous Q3H PRN Antonoy, January-Yaima SALMERON PA-C   0.5 mg at 10/16/19 0440  albuterol (ACCUNEB) nebulizer solution 1.25 mg  1.25 mg Nebulization Q6H PRN Olive, January-BEVERLY Dean-C      
 0.9% sodium chloride infusion 250 mL  250 mL IntraVENous PRN Priyanka Puri NP      
 ondansetron [facility]) injection 8 mg  8 mg IntraVENous Q4H PRN Jodi Callaway MD   8 mg at 10/15/19 2300  
 influenza vaccine 2019-20 (6 mos+)(PF) (FLUARIX/FLULAVAL/FLUZONE QUAD) injection 0.5 mL  0.5 mL IntraMUSCular PRIOR TO DISCHARGE Lambert Hui MD      
 cefepime (MAXIPIME) 2 g in sterile water (preservative free) 10 mL IV syringe  2 g IntraVENous Q24H Jodi Callaway MD   2 g at 10/15/19 2307  oxyCODONE ER (OxyCONTIN) tablet 10 mg  10 mg Oral Q12H Olga Lidia Parkinson MD   10 mg at 10/15/19 2305  
 0.9% sodium chloride infusion 250 mL  250 mL IntraVENous PRN Makenzie Tamez V PA-C 15 mL/hr at 10/16/19 0023 250 mL at 10/16/19 0023  VANCOMYCIN INFORMATION NOTE   Other CONTINUOUS Jodi Callaway MD      
 metroNIDAZOLE (FLAGYL) IVPB premix 500 mg  500 mg IntraVENous Q12H Jodi Callaway  mL/hr at 10/15/19 2308 500 mg at 10/15/19 2308  acetaminophen (TYLENOL) tablet 650 mg  650 mg Oral Q6H PRN Jodi Callaway MD      
  naloxone (NARCAN) injection 0.4 mg  0.4 mg IntraVENous PRN Matthew Diana MD      
 magnesium hydroxide (MILK OF MAGNESIA) 400 mg/5 mL oral suspension 30 mL  30 mL Oral DAILY PRN Matthew Diana MD      
 sodium bicarbonate (8.4%) 150 mEq in sterile water 1,000 mL infusion   IntraVENous CONTINUOUS Media MD Kalyani 125 mL/hr at 10/16/19 6352 Objective Vitals:  
 10/16/19 0300 10/16/19 0400 10/16/19 0500 10/16/19 0600 BP: 143/74 135/69 144/72 101/75 Pulse: (!) 112 (!) 108 (!) 107 (!) 103 Resp: 12 11 14 12 Temp:  98.7 °F (37.1 °C) SpO2: 99% 100% 100% 100% Weight:      
Height:      
 
 
 
Intake/Output Summary (Last 24 hours) at 10/16/2019 5703 Last data filed at 10/16/2019 7383 Gross per 24 hour Intake 4634.58 ml Output 50 ml Net 4584.58 ml Admission weight: Weight: 72.6 kg (160 lb) (10/14/19 1041) Last Weight Metrics: 
Weight Loss Metrics 10/14/2019 10/9/2019 10/7/2019 9/25/2019 9/20/2019 9/16/2019 9/16/2019 Today's Wt 160 lb 164 lb 160 lb 8 oz 160 lb 14.4 oz 136 lb 14.4 oz - 168 lb 3.2 oz  
BMI 26.63 kg/m2 27.29 kg/m2 26.71 kg/m2 26.78 kg/m2 - 22.78 kg/m2 - Physical Assessment:  
 
General: NAD, alert and oriented. Eye: EOMI Neck: No jvd. No cervical lymphadenopathy LUNGS: Clear to Auscultation, No rales, rhonchi or wheezes. CVS EXM: tachycardic Abdomen: soft, mild distended Lower Extremities:  Some b/l pedal edema. Skin: warm and well perfused Lab CBC w/Diff Recent Labs 10/16/19 
0810 10/15/19 
1931 10/15/19 
1145 10/14/19 
1120 WBC 0.4* 0.4* 0.3* 0.4* RBC 2.39* 2.08* 2.29* 2.58* HGB 6.7* 5.8* 6.5* 7.4* HCT 19.7* 17.7* 19.4* 23.0*  
PLT 18* 16* 16* 43* GRANS DIFFERENTIAL NOT PERFORMED WHEN WBC IS <0.5 DIFFERENTIAL NOT PERFORMED WHEN WBC IS <0.5  --  DIFFERENTIAL NOT PERFORMED WHEN WBC IS <0.5 LYMPH DIFFERENTIAL NOT PERFORMED WHEN WBC IS <0.5 DIFFERENTIAL NOT PERFORMED WHEN WBC IS <0.5  --  DIFFERENTIAL NOT PERFORMED WHEN WBC IS <0.5 EOS DIFFERENTIAL NOT PERFORMED WHEN WBC IS <0.5 DIFFERENTIAL NOT PERFORMED WHEN WBC IS <0.5  --  DIFFERENTIAL NOT PERFORMED WHEN WBC IS <0.5 Chemistry Recent Labs 10/16/19 
0810 10/15/19 
1835 10/15/19 
1147  10/14/19 
1120 * 147* 87   < > 107* * 135* 136   < > 133*  
K 5.4 5.2 5.7*   < > 6.7*  
 105 107   < > 106 CO2 22 17* 18*   < > 17* * 112* 109*   < > 116* CREA 5.76* 6.02* 5.99*   < > 6.03* CA 7.8* 7.8* 8.3*   < > 8.3* AGAP 11 13 11   < > 10 BUCR 18 19 18   < > 19 AP 83  --   --   --  122* TP 6.3*  --   --   --  6.7 ALB 1.8*  --   --   --  2.4*  
GLOB 4.5*  --   --   --  4.3* AGRAT 0.4*  --   --   --  0.6* PHOS 7.5*  --   --   --  5.6*  
 < > = values in this interval not displayed. Lab Results Component Value Date/Time Iron 12 (L) 03/07/2019 05:24 PM  
 Ferritin 603 (H) 03/07/2019 05:24 PM  
  
Lab Results Component Value Date/Time Calcium 7.8 (L) 10/16/2019 08:10 AM  
 CALCIUM,IONIZED 4.7 04/16/2018 01:01 PM  
 Phosphorus 7.5 (H) 10/16/2019 08:10 AM  
  
 
Maddy Cuellar MD 
[facility] Pager: [phone] Phone: [phone]

## 2019-10-16 NOTE — PROGRESS NOTES
visited with the family of Tushar Carl, who is a 71 y.o.,female. The  provided the following Interventions: 
Initiated a relationship of care and support to patient and her two boys present at the time of visit. Had a long talk with patient's son, Anastasia Strickland, who admits that he is in denial about his mom's prognosis. Anastasia Strickland had beenhis mom's caregiver for the past two years since she had been diagnosed with cancer. End of life issues were discussed at length and Anastasia Strickland seem to understand the process but still unable to accept reality for fear of her physical loss when it comes. Offered prayer and assurance of continued prayers on patient's behalf. Plan: 
Chaplains will continue to follow and will provide pastoral care on an as needed/requested basis.  recommends bedside caregivers page  on duty if patient shows signs of acute spiritual or emotional distress. [facility] [facility]  
[phone]

## 2019-10-16 NOTE — CONSULTS
[facility] Pulmonary, Critical Care, and Sleep Medicine Name: Boyd Saunders MRN: [id number] : 1950 Hospital: [facility] Date: 10/16/2019 Critical Care Consult IMPRESSION:  
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  
Diagnosis Code  Endometrial ca ([address] C54.1  Severe obesity (BMI 35.0-39. 9) E66.01  
 ARF (acute renal failure) (HCC) N17.9  Cervical cancer ([address] C53.9  Retroperitoneal abscess ([address] K68.19  
 DVT (deep venous thrombosis) ([facility]) I82.409  Colon perforation ([address] K63.1  Hydronephrosis with ureteral stricture, not elsewhere classified N13.1  Pelvic abscess in female N75.10  
 UTI (urinary tract infection) N39.0  BECK (acute kidney injury) ([address] N17.9  Neutropenic fever (HCC) D70.9, R50.81  Stage 3 chronic kidney disease (HCC) N18.3  Generalized abdominal pain R10.84  Advanced care planning/counseling discussion Z71.89  
 Pancytopenia ([address] E67.678 RECOMMENDATIONS:  
 · Neuro: intermittently drowsy as an effect of narcotic pain medicine -caution with increasing frequency or dose. · Resp: may continue with supplemental oxygen, target SpO2 > 94%. Prn bronchodilator · I/D: follow blood and urine c/s. Continue empiric abx per primary -on vancomycin, cefepime, flagyl · Hem/Onc: transfuse 1 unit PRBC, 1 FFP, 1 platelet. Give Vit K 5 mg IV. Monitor for active bleeding. Gyn onc service following. · CVS: currently hemodynamically stable -hypotension transient during RRT, likely iatrogenic (medication-induced). Repeat EKG today · Metabolic: trend electrolytes; cautious replacement if needed given BECK. Continue bicarb gtt per nephrology · Renal: no samaniego (low platelet) -female external cathete. Urology and nephrology following -plan for PCN insertion by IR if pt agreeable. Avoid nephrotoxic agents - morphine pain control switched to dilaudid. · Endocrine: avoid hypoglycemia · GI: clear liquids per primary. Strict aspiration precautions. · Musc/Skin: wound care per hospital protocol -R chest mediport · Code Status: Full Code. Agree with Palliative care follow-up for need of goals of care discussion. Son is closest [facility] although there is an older daughter in [address] [place] [address]. As cancer appears to be a terminal illness given its metastatic nature with increased progression, recommend hospice discussion. Best practice: 
· IHI ICU Bundles: 
·  Central Line Bundle Followed (mediport) · Stress ulcer prophylaxis. None · DVT prophylaxis. None -currently thromobcytopenic · Palliative care evaluation - initiated on 10/15 Subjective/History: This patient has been seen and evaluated at the request of Dr. Jovan De Dios for tachycardia with high risk for decompensation. 10/16/19 Patient is a 71 y.o. female with medical hx significant for widely metastatic gyn cancer with recent sigmoid perforation s/p percutaneous drainage (6/2019), CKD, DVT s/p IVC filter, presented to the ED for abdominal pain. Recently completed 5-day cycle of topotecan from 10/7-10/11/19. Per son, pt has been complaining of abdominal pain, increasing fatigue, nausea, intermittent vomiting and decreased appetite since chemotherapy was completed. [facility] infusion notes reviewed and it was documented that pt was already complaining of symptoms mentioned even while undergoing chemotherapy. Initial w/u included a CT abd/pelv which showed multiple abdominal and pelvic masses of varying sizes; new left iliac fossa abnormal fluid collection; increasing splenic masses; increases b/l hydronephrosis despite ureteral stent deployment and newly developing lung nodule on ALIA. Pt was also found to have hyperkalemia, acidosis and acute on chronic renal failure no admission. Urology, nephrology, surgery and gyn oncology services were consulted. Severe sepsis work-up was initiated -blood and urine cultures obtained and pt was empirically started on vancomycin, cefepime, flagyl. Pt was also started on bicarb gtt. Overnight, RRT was called on the floor for tachycardia. HR as high as 200s. Given adenosine 6mg IV, diltiazem 18 mg IV followed by 25 mg IV, digoxin 250 mcg IV, metoprolol 2.5 mg IV and 1L NS bolus. EKG done showed rapid afib RVR. As more work-up resulted, it was found that pt had become more anemic with H/H declining to 5.8/17 from 6.5/19 during the day for which pt did not receive blood transfusion. ICU asked to consult on pt for which she was initially evaluated on the floor prior to transfer. Pt complained of intermittent chest wall and back pain with nausea and episodic dry-heaving. The patient is critically ill and can not provide additional history due to intermittent drowsiness. Review of records/ procedures: 
6/21/19 - s/p percutaneous drainage by IR for pelvic fluid collection/ colonic perforation 3/15/19 - s/p IVC filter placement by IR for right iliac DVT involving right external iliac vein extending into right common iliac vein 3/8/19 - s/p percutaneous drainage by IR for left paracolic gutter fluid collection 3/5/19 - s/p insertion of b/l nephrostomy tubes by IR 
 
12/14/18 - s/p right chest mediport re-insertion by IR 
 
12/6/18 - s/p B/L PCN placement by IR Past Medical History:  
Diagnosis Date  Acute kidney failure ([facility] 12/05/2019  BMI 34.0-34.9,adult 34.8  Caffeine adverse reaction   
 elevated BP  Cancer ([facility] uterine, cervical  
 Cervical cancer (HCC)  Chronic kidney disease   
 acute kidney failure  Chronic sinusitis  Dizziness  Endometriosis  Hiatal hernia  High cholesterol  Palpitation  PMB (postmenopausal bleeding)  Rash   
 eczemz  Thromboembolus ([facility] 03/15/2019 IVC filter placed  Urinary incontinence Past Surgical History:  
Procedure Laterality Date  HX ABDOMINAL LAPAROSCOPY    
 HX BILATERAL SALPINGO-OOPHORECTOMY Bilateral   
 HX BREAST LUMPECTOMY Right 1973  
 benign  HX HYSTERECTOMY    
 radical  
 HX OTHER SURGICAL  09/01/2017 Exam under anesthesia: Cystoscopy, sigmoidoscopy  HX UROLOGICAL    
 nephrostomy tubes and stents  IR CHANGE EXIST CATHETER/TUBE LT  3/7/2019  IR MEDIPORT  VASCULAR SURGERY PROCEDURE UNLIST    
 filter Prior to Admission medications Medication Sig Start Date End Date Taking? Authorizing Provider  
cyanocobalamin (VITAMIN B12) 100 mcg tablet Take 100 mcg by mouth daily. Provider, Historical  
famotidine (PEPCID) 20 mg tablet Take 20 mg by mouth daily. Indications: gastroesophageal reflux disease    Provider, Historical  
multivitamin (ONE A DAY) tablet Take 1 Tab by mouth daily. Provider, Historical  
acetaminophen (TYLENOL) 500 mg tablet Take 1,000 mg by mouth every six (6) hours as needed for Pain. Provider, Historical  
b complex vitamins (B COMPLEX 1) tablet Take 1 Tab by mouth daily. Provider, Historical  
 
 
Current Facility-Administered Medications Medication Dose Route Frequency  HYDROmorphone (DILAUDID) 1 mg/mL injection  influenza vaccine 2019-20 (6 mos+)(PF) (FLUARIX/FLULAVAL/FLUZONE QUAD) injection 0.5 mL  0.5 mL IntraMUSCular PRIOR TO DISCHARGE  cefepime (MAXIPIME) 2 g in sterile water (preservative free) 10 mL IV syringe  2 g IntraVENous Q24H  
 oxyCODONE ER (OxyCONTIN) tablet 10 mg  10 mg Oral Q12H  
 dilTIAZem (CARDIZEM) 5 mg/mL injection  dilTIAZem (CARDIZEM) 5 mg/mL injection  metoprolol (LOPRESSOR) 5 mg/5 mL injection  VANCOMYCIN INFORMATION NOTE   Other CONTINUOUS  
 metroNIDAZOLE (FLAGYL) IVPB premix 500 mg  500 mg IntraVENous Q12H  
 sodium bicarbonate (8.4%) 150 mEq in sterile water 1,000 mL infusion   IntraVENous CONTINUOUS Allergies Allergen Reactions  Other Food Nausea and Vomiting Artificial sweeteners  Neulasta [Pegfilgrastim] Swelling  Aspirin Other (comments) Gi upset  Avastin [Bevacizumab] Other (comments) Bowel perforation  Tetracycline Unknown (comments) Patient does not remember, reaction many years ago  Wheat Atopic Dermatitis  
  eczema  Other Plant, Animal, Environmental Other (comments) Pt states she has \"springtime grass allergies. \" Reports reaction: Sinus infection Social History Tobacco Use  Smoking status: Never Smoker  Smokeless tobacco: Never Used Substance Use Topics  Alcohol use: No  
  
 
Family History Problem Relation Age of Onset  Cancer Mother   
     left ovary Review of Systems: 
Review of systems not obtained due to patient factors. Objective:  
Vital Signs:   
Visit Vitals /71 Pulse (!) 114 Temp 98.4 °F (36.9 °C) Resp 22 Ht 5' 5\" (1.651 m) Wt 72.6 kg (160 lb) SpO2 100% BMI 26.63 kg/m² O2 Device: (P) Nasal cannula O2 Flow Rate (L/min): (P) 3 l/min Temp (24hrs), Av.3 °F (36.8 °C), Min:97 °F (36.1 °C), Max:99.5 °F (37.5 °C) Intake/Output:  
Last shift:      10/15 1901 - 10/16 0700 In: 1700 [I.V.:1250] Out: 0 Last 3 shifts: 10/14 0701 - 10/15 1900 In: 5372.6 [P.O.:240; I.V.:5132.6] Out: 370 [Urine:370] Intake/Output Summary (Last 24 hours) at 10/16/2019 2027 Last data filed at 10/16/2019 0225 Gross per 24 hour Intake 4294.58 ml Output 370 ml Net 3924.58 ml Physical Exam:  
 
General:  Awakened on verbal stimulation, intermittently moaning Head:  Normocephalic, without obvious abnormality, atraumatic. Eyes:  Mildly pale palpebral conjunctivae, anicteric sclerae Nose: Nares normal. No drainage Throat: Lips, mucosa, and tongue dry. Oral/gum area red -unable to distinguish from gum bleeding as pt had consumed red jello Neck: Supple, symmetrical, trachea midline, no adenopathy, no carotid bruit and no JVD. Lungs:   Symmetrical chest rise; good AE bilat; CTAB; no wheezes/rhonchi/rales noted. Heart:  Tachycardic rate but regular rhythm Abdomen:   palpable abdominal mass mostly on the hypogastric area with tenderness on palpation Extremities: Bilateral LE pitting edema, L>R with hyperemia to b/l anterior tibial area Pulses: 2+ and symmetric all extremities. Skin: Skin color, texture, turgor normal. + mediport to R chest  
Neurologic: Grossly nonfocal 
  
 
 
Data:  
 
Recent Results (from the past 24 hour(s)) Cassidy Nelida Collection Time: 10/15/19  7:10 AM  
Result Value Ref Range Vancomycin, random 18.2 5.0 - 40.0 UG/ML  
POTASSIUM Collection Time: 10/15/19  7:10 AM  
Result Value Ref Range Potassium 5.8 (H) 3.5 - 5.5 mmol/L  
CBC W/O DIFF Collection Time: 10/15/19 11:45 AM  
Result Value Ref Range WBC 0.3 (LL) 4.6 - 13.2 K/uL  
 RBC 2.29 (L) 4.20 - 5.30 M/uL HGB 6.5 (L) 12.0 - 16.0 g/dL HCT 19.4 (L) 35.0 - 45.0 %  MCV 84.7 74.0 - 97.0 FL  
 MCH 28.4 24.0 - 34.0 PG  
 MCHC 33.5 31.0 - 37.0 g/dL  
 RDW 17.5 (H) 11.6 - 14.5 % PLATELET 16 (LL) 875 - 420 K/uL MPV 8.9 (L) 9.2 - 11.8 FL PROTHROMBIN TIME + INR Collection Time: 10/15/19 11:45 AM  
Result Value Ref Range Prothrombin time 17.3 (H) 11.5 - 15.2 sec INR 1.4 (H) 0.8 - 1.2 PTT Collection Time: 10/15/19 11:45 AM  
Result Value Ref Range aPTT 28.7 23.0 - 36.4 SEC METABOLIC PANEL, BASIC Collection Time: 10/15/19 11:47 AM  
Result Value Ref Range Sodium 136 136 - 145 mmol/L Potassium 5.7 (H) 3.5 - 5.5 mmol/L Chloride 107 100 - 111 mmol/L  
 CO2 18 (L) 21 - 32 mmol/L Anion gap 11 3.0 - 18 mmol/L Glucose 87 74 - 99 mg/dL  (H) 7.0 - 18 MG/DL Creatinine 5.99 (H) 0.6 - 1.3 MG/DL  
 BUN/Creatinine ratio 18 12 - 20 GFR est AA 8 (L) >60 ml/min/1.73m2 GFR est non-AA 7 (L) >60 ml/min/1.73m2 Calcium 8.3 (L) 8.5 - 52.1 MG/DL  
METABOLIC PANEL, BASIC Collection Time: 10/15/19  6:35 PM  
Result Value Ref Range Sodium 135 (L) 136 - 145 mmol/L Potassium 5.2 3.5 - 5.5 mmol/L Chloride 105 100 - 111 mmol/L  
 CO2 17 (L) 21 - 32 mmol/L Anion gap 13 3.0 - 18 mmol/L Glucose 147 (H) 74 - 99 mg/dL  (H) 7.0 - 18 MG/DL Creatinine 6.02 (H) 0.6 - 1.3 MG/DL  
 BUN/Creatinine ratio 19 12 - 20 GFR est AA 8 (L) >60 ml/min/1.73m2 GFR est non-AA 7 (L) >60 ml/min/1.73m2 Calcium 7.8 (L) 8.5 - 10.1 MG/DL  
CBC WITH AUTOMATED DIFF Collection Time: 10/15/19  7:31 PM  
Result Value Ref Range WBC 0.4 (LL) 4.6 - 13.2 K/uL  
 RBC 2.08 (L) 4.20 - 5.30 M/uL HGB 5.8 (LL) 12.0 - 16.0 g/dL HCT 17.7 (LL) 35.0 - 45.0 % MCV 85.1 74.0 - 97.0 FL  
 MCH 27.9 24.0 - 34.0 PG  
 MCHC 32.8 31.0 - 37.0 g/dL  
 RDW 17.6 (H) 11.6 - 14.5 % PLATELET 16 (LL) 923 - 420 K/uL MPV 9.3 9.2 - 11.8 FL  
 NEUTROPHILS DIFFERENTIAL NOT PERFORMED WHEN WBC IS <0.5 40 - 73 % LYMPHOCYTES DIFFERENTIAL NOT PERFORMED WHEN WBC IS <0.5 21 - 52 % MONOCYTES DIFFERENTIAL NOT PERFORMED WHEN WBC IS <0.5 3 - 10 % EOSINOPHILS DIFFERENTIAL NOT PERFORMED WHEN WBC IS <0.5 0 - 5 % BASOPHILS DIFFERENTIAL NOT PERFORMED WHEN WBC IS <0.5 0 - 2 % DF DIFFERENTIAL NOT PERFORMED WHEN WBC IS <0.5 CARDIAC PANEL,(CK, CKMB & TROPONIN) Collection Time: 10/15/19  7:31 PM  
Result Value Ref Range CK 50 26 - 192 U/L  
 CK - MB <1.0 <3.6 ng/ml CK-MB Index  0.0 - 4.0 % CALCULATION NOT PERFORMED WHEN RESULT IS BELOW LINEAR LIMIT Troponin-I, QT <0.02 0.0 - 0.045 NG/ML  
LACTIC ACID Collection Time: 10/15/19  7:31 PM  
Result Value Ref Range Lactic acid 2.1 (HH) 0.4 - 2.0 MMOL/L  
PROTHROMBIN TIME + INR Collection Time: 10/15/19  7:31 PM  
Result Value Ref Range Prothrombin time 18.1 (H) 11.5 - 15.2 sec INR 1.5 (H) 0.8 - 1.2 MAGNESIUM Collection Time: 10/15/19  7:31 PM  
Result Value Ref Range Magnesium 2.3 1.6 - 2.6 mg/dL TYPE + CROSSMATCH Collection Time: 10/15/19 10:32 PM  
Result Value Ref Range Crossmatch Expiration 10/18/2019 ABO/Rh(D) O POSITIVE Antibody screen NEG   
 CALLED TO: Casandra Abad AT 23:57 10/15/19 CK Unit number [id number] Blood component type RC LR Unit division 00 Status of unit ALLOCATED Crossmatch result Compatible Unit number [id number] Blood component type RC LR Unit division 00 Status of unit ISSUED Crossmatch result Compatible PLASMA, ALLOCATE Collection Time: 10/15/19 10:45 PM  
Result Value Ref Range Unit number [id number] Blood component type FP 24h,Thaw1 Unit division 00 Status of unit ISSUED PLATELETS, ALLOCATE Collection Time: 10/15/19 10:45 PM  
Result Value Ref Range Unit number [id number] Blood component type PLPH LR,PAS1 Unit division 00 Status of unit ALLOCATED   
GLUCOSE, POC Collection Time: 10/16/19  2:01 AM  
Result Value Ref Range Glucose (POC) 133 (H) 70 - 110 mg/dL No results for input(s): FIO2I, IFO2, HCO3I, IHCO3, HCOPOC, PCO2I, PCOPOC, IPHI, PHI, PHPOC, PO2I, PO2POC in the last 72 hours. No lab exists for component: IPOC2 Telemetry:sinus tachycardia Imaging: 
I have personally reviewed the patients radiographs and have reviewed the reports: 
 
CXR [date]: CXR Results  (Last 48 hours) 10/14/19 1102  XR CHEST PORT Final result Impression:  IMPRESSION: Bilateral basilar infiltrate/atelectasis with mild vascular  
congestion and small pleural effusion. Narrative:  AP CXR:  
   
Comparison March 8, 2019 HISTORY: Severe fatigue. Undergoing chemotherapy for cervical cancer. Lungs are hypoinflated with bilateral basilar infiltrate/atelectasis and small  
right pleural effusion. Cardiac silhouette is top normal in size. Bronchovascular crowding with mild vascular congestion. Central catheter tip  
overlying distal SVC. No pneumothorax. CT HEAD/CHEST/ABD/PELVIS [date]: CT Results  (Last 48 hours) 10/14/19 1355  CT ABD PELV WO CONT Final result Impression:  IMPRESSION:  
   
1. Abdominal and pelvic masses of varying sizes. Suspected to be of  
endometrial CA metastasis based on patient history. Overall increased in size  
and overall extent compared to recent prior studies. 2.  New developing lung nodule in the left upper lobe medial aspect. 3.  New left iliac fossa abnormal fluid collection with scattered air pockets. If the patient has not had recent surgical intervention, this abnormal fluid  
collection with air pockets may suggest a developing abscess. 4.  Tiny air pocket adjacent to the right adrenal gland. Query percolation from  
pelvic disease process. 5.  Increasing splenic masses. 6.  Increasing hydronephrosis despite ureteral stent deployment. 7.  Abnormal sigmoid thickening with fat stranding. Query acute-on-chronic diverticulitis vs. short segment colitis. Narrative:  EXAM:  CT Abdomen-Pelvis without Contrast.  
   
CLINICAL INDICATION:  Neutropenic fever. Severe acute abdominal pain. Acute  
renal failure. COMPARISON:  09/13/19, 06/19/19, 05/07/19 TECHNIQUE:    
   
- Helical volumetric CT imaging of the abdomen and pelvis is performed without IV contrast administration. Coronal and sagittal multiplanar reconstruction  
images are generated for improved anatomic delineation.  
- All CT scans at this facility are performed using dose optimization technique  
as appropriate to the performed exam, to include automated exposure control,  
adjustment of the mA and/or kV according to patient's size (Including  
appropriate matching for site-specific examinations), or use of iterative  
reconstruction technique. FINDINGS:  
   
Lung Bases:  
   
- Developing left upper lobe medial aspect 1.3 x 0.8 cm nodule (axial #7). - A questionable new developing lung nodule in the right lower lobe measuring  
about 0.8 x 0.5 cm (axial #9). - Subsegmental atelectatic changes in both lungs. Triangular atelectatic focus  
in the lingular region measuring 1.3 x 2.4 cm. Left lower lobe anterior basal  
region with another focus of atelectatic changes.  
- Small right pleural effusion. Trace left pleural effusion.  
- Ovoid enlarged node or nodule in the left pericardial fat pad measuring 1.6 x  
1.5 cm (axial #20). Liver, Pancreas:  Unremarkable. Gallbladder:  Subtle density gradient in the gallbladder lumen, with  
denser-appearing material layering in the posterior aspect. Presence of thick  
gallbladder sludge cannot be excluded. Tiny poorly calcified gallstones may  
also be considered. Spleen:  2 splenic masses. The most superior-anteriorly located splenic mass  
appears increased in size, currently measuring about 11.1 x 9.3 cm. This mass measured about 10.3 x 7.9 cm on 06/19/19. More inferiorly and posteriorly  
located mass appears increased in size as well, currently measuring about 6.6 x  
5.2 cm. This mass measured about 5.4 x 4.1 cm on 06/19/19. Adrenal Glands:  A small low-attenuation focus in the right adrenal gland (axial  
#32). A small air pocket may be a possibility. The source for this pocket is  
unclear. Kidneys:  Bilateral hydronephrosis despite double-J ureteral stent deployment  
bilaterally. The hydronephrosis appears increased compared to prior studies. GI Tract:  Small hiatal hernia. No acute gastric abnormalities. No acute small  
bowel obstruction. The appendix is not confidently visualized. Abnormal fat  
stranding around the distal descending colon diverticulum (axial #58). Multiple  
additional diverticula along the distal descending colon and sigmoid. Mild  
colon wall thickening. Peritoneal Cavity, Retroperitoneum: - Abnormal focal complex material collection involving the left iliac fossa  
anterolateral margin (axial #65) with scattered air pockets. Suggestive of  
possible abscess if the patient has not had recent surgical intervention. Notably, this is new compared to 09/13/19. This is accompanied by abnormal left  
iliac fossa region fluid collection and masslike density. The fluid collection  
measures up to about 8.5 x 5.1 cm (axial #73). More superiorly, a  
solid-appearing component is suggested, measuring up to about 5.2 x 4.3 cm  
(axial #62). The solid component appears to extend along the anterior aspect of  
the fluid collection into the pelvic cavity. These densities appear increased  
compared to recent prior studies. - 2 large solid masses are identified in the right iliac fossa, measuring up to  
about 5.9 x 5.3 cm (axial #64). More medial aspect reveals a solid mass  
measuring up to about 5.4 x 4.3 cm (axial #68). No definite cystic component is demonstrated. - Additional abnormal multiple masses are noted in the pelvic cavity, i.e. 2.7 x  
2.0 cm (axial #77) along the right pelvic sidewall, 4.1 x 2.2 cm along the left  
pelvic sidewall (axial #82), 5.0 x 2.4 cm (axial #77 along the anterior  
musculature, 2.8 x 1.8 cm along the left paramedian abdominal musculature (axial  
#83). - In the central pelvic cavity, a lobulated soft tissue mass is demonstrated  
with irregular contour measuring 7.7 x 4.7 cm (axial #84). - At the aortocaval interval region, a 1.5 x 1.0 cm node is noted along the  
anterior aspect indenting the inferior cava (axial #55). Bladder:  Unremarkable urinary bladder except for indentation from pelvic  
masses. Rectum:  Grossly unremarkable. Multiple pararectal dilated tubular structures  
suggestive of dilated veins. Skeletal Structures:  No lytic lesion is detected. No compression deformities. Amanda Borjas PA-C I saw and evaluated the patient, performing the key elements of the service. I discussed the findings, assessment and plan with the PA and agree with the PA's findings and plan as documented in the PA's note. All edits and changes made above or are mentioned in my addendum. Total of 84 min critical care time spent at bedside during the course of care providing evaluation,management and care decisions and ordering appropriate treatment related to critical care problems exclusive of procedures. The reason for providing this level of medical care for this critically ill patient was due a critical illness that impaired one or more vital organ systems such that there was a high probability of imminent or life threatening deterioration in the patients condition.  This care involved high complexity decision making to assess, manipulate, and support vital system functions, to treat this degree vital organ system failure and to prevent further life threatening deterioration of the patients condition. 79-year-old unfortunate female with past medical history of metastatic uterine cancer on chemotherapy, followed by Gyn-Onc, history of prior sigmoid perforation initially presented to the ER with abrupt onset of abdominal pain which continued to worsen. Patient reported poor appetite and increased vomiting after her current chemo which was given on 10/11/2019. 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 Patient was initially admitted for acute renal failure, patient having bilateral hydronephrosis, likely due to tumor compression resulting in obstructive uropathy, patient was seen by urology, status post bilateral ureteral stents, still with renal failure, advising bilateral nephrostomy tubes. Due to acute renal failure, patient was also hyperkalemic which was treated medically. Patient was transferred to our service after rapid response for tachycardia, with a heart rate as high as the 200s, patient was found to be in A. fib with RVR, received adenosine 6 mg, diltiazem 18 mg, followed by 25 mg, digoxin 250 mcg, metoprolol 2.5 mg IV, 1 L normal saline bolus. A. fib with RVR, likely secondary to acute blood loss anemia secondary to pancytopenia from chemotherapy, patient was found to have a hemoglobin of 5.8 upon arrival to the ICU, transfused which helped to improve heart rate. Patient also found to be neutropenic, patient treated for neutropenic fever, and suspected pelvic infection. CT abdomen pelvis showed multiple worsening abdominal masses including large spleen mass, multiple abdominal masses, and fluid collection, which was likely source of sepsis. Patient also had elevated INR, secondary coagulopathy, given FFP. With regards to pain, patient was given morphine, despite renal failure, likely contributing to some encephalopathy.   Due to pancytopenia and history of Neupogen allergy, we consulted oncology who recommended immunoglobulin supplementation instead of Granix, as well as parameters for transfusion. I also discussed the case with Gyn oncology, Dr. Wesly Lam, he had a family meeting today with the patient and her son, patient was made comfort measures and made a DNR. We consulted palliative care, I discussed the case with their service as well, patient was started on a morphine PCA and given boluses of morphine as needed. I then discussed the case with the hospitalist service, Dr. Judy Carlos, and the patient was transferred back to his service -- [address] [place] [address] Antione BOYLE/MPH Pulmonary, Critical Care Medicine [facility]

## 2019-10-16 NOTE — CONSULTS
Hematology / Oncology Consult Note Reason for Consultation: 
Shahram Roe is a 71 y.o. female who I've been asked to consult for assistance with the management of the patients pancytopenia in the setting of her Recurrent Stage 4B Metastatic cervical vs endometrial cancer. S/p Cycle 5 Day 5 Topotecan 10/11/19-Followed by Dr. Ricky Bella. Subjective: HPI:  Ms. Junella Spatz is a 72 yo with hx of metastatic uterine cancer on chemotherapy who is followed by . In addition she had PMHx of CKD- uropathy/bilateral hydronephrosis managed initially by nephrostomy, then with stents, and  Also she had a prior sigmoind perforation. CT abdomen indicative of worsening hydronephrosis of the stent. Likely secondary to multiple increased size masses in the abdomen. She presented to the ED with abrupt abdominal pain that continued to worsen on 10/14/19, she denies ever experiencing similar pain as well she denied diarrhea or change in bowel habits, fevers or chills, dysphagia,pain with BM or hematuria. She c/o poor appetite for food and drink with increased vomiting which she attributes to her current chemotherapy tx. She has received 1 unti of PRBC, 1 unit of FFP and 1 unit of platelets this admission. Pt had RR on yesterday and was transferred to ICU w/ tachy in 200s. Review of Systems:  
Constitutional: Positive for fatigue,sore throat. Negative for fever, chills, diaphoresis, activity change, appetite change and unexpected weight change. HENT: Negative for nosebleeds, congestion, facial swelling, mouth sores, trouble swallowing, neck pain, neck stiffness, voice change and postnasal drip. Eyes: Negative for photophobia, pain, discharge and itching. Respiratory: Negative for apnea, cough, choking, chest tightness, wheezing and stridor. Cardiovascular: Negative for chest pain, palpitations and leg swelling. Gastrointestinal: Positive for abdominal pain.  Negative for nausea, diarrhea, constipation, blood in stool and rectal pain. Genitourinary: Negative for dysuria, urgency, hematuria, flank pain and difficulty urinating. Musculoskeletal: Negative for back pain, joint swelling, arthralgias and gait problem. Skin: Negative for color change, pallor, rash and wound. Neurological: Positive for weakness. Negative for dizziness, facial asymmetry, speech difficulty, light-headedness and headaches. Hematological: Negative for adenopathy. Does not bruise/bleed easily. Psychiatric/Behavioral: Negative for hallucinations, confusion, disturbed wake/sleep cycle and agitation. Physical Assessment:  
 
Visit Vitals /75 Pulse (!) 103 Temp 98.7 °F (37.1 °C) Resp 12 Ht 5' 5\" (1.651 m) Wt 72.6 kg (160 lb) SpO2 100% BMI 26.63 kg/m² Temp (24hrs), Av.6 °F (37 °C), Min:97 °F (36.1 °C), Max:99.5 °F (37.5 °C) Physical Exam: 
 
General: Mild distress noted. HEENT:  Anicteric sclerae; pink palpebral conjunctivae; mucosa moist 
Resp:  Symmetrical chest expansion, no accessory muscle use; good airway entry; no rales/ wheezing/ rhonchi noted CV:  S1, S2 present; tachy rate and  Regular rhythm GI:  Abdomen soft, non-tender; (+) active bowel sounds Extremities:  +2 pulses on all extremities; no edema Skin:  Warm; no rashes/ lesions noted Neurologic:  Non-focal 
 
 
Assessment:  
· Recurrent Stage IVB endometrioid cervical cancer vs. Endometrial cancer, poor prognosis · Pancytopenia-  Most likely r/t chemotherapy · Acute renal Failure Plan: · Per the patients Gyn Oncologist her chemotherapy is on hold given current clinical status. S/p Cycle 5 Topotecan 10/11/19. · Today's H/H is 6.7/19.7 we recommend and additional 2 units pf PRBC's. Recommend Recheck Hemoglobin  after 2 units have been given. I will order ferritin and iron panel today.  
· Platelet count is 42,233, her platelet count is below 20,000 and she is no a candidate for prednisone at this time, we recommend 2 units of single donor platelets today. Additional platelets may be given prior to surgical procedure if a platelet count of >29,656 is warranted at such time. · WBC is 0.4,no ANC noted at this time; Pt has a documented Neulasta Allergy; however; I see no actual witnessed swelling documentated in her EMR. I have spoken with the patient who says that she was initially given Neulasta on body injector and called her oncologist to c/o facial redness and with what felt like her throat was swelling- and was instructed to take benadryl which apparently resolved her symptoms at home. Due to her reported allergic reaction I will hold off on giving Granix; and we will give immunoglobulin for now and watch her closely. · ID following, pt currently on empirical antibx Maxipime and flagyl. · IR has been consulted for drainage of pelvic abscesses. · I have discussed my plan with the patient and her son (at bedside) and answered all questions asked. I have also discuss plan with ICU -NP. Past Medical History:  
Diagnosis Date  Acute kidney failure ([address] 12/05/2019  BMI 34.0-34.9,adult 34.8  Caffeine adverse reaction   
 elevated BP  Cancer ([address] uterine, cervical  
 Cervical cancer (HCC)  Chronic kidney disease   
 acute kidney failure  Chronic sinusitis  Dizziness  Endometriosis  Hiatal hernia  High cholesterol  Palpitation  PMB (postmenopausal bleeding)  Rash   
 eczemz  Thromboembolus ([address] 03/15/2019 IVC filter placed  Urinary incontinence Past Surgical History:  
Procedure Laterality Date  HX ABDOMINAL LAPAROSCOPY    
 HX BILATERAL SALPINGO-OOPHORECTOMY Bilateral   
 HX BREAST LUMPECTOMY Right 1973  
 benign  HX HYSTERECTOMY    
 radical  
 HX OTHER SURGICAL  09/01/2017 Exam under anesthesia: Cystoscopy, sigmoidoscopy  HX UROLOGICAL    
 nephrostomy tubes and stents  IR CHANGE EXIST CATHETER/TUBE LT  3/7/2019  IR MEDIPORT  VASCULAR SURGERY PROCEDURE UNLIST    
 filter Family History Problem Relation Age of Onset  Cancer Mother   
     left ovary Allergies Allergen Reactions  Other Food Nausea and Vomiting Artificial sweeteners  Neulasta [Pegfilgrastim] Swelling  Aspirin Other (comments) Gi upset  Avastin [Bevacizumab] Other (comments) Bowel perforation  Tetracycline Unknown (comments) Patient does not remember, reaction many years ago  Wheat Atopic Dermatitis  
  eczema  Other Plant, Animal, Environmental Other (comments) Pt states she has \"springtime grass allergies. \" Reports reaction: Sinus infection Home Medications:  
Home Meds reviewed with patient Hospital Medications:  
Current hospital medications reviewed Labs: 
Recent Labs 10/16/19 
0810 10/15/19 
1931 10/15/19 
1145 WBC 0.4* 0.4* 0.3*  
RBC 2.39* 2.08* 2.29* HCT 19.7* 17.7* 19.4* MCV 82.4 85.1 84.7 MCH 28.0 27.9 28.4 MCHC 34.0 32.8 33.5  
RDW 17.9* 17.6* 17.5* Recent Labs 10/15/19 
1835 10/15/19 
1147 10/14/19 
1605 CO2 17* 18* 13* * 109* 109* No results for input(s): ALBUMIN in the last 72 hours. No lab exists for component: [facility] Thank you for requesting us to consult on your patient. We appreciate the opportunity to participate in your patient's care. Please call if you have questions.  
 
 
Milagro Jj NP

## 2019-10-16 NOTE — PROGRESS NOTES
responded to Rapid Response for  [employer], who is a 71 y.o.,female, The  provided the following Interventions: 
Provided crisis spiritual care and support. Offered prayers on behalf for the patient. Chart reviewed. The following outcomes were achieved provided care and support for family: 
 
 
Assessment: 
There are no further spiritual or Hindu issues which require intervention at this time. Plan: 
Chaplains will continue to follow and will provide spiritual care as needed.  recommends bedside caregivers page  on duty if patient or family shows signs of acute spiritual or emotional distress. amanda [facility] Spiritual Care Department [phone]

## 2019-10-16 NOTE — PROGRESS NOTES
[facility] [address] 
[phone] Colon and Rectal Surgery Progress Note Patient: Oleg Brennan MRN: [id number]  SSN: [id number] YOB: 1950  Age: 71 y.o. Sex: female Admit Date: 10/14/2019 LOS: 2 days Subjective:  
 
Patient in pain this morning but conversational. States she is not willing to undergo procedures at this time. Is continuing conversations with palliative team.  
 
Objective:  
 
Vitals:  
 10/16/19 0300 10/16/19 0400 10/16/19 0500 10/16/19 0600 BP: 143/74 135/69 144/72 101/75 Pulse: (!) 112 (!) 108 (!) 107 (!) 103 Resp: 12 11 14 12 Temp:  98.7 °F (37.1 °C) SpO2: 99% 100% 100% 100% Weight:      
Height:      
  
 
Intake and Output: 
Current Shift: No intake/output data recorded. Last three shifts: 10/14 1901 - 10/16 0700 In: 4884.6 [P.O.:240; I.V.:3604.6] Out: 370 [Urine:370] Physical Exam:  
 
General: NAD, alert, oriented Abdomen: soft, some distension, diffusely tender. No guarding or rigidity. Lab/Data Review: 
 
BMP:  
Lab Results Component Value Date/Time  (L) 10/16/2019 08:10 AM  
 K 5.4 10/16/2019 08:10 AM  
  10/16/2019 08:10 AM  
 CO2 22 10/16/2019 08:10 AM  
 AGAP 11 10/16/2019 08:10 AM  
  (H) 10/16/2019 08:10 AM  
  (H) 10/16/2019 08:10 AM  
 CREA 5.76 (H) 10/16/2019 08:10 AM  
 GFRAA 9 (L) 10/16/2019 08:10 AM  
 GFRNA 7 (L) 10/16/2019 08:10 AM  
 
CBC:  
Lab Results Component Value Date/Time WBC 0.4 (LL) 10/16/2019 08:10 AM  
 HGB 6.7 (L) 10/16/2019 08:10 AM  
 HCT 19.7 (L) 10/16/2019 08:10 AM  
 PLT 18 (LL) 10/16/2019 08:10 AM  
  
 
Assessment:  
 
Metastatic uterine cancer, colon perforation secondary to chemotherapy, acute renal failure, pancytopenia Plan:  
 
Continue ABs Patient not candidate for procedures at this time given pancytopenia Perc nephrostomy tubes to be addressed if patient improves. Continue ABx Consider IR consult for abscess drainage should patient improve. Signed By: Liu Rajan DO October 16, 2019

## 2019-10-16 NOTE — PROGRESS NOTES
Gyn onc S: pt very uncomfortable stating pain meds not working. O: 
Patient Vitals for the past 12 hrs: 
 Temp Pulse Resp BP SpO2  
10/16/19 1003 98.6 °F (37 °C)      
10/16/19 0900  (!) 118 20 153/79 100 % 10/16/19 0800  (!) 110 14 169/71 100 % 10/16/19 0600  (!) 103 12 101/75 100 % 10/16/19 0500  (!) 107 14 144/72 100 % 10/16/19 0400 98.7 °F (37.1 °C) (!) 108 11 135/69 100 % 10/16/19 0300  (!) 112 12 143/74 99 % 10/16/19 0200  (!) 114 22 148/73 100 % 10/16/19 0100 98.4 °F (36.9 °C) (!) 114 22 163/71 100 % 10/16/19 0020 98.4 °F (36.9 °C)      
10/15/19 2300  (!) 118 10 113/69 100 % A/P 
1. Recurrent metastatic endometrial cancer with neutropenic fever 
 -discussed with patient and son her poor prognosis given progression of disease and the multitude of medical issues she is dealing with now 
 -explained that I don't think she will get well enough to leave hospital  
 -after good discussion pt agreeable to sign DNR/DNI and move to comfort measures only 
 -d/w ICU team 
 -will continue to follow Dennis Livingston DO 
[phone]

## 2019-10-16 NOTE — ACP (ADVANCE CARE PLANNING)
Palliative Medicine Consult [facility]: [phone] ([phone]) [facility] [phone] Palliative care team including Gadiel Rain NP, and this Vern Phan RN met with Ms. Antonio Andrews and her son Denny Potts in ICU at bedside. Pt is in mode to severe pain but alert and aware x 4. Pt does not have an Advance Directive on file in EMR but agrees to complete one today. Pt named her son Theo Carl as her primary decision maker ([phone]) and her sister Kem Boyce as successor agent ([phone]). After conversation with Dr. Stevenson Ocampo this AM, pt has decided to transition to comfort measures. POST form completed by patient and signed by Gadiel Rain NP. 
   
POST form  DNR/DNI. Comfort Measures. No Feeding tubes. 3 copies provided in folder to sonDenny, instructed to keep the POST form in a area near the patient at home. Copies of AMD, POST and DDNR in chart for scanning into EMR. Referral to hospice made. .  
.  
GOALS OF CARE- DNR/DNI. NO feeding tubes. Comfort Measures. Hospice referral    
    
ACP documents you currently have include: 
[x] Advance Directive or Living Will 
[x] Durable Do Not Resuscitate [x] Physician Orders for Scope of Treatment (POST) [] Medical Power of  
[] Other  None Thank you for the Palliative Medicine consult and allowing us to participate in the care of Ms. Hewitt and Denny parra. Williams Carvajal RN, [facility] Palliative Medicine Inpatient RN [facility] Palliative COPE Line: [phone] ([phone])

## 2019-10-16 NOTE — PROGRESS NOTES
Rapid Response Note [address] [facility] Patient: Supriya Hewitt 71 y.o. female [id number] 1950 Admit Date: 10/14/2019 Admission Diagnosis: Neutropenic fever ([facility] [D70.9, R50.81] RAPID RESPONSE Rapid response called for tachycardia to 200s. She only complained of abdominal pain, no chest pain. No SOB initially, but did intermittently have SOB during rapid. Blood pressure initially with systolic to 660F. EKG was done which initially looked like SVT. She was given IV 6 mg adenosine. Her heart rate decreased to 180s. Repeat EKG showed Afib with RVR. Blood pressure at this time was systolic 513O. She was given diltiazem 0.25 mg/kg IV. She did not respond. She was given another IV diltiazem 0.35mg/kg IV. HR decreased to 160s. Called Dr. Devika Almazan. Recommended digitoxin 0.25 mg IV. Blood pressure back up to systolic 984D after fluid bolus. We watched HR for about 15 minutes. Concern for renal failure, so did not give any more digoxin. HR still did not respond. Metoprolol 2.5 mg IV given. Blood pressure with systolic in 59K. Started 1L NS bolus. Blood pressure recheck with systolic 808B. Heart rate improved to 110, but would jump back up to 200s within seconds. Called ICU team. 
 
Medications Reviewed. She just received morphine prior to Rapid. OBJECTIVE Visit Vitals /78 Pulse (!) 218 Temp 99.5 °F (37.5 °C) Comment: ax  
Resp 20 Ht 5' 5\" (1.651 m) Wt 72.6 kg (160 lb) SpO2 97% BMI 26.63 kg/m² PHYSICAL: 
General:  Alert and Responsive, moaning, intermittently complaining of belly pain and SOB. CV:  Irregularly irregular, no murmurs, rubs, or gallops. No visible pulsations or thrills. RESP:  Unlabored breathing. Lungs clear to auscultation. No wheeze, rales, or rhonchi. Equal expansion bilaterally. ABD:  Firm, distended, minimally tender (baseline). Normoactive bowel sounds. Neuro:   A+Ox3. 5/5 strength bilateral upper extremities and lower extremities. Sensation grossly intact. Medications administered: IV Cardizem 0.25 mg/kg bolus x1; IV Cardizem 0.35 mg/kg bolus x2; IV Digoxin 0.25 mg x1, IV Metoprolol 2.5 mg x1 EKG: Atrial Fibrillation w/RVR Labs: CBC, BMP, Mag, Cardiac Panel, PT/INR, Ammonia, Lactate Orders: Type&Screen, Blood Transfusion of 1 unit prBC ASSESSMENT, PLAN & DISPOSITION Efrain Kinney is a 71y.o. year old female admitted for Neutropenic fever ([facility] [D70.9, R50.81]. Rapid response called for tachycardia. Patient condition currently: stable for transfer. Per son, Patient has had episodes of Afib before. Reports that last time she needed a blood transfusion and after the transfusion she got better. AM CBC showed anemia to 6.5, repeat during rapid with Hgb 5.8. Ordered 1U blood transfusion. Recommend ICU admission for further evaluation and management as we have tried CCB, BB, and digoxin without successful conversion. Disposition: Awaiting ICU transfer Attending Dr. Stephanie Emmanuel notified of rapid response. In agreement with plan. Attending Dr. Stephanie Emmanuel to call ICU for transfer. ICU PA Jhoan Ledesma evaluated patient at bedside and accepted patient transfer into ICU. ICU team resuming care.

## 2019-10-16 NOTE — PROGRESS NOTES
Noted patient was transferred to ICU service for tachycardia into 200s Patient refused PCNs with concern for thrombocytopenia Oliguric BECK , hyperkalemia , metabolic acidoses Continue on bicarb drip @ 125cc/hrs  
K has improved to 5.2 
1 unit of PRBC If continues to be oliguric , at risk for electrolyte/acid base/volume overload Needs close monitoring Discussed with NUrsing , continue to follow closely Please call with questions, 
 
Hemanth Atwood MD FASN Cell [phone] Pager: [phone]

## 2019-10-16 NOTE — PROGRESS NOTES
[address] [facility], [address] Home Phone: [phone] Urology Daily Progress Note Patient Active Problem List  
Diagnosis Code  Endometrial ca ([address] C54.1  Severe obesity (BMI 35.0-39. 9) E66.01  
 ARF (acute renal failure) (HCC) N17.9  Cervical cancer ([address] C53.9  Retroperitoneal abscess ([address] K68.19  
 DVT (deep venous thrombosis) (HCC) I82.409  Colon perforation ([address] K63.1  Hydronephrosis with ureteral stricture, not elsewhere classified N13.1  Pelvic abscess in female N75.10  
 UTI (urinary tract infection) N39.0  BECK (acute kidney injury) ([address] N17.9  Neutropenic fever ([facility]) D70.9, R50.81  Stage 3 chronic kidney disease (HCC) N18.3  Generalized abdominal pain R10.84  Advanced care planning/counseling discussion Z71.89  
 Pancytopenia ([address] X71.026 Assessment & Plan ASSESSMENT:  
- Bilateral hydronephrosis initially managed with PCN/s, then converted to stents  
            [facility], now converted to [address] 9/17  
            CT 10/14 w/ Increasing hydronephrosis despite ureteral stent placements 
- Stage IVB Grade 2 endometrioid adenocarcinoma, favor cervical primary with obstructive uropathy, on chemotherapy Avastin and Topotecan (S/p Cycle 5 Day 5 Topotecan 10/11/19.) - CT 10/14 with progression of metastatic disease -  Left iliac fossa abnormal fluid collection - ? Abscess - Acute Diverticulitis vs Colitis - BECK on CKD II 
- Neutropenic Fever- WBC 0.4 
- Thrombocytopenia- Plt 43 
  
  
PLAN:   
Maintain Samaniego- discharge home with samaniego in place Patient made CMO, no aggressive measures. Hospice on board Will sign off at this time. Please contact with any questions or concerns.   
Follow Up arranged: YES patient has an appointment with Dr. Madelyn Flores already scheduled for 11/13/19 Carmina Allen AGACNP-BC [facility] Pager # [phone] Available M-F 7:30- 5pm .  After 5pm and weekends please call answering service at [phone] Subjective Resting comfortably with family at bedside Objective PHYSICAL EXAMINATION:  
Visit Vitals BP (!) 146/97 Pulse (!) 114 Temp 98.1 °F (36.7 °C) Resp 18 Ht 5' 5\" (1.651 m) Wt 160 lb (72.6 kg) SpO2 100% BMI 26.63 kg/m² Constitutional: i'll appearing female. mild distress. HEENT: Normocephalic, Atraumatic CV:  Tachy Pulm: No respiratory distress or difficulties breathing GI:  Abdomen distended, diffusely tender :  Blackwell draining red tinged urine Skin: No evidence of jaundice. Normal color Neuro/Psych:  Drowsy, easily aroused oriented. Affect appropriate. Lymphatic:  No inguinal lymphadenopathy MSK: limited REVIEW OF LABS AND IMAGING:   
 
Labs:  
 
Labs: Results:  
Chemistry Recent Labs 10/16/19 
0810 10/15/19 
1835 10/15/19 
1147  10/14/19 
1120 * 147* 87   < > 107* * 135* 136   < > 133*  
K 5.4 5.2 5.7*   < > 6.7*  
 105 107   < > 106 CO2 22 17* 18*   < > 17* * 112* 109*   < > 116* CREA 5.76* 6.02* 5.99*   < > 6.03* CA 7.8* 7.8* 8.3*   < > 8.3* AGAP 11 13 11   < > 10 BUCR 18 19 18   < > 19 AP 83  --   --   --  122* TP 6.3*  --   --   --  6.7 ALB 1.8*  --   --   --  2.4*  
GLOB 4.5*  --   --   --  4.3* AGRAT 0.4*  --   --   --  0.6*  
 < > = values in this interval not displayed. CBC w/Diff Recent Labs 10/16/19 
0810 10/15/19 
1931 10/15/19 
1145 10/14/19 
1120 WBC 0.4* 0.4* 0.3* 0.4* RBC 2.39* 2.08* 2.29* 2.58* HGB 6.7* 5.8* 6.5* 7.4* HCT 19.7* 17.7* 19.4* 23.0*  
PLT 18* 16* 16* 43* GRANS DIFFERENTIAL NOT PERFORMED WHEN WBC IS <0.5 DIFFERENTIAL NOT PERFORMED WHEN WBC IS <0.5  --  DIFFERENTIAL NOT PERFORMED WHEN WBC IS <0.5 LYMPH DIFFERENTIAL NOT PERFORMED WHEN WBC IS <0.5 DIFFERENTIAL NOT PERFORMED WHEN WBC IS <0.5  --  DIFFERENTIAL NOT PERFORMED WHEN WBC IS <0.5 EOS DIFFERENTIAL NOT PERFORMED WHEN WBC IS <0.5 DIFFERENTIAL NOT PERFORMED WHEN WBC IS <0.5  --  DIFFERENTIAL NOT PERFORMED WHEN WBC IS <0.5 Cultures Recent Labs 10/14/19 
1120 10/14/19 
1105 10/14/19 
1055 CULT CULTURE IN PROGRESS,FURTHER UPDATES TO FOLLOW NO GROWTH 2 DAYS NO GROWTH 2 DAYS All Micro Results Procedure Component Value Units Date/Time CULTURE, BLOOD [[id number]] Collected:  10/14/19 1120 Order Status:  Completed Specimen:  Blood Updated:  10/16/19 4840 Special Requests: NO SPECIAL REQUESTS     
  GRAM STAIN    
  GRAM NEGATIVE RODS ANAEROBIC BOTTLE  
     
      
  SMEAR CALLED TO AND CORRECTLY REPEATED BY: JUANITA Dias [facility] ON [id number] AT 8114 TP 0882. Culture result:    
  CULTURE IN PROGRESS,FURTHER UPDATES TO FOLLOW CULTURE, URINE [[id number]] Collected:  10/14/19 1055 Order Status:  Completed Specimen:  Cath Urine Updated:  10/16/19 6314 Special Requests: NO SPECIAL REQUESTS Culture result: NO GROWTH 2 DAYS     
 CULTURE, BLOOD [[id number]] Collected:  10/14/19 1105 Order Status:  Completed Specimen:  Blood Updated:  10/16/19 0737 Special Requests: NO SPECIAL REQUESTS Culture result: NO GROWTH 2 DAYS INFLUENZA A & B AG (RAPID TEST) [[id number]] Collected:  10/14/19 1242 Order Status:  Completed Specimen:  Nasopharyngeal from Nasal washing Updated:  10/14/19 1322 Influenza A Antigen NEGATIVE Comment: A negative result does not exclude influenza virus infection, seasonal or H1N1 due to suboptimal sensitivity. If influenza is circulating in your community, a diagnosis of influenza should be considered based on a patients clinical presentation and empiric antiviral treatment should be considered, if indicated.   
  
  Influenza B Antigen NEGATIVE      
 CULTURE, BLOOD, PAIRED [[id number]] Collected:  10/14/19 1100 Order Status:  Canceled Specimen:  Blood Urinalysis Color Date Value Ref Range Status 10/14/2019 YELLOW   Final  
 
Appearance Date Value Ref Range Status 10/14/2019 CLOUDY   Final  
 
Specific gravity Date Value Ref Range Status 10/14/2019 1.013 1.005 - 1.030   Final  
 
pH (UA) Date Value Ref Range Status 10/14/2019 5.5 5.0 - 8.0   Final  
 
Protein Date Value Ref Range Status 10/14/2019 30 (A) NEG mg/dL Final  
 
Ketone Date Value Ref Range Status 10/14/2019 NEGATIVE  NEG mg/dL Final  
 
Bilirubin Date Value Ref Range Status 10/14/2019 NEGATIVE  NEG   Final  
 
Blood Date Value Ref Range Status 10/14/2019 LARGE (A) NEG   Final  
 
Urobilinogen Date Value Ref Range Status 10/14/2019 0.2 0.2 - 1.0 EU/dL Final  
 
Nitrites Date Value Ref Range Status 10/14/2019 NEGATIVE  NEG   Final  
 
Leukocyte Esterase Date Value Ref Range Status 10/14/2019 SMALL (A) NEG   Final  
 
Potassium Date Value Ref Range Status 10/16/2019 5.4 3.5 - 5.5 mmol/L Final  
 
Creatinine Date Value Ref Range Status 10/16/2019 5.76 (H) 0.6 - 1.3 MG/DL Final  
 
BUN Date Value Ref Range Status 10/16/2019 106 (H) 7.0 - 18 MG/DL Final  
  
PSA No results for input(s): PSA in the last 72 hours. Coagulation Lab Results Component Value Date/Time  Prothrombin time 17.0 (H) 10/16/2019 08:10 AM  
 Prothrombin time 18.1 (H) 10/15/2019 07:31 PM  
 INR 1.4 (H) 10/16/2019 08:10 AM  
 INR 1.5 (H) 10/15/2019 07:31 PM  
 aPTT 28.7 10/15/2019 11:45 AM  
 aPTT 27.9 06/20/2019 06:57 PM

## 2019-10-16 NOTE — PROGRESS NOTES
Palliative Medicine Consult DR. [facility]IVEIRA[facility]: [phone] (1983) CHAO FELIZ [facility]: [phone] [facility]: [phone] Patient Name: Jasper Turner YOB: 1950 Date of Initial Consult: 10/15/2019; follow up 10/16/2019 Reason for Consult: goals of care Requesting Provider: Shane Gomes MD 
Primary Care Physician: Christianne Rodriguez MD 
  
 SUMMARY:  
Jasper Turner is a 71 y.o. female with a past history of cervical cancer, CKD, DVT who was admitted on 10/14/2019 from home with a diagnosis of acute renal failure. Patient with history of endometrial (HPV related) cancer involving cervix diagnosed in 8/16/2017. PET scan 8/31/17 suspicious for metastatic disease to left ovary, omentum, liver. Exploratory lap with radical hysterectomy, BSO, resection of omental mass 9/15/2017. She was gives 6 cycles Carbo/Taxol which she completed in Feb. 2018. PET CT 2/27/18 with new nodular densities adjacent to cecum concerning for metastatic disease which then was treated with tamoxifen and megace. Secondary to renal failure and hydronephrosis 12/2018 she had bilateral PCNs placed. After 2 cycles of topotecan/avastin she was admitted in 3/2019 with perforated colon. Chemotherapy paused for treatment of perforated colon and abscess. Resumed chemotherapy 8/3/2019. Current medical issues leading to Palliative Medicine involvement include: progressive stage 4 endometrial cancer and goals of care. 10/16/2019: Palliative care team including Williams Carvajal LCSW and I met with patient and I at bedside. Patient is loudly moaning in pain. She and her son state that after talking with Dr. Michael Sumner they have decided for comfort measures with hospice care at home. Son is teary and has been in contact with his sister, Pito Venegas. ADDENDUM:  Re-assessed patient's pain. She appears slightly more comfortable but is complaining of sore throat.  States it feels like she has an infection. Gums erythematous and uvula with white coating. Ordered magic mouthwash and nystatin as well as PRN bolus dose for severe pain that RN may administer. PALLIATIVE DIAGNOSES:  
1. Advanced care decisions 2. Abdominal pain 3. Acute on chronic kidney failure 4. Metastatic endometrial cancer 5. Pancytopenia PLAN:  
10/16/2019: Follow up meeting with patient and son. They wish to proceed with comfort measures starting today. Patient is in pain but completely oriented and able to make decisions on her own. She completed AMD form and has named her son, Pako Patiño, as her primary decision maker and her sister, Trey Nj as his backup surrogate decision maker. POST was also prepared and signed. Bolus dose of pain medication given and comfort orders placed ordering PCA morphine to manage pain. Patient stated dilaudid was not working for her; I offered to increase the dose/frequency but she was insistent on changing back to morphine in spite of knowledge of its renal impact. Encouraged her to have RNs try to place samaniego after pain is better controlled as her distended bladder may be increasing the source of pain. Emotional support provided to patient and Flor Guerra. GOALS OF CARE: DNR/DNI, comfort measures, no feeding tubes. Plan for discharge to home with hospice. See previous notes shown below: 
 
10/15/2019: 
1. Advanced care decisions: Palliative care team including Lilly Andersen LCSW, Rashaad Morse, JUANITA and I met with patient and her son, Flor Guerra at her bedside. Patient is drowsy from medication but oriented. She is  and the mother of two adult children. 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 She has continued to work throughout chemotherapy treatments and was working a week ago. Patient and Flor Guerra share an apartment. Her daughter, Jarett Duke, lives in [place] and has a disabled son. No AMD is on file.  We discussed the importance of completing AMD documents to establish health care decision makers. Discussed the benefits and burdens of resuscitation and intubation. Patient stated, \"I'm not ready to die yet\" and became emotional about not having eaten for days and just wanted to get her cell counts up. Dr. Damian Benitez was present for part of the discussion and reminded patient of her previous statements in his office of not have new nephrostomy tubes placed. Patient also stated during our visit she did not wish to have the nephrostomy tubes. However, she was unable to answer if she would elect placement if it would prolong her life. Per Alvarez Sandoval, patient has been \"in denial\" about her diagnosis and stated that she did not tell him that her cancer had spread. As patient admitted she was tired of tubes, we discussed the availability of hospice care at which point Alvarez Sandoval became very tearful. Emotional support provided. Patient began to use humor to avoid further discussion. Alvarez Chrisguillaume stated, \"I knew it was coming. I've been preparing for her to die for almost three years now. \" Patient not ready to make any decisions today re aggressive vs. comfort care. Agreed to follow up visits for further discussion and support. GOALS OF CARE: FULL CODE with FULL AGGRESSIVE MEASURES. 2.  Abdominal pain: likely secondary to metastatic cervical cancer. Primary team managing. Patient states pain was 10/10 prior to morphine administration and has now reduced to a comfortable 4/10. 
3.  Acute on chronic kidney failure: Serum BUN/Creatinine on admission was 116 and 6.03 respectively. IV fluids administered with improvement noted. Hyperkalemia slowly resolving. Nephrology and urology following. Plan for PCN tubes on hold. 4.  Metastatic endometrial cancer: As described in summary above. S/p cycle 5, day 5 topotecan on 10/11/2019.  Oncology following and states poor prognosis with recommendation for hospice due to patient's current health status. 5.   Pancytopenia: s/p chemotherapy. Neutropenic precautions. 6.  Initial consult note routed to primary continuity provided 7. Communicated plan of care with: Palliative IDT 
 
GOALS OF CARE: 
Patient/Health Care Proxy Stated Goals: Comfort TREATMENT PREFERENCES:  
Code Status: DNR/DNI Advance Care Planning: 
Advance Care Planning 10/15/2019 Patient's Healthcare Decision Maker is: Legal Next of Kin Primary Decision Maker Name -  
Primary Decision Maker Phone Number -  
Primary Decision Maker Relationship to Patient -  
Confirm Advance Directive None Patient Would Like to Complete Advance Directive - Medical Interventions: Comfort measures Artificially Administered Nutrition: No feeding tube Other: As far as possible, the palliative care team has discussed with patient / health care proxy about goals of care / treatment preferences for patient. HISTORY:  
 
History obtained from: chart CHIEF COMPLAINT: abdominal pain HPI/SUBJECTIVE: The patient is:  
[x] Verbal and participatory [] Non-participatory due to:  
Patient c/o abdominal pain; loudly moaning. Clinical Pain Assessment (nonverbal scale for nonverbal patients): Clinical Pain Assessment Severity: 10    
  
 
Duration: for how long has pt been experiencing pain (e.g., 2 days, 1 month, years) Frequency: how often pain is an issue (e.g., several times per day, once every few days, constant) FUNCTIONAL ASSESSMENT:  
 
Palliative Performance Scale (PPS): PPS: 40 ECOG 
ECOG Status : Limited self-care PSYCHOSOCIAL/SPIRITUAL SCREENING:  
  
Any spiritual / Samaritan concerns: 
[] Yes /  [x] No 
 
Caregiver Burnout: 
[] Yes /  [x] No /  [] No Caregiver Present Anticipatory grief assessment:  
[x] Normal  / [] Maladaptive REVIEW OF SYSTEMS:  
 
Positive and pertinent negative findings in ROS are noted above in HPI.  
The following systems were [x] reviewed / [] unable to be reviewed as noted in HPI Other findings are noted below. Systems: constitutional, ears/nose/mouth/throat, respiratory, gastrointestinal, genitourinary, musculoskeletal, integumentary, neurologic, psychiatric, endocrine. Positive findings noted below. Modified ESAS Completed by: provider Pain: 10  
  Nausea: 2 Dyspnea: 2 Stool Occurrence(s): 0 PHYSICAL EXAM:  
 
Wt Readings from Last 3 Encounters:  
10/14/19 72.6 kg (160 lb) 10/09/19 74.4 kg (164 lb) 10/07/19 72.8 kg (160 lb 8 oz) Blood pressure 153/79, pulse (!) 118, temperature 98.1 °F (36.7 °C), resp. rate 20, height 5' 5\" (1.651 m), weight 72.6 kg (160 lb), SpO2 100 %. Pain: 
Pain Scale 1: Numeric (0 - 10) Pain Intensity 1: 8 Pain Onset 1: acute Pain Location 1: Abdomen Pain Orientation 1: Anterior Pain Description 1: Aching Pain Intervention(s) 1: Medication (see MAR) Constitutional: Pale, chronically ill, frail, thin female in mod-severe distress secondary to pain. Eyes: pupils equal, anicteric ENMT: no nasal discharge, moist mucous membranes Respiratory: breathing slightly labored, symmetric Gastrointestinal: soft non-tender Musculoskeletal: no deformity, no tenderness to palpation Skin: warm, dry Neurologic: following commands, moving all extremities; oriented X 4. 
Psychiatric: full affect, no hallucinations HISTORY:  
 
Active Problems: 
  Neutropenic fever ([address] (10/14/2019) Stage 3 chronic kidney disease (HCC) () Generalized abdominal pain () Advanced care planning/counseling discussion () Pancytopenia ([address] () Past Medical History:  
Diagnosis Date  Acute kidney failure ([address] 12/05/2019  BMI 34.0-34.9,adult 34.8  Caffeine adverse reaction   
 elevated BP  Cancer ([address] uterine, cervical  
 Cervical cancer (HCC)  Chronic kidney disease   
 acute kidney failure  Chronic sinusitis  Dizziness  Endometriosis  Hiatal hernia  High cholesterol  Palpitation  PMB (postmenopausal bleeding)  Rash   
 eczemz  Thromboembolus ([address] 03/15/2019 IVC filter placed  Urinary incontinence Past Surgical History:  
Procedure Laterality Date  HX ABDOMINAL LAPAROSCOPY    
 HX BILATERAL SALPINGO-OOPHORECTOMY Bilateral   
 HX BREAST LUMPECTOMY Right 1973  
 benign  HX HYSTERECTOMY    
 radical  
 HX OTHER SURGICAL  09/01/2017 Exam under anesthesia: Cystoscopy, sigmoidoscopy  HX UROLOGICAL    
 nephrostomy tubes and stents  IR CHANGE EXIST CATHETER/TUBE LT  3/7/2019  IR MEDIPORT  VASCULAR SURGERY PROCEDURE UNLIST    
 filter Family History Problem Relation Age of Onset  Cancer Mother   
     left ovary History reviewed, no pertinent family history. Social History Tobacco Use  Smoking status: Never Smoker  Smokeless tobacco: Never Used Substance Use Topics  Alcohol use: No  
 
Allergies Allergen Reactions  Other Food Nausea and Vomiting Artificial sweeteners  Neulasta [Pegfilgrastim] Swelling  Aspirin Other (comments) Gi upset  Avastin [Bevacizumab] Other (comments) Bowel perforation  Tetracycline Unknown (comments) Patient does not remember, reaction many years ago  Wheat Atopic Dermatitis  
  eczema  Other Plant, Animal, Environmental Other (comments) Pt states she has \"springtime grass allergies. \" Reports reaction: Sinus infection Current Facility-Administered Medications Medication Dose Route Frequency  albuterol (ACCUNEB) nebulizer solution 1.25 mg  1.25 mg Nebulization Q6H PRN  
 0.9% sodium chloride infusion 250 mL  250 mL IntraVENous PRN  
 ondansetron (ZOFRAN ODT) tablet 4 mg  4 mg Oral Q6H PRN  
 LORazepam (INTENSOL) 2 mg/mL oral concentrate 0.5 mg  0.5 mg SubLINGual Q3H PRN  
  scopolamine (TRANSDERM-SCOP) 1 mg over 3 days 1 Patch  1 Patch TransDERmal Q72H PRN  
 senna-docusate (PERICOLACE) 8.6-50 mg per tablet 2 Tab  2 Tab Oral BID PRN  
 morphine (ROXANOL) concentrated oral syringe 5 mg  5 mg SubLINGual Q1H PRN  
 sodium chloride (NS) flush 5-40 mL  5-40 mL IntraVENous Q8H  
 sodium chloride (NS) flush 5-40 mL  5-40 mL IntraVENous PRN  
 morphine (PF)  mg/30 ml   IntraVENous TITRATE  cefepime (MAXIPIME) 1 g in sterile water (preservative free) 10 mL IV syringe  1 g IntraVENous Q24H  
 0.9% sodium chloride infusion 250 mL  250 mL IntraVENous PRN  
 VANCOMYCIN INFORMATION NOTE   Other CONTINUOUS  
 metroNIDAZOLE (FLAGYL) IVPB premix 500 mg  500 mg IntraVENous Q12H  
 acetaminophen (TYLENOL) tablet 650 mg  650 mg Oral Q6H PRN  
 magnesium hydroxide (MILK OF MAGNESIA) 400 mg/5 mL oral suspension 30 mL  30 mL Oral DAILY PRN  
 sodium bicarbonate (8.4%) 150 mEq in sterile water 1,000 mL infusion   IntraVENous CONTINUOUS  
 
 
 LAB AND IMAGING FINDINGS:  
 
Lab Results Component Value Date/Time WBC 0.4 (LL) 10/16/2019 08:10 AM  
 HGB 6.7 (L) 10/16/2019 08:10 AM  
 PLATELET 18 (LL) 61/80/0101 08:10 AM  
 
Lab Results Component Value Date/Time Sodium 134 (L) 10/16/2019 08:10 AM  
 Potassium 5.4 10/16/2019 08:10 AM  
 Chloride 101 10/16/2019 08:10 AM  
 CO2 22 10/16/2019 08:10 AM  
  (H) 10/16/2019 08:10 AM  
 Creatinine 5.76 (H) 10/16/2019 08:10 AM  
 Calcium 7.8 (L) 10/16/2019 08:10 AM  
 Magnesium 2.2 10/16/2019 08:10 AM  
 Phosphorus 7.5 (H) 10/16/2019 08:10 AM  
  
Lab Results Component Value Date/Time AST (SGOT) 34 10/16/2019 08:10 AM  
 Alk. phosphatase 83 10/16/2019 08:10 AM  
 Protein, total 6.3 (L) 10/16/2019 08:10 AM  
 Albumin 1.8 (L) 10/16/2019 08:10 AM  
 Globulin 4.5 (H) 10/16/2019 08:10 AM  
 
Lab Results Component Value Date/Time  INR 1.4 (H) 10/16/2019 08:10 AM  
 Prothrombin time 17.0 (H) 10/16/2019 08:10 AM  
 aPTT 28.7 10/15/2019 11:45 AM  
  
Lab Results Component Value Date/Time Iron 12 (L) 03/07/2019 05:24 PM  
 Ferritin 603 (H) 03/07/2019 05:24 PM  
  
Lab Results Component Value Date/Time pH 7.319 (L) 09/15/2017 12:30 PM  
 PCO2 40.7 09/15/2017 12:30 PM  
 PO2 131.6 (H) 09/15/2017 12:30 PM  
 
No components found for: [place] Lab Results Component Value Date/Time CK 50 10/15/2019 07:31 PM  
 CK - MB <1.0 10/15/2019 07:31 PM  
  
 
   
 
Total time: 35 minutes Counseling / coordination time, spent as noted above: 30 minutes > 50% counseling / coordination: patient, family, MD, RN Prolonged service was provided for  []30 min   []75 min in face to face time in the presence of the patient, spent as noted above. Time Start:  
Time End:  
Note: this can only be billed with 42571 (initial) or 87590 (follow up). If multiple start / stop times, list each separately.

## 2019-10-16 NOTE — PROGRESS NOTES
conducted a Follow up consultation and Spiritual Assessment for Boyd Saunders, who is a 71 y.o.,female. The  provided the following Interventions: 
Continued the relationship of care and support. Listened empathically. Offered prayer and assurance of continued prayer on patients behalf. Chart reviewed. The following outcomes were achieved: 
Patient expressed gratitude for 's visit. Assessment: 
Patient's physical pain is being addressed by the staff. Prayed with her and her son. Patient's jasmina in God is very important for her to cope. Plan: 
Chaplains will continue to follow and will provide pastoral care on an as needed/requested basis.  recommends bedside caregivers page  on duty if patient shows signs of acute spiritual or emotional distress. Penny Tee Board Certified [employer] Spiritual Care  
[phone]

## 2019-10-16 NOTE — PROGRESS NOTES
Bedside and Verbal shift change report given to [address] [place] [address] (oncoming nurse) by Marycarmen Esparza RN 
 (offgoing nurse). Report included the following information SBAR, MAR and Cardiac Rhythm . Aguila Knapp

## 2019-10-16 NOTE — CONSULTS
Interventional Radiology 
  
Consult received from Dr. Brittney Jefferson for evaluation of bilateral hydronephrosis. 
  
Patient well known to our service as she has had multiple interventions in the past year. She has a history of stage 4 endometrioid adenocarcinoma s/p ex lap with radical hysterectomy, BSO, omental mass resection and chemotherapy complicated by colon perforation and abscess requiring drainage. She also developed renal failure and hydronephrosis which required bilateral PCNs that were eventually internalized. She is currently admitted with BECK, bilateral hydronephrosis, anemia, thrombocytopenia, neutropenic fever. She was transitioned to comfort care measures and DNR status this afternoon. No IR intervention planned at this time. Will sign off.   
  
Isabell Corona PA-C [address]

## 2019-10-16 NOTE — INTERDISCIPLINARY ROUNDS
Interdisciplinary Round Note Patient Information: Patient Information: Jaky Hewitt 
                                    256/34 Reason for Admission: Neutropenic fever ([facility] [address] [D70.9, R50.81] Attending Provider:   Whit Tam MD 
Primary Care Physician: 
     Samaria Ramsey Past Medical History:  
Past Medical History:  
Diagnosis Date  Acute kidney failure ([facility] [address] 12/05/2019  BMI 34.0-34.9,adult 34.8  Caffeine adverse reaction   
 elevated BP  Cancer ([facility] [address] uterine, cervical  
 Cervical cancer (HCC)  Chronic kidney disease   
 acute kidney failure  Chronic sinusitis  Dizziness  Endometriosis  Hiatal hernia  High cholesterol  Palpitation  PMB (postmenopausal bleeding)  Rash   
 eczemz  Thromboembolus ([facility] [address] 03/15/2019 IVC filter placed  Urinary incontinence Hospital day: 1 Estimated discharge date: 10/20/19 RRAT Score: High Risk   
      
 21 Total Score 9 IP Visits Last 12 Months (1-3=4, 4=9, >4=11) 5 Pt. Coverage (Medicare=5 , Medicaid, or Self-Pay=4) 7 Charlson Comorbidity Score (Age + Comorbid Conditions) Criteria that do not apply:  
 Has Seen PCP in Last 6 Months (Yes=3, No=0) . Living with Significant Other. Assisted Living. LTAC. SNF. or  
Rehab Patient Length of Stay (>5 days = 3) Goals for Today: pain control Overnight Events: a lot of pain VITAL SIGNS Vitals:  
 10/15/19 2114 10/15/19 2115 10/15/19 2115 10/15/19 2118 BP:      
Pulse: (!) 191 (!) 220 (!) 205 (!) 218 Resp:      
Temp:      
SpO2:   97% Weight:      
Height:      
 
 
 
 Lines, Drains, & Airways Venous Access Device 10/07/19 Upper chest (subclavicular area, right-Site Assessment: Clean, dry, & intact External Female Catheter 10/14/19-Site Assessment: Clean, dry, & intact Peripheral IV 10/14/19 Right Antecubital-Site Assessment: Clean, dry, & intact VTE Prophylaxis Sequential/Intermittent Compression Device: Bilateral 
       
     Patient Refused VTE Prophylaxis: Yes Intake and Output:  
10/14 0701 - 10/15 1900 In: 5372.6 [P.O.:240; I.V.:5132.6] Out: 203 [TJQTY:292] 10/15 1901 - 10/16 0700 In: 1000 [I.V.:1000] Out: -  Current Diet: DIET NUTRITIONAL SUPPLEMENTS All Meals; ENSURE CLEAR 
DIET CLEAR LIQUID Abdominal  
Last Bowel Movement Date: 10/13/19 Stool Appearance: Loose(per pt report) Abdominal Assessment: Distended, Tender Appetite: Fair Bowel Sounds: Active GI Prophylaxis: no 
      Type:       
Recent Glucose Results:  
Lab Results Component Value Date/Time  (H) 10/15/2019 06:35 PM  
 GLU 87 10/15/2019 11:47 AM  
 
 
  
IV Antibiotics? yes When started: 10/14/19 Activity Level: Activity Level: Bed Rest 
Needs assistance with ADLs: yes PT Consult Status: needs Current Immunizations: There is no immunization history for the selected administration types on file for this patient. Recommendations:  
Discharge Disposition: TBD, pending progress Needs for Discharge:  Recommendations from IDR team: iv antibiotics, pain control, PCN drains, Palliative conmsult Other Notes:

## 2019-10-16 NOTE — PROGRESS NOTES
PCCM Follow-up Pt evaluated on the floor following RRT for tachycardia. HR as high as 200s. Given adenosine 6mg IV, diltiazem 18 mg IV followed by 25 mg IV, digoxin 250 mcg IV, metoprolol 2.5 mg IV and 1L NS bolus. EKG done showed rapid afib RVR. Admitted for abdominal pain, CT abd/pelv with multiple abdominal and pelvic masses of varying sizes; new left iliac fossa abnormal fluid collection; increasing splenic masses; increases b/l hydronephrosis despite ureteral stent deployment. Pt has a hx of recurrent St. IVB endometrial cervical ca vs endometrial ca with recent completion of chemotherapy. PE: 
Awake, alert, moaning intermittently Mildly pale palpebral conjunctivae, anicteric sclerae Oral/gum area red -unable to distinguish from gum bleeding as pt had consumed red jello Lungs clear, no accessory muscle use Tachycardic rate but regular rhythm Abdomen: palpable abdominal mass mostly on the hypogastric area with tenderness on palpation B/l LE pitting edema, L>R Labs reviewed - significant findings include gradual decline in H/H 5.8/17.7 from 6.5/19.4; mild coagulopathy with INR 1.5; hyperkalemia on admission at 6.7, latest at 5.2; worsening renal indices Crea 6.02, . Tachycardia, reported atrial fib with RVR -broad differentials to include acute blood loss anemia vs effect of chemotherapy (pancytopenia) vs ?retroperitoneal bleed from underlying pelvic masses; pain from metastatic endometroid adenoca/ cervical ca; sepsis, possible abscess with L iliac fossa fluid collection Widely metastatic gyn cancer with multi-organ dysfunction to include b/l hydronephrosis secondary to obstructive uropathy, pancytopenia, electrolyte abnormalities Acute on chronic kidney disease · Transfuse 1 unit PRBC, 1 FFP, 1 platelet · Vit K 5 mg IV · Monitor for active bleeding including gum bleeding · Switch morphine to dilaudid (latter being nephrotoxic) · Repeat EKG in am 
 · Avoid nephrotoxic medications · Continue antibiotics per primary Full note to follow.  
 
Flor Jo PA-C

## 2019-10-16 NOTE — PROGRESS NOTES
NUTRITION Nursing Referral: [facility] 
  
RECOMMENDATIONS / PLAN:  
 
- Provide nutrition interventions consistent with goals of care- diet as tolerated for comfort.  
- Continue RD inpatient monitoring and evaluation. NUTRITION INTERVENTIONS & DIAGNOSIS:  
 
- Meals/snacks: modified composition - Medical food supplement therapy: Ensure Clear TID 
- Collaboration and referral of nutrition care: interdisciplinary rounds Nutrition Diagnosis: Chronic disease or condition related malnutrition related to decreased appetite, altered GI function and chronic illness as evidenced by poor meal intake and 45 lb, 22% weight loss x year. Patient meets criteria for Severe Protein Calorie Malnutrition as evidenced by:  
ASPEN Malnutrition Criteria Acute Illness, Chronic Illness, or Social/Enviornmental: Chronic illness Energy Intake: Less than/equal to 75% of est energy req for greater than/equal to 1 month Weight Loss: Greater than 20% x 1 yr 
ASPEN Malnutrition Score - Chronic Illness: 12 
Chronic Illness - Malnutrition Diagnosis: Severe malnutrition. ASSESSMENT:  
 
RRT overnight for tachycardia, c/o abdominal pain and poor appetite on admission, not eating or drinking and restricted to clear liquids. Palliative following and decision made to provide comfort measures only, hospice consulted. Nutritional intake adequate to meet patients estimated nutritional needs:  No 
 
Diet: DIET NUTRITIONAL SUPPLEMENTS All Meals; ENSURE CLEAR 
DIET CLEAR LIQUID Food Allergies: wheat, artificial sweeteners Current Appetite: Poor Appetite/meal intake prior to admission: Poor Feeding Limitations:  [] Swallowing difficulty    [] Chewing difficulty    [] Other: 
Current Meal Intake:  
Patient Vitals for the past 100 hrs: 
 % Diet Eaten 10/15/19 2200 0 % Oliguria BM: 10/14, loose Skin Integrity: WDL Edema:   [] No     [x] Yes Pertinent Medications: Reviewed: milk of magnesia, zofran, sodium bicarbonate at 125 mL/hr Recent Labs 10/16/19 
0810 10/15/19 
1931 10/15/19 
1835 10/15/19 
1147  10/14/19 
1120 *  --  135* 136   < > 133*  
K 5.4  --  5.2 5.7*   < > 6.7*  
  --  105 107   < > 106 CO2 22  --  17* 18*   < > 17* *  --  147* 87   < > 107* *  --  112* 109*   < > 116* CREA 5.76*  --  6.02* 5.99*   < > 6.03* CA 7.8*  --  7.8* 8.3*   < > 8.3*  
MG 2.2 2.3  --   --   --   --   
PHOS 7.5*  --   --   --   --  5.6* ALB 1.8*  --   --   --   --  2.4* SGOT 34  --   --   --   --  30 ALT 32  --   --   --   --  23  
 < > = values in this interval not displayed. Intake/Output Summary (Last 24 hours) at 10/16/2019 1152 Last data filed at 10/16/2019 8844 Gross per 24 hour Intake 4534.58 ml Output 50 ml Net 4484.58 ml Anthropometrics: 
Ht Readings from Last 1 Encounters:  
10/14/19 5' 5\" (1.651 m) Last 3 Recorded Weights in this Encounter 10/14/19 1041 Weight: 72.6 kg (160 lb) Body mass index is 26.63 kg/m². Weight History: 29 lb, 15% weight loss x 7 months and total of 45 lb, 22% weight loss over the past year per chart history review Weight Metrics 10/14/2019 10/9/2019 10/7/2019 9/25/2019 9/20/2019 9/16/2019 9/16/2019 Weight 160 lb 164 lb 160 lb 8 oz 160 lb 14.4 oz 136 lb 14.4 oz - 168 lb 3.2 oz  
BMI 26.63 kg/m2 27.29 kg/m2 26.71 kg/m2 26.78 kg/m2 - 22.78 kg/m2 - Admitting Diagnosis: Neutropenic fever ([address] [D70.9, R50.81] Pertinent PMHx: metastatic uterine cancer on chemotherapy, s/p hysterectomy and resection of omental mass (9/2017), acute on chronic renal failure s/p bilateral nephrostomy tube placement and ureteral stent placement, sigmoid colon perforation s/p percutaneous drainage (6/2019) Education Needs:        [x] None identified  [] Identified - Not appropriate at this time  []  Identified and addressed - refer to education log Learning Limitations:   [x] None identified  [] Identified Cultural, [employer] & ethnic food preferences:  [x] None identified    [] Identified and addressed ESTIMATED NUTRITION NEEDS:  
 
Calories: 1126-7729 kcal (HBEx1.3-1.5) based on  [x] Actual BW 73 kg     [] IBW Protein:  gm (1-1.5 gm/kg) based on  [x] Actual BW      [] IBW Fluid: 1 mL/kcal 
  
MONITORING & EVALUATION:  
 
Nutrition Goal(s):  
- Provide nutrition intervention as appropriate with goals of care for the next 7 days. Outcome: New/Initial goal  
 
Monitoring:   [x] Food and beverage intake   [x] Diet order   [x] Nutrition-focused physical findings   [x] Treatment/therapy   [] Weight   [] Enteral nutrition intake Previous Recommendations (for follow-up assessments only):  Not Applicable Discharge Planning: No nutritional discharge needs at this time. Diet as tolerated for comfort. [x] Participated in care planning, discharge planning, & interdisciplinary rounds as appropriate Surjit Mishra RD, [facility] Pager: [phone]

## 2019-10-16 NOTE — PROGRESS NOTES
Physical Therapy Screening: 
Services are not indicated at this time. An InBasket screening referral was triggered for physical therapy based on results obtained during the nursing admission assessment. The patients chart was reviewed and the patient is not appropriate for a skilled therapy evaluation at this time. Please consult physical therapy if any therapy needs arise. Thank you.  
 
Celeste Miur, PT

## 2019-10-16 NOTE — HOSPICE
Referral received. Chart review in process. Thank you for the referral to [facility]. Please contact [phone] with any questions or concerns. MORALES LariosN, RN Hospice Liaison [facility] [address] [place], [facility] [address] 
[phone]

## 2019-10-16 NOTE — PROGRESS NOTES
[facility] Hospitalist Group Progress Note Patient: Betty Buitrago Age: 71 y.o. : 1950 MR#: [id number] SSN: [id number] Date: 10/16/2019 Subjective/24-hoour events:  
 
Has continued to have issues with pain control. No chest pain or SOB. Assessment:  
Obstructive uropathy Acute kidney injury on CKD 2 secondary to obstruction and likely dehydration Atrial fibrillation with RVR Hyperkalemia Hypocalcemia Pancytopenia Hematuria L iliac fossa fluid collection, concern for possible abscess Neutropenic fever Metastatic GYN cancer Hx sigmoid colon perforation with percutaneous drain placement Plan: 
Patient has decided to proceed with comfort care measures. Palliative care medicine has made recommendations with regard to management of pain. Morphine bolus given earlier and PCA initiated. Continue to adjust as necessary to achieve adequate control of symptoms. Hospice evaluation done, will await final plan for disposition. Patient can transfer to floor when bed is available. Case discussed with:  [x]Patient  [x]Family  [x]Nursing  [x]Case Management DVT Prophylaxis:  []Lovenox  []Hep SQ  []SCDs  []Coumadin   []On Heparin gtt Objective:  
VS:  
Visit Vitals BP (!) 146/97 Pulse (!) 122 Temp 98.1 °F (36.7 °C) Resp 18 Ht 5' 5\" (1.651 m) Wt 72.6 kg (160 lb) SpO2 100% BMI 26.63 kg/m² General:  Appears uncomfortable. Cardiovascular:  Regular, tachy. Pulmonary:  No wheezes, effort nonlabored. GI:  Abdomen soft, NTTP. Extremities:  Warm, no ischemia. Neuro:  Awake/alert currently. Moves extremities spontaneously. Labs:   
Recent Results (from the past 24 hour(s)) METABOLIC PANEL, BASIC Collection Time: 10/15/19  6:35 PM  
Result Value Ref Range Sodium 135 (L) 136 - 145 mmol/L Potassium 5.2 3.5 - 5.5 mmol/L Chloride 105 100 - 111 mmol/L  
 CO2 17 (L) 21 - 32 mmol/L  Anion gap 13 3.0 - 18 mmol/L  
 Glucose 147 (H) 74 - 99 mg/dL  (H) 7.0 - 18 MG/DL Creatinine 6.02 (H) 0.6 - 1.3 MG/DL  
 BUN/Creatinine ratio 19 12 - 20 GFR est AA 8 (L) >60 ml/min/1.73m2 GFR est non-AA 7 (L) >60 ml/min/1.73m2 Calcium 7.8 (L) 8.5 - 10.1 MG/DL  
EKG, 12 LEAD, SUBSEQUENT Collection Time: 10/15/19  7:15 PM  
Result Value Ref Range Ventricular Rate 196 BPM  
 Atrial Rate 157 BPM  
 QRS Duration 78 ms Q-T Interval 232 ms QTC Calculation (Bezet) 419 ms Calculated R Axis -6 degrees Calculated T Axis 45 degrees Diagnosis Atrial fibrillation with rapid ventricular response Nonspecific ST abnormality , probably digitalis effect Abnormal ECG When compared with ECG of 14-OCT-2019 11:04, Atrial fibrillation has replaced Sinus rhythm Vent. rate has increased BY  66 BPM 
Nonspecific T wave abnormality now evident in Inferior leads EKG, 12 LEAD, SUBSEQUENT Collection Time: 10/15/19  7:20 PM  
Result Value Ref Range Ventricular Rate 180 BPM  
 Atrial Rate 214 BPM  
 QRS Duration 74 ms Q-T Interval 248 ms QTC Calculation (Bezet) 429 ms Calculated R Axis -4 degrees Calculated T Axis 49 degrees Diagnosis Atrial fibrillation with rapid ventricular response Nonspecific ST abnormality , probably digitalis effect Abnormal ECG When compared with ECG of 15-OCT-2019 19:15, No significant change was found CBC WITH AUTOMATED DIFF Collection Time: 10/15/19  7:31 PM  
Result Value Ref Range WBC 0.4 (LL) 4.6 - 13.2 K/uL  
 RBC 2.08 (L) 4.20 - 5.30 M/uL HGB 5.8 (LL) 12.0 - 16.0 g/dL HCT 17.7 (LL) 35.0 - 45.0 % MCV 85.1 74.0 - 97.0 FL  
 MCH 27.9 24.0 - 34.0 PG  
 MCHC 32.8 31.0 - 37.0 g/dL  
 RDW 17.6 (H) 11.6 - 14.5 % PLATELET 16 (LL) 894 - 420 K/uL MPV 9.3 9.2 - 11.8 FL  
 NEUTROPHILS DIFFERENTIAL NOT PERFORMED WHEN WBC IS <0.5 40 - 73 % LYMPHOCYTES DIFFERENTIAL NOT PERFORMED WHEN WBC IS <0.5 21 - 52 % MONOCYTES DIFFERENTIAL NOT PERFORMED WHEN WBC IS <0.5 3 - 10 % EOSINOPHILS DIFFERENTIAL NOT PERFORMED WHEN WBC IS <0.5 0 - 5 % BASOPHILS DIFFERENTIAL NOT PERFORMED WHEN WBC IS <0.5 0 - 2 % DF DIFFERENTIAL NOT PERFORMED WHEN WBC IS <0.5 CARDIAC PANEL,(CK, CKMB & TROPONIN) Collection Time: 10/15/19  7:31 PM  
Result Value Ref Range CK 50 26 - 192 U/L  
 CK - MB <1.0 <3.6 ng/ml CK-MB Index  0.0 - 4.0 % CALCULATION NOT PERFORMED WHEN RESULT IS BELOW LINEAR LIMIT Troponin-I, QT <0.02 0.0 - 0.045 NG/ML  
LACTIC ACID Collection Time: 10/15/19  7:31 PM  
Result Value Ref Range Lactic acid 2.1 (HH) 0.4 - 2.0 MMOL/L  
PROTHROMBIN TIME + INR Collection Time: 10/15/19  7:31 PM  
Result Value Ref Range Prothrombin time 18.1 (H) 11.5 - 15.2 sec INR 1.5 (H) 0.8 - 1.2 MAGNESIUM Collection Time: 10/15/19  7:31 PM  
Result Value Ref Range Magnesium 2.3 1.6 - 2.6 mg/dL EKG, 12 LEAD, SUBSEQUENT Collection Time: 10/15/19  7:33 PM  
Result Value Ref Range Ventricular Rate 137 BPM  
 Atrial Rate 127 BPM  
 QRS Duration 78 ms Q-T Interval 284 ms QTC Calculation (Bezet) 428 ms Calculated R Axis 3 degrees Calculated T Axis 51 degrees Diagnosis Atrial fibrillation with rapid ventricular response Nonspecific ST and T wave abnormality , probably digitalis effect Abnormal ECG When compared with ECG of 15-OCT-2019 19:20, No significant change was found EKG, 12 LEAD, SUBSEQUENT Collection Time: 10/15/19  8:01 PM  
Result Value Ref Range Ventricular Rate 148 BPM  
 Atrial Rate 192 BPM  
 QRS Duration 74 ms Q-T Interval 266 ms  
 QTC Calculation (Bezet) 417 ms Calculated T Axis 34 degrees Diagnosis Atrial fibrillation with rapid ventricular response Abnormal ECG When compared with ECG of 15-OCT-2019 19:33, No significant change was found EKG, 12 LEAD, SUBSEQUENT Collection Time: 10/15/19  8:11 PM  
Result Value Ref Range Ventricular Rate 120 BPM  
 Atrial Rate 141 BPM  
 QRS Duration 74 ms Q-T Interval 270 ms QTC Calculation (Bezet) 381 ms Calculated R Axis 2 degrees Calculated T Axis 25 degrees Diagnosis Atrial fibrillation with rapid ventricular response Nonspecific ST abnormality , probably digitalis effect Abnormal ECG When compared with ECG of 15-OCT-2019 20:01, No significant change was found EKG, 12 LEAD, SUBSEQUENT Collection Time: 10/15/19  8:22 PM  
Result Value Ref Range Ventricular Rate 195 BPM  
 Atrial Rate 202 BPM  
 QRS Duration 76 ms  
 Q-T Interval 240 ms QTC Calculation (Bezet) 432 ms Calculated R Axis -4 degrees Calculated T Axis 34 degrees Diagnosis Atrial fibrillation with rapid ventricular response Abnormal ECG When compared with ECG of 15-OCT-2019 20:11, 
Vent. rate has increased BY  75 BPM 
ST no longer depressed in Anterior leads EKG, 12 LEAD, SUBSEQUENT Collection Time: 10/15/19  8:27 PM  
Result Value Ref Range Ventricular Rate 110 BPM  
 Atrial Rate 110 BPM  
 P-R Interval 146 ms  
 QRS Duration 76 ms  
 Q-T Interval 294 ms QTC Calculation (Bezet) 397 ms Calculated P Axis 40 degrees Calculated R Axis -8 degrees Calculated T Axis 7 degrees Diagnosis Sinus tachycardia Otherwise normal ECG When compared with ECG of 15-OCT-2019 20:22, Sinus rhythm has replaced Atrial fibrillation Vent. rate has decreased BY  85 BPM 
  
TYPE + CROSSMATCH Collection Time: 10/15/19 10:32 PM  
Result Value Ref Range Crossmatch Expiration 10/18/2019 ABO/Rh(D) O POSITIVE Antibody screen NEG   
 CALLED TO: Aldo Shepherd AT 23:57 10/15/19 CK Comment PATIENT DOES NOT NEED IRRADIATED UNIT PER JASMYN HERNANDEZ NP, ICU, [id number] TO JNP. Unit number [id number] Blood component type RC LR Unit division 00 Status of unit ISSUED Crossmatch result Compatible Unit number [id number] Blood component type RC LR Unit division 00 Status of unit ISSUED Crossmatch result Compatible Unit number [id number] Blood component type RC LR,1 Unit division 00 Status of unit ALLOCATED Crossmatch result Compatible PLASMA, ALLOCATE Collection Time: 10/15/19 10:45 PM  
Result Value Ref Range Unit number [id number] Blood component type FP 24h,Thaw1 Unit division 00 Status of unit ISSUED PLATELETS, ALLOCATE Collection Time: 10/15/19 10:45 PM  
Result Value Ref Range Unit number [id number] Blood component type PLPH LR,PAS1 Unit division 00 Status of unit ISSUED   
GLUCOSE, POC Collection Time: 10/16/19  2:01 AM  
Result Value Ref Range Glucose (POC) 133 (H) 70 - 110 mg/dL Charisse Kaplanon Collection Time: 10/16/19  8:10 AM  
Result Value Ref Range Vancomycin, random 24.0 5.0 - 68.1 UG/ML  
METABOLIC PANEL, BASIC Collection Time: 10/16/19  8:10 AM  
Result Value Ref Range Sodium 134 (L) 136 - 145 mmol/L Potassium 5.4 3.5 - 5.5 mmol/L Chloride 101 100 - 111 mmol/L  
 CO2 22 21 - 32 mmol/L Anion gap 11 3.0 - 18 mmol/L Glucose 124 (H) 74 - 99 mg/dL  (H) 7.0 - 18 MG/DL Creatinine 5.76 (H) 0.6 - 1.3 MG/DL  
 BUN/Creatinine ratio 18 12 - 20 GFR est AA 9 (L) >60 ml/min/1.73m2 GFR est non-AA 7 (L) >60 ml/min/1.73m2 Calcium 7.8 (L) 8.5 - 10.1 MG/DL  
CBC WITH AUTOMATED DIFF Collection Time: 10/16/19  8:10 AM  
Result Value Ref Range WBC 0.4 (LL) 4.6 - 13.2 K/uL  
 RBC 2.39 (L) 4.20 - 5.30 M/uL HGB 6.7 (L) 12.0 - 16.0 g/dL HCT 19.7 (L) 35.0 - 45.0 % MCV 82.4 74.0 - 97.0 FL  
 MCH 28.0 24.0 - 34.0 PG  
 MCHC 34.0 31.0 - 37.0 g/dL  
 RDW 17.9 (H) 11.6 - 14.5 % PLATELET 18 (LL) 984 - 420 K/uL MPV 9.3 9.2 - 11.8 FL  
 NEUTROPHILS DIFFERENTIAL NOT PERFORMED WHEN WBC IS <0.5 40 - 73 % LYMPHOCYTES DIFFERENTIAL NOT PERFORMED WHEN WBC IS <0.5 21 - 52 % MONOCYTES DIFFERENTIAL NOT PERFORMED WHEN WBC IS <0.5 3 - 10 % EOSINOPHILS DIFFERENTIAL NOT PERFORMED WHEN WBC IS <0.5 0 - 5 % BASOPHILS DIFFERENTIAL NOT PERFORMED WHEN WBC IS <0.5 0 - 2 %  
 ABS. NEUTROPHILS DIFFERENTIAL NOT PERFORMED WHEN WBC IS <0.5 1.8 - 8.0 K/UL  
 ABS. LYMPHOCYTES DIFFERENTIAL NOT PERFORMED WHEN WBC IS <0.5 0.9 - 3.6 K/UL  
 ABS. MONOCYTES DIFFERENTIAL NOT PERFORMED WHEN WBC IS <0.5 0.05 - 1.2 K/UL  
 ABS. EOSINOPHILS DIFFERENTIAL NOT PERFORMED WHEN WBC IS <0.5 0.0 - 0.4 K/UL  
 ABS. BASOPHILS DIFFERENTIAL NOT PERFORMED WHEN WBC IS <0.5 0.0 - 0.1 K/UL MAGNESIUM Collection Time: 10/16/19  8:10 AM  
Result Value Ref Range Magnesium 2.2 1.6 - 2.6 mg/dL PHOSPHORUS Collection Time: 10/16/19  8:10 AM  
Result Value Ref Range Phosphorus 7.5 (H) 2.5 - 4.9 MG/DL  
HEPATIC FUNCTION PANEL Collection Time: 10/16/19  8:10 AM  
Result Value Ref Range Protein, total 6.3 (L) 6.4 - 8.2 g/dL Albumin 1.8 (L) 3.4 - 5.0 g/dL Globulin 4.5 (H) 2.0 - 4.0 g/dL A-G Ratio 0.4 (L) 0.8 - 1.7 Bilirubin, total 1.3 (H) 0.2 - 1.0 MG/DL Bilirubin, direct 0.9 (H) 0.0 - 0.2 MG/DL Alk. phosphatase 83 45 - 117 U/L  
 AST (SGOT) 34 10 - 38 U/L  
 ALT (SGPT) 32 13 - 56 U/L  
PROTHROMBIN TIME + INR Collection Time: 10/16/19  8:10 AM  
Result Value Ref Range Prothrombin time 17.0 (H) 11.5 - 15.2 sec INR 1.4 (H) 0.8 - 1.2 PLATELETS, ALLOCATE Collection Time: 10/16/19  9:45 AM  
Result Value Ref Range CALLED TO:    
  KIKE Hanson ON [id number] AT 1022 BY B CALLED ABOUT RBCS AND PLT READY CALLED TO:  KIKE Singh, [id number] AT [address]. Unit number [id number] Blood component type PLP LR,[facility] Unit division 00 Status of unit ALLOCATED Unit number [id number] Blood component type [facility] LR,[facility] Unit division 00 Status of unit ALLOCATED Signed By: Jigna Thurman MD   
 October 16, 2019

## 2019-10-16 NOTE — HOSPICE
Met with patient and son Victor Manuel Armstrong at bedside. Discussed [facility] philosophy, services, criteria, and IDTwith son. Answered all questions. Gave brochure with 24/7 contact information. Son is sole caregiver. He attends [employer] Monday & Thursday's and needs assistance caring for his mom at home. His sister lives out-of-state with disabled child and cannot help. Son is working on assistance with caregiver. Thank you for the referral to [facility]. If we can be of further assistance please contact [phone]. MORALES LariosN, RN 
Nurse Liaison, [address] [address] [address] [place], [address] 
[phone] 
[email or web address]

## 2019-10-16 NOTE — ROUTINE PROCESS
TRANSFER - IN REPORT: 
 
Verbal report received from (name) on Jasper Turner  being received from [address] [place] [address] unit) for routine progression of care Report consisted of patients Situation, Background, Assessment and  
Recommendations(SBAR). Information from the following report(s) SBAR, Kardex, Intake/Output and MAR was reviewed with the receiving nurse. Opportunity for questions and clarification was provided. Received pt via stretcher from [address] [place] [address]  With assistance of several staff members pt transfer from stretcher to bed. Full body assessment done. Skin intact Oriented pt to bed and call bell system Call within reach. Will continue to monitor Bedside and Verbal shift change report given to Tahira Velasco (oncoming nurse) by Matt Malin RN 
(offgoing nurse). Report included the following information SBAR, Kardex, Intake/Output and MAR.

## 2019-10-16 NOTE — PROGRESS NOTES
Patient finally consented to have samaniego cath inserted after refusing procedure all day. Unable to get inserted. Met resistance.

## 2019-10-16 NOTE — PROGRESS NOTES
Notified of elevated HR and RRT called as a result. Given 2 doses of cardizem initially without significant improvement in rates but decrease in BP from 130s to low 100s was noted. Patient has had variable HRs up to upper 100s despite administration of additional rate control and further BP drop to 80s was noted following administration of metoprolol. Pressures have improved with IV fluids but rates remain poorly controlled. Avoid additional digoxin given BECK - would consider amiodarone bolus and infusion at this point as discussed with ICU attending, though this may also have a negative effect on BP. No transfusion was given today and repeat H/H done on this evening during rapid response call showed further drop in H/H - order for transfusion has already been entered. Have followed up with ICU attending - have accepted for transfer to ICU. Assistance is greatly appreciated. Will continue to follow and readdress goals of care. Hopefully, she will respond to transfusion and rate control but prognosis in setting of progressive malignancy remains poor.

## 2019-10-16 NOTE — PROGRESS NOTES
Long discussion again with her She would like to avoid general anesthesia and I agree this would be quite risky in her frail condition. Furthermore stents have failed twice already and exchange would impose risk with likely no improved outcome. The only drainage available to her that could be done under sedation and that would be reliable is nephrostomy tubes. This cannot be safely done unless platelets > 50 at minimum but would defer to IR regarding the exact number as they are the proceduralists. She has expressed to me directly her wishes to avoid invasive procedures if possible unless her platelets were to go up into a safe range to allow the procedure to occur. She is not quite ready for hospice but is entertaining the idea. Palliative care involved. Her pain is not currently controlled and she is requesting more morphine. I have reached out to the hospitalists. This is obviously a difficult situation. She continues to take everything under advisement.

## 2019-10-16 NOTE — MED STUDENT NOTES
*ATTENTION: This note has been created by a medical student for educational purposes only. Please do not refer to the content of this note for clinical decision-making, billing, or other purposes. Please see attending physicians note to obtain clinical information on this patient. * [facility] ICU Progress Note Name: Maral Izaguirre : 1950 MRN: [id number] Date: 10/16/2019 [x]I have reviewed the flowsheet and previous days notes. Events overnight reviewed and discussed with nursing staff. Vital signs and records reviewed. Subjective: 
10/16/19  
 
72 y/o  female seen on follow-up for acute abdominal pain. Patient has a PMH of metastatic endometrial cancer and CKD. Her son reported that she had been c/o abdominal pain, nausea, vomiting, fatigue and decreased appetite since last dose of 5 day cycle topotecan chemotherapy last week. During w/u patient was found to be pancytopenic. CT abd/pelv showed multiple masses in the abdomen and pelvis, acute abnormal fluid collection in the left iliac fossa, hydronephrosis s/p ureteral stent, new developing left upper lobe lung nodule. Patient was initially admitted to medical floor when rapid response was called 10/15/19 for tachycardia in the 200s. Given adenosine 6mg IV, diltiazem 18mg IV followed by 25mg IV, digoxin 250mcg IV, metoprolol 2.5mg IV and 1L ns bolus. EKG reported A-fib w/ RVR. Transferred to ICU. Palliative care, nephrology, urology, oncology, and GYN onc following. Discussing comfort care measures. Patient in bed, alert and awake. In distress due to abdominal pain. Stated that she had spoken with Dr. Gaby Amaya earlier this am and he stated that she would not have any invasive procedures done until platelets increase. []The patient is unable to give any meaningful history or review of systems because the patient is: 
[]Intubated []Sedated  
[]Unresponsive []The patient is critically ill on     
[]Mechanical ventilation []Pressors []BiPAP [] ROS:A comprehensive review of systems was negative except for that written in the HPI. Medication Review · Pain - Morphine and Tylenol · Others- Sodium bicarb Vital Signs:   
Visit Vitals /77 Pulse (!) 111 Temp 98.1 °F (36.7 °C) Resp 12 Ht 5' 5\" (1.651 m) Wt 72.6 kg (160 lb) SpO2 96% BMI 26.63 kg/m² O2 Device: Nasal cannula O2 Flow Rate (L/min): 3 l/min Temp (24hrs), Av.6 °F (37 °C), Min:97.8 °F (36.6 °C), Max:99.5 °F (37.5 °C) Intake/Output:  
Last shift:      No intake/output data recorded. Last 3 shifts: 10/14 1901 - 10/16 0700 In: 4884.6 [P.O.:240; I.V.:3604.6] Out: 370 [Urine:370] Intake/Output Summary (Last 24 hours) at 10/16/2019 1742 Last data filed at 10/16/2019 3144 Gross per 24 hour Intake 4284.58 ml Output 50 ml Net 4234.58 ml Ventilator Settings: 
  
  
  
  
 
Physical Exam: 
Physical Examination:  
General appearance - alert, awake and oriented  and in mild to moderate distress Mental status - alert and oriented Chest - clear to auscultation, no wheezes, rales or rhonchi, symmetric air entry Heart - normal rate, regular rhythm, normal S1, S2, no murmurs, rubs, clicks or gallops Abdomen - tenderness Neurological - alert, oriented, normal speech, follows commands Extremities - peripheral pulses normal, no clubbing or cyanosis Skin - normal coloration and turgor, no rashes, no suspicious skin lesions noted DATA:  
 
Current Facility-Administered Medications Medication Dose Route Frequency  albuterol (ACCUNEB) nebulizer solution 1.25 mg  1.25 mg Nebulization Q6H PRN  
 0.9% sodium chloride infusion 250 mL  250 mL IntraVENous PRN  
 ondansetron (ZOFRAN ODT) tablet 4 mg  4 mg Oral Q6H PRN  
  LORazepam (INTENSOL) 2 mg/mL oral concentrate 0.5 mg  0.5 mg SubLINGual Q3H PRN  
 scopolamine (TRANSDERM-SCOP) 1 mg over 3 days 1 Patch  1 Patch TransDERmal Q72H PRN  
 senna-docusate (PERICOLACE) 8.6-50 mg per tablet 2 Tab  2 Tab Oral BID PRN  
 morphine (ROXANOL) concentrated oral syringe 5 mg  5 mg SubLINGual Q1H PRN  
 sodium chloride (NS) flush 5-40 mL  5-40 mL IntraVENous Q8H  
 sodium chloride (NS) flush 5-40 mL  5-40 mL IntraVENous PRN  
 morphine (PF)  mg/30 ml   IntraVENous TITRATE  benzocaine-menthol (CEPACOL) lozenge 1 Lozenge  1 Lozenge Mucous Membrane PRN  
 magic mouthwash (FIRST-MOUTHWASH BLM) oral suspension 10 mL  10 mL Oral TIDAC  nystatin (MYCOSTATIN) 100,000 unit/mL oral suspension 500,000 Units  500,000 Units Oral QID  morphine injection 2 mg  2 mg IntraVENous Q4H PRN  
 0.9% sodium chloride infusion 250 mL  250 mL IntraVENous PRN  
 acetaminophen (TYLENOL) tablet 650 mg  650 mg Oral Q6H PRN  
 magnesium hydroxide (MILK OF MAGNESIA) 400 mg/5 mL oral suspension 30 mL  30 mL Oral DAILY PRN  
 sodium bicarbonate (8.4%) 150 mEq in sterile water 1,000 mL infusion   IntraVENous CONTINUOUS Labs: Results:  
   
Chemistry Recent Labs 10/16/19 
0810 10/15/19 
1835 10/15/19 
1147  10/14/19 
1120 * 147* 87   < > 107* * 135* 136   < > 133*  
K 5.4 5.2 5.7*   < > 6.7*  
 105 107   < > 106 CO2 22 17* 18*   < > 17* * 112* 109*   < > 116* CREA 5.76* 6.02* 5.99*   < > 6.03* CA 7.8* 7.8* 8.3*   < > 8.3* AGAP 11 13 11   < > 10 BUCR 18 19 18   < > 19 AP 83  --   --   --  122* TP 6.3*  --   --   --  6.7 ALB 1.8*  --   --   --  2.4*  
GLOB 4.5*  --   --   --  4.3* AGRAT 0.4*  --   --   --  0.6*  
 < > = values in this interval not displayed. CBC w/Diff Recent Labs 10/16/19 
0810 10/15/19 
1931 10/15/19 
1145 10/14/19 
1120 WBC 0.4* 0.4* 0.3* 0.4* RBC 2.39* 2.08* 2.29* 2.58* HGB 6.7* 5.8* 6.5* 7.4*  
 HCT 19.7* 17.7* 19.4* 23.0*  
PLT 18* 16* 16* 43* GRANS DIFFERENTIAL NOT PERFORMED WHEN WBC IS <0.5 DIFFERENTIAL NOT PERFORMED WHEN WBC IS <0.5  --  DIFFERENTIAL NOT PERFORMED WHEN WBC IS <0.5 LYMPH DIFFERENTIAL NOT PERFORMED WHEN WBC IS <0.5 DIFFERENTIAL NOT PERFORMED WHEN WBC IS <0.5  --  DIFFERENTIAL NOT PERFORMED WHEN WBC IS <0.5 EOS DIFFERENTIAL NOT PERFORMED WHEN WBC IS <0.5 DIFFERENTIAL NOT PERFORMED WHEN WBC IS <0.5  --  DIFFERENTIAL NOT PERFORMED WHEN WBC IS <0.5 Coagulation Recent Labs 10/16/19 
0810 10/15/19 
1931 10/15/19 
1145 PTP 17.0* 18.1* 17.3* INR 1.4* 1.5* 1.4* APTT  --   --  28.7 Liver Enzymes Recent Labs 10/16/19 
0810 TP 6.3* ALB 1.8* AP 83 SGOT 34 ABG No results found for: PH, PHI, PCO2, PCO2I, PO2, PO2I, HCO3, HCO3I, FIO2, FIO2I Microbiology Recent Labs 10/14/19 
1120 10/14/19 
1105 10/14/19 
1055 CULT CULTURE IN PROGRESS,FURTHER UPDATES TO FOLLOW NO GROWTH 2 DAYS NO GROWTH 2 DAYS Telemetry: [x]Sinus []A-flutter []Paced []A-fib []Multiple PVCs ECHO Echo Results  (Last 48 hours) None Imaging: 
[x]I have personally reviewed the patients radiographs []Radiographs reviewed with radiologist 
 []No change from prior, tubes and lines in adequate position []Improved   []Worsening CXR Results  (Last 48 hours) None CT Results  (Last 48 hours) None IMPRESSION:  
· Metastatic endometrial cancer s/p hysterectomy, BSO and resection of omental mass 09/2017 · Pancytopenia related to chemotherapy · Resolved A-fib w/ RVR · Acute abdominal pain r/t hydronephrosis/cancer · Hyponatremia-improving · Hyperkalemia-resolved · BECK/CKD stage 3 
· Suspected sepsis-fluid collection in L iliac fossa fluid collection, possible abscess · Hydronephrosis s/p ureteral stents RECOMMENDATIONS:  
· Neuro/ Pain/ Sedation - Morphine and Tylenol · Resp - Continuous pulse oximetry, PRN oxygen · CVS - Cardiac monitoring, vital signs per unit protocol, monitor for hypotension, Platelets and PRBC on standby · Heme/ Onc- Oncology following patient · GI - Clear liquid diet, ensure with all meals, document BMs · Renal/  - Urology and Nephrology following, bladder scan PRN,  
· ID - Trend CBC, protective isolation · Endocrine - Check blood sugar PRN 
· Metabolic - Daily BMP, continue sodium bicarb gtt · Musculo/Integ- Turn q2h, up ad june with assistance, monitor for skin breakdown · Code Status - DNR 
· Case discussed with Dr Racquel Saunders / Eunice Mendenhall Best Practice: · Sepsis Bundle per Hospital Protocol · Glycemic control; avoid Hypoglycemia · DVT prophylaxis-SCD · Restraints need-no · Palliative care evaluation-yes The patient is: [] acutely ill Risk of deterioration: [] moderate [x] critically ill  [x] high  
 
[x]Total critical care time exclusive of procedures   45  minutes Ac Capellan, AGACNP-S

## 2019-10-16 NOTE — ROUTINE PROCESS
TRANSFER - OUT REPORT: 
 
Verbal report given to Tyesha Sheldon RN(name) on Oleg Brennan  being transferred to (unit) for change in patient condition(Rapid Response. Afib with RVR) Report consisted of patients Situation, Background, Assessment and  
Recommendations(SBAR). Information from the following report(s) SBAR, Kardex, ED Summary, OR Summary, Procedure Summary, Intake/Output, MAR, Recent Results and Cardiac Rhythm Afib with RVR was reviewed with the receiving nurse. Lines:  
Venous Access Device 10/07/19 Upper chest (subclavicular area, right (Active) Central Line Being Utilized Yes 10/14/2019  7:41 PM  
Criteria for Appropriate Use Irritant/vesicant medication 10/14/2019  7:41 PM  
Site Assessment Clean, dry, & intact 10/14/2019  7:41 PM  
Date of Last Dressing Change 10/14/19 10/14/2019  7:41 PM  
Dressing Status Clean, dry, & intact 10/14/2019  7:41 PM  
Dressing Type Disk with Chlorhexadine gluconate (CHG) 10/14/2019  7:41 PM  
Action Taken Open ports on tubing capped 10/14/2019  7:41 PM  
Access Medial Site Assessment Unable to draw 10/14/2019  2:48 PM  
Date Accessed (Medial Site) 10/14/19 10/14/2019  7:41 PM  
Access Time (Medial Site) 1446 10/14/2019  2:48 PM  
Access Needle Size (Site #1) 20 G 10/14/2019  2:48 PM  
Access Needle Length (Medial Site) 0.75 inches 10/14/2019  2:48 PM  
Positive Blood Return (Medial Site) Yes 10/14/2019  7:41 PM  
Alcohol Cap Used Yes 10/14/2019  7:41 PM  
   
Peripheral IV 10/14/19 Right Antecubital (Active) Site Assessment Clean, dry, & intact 10/14/2019  7:41 PM  
Phlebitis Assessment 0 10/14/2019  7:41 PM  
Infiltration Assessment 0 10/14/2019  7:41 PM  
Dressing Status Clean, dry, & intact 10/14/2019  7:41 PM  
Dressing Type Transparent;Tape 10/14/2019  7:41 PM  
Hub Color/Line Status Pink;Flushed 10/14/2019  7:41 PM  
Action Taken Open ports on tubing capped 10/14/2019  7:41 PM  
Alcohol Cap Used Yes 10/14/2019  7:41 PM  
  
 
 Opportunity for questions and clarification was provided. Patient transported with: 
 Monitor O2 @ 4 liters Registered Nurse

## 2019-10-16 NOTE — PROGRESS NOTES
Palliative Medicine Consult [facility]: [phone] [facility] [phone] Pain medication in place, morphine bolus given and PCA started,  after completion of paperwork. Son, Iman Haji crying, offered emotional support and compassionate listening. So stated \"what happens if he passes here\". PM team member offered some information on the next steps. Call placed to pastoral care to provide  home information to son. He stated that his sister, Vianney Anand is trying to see if she can come from [place], [place]. but may be able to come . PM team will continue to follow closely to assessment pain control and offer support. Pt's pain will need to be managed prior to discharge. Raul Maravilla RN, [facility] Palliative Medicine Inpatient RN [facility] Palliative [facility] Line: [phone] 
 
1530-PM team revisited pt and son at bedside. Pt sitting up high in bed with O2 &  Morphine PCA in place. Patient appears more comfortable but pain not completley managed. Pt stated \" I don't want to overdose\" Pt was reinstructed on use of PCA. Still with audible sigh/groan when taking a breath. Blackwell cath inserted and draining blood tinged urine, approx 600cc at this time. Pt more alert and conversational, even laughing at times. Iman Haji is calmer and focusing on self care needs,inquiring on places to obtain food, pt chimed in that she wanted [place]. His sister will not be able to come and assist with any care. He is working on getting  assistance. Palliative Medicine team will continue to follow and offer support.   
 
Raul Maravilla RN

## 2019-10-17 NOTE — PROGRESS NOTES
visited with the family of Anival Mccurdy, who is a 71 y.o.,female. The  provided the following Interventions: 
Initiated a relationship of care and support. Offered prayer and assurance of continued prayers on patients behalf. Plan: 
Chaplains will continue to follow and will provide pastoral care on an as needed/requested basis.  recommends bedside caregivers page  on duty if patient shows signs of acute spiritual or emotional distress. [address] [facility] Spiritual Care  
[phone]

## 2019-10-17 NOTE — HOSPICE
.Pt/family pursuing hospice:yes Admission dx approved by Hospice Medical Director: Metastatic Endometrial Cancer Anticipated hospital d/c date:10/17/2019 Discussion occurred with patient and/or family about preference of hospitalization once admitted to hospice: yes Reviewed and discussed hospice philosophy and keeping the patient comfortable in their residence: yes Narrative of events and/or assessment if applicable: Met with patient, son Everlina Ganser, niece (hospice RN) and patient's sister at bedside. Discussed [employer] philosophy,services, criteria, and IDT. Answered all questions. Gave brochure with 24/7 contact information. Family agreeable to home with hospice. STAT DME ordered. Please have discharge prescriptions filled in the hospital prior to discharge. Spoke to Dr. Madyd Urias  And CM. Thank you for the referral to [employer]. If we can be of further assistance please contact [phone]. MORALES BrightN, RN 
Nurse Liaison, [address] [facility] [place] [address] [place], [facility] [address] 
[phone] 
[email or web address]

## 2019-10-17 NOTE — PROGRESS NOTES
[facility] Hospitalist Group Progress Note Patient: Kelley Lovell Age: 71 y.o. : 1950 MR#: [id number] SSN: [id number] Date: 10/17/2019 Subjective:  
Plan for discharge home with hospice on 10/18. Patient wants to be discharged on 10/18. Hospice services are available today. NAD, calm. Alert and oriented. Aware of what she wants. Makes her decisions. Assessment/Plan: 1. Obstructive uropathy 2. BECK on CKD2 secondary to obstruction and likely dehydrations 3. Afib with RVR 4. Pancytopenia 5. Abdominal and pelvic masses. Endometrial CA with metastasis 6. Neutropenic fever 7. Hypocalcemia 8. Hyperkalemia. Improved 9. Hematuria 10. L iliac fossa fluid collection, concern for possible abscess 11. Hx of sigmoid colon perforation with percutaneous drain placement Plan 1. Plan for discharge home with hospice 2. Comfort care measures 3. Palliative and hospice consult Additional Notes:   
 
Case discussed with:  [x]Patient  [x]Family  []Nursing  []Case Management DVT Prophylaxis:  []Lovenox  []Hep SQ  []SCDs  []Coumadin   []On Heparin gtt Objective:  
VS:  
Visit Vitals BP (!) 137/98 Pulse (!) 128 Temp 98.3 °F (36.8 °C) Resp 14 Ht 5' 5\" (1.651 m) Wt 78.3 kg (172 lb 9.9 oz) SpO2 97% BMI 28.73 kg/m² Tmax/24hrs: Temp (24hrs), Av.5 °F (36.9 °C), Min:98.3 °F (36.8 °C), Max:98.7 °F (37.1 °C) Intake/Output Summary (Last 24 hours) at 10/17/2019 1705 Last data filed at 10/17/2019 0500 Gross per 24 hour Intake 100 ml Output 350 ml Net -250 ml General:  NAD, resting Cardiovascular:  RRR Pulmonary:  LSC throughout; respiratory effort WNL 
GI:  +BS in all four quadrants, soft, non-tender Extremities:  No edema; 2+ dorsalis pedis pulses bilaterally Neuro: alert and oriented Family contact: niece at bedside Labs:   
No results found for this or any previous visit (from the past 24 hour(s)). Signed By: Soren Garcia NP October 17, 2019

## 2019-10-17 NOTE — PROGRESS NOTES
GYN ONC 
S: Pt in and out of consciousness. Aroused to voice. Pt reported some pain. Family at bedside. O:  
Patient Vitals for the past 12 hrs: 
 Temp Pulse Resp BP SpO2  
10/17/19 0800  (!) 131 11 138/63 93 % 10/17/19 0700  (!) 129 12  93 % 10/17/19 0600  (!) 125 9  93 % 10/17/19 0500  (!) 122 9  92 % 10/17/19 0400  (!) 120 10 137/57 93 % 10/17/19 0300  (!) 120 9  93 % 10/17/19 0200  (!) 117 8  92 % 10/17/19 0100  (!) 123 16  95 % 10/17/19 0000 98.7 °F (37.1 °C) (!) 118 12 141/65 93 % 10/16/19 2300  (!) 118 12  93 % A/P 
1. Recurrent metastatic endometrial cancer with neutropenic fever plan for d/c home today with hospice. -will continue to follow. 
 -all questions answered. Carlos Wilde NP 
[phone]

## 2019-10-17 NOTE — ROUTINE PROCESS
1920:  Rec'd Phyllis Hewitt  from ANDERSON Vasquez RN. SBAR reviewed patient-side with two-nurse check-offs done of meds, dressings, wounds, LDA's & revelant assessment findings including neuro status, vent settings, rhythm & pulses, diet. Bed in lowest position with noted SR up, wheels locked and exit alarm on. Tonight:  Weak but pleasant. Says current analgesia regime is sufficient. Son is at bedside and fielded a long phone call from a relative in [place].   
 
0740:  Verbal Patient-side shift change report given to ANDERSON Sauer, (oncoming nurse) by Max Camp RN 
(offgoing nurse). Report included the following information SBAR, Kardex, ED Summary, Procedure Summary, Intake/Output, MAR, Recent Results and Med Rec Status. Included:  Intro, hx, two-RN eval of relevant assessment findings, LDAs, skin, diagnostics and infusions.

## 2019-10-17 NOTE — PROGRESS NOTES
Palliative Medicine Consult DR[facility]: [phone] ([phone]) [facility]: [phone] [facility]: [phone] Patient Name: Senia Zhang YOB: 1950 Date of Initial Consult: 10/15/2019; follow up 10/17/2019 Reason for Consult: goals of care Requesting Provider: Carlos Severance, MD 
Primary Care Physician: Moisés Spears MD 
  
 SUMMARY:  
Senia Zhang is a 71 y.o. female with a past history of cervical cancer, CKD, DVT who was admitted on 10/14/2019 from home with a diagnosis of acute renal failure. Patient with history of endometrial (HPV related) cancer involving cervix diagnosed in 8/16/2017. PET scan 8/31/17 suspicious for metastatic disease to left ovary, omentum, liver. Exploratory lap with radical hysterectomy, BSO, resection of omental mass 9/15/2017. She was gives 6 cycles Carbo/Taxol which she completed in Feb. 2018. PET CT 2/27/18 with new nodular densities adjacent to cecum concerning for metastatic disease which then was treated with tamoxifen and megace. Secondary to renal failure and hydronephrosis 12/2018 she had bilateral PCNs placed. After 2 cycles of topotecan/avastin she was admitted in 3/2019 with perforated colon. Chemotherapy paused for treatment of perforated colon and abscess. Resumed chemotherapy 8/3/2019. Current medical issues leading to Palliative Medicine involvement include: progressive stage 4 endometrial cancer and goals of care. 10/16/2019: Palliative care team including Giovanny Talbert RN and I met with patient and I at bedside. Patient is loudly moaning in pain. She and her son state that after talking with Dr. Christine Allen they have decided for comfort measures with hospice care at home. Son is teary and has been in contact with his sister, Brandi Fuller. ADDENDUM:  Re-assessed patient's pain. She appears slightly more comfortable but is complaining of sore throat.  States it feels like she has an infection. Gums erythematous and uvula with white coating. Ordered magic mouthwash and nystatin as well as PRN bolus dose for severe pain that RN may administer. 10/17/2019: Palliative care team including Saima Bang LCSW, Chaplain Ej and I met with patient at bedside. She is quietly resting, in and out of sleep. Her son, Corina Villeda, and niece and great niece were present. Her great niece is a hospice nurse and very attentive to patient and her needs. PALLIATIVE DIAGNOSES:  
1. Advanced care decisions 2. Abdominal pain 3. Acute on chronic kidney failure 4. Metastatic endometrial cancer 5. Pancytopenia PLAN:  
 
10/17/2019: Follow up to assess appropriateness of comfort measures. Patient is comfortable, though less alert than yesterday. Samaniego catheter is in place with hematuria present. Emotional support administered to family. Updated RN, MD and hospice liaison that patient is ready and would like to be discharged today if possible. GOALS OF CARE: DNR/DNI, comfort measures, no feeding tubes. Plan for discharge to home with hospice. POST signed and on paper chart. See previous notes shown below: 
 
10/16/2019: Follow up meeting with patient and son. They wish to proceed with comfort measures starting today. Patient is in pain but completely oriented and able to make decisions on her own. She completed AMD form and has named her son, Mateo Calle, as her primary decision maker and her sister, Grant Toscano as his backup surrogate decision maker. POST was also prepared and signed. Bolus dose of pain medication given and comfort orders placed ordering PCA morphine to manage pain. Patient stated dilaudid was not working for her; I offered to increase the dose/frequency but she was insistent on changing back to morphine in spite of knowledge of its renal impact.  Encouraged her to have RNs try to place samaniego after pain is better controlled as her distended bladder may be increasing the source of pain. Emotional support provided to patient and Caitlin Govea. GOALS OF CARE: DNR/DNI, comfort measures, no feeding tubes. Plan for discharge to home with hospice. 10/15/2019: 
1. Advanced care decisions: Palliative care team including Ranjith Posada LCSW, Kellee Jimenez RN and I met with patient and her son, Caitlin Govea at her bedside. Patient is drowsy from medication but oriented. She is  and the mother of two adult children. 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 She has continued to work throughout chemotherapy treatments and was working a week ago. Patient and Caitlin Govea share an apartment. Her daughter, August Hernandez, lives in [place] and has a disabled son. No AMD is on file. We discussed the importance of completing AMD documents to establish health care decision makers. Discussed the benefits and burdens of resuscitation and intubation. Patient stated, \"I'm not ready to die yet\" and became emotional about not having eaten for days and just wanted to get her cell counts up. Dr. Mary Olivarez was present for part of the discussion and reminded patient of her previous statements in his office of not have new nephrostomy tubes placed. Patient also stated during our visit she did not wish to have the nephrostomy tubes. However, she was unable to answer if she would elect placement if it would prolong her life. Per Caitlin Book, patient has been \"in denial\" about her diagnosis and stated that she did not tell him that her cancer had spread. As patient admitted she was tired of tubes, we discussed the availability of hospice care at which point Caitlin Book became very tearful. Emotional support provided. Patient began to use humor to avoid further discussion. Caitlin Book stated, \"I knew it was coming. I've been preparing for her to die for almost three years now. \" Patient not ready to make any decisions today re aggressive vs. comfort care. Agreed to follow up visits for further discussion and support. GOALS OF CARE: FULL CODE with FULL AGGRESSIVE MEASURES. 2.  Abdominal pain: likely secondary to metastatic cervical cancer. Primary team managing. Patient states pain was 10/10 prior to morphine administration and has now reduced to a comfortable 4/10. 
3.  Acute on chronic kidney failure: Serum BUN/Creatinine on admission was 116 and 6.03 respectively. IV fluids administered with improvement noted. Hyperkalemia slowly resolving. Nephrology and urology following. Plan for PCN tubes on hold. 4.  Metastatic endometrial cancer: As described in summary above. S/p cycle 5, day 5 topotecan on 10/11/2019. Oncology following and states poor prognosis with recommendation for hospice due to patient's current health status. 5.   Pancytopenia: s/p chemotherapy. Neutropenic precautions. 6.  Initial consult note routed to primary continuity provided 7. Communicated plan of care with: Palliative IDT 
 
GOALS OF CARE: 
Patient/Health Care Proxy Stated Goals: Comfort TREATMENT PREFERENCES:  
Code Status: DNR/DNI Advance Care Planning: 
Advance Care Planning 10/16/2019 Patient's Healthcare Decision Maker is: Named in scanned ACP document Primary Decision Maker Name -  
Primary Decision Maker Phone Number -  
Primary Decision Maker Relationship to Patient -  
Confirm Advance Directive Yes, on file Patient Would Like to Complete Advance Directive - Does the patient have other document types Power of [place]; Do Not Resuscitate;MOST/MOLST/POST/POLST Medical Interventions: Comfort measures Artificially Administered Nutrition: No feeding tube Other: As far as possible, the palliative care team has discussed with patient / health care proxy about goals of care / treatment preferences for patient. HISTORY:  
 
History obtained from: chart CHIEF COMPLAINT: sore throat HPI/SUBJECTIVE: The patient is:  
[x] Verbal and participatory [] Non-participatory due to:  
Patient is lethargic. Wakes up periodically to talk with visitors. Clinical Pain Assessment (nonverbal scale for nonverbal patients): Clinical Pain Assessment Severity: 0 Duration: for how long has pt been experiencing pain (e.g., 2 days, 1 month, years) Frequency: how often pain is an issue (e.g., several times per day, once every few days, constant) FUNCTIONAL ASSESSMENT:  
 
Palliative Performance Scale (PPS): PPS: 10 
 
ECOG 
ECOG Status : Completely disabled PSYCHOSOCIAL/SPIRITUAL SCREENING:  
  
Any spiritual / Restorationism concerns: 
[] Yes /  [x] No 
 
Caregiver Burnout: 
[] Yes /  [x] No /  [] No Caregiver Present Anticipatory grief assessment:  
[x] Normal  / [] Maladaptive REVIEW OF SYSTEMS:  
 
Positive and pertinent negative findings in ROS are noted above in HPI. The following systems were [x] reviewed / [] unable to be reviewed as noted in HPI Other findings are noted below. Systems: constitutional, ears/nose/mouth/throat, respiratory, gastrointestinal, genitourinary, musculoskeletal, integumentary, neurologic, psychiatric, endocrine. Positive findings noted below. Modified ESAS Completed by: provider Pain: 0 Nausea: 2 Dyspnea: 2 Stool Occurrence(s): 0 PHYSICAL EXAM:  
 
Wt Readings from Last 3 Encounters:  
10/17/19 78.3 kg (172 lb 9.9 oz) 10/09/19 74.4 kg (164 lb) 10/07/19 72.8 kg (160 lb 8 oz) Blood pressure 138/63, pulse (!) 131, temperature 98.7 °F (37.1 °C), resp. rate 11, height 5' 5\" (1.651 m), weight 78.3 kg (172 lb 9.9 oz), SpO2 93 %. Pain: 
Pain Scale 1: Numeric (0 - 10) Pain Intensity 1: 0 Pain Onset 1: acute Pain Location 1: Abdomen Pain Orientation 1: Anterior Pain Description 1: Aching Pain Intervention(s) 1: Medication (see MAR) Constitutional: Pale, chronically ill, frail, thin female in no apparent distress Eyes: pupils equal, anicteric ENMT: no nasal discharge, dry mucous membranes Respiratory: breathing unlabored, symmetric Gastrointestinal: soft non-tender Musculoskeletal: no deformity, no tenderness to palpation Skin: warm, dry Neurologic: following commands, moving all extremities; oriented X 4. lethargic HISTORY:  
 
Active Problems: 
  Neutropenic fever ([address] (10/14/2019) Stage 3 chronic kidney disease (HCC) () Generalized abdominal pain () Advanced care planning/counseling discussion () Pancytopenia ([address] () Past Medical History:  
Diagnosis Date  Acute kidney failure ([address] 12/05/2019  BMI 34.0-34.9,adult 34.8  Caffeine adverse reaction   
 elevated BP  Cancer ([address] uterine, cervical  
 Cervical cancer (HCC)  Chronic kidney disease   
 acute kidney failure  Chronic sinusitis  Dizziness  Endometriosis  Hiatal hernia  High cholesterol  Palpitation  PMB (postmenopausal bleeding)  Rash   
 eczemz  Thromboembolus ([address] 03/15/2019 IVC filter placed  Urinary incontinence Past Surgical History:  
Procedure Laterality Date  HX ABDOMINAL LAPAROSCOPY    
 HX BILATERAL SALPINGO-OOPHORECTOMY Bilateral   
 HX BREAST LUMPECTOMY Right 1973  
 benign  HX HYSTERECTOMY    
 radical  
 HX OTHER SURGICAL  09/01/2017 Exam under anesthesia: Cystoscopy, sigmoidoscopy  HX UROLOGICAL    
 nephrostomy tubes and stents  IR CHANGE EXIST CATHETER/TUBE LT  3/7/2019  IR MEDIPORT  VASCULAR SURGERY PROCEDURE UNLIST    
 filter Family History Problem Relation Age of Onset  Cancer Mother   
     left ovary History reviewed, no pertinent family history. Social History Tobacco Use  Smoking status: Never Smoker  Smokeless tobacco: Never Used Substance Use Topics  Alcohol use: No  
 
Allergies Allergen Reactions  Other Food Nausea and Vomiting Artificial sweeteners  Neulasta [Pegfilgrastim] Swelling  Aspirin Other (comments) Gi upset  Avastin [Bevacizumab] Other (comments) Bowel perforation  Tetracycline Unknown (comments) Patient does not remember, reaction many years ago  Wheat Atopic Dermatitis  
  eczema  Other Plant, Animal, Environmental Other (comments) Pt states she has \"springtime grass allergies. \" Reports reaction: Sinus infection Current Facility-Administered Medications Medication Dose Route Frequency  albuterol (ACCUNEB) nebulizer solution 1.25 mg  1.25 mg Nebulization Q6H PRN  
 ondansetron (ZOFRAN ODT) tablet 4 mg  4 mg Oral Q6H PRN  
 LORazepam (INTENSOL) 2 mg/mL oral concentrate 0.5 mg  0.5 mg SubLINGual Q3H PRN  
 scopolamine (TRANSDERM-SCOP) 1 mg over 3 days 1 Patch  1 Patch TransDERmal Q72H PRN  
 senna-docusate (PERICOLACE) 8.6-50 mg per tablet 2 Tab  2 Tab Oral BID PRN  
 morphine (ROXANOL) concentrated oral syringe 5 mg  5 mg SubLINGual Q1H PRN  
 sodium chloride (NS) flush 5-40 mL  5-40 mL IntraVENous Q8H  
 sodium chloride (NS) flush 5-40 mL  5-40 mL IntraVENous PRN  
 morphine (PF)  mg/30 ml   IntraVENous TITRATE  benzocaine-menthol (CEPACOL) lozenge 1 Lozenge  1 Lozenge Mucous Membrane PRN  
 magic mouthwash (FIRST-MOUTHWASH BLM) oral suspension 10 mL  10 mL Oral TIDAC  nystatin (MYCOSTATIN) 100,000 unit/mL oral suspension 500,000 Units  500,000 Units Oral QID  morphine injection 2 mg  2 mg IntraVENous Q4H PRN  
 acetaminophen (TYLENOL) tablet 650 mg  650 mg Oral Q6H PRN  
 magnesium hydroxide (MILK OF MAGNESIA) 400 mg/5 mL oral suspension 30 mL  30 mL Oral DAILY PRN  
 sodium bicarbonate (8.4%) 150 mEq in sterile water 1,000 mL infusion   IntraVENous CONTINUOUS  
 
 
 LAB AND IMAGING FINDINGS:  
 
Lab Results Component Value Date/Time  WBC 0.4 (LL) 10/16/2019 08:10 AM  
 HGB 6.7 (L) 10/16/2019 08:10 AM  
 PLATELET 18 (LL) 24/41/6024 08:10 AM  
 
Lab Results Component Value Date/Time Sodium 134 (L) 10/16/2019 08:10 AM  
 Potassium 5.4 10/16/2019 08:10 AM  
 Chloride 101 10/16/2019 08:10 AM  
 CO2 22 10/16/2019 08:10 AM  
  (H) 10/16/2019 08:10 AM  
 Creatinine 5.76 (H) 10/16/2019 08:10 AM  
 Calcium 7.8 (L) 10/16/2019 08:10 AM  
 Magnesium 2.2 10/16/2019 08:10 AM  
 Phosphorus 7.5 (H) 10/16/2019 08:10 AM  
  
Lab Results Component Value Date/Time AST (SGOT) 34 10/16/2019 08:10 AM  
 Alk. phosphatase 83 10/16/2019 08:10 AM  
 Protein, total 6.3 (L) 10/16/2019 08:10 AM  
 Albumin 1.8 (L) 10/16/2019 08:10 AM  
 Globulin 4.5 (H) 10/16/2019 08:10 AM  
 
Lab Results Component Value Date/Time INR 1.4 (H) 10/16/2019 08:10 AM  
 Prothrombin time 17.0 (H) 10/16/2019 08:10 AM  
 aPTT 28.7 10/15/2019 11:45 AM  
  
Lab Results Component Value Date/Time Iron 12 (L) 03/07/2019 05:24 PM  
 Ferritin 603 (H) 03/07/2019 05:24 PM  
  
Lab Results Component Value Date/Time pH 7.319 (L) 09/15/2017 12:30 PM  
 PCO2 40.7 09/15/2017 12:30 PM  
 PO2 131.6 (H) 09/15/2017 12:30 PM  
 
No components found for: [place] Lab Results Component Value Date/Time CK 50 10/15/2019 07:31 PM  
 CK - MB <1.0 10/15/2019 07:31 PM  
  
 
   
 
Total time: 35 minutes Counseling / coordination time, spent as noted above: 30 minutes > 50% counseling / coordination: patient, family, MD, RN, hospice liaison Prolonged service was provided for  []30 min   []75 min in face to face time in the presence of the patient, spent as noted above. Time Start:  
Time End:  
Note: this can only be billed with 62573 (initial) or 52650 (follow up). If multiple start / stop times, list each separately.

## 2019-10-17 NOTE — ROUTINE PROCESS
Bedside and Verbal shift change report given to Reba Gomez RN (oncoming nurse) by Mabel De Paz RN 
 (offgoing nurse). Report included the following information SBAR, MAR and Cardiac Rhythm . Aimee Lemons

## 2019-10-17 NOTE — PROGRESS NOTES
Palliative NP Katarzyna Ang, JUAN FW Cynthia Martinez and I followed up with Ms Armando Montaño. Her son Blaire Stokes is in hallway outside of her room; compassionate hugs and words of comfort offered to him. Blaire Stokes is teary and grateful for our visit, guidance. Niece and great niece, who state they both have hospice experience, are in room. Blaire Stokes shares that his  \"came yesterday and we were singing and praying and Mom was singing Blessed Assurance. \" Compassionate support offered as Blaire Stokes shares a little more about his jasmina, wondering out loud how he will get through this time. His family appears to be arriving from [facility] to help him with transition home with hospice. Ms Kellie Temple appears to be agitated and responds to words of assurance, orientation and light touch. Her nieces are very attentive to her needs. Family shares their gratitude for follow up support and care. Beverley Tam Palliative  [phone]

## 2019-10-17 NOTE — PROGRESS NOTES
Palliative Medicine Consult DR[facility]: [phone] ([phone]) [facility]: [phone] Time In: 0930 Time Out: 1030 Palliative Medicine Team Brianne Fontana, ELOISE, [place], , and this LCSW) met with patient, son Desmond Ortega, and multiple family members at bedside. Patient awake and communicating with family members in person and via phone. Patient has PMH of recurrent stage IVB endometrioid cervical cancer vs. endometrial cancer. She was recently discharged from hospital after being admitted for acute on chronic renal failure s/p stent exchange. She presented to the ED on 10/14/2019 w/ c/o extreme fatigue, dehydration, weakness, and abdominal pain. Patient denied pain during this visit. D/c plan is to home with hospice services in place. Patient resides at home with her son Desmond Ortega, 32years old. Desmond Ortega has provided patient with care since she was originally diagnosed with cancer in 2017. He provided full care x 6 weeks following her hysterectomy in 2017. He is a full-time student, but will have assistance from his relatives that are in town from [place], two of whom have hospice experience. Patient's daughter, Brandi Fuller, resides in [place] and cares full-time for her disabled child. Patient was working as a  up until last Friday. Patient and Desmond Ortega are Christians who find strength in their jasmina. Emotional support and words of validation provided; Desmond Ortega appropriately tearful. Patient status is DNR/DNI. POST on file with the following measures: NO CPR; comfort measures; NO feeding tube. Patient also has an Advance Medical Directive on file naming the following decision-makers: 
 
Primary Healthcare Agent: Neil Rumbrea Relationship: Son Phone Number [phone] Secondary Healthcare Agent: [address] Relationship: Sister Phone Number: [phone] PM Team to f/u as appropriate.

## 2019-10-17 NOTE — HOSPICE
After lengthy family meeting with patient and son agreeing to discharge home today with hospice services (d/t nieces daughter a hospice RN being in town to help son until Saturday) the patient and niece (not POA) told attending she wanted to discharge tomorrow. Patient will discharge home 10/18/2019 with hospice services. Writer notified CLAUDIA

## 2019-10-18 NOTE — PROGRESS NOTES
Palliative Medicine Consult [facility]: [phone] ([phone]) [facility]: [phone] Time In: 0915 Time Out: 1155 Palliative Medicine Team (Freddie Bullard NP, Nicholas Pfeiffer, RN, and this LCSW) met with patient, son Arminda Loyd, daughter Guanaco Hawley, and multiple family members. Patient is not safe for discharge to home at this time per medical team.  
 
Compassionate listening and emotional support provided. Patient appears to be actively dying. Arminda Loyd requested PM Team presence as he told patient it was [place] to go. \" Entire family very tearful and supportive of one another. Family appreciative of palliative involvement. Patient status is DNR/DNI. POST on file with the following measures: NO CPR; comfort measures; NO feeding tube. PM Team to f/u as appropriate.

## 2019-10-18 NOTE — ROUTINE PROCESS
0600- No acute changes noted on patient's current health status. For dicharged to Hospice today,patient is comfort care. Families stays with patient all night long. Tolerating PCA Morphine.

## 2019-10-18 NOTE — PROGRESS NOTES
conducted a follow up with Family and Patient as requested Moraima Hoang, who is a 71 y.o.,female. The  provided the following Interventions: 
Continued the relationship of care and support. Comforted family and provided support. Listened empathically. Offered prayer and assurance of continued prayer on patients behalf. Chart reviewed. Assessment: 
Patient's daughter is expected to arrive within a few hours (with her adult children). Informed ED font desk staff of their possible arrival.  
 
Plan: 
Chaplains will continue to follow and will provide pastoral care on an as needed/requested basis.  recommends bedside caregivers page  on duty if patient shows signs of acute spiritual or emotional distress. [address] [facility]ual [facility]  
[phone]

## 2019-10-18 NOTE — ROUTINE PROCESS
Bedside, Verbal and Written shift change report given to Francie Lebron (oncoming nurse) by Parish Avila RN 
 (offgoing nurse). Report included the following information SBAR, Kardex, Intake/Output, MAR, Alarm Parameters  and Quality Measures.

## 2019-10-18 NOTE — PROGRESS NOTES
[facility] Hospitalist Group Progress Note Patient: Saint Singh Age: 71 y.o. : 1950 MR#: [id number] SSN: [id number] Date: 10/18/2019 Subjective:  
Currently unstable for transport to home with hospice. Palliative team notified this writer this morning of active dying process. Patient is comfort measures only. She is non responsive. Family is at bedside. No needs at this time. Assessment/Plan: 1. Obstructive uropathy 2. BECK on CKD2 secondary to obstruction and likely dehydrations 3. Afib with RVR 4. Pancytopenia 5. Abdominal and pelvic masses. New developing lung nodule, ALIA. Endometrial CA with metastasis 6. Neutropenic fever 7. Hypocalcemia 8. Hyperkalemia. Improved 9. Hematuria 10. L iliac fossa fluid collection, concern for possible abscess 11. Hx of sigmoid colon perforation with percutaneous drain placement 
  
Plan 1. Plan for discharge home with hospice 2. Comfort care measures 3. Palliative and hospice consult Additional Notes:   
 
Case discussed with:  []Patient  [x]Family  [x]Nursing  []Case Management DVT Prophylaxis:  []Lovenox  []Hep SQ  []SCDs  []Coumadin   []On Heparin gtt Objective:  
VS:  
Visit Vitals /42 Pulse (!) 127 Temp 98.5 °F (36.9 °C) Resp 13 Ht 5' 5\" (1.651 m) Wt 78.3 kg (172 lb 9.9 oz) SpO2 93% BMI 28.73 kg/m² Tmax/24hrs: Temp (24hrs), Av.6 °F (37 °C), Min:98.5 °F (36.9 °C), Max:98.7 °F (37.1 °C) Intake/Output Summary (Last 24 hours) at 10/18/2019 1301 Last data filed at 10/17/2019 2000 Gross per 24 hour Intake  Output 300 ml Net -300 ml General:  nonresponsive Cardiovascular:  RRR Pulmonary:  LSC throughout; respiratory effort WNL 
GI:  +BS in all four quadrants, soft, non-tender Extremities:  No edema; 2+ dorsalis pedis pulses bilaterally Neuro: non responsive Family contact: family members at bedside Labs: No results found for this or any previous visit (from the past 24 hour(s)). Signed By: Serge Ulloa NP October 18, 2019

## 2019-10-18 NOTE — PROGRESS NOTES
conducted a follow up with Family of  Shahram Roe, who is a 71 y.o.,female. The  provided the following Interventions: 
Continued the relationship of care and support with Patient and Family. Followed up with Patient's Family. Listened empathically. Offered prayer and assurance of continued prayer on patients behalf. Chart reviewed. The following outcomes were achieved: 
Patient's Family expressed gratitude for 's visit. Plan: 
Chaplains will continue to follow and will provide pastoral care on an as needed/requested basis.  recommends bedside caregivers page  on duty if patient shows signs of acute spiritual or emotional distress. [address] [facility] Spiritual Care  
[phone]

## 2019-10-18 NOTE — PROGRESS NOTES
[facility]iative Medicine [facility]: [phone] ([phone] [facility]: [phone] Palliative medicine team including Babatunde Potter NP, Lawrence Jackson LCSW, and this writer met with patient and extended family at bedside in ICU. Her son, James Cleary, daughter, Brandi Ghosh, 2 grandchildren, niece and great niece were present. Her great niece is a hospice nurse and very attentive to patient and her needs. Patient is imminent, BP 84/42, RR 11, . Not safe for transport to home with hospice. Palliative team offered emotional support and compassionate listening. Hospice liaison updated on current condition. Hospitalist, Brian Ornelas NP, informed. Olaf Ann RN, [facility] Palliative Medicine Inpatient RN  [facility] DEBI Line: [phone]

## 2019-10-18 NOTE — ROUTINE PROCESS
TRANSFER - IN REPORT: 
 
Verbal report received from Shon Waldrop RN(name) on Bela Seeds  being received from ICU(unit) for routine progression of care Report consisted of patients Situation, Background, Assessment and  
Recommendations(SBAR). Information from the following report(s) SBAR, Kardex, [facility] and Recent Results was reviewed with the receiving nurse. Opportunity for questions and clarification was provided. Pt arrived on floor at 1910. Pt on unit for comfort care. Multiple family member and friends at bedside.

## 2019-10-18 NOTE — PROGRESS NOTES
Palliative Medicine Consult DR. OLIVEIRA'S [facility]: [phone] ([phone]) [facility]: [phone] [facility]: [phone] Patient Name: Efrain Kinney YOB: 1950 Date of Initial Consult: 10/15/2019; follow up 10/17/2019 Reason for Consult: goals of care Requesting Provider: Chris Murguia MD 
Primary Care Physician: Ollie Bhatt MD 
  
 SUMMARY:  
Efrain Kinney is a 71 y.o. female with a past history of cervical cancer, CKD, DVT who was admitted on 10/14/2019 from home with a diagnosis of acute renal failure. Patient with history of endometrial (HPV related) cancer involving cervix diagnosed in 8/16/2017. PET scan 8/31/17 suspicious for metastatic disease to left ovary, omentum, liver. Exploratory lap with radical hysterectomy, BSO, resection of omental mass 9/15/2017. She was gives 6 cycles Carbo/Taxol which she completed in Feb. 2018. PET CT 2/27/18 with new nodular densities adjacent to cecum concerning for metastatic disease which then was treated with tamoxifen and megace. Secondary to renal failure and hydronephrosis 12/2018 she had bilateral PCNs placed. After 2 cycles of topotecan/avastin she was admitted in 3/2019 with perforated colon. Chemotherapy paused for treatment of perforated colon and abscess. Resumed chemotherapy 8/3/2019. Current medical issues leading to Palliative Medicine involvement include: progressive stage 4 endometrial cancer and goals of care. 10/16/2019: Palliative care team including Dmitriy Sutton RN and I met with patient and I at bedside. Patient is loudly moaning in pain. She and her son state that after talking with Dr. Kendall Hughes they have decided for comfort measures with hospice care at home. Son is teary and has been in contact with his sister, Che Jaffe. ADDENDUM:  Re-assessed patient's pain. She appears slightly more comfortable but is complaining of sore throat.  States it feels like she has an infection. Gums erythematous and uvula with white coating. Ordered magic mouthwash and nystatin as well as PRN bolus dose for severe pain that RN may administer. 10/17/2019: Palliative care team including Alma Schmitz LCSW, Tabby Jacob,  and I met with patient at bedside. She is quietly resting, in and out of sleep. Her son, Vivi Rodrigues, and niece and great niece were present. Her great niece is a hospice nurse and very attentive to patient and her needs. PALLIATIVE DIAGNOSES:  
1. Advanced care decisions 2. Abdominal pain 3. Acute on chronic kidney failure 4. Metastatic endometrial cancer 5. Pancytopenia PLAN:  
10/18/19 Follow up for comfort care. Patient appears to be actively dying with deep rhythmic breathing and short periods of apnea. Patient with sallow complexion and + 1 pitting edema BLE's. Plan was for patient to be transported to home today but her status is rapidly decompensating and her BP is low at 84/46. Pt with breathing changes and symptoms of imminent death. Family is at the bedside tearful. Palliative team spent much time consoling and educating family on end of life and encouragement to do life review. Cancelled hospice discharge for today due to high risk of dying in transport. At this time patient remains a DNR/DNI on comfort care only 10/17/2019: Follow up to assess appropriateness of comfort measures. Patient is comfortable, though less alert than yesterday. Blackwell catheter is in place with hematuria present. Emotional support administered to family. Updated RN, MD and hospice liaison that patient is ready and would like to be discharged today if possible. GOALS OF CARE: DNR/DNI, comfort measures, no feeding tubes. Plan for discharge to home with hospice. POST signed and on paper chart. See previous notes shown below: 
 
10/16/2019: Follow up meeting with patient and son.  They wish to proceed with comfort measures starting today. Patient is in pain but completely oriented and able to make decisions on her own. She completed AMD form and has named her son, Tor Starr, as her primary decision maker and her sister, Humberto Guidry as his backup surrogate decision maker. POST was also prepared and signed. Bolus dose of pain medication given and comfort orders placed ordering PCA morphine to manage pain. Patient stated dilaudid was not working for her; I offered to increase the dose/frequency but she was insistent on changing back to morphine in spite of knowledge of its renal impact. Encouraged her to have RNs try to place samaniego after pain is better controlled as her distended bladder may be increasing the source of pain. Emotional support provided to patient and Victor Manuel Armstrong. GOALS OF CARE: DNR/DNI, comfort measures, no feeding tubes. Plan for discharge to home with hospice. 10/15/2019: 
1. Advanced care decisions: Palliative care team including Francisco López LCSW, Francois Laurent, RN and I met with patient and her son, Victor Manuel Armstrong at her bedside. Patient is drowsy from medication but oriented. She is  and the mother of two adult children. 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 She has continued to work throughout chemotherapy treatments and was working a week ago. Patient and Victor Manuel Armstrong share an apartment. Her daughter, Chance Brennan, lives in [place] and has a disabled son. No AMD is on file. We discussed the importance of completing AMD documents to establish health care decision makers. Discussed the benefits and burdens of resuscitation and intubation. Patient stated, \"I'm not ready to die yet\" and became emotional about not having eaten for days and just wanted to get her cell counts up.  Dr. Ashley Kang was present for part of the discussion and reminded patient of her previous statements in his office of not have new nephrostomy tubes placed. Patient also stated during our visit she did not wish to have the nephrostomy tubes. However, she was unable to answer if she would elect placement if it would prolong her life. Per Denny Potts, patient has been \"in denial\" about her diagnosis and stated that she did not tell him that her cancer had spread. As patient admitted she was tired of tubes, we discussed the availability of hospice care at which point Denny Potts became very tearful. Emotional support provided. Patient began to use humor to avoid further discussion. Denny Potts stated, \"I knew it was coming. I've been preparing for her to die for almost three years now. \" Patient not ready to make any decisions today re aggressive vs. comfort care. Agreed to follow up visits for further discussion and support. GOALS OF CARE: FULL CODE with FULL AGGRESSIVE MEASURES. 2.  Abdominal pain: likely secondary to metastatic cervical cancer. Primary team managing. Patient states pain was 10/10 prior to morphine administration and has now reduced to a comfortable 4/10. 
3.  Acute on chronic kidney failure: Serum BUN/Creatinine on admission was 116 and 6.03 respectively. IV fluids administered with improvement noted. Hyperkalemia slowly resolving. Nephrology and urology following. Plan for PCN tubes on hold. 4.  Metastatic endometrial cancer: As described in summary above. S/p cycle 5, day 5 topotecan on 10/11/2019. Oncology following and states poor prognosis with recommendation for hospice due to patient's current health status. 5.   Pancytopenia: s/p chemotherapy. Neutropenic precautions. 6.  Initial consult note routed to primary continuity provided 7. Communicated plan of care with: Palliative IDT 
 
GOALS OF CARE: 
Patient/Health Care Proxy Stated Goals: Comfort TREATMENT PREFERENCES:  
Code Status: DNR/DNI Advance Care Planning: 
Advance Care Planning 10/16/2019 Patient's Healthcare Decision Maker is: Named in scanned ACP document Primary Decision Maker Name -  
Primary Decision Maker Phone Number -  
Primary Decision Maker Relationship to Patient -  
Confirm Advance Directive Yes, on file Patient Would Like to Complete Advance Directive - Does the patient have other document types Power of [place]; Do Not Resuscitate;MOST/MOLST/POST/POLST Medical Interventions: Comfort measures Artificially Administered Nutrition: No feeding tube Other: As far as possible, the palliative care team has discussed with patient / health care proxy about goals of care / treatment preferences for patient. HISTORY:  
 
History obtained from: chart CHIEF COMPLAINT: sore throat HPI/SUBJECTIVE: The patient is:  
[x] Verbal and participatory [] Non-participatory due to:  
Patient is lethargic. Wakes up periodically to talk with visitors. Clinical Pain Assessment (nonverbal scale for nonverbal patients): Clinical Pain Assessment Severity: 0 Activity (Movement): Lying quietly, normal position Duration: for how long has pt been experiencing pain (e.g., 2 days, 1 month, years) Frequency: how often pain is an issue (e.g., several times per day, once every few days, constant) FUNCTIONAL ASSESSMENT:  
 
Palliative Performance Scale (PPS): PPS: 10 
 
ECOG 
ECOG Status : Completely disabled PSYCHOSOCIAL/SPIRITUAL SCREENING:  
  
Any spiritual / Caodaism concerns: 
[] Yes /  [x] No 
 
Caregiver Burnout: 
[] Yes /  [x] No /  [] No Caregiver Present Anticipatory grief assessment:  
[x] Normal  / [] Maladaptive REVIEW OF SYSTEMS:  
 
Positive and pertinent negative findings in ROS are noted above in HPI. The following systems were [x] reviewed / [] unable to be reviewed as noted in HPI Other findings are noted below.  
Systems: constitutional, ears/nose/mouth/throat, respiratory, gastrointestinal, genitourinary, musculoskeletal, integumentary, neurologic, psychiatric, endocrine. Positive findings noted below. Modified ESAS Completed by: provider Fatigue: 10 Drowsiness: 10 Pain: 0 Anxiety: 0 Nausea: 0 Dyspnea: 0 Stool Occurrence(s): 0 PHYSICAL EXAM:  
 
Wt Readings from Last 3 Encounters:  
10/17/19 78.3 kg (172 lb 9.9 oz) 10/09/19 74.4 kg (164 lb) 10/07/19 72.8 kg (160 lb 8 oz) Blood pressure (!) 84/40, pulse (!) 138, temperature 98.5 °F (36.9 °C), resp. rate 14, height 5' 5\" (1.651 m), weight 78.3 kg (172 lb 9.9 oz), SpO2 94 %. Pain: 
Pain Scale 1: Numeric (0 - 10) Pain Intensity 1: 0 Pain Onset 1: acute Pain Location 1: Abdomen Pain Orientation 1: Anterior Pain Description 1: Aching Pain Intervention(s) 1: Medication (see MAR) Constitutional: Pale, chronically ill, frail, thin female in no apparent distress Eyes: pupils equal, anicteric ENMT: no nasal discharge, dry mucous membranes Respiratory: breathing unlabored, symmetric Gastrointestinal: soft non-tender Musculoskeletal: no deformity, no tenderness to palpation Skin: warm, dry Neurologic: following commands, moving all extremities; oriented X 4. lethargic HISTORY:  
 
Active Problems: 
  Neutropenic fever ([address] (10/14/2019) Stage 3 chronic kidney disease (HCC) () Generalized abdominal pain () Advanced care planning/counseling discussion () Pancytopenia ([address] () Past Medical History:  
Diagnosis Date  Acute kidney failure ([address] 12/05/2019  BMI 34.0-34.9,adult 34.8  Caffeine adverse reaction   
 elevated BP  Cancer ([address] uterine, cervical  
 Cervical cancer (HCC)  Chronic kidney disease   
 acute kidney failure  Chronic sinusitis  Dizziness  Endometriosis  Hiatal hernia  High cholesterol  Palpitation  PMB (postmenopausal bleeding)  Rash   
 eczemz  Thromboembolus ([address] 03/15/2019 IVC filter placed  Urinary incontinence Past Surgical History:  
Procedure Laterality Date  HX ABDOMINAL LAPAROSCOPY    
 HX BILATERAL SALPINGO-OOPHORECTOMY Bilateral   
 HX BREAST LUMPECTOMY Right 1973  
 benign  HX HYSTERECTOMY    
 radical  
 HX OTHER SURGICAL  09/01/2017 Exam under anesthesia: Cystoscopy, sigmoidoscopy  HX UROLOGICAL    
 nephrostomy tubes and stents  IR CHANGE EXIST CATHETER/TUBE LT  3/7/2019  IR MEDIPORT  VASCULAR SURGERY PROCEDURE UNLIST    
 filter Family History Problem Relation Age of Onset  Cancer Mother   
     left ovary History reviewed, no pertinent family history. Social History Tobacco Use  Smoking status: Never Smoker  Smokeless tobacco: Never Used Substance Use Topics  Alcohol use: No  
 
Allergies Allergen Reactions  Other Food Nausea and Vomiting Artificial sweeteners  Neulasta [Pegfilgrastim] Swelling  Aspirin Other (comments) Gi upset  Ativan [Lorazepam] Shortness of Breath Patient states that ativan makes her short of breath. I called and d/w son Everlina Ganser [phone] and he stated that patient had told him the same.  Avastin [Bevacizumab] Other (comments) Bowel perforation  Tetracycline Unknown (comments) Patient does not remember, reaction many years ago  Wheat Atopic Dermatitis  
  eczema  Other Plant, Animal, Environmental Other (comments) Pt states she has \"springtime grass allergies. \" Reports reaction: Sinus infection Current Facility-Administered Medications Medication Dose Route Frequency  morphine (ROXANOL) concentrated oral syringe 5 mg  5 mg Oral Q1H PRN  
 fentaNYL (DURAGESIC) 12 mcg/hr patch 1 Patch  1 Patch TransDERmal Q72H  morphine (PF)  mg/30 ml   IntraVENous CONTINUOUS  
 albuterol (ACCUNEB) nebulizer solution 1.25 mg  1.25 mg Nebulization Q6H PRN  
 ondansetron (ZOFRAN ODT) tablet 4 mg  4 mg Oral Q6H PRN  
 scopolamine (TRANSDERM-SCOP) 1 mg over 3 days 1 Patch  1 Patch TransDERmal Q72H PRN  
 senna-docusate (PERICOLACE) 8.6-50 mg per tablet 2 Tab  2 Tab Oral BID PRN  
 sodium chloride (NS) flush 5-40 mL  5-40 mL IntraVENous Q8H  
 sodium chloride (NS) flush 5-40 mL  5-40 mL IntraVENous PRN  
 benzocaine-menthol (CEPACOL) lozenge 1 Lozenge  1 Lozenge Mucous Membrane PRN  
 magic mouthwash (FIRST-MOUTHWASH BLM) oral suspension 10 mL  10 mL Oral TIDAC  nystatin (MYCOSTATIN) 100,000 unit/mL oral suspension 500,000 Units  500,000 Units Oral QID  morphine injection 2 mg  2 mg IntraVENous Q4H PRN  
 acetaminophen (TYLENOL) tablet 650 mg  650 mg Oral Q6H PRN  
 magnesium hydroxide (MILK OF MAGNESIA) 400 mg/5 mL oral suspension 30 mL  30 mL Oral DAILY PRN  
 
 
 LAB AND IMAGING FINDINGS:  
 
Lab Results Component Value Date/Time WBC 0.4 (LL) 10/16/2019 08:10 AM  
 HGB 6.7 (L) 10/16/2019 08:10 AM  
 PLATELET 18 (LL) 79/21/9082 08:10 AM  
 
Lab Results Component Value Date/Time Sodium 134 (L) 10/16/2019 08:10 AM  
 Potassium 5.4 10/16/2019 08:10 AM  
 Chloride 101 10/16/2019 08:10 AM  
 CO2 22 10/16/2019 08:10 AM  
  (H) 10/16/2019 08:10 AM  
 Creatinine 5.76 (H) 10/16/2019 08:10 AM  
 Calcium 7.8 (L) 10/16/2019 08:10 AM  
 Magnesium 2.2 10/16/2019 08:10 AM  
 Phosphorus 7.5 (H) 10/16/2019 08:10 AM  
  
Lab Results Component Value Date/Time AST (SGOT) 34 10/16/2019 08:10 AM  
 Alk. phosphatase 83 10/16/2019 08:10 AM  
 Protein, total 6.3 (L) 10/16/2019 08:10 AM  
 Albumin 1.8 (L) 10/16/2019 08:10 AM  
 Globulin 4.5 (H) 10/16/2019 08:10 AM  
 
Lab Results Component Value Date/Time INR 1.4 (H) 10/16/2019 08:10 AM  
 Prothrombin time 17.0 (H) 10/16/2019 08:10 AM  
 aPTT 28.7 10/15/2019 11:45 AM  
  
Lab Results Component Value Date/Time Iron 12 (L) 03/07/2019 05:24 PM  
 Ferritin 603 (H) 03/07/2019 05:24 PM  
  
Lab Results Component Value Date/Time  pH 7.319 (L) 09/15/2017 12:30 PM  
 PCO2 40.7 09/15/2017 12:30 PM  
 PO2 131.6 (H) 09/15/2017 12:30 PM  
 
No components found for: [place] Lab Results Component Value Date/Time CK 50 10/15/2019 07:31 PM  
 CK - MB <1.0 10/15/2019 07:31 PM  
  
 
   
 
Total time: 35 minutes Counseling / coordination time, spent as noted above: 30 minutes > 50% counseling / coordination: patient, family, MD, RN, hospice liaison Prolonged service was provided for  []30 min   []75 min in face to face time in the presence of the patient, spent as noted above. Time Start:  
Time End:  
Note: this can only be billed with 10044 (initial) or 17052 (follow up). If multiple start / stop times, list each separately.

## 2019-10-18 NOTE — HOSPICE
Bedside visit. Patient is imminent. Palliative team and family present. Palliative NP to notify attending. Hospice will continue to follow.

## 2019-10-19 NOTE — PROGRESS NOTES
responded to Death of  Shun Sierra, who was a 71 y.o.,female, The  provided the following Interventions: 
Provided crisis pastoral care, pastoral support and grief interventions. Offered prayers on behalf of the patient. Chart reviewed. Plan: 
Chaplains will continue to follow and will provide pastoral care on an as needed/requested basis and grief support for the family. amanda Jackman Spiritual Care  
[phone]

## 2019-10-19 NOTE — PROGRESS NOTES
[facility] Death Pronouncement Note Patient: Farhad Salas MRN: [id number] SSN: [id number]  YOB: 1950 Age: 71 y.o. Sex: female Admission Date: 10/14/2019 10:34 AM Time of Death: 1:50 AM  
    
Comments:  
Called to patient's bedside for apnea and pulselessness. Patient lying in bed, eyes closed, mouth open, unresponsive to voice or painful stimuli. No spontaneous respirations and chest rise absent. No palpable carotid pulse bilaterally. No heart sounds or breath sounds. Pupils fixed and dilated bilaterally. Corneal reflexes absent. Family present, questions answered to their satisfaction. Death officially pronounced at 1:50 AM 10/19/2019. Attending physician, Dr. Bong Adames, to be notified by nursing. Ally Kang MD, PGY-1  
[facility] Medicine Senior Pager: [phone] October 19, 2019, 1:55 AM

## 2019-10-19 NOTE — PROGRESS NOTES
visited with the family of Ad Dangelo, who is a 71 y.o.,female. The  provided the following Interventions: 
Initiated a relationship of care and support. Offered prayer and assurance of continued prayers on patients behalf. Plan: 
Chaplains will continue to follow and will provide pastoral care on an as needed/requested basis.  recommends bedside caregivers page  on duty if patient shows signs of acute spiritual or emotional distress. Chaplain Maria Eugenia Morfin Spiritual Care  
[phone]

## 2019-10-19 NOTE — PROGRESS NOTES
Family member motioned me into the room stating pt's breathing changed. On assessment, breathing at 5 breaths per minute with pt taking irregular shallow breaths. 0110: Pt stopped breathing and no pulse found. MD and  paged. Pt's family left alone to grieve. 0430: Postmortem care completed after consent from family. Family members at bedside. Family wished to stay with pt to say final goodbyes after care was completed. 2592: Pt transported to [facility].

## 2019-10-20 LAB
BACTERIA SPEC CULT: NORMAL
SERVICE CMNT-IMP: NORMAL

## 2019-10-21 ENCOUNTER — HOME CARE VISIT (OUTPATIENT)
Dept: HOSPICE | Facility: HOSPICE | Age: 69
End: 2019-10-21

## 2019-10-23 NOTE — CDMP QUERY
Patient admitted with neutropenia  and endometrial cancer . Zo Dillon Documentation in  ER  MD notes Sepsis bundle initiated and sepsis noted in differential diagnosis list.  
 
If possible, please clarify in  d/c summary if sepsis   was  :  
 
? Ruled out after study ? Still Suspected after study ? Confirmed after study The medical record reflects the following: 
 
  Risk Factors:  neutropenia Clinical Indicators: Temp 100.9;   HR   134;    RR  36;     WBC  0.4;    lactic acid   1.07; 
urology  consult-   Sepsis, due to unspecified organism, unspecified whether acute organ dysfunction present Treatment:   IV   ABX Thank you   Tram Valiente RN   CCDS    x [phone]

## 2019-10-23 NOTE — DISCHARGE SUMMARY
[address], [address] DISCHARGE SUMMARY Name: Shahram Roe MRN: [id number] Age / Sex: 71 y.o. / female CSN: [id number] YOB: 1950 Length of Stay: 5 days Admit Date: 10/14/2019 Discharge Date: PRIMARY CARE PHYSICIAN: Hector Link MD 
 
 
DISCHARGE DIAGNOSES: 
1. Obstructive uropathy 2. BECK on CKD2 secondary to obstruction and likely dehydrations 3. Afib with RVR 4. Pancytopenia 5. Metastatic Endometrial cancer 6. Neutropenic fever 7. Hypocalcemia 8. Hyperkalemia. 9. Hematuria 10. L iliac fossa fluid collection, concern for possible abscess 11. Hx of sigmoid colon perforation with percutaneous drain placement 12- I doubt Sepsis CONSULTS CALLED: PCCM, Urology, Nephrology, Oncology, Oncogynecology, Colorectal surgery. PROCEDURES DONE: None COURSE IN THE HOSPITAL: This is a 59-year-old female with known endometrial cancer who presented to the ED with complaints of nausea and abdominal pain. Patient was evaluated and was noted to have acute renal failure and evidence of hydronephrosis. Patient also had hyperkalemia in the setting of acute renal failure. CT scan also showed left iliac fossa fluid collection which was concerning for abscess. Patient was noted to have pancytopenia and neutropenia. Patient was admitted. Patient was started on broad-spectrum antibiotics. Cultures were requested. Urology was consulted. Nephrology was consulted. Patient became significantly tachycardic and RRT was called and patient was noted to be severely anemic. Patient was transferred to ICU and received packed RBC transfusion. Potassium showed improvement. Urology recommended a percutaneous nephrostomy tube. But patient was thrombocytopenic and was high risk for any intervention. Interventional radiology was also consulted. Oncology saw the patient.   And alcohol gynecology Dr. Jeff Sen and also saw the patient. After discussions with the patient and family patient was made DNR and DNI. Palliative care was consulted. Patient wished to go home with hospice. Hospice was consulted and discussed with the patient and son. Arrangements were made for home hospice. But patient's condition deteriorated and so patient was continued in the hospital.  Patient subsequently passed away in the hospital. 
 
 
MEDICATIONS ON DISCHARGE: 
 
Patient  in [address]:  Patient  Signed:  Pushpa Isbell MD 
 
  10/23/2019

## 2019-10-28 ENCOUNTER — APPOINTMENT (OUTPATIENT)
Dept: INFUSION THERAPY | Age: 69
End: 2019-10-28
Payer: COMMERCIAL

## 2019-10-28 NOTE — CDMP QUERY
10/14 Blood Cx:  ANAEROBIC BOTTLE BACTEROIDES THETAIOTAOMICRON BETA LACTAMASE POSITIVE Please clarify the clinical significance of the above Blood culture finding. Is the condition: »  Clinically insignificant »  Clinically significant (please also document diagnosis and clinical significance) »  Unable to determine clinical significance »  Other (please specify)___________________________ »  Unknown Thank you. Raven Lieberman RN BSN CCDS [phone]

## 2019-10-29 ENCOUNTER — APPOINTMENT (OUTPATIENT)
Dept: INFUSION THERAPY | Age: 69
End: 2019-10-29
Payer: COMMERCIAL

## 2019-10-30 ENCOUNTER — APPOINTMENT (OUTPATIENT)
Dept: INFUSION THERAPY | Age: 69
End: 2019-10-30
Payer: COMMERCIAL

## 2019-10-30 NOTE — PROGRESS NOTES
Late entry- 10/29/19, 8.06 pm 
+ blood cx with bacteroides from 10/14- likely contaminant. Patient also had sever protein calorie malnutrition.

## 2019-10-31 ENCOUNTER — APPOINTMENT (OUTPATIENT)
Dept: INFUSION THERAPY | Age: 69
End: 2019-10-31
Payer: COMMERCIAL

## 2019-11-01 ENCOUNTER — APPOINTMENT (OUTPATIENT)
Dept: INFUSION THERAPY | Age: 69
End: 2019-11-01
